# Patient Record
Sex: FEMALE | Race: WHITE | NOT HISPANIC OR LATINO | Employment: OTHER | URBAN - METROPOLITAN AREA
[De-identification: names, ages, dates, MRNs, and addresses within clinical notes are randomized per-mention and may not be internally consistent; named-entity substitution may affect disease eponyms.]

---

## 2017-01-24 DIAGNOSIS — I10 ESSENTIAL HYPERTENSION: ICD-10-CM

## 2017-01-24 DIAGNOSIS — E78.5 HYPERLIPIDEMIA, UNSPECIFIED HYPERLIPIDEMIA TYPE: ICD-10-CM

## 2017-01-24 DIAGNOSIS — Z86.73 HISTORY OF TIA (TRANSIENT ISCHEMIC ATTACK): ICD-10-CM

## 2017-01-24 RX ORDER — FLUTICASONE PROPIONATE 110 UG/1
1 AEROSOL, METERED RESPIRATORY (INHALATION) 2 TIMES DAILY
Qty: 12 G | Refills: 4 | Status: SHIPPED | OUTPATIENT
Start: 2017-01-24 | End: 2021-01-04 | Stop reason: SDUPTHER

## 2017-01-24 RX ORDER — VALSARTAN 160 MG/1
160 TABLET ORAL DAILY
Qty: 30 TABLET | Refills: 4 | Status: SHIPPED | OUTPATIENT
Start: 2017-01-24 | End: 2017-04-17 | Stop reason: SDUPTHER

## 2017-01-24 RX ORDER — METOPROLOL SUCCINATE 100 MG/1
100 TABLET, EXTENDED RELEASE ORAL DAILY
Qty: 90 TABLET | Refills: 1 | Status: SHIPPED | OUTPATIENT
Start: 2017-01-24 | End: 2017-04-17 | Stop reason: SDUPTHER

## 2017-01-24 RX ORDER — PRAVASTATIN SODIUM 40 MG/1
40 TABLET ORAL DAILY
Qty: 90 TABLET | Refills: 1 | Status: SHIPPED | OUTPATIENT
Start: 2017-01-24 | End: 2017-04-17 | Stop reason: SDUPTHER

## 2017-01-24 NOTE — TELEPHONE ENCOUNTER
----- Message from Kamala Carpenter sent at 1/24/2017 11:12 AM CST -----  Would like a refill on medication. Pt uses.  [id number] - [place] - [address] AT [facility] [address]  [address]  [facility] [address]  Phone: [phone] Fax: [phone]    Please call back at [phone]. Thanks///cdb

## 2017-01-27 ENCOUNTER — OFFICE VISIT (OUTPATIENT)
Dept: INTERNAL MEDICINE | Facility: CLINIC | Age: 82
End: 2017-01-27
Payer: MEDICARE

## 2017-01-27 VITALS
OXYGEN SATURATION: 97 % | WEIGHT: 205 LBS | SYSTOLIC BLOOD PRESSURE: 120 MMHG | HEIGHT: 59 IN | DIASTOLIC BLOOD PRESSURE: 72 MMHG | HEART RATE: 62 BPM | BODY MASS INDEX: 41.33 KG/M2

## 2017-01-27 DIAGNOSIS — I48.0 PAF (PAROXYSMAL ATRIAL FIBRILLATION): Chronic | ICD-10-CM

## 2017-01-27 DIAGNOSIS — Z00.00 ENCOUNTER FOR PREVENTIVE HEALTH EXAMINATION: Primary | ICD-10-CM

## 2017-01-27 DIAGNOSIS — E66.01 MORBID OBESITY WITH BMI OF 40.0-44.9, ADULT: ICD-10-CM

## 2017-01-27 DIAGNOSIS — I25.10 CORONARY ARTERY DISEASE INVOLVING NATIVE CORONARY ARTERY OF NATIVE HEART WITHOUT ANGINA PECTORIS: Chronic | ICD-10-CM

## 2017-01-27 DIAGNOSIS — E78.5 HYPERLIPIDEMIA, UNSPECIFIED HYPERLIPIDEMIA TYPE: Chronic | ICD-10-CM

## 2017-01-27 DIAGNOSIS — I50.32 CHRONIC DIASTOLIC CONGESTIVE HEART FAILURE: ICD-10-CM

## 2017-01-27 DIAGNOSIS — R94.4 DECREASED GFR: ICD-10-CM

## 2017-01-27 DIAGNOSIS — I10 ESSENTIAL HYPERTENSION: Chronic | ICD-10-CM

## 2017-01-27 DIAGNOSIS — H91.90 HEARING DIFFICULTY, UNSPECIFIED LATERALITY: ICD-10-CM

## 2017-01-27 DIAGNOSIS — I38 HEART VALVE REGURGITATION: ICD-10-CM

## 2017-01-27 DIAGNOSIS — I51.89 LEFT VENTRICULAR DIASTOLIC DYSFUNCTION WITH PRESERVED SYSTOLIC FUNCTION: Chronic | ICD-10-CM

## 2017-01-27 DIAGNOSIS — J45.909 UNCOMPLICATED ASTHMA, UNSPECIFIED ASTHMA SEVERITY: Chronic | ICD-10-CM

## 2017-01-27 DIAGNOSIS — R32 URINARY INCONTINENCE, UNSPECIFIED TYPE: ICD-10-CM

## 2017-01-27 DIAGNOSIS — I70.0 CALCIFICATION OF AORTA: ICD-10-CM

## 2017-01-27 DIAGNOSIS — M19.90 OSTEOARTHRITIS, UNSPECIFIED OSTEOARTHRITIS TYPE, UNSPECIFIED SITE: ICD-10-CM

## 2017-01-27 PROCEDURE — 99999 PR PBB SHADOW E&M-EST. PATIENT-LVL IV: CPT | Mod: PBBFAC,,, | Performed by: NURSE PRACTITIONER

## 2017-01-27 PROCEDURE — 99499 UNLISTED E&M SERVICE: CPT | Mod: S$GLB,,, | Performed by: NURSE PRACTITIONER

## 2017-01-27 PROCEDURE — 3078F DIAST BP <80 MM HG: CPT | Mod: S$GLB,,, | Performed by: NURSE PRACTITIONER

## 2017-01-27 PROCEDURE — G0439 PPPS, SUBSEQ VISIT: HCPCS | Mod: S$GLB,,, | Performed by: NURSE PRACTITIONER

## 2017-01-27 PROCEDURE — 3074F SYST BP LT 130 MM HG: CPT | Mod: S$GLB,,, | Performed by: NURSE PRACTITIONER

## 2017-01-27 NOTE — MR AVS SNAPSHOT
[facility]  [id number] [place] [id number]  Phone: [phone]  Fax: [phone]                  Elke Laws   2017 9:00 AM   Office Visit    Description:  Female : 10/15/1934   Provider:  Beronica Walters NP   Department:  [facility] - Internal Medicine           Reason for Visit     Health Risk Assessment           Diagnoses this Visit        Comments    Encounter for preventive health examination    -  Primary     Chronic diastolic congestive heart failure         Left ventricular diastolic dysfunction with preserved systolic function         Coronary artery disease involving native coronary artery of native heart without angina pectoris         PAF (paroxysmal atrial fibrillation)         Essential hypertension         Hyperlipidemia, unspecified hyperlipidemia type         Calcification of aorta         Heart valve regurgitation         Uncomplicated asthma, unspecified asthma severity         Decreased GFR         Urinary incontinence, unspecified type         Hearing difficulty, unspecified laterality         Osteoarthritis, unspecified osteoarthritis type, unspecified site         Morbid obesity with BMI of 40.0-44.9, adult                To Do List           Future Appointments        Provider Department Dept Phone    2017 8:40 AM Isidra Benavidez MD [facility] Internal [facility] [phone]      Goals (5 Years of Data)     None      Follow-Up and Disposition     Return in 4 months (on 2017).      [facility]     [facility] On [facility] Nurse Care Line -  Assistance  Registered nurses in the [facility] On Call Center provide clinical advisement, health education, appointment booking, and other advisory services.  Call for this free service at [phone].             Medications           Message regarding Medications     Verify the changes and/or additions to your medication regime listed below are the same as discussed with your clinician  today.  If any of these changes or additions are incorrect, please notify your healthcare provider.             Verify that the below list of medications is an accurate representation of the medications you are currently taking.  If none reported, the list may be blank. If incorrect, please contact your healthcare provider. Carry this list with you in case of emergency.           Current Medications     acetaminophen (TYLENOL) 500 MG tablet Take 1 tablet (500 mg total) by mouth 2 (two) times daily.    albuterol 90 mcg/actuation inhaler Inhale 2 puffs into the lungs 4 (four) times daily.    albuterol sulfate 2.5 mg/0.5 mL Nebu Take 2.5 mg by nebulization every 4 (four) hours as needed.    apixaban (ELIQUIS) 5 mg Tab Take 1 tablet (5 mg total) by mouth 2 (two) times daily.    ASCORBATE CALCIUM (VITAMIN C ORAL) Take by mouth once daily.    compressor, for nebulizer Lara Compressor use as directed by albuterol use.    DOCOSAHEXANOIC ACID/EPA (FISH OIL ORAL) Take by mouth once daily.    flecainide (TAMBOCOR) 50 MG Tab Take 1 tablet (50 mg total) by mouth every 12 (twelve) hours.    fluticasone (FLOVENT HFA) 110 mcg/actuation inhaler Inhale 1 puff into the lungs 2 (two) times daily.    furosemide (LASIX) 40 MG tablet Take 1 tablet (40 mg total) by mouth once daily.    isosorbide mononitrate (IMDUR) 30 MG 24 hr tablet TAKE 1 TABLET(30 MG) BY MOUTH EVERY DAY    loratadine (CLARITIN) 10 mg tablet Take 1 tablet (10 mg total) by mouth once daily.    metoprolol succinate (TOPROL-XL) 100 MG 24 hr tablet Take 1 tablet (100 mg total) by mouth once daily.    pravastatin (PRAVACHOL) 40 MG tablet Take 1 tablet (40 mg total) by mouth once daily.    valsartan (DIOVAN) 160 MG tablet Take 1 tablet (160 mg total) by mouth once daily.           Clinical Reference Information           Vital Signs - Last Recorded  Most recent update: 1/27/2017  8:56 AM by Jessica Gifford MA    BP Pulse Ht Wt LMP SpO2    120/72 (BP Location: Left arm,  "Patient Position: Sitting, BP Method: Manual) 62 4' 10.5" (1.486 m) 93 kg (205 lb 0.4 oz) 12/13/1973 97%    BMI                42.12 kg/m2          Blood Pressure          Most Recent Value    BP  120/72      Allergies as of 1/27/2017     Iodine And Iodide Containing Products      Immunizations Administered on Date of Encounter - 1/27/2017     None      [facility] Sign-Up     Activating your [facility] account is as easy as 1-2-3!     1) Visit [email or web address], select Sign Up Now, enter this activation code and your date of birth, then select Next.  [email or web address]  Expires: 3/13/2017 10:15 AM      2) Create a username and password to use when you visit [facility] in the future and select a security question in case you lose your password and select Next.    3) Enter your e-mail address and click Sign Up!    Additional Information  If you have questions, please e-mail [email or web address] or call [phone] to talk to our [facility] staff. Remember, [facility] is NOT to be used for urgent needs. For medical emergencies, dial 911.         Instructions      Counseling and Referral of Other Preventative  (Italic type indicates deductible and co-insurance are waived)    Patient Name: Elke Laws  Today's Date: 1/27/2017      SERVICE LIMITATIONS RECOMMENDATION    Vaccines    · Pneumococcal (once after 65)    · Influenza (annually)    · Hepatitis B (if medium/high risk)    · Prevnar 13      Hepatitis B medium/high risk factors:       - End-stage renal disease       - Hemophiliacs who received Factor VII or         IX concentrates       - Clients of institutions for the mentally             retarded       - Persons who live in the same house as          a HepB carrier       - Homosexual men       - Illicit injectable drug abusers     Pneumococcal: Done, 11/1/2007     Influenza: Done, 10/4/2016     Hepatitis B: Recommended to follow up with PCP to determine the need for Hep B vaccine.     Prevnar 13: Done, 12/8/2015    " Mammogram (biennial age 50-74)  Annually (age 40 or over)  Family history of breast cancer (sister). Mammogram 6/7/2012, report recommended a 1 year repeat. Recommended to follow up with PCP to discuss scheduling mammogram.    Pap (up to age 70 and after 70 if unknown history or abnormal study last 10 years)   Denies any history of abnormal pap smears. Recommended to follow up with PCP to determine the need for pap smear.     Colorectal cancer screening (to age 75)    · Fecal occult blood test (annual)  · Flexible sigmoidoscopy (5y)  · Screening colonoscopy (10y)  · Barium enema  Family history of colon cancer (mother and father). History of colon polyps.  Colonoscopy 3/27/2011. Recommended to follow up with PCP to discuss scheduling colorectal cancer screening.      Diabetes self-management training (no USPSTF recommendations)  Requires referral by treating physician for patient with diabetes or renal disease. 10 hours of initial DSMT sessions of no less than 30 minutes each in a continuous 12-month period. 2 hours of follow-up DSMT in subsequent years.  Patient is not a diabetic and does not have any known renal disease     Bone mass measurements (age 65 & older, biennial)  Requires diagnosis related to osteoporosis or estrogen deficiency. Biennial benefit unless patient has history of long-term glucocorticoid  DEXA 5/12/2010. Advised to follow up with PCP to schedule.     Glaucoma screening (no USPSTF recommendation)  Diabetes mellitus, family history   , age 50 or over    American, age 65 or over Eye exam 1/22/2016. Had an appointment this morning but was told she did not need an eye appointment at this time.     Medical nutrition therapy for diabetes or renal disease (no recommended schedule)  Requires referral by treating physician for patient with diabetes or renal disease or kidney transplant within the past 3 years.  Can be provided in same year as diabetes self-management training  (DSMT), and CMS recommends medical nutrition therapy take place after DSMT. Up to 3 hours for initial year and 2 hours in subsequent years. Patient is not a diabetic and does not have any known renal disease     Cardiovascular screening blood tests (every 5 years)  · Fasting lipid panel  Order as a panel if possible  Lipid 6/2/2016    Diabetes screening tests (at least every 3 years, Medicare covers annually or at 6-month intervals for prediabetic patients)  · Fasting blood sugar (FBS) or glucose tolerance test (GTT)  Patient must be diagnosed with one of the following:       - Hypertension       - Dyslipidemia       - Obesity (BMI 30kg/m2)       - Previous elevated impaired FBS or GTT       ... or any two of the following:       - Overweight (BMI 25 but <30)       - Family history of diabetes       - Age 65 or older       - History of gestational diabetes or birth of baby weighing more than 9 pounds  CMP 12/30/2016, reports was not fasting. Blood sugar 128H. Advised to follow up with PCP to further evaluate.      Abdominal aortic aneurysm screening (once)  · Sonogram   Limited to patients who meet one of the following criteria:       - Men who are 65-75 years old and have smoked more than 100 cigarette in their lifetime       - Anyone with a family history of abdominal aortic aneurysm       - Anyone recommended for screening by the USPSTF N/A    HIV screening (annually for increased risk patients)  · HIV-1 and HIV-2 by EIA, or PATRICE, rapid antibody test or oral mucosa transudate  Patients must be at increased risk for HIV infection per USPSTF guidelines or pregnant. Tests covered annually for patient at increased risk or as requested by the patient. Pregnant patients may receive up to 3 tests during pregnancy.  Risks discussed, recommended to follow up with PCP to determine the need for HIV screening.     Smoking cessation counseling (up to 8 sessions per year)  Patients must be asymptomatic of tobacco-related  conditions to receive as a preventative service.  N/A    Subsequent annual wellness visit  At least 12 months since last AWV  Return in one year     The following information is provided to all patients.  This information is to help you find resources for any of the problems found today that may be affecting your health:                Living healthy guide: www.[facility].[place].[facility]      Understanding Diabetes: www.diabetes.org      Eating healthy: www.cdc.gov/healthyweight      CDC home safety checklist: www.cdc.gov/steadi/patient.html      Agency on Aging: www.goea.[place].[facility]      Alcoholics anonymous (AA): www.aa.org      Physical Activity: www.cody.nih.gov/gv3cvon      Tobacco use: www.quitwithusla.org

## 2017-01-27 NOTE — PROGRESS NOTES
"Elke Laws presented for a  Medicare AWV and comprehensive Health Risk Assessment today. The following components were reviewed and updated:    · Medical history  · Family History  · Social history  · Allergies and Current Medications  · Health Risk Assessment  · Health Maintenance  · Care Team     ** See Completed Assessments for Annual Wellness Visit within the encounter summary.**       The following assessments were completed:  · Living Situation  · CAGE  · Depression Screening  · Timed Get Up and Go  · Whisper Test  · Cognitive Function Screening  · Nutrition Screening  · ADL Screening  · PAQ Screening    Vitals:    01/27/17 0856   BP: 120/72   BP Location: Left arm   Patient Position: Sitting   BP Method: Manual   Pulse: 62   SpO2: 97%   Weight: 93 kg (205 lb 0.4 oz)   Height: 4' 10.5" (1.486 m)     Body mass index is 42.12 kg/(m^2).  Physical Exam   Constitutional: She is oriented to person, place, and time. She appears well-developed and well-nourished.   HENT:   Head: Normocephalic.   Cardiovascular: Normal rate, regular rhythm and normal heart sounds.    No murmur heard.  Pulmonary/Chest: Effort normal and breath sounds normal. No respiratory distress.   Abdominal: Soft. She exhibits no mass. There is no tenderness.   Musculoskeletal: Normal range of motion. She exhibits no edema.   Neurological: She is alert and oriented to person, place, and time. She exhibits normal muscle tone.   Skin: Skin is warm, dry and intact.   Psychiatric: She has a normal mood and affect. Her speech is normal and behavior is normal.   Nursing note and vitals reviewed.        Diagnoses and health risks identified today and associated recommendations/orders:    1. Encounter for preventive health examination    2. Chronic diastolic congestive heart failure  See echo on #3.   Reports SOB on exertion, not new and not worsening. Cardiology is aware of SOB on exertion.   Denies chest pains or palpitations.  Denies lightheadedness, " "dizziness, near syncope, or syncope.    Discussed s/s of MI and stroke (patient denies any s/s at this time) and advised to go to the ER if occur. She expressed understanding.   Stable. Continue current treatment plan as previously prescribed with your cardiologist, Dr. Angulo.     3. Left ventricular diastolic dysfunction with preserved systolic function  Echo 6/16/2015---CONCLUSIONS     1 - Normal left ventricular systolic function (EF 60-65%).     2 - Left ventricular diastolic dysfunction.     3 - Normal right ventricular systolic function .   Stable. Continue current treatment plan as previously prescribed with your cardiologist, Dr. Angulo, and PCP.     4. Coronary artery disease involving native coronary artery of native heart without angina pectoris  Cath 6/30/2016---B. Summary/Post-Operative Diagnosis   1.   Mild non-obstructive CAD.   2.   Normal LVEF.                3.  Elevated LVEDP.  Reports was told she had a "mini heart attack" in the past.   Stable. Continue current treatment plan as previously prescribed with cardiology.     5. PAF (paroxysmal atrial fibrillation)  EKG 5/7/2015---Atrial fibrillation  Abnormal ECG  When compared with ECG of 20-MAY-2014 09:29,  Previous ECG has undetermined rhythm, needs review  Confirmed by OLIVIA GAGNON MD ([id number]) on 5/7/2015 3:21:45 PM  Patient reports was told by one doctor she had 2 mini strokes but another doctor said she did not  Stable and controlled. Continue current treatment plan as previously prescribed with your PCP and cardiologist.     6. Essential hypertension  Stable and controlled. Continue current treatment plan as previously prescribed with your PCP and cardiologist.     7. Hyperlipidemia, unspecified hyperlipidemia type  Stable and controlled. Continue current treatment plan as previously prescribed with your PCP and cardiologist.     8. Calcification of aorta  CXR 12/30/2016--- Aorta is tortuous with underlying calcification.   Stable. Continue " "current treatment plan as previously prescribed with your PCP and cardiologist.     9. Heart valve regurgitation  Echo 6/16/2015---Aortic Valve:  The aortic valve is mildly sclerotic with normal leaflet mobility. The peak gradient obtained across the aortic valve is 19.0 mmHg, with a mean gradient of 9.0 mmHg. Using a left ventricular outflow tract diameter of 1.8 cm, a left ventricular outflow tract velocity time integral of 51 cm, and a peak instantaneous transvalvular velocity of  m/s, the calculated aortic valve area is 2.4 cm2. Additionally, there is mild aortic regurgitation. There is a pressure half time of 539.0   msec.   Mitral Valve:  The mitral valve is mildly sclerotic. There is mild mitral regurgitation. There is marked mitral annular calcification.   Tricuspid Valve:  The tricuspid valve is normal in structure. There is mild tricuspid regurgitation.   Pulmonary Valve:  The pulmonic valve is not well seen.   Stable. Continue current treatment plan as previously prescribed with your PCP and cardiologist.     10. Uncomplicated asthma, unspecified asthma severity  Reports she had an "asthma attack" this am but used her inhaler and it resolved. Reports that is the first she has had in a while. Reports she feels okay now. Reports wheezing this am that resolved with use of inhaler. Denies cough. Reports she is at her respiratory baseline.   Continue current treatment plan as previously prescribed with your PCP.   Advised to follow up with PCP for any future attacks or any worsening of breathing. She expressed understanding.       11. Decreased GFR  Discussed with patient.   Advised to follow up with PCP for monitoring and further recommendations. She expressed understanding.      12. Urinary incontinence, unspecified type  Reports daily urinary incontinence, ongoing for a "long time", denies any worsening. Wears depends.   Advised to follow up with PCP for further evaluation and recommendations. She expressed " understanding.      13. Hearing difficulty, unspecified laterality  Reports difficulty hearing. Abnormal whisper test.   Advised to follow up with PCP for further evaluation and recommendations. She expressed understanding.     14. Osteoarthritis, unspecified osteoarthritis type, unspecified site  Per patient stable.   Stable. Continue current treatment plan as previously prescribed with your PCP.     15. Morbid obesity with BMI of 40.0-44.9, adult  Continue current treatment plan as previously prescribed with your PCP.     Advised to follow up with PCP for further evaluation and treatment of back pain. She expressed understanding.     Abnormal Cognitive Function Screening (3). Patient denies any memory difficulties. Reports she gets nervous when she has to do things like this (referring to the cognitive screening)Advised to follow up with PCP for further evaluation. She expressed understanding.    Reports intermittent numbness and tingling (very seldom, been a while since she has noticed it) of left arm/hand, ongoing for about a year, denies any worsening. Advised to follow up with PCP for further evaluation. She expressed understanding.      Discussed health maintenance with patient and advised to follow up with PCP to discuss screenings. Education, counseling, and referrals were provided as needed. After Visit Summary printed and given to patient which includes a list of additional screenings\tests needed.     Return in 4 months (on 6/9/2017).    Beronica Walters NP

## 2017-01-27 NOTE — PATIENT INSTRUCTIONS
Counseling and Referral of Other Preventative  (Italic type indicates deductible and co-insurance are waived)    Patient Name: Elke Laws  Today's Date: 1/27/2017      SERVICE LIMITATIONS RECOMMENDATION    Vaccines    · Pneumococcal (once after 65)    · Influenza (annually)    · Hepatitis B (if medium/high risk)    · Prevnar 13      Hepatitis B medium/high risk factors:       - End-stage renal disease       - Hemophiliacs who received Factor VII or         IX concentrates       - Clients of institutions for the mentally             retarded       - Persons who live in the same house as          a HepB carrier       - Homosexual men       - Illicit injectable drug abusers     Pneumococcal: Done, 11/1/2007     Influenza: Done, 10/4/2016     Hepatitis B: Recommended to follow up with PCP to determine the need for Hep B vaccine.     Prevnar 13: Done, 12/8/2015    Mammogram (biennial age 50-74)  Annually (age 40 or over)  Family history of breast cancer (sister). Mammogram 6/7/2012, report recommended a 1 year repeat. Recommended to follow up with PCP to discuss scheduling mammogram.    Pap (up to age 70 and after 70 if unknown history or abnormal study last 10 years)   Denies any history of abnormal pap smears. Recommended to follow up with PCP to determine the need for pap smear.     Colorectal cancer screening (to age 75)    · Fecal occult blood test (annual)  · Flexible sigmoidoscopy (5y)  · Screening colonoscopy (10y)  · Barium enema  Family history of colon cancer (mother and father). History of colon polyps.  Colonoscopy 3/27/2011. Recommended to follow up with PCP to discuss scheduling colorectal cancer screening.      Diabetes self-management training (no USPSTF recommendations)  Requires referral by treating physician for patient with diabetes or renal disease. 10 hours of initial DSMT sessions of no less than 30 minutes each in a continuous 12-month period. 2 hours of follow-up DSMT in subsequent years.   Patient is not a diabetic and does not have any known renal disease     Bone mass measurements (age 65 & older, biennial)  Requires diagnosis related to osteoporosis or estrogen deficiency. Biennial benefit unless patient has history of long-term glucocorticoid  DEXA 5/12/2010. Advised to follow up with PCP to schedule.     Glaucoma screening (no USPSTF recommendation)  Diabetes mellitus, family history   , age 50 or over    American, age 65 or over Eye exam 1/22/2016. Had an appointment this morning but was told she did not need an eye appointment at this time.     Medical nutrition therapy for diabetes or renal disease (no recommended schedule)  Requires referral by treating physician for patient with diabetes or renal disease or kidney transplant within the past 3 years.  Can be provided in same year as diabetes self-management training (DSMT), and CMS recommends medical nutrition therapy take place after DSMT. Up to 3 hours for initial year and 2 hours in subsequent years. Patient is not a diabetic and does not have any known renal disease     Cardiovascular screening blood tests (every 5 years)  · Fasting lipid panel  Order as a panel if possible  Lipid 6/2/2016    Diabetes screening tests (at least every 3 years, Medicare covers annually or at 6-month intervals for prediabetic patients)  · Fasting blood sugar (FBS) or glucose tolerance test (GTT)  Patient must be diagnosed with one of the following:       - Hypertension       - Dyslipidemia       - Obesity (BMI 30kg/m2)       - Previous elevated impaired FBS or GTT       ... or any two of the following:       - Overweight (BMI 25 but <30)       - Family history of diabetes       - Age 65 or older       - History of gestational diabetes or birth of baby weighing more than 9 pounds  CMP 12/30/2016, reports was not fasting. Blood sugar 128H. Advised to follow up with PCP to further evaluate.      Abdominal aortic aneurysm screening  (once)  · Sonogram   Limited to patients who meet one of the following criteria:       - Men who are 65-75 years old and have smoked more than 100 cigarette in their lifetime       - Anyone with a family history of abdominal aortic aneurysm       - Anyone recommended for screening by the USPSTF N/A    HIV screening (annually for increased risk patients)  · HIV-1 and HIV-2 by EIA, or PATRICE, rapid antibody test or oral mucosa transudate  Patients must be at increased risk for HIV infection per USPSTF guidelines or pregnant. Tests covered annually for patient at increased risk or as requested by the patient. Pregnant patients may receive up to 3 tests during pregnancy.  Risks discussed, recommended to follow up with PCP to determine the need for HIV screening.     Smoking cessation counseling (up to 8 sessions per year)  Patients must be asymptomatic of tobacco-related conditions to receive as a preventative service.  N/A    Subsequent annual wellness visit  At least 12 months since last AWV  Return in one year     The following information is provided to all patients.  This information is to help you find resources for any of the problems found today that may be affecting your health:                Living healthy guide: www.[facility].[place].gov      Understanding Diabetes: www.diabetes.org      Eating healthy: www.cdc.gov/healthyweight      CDC home safety checklist: www.cdc.gov/steadi/patient.html      Agency on Aging: www.goea.[place].gov      Alcoholics anonymous (AA): www.aa.org      Physical Activity: www.cody.nih.gov/cl6jvfs      Tobacco use: www.quitwithusla.org

## 2017-03-03 ENCOUNTER — TELEPHONE (OUTPATIENT)
Dept: INTERNAL MEDICINE | Facility: CLINIC | Age: 82
End: 2017-03-03

## 2017-03-03 NOTE — TELEPHONE ENCOUNTER
----- Message from Mary Lepe sent at 3/3/2017 10:25 AM CST -----  Contact: [employer]  Calling to see if forms were received from them for patients Lidocaine ointment, please call them back at [phone], ref#[id number]. Thank you

## 2017-03-10 ENCOUNTER — TELEPHONE (OUTPATIENT)
Dept: PEDIATRICS | Facility: CLINIC | Age: 82
End: 2017-03-10

## 2017-03-10 NOTE — TELEPHONE ENCOUNTER
----- Message from Ashley Gómez sent at 3/10/2017 10:11 AM CST -----  Contact: [facility] [employer]  He would like to know the status of the Lidocaine ointment.  Call him at [phone] ref#[id number].                                              [place]

## 2017-03-13 ENCOUNTER — OFFICE VISIT (OUTPATIENT)
Dept: INTERNAL MEDICINE | Facility: CLINIC | Age: 82
End: 2017-03-13
Payer: MEDICARE

## 2017-03-13 VITALS
TEMPERATURE: 97 F | OXYGEN SATURATION: 97 % | DIASTOLIC BLOOD PRESSURE: 72 MMHG | HEIGHT: 58 IN | BODY MASS INDEX: 41.97 KG/M2 | HEART RATE: 50 BPM | WEIGHT: 199.94 LBS | SYSTOLIC BLOOD PRESSURE: 128 MMHG

## 2017-03-13 DIAGNOSIS — R05.9 COUGH: ICD-10-CM

## 2017-03-13 DIAGNOSIS — J06.9 VIRAL URI: ICD-10-CM

## 2017-03-13 DIAGNOSIS — R68.89 FLU-LIKE SYMPTOMS: Primary | ICD-10-CM

## 2017-03-13 LAB
FLUAV AG SPEC QL IA: NEGATIVE
FLUBV AG SPEC QL IA: NEGATIVE
SPECIMEN SOURCE: NORMAL

## 2017-03-13 PROCEDURE — 1160F RVW MEDS BY RX/DR IN RCRD: CPT | Mod: S$GLB,,, | Performed by: NURSE PRACTITIONER

## 2017-03-13 PROCEDURE — 99214 OFFICE O/P EST MOD 30 MIN: CPT | Mod: S$GLB,,, | Performed by: NURSE PRACTITIONER

## 2017-03-13 PROCEDURE — 1125F AMNT PAIN NOTED PAIN PRSNT: CPT | Mod: S$GLB,,, | Performed by: NURSE PRACTITIONER

## 2017-03-13 PROCEDURE — 3074F SYST BP LT 130 MM HG: CPT | Mod: S$GLB,,, | Performed by: NURSE PRACTITIONER

## 2017-03-13 PROCEDURE — 1157F ADVNC CARE PLAN IN RCRD: CPT | Mod: S$GLB,,, | Performed by: NURSE PRACTITIONER

## 2017-03-13 PROCEDURE — 87400 INFLUENZA A/B EACH AG IA: CPT | Mod: 59

## 2017-03-13 PROCEDURE — 99999 PR PBB SHADOW E&M-EST. PATIENT-LVL IV: CPT | Mod: PBBFAC,,, | Performed by: NURSE PRACTITIONER

## 2017-03-13 PROCEDURE — 3078F DIAST BP <80 MM HG: CPT | Mod: S$GLB,,, | Performed by: NURSE PRACTITIONER

## 2017-03-13 PROCEDURE — 1159F MED LIST DOCD IN RCRD: CPT | Mod: S$GLB,,, | Performed by: NURSE PRACTITIONER

## 2017-03-13 RX ORDER — BENZONATATE 100 MG/1
100 CAPSULE ORAL 3 TIMES DAILY PRN
Qty: 30 CAPSULE | Refills: 0 | Status: SHIPPED | OUTPATIENT
Start: 2017-03-13 | End: 2017-03-23

## 2017-03-13 NOTE — PROGRESS NOTES
Subjective:       Patient ID: Elke Laws is a 82 y.o. female.    Chief Complaint: Cough; Sinus Problem; and body aches    Cough   This is a new problem. Associated symptoms include chills, ear pain, a fever (subjective), myalgias and a sore throat. Pertinent negatives include no chest pain, headaches, rhinorrhea or shortness of breath.   Sinus Problem   Associated symptoms include chills, coughing (productive), ear pain and a sore throat. Pertinent negatives include no congestion, headaches, shortness of breath, sinus pressure or sneezing.   URI    This is a new problem. The current episode started in the past 7 days (3-4 days). The problem has been gradually worsening. Maximum temperature: subjective. Associated symptoms include coughing (productive), ear pain, sinus pain and a sore throat. Pertinent negatives include no chest pain, congestion, diarrhea, headaches, nausea, rhinorrhea, sneezing or vomiting. Associated symptoms comments: chills. Treatments tried: delsym. The treatment provided moderate relief.     Review of Systems   Constitutional: Positive for chills and fever (subjective).   HENT: Positive for ear pain and sore throat. Negative for congestion, rhinorrhea, sinus pressure and sneezing.    Eyes: Negative for discharge.   Respiratory: Positive for cough (productive). Negative for shortness of breath.    Cardiovascular: Negative for chest pain.   Gastrointestinal: Negative for diarrhea, nausea and vomiting.   Musculoskeletal: Positive for myalgias.   Neurological: Negative for headaches.       Objective:      Physical Exam   Constitutional: She is oriented to person, place, and time. She appears well-developed and well-nourished.   HENT:   Head: Normocephalic and atraumatic.   Right Ear: Tympanic membrane, external ear and ear canal normal.   Left Ear: Tympanic membrane, external ear and ear canal normal.   Nose: Rhinorrhea (clear) present. No mucosal edema. Right sinus exhibits no maxillary sinus  tenderness and no frontal sinus tenderness. Left sinus exhibits no maxillary sinus tenderness and no frontal sinus tenderness.   Eyes: Conjunctivae and EOM are normal. Pupils are equal, round, and reactive to light. Right eye exhibits no discharge. Left eye exhibits no discharge.   Neck: Normal range of motion.   Cardiovascular: Normal rate and regular rhythm.  Exam reveals no gallop and no friction rub.    No murmur heard.  Pulmonary/Chest: Effort normal and breath sounds normal. No respiratory distress. She has no wheezes. She has no rales.   Musculoskeletal:   In wheelchair   Neurological: She is alert and oriented to person, place, and time.   Skin: Skin is warm and dry. No rash noted.       Assessment:       1. Flu-like symptoms    2. Cough    3. Viral URI        Plan:   Flu-like symptoms  -     Influenza antigen Nasopharyngeal Swab    Cough  -     benzonatate (TESSALON) 100 MG capsule; Take 1 capsule (100 mg total) by mouth 3 (three) times daily as needed for Cough.  Dispense: 30 capsule; Refill: 0    Viral URI      Flu negative. Discussed symptom management with patient.   Return if symptoms worsen or fail to improve, for keep routine follow up.

## 2017-03-16 ENCOUNTER — HOSPITAL ENCOUNTER (OUTPATIENT)
Dept: RADIOLOGY | Facility: HOSPITAL | Age: 82
Discharge: HOME | End: 2017-03-16
Attending: NURSE PRACTITIONER
Payer: MEDICARE

## 2017-03-16 ENCOUNTER — OFFICE VISIT (OUTPATIENT)
Dept: INTERNAL MEDICINE | Facility: CLINIC | Age: 82
End: 2017-03-16
Payer: MEDICARE

## 2017-03-16 VITALS
WEIGHT: 199.94 LBS | SYSTOLIC BLOOD PRESSURE: 138 MMHG | HEIGHT: 58 IN | DIASTOLIC BLOOD PRESSURE: 74 MMHG | TEMPERATURE: 98 F | HEART RATE: 51 BPM | BODY MASS INDEX: 41.97 KG/M2 | OXYGEN SATURATION: 98 %

## 2017-03-16 DIAGNOSIS — J01.90 ACUTE SINUSITIS, RECURRENCE NOT SPECIFIED, UNSPECIFIED LOCATION: ICD-10-CM

## 2017-03-16 DIAGNOSIS — R05.9 COUGH: Primary | ICD-10-CM

## 2017-03-16 DIAGNOSIS — R05.9 COUGH: ICD-10-CM

## 2017-03-16 PROCEDURE — 71020 XR CHEST PA AND LATERAL: CPT | Mod: 26,,, | Performed by: RADIOLOGY

## 2017-03-16 PROCEDURE — 1157F ADVNC CARE PLAN IN RCRD: CPT | Mod: S$GLB,,, | Performed by: NURSE PRACTITIONER

## 2017-03-16 PROCEDURE — 71020 XR CHEST PA AND LATERAL: CPT | Mod: TC

## 2017-03-16 PROCEDURE — 1159F MED LIST DOCD IN RCRD: CPT | Mod: S$GLB,,, | Performed by: NURSE PRACTITIONER

## 2017-03-16 PROCEDURE — 3078F DIAST BP <80 MM HG: CPT | Mod: S$GLB,,, | Performed by: NURSE PRACTITIONER

## 2017-03-16 PROCEDURE — 99999 PR PBB SHADOW E&M-EST. PATIENT-LVL IV: CPT | Mod: PBBFAC,,, | Performed by: NURSE PRACTITIONER

## 2017-03-16 PROCEDURE — 1160F RVW MEDS BY RX/DR IN RCRD: CPT | Mod: S$GLB,,, | Performed by: NURSE PRACTITIONER

## 2017-03-16 PROCEDURE — 1126F AMNT PAIN NOTED NONE PRSNT: CPT | Mod: S$GLB,,, | Performed by: NURSE PRACTITIONER

## 2017-03-16 PROCEDURE — 99214 OFFICE O/P EST MOD 30 MIN: CPT | Mod: S$GLB,,, | Performed by: NURSE PRACTITIONER

## 2017-03-16 PROCEDURE — 3075F SYST BP GE 130 - 139MM HG: CPT | Mod: S$GLB,,, | Performed by: NURSE PRACTITIONER

## 2017-03-16 RX ORDER — AMOXICILLIN AND CLAVULANATE POTASSIUM 875; 125 MG/1; MG/1
1 TABLET, FILM COATED ORAL 2 TIMES DAILY
Qty: 14 TABLET | Refills: 0 | Status: SHIPPED | OUTPATIENT
Start: 2017-03-16 | End: 2017-03-26

## 2017-03-16 RX ORDER — METHYLPREDNISOLONE 4 MG/1
TABLET ORAL
Qty: 1 PACKAGE | Refills: 0 | Status: SHIPPED | OUTPATIENT
Start: 2017-03-16 | End: 2017-04-06

## 2017-03-16 RX ORDER — AZITHROMYCIN 250 MG/1
TABLET, FILM COATED ORAL
Qty: 6 TABLET | Refills: 0 | Status: SHIPPED | OUTPATIENT
Start: 2017-03-16 | End: 2017-03-16 | Stop reason: SINTOL

## 2017-03-16 NOTE — PROGRESS NOTES
Subjective:       Patient ID: Elke Laws is a 82 y.o. female.    Chief Complaint: Cough and weakness    Cough   This is a new problem. The current episode started in the past 7 days (about 1 week). The problem has been gradually worsening. The problem occurs every few minutes. The cough is productive of sputum. Associated symptoms include myalgias and rhinorrhea. Pertinent negatives include no chest pain, chills, ear pain, fever, headaches, sore throat, shortness of breath, sweats or wheezing. Nothing aggravates the symptoms. She has tried prescription cough suppressant and a beta-agonist inhaler for the symptoms. The treatment provided no relief. Her past medical history is significant for asthma.     Review of Systems   Constitutional: Negative for chills and fever.   HENT: Positive for rhinorrhea. Negative for ear pain and sore throat.    Respiratory: Positive for cough. Negative for shortness of breath and wheezing.    Cardiovascular: Negative for chest pain.   Musculoskeletal: Positive for myalgias.   Neurological: Positive for weakness (generalized). Negative for headaches.       Objective:      Physical Exam   Constitutional: She is oriented to person, place, and time. She appears well-developed and well-nourished.   HENT:   Head: Normocephalic and atraumatic.   Eyes: Conjunctivae and EOM are normal. Pupils are equal, round, and reactive to light. Right eye exhibits no discharge. Left eye exhibits no discharge.   Neck: Normal range of motion.   Cardiovascular: Regular rhythm.  Bradycardia present.  Exam reveals no gallop and no friction rub.    No murmur heard.  On beta-blocker   Pulmonary/Chest: Effort normal. No respiratory distress. She has decreased breath sounds in the left lower field. She has no wheezes. She has no rales.   Neurological: She is alert and oriented to person, place, and time.   Skin: Skin is warm and dry. No rash noted.       Assessment:       1. Cough    2. Acute sinusitis, recurrence  not specified, unspecified location        Plan:   Cough  -     X-Ray Chest PA And Lateral; Future; Expected date: 3/16/17  -     methylPREDNISolone (MEDROL DOSEPACK) 4 mg tablet; use as directed  Dispense: 1 Package; Refill: 0    Acute sinusitis, recurrence not specified, unspecified location  -     Discontinue: azithromycin (Z-DAMIAN) 250 MG tablet; Take 2 tablets by mouth on day 1; Take 1 tablet by mouth on days 2-5  Dispense: 6 tablet; Refill: 0  -     methylPREDNISolone (MEDROL DOSEPACK) 4 mg tablet; use as directed  Dispense: 1 Package; Refill: 0  -     amoxicillin-clavulanate 875-125mg (AUGMENTIN) 875-125 mg per tablet; Take 1 tablet by mouth 2 (two) times daily.  Dispense: 14 tablet; Refill: 0      Chest x-ray does not indicate PNA. Discussed with patient and daughter. Recommended rest, fluids, and tylenol for aches and fever.  ADDEND:  azith interacts with flecinide. Will change.  Return if symptoms worsen or fail to improve.

## 2017-03-31 ENCOUNTER — TELEPHONE (OUTPATIENT)
Dept: INTERNAL MEDICINE | Facility: CLINIC | Age: 82
End: 2017-03-31

## 2017-04-09 RX ORDER — APIXABAN 5 MG/1
TABLET, FILM COATED ORAL
Qty: 60 TABLET | Refills: 0 | Status: ON HOLD | OUTPATIENT
Start: 2017-04-09 | End: 2017-04-12 | Stop reason: SDUPTHER

## 2017-04-12 ENCOUNTER — HOSPITAL ENCOUNTER (OUTPATIENT)
Facility: HOSPITAL | Age: 82
Discharge: HOME | End: 2017-04-13
Attending: EMERGENCY MEDICINE | Admitting: HOSPITALIST
Payer: MEDICARE

## 2017-04-12 ENCOUNTER — NURSE TRIAGE (OUTPATIENT)
Dept: ADMINISTRATIVE | Facility: CLINIC | Age: 82
End: 2017-04-12

## 2017-04-12 DIAGNOSIS — R07.9 CHEST PAIN: ICD-10-CM

## 2017-04-12 DIAGNOSIS — M47.816 OSTEOARTHRITIS OF LUMBAR SPINE, UNSPECIFIED SPINAL OSTEOARTHRITIS COMPLICATION STATUS: ICD-10-CM

## 2017-04-12 DIAGNOSIS — E78.5 HYPERLIPIDEMIA, UNSPECIFIED HYPERLIPIDEMIA TYPE: Chronic | ICD-10-CM

## 2017-04-12 DIAGNOSIS — I10 ESSENTIAL HYPERTENSION: Chronic | ICD-10-CM

## 2017-04-12 DIAGNOSIS — I50.33 ACUTE ON CHRONIC DIASTOLIC CONGESTIVE HEART FAILURE: Chronic | ICD-10-CM

## 2017-04-12 DIAGNOSIS — E66.01 MORBID OBESITY WITH BMI OF 40.0-44.9, ADULT: ICD-10-CM

## 2017-04-12 DIAGNOSIS — J45.909 UNCOMPLICATED ASTHMA, UNSPECIFIED ASTHMA SEVERITY: Chronic | ICD-10-CM

## 2017-04-12 DIAGNOSIS — M19.90 OSTEOARTHRITIS, UNSPECIFIED OSTEOARTHRITIS TYPE, UNSPECIFIED SITE: ICD-10-CM

## 2017-04-12 DIAGNOSIS — I51.89 LEFT VENTRICULAR DIASTOLIC DYSFUNCTION WITH PRESERVED SYSTOLIC FUNCTION: Chronic | ICD-10-CM

## 2017-04-12 DIAGNOSIS — R07.9 CHEST PAIN SYNDROME: ICD-10-CM

## 2017-04-12 DIAGNOSIS — R06.02 SHORTNESS OF BREATH: Primary | ICD-10-CM

## 2017-04-12 DIAGNOSIS — I25.10 CORONARY ARTERY DISEASE, ANGINA PRESENCE UNSPECIFIED, UNSPECIFIED VESSEL OR LESION TYPE, UNSPECIFIED WHETHER NATIVE OR TRANSPLANTED HEART: ICD-10-CM

## 2017-04-12 DIAGNOSIS — I38 HEART VALVE REGURGITATION: ICD-10-CM

## 2017-04-12 DIAGNOSIS — I25.10 CORONARY ARTERY DISEASE INVOLVING NATIVE CORONARY ARTERY OF NATIVE HEART WITHOUT ANGINA PECTORIS: Chronic | ICD-10-CM

## 2017-04-12 DIAGNOSIS — I48.0 PAF (PAROXYSMAL ATRIAL FIBRILLATION): Chronic | ICD-10-CM

## 2017-04-12 DIAGNOSIS — I70.0 ATHEROSCLEROSIS OF AORTA: Chronic | ICD-10-CM

## 2017-04-12 DIAGNOSIS — R94.39 ABNORMAL NUCLEAR STRESS TEST: ICD-10-CM

## 2017-04-12 LAB
ALBUMIN SERPL BCP-MCNC: 3.2 G/DL
ALP SERPL-CCNC: 88 U/L
ALT SERPL W/O P-5'-P-CCNC: 11 U/L
ANION GAP SERPL CALC-SCNC: 8 MMOL/L
AST SERPL-CCNC: 17 U/L
BASOPHILS # BLD AUTO: 0.04 K/UL
BASOPHILS NFR BLD: 0.7 %
BILIRUB SERPL-MCNC: 0.5 MG/DL
BNP SERPL-MCNC: 385 PG/ML
BUN SERPL-MCNC: 22 MG/DL
CALCIUM SERPL-MCNC: 8.9 MG/DL
CHLORIDE SERPL-SCNC: 101 MMOL/L
CO2 SERPL-SCNC: 30 MMOL/L
CREAT SERPL-MCNC: 0.9 MG/DL
DIFFERENTIAL METHOD: ABNORMAL
EOSINOPHIL # BLD AUTO: 0.4 K/UL
EOSINOPHIL NFR BLD: 7.7 %
ERYTHROCYTE [DISTWIDTH] IN BLOOD BY AUTOMATED COUNT: 14.2 %
EST. GFR  (AFRICAN AMERICAN): >60 ML/MIN/1.73 M^2
EST. GFR  (NON AFRICAN AMERICAN): 60 ML/MIN/1.73 M^2
GLUCOSE SERPL-MCNC: 116 MG/DL
HCT VFR BLD AUTO: 34.9 %
HGB BLD-MCNC: 11.2 G/DL
INR PPP: 1
LYMPHOCYTES # BLD AUTO: 1.5 K/UL
LYMPHOCYTES NFR BLD: 26.2 %
MCH RBC QN AUTO: 29.3 PG
MCHC RBC AUTO-ENTMCNC: 32.1 %
MCV RBC AUTO: 91 FL
MONOCYTES # BLD AUTO: 0.8 K/UL
MONOCYTES NFR BLD: 13.3 %
NEUTROPHILS # BLD AUTO: 3 K/UL
NEUTROPHILS NFR BLD: 52.1 %
PLATELET # BLD AUTO: 199 K/UL
PMV BLD AUTO: 11.3 FL
POTASSIUM SERPL-SCNC: 4.2 MMOL/L
PROT SERPL-MCNC: 6.7 G/DL
PROTHROMBIN TIME: 10.7 SEC
RBC # BLD AUTO: 3.82 M/UL
SODIUM SERPL-SCNC: 139 MMOL/L
TROPONIN I SERPL DL<=0.01 NG/ML-MCNC: 0.01 NG/ML
TROPONIN I SERPL DL<=0.01 NG/ML-MCNC: <0.006 NG/ML
WBC # BLD AUTO: 5.72 K/UL

## 2017-04-12 PROCEDURE — 85025 COMPLETE CBC W/AUTO DIFF WBC: CPT

## 2017-04-12 PROCEDURE — 84484 ASSAY OF TROPONIN QUANT: CPT | Mod: 91

## 2017-04-12 PROCEDURE — 94640 AIRWAY INHALATION TREATMENT: CPT

## 2017-04-12 PROCEDURE — 99285 EMERGENCY DEPT VISIT HI MDM: CPT | Mod: 25

## 2017-04-12 PROCEDURE — 25000003 PHARM REV CODE 250: Performed by: EMERGENCY MEDICINE

## 2017-04-12 PROCEDURE — 93010 ELECTROCARDIOGRAM REPORT: CPT | Mod: ,,, | Performed by: INTERNAL MEDICINE

## 2017-04-12 PROCEDURE — G0378 HOSPITAL OBSERVATION PER HR: HCPCS

## 2017-04-12 PROCEDURE — 93005 ELECTROCARDIOGRAM TRACING: CPT

## 2017-04-12 PROCEDURE — 80053 COMPREHEN METABOLIC PANEL: CPT

## 2017-04-12 PROCEDURE — 27000221 HC OXYGEN, UP TO 24 HOURS

## 2017-04-12 PROCEDURE — 36415 COLL VENOUS BLD VENIPUNCTURE: CPT

## 2017-04-12 PROCEDURE — 85610 PROTHROMBIN TIME: CPT

## 2017-04-12 PROCEDURE — 83880 ASSAY OF NATRIURETIC PEPTIDE: CPT

## 2017-04-12 PROCEDURE — 25000242 PHARM REV CODE 250 ALT 637 W/ HCPCS: Performed by: HOSPITALIST

## 2017-04-12 PROCEDURE — 25000003 PHARM REV CODE 250: Performed by: HOSPITALIST

## 2017-04-12 RX ORDER — MORPHINE SULFATE 2 MG/ML
2 INJECTION, SOLUTION INTRAMUSCULAR; INTRAVENOUS EVERY 4 HOURS PRN
Status: DISCONTINUED | OUTPATIENT
Start: 2017-04-12 | End: 2017-04-13 | Stop reason: HOSPADM

## 2017-04-12 RX ORDER — FLECAINIDE ACETATE 50 MG/1
50 TABLET ORAL EVERY 12 HOURS
Status: DISCONTINUED | OUTPATIENT
Start: 2017-04-12 | End: 2017-04-13 | Stop reason: HOSPADM

## 2017-04-12 RX ORDER — PANTOPRAZOLE SODIUM 40 MG/1
40 TABLET, DELAYED RELEASE ORAL DAILY
Status: DISCONTINUED | OUTPATIENT
Start: 2017-04-13 | End: 2017-04-13 | Stop reason: HOSPADM

## 2017-04-12 RX ORDER — NITROGLYCERIN 0.4 MG/1
0.4 TABLET SUBLINGUAL EVERY 5 MIN PRN
Status: DISCONTINUED | OUTPATIENT
Start: 2017-04-12 | End: 2017-04-13 | Stop reason: HOSPADM

## 2017-04-12 RX ORDER — HYDROCODONE BITARTRATE AND ACETAMINOPHEN 5; 325 MG/1; MG/1
1 TABLET ORAL EVERY 6 HOURS PRN
Status: DISCONTINUED | OUTPATIENT
Start: 2017-04-12 | End: 2017-04-13 | Stop reason: HOSPADM

## 2017-04-12 RX ORDER — IPRATROPIUM BROMIDE AND ALBUTEROL SULFATE 2.5; .5 MG/3ML; MG/3ML
3 SOLUTION RESPIRATORY (INHALATION) EVERY 4 HOURS PRN
Status: DISCONTINUED | OUTPATIENT
Start: 2017-04-12 | End: 2017-04-13 | Stop reason: HOSPADM

## 2017-04-12 RX ORDER — PRAVASTATIN SODIUM 20 MG/1
40 TABLET ORAL DAILY
Status: DISCONTINUED | OUTPATIENT
Start: 2017-04-13 | End: 2017-04-13 | Stop reason: HOSPADM

## 2017-04-12 RX ORDER — ASPIRIN 81 MG/1
81 TABLET ORAL DAILY
Status: DISCONTINUED | OUTPATIENT
Start: 2017-04-13 | End: 2017-04-13 | Stop reason: HOSPADM

## 2017-04-12 RX ORDER — ASPIRIN 325 MG
325 TABLET ORAL
Status: COMPLETED | OUTPATIENT
Start: 2017-04-12 | End: 2017-04-12

## 2017-04-12 RX ORDER — METOPROLOL SUCCINATE 50 MG/1
50 TABLET, EXTENDED RELEASE ORAL DAILY
Status: DISCONTINUED | OUTPATIENT
Start: 2017-04-13 | End: 2017-04-12

## 2017-04-12 RX ORDER — IPRATROPIUM BROMIDE AND ALBUTEROL SULFATE 2.5; .5 MG/3ML; MG/3ML
3 SOLUTION RESPIRATORY (INHALATION)
Status: DISCONTINUED | OUTPATIENT
Start: 2017-04-12 | End: 2017-04-13 | Stop reason: HOSPADM

## 2017-04-12 RX ORDER — ISOSORBIDE MONONITRATE 30 MG/1
30 TABLET, EXTENDED RELEASE ORAL DAILY
Status: DISCONTINUED | OUTPATIENT
Start: 2017-04-13 | End: 2017-04-13 | Stop reason: HOSPADM

## 2017-04-12 RX ORDER — ONDANSETRON 2 MG/ML
4 INJECTION INTRAMUSCULAR; INTRAVENOUS EVERY 8 HOURS PRN
Status: DISCONTINUED | OUTPATIENT
Start: 2017-04-12 | End: 2017-04-13 | Stop reason: HOSPADM

## 2017-04-12 RX ORDER — DIPHENHYDRAMINE HYDROCHLORIDE 50 MG/ML
12.5 INJECTION INTRAMUSCULAR; INTRAVENOUS EVERY 6 HOURS PRN
Status: DISCONTINUED | OUTPATIENT
Start: 2017-04-12 | End: 2017-04-13 | Stop reason: HOSPADM

## 2017-04-12 RX ORDER — BUDESONIDE 0.25 MG/2ML
0.5 INHALANT ORAL EVERY 12 HOURS
Status: DISCONTINUED | OUTPATIENT
Start: 2017-04-12 | End: 2017-04-12 | Stop reason: DRUGHIGH

## 2017-04-12 RX ORDER — FUROSEMIDE 40 MG/1
40 TABLET ORAL DAILY
Status: DISCONTINUED | OUTPATIENT
Start: 2017-04-13 | End: 2017-04-13 | Stop reason: HOSPADM

## 2017-04-12 RX ORDER — IPRATROPIUM BROMIDE AND ALBUTEROL SULFATE 2.5; .5 MG/3ML; MG/3ML
3 SOLUTION RESPIRATORY (INHALATION) EVERY 8 HOURS
Status: DISCONTINUED | OUTPATIENT
Start: 2017-04-13 | End: 2017-04-12

## 2017-04-12 RX ORDER — BUDESONIDE 0.5 MG/2ML
0.5 INHALANT ORAL EVERY 12 HOURS
Status: DISCONTINUED | OUTPATIENT
Start: 2017-04-12 | End: 2017-04-13 | Stop reason: HOSPADM

## 2017-04-12 RX ORDER — VALSARTAN 80 MG/1
160 TABLET ORAL DAILY
Status: DISCONTINUED | OUTPATIENT
Start: 2017-04-13 | End: 2017-04-13 | Stop reason: HOSPADM

## 2017-04-12 RX ORDER — METOPROLOL SUCCINATE 50 MG/1
100 TABLET, EXTENDED RELEASE ORAL DAILY
Status: DISCONTINUED | OUTPATIENT
Start: 2017-04-13 | End: 2017-04-13 | Stop reason: HOSPADM

## 2017-04-12 RX ADMIN — BUDESONIDE 0.5 MG: 0.5 SUSPENSION RESPIRATORY (INHALATION) at 07:04

## 2017-04-12 RX ADMIN — APIXABAN 5 MG: 2.5 TABLET, FILM COATED ORAL at 09:04

## 2017-04-12 RX ADMIN — ASPIRIN 325 MG ORAL TABLET 325 MG: 325 PILL ORAL at 11:04

## 2017-04-12 RX ADMIN — IPRATROPIUM BROMIDE AND ALBUTEROL SULFATE 3 ML: .5; 3 SOLUTION RESPIRATORY (INHALATION) at 07:04

## 2017-04-12 RX ADMIN — FLECAINIDE ACETATE 50 MG: 50 TABLET ORAL at 09:04

## 2017-04-12 NOTE — ASSESSMENT & PLAN NOTE
Resume home meds  Hydralazine 10mg IVP q6 hours prn for systolic blood pressure greater than 170

## 2017-04-12 NOTE — SUBJECTIVE & OBJECTIVE
"Past Medical History:   Diagnosis Date    Acute on chronic diastolic congestive heart failure 8/27/2015    Anemia 5/9/2016    Anemia 5/9/2016    Aortic regurgitation     Echo 11/2013---5 - Mild to moderate aortic regurgitation.      Atrial fibrillation 5/15/2014    Back pain     s/p epidural steriod injections, no recent injections.    Baker's cyst     small, right, seen on US lower extremity 6/2014    Blood transfusion     Chest pain, atypical 8/27/2015    Diastolic dysfunction     Seen on echo 11/2013, stress test negative 5/2014    Diverticulosis     colonoscopy 3/27/2011    Hemorrhoids     colonoscopy 3/27/2011    Hiatal hernia     CXR 12/30/2016---Retrocardiac density again noted consistent with a hiatal hernia.    Hx of adenomatous colonic polyps     Hyperlipidemia     Hypertension     Hyponatremia 5/9/2016    Mitral regurgitation     Myocardial infarction     per patient was told in the past she had a "mild heart attack"    Obesity     Osteoarthritis, knee     Pneumonia     per patient "walking Pneumonia" did not require hospitalization    Seasonal allergies     Stroke     TIA; patient reports was told by one doctor she had 2 mini strokes but another doctor said she did not       Past Surgical History:   Procedure Laterality Date    EYE SURGERY Left     HYSTERECTOMY      benign reasons    KNEE SURGERY Bilateral     x2     OLECRANON BURSECTOMY Right     6/2011    URETHRA SURGERY         Review of patient's allergies indicates:   Allergen Reactions    Iodine and iodide containing products Rash       No current facility-administered medications on file prior to encounter.      Current Outpatient Prescriptions on File Prior to Encounter   Medication Sig    acetaminophen (TYLENOL) 500 MG tablet Take 1 tablet (500 mg total) by mouth 2 (two) times daily.    albuterol 90 mcg/actuation inhaler Inhale 2 puffs into the lungs 4 (four) times daily.    albuterol sulfate 2.5 mg/0.5 mL Nebu " Take 2.5 mg by nebulization every 4 (four) hours as needed.    apixaban (ELIQUIS) 5 mg Tab Take 1 tablet (5 mg total) by mouth 2 (two) times daily.    ASCORBATE CALCIUM (VITAMIN C ORAL) Take by mouth once daily.    compressor, for nebulizer Lara Compressor use as directed by albuterol use.    DOCOSAHEXANOIC ACID/EPA (FISH OIL ORAL) Take by mouth once daily.    ELIQUIS 5 mg Tab TAKE 1 TABLET BY MOUTH TWICE DAILY    flecainide (TAMBOCOR) 50 MG Tab Take 1 tablet (50 mg total) by mouth every 12 (twelve) hours.    fluticasone (FLOVENT HFA) 110 mcg/actuation inhaler Inhale 1 puff into the lungs 2 (two) times daily.    furosemide (LASIX) 40 MG tablet Take 1 tablet (40 mg total) by mouth once daily.    isosorbide mononitrate (IMDUR) 30 MG 24 hr tablet TAKE 1 TABLET(30 MG) BY MOUTH EVERY DAY    loratadine (CLARITIN) 10 mg tablet Take 1 tablet (10 mg total) by mouth once daily.    metoprolol succinate (TOPROL-XL) 100 MG 24 hr tablet Take 1 tablet (100 mg total) by mouth once daily.    pravastatin (PRAVACHOL) 40 MG tablet Take 1 tablet (40 mg total) by mouth once daily.    valsartan (DIOVAN) 160 MG tablet Take 1 tablet (160 mg total) by mouth once daily.     Family History     Problem Relation (Age of Onset)    COPD Son    Cancer Mother, Father, Sister    Diabetes Brother, Son    Heart disease Mother, Father, Sister, Brother    Hyperlipidemia Mother, Father    Hypertension Mother, Father, Son        Social History Main Topics    Smoking status: Former Smoker     Packs/day: 0.05     Years: 1.00     Types: Cigarettes     Quit date: 1/1/1952    Smokeless tobacco: Never Used    Alcohol use No    Drug use: No    Sexual activity: No     Review of Systems   Constitutional: Positive for diaphoresis and fatigue.   HENT: Positive for ear pain.    Eyes: Negative.    Respiratory: Positive for cough and wheezing.    Cardiovascular: Positive for chest pain.   Gastrointestinal: Positive for constipation and nausea.    Endocrine: Negative.    Genitourinary: Negative.    Musculoskeletal: Positive for back pain and myalgias.   Skin: Negative.    Allergic/Immunologic: Negative.    Neurological: Positive for light-headedness and headaches.   Hematological: Negative.    Psychiatric/Behavioral: Negative.      Objective:     Vital Signs (Most Recent):  Temp: 97.5 °F (36.4 °C) (04/12/17 1111)  Pulse: (!) 57 (04/12/17 1519)  Resp: (!) 22 (04/12/17 1519)  BP: (!) 151/49 (04/12/17 1518)  SpO2: 98 % (04/12/17 1519) Vital Signs (24h Range):  Temp:  [97.5 °F (36.4 °C)] 97.5 °F (36.4 °C)  Pulse:  [54-63] 57  Resp:  [17-23] 22  SpO2:  [97 %-100 %] 98 %  BP: (151-184)/(49-73) 151/49     Weight: 86.6 kg (191 lb)  Body mass index is 39.24 kg/(m^2).    Physical Exam   Constitutional: She is oriented to person, place, and time. She appears well-developed and well-nourished.   HENT:   Head: Normocephalic and atraumatic.   Eyes: Conjunctivae and EOM are normal. Pupils are equal, round, and reactive to light.   Neck: Normal range of motion. Neck supple.   Cardiovascular: Normal rate, regular rhythm and intact distal pulses.    Murmur heard.  Pulmonary/Chest: Effort normal. She has wheezes.   Abdominal: Soft. Bowel sounds are normal.   Musculoskeletal: Normal range of motion.   Neurological: She is alert and oriented to person, place, and time. She has normal reflexes.   Skin: Skin is warm and dry.        Psychiatric: She has a normal mood and affect. Her behavior is normal. Judgment and thought content normal.   Nursing note and vitals reviewed.       Significant Labs:   CBC:   Recent Labs  Lab 04/12/17  1130   WBC 5.72   HGB 11.2*   HCT 34.9*        CMP:   Recent Labs  Lab 04/12/17  1130      K 4.2      CO2 30*   *   BUN 22   CREATININE 0.9   CALCIUM 8.9   PROT 6.7   ALBUMIN 3.2*   BILITOT 0.5   ALKPHOS 88   AST 17   ALT 11   ANIONGAP 8   EGFRNONAA 60     Cardiac Markers:   Recent Labs  Lab 04/12/17  1130   *      Coagulation:   Recent Labs  Lab 04/12/17  1130   INR 1.0     All pertinent labs within the past 24 hours have been reviewed.    Significant Imaging:   Imaging Results         X-Ray Chest AP Portable (Final result) Result time:  04/12/17 12:01:56    Final result by Jose Gongora III, MD (04/12/17 12:01:56)    Impression:     Cardiomegaly. Suboptimal visualization of the left lung base. Lungs otherwise clear.      Electronically signed by: JOSE GONGORA MD  Date:     04/12/17  Time:    12:01     Narrative:    Chest x-ray, single view.    Clinical indication: Congestive heart failure.    Compared to March.    The heart size is enlarged. Chronic elevation of the right hemidiaphragm again noted. Lung fields are grossly clear. No consolidation or effusion. Left base is obscured by projection and overlying heart.

## 2017-04-12 NOTE — IP AVS SNAPSHOT
[address] Dr [place] [address]           Patient Discharge Instructions   Our goal is to set you up for success. This packet includes information on your condition, medications, and your home care.  It will help you care for yourself to prevent having to return to the hospital.     Please ask your nurse if you have any questions.      There are many details to remember when preparing to leave the hospital. Here is what you will need to do:    1. Take your medicine. If you are prescribed medications, review your Medication List on the following pages. You may have new medications to  at the pharmacy and others that you'll need to stop taking. Review the instructions for how and when to take your medications. Talk with your doctor or nurses if you are unsure of what to do.     2. Go to your follow-up appointments. Specific follow-up information is listed in the following pages. Your may be contacted by a nurse or clinical provider about future appointments. Be sure we have all of the phone numbers to reach you. Please contact your provider's office if you are unable to make an appointment.     3. Watch for warning signs. Your doctor or nurse will give you detailed warning signs to watch for and when to call for assistance. These instructions may also include educational information about your condition. If you experience any of warning signs to your health, call your doctor.               ** Verify the list of medication(s) below is accurate and up to date. Carry this with you in case of emergency. If your medications have changed, please notify your healthcare provider.             Medication List      START taking these medications        Additional Info                      aspirin 81 MG EC tablet   Commonly known as:  ECOTRIN   Refills:  0   Dose:  81 mg    Last time this was given:  81 mg on 4/13/2017  8:18 AM   Instructions:  Take 1 tablet (81 mg total) by  mouth once daily.     Begin Date    AM    Noon    PM    Bedtime         CONTINUE taking these medications        Additional Info                      acetaminophen 500 MG tablet   Commonly known as:  TYLENOL   Refills:  0   Dose:  500 mg    Instructions:  Take 1 tablet (500 mg total) by mouth 2 (two) times daily.     Begin Date    AM    Noon    PM    Bedtime       * albuterol 90 mcg/actuation inhaler   Quantity:  3 Inhaler   Refills:  4   Dose:  2 puff    Instructions:  Inhale 2 puffs into the lungs 4 (four) times daily.     Begin Date    AM    Noon    PM    Bedtime       * albuterol sulfate 2.5 mg/0.5 mL Nebu   Quantity:  90 each   Refills:  1   Dose:  2.5 mg    Instructions:  Take 2.5 mg by nebulization every 4 (four) hours as needed.     Begin Date    AM    Noon    PM    Bedtime       apixaban 5 mg Tab   Commonly known as:  ELIQUIS   Quantity:  180 tablet   Refills:  3   Dose:  5 mg    Last time this was given:  5 mg on 4/13/2017  8:18 AM   Instructions:  Take 1 tablet (5 mg total) by mouth 2 (two) times daily.     Begin Date    AM    Noon    PM    Bedtime       compressor, for nebulizer Lara   Quantity:  1 Device   Refills:  0    Instructions:  Compressor use as directed by albuterol use.     Begin Date    AM    Noon    PM    Bedtime       FISH OIL ORAL   Refills:  0    Instructions:  Take by mouth once daily.     Begin Date    AM    Noon    PM    Bedtime       flecainide 50 MG Tab   Commonly known as:  TAMBOCOR   Quantity:  60 tablet   Refills:  6   Dose:  50 mg    Last time this was given:  50 mg on 4/13/2017  8:17 AM   Instructions:  Take 1 tablet (50 mg total) by mouth every 12 (twelve) hours.     Begin Date    AM    Noon    PM    Bedtime       fluticasone 110 mcg/actuation inhaler   Commonly known as:  FLOVENT HFA   Quantity:  12 g   Refills:  4   Dose:  1 puff    Instructions:  Inhale 1 puff into the lungs 2 (two) times daily.     Begin Date    AM    Noon    PM    Bedtime       furosemide 40 MG tablet    Commonly known as:  LASIX   Quantity:  30 tablet   Refills:  11   Dose:  40 mg    Last time this was given:  40 mg on 4/13/2017  8:18 AM   Instructions:  Take 1 tablet (40 mg total) by mouth once daily.     Begin Date    AM    Noon    PM    Bedtime       isosorbide mononitrate 30 MG 24 hr tablet   Commonly known as:  IMDUR   Quantity:  30 tablet   Refills:  9    Last time this was given:  30 mg on 4/13/2017  8:18 AM   Instructions:  TAKE 1 TABLET(30 MG) BY MOUTH EVERY DAY     Begin Date    AM    Noon    PM    Bedtime       loratadine 10 mg tablet   Commonly known as:  CLARITIN   Refills:  0   Dose:  10 mg    Instructions:  Take 1 tablet (10 mg total) by mouth once daily.     Begin Date    AM    Noon    PM    Bedtime       metoprolol succinate 100 MG 24 hr tablet   Commonly known as:  TOPROL-XL   Quantity:  90 tablet   Refills:  1   Dose:  100 mg    Last time this was given:  100 mg on 4/13/2017  8:18 AM   Instructions:  Take 1 tablet (100 mg total) by mouth once daily.     Begin Date    AM    Noon    PM    Bedtime       pravastatin 40 MG tablet   Commonly known as:  PRAVACHOL   Quantity:  90 tablet   Refills:  1   Dose:  40 mg    Last time this was given:  40 mg on 4/13/2017  8:18 AM   Instructions:  Take 1 tablet (40 mg total) by mouth once daily.     Begin Date    AM    Noon    PM    Bedtime       valsartan 160 MG tablet   Commonly known as:  DIOVAN   Quantity:  30 tablet   Refills:  4   Dose:  160 mg    Last time this was given:  160 mg on 4/13/2017  8:18 AM   Instructions:  Take 1 tablet (160 mg total) by mouth once daily.     Begin Date    AM    Noon    PM    Bedtime       VITAMIN C ORAL   Refills:  0    Instructions:  Take by mouth once daily.     Begin Date    AM    Noon    PM    Bedtime       * Notice:  This list has 2 medication(s) that are the same as other medications prescribed for you. Read the directions carefully, and ask your doctor or other care provider to review them with you.         Where to  Get Your Medications      You can get these medications from any pharmacy     You don't need a prescription for these medications     aspirin 81 MG EC tablet                  Please bring to all follow up appointments:    1. A copy of your discharge instructions.  2. All medicines you are currently taking in their original bottles.  3. Identification and insurance card.    Please arrive 15 minutes ahead of scheduled appointment time.    Please call 24 hours in advance if you must reschedule your appointment and/or time.        Your Scheduled Appointments     Apr 17, 2017  9:00 AM CDT   Holter Monitor-3-14 Day with HOLTER, O'NEAL O'Neal - Special Cardiology ([facility])    [address] [place] [address] , 2nd Floor  [facility] [id number] [phone]            Apr 21, 2017 11:30 AM CDT   Established Patient Visit with KOSTAS Eason - Cardiology ([facility])    [address] [place] [id number] [phone]            Jun 08, 2017  9:40 AM CDT   Established Patient Visit with MD [place] Lopez - Internal Medicine ([facility])    [address] [place] [id number] [phone]              Follow-up Information     Follow up with Isidra Benavidez MD.    Specialty:  Family Medicine    Contact information:    [address] [place] [address] DR [place] [address]  [phone]          Follow up with Michael Valentine NP. Schedule an appointment as soon as possible for a visit in 1 week.    Specialty:  Cardiology    Contact information:    [address]  [place] [id number] [phone]          Discharge Instructions     Future Orders    Activity as tolerated     Diet general     Questions:    Total calories:      Fat restriction, if any:      Protein restriction, if any:      Na restriction, if any:      Fluid restriction:      Additional restrictions:          Primary Diagnosis     Your primary diagnosis was:  Chest Pain Syndrome     "  Admission Information     Date & Time Provider Department CSN    4/12/2017 11:12 AM Jim Garland MD [facility] [id number]      Care Providers     Provider Role Specialty Primary office phone    Jim Garland MD Attending Provider Internal Medicine [phone]    Jmi Garland MD Team Attending  Internal Medicine [phone]    Tish Angulo MD Consulting Physician  Cardiology [phone]      Your Vitals Were     BP Pulse Temp Resp Height Weight    117/53 (BP Location: Right arm, Patient Position: Lying, BP Method: Automatic) 55 97.9 °F (36.6 °C) (Oral) 18 4' 10" (1.473 m) 91.8 kg (202 lb 6.1 oz)    Last Period SpO2 BMI          12/13[birth year]1973 95% 42.3 kg/m2        Recent Lab Values     No lab values to display.      Pending Labs     Order Current Status    Hemoglobin A1c In process    Lipid panel In process      Allergies as of 4/13/2017        Reactions    Iodine And Iodide Containing Products Rash      [facility]     [facility] Nurse Care Line - 24/7 Assistance  Unless otherwise directed by your provider, please contact [facility], our nurse care line that is available for 24/7 assistance.     Registered nurses in the [facility] provide clinical advisement, health education, appointment booking, and other advisory services.  Call for this free service at [phone].        Advance Directives     An advance directive is a document which, in the event you are no longer able to make decisions for yourself, tells your healthcare team what kind of treatment you do or do not want to receive, or who you would like to make those decisions for you.  If you do not currently have an advance directive, [facility] encourages you to create one.  For more information call:  [phone]), [phone] ([phone]),  or log on to [email or web address].        Language Assistance Services     ATTENTION: Language assistance services are " available, free of charge. Please call [phone].      ATENCIÓN: Si max kumar, tiene a gong disposición servicios gratuitos de asistencia lingüística. Lljuaquin al [phone].     CHÚ Ý: N?u b?n nói Ti?ng Vi?t, có các d?ch v? h? tr? ngôn ng? mi?n phí dành cho b?n. G?i s? [phone].        Heart Failure Education       Heart Failure: Being Active  You have a condition called heart failure. Being active doesnt mean that you have to wear yourself out. Even a little movement each day helps to strengthen your heart. If you cant get out to exercise, you can do simple stretching and strengthening exercises at home. These are good ways to keep you well-conditioned and prevent you and your heart from becoming excessively weak.    Ideas to get you started  · Add a little movement to things you do now. Walk to mail letters. Park your car at the far end of the parking lot and walk to the store. Walk up a flight of stairs instead of taking the elevator.  · Choose activities you enjoy. You might walk, swim, or ride an exercise bike. Things like gardening and washing the car count, too. Other possibilities include: washing dishes, walking the dog, walking around the mall, and doing aerobic activities with friends.  · Join a group exercise program at a [facility], a senior center, or a community center. Or look into a hospital cardiac rehabilitation program. Ask your doctor if you qualify.  Tips to keep you going  · Get up and get dressed each day. Go to a coffee shop and read a newspaper or go somewhere that you'll be in the presence of other active people. Youll feel more like being active.  · Make a plan. Choose one or more activities that you enjoy and that you can easily do. Then plan to do at least one each day. You might write your plan on a calendar.  · Go with a friend or a group if you like company. This can help you feel supported and stay motivated, too.  · Plan social events that you enjoy. This will  keep you mentally engaged as well as physically motivated to do things you find pleasure in.  For your safety  · Talk with your healthcare provider before starting an exercise program.  · Exercise indoors when its too hot or too cold outside, or when the air quality is poor. Try walking at a shopping mall.  · Wear socks and sturdy shoes to maintain your balance and prevent falls.  · Start slowly. Do a few minutes several times a day at first. Increase your time and speed little by little.  · Stop and rest whenever you feel tired or get short of breath.  · Dont push yourself on days when you dont feel well.  Date Last Reviewed: 3/20/2016  © 8441-5012 [employer]. [address] [place] [address], [place] [address]. All rights reserved. This information is not intended as a substitute for professional medical care. Always follow your healthcare professional's instructions.              Heart Failure: Evaluating Your Heart  You have a condition called heart failure. To evaluate your condition, your doctor will examine you, ask questions, and do some tests. Along with looking for signs of heart failure, the doctor looks for any other health problems that may have led to heart failure. The results of your evaluation will help your doctor form a treatment plan.  Health history and physical exam  Your visit will start with a health history. Tell the doctor about any symptoms youve noticed and about all medicines you take. Then youll have a physical exam. This includes listening to your heartbeat and breathing. Youll also be checked for swelling (edema) in your legs and neck. When you have fluid buildup or fluid in the lungs, it may be called congestive heart failure.  Diagnosing heart failure     During an echocardiogram, sound waves bounce off the heart. These are converted into a picture on the screen.   The following may be done to help your doctor form a diagnosis:  · X-rays show the size and shape of your  heart. These pictures can also show fluid in your lungs.  · An electrocardiogram (ECG or EKG) shows the pattern of your heartbeat. Small pads (electrodes) are placed on your chest, arms, and legs. Wires connect the pads to the ECG machine, which records your hearts electrical signals. This can give the doctor information about heart function.  · An echocardiogram uses ultrasound waves to show the structure and movement of your heart muscle. This shows how well the heart pumps. It also shows the thickness of the heart walls, and if the heart is enlarged. It is one of the most useful, non-invasive tests as it provides information about the heart's general function. This helps your doctor make treatment decisions.  · Lab tests evaluate small amounts of blood or urine for signs of problems. A BNP lab test can help diagnose and evaluate heart failure. BNP stands for B-type natriuretic peptide. The ventricles secrete more BNP when heart failure worsens. Lab tests can also provide information about metabolic dysfunction or heart dysfunction.  Your treatment plan  Based on the results of your evaluation and tests, your doctor will develop a treatment plan. This plan is designed to relieve some of your heart failure symptoms and help make you more comfortable. Your treatment plan may include:  · Medicine to help your heart work better and improve your quality of life  · Changes in what you eat and drink to help prevent fluid from backing up in your body  · Daily monitoring of your weight and heart failure symptoms to see how well your treatment plan is working  · Exercise to help you stay healthy  · Help with quitting smoking  · Emotional and psychological support to help adjust to the changes  · Referrals to other specialists to make sure you are being treated comprehensively  Date Last Reviewed: 3/21/2016  © 0977-7889 [employer]. [address] [place] [address], [place] [address]. All rights reserved. This  information is not intended as a substitute for professional medical care. Always follow your healthcare professional's instructions.              Heart Failure: Making Changes to Your Diet  You have a condition called heart failure. When you have heart failure, excess fluid is more likely to build up in your body because your heart isn't working well. This makes the heart work harder to pump blood. Fluid buildup causes symptoms such as shortness of breath and swelling (edema). This is often referred to as congestive heart failure or CHF. Controlling the amount of salt (sodium) you eat may help stop fluid from building up. Your doctor may also tell you to reduce the amount of fluid you drink.  Reading food labels    Your healthcare provider will tell you how much sodium you can eat each day. Read food labels to keep track. Keep in mind that certain foods are high in salt. These include canned, frozen, and processed foods. Check the amount of sodium in each serving. Watch out for high-sodium ingredients. These include MSG (monosodium glutamate), baking soda, and sodium phosphate.   Eating less salt  Give yourself time to get used to eating less salt. It may take a little while. Here are some tips to help:  · Take the saltshaker off the table. Replace it with salt-free herb mixes and spices.  · Eat fresh or plain frozen vegetables. These have much less salt than canned vegetables.  · Choose low-sodium snacks like sodium-free pretzels, crackers, or air-popped popcorn.  · Dont add salt to your food when youre cooking. Instead, season your foods with pepper, lemon, garlic, or onion.  · When you eat out, ask that your food be cooked without added salt.  · Avoid eating fried foods as these often have a great deal of salt.  If youre told to limit fluids  You may need to limit how much fluid you have to help prevent swelling. This includes anything that is liquid at room temperature, such as ice cream and soup. If your  doctor tells you to limit fluid, try these tips:  · Measure drinks in a measuring cup before you drink them. This will help you meet daily goals.  · Chill drinks to make them more refreshing.  · Suck on frozen lemon wedges to quench thirst.  · Only drink when youre thirsty.  · Chew sugarless gum or suck on hard candy to keep your mouth moist.  · Weigh yourself daily to know if your body's fluid content is rising.  My sodium goal  Your healthcare provider may give you a sodium goal to meet each day. This includes sodium found in food as well as salt that you add. My goal is to eat no more than ___________ mg of sodium per day.     When to call your doctor  Call your doctor right away if you have any symptoms of worsening heart failure. These can include:  · Sudden weight gain  · Increased swelling of your legs or ankles  · Trouble breathing when youre resting or at night  · Increase in the number of pillows you have to sleep on  · Chest pain, pressure, discomfort, or pain in the jaw, neck, or back   Date Last Reviewed: 3/21/2016  © 2430-5880 [employer]. [address] [place] [address]. All rights reserved. This information is not intended as a substitute for professional medical care. Always follow your healthcare professional's instructions.              Heart Failure: Medicines to Help Your Heart    You have a condition called heart failure (also known as congestive heart failure, or CHF). Your doctor will likely prescribe medicines for heart failure and any underlying health problems you have. Most heart failure patients take one or more types of medicinen. Your healthcare provider will work to find the combination of medicines that works best for you.  Heart failure medicines  Here are the most common heart failure medicines:  · ACE inhibitors lower blood pressure and decrease strain on the heart. This makes it easier for the heart to pump. Angiotensin receptor blockers have similar  effects. These are prescribed for some patients instead of ACE inhibitors.  · Beta-blockers relieve stress on the heart. They also improve symptoms. They may also improve the heart's pumping action over time.  · Diuretics (also called water pills) help rid your body of excess water. This can help rid your body of swelling (edema). Having less fluid to pump means your heart doesnt have to work as hard. Some diuretics make your body lose a mineral called potassium. Your doctor will tell you if you need to take supplements or eat more foods high in potassium.  · Digoxin helps your heart pump with more strength. This helps your heart pump more blood with each beat. So, more oxygen-rich blood travels to the rest of the body.  · Aldosterone antagonists help alter hormones and decrease strain on the heart.  · Hydralazine and nitrates are two separate medicines used together to treat heart failure. They may come in one combination pill. They lower blood pressure and decrease how hard the heart has to pump.  Medicines for related conditions  Controlling other heart problems helps keep heart failure under control, too. Depending on other heart problems you have, medicines may be prescribed to:  · Lower blood pressure (antihypertensives).  · Lower cholesterol levels (statins).  · Prevent blood clots (anticoagulants or aspirin).  · Keep the heartbeat steady (antiarrhythmics).  Date Last Reviewed: 3/5/2016  © 5187-2789 [employer]. [address] [place] [address], [place] [address]. All rights reserved. This information is not intended as a substitute for professional medical care. Always follow your healthcare professional's instructions.              Heart Failure: Procedures That May Help    The heart is a muscle that pumps oxygen-rich blood to all parts of the body. When you have heart failure, the heart is not able to pump as well as it should. Blood and fluid may back up into the lungs (congestive heart failure),  and some parts of the body dont get enough oxygen-rich blood to work normally. These problems lead to the symptoms of heart failure.     Certain procedures may help the heart pump better in some cases of heart failure. Some procedures are done to treat health problems that may have caused the heart failure such as coronary artery disease or heart rhythm problems. For more serious heart failure, other options are available.  Treating artery and valve problems  If you have coronary artery disease or valve disease, procedures may be done to improve blood flow. This helps the heart pump better, which can improve heart failure symptoms. First, your doctor may do a cardiac catheterization to help detect clogged blood vessels or valve damage. During this procedure, a  thin tube (catheter) in inserted into a blood vessel and guided to the heart. There a dye is injected and a special type of X-ray (angiogram) is taken of the blood vessels. Procedures to open a blocked artery or fix damaged valves can also be done using catheterization.  · Angioplasty uses a balloon-tipped instrument at the end of the catheter. The balloon is inflated to widen the narrowed artery. In many cases, a stent is expanded to further support the narrowed artery. A stent is a metal mesh tube.  · Valve surgery repairs or replacement of faulty valves can also be done during catheterization so blood can flow properly through the chambers of the heart.  Bypass surgery is another option to help treat blocked arteries. It uses a healthy blood vessel from elsewhere in the body. The healthy blood vessel is attached above and below the blocked area so that blood can flow around the blocked artery.  Treating heart rhythm problems  A device may be placed in the chest to help a weak heart maintain a healthy, heartbeat so the heart can pump more effectively:  · Pacemaker. A pacemaker is an implanted device that regulates your heartbeat electronically. It monitors  your heart's rhythm and generates a painless electric impulse that helps the heart beat in a regular rhythm. A pacemaker is programmed to meet your specific heart rhythm needs.  · Biventricular pacing/cardiac resynchronization therapy. A type of pacemaker that paces both pumping chambers of the heart at the same time to coordinate contractions and to improve the heart's function. Some people with heart failure are candidates for this therapy.  · Implantable cardioverter defibrillator. A device similar to a pacemaker that senses when the heart is beating too fast and delivers an electrical shock to convert the fast rhythm to a normal rhythm. This can be a life saving device.  In severe cases  In more serious cases of heart failure when other treatments no longer work, other options may include:  · Ventricular assist devices (VADs). These are mechanical devices used to take over the pumping function for one or both of the heart's ventricles, or pumping chambers. A VAD may be necessary when heart failure progresses to the point that medicines and other treatments no longer help. In some cases, a VAD may be used as a bridge to transplant.  · Heart transplant. This is replacing the diseased heart with a healthy one from a donor. This is an option for a few people who are very sick. A heart transplant is very serious and not an option for all patients. Your doctor can tell you more.  Date Last Reviewed: 3/20/2016  © 4415-3758 [employer]. [address] [place] [address], [place] [address]. All rights reserved. This information is not intended as a substitute for professional medical care. Always follow your healthcare professional's instructions.              Heart Failure: Tracking Your Weight  You have a condition called heart failure. When you have heart failure, a sudden weight gain or a steady rise in weight is a warning sign that your body is retaining too much water and salt. This could mean your heart failure  is getting worse. If left untreated, it can cause problems for your lungs and result in shortness of breath. Weighing yourself each day is the best way to know if youre retaining water. If your weight goes up quickly, call your doctor. You will be given instructions on how to get rid of the excess water. You will likely need medicines and to avoid salt. This will help your heart work better.  Call your doctor if you gain more than 2 pounds in 1 day, more than 5 pounds in 1 week, or whatever weight gain you were told to report by your doctor. This is often a sign of worsening heart failure and needs to be evaluated and treated. Your doctor will tell you what to do next.   Tips for weighing yourself    · Weigh yourself at the same time each morning, wearing the same clothes. Weigh yourself after urinating and before eating.  · Use the same scale each day. Make sure the numbers are easy to read. Put the scale on a flat, hard surface -- not on a rug or carpet.  · Do not stop weighing yourself. If you forget one day, weigh again the next morning.  How to use your weight chart  · Keep your weight chart near the scale. Write your weight on the chart as soon as you get off the scale.  · Fill in the month and the start date on the chart. Then write down your weight each day. Your chart will look like this:    · If you miss a day, leave the space blank. Weigh yourself the next day and write your weight in the next space.  · Take your weight chart with you when you go to see your doctor.  Date Last Reviewed: 3/20/2016  © 3102-9338 [email or web address]. [address] [place] [address]. All rights reserved. This information is not intended as a substitute for professional medical care. Always follow your healthcare professional's instructions.              Heart Failure: Warning Signs of a Flare-Up  You have a condition called heart failure. Once you have heart failure, flare-ups can happen. Below are signs that  can mean your heart failure is getting worse. If you notice any of these warning signs, call your healthcare provider.  Swelling    · Your feet, ankles, or lower legs get puffier.  · You notice skin changes on your lower legs.  · Your shoes feel too tight.  · Your clothes are tighter in the waist.  · You have trouble getting rings on or off your fingers.  Shortness of breath  · You have to breathe harder even when youre doing your normal activities or when youre resting.  · You are short of breath walking up stairs or even short distances.  · You wake up at night short of breath or coughing.  · You need to use more pillows or sit up to sleep.  · You wake up tired or restless.  Other warning signs  · You feel weaker, dizzy, or more tired.  · You have chest pain or changes in your heartbeat.  · You have a cough that wont go away.  · You cant remember things or dont feel like eating.  Tracking your weight  Gaining weight is often the first warning sign that heart failure is getting worse. Gaining even a few pounds can be a sign that your body is retaining excess water and salt. Weighing yourself each day in the morning after you urinate and before you eat, is the best way to know if you're retaining water. Get a scale that is easy to read and make sure you wear the same clothes and use the same scale every time you weigh. Your healthcare provider will show you how to track your weight. Call your doctor if you gain more than 2 pounds in 1 day, 5 pounds in 1 week, or whatever weight gain you were told to report by your doctor. This is often a sign of worsening heart failure and needs to be evaluated and treated before it compromises your breathing. Your doctor will tell you what to do next.    Date Last Reviewed: 3/15/2016  © 5087-4623 [employer]. [address] [place] [address], [place] [address]. All rights reserved. This information is not intended as a substitute for professional medical care. Always  follow your healthcare professional's instructions.              Pneumonmia Discharge Instructions                Eliquis Informaiton           [employer] Sign-Up     Activating your [facility] account is as easy as 1-2-3!     1) Visit [email or web address], select Sign Up Now, enter this activation code and your date of birth, then select Next.  [email or web address]  Expires: 5/28/2017  2:02 PM      2) Create a username and password to use when you visit [facility] in the future and select a security question in case you lose your password and select Next.    3) Enter your e-mail address and click Sign Up!    Additional Information  If you have questions, please e-mail [email or web address] or call [phone] to talk to our [facility] staff. Remember, [facility] is NOT to be used for urgent needs. For medical emergencies, dial 911.          [facility] complies with applicable Federal civil rights laws and does not discriminate on the basis of race, color, national origin, age, disability, or sex.

## 2017-04-12 NOTE — PROGRESS NOTES
Patient oriented to room, call light, fall precautions, and room service. VS and assessment completed. Patient denies pain. Patient informed strict I +Os- patient notified that she is on fluid restrictions 1.5 L QD.

## 2017-04-12 NOTE — ED PROVIDER NOTES
SCRIBE #1 NOTE: I, Shaniqua Nichole, am scribing for, and in the presence of, Nat Cormier MD. I have scribed the entire note.      History      Chief Complaint   Patient presents with    Shortness of Breath     doctor told pt to come to her for increased sob. pt reports hx of asthma       Review of patient's allergies indicates:   Allergen Reactions    Iodine and iodide containing products Rash        HPI   HPI    4/12/2017, 11:20 AM   History obtained from the patient      History of Present Illness: Elke Laws is a 82 y.o. female patient who presents to the Emergency Department for SOB which onset gradually last night. Sx have been constant and moderate in severity. No modifying factors noted.  Associated sx include nausea and chest pain which onset gradually yesterday afternoon at approximately 4:00 PM. Chest pain is described as a pressure, is rated 7/10, and is located to the left chest. Pt denies any fever, chills, diaphoresis, leg swelling, calf pain, vomiting, or dizziness. No further complaints at this time.       Arrival mode: Personal vehicle      PCP: Isidra Benavidez MD       Past Medical History:  Past Medical History:   Diagnosis Date    Acute on chronic diastolic congestive heart failure 8/27/2015    Anemia 5/9/2016    Anemia 5/9/2016    Aortic regurgitation     Echo 11/2013---5 - Mild to moderate aortic regurgitation.      Atrial fibrillation 5/15/2014    Back pain     s/p epidural steriod injections, no recent injections.    Baker's cyst     small, right, seen on US lower extremity 6/2014    Blood transfusion     Chest pain, atypical 8/27/2015    Diastolic dysfunction     Seen on echo 11/2013, stress test negative 5/2014    Diverticulosis     colonoscopy 3/27/2011    Hemorrhoids     colonoscopy 3/27/2011    Hiatal hernia     CXR 12/30/2016---Retrocardiac density again noted consistent with a hiatal hernia.    Hx of adenomatous colonic polyps     Hyperlipidemia      "Hypertension     Hyponatremia 5/9/2016    Mitral regurgitation     Myocardial infarction     per patient was told in the past she had a "mild heart attack"    Obesity     Osteoarthritis, knee     Pneumonia     per patient "walking Pneumonia" did not require hospitalization    Seasonal allergies     Stroke     TIA; patient reports was told by one doctor she had 2 mini strokes but another doctor said she did not       Past Surgical History:  Past Surgical History:   Procedure Laterality Date    EYE SURGERY Left     HYSTERECTOMY      benign reasons    KNEE SURGERY Bilateral     x2     OLECRANON BURSECTOMY Right     6/2011    URETHRA SURGERY           Family History:  Family History   Problem Relation Age of Onset    Heart disease Mother     Cancer Mother      colon    Hypertension Mother     Hyperlipidemia Mother     Heart disease Father     Cancer Father      colon    Hypertension Father     Hyperlipidemia Father     Heart disease Sister      MI    Heart disease Brother      MI    COPD Son     Hypertension Son     Diabetes Brother     Diabetes Son     Cancer Sister      breast    Kidney disease Neg Hx     Stroke Neg Hx        Social History:  Social History     Social History Main Topics    Smoking status: Former Smoker     Packs/day: 0.05     Years: 1.00     Types: Cigarettes     Quit date: 1/1/1952    Smokeless tobacco: Never Used    Alcohol use No    Drug use: No    Sexual activity: No       ROS   Review of Systems   Constitutional: Negative for chills, diaphoresis and fever.   HENT: Negative for sore throat.    Respiratory: Positive for shortness of breath.    Cardiovascular: Positive for chest pain. Negative for leg swelling.   Gastrointestinal: Positive for nausea. Negative for abdominal pain, blood in stool, constipation, diarrhea and vomiting.   Genitourinary: Negative for dysuria.   Musculoskeletal: Negative for back pain.        (-) calf pain   Skin: Negative for rash. " "  Neurological: Negative for dizziness, weakness, light-headedness, numbness and headaches.   Hematological: Does not bruise/bleed easily.       Physical Exam    Initial Vitals   BP Pulse Resp Temp SpO2   04/12/17 1111 04/12/17 1111 04/12/17 1111 04/12/17 1111 04/12/17 1111   159/63 63 18 97.5 °F (36.4 °C) 97 %      Physical Exam  Nursing Notes and Vital Signs Reviewed.  Constitutional: Patient is in no acute distress. Awake and alert. Well-developed and well-nourished.  Head: Atraumatic. Normocephalic.  Eyes: PERRL. EOM intact. Conjunctivae are not pale. No scleral icterus.  ENT: Mucous membranes are moist. Oropharynx is clear and symmetric.    Neck: Supple. Full ROM. No lymphadenopathy.  Cardiovascular: Regular rate. Regular rhythm. No murmurs, rubs, or gallops. Distal pulses are 2+ and symmetric.  Pulmonary/Chest: No respiratory distress. Clear to auscultation bilaterally. No wheezing, rales, or rhonchi.  Abdominal: Soft and non-distended.  There is no tenderness.  No rebound, guarding, or rigidity.   Musculoskeletal: Moves all extremities. No obvious deformities. No edema. No calf tenderness.  Skin: Warm and dry.  Neurological:  Alert, awake, and appropriate.  Normal speech.  No acute focal neurological deficits are appreciated.  Psychiatric: Normal affect. Good eye contact. Appropriate in content.    ED Course    Procedures  ED Vital Signs:  Vitals:    04/12/17 1111 04/12/17 1222 04/12/17 1248 04/12/17 1317   BP: (!) 159/63 (!) 175/59 (!) 155/56 (!) 157/60   Pulse: 63 (!) 56 (!) 54 (!) 55   Resp: 18 (!) 23 (!) 22 18   Temp: 97.5 °F (36.4 °C)      TempSrc: Oral      SpO2: 97% 99% 98% 99%   Weight: 86.6 kg (191 lb)      Height: 4' 10.5" (1.486 m)          Abnormal Lab Results:  Labs Reviewed   CBC W/ AUTO DIFFERENTIAL - Abnormal; Notable for the following:        Result Value    RBC 3.82 (*)     Hemoglobin 11.2 (*)     Hematocrit 34.9 (*)     All other components within normal limits   COMPREHENSIVE METABOLIC " PANEL - Abnormal; Notable for the following:     CO2 30 (*)     Glucose 116 (*)     Albumin 3.2 (*)     All other components within normal limits   B-TYPE NATRIURETIC PEPTIDE - Abnormal; Notable for the following:      (*)     All other components within normal limits   TROPONIN I   PROTIME-INR        All Lab Results:  Results for orders placed or performed during the hospital encounter of 04/12/17   CBC auto differential   Result Value Ref Range    WBC 5.72 3.90 - 12.70 K/uL    RBC 3.82 (L) 4.00 - 5.40 M/uL    Hemoglobin 11.2 (L) 12.0 - 16.0 g/dL    Hematocrit 34.9 (L) 37.0 - 48.5 %    MCV 91 82 - 98 fL    MCH 29.3 27.0 - 31.0 pg    MCHC 32.1 32.0 - 36.0 %    RDW 14.2 11.5 - 14.5 %    Platelets 199 150 - 350 K/uL    MPV 11.3 9.2 - 12.9 fL    Gran # 3.0 1.8 - 7.7 K/uL    Lymph # 1.5 1.0 - 4.8 K/uL    Mono # 0.8 0.3 - 1.0 K/uL    Eos # 0.4 0.0 - 0.5 K/uL    Baso # 0.04 0.00 - 0.20 K/uL    Gran% 52.1 38.0 - 73.0 %    Lymph% 26.2 18.0 - 48.0 %    Mono% 13.3 4.0 - 15.0 %    Eosinophil% 7.7 0.0 - 8.0 %    Basophil% 0.7 0.0 - 1.9 %    Differential Method Automated    Comprehensive metabolic panel   Result Value Ref Range    Sodium 139 136 - 145 mmol/L    Potassium 4.2 3.5 - 5.1 mmol/L    Chloride 101 95 - 110 mmol/L    CO2 30 (H) 23 - 29 mmol/L    Glucose 116 (H) 70 - 110 mg/dL    BUN, Bld 22 8 - 23 mg/dL    Creatinine 0.9 0.5 - 1.4 mg/dL    Calcium 8.9 8.7 - 10.5 mg/dL    Total Protein 6.7 6.0 - 8.4 g/dL    Albumin 3.2 (L) 3.5 - 5.2 g/dL    Total Bilirubin 0.5 0.1 - 1.0 mg/dL    Alkaline Phosphatase 88 55 - 135 U/L    AST 17 10 - 40 U/L    ALT 11 10 - 44 U/L    Anion Gap 8 8 - 16 mmol/L    eGFR if African American >60 >60 mL/min/1.73 m^2    eGFR if non African American 60 >60 mL/min/1.73 m^2   Troponin I   Result Value Ref Range    Troponin I 0.014 0.000 - 0.026 ng/mL   Brain natriuretic peptide   Result Value Ref Range     (H) 0 - 99 pg/mL   Protime-INR   Result Value Ref Range    Prothrombin Time 10.7 9.0  - 12.5 sec    INR 1.0 0.8 - 1.2         Imaging Results:  Imaging Results         X-Ray Chest AP Portable (Final result) Result time:  04/12/17 12:01:56    Final result by Jose Gongora III, MD (04/12/17 12:01:56)    Impression:     Cardiomegaly. Suboptimal visualization of the left lung base. Lungs otherwise clear.      Electronically signed by: JOSE GONGORA MD  Date:     04/12/17  Time:    12:01     Narrative:    Chest x-ray, single view.    Clinical indication: Congestive heart failure.    Compared to March.    The heart size is enlarged. Chronic elevation of the right hemidiaphragm again noted. Lung fields are grossly clear. No consolidation or effusion. Left base is obscured by projection and overlying heart.             The EKG was ordered, reviewed, and independently interpreted by the ED provider.  Interpretation time: 11:19  Rate: 59 BPM  Rhythm: sinus bradycardia  Interpretation: Normal ST segment, T wave, and QRS. No STEMI.         The Emergency Provider reviewed the vital signs and test results, which are outlined above.    ED Discussion     11:26 AM: Pt's history was reviewed. Pt had a cardiac catheterization in 06/2016 which showed non-obstructive coronary disease.     11:56 AM: Per nurse, pt is chest pain free at this time. NTG not given.     1:44 PM: Re-evaluated pt. I have discussed test results, shared treatment plan, and the need for admission with patient and family at bedside. Pt and family express understanding at this time and agree with all information. All questions answered. Pt and family have no further questions or concerns at this time. Pt is ready for admit.    2:05 PM: Discussed case with Kylee Alvarez NP (Hospital Medicine). Kylee agrees with current care and management of pt and accepts admission.   Admitting Service: Hospital medicine   Admitting Physician: Dr. Garland   Admit to: Observation    ED Medication(s):  Medications   nitroGLYCERIN SL tablet 0.4 mg (0  mg Sublingual Hold 4/12/17 1137)   aspirin tablet 325 mg (325 mg Oral Given 4/12/17 1138)     Medical Decision Making    Medical Decision Making:   Clinical Tests:   Lab Tests: Ordered and Reviewed  Radiological Study: Ordered and Reviewed  Medical Tests: Reviewed and Ordered           Scribe Attestation:   Scribe #1: I performed the above scribed service and the documentation accurately describes the services I performed. I attest to the accuracy of the note.    Attending:   Physician Attestation Statement for Scribe #1: I, Nat Cormier MD, personally performed the services described in this documentation, as scribed by Shaniqua Nichole, in my presence, and it is both accurate and complete.          Clinical Impression       ICD-10-CM ICD-9-CM   1. Shortness of breath R06.02 786.05   2. Chest pain syndrome R07.9 786.50   3. Essential hypertension I10 401.9   4. Hyperlipidemia, unspecified hyperlipidemia type E78.5 272.4   5. Coronary artery disease, angina presence unspecified, unspecified vessel or lesion type, unspecified whether native or transplanted heart I25.10 414.00       Disposition:   Disposition: Placed in Observation  Condition: Fair         Nat Cormier MD  04/12/17 3662

## 2017-04-12 NOTE — ASSESSMENT & PLAN NOTE
Place on observation   Serial Troponin every 6 hours times 3  2D echo  Oxygen Therapy to maintain o2 sats greater than 92%  Lipid panel in AM  HGA1C in AM   Asa 81mg daily  Lipitor 40 mg daily  Nitrates SL PRN   Morphine PRN for chest pain   Possible cardiology consult  Left Heart cath in 2016 that showed luminal irregularities of coronary arteries.

## 2017-04-12 NOTE — ASSESSMENT & PLAN NOTE
Pt. Is compensated, will repeat 2D echo  Continue valsartan  Cardiac low sodium diet  Strict I/O's  1500 ml fluid restriction  Continue Lasix

## 2017-04-12 NOTE — H&P
[facility] Hospital Medicine  History & Physical    Patient Name: Elke Laws  MRN: [id number]  Admission Date: 4/12/2017  Attending Physician:  Gill  Primary Care Provider: Isidra Benavidez MD         Patient information was obtained from patient, relative(s) and ER records.     Subjective:     Principal Problem:Chest pain syndrome    Chief Complaint:   Chief Complaint   Patient presents with    Shortness of Breath     doctor told pt to come to her for increased sob. pt reports hx of asthma        HPI: Elke Laws 82 year old female with past medical history CAD, HF, hyperlipidemia, and HTN, presents today with complaints shortness of breath that started last night. Pt. reports SOB that started last night after going to the restroom. Associated symptoms include chest pain described as tightness on left side of chest, nausea, and diaphoresis.  Patient complains of fatigue for one week.   In the ED blood pressure mildly elevated, heart rate bradycardiac, , initial Troponin 0.014, EKG with no significant changes from previous.       Past Medical History:   Diagnosis Date    Acute on chronic diastolic congestive heart failure 8/27/2015    Anemia 5/9/2016    Anemia 5/9/2016    Aortic regurgitation     Echo 11/2013---5 - Mild to moderate aortic regurgitation.      Atrial fibrillation 5/15/2014    Back pain     s/p epidural steriod injections, no recent injections.    Baker's cyst     small, right, seen on US lower extremity 6/2014    Blood transfusion     Chest pain, atypical 8/27/2015    Diastolic dysfunction     Seen on echo 11/2013, stress test negative 5/2014    Diverticulosis     colonoscopy 3/27/2011    Hemorrhoids     colonoscopy 3/27/2011    Hiatal hernia     CXR 12/30/2016---Retrocardiac density again noted consistent with a hiatal hernia.    Hx of adenomatous colonic polyps     Hyperlipidemia     Hypertension     Hyponatremia 5/9/2016    Mitral regurgitation      "Myocardial infarction     per patient was told in the past she had a "mild heart attack"    Obesity     Osteoarthritis, knee     Pneumonia     per patient "walking Pneumonia" did not require hospitalization    Seasonal allergies     Stroke     TIA; patient reports was told by one doctor she had 2 mini strokes but another doctor said she did not       Past Surgical History:   Procedure Laterality Date    EYE SURGERY Left     HYSTERECTOMY      benign reasons    KNEE SURGERY Bilateral     x2     OLECRANON BURSECTOMY Right     6/2011    URETHRA SURGERY         Review of patient's allergies indicates:   Allergen Reactions    Iodine and iodide containing products Rash       No current facility-administered medications on file prior to encounter.      Current Outpatient Prescriptions on File Prior to Encounter   Medication Sig    acetaminophen (TYLENOL) 500 MG tablet Take 1 tablet (500 mg total) by mouth 2 (two) times daily.    albuterol 90 mcg/actuation inhaler Inhale 2 puffs into the lungs 4 (four) times daily.    albuterol sulfate 2.5 mg/0.5 mL Nebu Take 2.5 mg by nebulization every 4 (four) hours as needed.    apixaban (ELIQUIS) 5 mg Tab Take 1 tablet (5 mg total) by mouth 2 (two) times daily.    ASCORBATE CALCIUM (VITAMIN C ORAL) Take by mouth once daily.    compressor, for nebulizer Lara Compressor use as directed by albuterol use.    DOCOSAHEXANOIC ACID/EPA (FISH OIL ORAL) Take by mouth once daily.    ELIQUIS 5 mg Tab TAKE 1 TABLET BY MOUTH TWICE DAILY    flecainide (TAMBOCOR) 50 MG Tab Take 1 tablet (50 mg total) by mouth every 12 (twelve) hours.    fluticasone (FLOVENT HFA) 110 mcg/actuation inhaler Inhale 1 puff into the lungs 2 (two) times daily.    furosemide (LASIX) 40 MG tablet Take 1 tablet (40 mg total) by mouth once daily.    isosorbide mononitrate (IMDUR) 30 MG 24 hr tablet TAKE 1 TABLET(30 MG) BY MOUTH EVERY DAY    loratadine (CLARITIN) 10 mg tablet Take 1 tablet (10 mg total) by " mouth once daily.    metoprolol succinate (TOPROL-XL) 100 MG 24 hr tablet Take 1 tablet (100 mg total) by mouth once daily.    pravastatin (PRAVACHOL) 40 MG tablet Take 1 tablet (40 mg total) by mouth once daily.    valsartan (DIOVAN) 160 MG tablet Take 1 tablet (160 mg total) by mouth once daily.     Family History     Problem Relation (Age of Onset)    COPD Son    Cancer Mother, Father, Sister    Diabetes Brother, Son    Heart disease Mother, Father, Sister, Brother    Hyperlipidemia Mother, Father    Hypertension Mother, Father, Son        Social History Main Topics    Smoking status: Former Smoker     Packs/day: 0.05     Years: 1.00     Types: Cigarettes     Quit date: 1/1/1952    Smokeless tobacco: Never Used    Alcohol use No    Drug use: No    Sexual activity: No     Review of Systems   Constitutional: Positive for diaphoresis and fatigue.   HENT: Positive for ear pain.    Eyes: Negative.    Respiratory: Positive for cough and wheezing.    Cardiovascular: Positive for chest pain.   Gastrointestinal: Positive for constipation and nausea.   Endocrine: Negative.    Genitourinary: Negative.    Musculoskeletal: Positive for back pain and myalgias.   Skin: Negative.    Allergic/Immunologic: Negative.    Neurological: Positive for light-headedness and headaches.   Hematological: Negative.    Psychiatric/Behavioral: Negative.      Objective:     Vital Signs (Most Recent):  Temp: 97.5 °F (36.4 °C) (04/12/17 1111)  Pulse: (!) 57 (04/12/17 1519)  Resp: (!) 22 (04/12/17 1519)  BP: (!) 151/49 (04/12/17 1518)  SpO2: 98 % (04/12/17 1519) Vital Signs (24h Range):  Temp:  [97.5 °F (36.4 °C)] 97.5 °F (36.4 °C)  Pulse:  [54-63] 57  Resp:  [17-23] 22  SpO2:  [97 %-100 %] 98 %  BP: (151-184)/(49-73) 151/49     Weight: 86.6 kg (191 lb)  Body mass index is 39.24 kg/(m^2).    Physical Exam   Constitutional: She is oriented to person, place, and time. She appears well-developed and well-nourished.   HENT:   Head:  Normocephalic and atraumatic.   Eyes: Conjunctivae and EOM are normal. Pupils are equal, round, and reactive to light.   Neck: Normal range of motion. Neck supple.   Cardiovascular: Normal rate, regular rhythm and intact distal pulses.    Murmur heard.  Pulmonary/Chest: Effort normal. She has wheezes.   Abdominal: Soft. Bowel sounds are normal.   Musculoskeletal: Normal range of motion.   Neurological: She is alert and oriented to person, place, and time. She has normal reflexes.   Skin: Skin is warm and dry.        Psychiatric: She has a normal mood and affect. Her behavior is normal. Judgment and thought content normal.   Nursing note and vitals reviewed.       Significant Labs:   CBC:   Recent Labs  Lab 04/12/17  1130   WBC 5.72   HGB 11.2*   HCT 34.9*        CMP:   Recent Labs  Lab 04/12/17  1130      K 4.2      CO2 30*   *   BUN 22   CREATININE 0.9   CALCIUM 8.9   PROT 6.7   ALBUMIN 3.2*   BILITOT 0.5   ALKPHOS 88   AST 17   ALT 11   ANIONGAP 8   EGFRNONAA 60     Cardiac Markers:   Recent Labs  Lab 04/12/17  1130   *     Coagulation:   Recent Labs  Lab 04/12/17  1130   INR 1.0     All pertinent labs within the past 24 hours have been reviewed.    Significant Imaging:   Imaging Results         X-Ray Chest AP Portable (Final result) Result time:  04/12/17 12:01:56    Final result by Jose Gongora III, MD (04/12/17 12:01:56)    Impression:     Cardiomegaly. Suboptimal visualization of the left lung base. Lungs otherwise clear.      Electronically signed by: JOSE GONGORA MD  Date:     04/12/17  Time:    12:01     Narrative:    Chest x-ray, single view.    Clinical indication: Congestive heart failure.    Compared to March.    The heart size is enlarged. Chronic elevation of the right hemidiaphragm again noted. Lung fields are grossly clear. No consolidation or effusion. Left base is obscured by projection and overlying heart.            Assessment/Plan:     Chest pain  syndrome  Place on observation   Serial Troponin every 6 hours times 3  2D echo  Oxygen Therapy to maintain o2 sats greater than 92%  Lipid panel in AM  HGA1C in AM   Asa 81mg daily  Lipitor 40 mg daily  Nitrates SL PRN   Morphine PRN for chest pain   Possible cardiology consult  Left Heart cath in 2016 that showed luminal irregularities of coronary arteries.            Coronary artery disease involving native coronary artery of native heart without angina pectoris    Continue Eliquis, Imdur, Metoprolol, Pravastatin, and valsartan     Left ventricular diastolic dysfunction with preserved systolic function  Pt. Is compensated, will repeat 2D echo  Continue valsartan  Cardiac low sodium diet  Strict I/O's  1500 ml fluid restriction  Continue Lasix           Hypertension  Resume home meds  Hydralazine 10mg IVP q6 hours prn for systolic blood pressure greater than 170      Hyperlipidemia  Resume pravastatin   Lipid panel in AM      PAF (paroxysmal atrial fibrillation)  Pt. In sinus bradycardiac on EKG   Pt. Takes metoprolol 100 mg po daily will decrease dose to 50 mg po daily  Telemetry monitor  Continue Eliquis       VTE Risk Mitigation     None        Kathrine Mauricio NP  Department of Hospital Medicine   [facility]

## 2017-04-12 NOTE — TELEPHONE ENCOUNTER
Reason for Disposition   Difficulty breathing    Protocols used: ST CHEST PAIN-A-OH  Ms. Laws states she is experiencing difficulty breathing and pain around her heart that began last night. Patient's blood pressure is 150/60 with a pulse of 51.

## 2017-04-12 NOTE — PLAN OF CARE
Problem: Patient Care Overview  Goal: Plan of Care Review  Outcome: Ongoing (interventions implemented as appropriate)  Patient denies pain  Trending troponins  Patient up with stand by assistance- cane at bedside and family at bedside to assist  Fluid restrictions 1.5 L QD

## 2017-04-12 NOTE — ASSESSMENT & PLAN NOTE
Pt. In sinus bradycardiac on EKG   Pt. Takes metoprolol 100 mg po daily will decrease dose to 50 mg po daily  Telemetry monitor  Continue Eliquis

## 2017-04-12 NOTE — ED NOTES
Patient c/o chest pressure that began last night, reports no chest pain at this time. Patient reports seeing her PCP this morning, who instructed her to come to the ER. Patient also reports feeling some generalized weakness.    Patient moved to ED room 16, patient assisted onto stretcher and changed into a gown. Patient placed on cardiac monitor, continuous pulse oximetry and automatic blood pressure cuff. Bed placed in low locked position, side rails up x 2, call light is within reach of patient or family, orientation to room and explanation of wait provided to family and patient, alarms set and turned on for monitor and pulse ox, awaiting MD evaluation and orders, will continue to monitor.    Patient identifies self as Elke Laws      LOC: The patient is awake, alert and aware of environment with an appropriate affect, the patient is oriented x 3 and speaking appropriately.  APPEARANCE: Patient resting comfortably and in no acute distress, patient is clean and well groomed, patient's clothing is properly fastened.  SKIN: The skin is warm and dry, color consistent with ethnicity, patient has normal skin turgor and moist mucus membranes, skin intact, no breakdown or bruising noted.  MUSCULOSKELETAL: Patient moving all extremities well, no obvious swelling or deformities noted.  RESPIRATORY: Airway is open and patent, respirations are spontaneous, patient has a normal effort and rate, no accessory muscle use noted.  CARDIAC: Patient has a normal rate and rhythm, no periphreal edema noted, capillary refill < 3 seconds.  ABDOMEN: Soft and non tender to palpation, no distention noted.  NEUROLOGIC: PERRL, mm bilaterally, eyes open spontaneously, behavior appropriate to situation, follows commands, facial expression symmetrical, bilateral hand grasp equal and even, purposeful motor response noted, normal sensation in all extremities when touched with a finger.

## 2017-04-13 ENCOUNTER — TELEPHONE (OUTPATIENT)
Dept: CARDIOLOGY | Facility: CLINIC | Age: 82
End: 2017-04-13

## 2017-04-13 VITALS
HEART RATE: 55 BPM | BODY MASS INDEX: 42.48 KG/M2 | SYSTOLIC BLOOD PRESSURE: 117 MMHG | HEIGHT: 58 IN | TEMPERATURE: 98 F | DIASTOLIC BLOOD PRESSURE: 53 MMHG | RESPIRATION RATE: 18 BRPM | WEIGHT: 202.38 LBS | OXYGEN SATURATION: 95 %

## 2017-04-13 DIAGNOSIS — I48.0 PAF (PAROXYSMAL ATRIAL FIBRILLATION): Primary | Chronic | ICD-10-CM

## 2017-04-13 LAB
ALBUMIN SERPL BCP-MCNC: 3 G/DL
ALP SERPL-CCNC: 80 U/L
ALT SERPL W/O P-5'-P-CCNC: 11 U/L
ANION GAP SERPL CALC-SCNC: 5 MMOL/L
AORTIC VALVE REGURGITATION: ABNORMAL
AST SERPL-CCNC: 18 U/L
BASOPHILS # BLD AUTO: 0.02 K/UL
BASOPHILS NFR BLD: 0.3 %
BILIRUB SERPL-MCNC: 0.4 MG/DL
BUN SERPL-MCNC: 19 MG/DL
CALCIUM SERPL-MCNC: 9.3 MG/DL
CHLORIDE SERPL-SCNC: 102 MMOL/L
CHOLEST/HDLC SERPL: 2.1 {RATIO}
CO2 SERPL-SCNC: 32 MMOL/L
CREAT SERPL-MCNC: 0.8 MG/DL
DIASTOLIC DYSFUNCTION: YES
DIFFERENTIAL METHOD: ABNORMAL
EOSINOPHIL # BLD AUTO: 0.4 K/UL
EOSINOPHIL NFR BLD: 7.4 %
ERYTHROCYTE [DISTWIDTH] IN BLOOD BY AUTOMATED COUNT: 14.3 %
EST. GFR  (AFRICAN AMERICAN): >60 ML/MIN/1.73 M^2
EST. GFR  (NON AFRICAN AMERICAN): >60 ML/MIN/1.73 M^2
ESTIMATED PA SYSTOLIC PRESSURE: 28.2
GLUCOSE SERPL-MCNC: 86 MG/DL
HCT VFR BLD AUTO: 34.4 %
HDL/CHOLESTEROL RATIO: 48 %
HDLC SERPL-MCNC: 123 MG/DL
HDLC SERPL-MCNC: 59 MG/DL
HGB BLD-MCNC: 11 G/DL
LDLC SERPL CALC-MCNC: 51.2 MG/DL
LYMPHOCYTES # BLD AUTO: 1.8 K/UL
LYMPHOCYTES NFR BLD: 31.1 %
MCH RBC QN AUTO: 29.6 PG
MCHC RBC AUTO-ENTMCNC: 32 %
MCV RBC AUTO: 93 FL
MITRAL VALVE REGURGITATION: ABNORMAL
MONOCYTES # BLD AUTO: 0.8 K/UL
MONOCYTES NFR BLD: 13 %
NEUTROPHILS # BLD AUTO: 2.8 K/UL
NEUTROPHILS NFR BLD: 48.2 %
NONHDLC SERPL-MCNC: 64 MG/DL
PLATELET # BLD AUTO: 186 K/UL
PMV BLD AUTO: 11.5 FL
POTASSIUM SERPL-SCNC: 5.4 MMOL/L
PROT SERPL-MCNC: 6.4 G/DL
RBC # BLD AUTO: 3.72 M/UL
RETIRED EF AND QEF - SEE NOTES: 60 (ref 55–65)
SODIUM SERPL-SCNC: 139 MMOL/L
TRIGL SERPL-MCNC: 64 MG/DL
TROPONIN I SERPL DL<=0.01 NG/ML-MCNC: <0.006 NG/ML
WBC # BLD AUTO: 5.79 K/UL

## 2017-04-13 PROCEDURE — 25000003 PHARM REV CODE 250: Performed by: HOSPITALIST

## 2017-04-13 PROCEDURE — 25000242 PHARM REV CODE 250 ALT 637 W/ HCPCS: Performed by: HOSPITALIST

## 2017-04-13 PROCEDURE — 27000221 HC OXYGEN, UP TO 24 HOURS

## 2017-04-13 PROCEDURE — 93306 TTE W/DOPPLER COMPLETE: CPT | Mod: 26,,, | Performed by: INTERNAL MEDICINE

## 2017-04-13 PROCEDURE — 36415 COLL VENOUS BLD VENIPUNCTURE: CPT

## 2017-04-13 PROCEDURE — 99214 OFFICE O/P EST MOD 30 MIN: CPT | Mod: ,,, | Performed by: INTERNAL MEDICINE

## 2017-04-13 PROCEDURE — 85025 COMPLETE CBC W/AUTO DIFF WBC: CPT

## 2017-04-13 PROCEDURE — 80061 LIPID PANEL: CPT

## 2017-04-13 PROCEDURE — C8929 TTE W OR WO FOL WCON,DOPPLER: HCPCS

## 2017-04-13 PROCEDURE — 25000003 PHARM REV CODE 250: Performed by: NURSE PRACTITIONER

## 2017-04-13 PROCEDURE — 25000003 PHARM REV CODE 250: Performed by: EMERGENCY MEDICINE

## 2017-04-13 PROCEDURE — 80053 COMPREHEN METABOLIC PANEL: CPT

## 2017-04-13 PROCEDURE — 84484 ASSAY OF TROPONIN QUANT: CPT

## 2017-04-13 PROCEDURE — 83036 HEMOGLOBIN GLYCOSYLATED A1C: CPT

## 2017-04-13 PROCEDURE — G0378 HOSPITAL OBSERVATION PER HR: HCPCS

## 2017-04-13 PROCEDURE — 94640 AIRWAY INHALATION TREATMENT: CPT

## 2017-04-13 RX ORDER — ASPIRIN 81 MG/1
81 TABLET ORAL DAILY
Refills: 0 | COMMUNITY
Start: 2017-04-13 | End: 2018-08-07

## 2017-04-13 RX ADMIN — ISOSORBIDE MONONITRATE 30 MG: 30 TABLET, EXTENDED RELEASE ORAL at 08:04

## 2017-04-13 RX ADMIN — FUROSEMIDE 40 MG: 40 TABLET ORAL at 08:04

## 2017-04-13 RX ADMIN — PANTOPRAZOLE SODIUM 40 MG: 40 TABLET, DELAYED RELEASE ORAL at 08:04

## 2017-04-13 RX ADMIN — FLECAINIDE ACETATE 50 MG: 50 TABLET ORAL at 08:04

## 2017-04-13 RX ADMIN — METOPROLOL SUCCINATE 100 MG: 50 TABLET, EXTENDED RELEASE ORAL at 08:04

## 2017-04-13 RX ADMIN — IPRATROPIUM BROMIDE AND ALBUTEROL SULFATE 3 ML: .5; 3 SOLUTION RESPIRATORY (INHALATION) at 07:04

## 2017-04-13 RX ADMIN — BUDESONIDE 0.5 MG: 0.5 SUSPENSION RESPIRATORY (INHALATION) at 07:04

## 2017-04-13 RX ADMIN — PRAVASTATIN SODIUM 40 MG: 20 TABLET ORAL at 08:04

## 2017-04-13 RX ADMIN — ASPIRIN 81 MG: 81 TABLET, COATED ORAL at 08:04

## 2017-04-13 RX ADMIN — VALSARTAN 160 MG: 80 TABLET, FILM COATED ORAL at 08:04

## 2017-04-13 RX ADMIN — APIXABAN 5 MG: 2.5 TABLET, FILM COATED ORAL at 08:04

## 2017-04-13 NOTE — PLAN OF CARE
Problem: Patient Care Overview  Goal: Plan of Care Review  Outcome: Ongoing (interventions implemented as appropriate)  Free of falls/injury during shift  Pain managed effectively w/ PRN meds  1.5L Fluid Restriction  SOB reported w/ exertion  Breathing treatments  Serial troponin negative  SB/NSR on telemetry  Safety interventions in place

## 2017-04-13 NOTE — DISCHARGE SUMMARY
[facility] Hospital Medicine  Discharge Summary      Patient Name: Elke Laws  MRN: [id number]  Admission Date: 4/12/2017  Hospital Length of Stay: 0 days  Discharge Date and Time: 4/13/2017  2:32 PM  Attending Physician: No att. providers found   Discharging Provider: Manish Cruz NP  Primary Care Provider: Isidra Benavidez MD      HPI:   Elke Laws 82 year old female with past medical history CAD, HF, hyperlipidemia, and HTN, presents today with complaints shortness of breath that started last night. Pt. reports SOB that started last night after going to the restroom. Associated symptoms include chest pain described as tightness on left side of chest, nausea, and diaphoresis.  Patient complains of fatigue for one week.   In the ED blood pressure mildly elevated, heart rate bradycardiac, , initial Troponin 0.014, EKG with no significant changes from previous.       * No surgery found *      Indwelling Lines/Drains at time of discharge:   Lines/Drains/Airways          No matching active lines, drains, or airways        Hospital Course:   The patient reported no further chest pain overnight. Troponin remained negative. Cardiology was consulted and recommended outpatient follow up in the clinic and for the patient to be fit for a Zio holter monitor. The patient was seen and examined today and determined to be suitable for discharge. The patient will follow up with her PCP and with Cardiology     Consults:   Consults         Status Ordering Provider     Inpatient consult to Cardiology  Once     Provider:  Tish Angulo MD    Completed MANISH CRUZ          Significant Diagnostic Studies:   Imaging Results         X-Ray Chest AP Portable (Final result) Result time:  04/12/17 12:01:56    Final result by Jose Gongora III, MD (04/12/17 12:01:56)    Impression:     Cardiomegaly. Suboptimal visualization of the left lung base. Lungs otherwise clear.      Electronically signed  by: JODEE GLASER MD  Date:     04/12/17  Time:    12:01     Narrative:    Chest x-ray, single view.    Clinical indication: Congestive heart failure.    Compared to March.    The heart size is enlarged. Chronic elevation of the right hemidiaphragm again noted. Lung fields are grossly clear. No consolidation or effusion. Left base is obscured by projection and overlying heart.                 Pending Diagnostic Studies:     Procedure Component Value Units Date/Time    Hemoglobin A1c [[id number]] Collected:  04/13/17 0309    Order Status:  Sent Lab Status:  In process Updated:  04/13/17 1617    Specimen:  Blood from Blood     Lipid panel [[id number]] Collected:  04/13/17 0309    Order Status:  Sent Lab Status:  In process Updated:  04/13/17 1617    Specimen:  Blood from Blood         Final Active Diagnoses:    Diagnosis Date Noted POA    Coronary artery disease involving native coronary artery of native heart without angina pectoris [I25.10] 12/15/2016 Yes     Chronic    Left ventricular diastolic dysfunction with preserved systolic function [I51.9] 05/09/2016 Yes     Chronic    PAF (paroxysmal atrial fibrillation) [I48.0] 07/17/2015 Yes     Chronic    Hypertension [I10]  Yes     Chronic    Hyperlipidemia [E78.5]  Yes     Chronic      Problems Resolved During this Admission:    Diagnosis Date Noted Date Resolved POA    PRINCIPAL PROBLEM:  Chest pain syndrome [R07.9] 06/09/2016 04/13/2017 Yes     Chronic      No new Assessment & Plan notes have been filed under this hospital service since the last note was generated.  Service: Hospital Medicine      Discharged Condition: stable    Disposition: Home or Self Care    Follow Up:  Follow-up Information     Follow up with Isidra Benavidez MD.    Specialty:  Family Medicine    Contact information:    [address] [place] [address] DR Bushra GILL [id number] [phone]          Follow up with Michael Valentine NP. Schedule an appointment as soon as possible for a visit in  1 week.    Specialty:  Cardiology    Contact information:    [address]  [place] [id number] [phone]          Patient Instructions:     Diet general     Activity as tolerated       Medications:  Reconciled Home Medications:   Discharge Medication List as of 4/13/2017  2:08 PM      START taking these medications    Details   aspirin (ECOTRIN) 81 MG EC tablet Take 1 tablet (81 mg total) by mouth once daily., Starting 4/13/2017, Until Fri 4/13/18, OTC         CONTINUE these medications which have NOT CHANGED    Details   apixaban (ELIQUIS) 5 mg Tab Take 1 tablet (5 mg total) by mouth 2 (two) times daily., Starting 10/18/2016, Until Discontinued, Normal      ASCORBATE CALCIUM (VITAMIN C ORAL) Take by mouth once daily., Until Discontinued, Historical Med      DOCOSAHEXANOIC ACID/EPA (FISH OIL ORAL) Take by mouth once daily., Until Discontinued, Historical Med      flecainide (TAMBOCOR) 50 MG Tab Take 1 tablet (50 mg total) by mouth every 12 (twelve) hours., Starting 6/24/2016, Until Discontinued, Normal      fluticasone (FLOVENT HFA) 110 mcg/actuation inhaler Inhale 1 puff into the lungs 2 (two) times daily., Starting 1/24/2017, Until Wed 1/24/18, Normal      furosemide (LASIX) 40 MG tablet Take 1 tablet (40 mg total) by mouth once daily., Starting 9/8/2016, Until Discontinued, Normal      isosorbide mononitrate (IMDUR) 30 MG 24 hr tablet TAKE 1 TABLET(30 MG) BY MOUTH EVERY DAY, Normal      metoprolol succinate (TOPROL-XL) 100 MG 24 hr tablet Take 1 tablet (100 mg total) by mouth once daily., Starting 1/24/2017, Until Discontinued, Normal      pravastatin (PRAVACHOL) 40 MG tablet Take 1 tablet (40 mg total) by mouth once daily., Starting 1/24/2017, Until Discontinued, Normal      valsartan (DIOVAN) 160 MG tablet Take 1 tablet (160 mg total) by mouth once daily., Starting 1/24/2017, Until Discontinued, Normal      acetaminophen (TYLENOL) 500 MG tablet Take 1 tablet (500 mg total) by mouth 2 (two) times daily.,  Starting 3/10/2016, Until Discontinued, No Print      albuterol 90 mcg/actuation inhaler Inhale 2 puffs into the lungs 4 (four) times daily., Starting 5/14/2015, Until Discontinued, Normal      albuterol sulfate 2.5 mg/0.5 mL Nebu Take 2.5 mg by nebulization every 4 (four) hours as needed., Starting 12/30/2016, Until Sat 12/30/17, Normal      compressor, for nebulizer Lara Compressor use as directed by albuterol use., Normal      loratadine (CLARITIN) 10 mg tablet Take 1 tablet (10 mg total) by mouth once daily., Starting 12/30/2016, Until Sat 12/30/17, OTC           Time spent on the discharge of patient: > 30 minutes    Manish Desai NP  Department of Hospital Medicine  [facility]     I have reviewed the notes, assessments, and/or procedures performed by NP, I concur with her/his documentation of Elke Laws.

## 2017-04-13 NOTE — CONSULTS
"Consult Note  Cardiology    Consult Requested By: Manish Desai NP  Reason for Consult: Chest Pain     SUBJECTIVE:     History of Present Illness:  Ms Laws is a 82 y.o. female presents with PMHx HTN, HLD, PAF, Diastolic Heart Failure and chest pain syndrome. She presented to [facility] Emergency Room with chest pain. Patient describes chest pain as pressure to the left side of chest. Associated symptoms include shortness of breath and palpitations. Troponin negative. . Left Heart Cath by Dr Valdez on 630/2016 showed luminal irregularities of coronary arteries, normal LVEF. Reviewed of records shows Blood pressure was in the 200's during episode of chest pain.     Review of patient's allergies indicates:   Allergen Reactions    Iodine and iodide containing products Rash       Past Medical History:   Diagnosis Date    Acute on chronic diastolic congestive heart failure 8/27/2015    Anemia 5/9/2016    Anemia 5/9/2016    Aortic regurgitation     Echo 11/2013---5 - Mild to moderate aortic regurgitation.      Atrial fibrillation 5/15/2014    Back pain     s/p epidural steriod injections, no recent injections.    Baker's cyst     small, right, seen on US lower extremity 6/2014    Blood transfusion     Chest pain, atypical 8/27/2015    Diastolic dysfunction     Seen on echo 11/2013, stress test negative 5/2014    Diverticulosis     colonoscopy 3/27/2011    Hemorrhoids     colonoscopy 3/27/2011    Hiatal hernia     CXR 12/30/2016---Retrocardiac density again noted consistent with a hiatal hernia.    Hx of adenomatous colonic polyps     Hyperlipidemia     Hypertension     Hyponatremia 5/9/2016    Mitral regurgitation     Myocardial infarction     per patient was told in the past she had a "mild heart attack"    Obesity     Osteoarthritis, knee     Pneumonia     per patient "walking Pneumonia" did not require hospitalization    Seasonal allergies     Stroke     TIA; patient reports was told by one " doctor she had 2 mini strokes but another doctor said she did not     Past Surgical History:   Procedure Laterality Date    EYE SURGERY Left     HYSTERECTOMY      benign reasons    KNEE SURGERY Bilateral     x2     OLECRANON BURSECTOMY Right     6/2011    URETHRA SURGERY       Family History   Problem Relation Age of Onset    Heart disease Mother     Cancer Mother      colon    Hypertension Mother     Hyperlipidemia Mother     Heart disease Father     Cancer Father      colon    Hypertension Father     Hyperlipidemia Father     Heart disease Sister      MI    Heart disease Brother      MI    COPD Son     Hypertension Son     Diabetes Brother     Diabetes Son     Cancer Sister      breast    Kidney disease Neg Hx     Stroke Neg Hx      Social History   Substance Use Topics    Smoking status: Former Smoker     Packs/day: 0.05     Years: 1.00     Types: Cigarettes     Quit date: 1/1/1952    Smokeless tobacco: Never Used    Alcohol use No        Review of Systems:  Constitutional: negative for fever or chills   Eyes: negative  Ears, nose, mouth, throat, and face: negative  Respiratory: negative for shortness of breath, orthopnea or PND  Cardiovascular: positive for chest pain and palpitations negative syncope or near syncope  Gastrointestinal: negative for nausea or vomiting  Genitourinary:negative  Hematologic/lymphatic: negative  Musculoskeletal:negative,   Neurological: negative  Behavioral/Psych: negative  Endocrine: negative  Allergic/Immunologic: negative    OBJECTIVE:     Vital Signs (Most Recent)  Temp: 97.9 °F (36.6 °C) (04/13/17 1201)  Pulse: (!) 55 (04/13/17 1201)  Resp: 18 (04/13/17 1201)  BP: (!) 117/53 (04/13/17 1201)  SpO2: 95 % (04/13/17 1201)    Vital Signs Range (Last 24H):  Temp:  [97.9 °F (36.6 °C)-98.4 °F (36.9 °C)]   Pulse:  [47-61]   Resp:  [16-23]   BP: (117-184)/(49-86)   SpO2:  [95 %-100 %]     Current Facility-Administered Medications   Medication     albuterol-ipratropium 2.5mg-0.5mg/3mL nebulizer solution 3 mL    albuterol-ipratropium 2.5mg-0.5mg/3mL nebulizer solution 3 mL    apixaban tablet 5 mg    aspirin EC tablet 81 mg    budesonide nebulizer solution 0.5 mg    diphenhydrAMINE injection 12.5 mg    flecainide tablet 50 mg    furosemide tablet 40 mg    hydrocodone-acetaminophen 5-325mg per tablet 1 tablet    isosorbide mononitrate 24 hr tablet 30 mg    metoprolol succinate (TOPROL-XL) 24 hr tablet 100 mg    morphine injection 2 mg    nitroGLYCERIN SL tablet 0.4 mg    ondansetron injection 4 mg    pantoprazole EC tablet 40 mg    pravastatin tablet 40 mg    promethazine (PHENERGAN) 12.5 mg in dextrose 5 % 50 mL IVPB    valsartan tablet 160 mg     No current facility-administered medications on file prior to encounter.      Current Outpatient Prescriptions on File Prior to Encounter   Medication Sig    apixaban (ELIQUIS) 5 mg Tab Take 1 tablet (5 mg total) by mouth 2 (two) times daily.    ASCORBATE CALCIUM (VITAMIN C ORAL) Take by mouth once daily.    DOCOSAHEXANOIC ACID/EPA (FISH OIL ORAL) Take by mouth once daily.    flecainide (TAMBOCOR) 50 MG Tab Take 1 tablet (50 mg total) by mouth every 12 (twelve) hours.    fluticasone (FLOVENT HFA) 110 mcg/actuation inhaler Inhale 1 puff into the lungs 2 (two) times daily.    furosemide (LASIX) 40 MG tablet Take 1 tablet (40 mg total) by mouth once daily.    isosorbide mononitrate (IMDUR) 30 MG 24 hr tablet TAKE 1 TABLET(30 MG) BY MOUTH EVERY DAY    metoprolol succinate (TOPROL-XL) 100 MG 24 hr tablet Take 1 tablet (100 mg total) by mouth once daily.    pravastatin (PRAVACHOL) 40 MG tablet Take 1 tablet (40 mg total) by mouth once daily.    valsartan (DIOVAN) 160 MG tablet Take 1 tablet (160 mg total) by mouth once daily.    acetaminophen (TYLENOL) 500 MG tablet Take 1 tablet (500 mg total) by mouth 2 (two) times daily.    albuterol 90 mcg/actuation inhaler Inhale 2 puffs into the lungs 4  (four) times daily.    albuterol sulfate 2.5 mg/0.5 mL Nebu Take 2.5 mg by nebulization every 4 (four) hours as needed.    compressor, for nebulizer Lara Compressor use as directed by albuterol use.    loratadine (CLARITIN) 10 mg tablet Take 1 tablet (10 mg total) by mouth once daily.       Physical Exam:  General appearance: alert, appears stated age and cooperative  Head: Normocephalic, without obvious abnormality, atraumatic  Eyes:  conjunctivae/corneas clear. PERRL, EOM's intact. Fundi benign.  Nose: no discharge  Throat: normal findings: tongue midline and normal  Neck: no adenopathy, no carotid bruit, no JVD, supple, symmetrical, trachea midline and thyroid not enlarged, symmetric, no tenderness/mass/nodules  Back:  no skin lesions, erythema, or scars  Lungs:  clear to auscultation bilaterally  Chest wall: no tenderness  Heart: regular rate and rhythm, S1, S2 normal, no murmur, click, rub or gallop  Abdomen: soft, non-tender; bowel sounds normal; no masses,  no organomegaly  Extremities: extremities normal, atraumatic, no cyanosis or edema  Pulses: 1+ and symmetric  Skin: Skin color, texture, turgor normal. No rashes or lesions  Neurologic: Grossly normal    Laboratory:  Chemistry:   Lab Results   Component Value Date     04/13/2017    K 5.4 (H) 04/13/2017     04/13/2017    CO2 32 (H) 04/13/2017    BUN 19 04/13/2017    CREATININE 0.8 04/13/2017    CALCIUM 9.3 04/13/2017     Cardiac Markers:   Lab Results   Component Value Date    TROPONINI <0.006 04/13/2017     Cardiac Markers (Last 3):   Lab Results   Component Value Date    TROPONINI <0.006 04/13/2017    TROPONINI <0.006 04/12/2017    TROPONINI 0.014 04/12/2017     CBC:   Lab Results   Component Value Date    WBC 5.79 04/13/2017    HGB 11.0 (L) 04/13/2017    HCT 34.4 (L) 04/13/2017    MCV 93 04/13/2017     04/13/2017     Lipids:   Lab Results   Component Value Date    CHOL 165 06/02/2016    TRIG 51 06/02/2016    HDL 75 06/02/2016      Coagulation:   Lab Results   Component Value Date    INR 1.0 04/12/2017    APTT 30.4 12/02/2016       Diagnostic Results:  ECG: Reviewed  X-Ray: Reviewed  US: Reviewed  CT: Reviewed  Echo: Reviewed      ASSESSMENT/PLAN:     Patient Active Problem List   Diagnosis    Hypertension    Hyperlipidemia    Morbid obesity with BMI of 40.0-44.9, adult    Lumbar spondylosis    Asthma    Atherosclerosis of aorta    PAF (paroxysmal atrial fibrillation)    Acute on chronic diastolic congestive heart failure    Left ventricular diastolic dysfunction with preserved systolic function    Heart valve regurgitation    Osteoarthritis    Chest pain syndrome    Abnormal nuclear stress test    Coronary artery disease involving native coronary artery of native heart without angina pectoris      Patient feeling much better. No sign of myocardial ischemia or ADHF. Vital sighs   BP was elevated before admission which was probable contribution to her symptoms. BP now improved.   She may have experienced some runs of PAF. She reports complaints with medications.     Plan:     Will continue current medications and treatment plan  Risk modification   Low sodium diet   Continue Statin, ARB, B blocker , Imdur and flecainide.   Continue ASA and Eliquis.   Zio Holter monitor after discharge.     Chart reviewed. Dr. Angulo agrees with plan as outlined above.

## 2017-04-13 NOTE — PLAN OF CARE
04/13/17 1152   Discharge Assessment   Assessment Type Discharge Planning Assessment   Assessment information obtained from? Medical Record   Prior to hospitilization cognitive status: Alert/Oriented   Arrived From home or self-care   Lives With alone  (Supportive family)   Patient discharging with follow up with cardiology. Discussed with NP. No discharge needs.

## 2017-04-13 NOTE — NURSING
Notified Dr. Sheldon that was pt was complaining of SOB and wheezing. Duo nebs Q 4 hr prn ordered per Dr. Sheldon. Confirmed though read back.

## 2017-04-14 LAB
ESTIMATED AVG GLUCOSE: 123 MG/DL
HBA1C MFR BLD HPLC: 5.9 %

## 2017-04-17 ENCOUNTER — CLINICAL SUPPORT (OUTPATIENT)
Dept: CARDIOLOGY | Facility: CLINIC | Age: 82
End: 2017-04-17
Payer: MEDICARE

## 2017-04-17 ENCOUNTER — OFFICE VISIT (OUTPATIENT)
Dept: INTERNAL MEDICINE | Facility: CLINIC | Age: 82
End: 2017-04-17
Payer: MEDICARE

## 2017-04-17 ENCOUNTER — APPOINTMENT (OUTPATIENT)
Dept: CARDIOLOGY | Facility: CLINIC | Age: 82
End: 2017-04-17
Payer: MEDICARE

## 2017-04-17 VITALS
WEIGHT: 199.06 LBS | SYSTOLIC BLOOD PRESSURE: 140 MMHG | HEIGHT: 58 IN | DIASTOLIC BLOOD PRESSURE: 64 MMHG | HEART RATE: 54 BPM | BODY MASS INDEX: 41.78 KG/M2 | TEMPERATURE: 97 F

## 2017-04-17 DIAGNOSIS — I25.10 CORONARY ARTERY DISEASE INVOLVING NATIVE CORONARY ARTERY OF NATIVE HEART WITHOUT ANGINA PECTORIS: Chronic | ICD-10-CM

## 2017-04-17 DIAGNOSIS — I20.89 ANGINA EFFORT: ICD-10-CM

## 2017-04-17 DIAGNOSIS — I48.0 PAF (PAROXYSMAL ATRIAL FIBRILLATION): Chronic | ICD-10-CM

## 2017-04-17 DIAGNOSIS — I25.10 CORONARY ARTERY DISEASE INVOLVING NATIVE CORONARY ARTERY OF NATIVE HEART WITHOUT ANGINA PECTORIS: ICD-10-CM

## 2017-04-17 DIAGNOSIS — I10 ESSENTIAL HYPERTENSION: ICD-10-CM

## 2017-04-17 DIAGNOSIS — I10 ESSENTIAL HYPERTENSION: Primary | Chronic | ICD-10-CM

## 2017-04-17 DIAGNOSIS — E78.5 HYPERLIPIDEMIA, UNSPECIFIED HYPERLIPIDEMIA TYPE: ICD-10-CM

## 2017-04-17 DIAGNOSIS — I50.33 ACUTE ON CHRONIC DIASTOLIC CONGESTIVE HEART FAILURE: ICD-10-CM

## 2017-04-17 DIAGNOSIS — Z86.73 HISTORY OF TIA (TRANSIENT ISCHEMIC ATTACK): ICD-10-CM

## 2017-04-17 PROCEDURE — 99213 OFFICE O/P EST LOW 20 MIN: CPT | Mod: S$GLB,,, | Performed by: NURSE PRACTITIONER

## 2017-04-17 PROCEDURE — 0298T HOLTER MONITOR - 3-14 DAY ADULT: CPT | Mod: ,,, | Performed by: NUCLEAR MEDICINE

## 2017-04-17 PROCEDURE — 3078F DIAST BP <80 MM HG: CPT | Mod: S$GLB,,, | Performed by: NURSE PRACTITIONER

## 2017-04-17 PROCEDURE — 1157F ADVNC CARE PLAN IN RCRD: CPT | Mod: S$GLB,,, | Performed by: NURSE PRACTITIONER

## 2017-04-17 PROCEDURE — 3077F SYST BP >= 140 MM HG: CPT | Mod: S$GLB,,, | Performed by: NURSE PRACTITIONER

## 2017-04-17 PROCEDURE — 0296T HOLTER MONITOR - 3-14 DAY ADULT: CPT | Mod: ,,, | Performed by: NUCLEAR MEDICINE

## 2017-04-17 PROCEDURE — 99499 UNLISTED E&M SERVICE: CPT | Mod: S$GLB,,, | Performed by: NURSE PRACTITIONER

## 2017-04-17 PROCEDURE — 1159F MED LIST DOCD IN RCRD: CPT | Mod: S$GLB,,, | Performed by: NURSE PRACTITIONER

## 2017-04-17 PROCEDURE — 1160F RVW MEDS BY RX/DR IN RCRD: CPT | Mod: S$GLB,,, | Performed by: NURSE PRACTITIONER

## 2017-04-17 PROCEDURE — 99999 PR PBB SHADOW E&M-EST. PATIENT-LVL IV: CPT | Mod: PBBFAC,,, | Performed by: NURSE PRACTITIONER

## 2017-04-17 RX ORDER — FUROSEMIDE 40 MG/1
40 TABLET ORAL DAILY
Qty: 90 TABLET | Refills: 3 | Status: SHIPPED | OUTPATIENT
Start: 2017-04-17 | End: 2017-06-05 | Stop reason: ALTCHOICE

## 2017-04-17 RX ORDER — ISOSORBIDE MONONITRATE 30 MG/1
TABLET, EXTENDED RELEASE ORAL
Qty: 90 TABLET | Refills: 3 | Status: SHIPPED | OUTPATIENT
Start: 2017-04-17 | End: 2018-05-04 | Stop reason: SDUPTHER

## 2017-04-17 RX ORDER — VALSARTAN 160 MG/1
160 TABLET ORAL DAILY
Qty: 90 TABLET | Refills: 3 | Status: SHIPPED | OUTPATIENT
Start: 2017-04-17 | End: 2017-06-06 | Stop reason: ALTCHOICE

## 2017-04-17 RX ORDER — METOPROLOL SUCCINATE 100 MG/1
100 TABLET, EXTENDED RELEASE ORAL DAILY
Qty: 90 TABLET | Refills: 1 | Status: SHIPPED | OUTPATIENT
Start: 2017-04-17 | End: 2017-11-10 | Stop reason: SDUPTHER

## 2017-04-17 RX ORDER — PRAVASTATIN SODIUM 40 MG/1
40 TABLET ORAL DAILY
Qty: 90 TABLET | Refills: 1 | Status: SHIPPED | OUTPATIENT
Start: 2017-04-17 | End: 2017-11-10 | Stop reason: SDUPTHER

## 2017-04-17 NOTE — PROGRESS NOTES
Subjective:       Patient ID: Elke Laws is a 82 y.o. female.    Chief Complaint: No chief complaint on file.    HPI Comments: Patient presents with her daughter for hospital follow up. She reports she went to the hospital on 4/12 with chest pain and low blood pressure. She was in the hospital approximately 24 hours. She saw cardiology today for ZIO heart monitor. She reports her daughter is staying with her and she is doing well.     Review of Systems   Constitutional: Positive for fatigue (chronic). Negative for chills, fever and unexpected weight change.   Eyes: Negative for visual disturbance.   Respiratory: Negative for shortness of breath.    Cardiovascular: Negative for chest pain.   Musculoskeletal: Negative for myalgias.   Neurological: Negative for headaches.       Objective:      Physical Exam   Constitutional: She is oriented to person, place, and time. She appears well-developed and well-nourished. No distress.   HENT:   Head: Normocephalic and atraumatic.   Cardiovascular: Normal rate and regular rhythm.    Pulmonary/Chest: Effort normal and breath sounds normal.   Neurological: She is alert and oriented to person, place, and time.   Skin: She is not diaphoretic.   Psychiatric: She has a normal mood and affect.   Vitals reviewed.      Assessment:       1. Essential hypertension    2. Coronary artery disease involving native coronary artery of native heart without angina pectoris        Plan:   Essential hypertension  Comments:  controlled, continue current meds    Coronary artery disease involving native coronary artery of native heart without angina pectoris  Comments:  stable, keep follow up with cardiology        Return if symptoms worsen or fail to improve, for keep routine follow up.

## 2017-04-21 ENCOUNTER — OFFICE VISIT (OUTPATIENT)
Dept: CARDIOLOGY | Facility: CLINIC | Age: 82
End: 2017-04-21
Payer: MEDICARE

## 2017-04-21 VITALS
BODY MASS INDEX: 42.06 KG/M2 | HEIGHT: 58 IN | SYSTOLIC BLOOD PRESSURE: 140 MMHG | DIASTOLIC BLOOD PRESSURE: 60 MMHG | WEIGHT: 200.38 LBS | HEART RATE: 58 BPM

## 2017-04-21 DIAGNOSIS — I70.0 ATHEROSCLEROSIS OF AORTA: Chronic | ICD-10-CM

## 2017-04-21 DIAGNOSIS — I25.10 CORONARY ARTERY DISEASE INVOLVING NATIVE CORONARY ARTERY OF NATIVE HEART WITHOUT ANGINA PECTORIS: Primary | Chronic | ICD-10-CM

## 2017-04-21 DIAGNOSIS — I38 HEART VALVE REGURGITATION: ICD-10-CM

## 2017-04-21 DIAGNOSIS — I48.0 PAF (PAROXYSMAL ATRIAL FIBRILLATION): Chronic | ICD-10-CM

## 2017-04-21 DIAGNOSIS — I10 ESSENTIAL HYPERTENSION: Chronic | ICD-10-CM

## 2017-04-21 DIAGNOSIS — I51.89 LEFT VENTRICULAR DIASTOLIC DYSFUNCTION WITH PRESERVED SYSTOLIC FUNCTION: Chronic | ICD-10-CM

## 2017-04-21 DIAGNOSIS — E66.01 MORBID OBESITY WITH BMI OF 40.0-44.9, ADULT: ICD-10-CM

## 2017-04-21 DIAGNOSIS — E78.5 HYPERLIPIDEMIA, UNSPECIFIED HYPERLIPIDEMIA TYPE: Chronic | ICD-10-CM

## 2017-04-21 PROCEDURE — 99499 UNLISTED E&M SERVICE: CPT | Mod: S$GLB,,, | Performed by: NURSE PRACTITIONER

## 2017-04-21 PROCEDURE — 1126F AMNT PAIN NOTED NONE PRSNT: CPT | Mod: S$GLB,,, | Performed by: NURSE PRACTITIONER

## 2017-04-21 PROCEDURE — 99214 OFFICE O/P EST MOD 30 MIN: CPT | Mod: S$GLB,,, | Performed by: NURSE PRACTITIONER

## 2017-04-21 PROCEDURE — 99999 PR PBB SHADOW E&M-EST. PATIENT-LVL III: CPT | Mod: PBBFAC,,, | Performed by: NURSE PRACTITIONER

## 2017-04-21 PROCEDURE — 3077F SYST BP >= 140 MM HG: CPT | Mod: S$GLB,,, | Performed by: NURSE PRACTITIONER

## 2017-04-21 PROCEDURE — 1159F MED LIST DOCD IN RCRD: CPT | Mod: S$GLB,,, | Performed by: NURSE PRACTITIONER

## 2017-04-21 PROCEDURE — 1160F RVW MEDS BY RX/DR IN RCRD: CPT | Mod: S$GLB,,, | Performed by: NURSE PRACTITIONER

## 2017-04-21 PROCEDURE — 3078F DIAST BP <80 MM HG: CPT | Mod: S$GLB,,, | Performed by: NURSE PRACTITIONER

## 2017-04-21 NOTE — PROGRESS NOTES
Subjective:    Patient ID:  Elke Laws is a 82 y.o. female who presents for follow-up of Hospital Follow Up; Coronary Artery Disease; and Atrial Fibrillation      HPI   Ms Laws is a 82 year old female with a PMHx of CAD, HTN, PAF, Diastolic CHF, HTN and HLD. Patient was admitted to [facility] on 4/12/2017 with chest pain associated with shortness of breath and palpitations. Left Heart Cath by Dr Guaman on 6/30/2016 showed luminal irregularities of coronary arteries, normal LVEF. Symptoms may have been cause by elevated blood pressure before admit or possible run of PAF. Vital signs have been stable and no PAF during hospital stay. Troponin negative. .   She visits the clinic today for hospital follow up. Patient has been doing well since discharge from [facility]. No complaints of chest pain or shortness of breath. She currently has Zio holter monitor in place.       Review of Systems   Constitution: Negative for diaphoresis, weakness, malaise/fatigue, weight gain and weight loss.   HENT: Negative for congestion and nosebleeds.    Eyes: Negative for blurred vision and double vision.   Cardiovascular: Negative for chest pain, claudication, cyanosis, dyspnea on exertion, irregular heartbeat, leg swelling, near-syncope, orthopnea, palpitations, paroxysmal nocturnal dyspnea and syncope.   Respiratory: Negative for cough, hemoptysis, shortness of breath, sputum production and wheezing.    Hematologic/Lymphatic: Negative for bleeding problem. Does not bruise/bleed easily.   Skin: Negative for rash.   Musculoskeletal: Positive for arthritis, back pain and joint pain. Negative for falls, joint swelling and neck pain.   Gastrointestinal: Negative for abdominal pain, heartburn and vomiting.   Genitourinary: Negative for dysuria, frequency and hematuria.   Neurological: Negative for difficulty with concentration, dizziness, focal weakness, light-headedness, numbness and seizures.   Psychiatric/Behavioral: Negative for  "depression, memory loss and substance abuse.   Allergic/Immunologic: Negative for HIV exposure and hives.           Past Medical History:   Diagnosis Date    Acute on chronic diastolic congestive heart failure 8/27/2015    Anemia 5/9/2016    Anemia 5/9/2016    Aortic regurgitation     Echo 11/2013---5 - Mild to moderate aortic regurgitation.      Atrial fibrillation 5/15/2014    Back pain     s/p epidural steriod injections, no recent injections.    Baker's cyst     small, right, seen on US lower extremity 6/2014    Blood transfusion     Chest pain, atypical 8/27/2015    Diastolic dysfunction     Seen on echo 11/2013, stress test negative 5/2014    Diverticulosis     colonoscopy 3/27/2011    Hemorrhoids     colonoscopy 3/27/2011    Hiatal hernia     CXR 12/30/2016---Retrocardiac density again noted consistent with a hiatal hernia.    Hx of adenomatous colonic polyps     Hyperlipidemia     Hypertension     Hyponatremia 5/9/2016    Mitral regurgitation     Myocardial infarction     per patient was told in the past she had a "mild heart attack"    Obesity     Osteoarthritis, knee     Pneumonia     per patient "walking Pneumonia" did not require hospitalization    Seasonal allergies     Stroke     TIA; patient reports was told by one doctor she had 2 mini strokes but another doctor said she did not     Past Surgical History:   Procedure Laterality Date    EYE SURGERY Left     HYSTERECTOMY      benign reasons    KNEE SURGERY Bilateral     x2     OLECRANON BURSECTOMY Right     6/2011    URETHRA SURGERY       Family History   Problem Relation Age of Onset    Heart disease Mother     Cancer Mother      colon    Hypertension Mother     Hyperlipidemia Mother     Heart disease Father     Cancer Father      colon    Hypertension Father     Hyperlipidemia Father     Heart disease Sister      MI    Heart disease Brother      MI    COPD Son     Hypertension Son     Diabetes Brother     " Diabetes Son     Cancer Sister      breast    Kidney disease Neg Hx     Stroke Neg Hx      Social History     Social History    Marital status:      Spouse name: N/A    Number of children: N/A    Years of education: N/A     Social History Main Topics    Smoking status: Former Smoker     Packs/day: 0.05     Years: 1.00     Types: Cigarettes     Quit date: 1/1/1952    Smokeless tobacco: Never Used    Alcohol use No    Drug use: No    Sexual activity: No     Other Topics Concern    Not on file     Social History Narrative     Review of patient's allergies indicates:   Allergen Reactions    Iodine and iodide containing products Rash     Current Outpatient Prescriptions on File Prior to Visit   Medication Sig Dispense Refill    acetaminophen (TYLENOL) 500 MG tablet Take 1 tablet (500 mg total) by mouth 2 (two) times daily.  0    albuterol 90 mcg/actuation inhaler Inhale 2 puffs into the lungs 4 (four) times daily. 3 Inhaler 4    albuterol sulfate 2.5 mg/0.5 mL Nebu Take 2.5 mg by nebulization every 4 (four) hours as needed. 90 each 1    apixaban (ELIQUIS) 5 mg Tab Take 1 tablet (5 mg total) by mouth 2 (two) times daily. 180 tablet 3    ASCORBATE CALCIUM (VITAMIN C ORAL) Take by mouth once daily.      aspirin (ECOTRIN) 81 MG EC tablet Take 1 tablet (81 mg total) by mouth once daily.  0    compressor, for nebulizer Lara Compressor use as directed by albuterol use. 1 Device 0    DOCOSAHEXANOIC ACID/EPA (FISH OIL ORAL) Take by mouth once daily.      flecainide (TAMBOCOR) 50 MG Tab Take 1 tablet (50 mg total) by mouth every 12 (twelve) hours. 60 tablet 6    fluticasone (FLOVENT HFA) 110 mcg/actuation inhaler Inhale 1 puff into the lungs 2 (two) times daily. 12 g 4    furosemide (LASIX) 40 MG tablet Take 1 tablet (40 mg total) by mouth once daily. 90 tablet 3    isosorbide mononitrate (IMDUR) 30 MG 24 hr tablet TAKE 1 TABLET(30 MG) BY MOUTH EVERY DAY 90 tablet 3    loratadine (CLARITIN) 10 mg  tablet Take 1 tablet (10 mg total) by mouth once daily.  0    metoprolol succinate (TOPROL-XL) 100 MG 24 hr tablet Take 1 tablet (100 mg total) by mouth once daily. 90 tablet 1    pravastatin (PRAVACHOL) 40 MG tablet Take 1 tablet (40 mg total) by mouth once daily. 90 tablet 1    valsartan (DIOVAN) 160 MG tablet Take 1 tablet (160 mg total) by mouth once daily. 90 tablet 3     No current facility-administered medications on file prior to visit.      .     Objective:    Physical Exam   Constitutional: She is oriented to person, place, and time. She appears well-developed and well-nourished.   HENT:   Head: Normocephalic and atraumatic.   Eyes: Pupils are equal, round, and reactive to light.   Neck: Normal range of motion. No JVD present.   Cardiovascular: Exam reveals no gallop and no friction rub.    No murmur heard.  Pulmonary/Chest: Effort normal and breath sounds normal. No respiratory distress. She has no wheezes. She has no rales. She exhibits no tenderness.   Abdominal: Soft. Bowel sounds are normal. She exhibits no distension and no mass. There is no tenderness. There is no rebound and no guarding.   Musculoskeletal: She exhibits no edema or deformity.   Neurological: She is alert and oriented to person, place, and time.   Skin: Skin is warm and dry.   Psychiatric: She has a normal mood and affect. Her behavior is normal. Judgment and thought content normal.   Nursing note and vitals reviewed.        Lab Results   Component Value Date    CHOL 123 04/13/2017    CHOL 165 06/02/2016    CHOL 170 05/14/2015     Lab Results   Component Value Date    HDL 59 04/13/2017    HDL 75 06/02/2016    HDL 92 (H) 05/14/2015     Lab Results   Component Value Date    LDLCALC 51.2 (L) 04/13/2017    LDLCALC 79.8 06/02/2016    LDLCALC 65.4 05/14/2015     Lab Results   Component Value Date    TRIG 64 04/13/2017    TRIG 51 06/02/2016    TRIG 63 05/14/2015     Lab Results   Component Value Date    CHOLHDL 48.0 04/13/2017    CHOLHDL  "45.5 06/02/2016    CHOLHDL 54.1 (H) 05/14/2015       Chemistry        Component Value Date/Time     04/13/2017 0309    K 5.4 (H) 04/13/2017 0309     04/13/2017 0309    CO2 32 (H) 04/13/2017 0309    BUN 19 04/13/2017 0309    CREATININE 0.8 04/13/2017 0309    GLU 86 04/13/2017 0309        Component Value Date/Time    CALCIUM 9.3 04/13/2017 0309    ALKPHOS 80 04/13/2017 0309    AST 18 04/13/2017 0309    ALT 11 04/13/2017 0309    BILITOT 0.4 04/13/2017 0309          Lab Results   Component Value Date    TSH 1.057 12/30/2016     Lab Results   Component Value Date    INR 1.0 04/12/2017    INR 1.0 12/02/2016    INR 1.0 02/01/2016     Lab Results   Component Value Date    WBC 5.79 04/13/2017    HGB 11.0 (L) 04/13/2017    HCT 34.4 (L) 04/13/2017    MCV 93 04/13/2017     04/13/2017     BP (!) 140/60 (BP Location: Right arm, Patient Position: Sitting, BP Method: Manual)  Pulse (!) 58 Comment: radial  Ht 4' 10" (1.473 m)  Wt 90.9 kg (200 lb 6.4 oz)  LMP 12/13/1973  BMI 41.88 kg/m2    Assessment:       1. Coronary artery disease involving native coronary artery of native heart without angina pectoris    2. Morbid obesity with BMI of 40.0-44.9, adult    3. Hyperlipidemia, unspecified hyperlipidemia type    4. Heart valve regurgitation    5. Left ventricular diastolic dysfunction with preserved systolic function    6. PAF (paroxysmal atrial fibrillation)    7. Atherosclerosis of aorta    8. Essential hypertension        She is doing well since hospital discharge. No sign of ADHF or myocardial ischemia on exam. Vital signs stable.       Plan:       Will continue current medications and treatment plan  Risk modification   Low sodium diet  Telephone review Zio monitor results   Follow up in clinic in 3 months or sooner if needed               "

## 2017-04-21 NOTE — MR AVS SNAPSHOT
[facility] - Cardiology  [address] [facility] [address]  Phone: [phone]  Fax: [phone]                  Elke Laws   2017 11:30 AM   Office Visit    Description:  Female : 10/15/1934   Provider:  Michael Valentine NP   Department:  [facility]           Reason for Visit     Hospital Follow Up           Diagnoses this Visit        Comments    Coronary artery disease involving native coronary artery of native heart without angina pectoris    -  Primary     Morbid obesity with BMI of 40.0-44.9, adult         Hyperlipidemia, unspecified hyperlipidemia type         Heart valve regurgitation         Left ventricular diastolic dysfunction with preserved systolic function         PAF (paroxysmal atrial fibrillation)         Atherosclerosis of aorta         Essential hypertension                To Do List           Future Appointments        Provider Department Dept Phone    2017 9:40 AM Isidra Benavidez MD [facility] Internal Medicine [phone]      Goals (5 Years of Data)     None      Follow-Up and Disposition     Return in about 3 months (around 2017).      [facility] On Call     [facility] On Call Nurse Care Line -  Assistance  Unless otherwise directed by your provider, please contact [facility], our nurse care line that is available for  assistance.     Registered nurses in the [facility] On Call Center provide: appointment scheduling, clinical advisement, health education, and other advisory services.  Call: [phone] (toll free)               Medications           Message regarding Medications     Verify the changes and/or additions to your medication regime listed below are the same as discussed with your clinician today.  If any of these changes or additions are incorrect, please notify your healthcare provider.             Verify that the below list of medications is an accurate representation of the medications you are currently taking.  If  "none reported, the list may be blank. If incorrect, please contact your healthcare provider. Carry this list with you in case of emergency.           Current Medications     acetaminophen (TYLENOL) 500 MG tablet Take 1 tablet (500 mg total) by mouth 2 (two) times daily.    albuterol 90 mcg/actuation inhaler Inhale 2 puffs into the lungs 4 (four) times daily.    albuterol sulfate 2.5 mg/0.5 mL Nebu Take 2.5 mg by nebulization every 4 (four) hours as needed.    apixaban (ELIQUIS) 5 mg Tab Take 1 tablet (5 mg total) by mouth 2 (two) times daily.    ASCORBATE CALCIUM (VITAMIN C ORAL) Take by mouth once daily.    aspirin (ECOTRIN) 81 MG EC tablet Take 1 tablet (81 mg total) by mouth once daily.    compressor, for nebulizer Lara Compressor use as directed by albuterol use.    DOCOSAHEXANOIC ACID/EPA (FISH OIL ORAL) Take by mouth once daily.    flecainide (TAMBOCOR) 50 MG Tab Take 1 tablet (50 mg total) by mouth every 12 (twelve) hours.    fluticasone (FLOVENT HFA) 110 mcg/actuation inhaler Inhale 1 puff into the lungs 2 (two) times daily.    furosemide (LASIX) 40 MG tablet Take 1 tablet (40 mg total) by mouth once daily.    isosorbide mononitrate (IMDUR) 30 MG 24 hr tablet TAKE 1 TABLET(30 MG) BY MOUTH EVERY DAY    loratadine (CLARITIN) 10 mg tablet Take 1 tablet (10 mg total) by mouth once daily.    metoprolol succinate (TOPROL-XL) 100 MG 24 hr tablet Take 1 tablet (100 mg total) by mouth once daily.    pravastatin (PRAVACHOL) 40 MG tablet Take 1 tablet (40 mg total) by mouth once daily.    valsartan (DIOVAN) 160 MG tablet Take 1 tablet (160 mg total) by mouth once daily.           Clinical Reference Information           Your Vitals Were     BP Pulse Height Weight Last Period BMI    140/60 (BP Location: Right arm, Patient Position: Sitting, BP Method: Manual) 58 4' 10" (1.473 m) 90.9 kg (200 lb 6.4 oz) 12/13/1973 41.88 kg/m2      Blood Pressure          Most Recent Value    BP  (!)  140/60      Allergies as of 4/21/2017 "     Iodine And Iodide Containing Products      Immunizations Administered on Date of Encounter - 4/21/2017     None      [facility] Sign-Up     Activating your [facility] account is as easy as 1-2-3!     1) Visit [email or web address], select Sign Up Now, enter this activation code and your date of birth, then select Next.  [email or web address]  Expires: 5/28/2017  2:02 PM      2) Create a username and password to use when you visit [facility] in the future and select a security question in case you lose your password and select Next.    3) Enter your e-mail address and click Sign Up!    Additional Information  If you have questions, please e-mail [email or web address] or call [phone] to talk to our [facility] staff. Remember, [facility] is NOT to be used for urgent needs. For medical emergencies, dial 911.         Language Assistance Services     ATTENTION: Language assistance services are available, free of charge. Please call [phone].      ATENCIÓN: Si habla español, tiene a gong disposición servicios gratuitos de asistencia lingüística. Llame al [phone].     CHÚ Ý: N?u b?n nói Ti?ng Vi?t, có các d?ch v? h? tr? ngôn ng? mi?n phí dành cho b?n. G?i s? [phone].         [facility] complies with applicable Federal civil rights laws and does not discriminate on the basis of race, color, national origin, age, disability, or sex.

## 2017-04-22 ENCOUNTER — HOSPITAL ENCOUNTER (EMERGENCY)
Facility: HOSPITAL | Age: 82
Discharge: HOME | End: 2017-04-22
Attending: EMERGENCY MEDICINE
Payer: MEDICARE

## 2017-04-22 VITALS
TEMPERATURE: 98 F | OXYGEN SATURATION: 98 % | DIASTOLIC BLOOD PRESSURE: 61 MMHG | RESPIRATION RATE: 18 BRPM | BODY MASS INDEX: 39.88 KG/M2 | WEIGHT: 190 LBS | HEIGHT: 58 IN | HEART RATE: 66 BPM | SYSTOLIC BLOOD PRESSURE: 142 MMHG

## 2017-04-22 DIAGNOSIS — R06.00 DYSPNEA, UNSPECIFIED TYPE: Primary | ICD-10-CM

## 2017-04-22 DIAGNOSIS — Z79.01 CHRONIC ANTICOAGULATION: ICD-10-CM

## 2017-04-22 LAB
ALBUMIN SERPL BCP-MCNC: 3.2 G/DL
ALP SERPL-CCNC: 106 U/L
ALT SERPL W/O P-5'-P-CCNC: 14 U/L
ANION GAP SERPL CALC-SCNC: 11 MMOL/L
AST SERPL-CCNC: 19 U/L
BASOPHILS # BLD AUTO: 0.02 K/UL
BASOPHILS NFR BLD: 0.2 %
BILIRUB SERPL-MCNC: 0.4 MG/DL
BNP SERPL-MCNC: 254 PG/ML
BUN SERPL-MCNC: 30 MG/DL
CALCIUM SERPL-MCNC: 9.1 MG/DL
CHLORIDE SERPL-SCNC: 101 MMOL/L
CO2 SERPL-SCNC: 25 MMOL/L
CREAT SERPL-MCNC: 1.2 MG/DL
DIFFERENTIAL METHOD: ABNORMAL
EOSINOPHIL # BLD AUTO: 0.2 K/UL
EOSINOPHIL NFR BLD: 2.1 %
ERYTHROCYTE [DISTWIDTH] IN BLOOD BY AUTOMATED COUNT: 14 %
EST. GFR  (AFRICAN AMERICAN): 49 ML/MIN/1.73 M^2
EST. GFR  (NON AFRICAN AMERICAN): 42 ML/MIN/1.73 M^2
GLUCOSE SERPL-MCNC: 109 MG/DL
HCT VFR BLD AUTO: 34.9 %
HGB BLD-MCNC: 11.3 G/DL
INR PPP: 1.1
LYMPHOCYTES # BLD AUTO: 2.1 K/UL
LYMPHOCYTES NFR BLD: 25.2 %
MCH RBC QN AUTO: 29.5 PG
MCHC RBC AUTO-ENTMCNC: 32.4 %
MCV RBC AUTO: 91 FL
MONOCYTES # BLD AUTO: 1 K/UL
MONOCYTES NFR BLD: 12.4 %
NEUTROPHILS # BLD AUTO: 4.9 K/UL
NEUTROPHILS NFR BLD: 60.1 %
PLATELET # BLD AUTO: 188 K/UL
PMV BLD AUTO: 11.6 FL
POTASSIUM SERPL-SCNC: 4 MMOL/L
PROT SERPL-MCNC: 6.7 G/DL
PROTHROMBIN TIME: 11 SEC
RBC # BLD AUTO: 3.83 M/UL
SODIUM SERPL-SCNC: 137 MMOL/L
TROPONIN I SERPL DL<=0.01 NG/ML-MCNC: 0.01 NG/ML
WBC # BLD AUTO: 8.14 K/UL

## 2017-04-22 PROCEDURE — 84484 ASSAY OF TROPONIN QUANT: CPT

## 2017-04-22 PROCEDURE — 99284 EMERGENCY DEPT VISIT MOD MDM: CPT | Mod: 25

## 2017-04-22 PROCEDURE — 80053 COMPREHEN METABOLIC PANEL: CPT

## 2017-04-22 PROCEDURE — 85025 COMPLETE CBC W/AUTO DIFF WBC: CPT

## 2017-04-22 PROCEDURE — 93010 ELECTROCARDIOGRAM REPORT: CPT | Mod: ,,, | Performed by: INTERNAL MEDICINE

## 2017-04-22 PROCEDURE — 85610 PROTHROMBIN TIME: CPT

## 2017-04-22 PROCEDURE — 93005 ELECTROCARDIOGRAM TRACING: CPT

## 2017-04-22 PROCEDURE — 36415 COLL VENOUS BLD VENIPUNCTURE: CPT

## 2017-04-22 PROCEDURE — 83880 ASSAY OF NATRIURETIC PEPTIDE: CPT

## 2017-04-22 NOTE — ED AVS SNAPSHOT
[facility] -   [address] [place] [address]               Elke Laws   2017 12:42 AM   ED    Description:  Female : 10/15/1934   Department:  [facility]           Your Care was Coordinated By:     Provider Role From To    Nat Cormier MD Attending Provider 17 0038 --      Reason for Visit     Shortness of Breath           Diagnoses this Visit        Comments    Dyspnea, unspecified type    -  Primary     Chronic anticoagulation           ED Disposition     None           To Do List           Follow-up Information     Follow up with [facility] CARDIOLOGY. Schedule an appointment as soon as possible for a visit in 2 days.    Specialty:  Cardiology    Why:  Return to the Emergency Room, If symptoms worsen    Contact information:    [address] [place] [address]  [phone]      [facility]     [facility] Nurse Care [facility] -  Assistance  Unless otherwise directed by your provider, please contact [facility], our nurse care line that is available for  assistance.     Registered nurses in the [facility] provide: appointment scheduling, clinical advisement, health education, and other advisory services.  Call: [phone] (toll free)               Medications           Message regarding Medications     Verify the changes and/or additions to your medication regime listed below are the same as discussed with your clinician today.  If any of these changes or additions are incorrect, please notify your healthcare provider.             Verify that the below list of medications is an accurate representation of the medications you are currently taking.  If none reported, the list may be blank. If incorrect, please contact your healthcare provider. Carry this list with you in case of emergency.           Current Medications     acetaminophen (TYLENOL) 500 MG tablet Take 1 tablet (500  "mg total) by mouth 2 (two) times daily.    albuterol 90 mcg/actuation inhaler Inhale 2 puffs into the lungs 4 (four) times daily.    albuterol sulfate 2.5 mg/0.5 mL Nebu Take 2.5 mg by nebulization every 4 (four) hours as needed.    apixaban (ELIQUIS) 5 mg Tab Take 1 tablet (5 mg total) by mouth 2 (two) times daily.    ASCORBATE CALCIUM (VITAMIN C ORAL) Take by mouth once daily.    aspirin (ECOTRIN) 81 MG EC tablet Take 1 tablet (81 mg total) by mouth once daily.    compressor, for nebulizer Lara Compressor use as directed by albuterol use.    DOCOSAHEXANOIC ACID/EPA (FISH OIL ORAL) Take by mouth once daily.    flecainide (TAMBOCOR) 50 MG Tab Take 1 tablet (50 mg total) by mouth every 12 (twelve) hours.    fluticasone (FLOVENT HFA) 110 mcg/actuation inhaler Inhale 1 puff into the lungs 2 (two) times daily.    furosemide (LASIX) 40 MG tablet Take 1 tablet (40 mg total) by mouth once daily.    isosorbide mononitrate (IMDUR) 30 MG 24 hr tablet TAKE 1 TABLET(30 MG) BY MOUTH EVERY DAY    loratadine (CLARITIN) 10 mg tablet Take 1 tablet (10 mg total) by mouth once daily.    metoprolol succinate (TOPROL-XL) 100 MG 24 hr tablet Take 1 tablet (100 mg total) by mouth once daily.    pravastatin (PRAVACHOL) 40 MG tablet Take 1 tablet (40 mg total) by mouth once daily.    valsartan (DIOVAN) 160 MG tablet Take 1 tablet (160 mg total) by mouth once daily.           Clinical Reference Information           Your Vitals Were     BP Pulse Temp Resp Height Weight    148/55 63 96 °F (35.6 °C) (Oral) 20 4' 10" (1.473 m) 86.2 kg (190 lb)    Last Period SpO2 BMI          12/13/1973 96% 39.71 kg/m2        Allergies as of 4/22/2017        Reactions    Iodine And Iodide Containing Products Rash      Immunizations Administered on Date of Encounter - 4/22/2017     None      ED Micro, Lab, POCT     Start Ordered       Status Ordering Provider    04/22/17 0043 04/22/17 0042  CBC auto differential  STAT      Final result     04/22/17 0043 04/22/17 " 0042  Comprehensive metabolic panel  STAT      In process     04/22/17 0043 04/22/17 0042  Troponin I  STAT      In process     04/22/17 0043 04/22/17 0042  Brain natriuretic peptide  STAT      In process     04/22/17 0043 04/22/17 0042  Protime-INR  Once      Final result       ED Imaging Orders     Start Ordered       Status Ordering Provider    04/22/17 0043 04/22/17 0042  X-Ray Chest AP Portable  1 time imaging      In process       Discharge References/Attachments     SHORTNESS OF BREATH (DYSPNEA) (ENGLISH)      Your Scheduled Appointments     Jun 08, 2017  9:40 AM CDT   Established Patient Visit with Isidra Benavidez MD   [facility] ([facility])    [address] [place] [address]   [phone]            Jul 26, 2017 11:00 AM CDT   Established Patient Visit with Michael Valentine NP   [facility] ([facility])    [address] [place] [address]   [phone]              [facility] Sign-Up     Activating your [facility] account is as easy as 1-2-3!     1) Visit [email or web address], select Sign Up Now, enter this activation code and your date of birth, then select Next.  [email or web address]  Expires: 5/28/2017  2:02 PM      2) Create a username and password to use when you visit [facility] in the future and select a security question in case you lose your password and select Next.    3) Enter your e-mail address and click Sign Up!    Additional Information  If you have questions, please e-mail [email or web address] or call [phone] to talk to our [facility] staff. Remember, [facility] is NOT to be used for urgent needs. For medical emergencies, dial 911.          [facility]  complies with applicable Federal civil rights laws and does not discriminate on the basis of race, color, national origin, age, disability, or sex.        Language Assistance Services     ATTENTION: Language assistance services are available, free of  charge. Please call [phone].      ATENCIÓN: Si habla español, tiene a gong disposición servicios gratuitos de asistencia lingüística. Llame al [phone].     CHÚ Ý: N?u b?n nói Ti?ng Vi?t, có các d?ch v? h? tr? ngôn ng? mi?n phí dành cho b?n. G?i s? [phone].

## 2017-04-22 NOTE — ED PROVIDER NOTES
"SCRIBE #1 NOTE: I, Rebecca Shook, am scribing for, and in the presence of, Nat Cormier MD. I have scribed the entire note.      History      Chief Complaint   Patient presents with    Shortness of Breath     " i woke up short of breath"        Review of patient's allergies indicates:   Allergen Reactions    Iodine and iodide containing products Rash        HPI   HPI    4/22/2017, 12:47 AM   History obtained from the patient      History of Present Illness: Elke Laws is a 82 y.o. female patient who presents to the Emergency Department for SOB which onset suddenly tonight awakening patient from sleep around 11:00pm. Symptoms are constant and moderate in severity. No mitigating or exacerbating factors reported. Patient reports taking 1 nebulizer treatment bc she was wheezing and using her breathing machine at home and patient states she is currently feeling much better, denies SOB at this time. No other sxs reported. Patient denies any chills, fever, leg pain/swelling, CP, cough, n/v, chest tightness, palpitations, lightheadedness, dizziness and all other sxs at this time. Pt also reports seeing cardiology yesterday for a f/u. No further complaints or concerns at this time.     Arrival mode: Personal vehicle    PCP: Isidra Benavidez MD       Past Medical History:  Past Medical History:   Diagnosis Date    Acute on chronic diastolic congestive heart failure 8/27/2015    Anemia 5/9/2016    Anemia 5/9/2016    Aortic regurgitation     Echo 11/2013---5 - Mild to moderate aortic regurgitation.      Atrial fibrillation 5/15/2014    Back pain     s/p epidural steriod injections, no recent injections.    Baker's cyst     small, right, seen on US lower extremity 6/2014    Blood transfusion     Chest pain, atypical 8/27/2015    Diastolic dysfunction     Seen on echo 11/2013, stress test negative 5/2014    Diverticulosis     colonoscopy 3/27/2011    Hemorrhoids     colonoscopy 3/27/2011    Hiatal " "hernia     CXR 12/30/2016---Retrocardiac density again noted consistent with a hiatal hernia.    Hx of adenomatous colonic polyps     Hyperlipidemia     Hypertension     Hyponatremia 5/9/2016    Mitral regurgitation     Myocardial infarction     per patient was told in the past she had a "mild heart attack"    Obesity     Osteoarthritis, knee     Pneumonia     per patient "walking Pneumonia" did not require hospitalization    Seasonal allergies     Stroke     TIA; patient reports was told by one doctor she had 2 mini strokes but another doctor said she did not       Past Surgical History:  Past Surgical History:   Procedure Laterality Date    EYE SURGERY Left     HYSTERECTOMY      benign reasons    KNEE SURGERY Bilateral     x2     OLECRANON BURSECTOMY Right     6/2011    URETHRA SURGERY           Family History:  Family History   Problem Relation Age of Onset    Heart disease Mother     Cancer Mother      colon    Hypertension Mother     Hyperlipidemia Mother     Heart disease Father     Cancer Father      colon    Hypertension Father     Hyperlipidemia Father     Heart disease Sister      MI    Heart disease Brother      MI    COPD Son     Hypertension Son     Diabetes Brother     Diabetes Son     Cancer Sister      breast    Kidney disease Neg Hx     Stroke Neg Hx        Social History:  Social History     Social History Main Topics    Smoking status: Former Smoker     Packs/day: 0.05     Years: 1.00     Types: Cigarettes     Quit date: 1/1/1952    Smokeless tobacco: Never Used    Alcohol use No    Drug use: No    Sexual activity: No       ROS   Review of Systems   Constitutional: Negative for chills and fever.   HENT: Negative for congestion and sore throat.    Respiratory: Positive for shortness of breath. Negative for cough and chest tightness.    Cardiovascular: Negative for chest pain and leg swelling.   Gastrointestinal: Negative for abdominal pain, nausea and vomiting. " "  Genitourinary: Negative for difficulty urinating and dysuria.   Musculoskeletal: Negative for back pain and neck pain.   Skin: Negative for rash.   Neurological: Negative for dizziness, numbness and headaches.   Psychiatric/Behavioral: Negative for agitation and confusion.   All other systems reviewed and are negative.      Physical Exam    Initial Vitals   BP Pulse Resp Temp SpO2   04/22/17 0039 04/22/17 0037 04/22/17 0037 04/22/17 0037 04/22/17 0037   163/69 65 19 96 °F (35.6 °C) 93 %      Physical Exam  Nursing Notes and Vital Signs Reviewed.  Constitutional: Patient is in no acute distress. Awake and alert. Well-developed and well-nourished.  Head: Atraumatic. Normocephalic.  Eyes: PERRL. EOM intact. Conjunctivae are not pale. No scleral icterus.  ENT: Mucous membranes are moist. Oropharynx is clear and symmetric.    Neck: Supple. Full ROM. No lymphadenopathy.  Cardiovascular: Regular rate. Regular rhythm. No murmurs, rubs, or gallops. Distal pulses are 2+ and symmetric.  Pulmonary/Chest: No respiratory distress. Clear to auscultation bilaterally. No wheezing, rales, or rhonchi.  Abdominal: Soft and non-distended.  There is no tenderness.  No rebound, guarding, or rigidity. Good bowel sounds.  Musculoskeletal: Moves all extremities. No obvious deformities. No edema. No calf tenderness.  Skin: Warm and dry.  Neurological:  Alert, awake, and appropriate.  Normal speech.  No acute focal neurological deficits are appreciated.  Psychiatric: Normal affect. Good eye contact. Appropriate in content.    ED Course    Procedures  ED Vital Signs:  Vitals:    04/22/17 0037 04/22/17 0039 04/22/17 0045 04/22/17 0055   BP:  (!) 163/69     Pulse: 65   67   Resp: 19      Temp: 96 °F (35.6 °C)      TempSrc: Oral      SpO2: (!) 93%  97%    Weight: 86.2 kg (190 lb)      Height: 4' 10" (1.473 m)       04/22/17 0059 04/22/17 0106 04/22/17 0200   BP: (!) 148/55  (!) 142/61   Pulse: 64 63 66   Resp: 20  18   Temp:   98.1 °F (36.7 °C) "   TempSrc:   Oral   SpO2: 96%  98%   Weight:      Height:          Abnormal Lab Results:  Labs Reviewed   CBC W/ AUTO DIFFERENTIAL - Abnormal; Notable for the following:        Result Value    RBC 3.83 (*)     Hemoglobin 11.3 (*)     Hematocrit 34.9 (*)     All other components within normal limits   COMPREHENSIVE METABOLIC PANEL - Abnormal; Notable for the following:     BUN, Bld 30 (*)     Albumin 3.2 (*)     eGFR if  49 (*)     eGFR if non  42 (*)     All other components within normal limits   B-TYPE NATRIURETIC PEPTIDE - Abnormal; Notable for the following:      (*)     All other components within normal limits   TROPONIN I   PROTIME-INR        All Lab Results:  Results for orders placed or performed during the hospital encounter of 04/22/17   CBC auto differential   Result Value Ref Range    WBC 8.14 3.90 - 12.70 K/uL    RBC 3.83 (L) 4.00 - 5.40 M/uL    Hemoglobin 11.3 (L) 12.0 - 16.0 g/dL    Hematocrit 34.9 (L) 37.0 - 48.5 %    MCV 91 82 - 98 fL    MCH 29.5 27.0 - 31.0 pg    MCHC 32.4 32.0 - 36.0 %    RDW 14.0 11.5 - 14.5 %    Platelets 188 150 - 350 K/uL    MPV 11.6 9.2 - 12.9 fL    Gran # 4.9 1.8 - 7.7 K/uL    Lymph # 2.1 1.0 - 4.8 K/uL    Mono # 1.0 0.3 - 1.0 K/uL    Eos # 0.2 0.0 - 0.5 K/uL    Baso # 0.02 0.00 - 0.20 K/uL    Gran% 60.1 38.0 - 73.0 %    Lymph% 25.2 18.0 - 48.0 %    Mono% 12.4 4.0 - 15.0 %    Eosinophil% 2.1 0.0 - 8.0 %    Basophil% 0.2 0.0 - 1.9 %    Differential Method Automated    Comprehensive metabolic panel   Result Value Ref Range    Sodium 137 136 - 145 mmol/L    Potassium 4.0 3.5 - 5.1 mmol/L    Chloride 101 95 - 110 mmol/L    CO2 25 23 - 29 mmol/L    Glucose 109 70 - 110 mg/dL    BUN, Bld 30 (H) 8 - 23 mg/dL    Creatinine 1.2 0.5 - 1.4 mg/dL    Calcium 9.1 8.7 - 10.5 mg/dL    Total Protein 6.7 6.0 - 8.4 g/dL    Albumin 3.2 (L) 3.5 - 5.2 g/dL    Total Bilirubin 0.4 0.1 - 1.0 mg/dL    Alkaline Phosphatase 106 55 - 135 U/L    AST 19 10 - 40 U/L     ALT 14 10 - 44 U/L    Anion Gap 11 8 - 16 mmol/L    eGFR if African American 49 (A) >60 mL/min/1.73 m^2    eGFR if non African American 42 (A) >60 mL/min/1.73 m^2   Troponin I   Result Value Ref Range    Troponin I 0.014 0.000 - 0.026 ng/mL   Brain natriuretic peptide   Result Value Ref Range     (H) 0 - 99 pg/mL   Protime-INR   Result Value Ref Range    Prothrombin Time 11.0 9.0 - 12.5 sec    INR 1.1 0.8 - 1.2         Imaging Results:  Imaging Results         X-Ray Chest AP Portable (In process) Impression by ED provider: Stable, NAF          The EKG was ordered, reviewed, and independently interpreted by the ED provider.  Interpretation time: 052  Rate: 65 BPM  Rhythm: normal sinus rhythm  Interpretation: Normal EKG. No STEMI.           The Emergency Provider reviewed the vital signs and test results, which are outlined above.    ED Discussion     1:28 AM: Reassessed pt at this time.  Pt states her condition has improved at this time, labs, ECG and x-ray at baseline, on chronic anticoagulation, do not feel this is a PE or ACS. Discussed with pt all pertinent ED information and results. Discussed pt dx and plan of tx. Gave pt all f/u and return to the ED instructions. All questions and concerns were addressed at this time. Pt expresses understanding of information and instructions, and is comfortable with plan to discharge. Pt is stable for discharge.    Pre-hypertension/Hypertension: The pt has been informed that they may have pre-hypertension or hypertension based on a blood pressure reading in the ED. I recommend that the pt call the PCP listed on their discharge instructions or a physician of their choice this week to arrange f/u for further evaluation of possible pre-hypertension or hypertension.     I discussed with patient and/or family/caretaker that evaluation in the ED does not suggest any emergent or life threatening medical conditions requiring immediate intervention beyond what was provided  in the ED, and I believe patient is safe for discharge.  Regardless, an unremarkable evaluation in the ED does not preclude the development or presence of a serious of life threatening condition. As such, patient was instructed to return immediately for any worsening or change in current symptoms.      ED Medication(s):  Medications - No data to display    New Prescriptions    No medications on file       Follow-up Information     Follow up with [facility]. Schedule an appointment as soon as possible for a visit in 2 days.    Specialty:  Cardiology    Why:  Return to the Emergency Room, If symptoms worsen    Contact information:    [address] [place] [address]  [phone]            Medical Decision Making    Medical Decision Making:   Clinical Tests:   Lab Tests: Ordered and Reviewed  Radiological Study: Reviewed and Ordered  Medical Tests: Ordered and Reviewed           Scribe Attestation:   Scribe #1: I performed the above scribed service and the documentation accurately describes the services I performed. I attest to the accuracy of the note.    Attending:   Physician Attestation Statement for Scribe #1: I, Nat Cormier MD, personally performed the services described in this documentation, as scribed by Rebecca Shook, in my presence, and it is both accurate and complete.          Clinical Impression       ICD-10-CM ICD-9-CM   1. Dyspnea, unspecified type R06.00 786.09   2. Chronic anticoagulation Z79.01 V58.61       Disposition:   Disposition: Discharged  Condition: Stable         Nat Cormier MD  04/22/17 0545

## 2017-05-07 RX ORDER — APIXABAN 5 MG/1
TABLET, FILM COATED ORAL
Qty: 60 TABLET | Refills: 6 | Status: ON HOLD | OUTPATIENT
Start: 2017-05-07 | End: 2017-05-25 | Stop reason: HOSPADM

## 2017-05-12 ENCOUNTER — TELEPHONE (OUTPATIENT)
Dept: CARDIOLOGY | Facility: CLINIC | Age: 82
End: 2017-05-12

## 2017-05-12 NOTE — TELEPHONE ENCOUNTER
Please notify Ms Laws Holter monitor results was stable. Check if she is experiencing any symptoms.

## 2017-05-21 ENCOUNTER — HOSPITAL ENCOUNTER (INPATIENT)
Facility: HOSPITAL | Age: 82
LOS: 3 days | Discharge: HOME HEALTH | DRG: 871 | End: 2017-05-25
Attending: EMERGENCY MEDICINE | Admitting: INTERNAL MEDICINE
Payer: MEDICARE

## 2017-05-21 DIAGNOSIS — I50.33 ACUTE ON CHRONIC DIASTOLIC CONGESTIVE HEART FAILURE: Primary | Chronic | ICD-10-CM

## 2017-05-21 DIAGNOSIS — R07.9 CHEST PAIN: ICD-10-CM

## 2017-05-21 DIAGNOSIS — R06.02 SOB (SHORTNESS OF BREATH): ICD-10-CM

## 2017-05-21 DIAGNOSIS — I48.91 ATRIAL FIBRILLATION: ICD-10-CM

## 2017-05-21 DIAGNOSIS — R07.9 CHEST PAIN, UNSPECIFIED TYPE: ICD-10-CM

## 2017-05-21 DIAGNOSIS — I50.9 ACUTE ON CHRONIC HEART FAILURE: ICD-10-CM

## 2017-05-21 DIAGNOSIS — I50.9 ACUTE ON CHRONIC CONGESTIVE HEART FAILURE: ICD-10-CM

## 2017-05-21 DIAGNOSIS — A41.9 SEPSIS: ICD-10-CM

## 2017-05-21 DIAGNOSIS — I48.0 PAROXYSMAL ATRIAL FIBRILLATION: ICD-10-CM

## 2017-05-21 DIAGNOSIS — I50.9 ACUTE ON CHRONIC CONGESTIVE HEART FAILURE, UNSPECIFIED CONGESTIVE HEART FAILURE TYPE: ICD-10-CM

## 2017-05-21 PROBLEM — R10.31 RLQ ABDOMINAL PAIN: Status: ACTIVE | Noted: 2017-05-21

## 2017-05-21 PROBLEM — N39.0 UTI (URINARY TRACT INFECTION): Status: ACTIVE | Noted: 2017-05-21

## 2017-05-21 LAB
ALBUMIN SERPL BCP-MCNC: 3 G/DL
ALBUMIN SERPL BCP-MCNC: 3.1 G/DL
ALP SERPL-CCNC: 82 U/L
ALP SERPL-CCNC: 82 U/L
ALT SERPL W/O P-5'-P-CCNC: 16 U/L
ALT SERPL W/O P-5'-P-CCNC: 19 U/L
ANION GAP SERPL CALC-SCNC: 10 MMOL/L
ANION GAP SERPL CALC-SCNC: 9 MMOL/L
AST SERPL-CCNC: 28 U/L
AST SERPL-CCNC: 30 U/L
BACTERIA #/AREA URNS HPF: ABNORMAL /HPF
BASOPHILS # BLD AUTO: 0.01 K/UL
BASOPHILS # BLD AUTO: 0.02 K/UL
BASOPHILS NFR BLD: 0.1 %
BASOPHILS NFR BLD: 0.2 %
BILIRUB SERPL-MCNC: 1.1 MG/DL
BILIRUB SERPL-MCNC: 1.3 MG/DL
BILIRUB UR QL STRIP: NEGATIVE
BNP SERPL-MCNC: 1041 PG/ML
BUN SERPL-MCNC: 17 MG/DL
BUN SERPL-MCNC: 17 MG/DL
CALCIUM SERPL-MCNC: 9.2 MG/DL
CALCIUM SERPL-MCNC: 9.3 MG/DL
CHLORIDE SERPL-SCNC: 103 MMOL/L
CHLORIDE SERPL-SCNC: 99 MMOL/L
CLARITY UR: CLEAR
CO2 SERPL-SCNC: 24 MMOL/L
CO2 SERPL-SCNC: 28 MMOL/L
COLOR UR: YELLOW
CREAT SERPL-MCNC: 0.8 MG/DL
CREAT SERPL-MCNC: 0.9 MG/DL
DIFFERENTIAL METHOD: ABNORMAL
DIFFERENTIAL METHOD: ABNORMAL
EOSINOPHIL # BLD AUTO: 0 K/UL
EOSINOPHIL # BLD AUTO: 0 K/UL
EOSINOPHIL NFR BLD: 0 %
EOSINOPHIL NFR BLD: 0.1 %
ERYTHROCYTE [DISTWIDTH] IN BLOOD BY AUTOMATED COUNT: 14.7 %
ERYTHROCYTE [DISTWIDTH] IN BLOOD BY AUTOMATED COUNT: 14.7 %
EST. GFR  (AFRICAN AMERICAN): >60 ML/MIN/1.73 M^2
EST. GFR  (AFRICAN AMERICAN): >60 ML/MIN/1.73 M^2
EST. GFR  (NON AFRICAN AMERICAN): 60 ML/MIN/1.73 M^2
EST. GFR  (NON AFRICAN AMERICAN): >60 ML/MIN/1.73 M^2
GLUCOSE SERPL-MCNC: 117 MG/DL
GLUCOSE SERPL-MCNC: 128 MG/DL
GLUCOSE UR QL STRIP: NEGATIVE
HCT VFR BLD AUTO: 35 %
HCT VFR BLD AUTO: 35.4 %
HGB BLD-MCNC: 11.4 G/DL
HGB BLD-MCNC: 11.5 G/DL
HGB UR QL STRIP: ABNORMAL
INR PPP: 1.2
KETONES UR QL STRIP: NEGATIVE
LEUKOCYTE ESTERASE UR QL STRIP: NEGATIVE
LYMPHOCYTES # BLD AUTO: 0.9 K/UL
LYMPHOCYTES # BLD AUTO: 1 K/UL
LYMPHOCYTES NFR BLD: 8.2 %
LYMPHOCYTES NFR BLD: 8.5 %
MAGNESIUM SERPL-MCNC: 1.8 MG/DL
MCH RBC QN AUTO: 29.6 PG
MCH RBC QN AUTO: 29.8 PG
MCHC RBC AUTO-ENTMCNC: 32.5 %
MCHC RBC AUTO-ENTMCNC: 32.6 %
MCV RBC AUTO: 91 FL
MCV RBC AUTO: 92 FL
MICROSCOPIC COMMENT: ABNORMAL
MONOCYTES # BLD AUTO: 1.2 K/UL
MONOCYTES # BLD AUTO: 1.5 K/UL
MONOCYTES NFR BLD: 11 %
MONOCYTES NFR BLD: 12.4 %
NEUTROPHILS # BLD AUTO: 8.5 K/UL
NEUTROPHILS # BLD AUTO: 9.4 K/UL
NEUTROPHILS NFR BLD: 78.9 %
NEUTROPHILS NFR BLD: 80.6 %
NITRITE UR QL STRIP: POSITIVE
PH UR STRIP: 6 [PH] (ref 5–8)
PHOSPHATE SERPL-MCNC: 2.7 MG/DL
PLATELET # BLD AUTO: 150 K/UL
PLATELET # BLD AUTO: 166 K/UL
PMV BLD AUTO: 10.8 FL
PMV BLD AUTO: 11.2 FL
POTASSIUM SERPL-SCNC: 4.1 MMOL/L
POTASSIUM SERPL-SCNC: 4.2 MMOL/L
PROT SERPL-MCNC: 6.8 G/DL
PROT SERPL-MCNC: 7.1 G/DL
PROT UR QL STRIP: NEGATIVE
PROTHROMBIN TIME: 12.1 SEC
RBC # BLD AUTO: 3.85 M/UL
RBC # BLD AUTO: 3.86 M/UL
RBC #/AREA URNS HPF: 2 /HPF (ref 0–4)
SODIUM SERPL-SCNC: 136 MMOL/L
SODIUM SERPL-SCNC: 137 MMOL/L
SP GR UR STRIP: 1.01 (ref 1–1.03)
TROPONIN I SERPL DL<=0.01 NG/ML-MCNC: 0.03 NG/ML
TROPONIN I SERPL DL<=0.01 NG/ML-MCNC: 0.04 NG/ML
URN SPEC COLLECT METH UR: ABNORMAL
UROBILINOGEN UR STRIP-ACNC: NEGATIVE EU/DL
WBC # BLD AUTO: 10.59 K/UL
WBC # BLD AUTO: 11.9 K/UL

## 2017-05-21 PROCEDURE — 25000242 PHARM REV CODE 250 ALT 637 W/ HCPCS: Performed by: EMERGENCY MEDICINE

## 2017-05-21 PROCEDURE — G0378 HOSPITAL OBSERVATION PER HR: HCPCS

## 2017-05-21 PROCEDURE — 83735 ASSAY OF MAGNESIUM: CPT

## 2017-05-21 PROCEDURE — 84484 ASSAY OF TROPONIN QUANT: CPT

## 2017-05-21 PROCEDURE — 63600175 PHARM REV CODE 636 W HCPCS: Performed by: EMERGENCY MEDICINE

## 2017-05-21 PROCEDURE — 87186 SC STD MICRODIL/AGAR DIL: CPT

## 2017-05-21 PROCEDURE — 84100 ASSAY OF PHOSPHORUS: CPT

## 2017-05-21 PROCEDURE — 80053 COMPREHEN METABOLIC PANEL: CPT | Mod: 91

## 2017-05-21 PROCEDURE — 36415 COLL VENOUS BLD VENIPUNCTURE: CPT

## 2017-05-21 PROCEDURE — 99900035 HC TECH TIME PER 15 MIN (STAT)

## 2017-05-21 PROCEDURE — 96374 THER/PROPH/DIAG INJ IV PUSH: CPT

## 2017-05-21 PROCEDURE — 85025 COMPLETE CBC W/AUTO DIFF WBC: CPT

## 2017-05-21 PROCEDURE — 85610 PROTHROMBIN TIME: CPT

## 2017-05-21 PROCEDURE — 81000 URINALYSIS NONAUTO W/SCOPE: CPT

## 2017-05-21 PROCEDURE — 25000003 PHARM REV CODE 250: Performed by: INTERNAL MEDICINE

## 2017-05-21 PROCEDURE — 27000221 HC OXYGEN, UP TO 24 HOURS

## 2017-05-21 PROCEDURE — 93010 ELECTROCARDIOGRAM REPORT: CPT | Mod: ,,, | Performed by: INTERNAL MEDICINE

## 2017-05-21 PROCEDURE — 94640 AIRWAY INHALATION TREATMENT: CPT

## 2017-05-21 PROCEDURE — 80053 COMPREHEN METABOLIC PANEL: CPT

## 2017-05-21 PROCEDURE — 25000003 PHARM REV CODE 250: Performed by: NURSE PRACTITIONER

## 2017-05-21 PROCEDURE — 63600175 PHARM REV CODE 636 W HCPCS: Performed by: NURSE PRACTITIONER

## 2017-05-21 PROCEDURE — 83880 ASSAY OF NATRIURETIC PEPTIDE: CPT

## 2017-05-21 PROCEDURE — 87040 BLOOD CULTURE FOR BACTERIA: CPT

## 2017-05-21 PROCEDURE — 25000003 PHARM REV CODE 250: Performed by: EMERGENCY MEDICINE

## 2017-05-21 PROCEDURE — 99285 EMERGENCY DEPT VISIT HI MDM: CPT | Mod: 25

## 2017-05-21 PROCEDURE — 93010 ELECTROCARDIOGRAM REPORT: CPT | Mod: 76,,, | Performed by: INTERNAL MEDICINE

## 2017-05-21 RX ORDER — POLYETHYLENE GLYCOL 3350 17 G/17G
17 POWDER, FOR SOLUTION ORAL DAILY
Status: DISCONTINUED | OUTPATIENT
Start: 2017-05-22 | End: 2017-05-25 | Stop reason: HOSPADM

## 2017-05-21 RX ORDER — ACETAMINOPHEN 325 MG/1
650 TABLET ORAL EVERY 6 HOURS PRN
Status: DISCONTINUED | OUTPATIENT
Start: 2017-05-21 | End: 2017-05-25 | Stop reason: HOSPADM

## 2017-05-21 RX ORDER — FUROSEMIDE 10 MG/ML
40 INJECTION INTRAMUSCULAR; INTRAVENOUS
Status: COMPLETED | OUTPATIENT
Start: 2017-05-21 | End: 2017-05-21

## 2017-05-21 RX ORDER — ONDANSETRON 8 MG/1
8 TABLET, ORALLY DISINTEGRATING ORAL EVERY 8 HOURS PRN
Status: DISCONTINUED | OUTPATIENT
Start: 2017-05-21 | End: 2017-05-25 | Stop reason: HOSPADM

## 2017-05-21 RX ORDER — FLECAINIDE ACETATE 50 MG/1
50 TABLET ORAL EVERY 12 HOURS
Status: DISCONTINUED | OUTPATIENT
Start: 2017-05-21 | End: 2017-05-25 | Stop reason: HOSPADM

## 2017-05-21 RX ORDER — ASPIRIN 325 MG
325 TABLET ORAL
Status: COMPLETED | OUTPATIENT
Start: 2017-05-21 | End: 2017-05-21

## 2017-05-21 RX ORDER — BUDESONIDE 0.5 MG/2ML
0.5 INHALANT ORAL EVERY 12 HOURS
Status: DISCONTINUED | OUTPATIENT
Start: 2017-05-21 | End: 2017-05-25 | Stop reason: HOSPADM

## 2017-05-21 RX ORDER — POLYETHYLENE GLYCOL 3350 17 G/17G
17 POWDER, FOR SOLUTION ORAL ONCE
Status: COMPLETED | OUTPATIENT
Start: 2017-05-21 | End: 2017-05-21

## 2017-05-21 RX ORDER — SODIUM CHLORIDE 9 MG/ML
3 INJECTION, SOLUTION INTRAMUSCULAR; INTRAVENOUS; SUBCUTANEOUS EVERY 8 HOURS
Status: DISCONTINUED | OUTPATIENT
Start: 2017-05-21 | End: 2017-05-25 | Stop reason: HOSPADM

## 2017-05-21 RX ORDER — VALSARTAN 80 MG/1
160 TABLET ORAL DAILY
Status: DISCONTINUED | OUTPATIENT
Start: 2017-05-22 | End: 2017-05-25 | Stop reason: HOSPADM

## 2017-05-21 RX ORDER — PRAVASTATIN SODIUM 20 MG/1
40 TABLET ORAL DAILY
Status: DISCONTINUED | OUTPATIENT
Start: 2017-05-22 | End: 2017-05-25

## 2017-05-21 RX ORDER — ASPIRIN 81 MG/1
81 TABLET ORAL DAILY
Status: DISCONTINUED | OUTPATIENT
Start: 2017-05-22 | End: 2017-05-25

## 2017-05-21 RX ORDER — IPRATROPIUM BROMIDE AND ALBUTEROL SULFATE 2.5; .5 MG/3ML; MG/3ML
3 SOLUTION RESPIRATORY (INHALATION)
Status: COMPLETED | OUTPATIENT
Start: 2017-05-21 | End: 2017-05-21

## 2017-05-21 RX ORDER — NITROGLYCERIN 0.4 MG/1
0.4 TABLET SUBLINGUAL EVERY 5 MIN PRN
Status: DISCONTINUED | OUTPATIENT
Start: 2017-05-21 | End: 2017-05-25 | Stop reason: HOSPADM

## 2017-05-21 RX ORDER — METOPROLOL SUCCINATE 50 MG/1
100 TABLET, EXTENDED RELEASE ORAL DAILY
Status: DISCONTINUED | OUTPATIENT
Start: 2017-05-22 | End: 2017-05-25 | Stop reason: HOSPADM

## 2017-05-21 RX ORDER — FUROSEMIDE 10 MG/ML
40 INJECTION INTRAMUSCULAR; INTRAVENOUS DAILY
Status: DISCONTINUED | OUTPATIENT
Start: 2017-05-22 | End: 2017-05-23

## 2017-05-21 RX ORDER — ENOXAPARIN SODIUM 100 MG/ML
40 INJECTION SUBCUTANEOUS EVERY 24 HOURS
Status: DISCONTINUED | OUTPATIENT
Start: 2017-05-21 | End: 2017-05-21

## 2017-05-21 RX ORDER — ISOSORBIDE MONONITRATE 30 MG/1
30 TABLET, EXTENDED RELEASE ORAL DAILY
Status: DISCONTINUED | OUTPATIENT
Start: 2017-05-22 | End: 2017-05-25 | Stop reason: HOSPADM

## 2017-05-21 RX ORDER — FLUTICASONE PROPIONATE 110 UG/1
1 AEROSOL, METERED RESPIRATORY (INHALATION) 2 TIMES DAILY
Status: DISCONTINUED | OUTPATIENT
Start: 2017-05-21 | End: 2017-05-21

## 2017-05-21 RX ORDER — FAMOTIDINE 20 MG/1
20 TABLET, FILM COATED ORAL 2 TIMES DAILY
Status: DISCONTINUED | OUTPATIENT
Start: 2017-05-21 | End: 2017-05-25 | Stop reason: HOSPADM

## 2017-05-21 RX ADMIN — ACETAMINOPHEN 650 MG: 325 TABLET ORAL at 06:05

## 2017-05-21 RX ADMIN — BUDESONIDE 0.5 MG: 0.5 SUSPENSION RESPIRATORY (INHALATION) at 07:05

## 2017-05-21 RX ADMIN — FUROSEMIDE 40 MG: 10 INJECTION, SOLUTION INTRAMUSCULAR; INTRAVENOUS at 01:05

## 2017-05-21 RX ADMIN — SODIUM CHLORIDE, PRESERVATIVE FREE 3 ML: 5 INJECTION INTRAVENOUS at 11:05

## 2017-05-21 RX ADMIN — ASPIRIN 325 MG ORAL TABLET 325 MG: 325 PILL ORAL at 01:05

## 2017-05-21 RX ADMIN — APIXABAN 5 MG: 2.5 TABLET, FILM COATED ORAL at 08:05

## 2017-05-21 RX ADMIN — FAMOTIDINE 20 MG: 20 TABLET ORAL at 08:05

## 2017-05-21 RX ADMIN — POLYETHYLENE GLYCOL 3350 17 G: 17 POWDER, FOR SOLUTION ORAL at 06:05

## 2017-05-21 RX ADMIN — CEFTRIAXONE 1 G: 1 INJECTION, SOLUTION INTRAVENOUS at 06:05

## 2017-05-21 RX ADMIN — IPRATROPIUM BROMIDE AND ALBUTEROL SULFATE 3 ML: .5; 3 SOLUTION RESPIRATORY (INHALATION) at 11:05

## 2017-05-21 RX ADMIN — FLECAINIDE ACETATE 50 MG: 50 TABLET ORAL at 08:05

## 2017-05-21 NOTE — PROGRESS NOTES
Order for EKG was placed to do EKG on admit if not done in ER. Patient had a few EKGs done in the ER. Order completed.

## 2017-05-21 NOTE — PROGRESS NOTES
Pt arrived to room from ed via stretcher. Pt accompanied by family. Pt is stable. Cardiac monitor applied to chest wall; afib on cm. Pt smells of urine. Pt states shedid urinate in breif wants to change her own pull up. Pt assisted to restroom and assisted pt to clean up. Pt oriented to room and call bell and menu. Safety measures in place. Will continue to monitor

## 2017-05-21 NOTE — H&P
"[facility] Hospital Medicine  History & Physical    Patient Name: Elke Laws  MRN: [id number]  Admission Date: 5/21/2017  Attending Physician: Solomon Han MD   Primary Care Provider: Isidra Benavidez MD         Patient information was obtained from patient, relative(s) and ER records.     Subjective:     Principal Problem:Acute on chronic congestive heart failure    Chief Complaint:   Chief Complaint   Patient presents with    Weakness     Pt states, "I feel really weak and it feels like my heart is going really fast".        HPI: 83 y/o w/f presented to ED via private with hx of COPD presents to the ED for generalized weakness which onset gradually yesterday. She reports she was nauseated and vomited last night 4-5 times and continued to vomit this morning until she was dry heaving. She reports RLQ pain that started yesterday before she began vomiting, and she has not had a bowel movement in 5 days. She also reports she is SOB with heaviness in l chest, palpitations, and wheezing. CXR shows no acute findings. Admitted to telemetry with dx of acute on chronic diastolic heart failure, A-Fib, RLQ pain, and COPD     Past Medical History:   Diagnosis Date    Acute on chronic diastolic congestive heart failure 8/27/2015    Anemia 5/9/2016    Anemia 5/9/2016    Anticoagulant long-term use     Aortic regurgitation     Echo 11/2013---5 - Mild to moderate aortic regurgitation.      Atrial fibrillation 5/15/2014    Back pain     s/p epidural steriod injections, no recent injections.    Baker's cyst     small, right, seen on US lower extremity 6/2014    Blood transfusion     Chest pain, atypical 8/27/2015    Diastolic dysfunction     Seen on echo 11/2013, stress test negative 5/2014    Diverticulosis     colonoscopy 3/27/2011    Hemorrhoids     colonoscopy 3/27/2011    Hiatal hernia     CXR 12/30/2016---Retrocardiac density again noted consistent with a hiatal hernia.    Hx of " "adenomatous colonic polyps     Hyperlipidemia     Hypertension     Hyponatremia 5/9/2016    Mitral regurgitation     Myocardial infarction     per patient was told in the past she had a "mild heart attack"    Obesity     Osteoarthritis, knee     Pneumonia     per patient "walking Pneumonia" did not require hospitalization    Seasonal allergies     Stroke     TIA; patient reports was told by one doctor she had 2 mini strokes but another doctor said she did not       Past Surgical History:   Procedure Laterality Date    APPENDECTOMY      EYE SURGERY Left     HYSTERECTOMY      benign reasons    KNEE SURGERY Bilateral     x2     OLECRANON BURSECTOMY Right     6/2011    URETHRA SURGERY         Review of patient's allergies indicates:   Allergen Reactions    Iodine and iodide containing products Rash       No current facility-administered medications on file prior to encounter.      Current Outpatient Prescriptions on File Prior to Encounter   Medication Sig    apixaban (ELIQUIS) 5 mg Tab Take 1 tablet (5 mg total) by mouth 2 (two) times daily.    ASCORBATE CALCIUM (VITAMIN C ORAL) Take by mouth once daily.    aspirin (ECOTRIN) 81 MG EC tablet Take 1 tablet (81 mg total) by mouth once daily.    DOCOSAHEXANOIC ACID/EPA (FISH OIL ORAL) Take by mouth once daily.    ELIQUIS 5 mg Tab TAKE 1 TABLET BY MOUTH TWICE DAILY    flecainide (TAMBOCOR) 50 MG Tab Take 1 tablet (50 mg total) by mouth every 12 (twelve) hours.    fluticasone (FLOVENT HFA) 110 mcg/actuation inhaler Inhale 1 puff into the lungs 2 (two) times daily.    furosemide (LASIX) 40 MG tablet Take 1 tablet (40 mg total) by mouth once daily.    isosorbide mononitrate (IMDUR) 30 MG 24 hr tablet TAKE 1 TABLET(30 MG) BY MOUTH EVERY DAY    loratadine (CLARITIN) 10 mg tablet Take 1 tablet (10 mg total) by mouth once daily.    metoprolol succinate (TOPROL-XL) 100 MG 24 hr tablet Take 1 tablet (100 mg total) by mouth once daily.    pravastatin " (PRAVACHOL) 40 MG tablet Take 1 tablet (40 mg total) by mouth once daily.    valsartan (DIOVAN) 160 MG tablet Take 1 tablet (160 mg total) by mouth once daily.    acetaminophen (TYLENOL) 500 MG tablet Take 1 tablet (500 mg total) by mouth 2 (two) times daily.    albuterol 90 mcg/actuation inhaler Inhale 2 puffs into the lungs 4 (four) times daily.    albuterol sulfate 2.5 mg/0.5 mL Nebu Take 2.5 mg by nebulization every 4 (four) hours as needed.    compressor, for nebulizer Lara Compressor use as directed by albuterol use.     Family History     Problem Relation (Age of Onset)    COPD Son    Cancer Mother, Father, Sister    Diabetes Brother, Son    Heart disease Mother, Father, Sister, Brother    Hyperlipidemia Mother, Father    Hypertension Mother, Father, Son        Social History Main Topics    Smoking status: Former Smoker     Packs/day: 0.05     Years: 1.00     Types: Cigarettes     Quit date: 1/1/1952    Smokeless tobacco: Never Used    Alcohol use No    Drug use: No    Sexual activity: No     Review of Systems   Constitutional: Positive for fatigue.   HENT: Negative.    Eyes: Negative.    Respiratory: Positive for cough, chest tightness, shortness of breath and wheezing.    Cardiovascular: Positive for chest pain, palpitations and leg swelling.   Gastrointestinal: Positive for constipation, nausea and vomiting.   Endocrine: Negative.    Genitourinary: Negative.    Musculoskeletal: Negative.    Skin: Negative.    Allergic/Immunologic: Positive for food allergies (seafood).   Neurological: Positive for weakness.   Hematological: Negative.    Psychiatric/Behavioral: Negative.      Objective:     Vital Signs (Most Recent):  Temp: (!) 101.7 °F (38.7 °C) (05/21/17 1519)  Pulse: 61 (05/21/17 1519)  Resp: (!) 24 (05/21/17 1519)  BP: 125/67 (05/21/17 1519)  SpO2: 96 % (05/21/17 1519) Vital Signs (24h Range):  Temp:  [99.8 °F (37.7 °C)-101.7 °F (38.7 °C)] 101.7 °F (38.7 °C)  Pulse:  [] 61  Resp:   "[20-31] 24  SpO2:  [93 %-98 %] 96 %  BP: (102-145)/(53-80) 125/67     Weight: 90.7 kg (200 lb)  Body mass index is 61.03 kg/m².    Physical Exam   Constitutional: She is oriented to person, place, and time. She appears well-developed and well-nourished.   Eyes: Conjunctivae and EOM are normal. Pupils are equal, round, and reactive to light.   Neck: Normal range of motion. Neck supple.   Cardiovascular: Normal rate and intact distal pulses.    Irregular rythm   Pulmonary/Chest: Effort normal. She has wheezes.   Abdominal: Soft. Bowel sounds are normal. There is tenderness (RLQ with deep palp).   Musculoskeletal: Normal range of motion. She exhibits edema (1+ to BLE).   Neurological: She is alert and oriented to person, place, and time.   Skin: Skin is warm and dry. Capillary refill takes less than 2 seconds.   Psychiatric: She has a normal mood and affect. Her behavior is normal.   Vitals reviewed.    Past Medical History:   Diagnosis Date    Acute on chronic diastolic congestive heart failure 8/27/2015    Anemia 5/9/2016    Anemia 5/9/2016    Anticoagulant long-term use     Aortic regurgitation     Echo 11/2013---5 - Mild to moderate aortic regurgitation.      Atrial fibrillation 5/15/2014    Back pain     s/p epidural steriod injections, no recent injections.    Baker's cyst     small, right, seen on US lower extremity 6/2014    Blood transfusion     Chest pain, atypical 8/27/2015    Diastolic dysfunction     Seen on echo 11/2013, stress test negative 5/2014    Diverticulosis     colonoscopy 3/27/2011    Hemorrhoids     colonoscopy 3/27/2011    Hiatal hernia     CXR 12/30/2016---Retrocardiac density again noted consistent with a hiatal hernia.    Hx of adenomatous colonic polyps     Hyperlipidemia     Hypertension     Hyponatremia 5/9/2016    Mitral regurgitation     Myocardial infarction     per patient was told in the past she had a "mild heart attack"    Obesity     Osteoarthritis, knee  " "   Pneumonia     per patient "walking Pneumonia" did not require hospitalization    Seasonal allergies     Stroke     TIA; patient reports was told by one doctor she had 2 mini strokes but another doctor said she did not       Past Surgical History:   Procedure Laterality Date    APPENDECTOMY      EYE SURGERY Left     HYSTERECTOMY      benign reasons    KNEE SURGERY Bilateral     x2     OLECRANON BURSECTOMY Right     6/2011    URETHRA SURGERY         Review of patient's allergies indicates:   Allergen Reactions    Iodine and iodide containing products Rash       No current facility-administered medications on file prior to encounter.      Current Outpatient Prescriptions on File Prior to Encounter   Medication Sig    apixaban (ELIQUIS) 5 mg Tab Take 1 tablet (5 mg total) by mouth 2 (two) times daily.    ASCORBATE CALCIUM (VITAMIN C ORAL) Take by mouth once daily.    aspirin (ECOTRIN) 81 MG EC tablet Take 1 tablet (81 mg total) by mouth once daily.    DOCOSAHEXANOIC ACID/EPA (FISH OIL ORAL) Take by mouth once daily.    ELIQUIS 5 mg Tab TAKE 1 TABLET BY MOUTH TWICE DAILY    flecainide (TAMBOCOR) 50 MG Tab Take 1 tablet (50 mg total) by mouth every 12 (twelve) hours.    fluticasone (FLOVENT HFA) 110 mcg/actuation inhaler Inhale 1 puff into the lungs 2 (two) times daily.    furosemide (LASIX) 40 MG tablet Take 1 tablet (40 mg total) by mouth once daily.    isosorbide mononitrate (IMDUR) 30 MG 24 hr tablet TAKE 1 TABLET(30 MG) BY MOUTH EVERY DAY    loratadine (CLARITIN) 10 mg tablet Take 1 tablet (10 mg total) by mouth once daily.    metoprolol succinate (TOPROL-XL) 100 MG 24 hr tablet Take 1 tablet (100 mg total) by mouth once daily.    pravastatin (PRAVACHOL) 40 MG tablet Take 1 tablet (40 mg total) by mouth once daily.    valsartan (DIOVAN) 160 MG tablet Take 1 tablet (160 mg total) by mouth once daily.    acetaminophen (TYLENOL) 500 MG tablet Take 1 tablet (500 mg total) by mouth 2 (two) times " daily.    albuterol 90 mcg/actuation inhaler Inhale 2 puffs into the lungs 4 (four) times daily.    albuterol sulfate 2.5 mg/0.5 mL Nebu Take 2.5 mg by nebulization every 4 (four) hours as needed.    compressor, for nebulizer Lara Compressor use as directed by albuterol use.     Family History     Problem Relation (Age of Onset)    COPD Son    Cancer Mother, Father, Sister    Diabetes Brother, Son    Heart disease Mother, Father, Sister, Brother    Hyperlipidemia Mother, Father    Hypertension Mother, Father, Son        Social History Main Topics    Smoking status: Former Smoker     Packs/day: 0.05     Years: 1.00     Types: Cigarettes     Quit date: 1/1/1952    Smokeless tobacco: Never Used    Alcohol use No    Drug use: No    Sexual activity: No     Review of Systems   Constitutional: Positive for fatigue.   HENT: Negative.    Eyes: Negative.    Respiratory: Positive for cough, chest tightness, shortness of breath and wheezing.    Cardiovascular: Positive for chest pain, palpitations and leg swelling.   Gastrointestinal: Positive for constipation, nausea and vomiting.   Endocrine: Negative.    Genitourinary: Negative.    Musculoskeletal: Negative.    Skin: Negative.    Allergic/Immunologic: Positive for food allergies (seafood).   Neurological: Positive for weakness.   Hematological: Negative.    Psychiatric/Behavioral: Negative.      Objective:     Vital Signs (Most Recent):  Temp: (!) 101.7 °F (38.7 °C) (05/21/17 1519)  Pulse: 61 (05/21/17 1519)  Resp: (!) 24 (05/21/17 1519)  BP: 125/67 (05/21/17 1519)  SpO2: 96 % (05/21/17 1519) Vital Signs (24h Range):  Temp:  [99.8 °F (37.7 °C)-101.7 °F (38.7 °C)] 101.7 °F (38.7 °C)  Pulse:  [] 61  Resp:  [20-31] 24  SpO2:  [93 %-98 %] 96 %  BP: (102-145)/(53-80) 125/67     Weight: 90.7 kg (200 lb)  Body mass index is 61.03 kg/m².    Physical Exam   Constitutional: She is oriented to person, place, and time. She appears well-developed and well-nourished.   Eyes:  Conjunctivae and EOM are normal. Pupils are equal, round, and reactive to light.   Neck: Normal range of motion. Neck supple.   Cardiovascular: Normal rate and intact distal pulses.    Irregular rythm   Pulmonary/Chest: Effort normal. She has wheezes.   Abdominal: Soft. Bowel sounds are normal. There is tenderness (RLQ with deep palp).   Musculoskeletal: Normal range of motion. She exhibits edema (1+ to BLE).   Neurological: She is alert and oriented to person, place, and time.   Skin: Skin is warm and dry. Capillary refill takes less than 2 seconds.   Psychiatric: She has a normal mood and affect. Her behavior is normal.   Vitals reviewed.       Significant Labs: All pertinent labs within the past 24 hours have been reviewed.    Significant Imaging: I have reviewed and interpreted all pertinent imaging results/findings within the past 24 hours.     Significant Labs: All pertinent labs within the past 24 hours have been reviewed.    Significant Imaging: I have reviewed and interpreted all pertinent imaging results/findings within the past 24 hours.    Assessment/Plan:     RLQ abdominal pain    KUB  Monitor I&O  Miralax          PAF (paroxysmal atrial fibrillation)    Cardiac Monitoring  Continue fleccanide  Continue elaquis            Sepsis due to urinary tract infection    Blood cultures  U/A  Rocephin          Hypertension      Monitor VS  Continue home meds          VTE Risk Mitigation         Ordered     apixaban tablet 5 mg  2 times daily     Route:  Oral        05/21/17 1755     Medium Risk of VTE  Once      05/21/17 1715     Place sequential compression device  Until discontinued      05/21/17 1522        SRIDEVI Huston  Department of Hospital Medicine   [facility]

## 2017-05-21 NOTE — H&P
"[facility] Hospital Medicine  History & Physical    Patient Name: Elke Laws  MRN: [id number]  Admission Date: 5/21/2017  Attending Physician: Solomon Han MD   Primary Care Provider: Isidra Benavidez MD         Patient information was obtained from patient, relative(s) and ER records.     Subjective:     Principal Problem:Acute on chronic congestive heart failure    Chief Complaint:   Chief Complaint   Patient presents with    Weakness     Pt states, "I feel really weak and it feels like my heart is going really fast".        HPI: 81 y/o w/f presented to ED via private with hx of COPD presents to the ED for generalized weakness which onset gradually yesterday. She reports she was nauseated and vomited last night 4-5 times and continued to vomit this morning until she was dry heaving. She reports RLQ pain that started yesterday before she began vomiting, and she has not had a bowel movement in 5 days. She also reports she is SOB with heaviness in l chest, palpitations, and wheezing. CXR shows no acute findings. Admitted to telemetry with dx of acute on chronic diastolic heart failure, A-Fib, RLQ pain, and COPD     Past Medical History:   Diagnosis Date    Acute on chronic diastolic congestive heart failure 8/27/2015    Anemia 5/9/2016    Anemia 5/9/2016    Anticoagulant long-term use     Aortic regurgitation     Echo 11/2013---5 - Mild to moderate aortic regurgitation.      Atrial fibrillation 5/15/2014    Back pain     s/p epidural steriod injections, no recent injections.    Baker's cyst     small, right, seen on US lower extremity 6/2014    Blood transfusion     Chest pain, atypical 8/27/2015    Diastolic dysfunction     Seen on echo 11/2013, stress test negative 5/2014    Diverticulosis     colonoscopy 3/27/2011    Hemorrhoids     colonoscopy 3/27/2011    Hiatal hernia     CXR 12/30/2016---Retrocardiac density again noted consistent with a hiatal hernia.    Hx of " "adenomatous colonic polyps     Hyperlipidemia     Hypertension     Hyponatremia 5/9/2016    Mitral regurgitation     Myocardial infarction     per patient was told in the past she had a "mild heart attack"    Obesity     Osteoarthritis, knee     Pneumonia     per patient "walking Pneumonia" did not require hospitalization    Seasonal allergies     Stroke     TIA; patient reports was told by one doctor she had 2 mini strokes but another doctor said she did not       Past Surgical History:   Procedure Laterality Date    APPENDECTOMY      EYE SURGERY Left     HYSTERECTOMY      benign reasons    KNEE SURGERY Bilateral     x2     OLECRANON BURSECTOMY Right     6/2011    URETHRA SURGERY         Review of patient's allergies indicates:   Allergen Reactions    Iodine and iodide containing products Rash       No current facility-administered medications on file prior to encounter.      Current Outpatient Prescriptions on File Prior to Encounter   Medication Sig    apixaban (ELIQUIS) 5 mg Tab Take 1 tablet (5 mg total) by mouth 2 (two) times daily.    ASCORBATE CALCIUM (VITAMIN C ORAL) Take by mouth once daily.    aspirin (ECOTRIN) 81 MG EC tablet Take 1 tablet (81 mg total) by mouth once daily.    DOCOSAHEXANOIC ACID/EPA (FISH OIL ORAL) Take by mouth once daily.    ELIQUIS 5 mg Tab TAKE 1 TABLET BY MOUTH TWICE DAILY    flecainide (TAMBOCOR) 50 MG Tab Take 1 tablet (50 mg total) by mouth every 12 (twelve) hours.    fluticasone (FLOVENT HFA) 110 mcg/actuation inhaler Inhale 1 puff into the lungs 2 (two) times daily.    furosemide (LASIX) 40 MG tablet Take 1 tablet (40 mg total) by mouth once daily.    isosorbide mononitrate (IMDUR) 30 MG 24 hr tablet TAKE 1 TABLET(30 MG) BY MOUTH EVERY DAY    loratadine (CLARITIN) 10 mg tablet Take 1 tablet (10 mg total) by mouth once daily.    metoprolol succinate (TOPROL-XL) 100 MG 24 hr tablet Take 1 tablet (100 mg total) by mouth once daily.    pravastatin " (PRAVACHOL) 40 MG tablet Take 1 tablet (40 mg total) by mouth once daily.    valsartan (DIOVAN) 160 MG tablet Take 1 tablet (160 mg total) by mouth once daily.    acetaminophen (TYLENOL) 500 MG tablet Take 1 tablet (500 mg total) by mouth 2 (two) times daily.    albuterol 90 mcg/actuation inhaler Inhale 2 puffs into the lungs 4 (four) times daily.    albuterol sulfate 2.5 mg/0.5 mL Nebu Take 2.5 mg by nebulization every 4 (four) hours as needed.    compressor, for nebulizer Lara Compressor use as directed by albuterol use.     Family History     Problem Relation (Age of Onset)    COPD Son    Cancer Mother, Father, Sister    Diabetes Brother, Son    Heart disease Mother, Father, Sister, Brother    Hyperlipidemia Mother, Father    Hypertension Mother, Father, Son        Social History Main Topics    Smoking status: Former Smoker     Packs/day: 0.05     Years: 1.00     Types: Cigarettes     Quit date: 1/1/1952    Smokeless tobacco: Never Used    Alcohol use No    Drug use: No    Sexual activity: No     Review of Systems   Constitutional: Positive for fatigue.   HENT: Negative.    Eyes: Negative.    Respiratory: Positive for cough, chest tightness, shortness of breath and wheezing.    Cardiovascular: Positive for chest pain, palpitations and leg swelling.   Gastrointestinal: Positive for constipation, nausea and vomiting.   Endocrine: Negative.    Genitourinary: Negative.    Musculoskeletal: Negative.    Skin: Negative.    Allergic/Immunologic: Positive for food allergies (seafood).   Neurological: Positive for weakness.   Hematological: Negative.    Psychiatric/Behavioral: Negative.      Objective:     Vital Signs (Most Recent):  Temp: (!) 101.7 °F (38.7 °C) (05/21/17 1519)  Pulse: 61 (05/21/17 1519)  Resp: (!) 24 (05/21/17 1519)  BP: 125/67 (05/21/17 1519)  SpO2: 96 % (05/21/17 1519) Vital Signs (24h Range):  Temp:  [99.8 °F (37.7 °C)-101.7 °F (38.7 °C)] 101.7 °F (38.7 °C)  Pulse:  [] 61  Resp:   [20-31] 24  SpO2:  [93 %-98 %] 96 %  BP: (102-145)/(53-80) 125/67     Weight: 90.7 kg (200 lb)  Body mass index is 61.03 kg/m².    Physical Exam   Constitutional: She is oriented to person, place, and time. She appears well-developed and well-nourished.   Eyes: Conjunctivae and EOM are normal. Pupils are equal, round, and reactive to light.   Neck: Normal range of motion. Neck supple.   Cardiovascular: Normal rate and intact distal pulses.    Irregular rythm   Pulmonary/Chest: Effort normal. She has wheezes.   Abdominal: Soft. Bowel sounds are normal. There is tenderness (RLQ with deep palp).   Musculoskeletal: Normal range of motion. She exhibits edema (1+ to BLE).   Neurological: She is alert and oriented to person, place, and time.   Skin: Skin is warm and dry. Capillary refill takes less than 2 seconds.   Psychiatric: She has a normal mood and affect. Her behavior is normal.   Vitals reviewed.       Significant Labs: All pertinent labs within the past 24 hours have been reviewed.    Significant Imaging: I have reviewed and interpreted all pertinent imaging results/findings within the past 24 hours.    Assessment/Plan:     RLQ abdominal pain    KUB  Monitor I&O  Miralax          PAF (paroxysmal atrial fibrillation)    Cardiac Monitoring  Continue fleccanide  Continue elaquis            Sepsis due to urinary tract infection    Blood cultures  U/A  Rocephin          Hypertension      Monitor VS  Continue home meds          VTE Risk Mitigation         Ordered     Elequis       05/21/17 1715     Medium Risk of VTE  Once      05/21/17 1715     Place sequential compression device  Until discontinued      05/21/17 1522        SRIDEVI Huston  Department of Hospital Medicine   [facility]

## 2017-05-21 NOTE — ED NOTES
Patient sitting up in bed, no acute distress noted, awake, alert, and oriented x 3, calm, respirations even and skin is warm and dry. Patient verbalized understanding of status and plan of care. Patient denies needs at this time. Side rails up x 2, call light in reach, bed low and locked. Family at bedside. Will continue to monitor.

## 2017-05-21 NOTE — ED PROVIDER NOTES
"SCRIBE #1 NOTE: I, Shelly Wilhelm, am scribing for, and in the presence of, Rufus Lobo MD. I have scribed the entire note.      History      Chief Complaint   Patient presents with    Weakness     Pt states, "I feel really weak and it feels like my heart is going really fast".       Review of patient's allergies indicates:   Allergen Reactions    Iodine and iodide containing products Rash        HPI   HPI    5/21/2017, 11:41 AM   History obtained from the patient      History of Present Illness: Elke Laws is a 82 y.o. female patient with hx of COPD presents to the Emergency Department for generalized weakness which onset gradually yesterday. Symptoms are constant and moderate in severity.  No mitigating or exacerbating factors reported. Associated sxs include SOB, heaviness in L chest, chills, and wheezing. Patient denies any diaphoresis, palpitations, extremity weakness/numbness, leg pain/swelling, dizziness, cough, n/v and all other sxs at this time. No further complaints or concerns at this time.         Arrival mode: Personal vehicle      PCP: Isidra Benavidez MD       Past Medical History:  Past Medical History:   Diagnosis Date    Acute kidney injury (nontraumatic) 5/27/2017    Acute on chronic diastolic congestive heart failure 8/27/2015    Anemia 5/9/2016    Anemia 5/9/2016    Anticoagulant long-term use     Aortic regurgitation     Echo 11/2013---5 - Mild to moderate aortic regurgitation.      Atrial fibrillation 5/15/2014    Back pain     s/p epidural steriod injections, no recent injections.    Baker's cyst     small, right, seen on US lower extremity 6/2014    Blood transfusion     Chest pain, atypical 8/27/2015    Diastolic dysfunction     Seen on echo 11/2013, stress test negative 5/2014    Diverticulosis     colonoscopy 3/27/2011    Hemorrhoids     colonoscopy 3/27/2011    Hiatal hernia     CXR 12/30/2016---Retrocardiac density again noted consistent with a hiatal hernia.    " "Hx of adenomatous colonic polyps     Hyperlipidemia     Hypertension     Hyponatremia 5/9/2016    Mitral regurgitation     Myocardial infarction     per patient was told in the past she had a "mild heart attack"    Obesity     Osteoarthritis, knee     Pneumonia     per patient "walking Pneumonia" did not require hospitalization    Seasonal allergies     Stroke     TIA; patient reports was told by one doctor she had 2 mini strokes but another doctor said she did not       Past Surgical History:  Past Surgical History:   Procedure Laterality Date    APPENDECTOMY      EYE SURGERY Left     HYSTERECTOMY      benign reasons    KNEE SURGERY Bilateral     x2     OLECRANON BURSECTOMY Right     6/2011    TONSILLECTOMY      URETHRA SURGERY           Family History:  Family History   Problem Relation Age of Onset    Heart disease Mother     Cancer Mother      colon    Hypertension Mother     Hyperlipidemia Mother     Heart disease Father     Cancer Father      colon    Hypertension Father     Hyperlipidemia Father     Heart disease Sister      MI    Heart disease Brother      MI    COPD Son     Hypertension Son     Diabetes Brother     Diabetes Son     Cancer Sister      breast    Kidney disease Neg Hx     Stroke Neg Hx        Social History:  Social History     Social History Main Topics    Smoking status: Former Smoker     Packs/day: 0.05     Years: 1.00     Types: Cigarettes     Quit date: 1/1/1952    Smokeless tobacco: Never Used    Alcohol use No    Drug use: No    Sexual activity: No       ROS   Review of Systems   Constitutional: Positive for chills. Negative for diaphoresis and fever.   HENT: Negative for sore throat.    Respiratory: Positive for shortness of breath and wheezing. Negative for cough.    Cardiovascular: Negative for chest pain and palpitations.        + chest heaviness   Gastrointestinal: Negative for nausea and vomiting.   Genitourinary: Negative for dysuria. "   Musculoskeletal: Negative for back pain.        - leg pain/swelling   Skin: Negative for rash.   Neurological: Positive for weakness. Negative for dizziness and numbness.   Hematological: Does not bruise/bleed easily.       Physical Exam      Vitals:    05/25/17 1500   BP:    Pulse: 72   Resp:    Temp:        Physical Exam  Nursing Notes and Vital Signs Reviewed.  Constitutional: Patient is in no apparent distress. Well-developed and well-nourished.  Head: Atraumatic. Normocephalic.  Eyes: PERRL. EOM intact. Conjunctivae are not pale. No scleral icterus.  ENT: Mucous membranes are moist. Oropharynx is clear and symmetric.    Neck: Supple. Full ROM. No lymphadenopathy.  Cardiovascular: Regular rate. Regular rhythm. No murmurs, rubs, or gallops. Distal pulses are 2+ and symmetric.  Pulmonary/Chest: No respiratory distress. Clear to auscultation bilaterally. Slight L sided inspiratory wheezing..  Abdominal: Soft and non-distended.  There is no tenderness.  No rebound, guarding, or rigidity.   Musculoskeletal: Moves all extremities. No obvious deformities. No edema. No calf tenderness.  Skin: Warm and dry.  Neurological:  Alert, awake, and appropriate.  Normal speech.  No acute focal neurological deficits are appreciated.  Psychiatric: Normal affect. Good eye contact. Appropriate in content.    ED Course    Procedures  ED Vital Signs:  Vitals:    05/24/17 1900 05/24/17 2044 05/25/17 0009 05/25/17 0139   BP: 130/63  (!) 125/54    Pulse: 78 69 93    Resp: 18 (!) 22 20    Temp: 98.3 °F (36.8 °C)  98 °F (36.7 °C)    TempSrc: Oral  Oral    SpO2: 98% 97% 97% 96%   Weight:       Height:        05/25/17 0411 05/25/17 0430 05/25/17 0539 05/25/17 0700   BP: (!) 161/89 (!) 145/57     Pulse: 81 76  68   Resp: 20      Temp: 98 °F (36.7 °C)      TempSrc: Oral      SpO2: 95%  95%    Weight: 92.5 kg (204 lb)      Height:        05/25/17 0800 05/25/17 0824 05/25/17 0900 05/25/17 1100   BP: 125/71      Pulse: 74 77 73 79   Resp: 18 18      Temp: 98.2 °F (36.8 °C)      TempSrc: Oral      SpO2: 99% 95%     Weight:       Height:        05/25/17 1234 05/25/17 1300 05/25/17 1500   BP: (!) 96/43     Pulse: 78 72 72   Resp: 18     Temp: 97.8 °F (36.6 °C)     TempSrc: Oral     SpO2: 95%     Weight:      Height:          Abnormal Lab Results:  Labs Reviewed   CBC W/ AUTO DIFFERENTIAL - Abnormal; Notable for the following:        Result Value    RBC 3.85 (*)     Hemoglobin 11.4 (*)     Hematocrit 35.0 (*)     RDW 14.7 (*)     Gran # 9.4 (*)     Mono # 1.5 (*)     Gran% 78.9 (*)     Lymph% 8.5 (*)     All other components within normal limits   COMPREHENSIVE METABOLIC PANEL - Abnormal; Notable for the following:     Glucose 128 (*)     Albumin 3.0 (*)     Total Bilirubin 1.1 (*)     All other components within normal limits   TROPONIN I - Abnormal; Notable for the following:     Troponin I 0.027 (*)     All other components within normal limits   B-TYPE NATRIURETIC PEPTIDE - Abnormal; Notable for the following:     BNP 1,041 (*)     All other components within normal limits   PROTIME-INR        All Lab Results:  Results for orders placed or performed during the hospital encounter of 05/21/17   Blood culture   Result Value Ref Range    Blood Culture, Routine Gram stain rani bottle: Gram negative rods     Blood Culture, Routine       Results called to and read back by:Tati Dunham RN 05/22/2017  12:50    Blood Culture, Routine Gram stain aer bottle: Gram negative rods     Blood Culture, Routine 05/22/2017  14:58      Blood Culture, Routine Positive results previously called     Blood Culture, Routine ESCHERICHIA COLI        Susceptibility    Escherichia coli - CULTURE, BLOOD     Amp/Sulbactam <=4/2 Sensitive mcg/mL     Ampicillin <=2 Sensitive mcg/mL     Amox/K Clav'ate <=8/4 Sensitive mcg/mL     Ceftriaxone <=1 Sensitive mcg/mL     Cefazolin <=2 Sensitive mcg/mL     Ciprofloxacin <=0.5 Sensitive mcg/mL     Ertapenem <=0.5 Sensitive mcg/mL     Gentamicin <=1  Sensitive mcg/mL     Meropenem <=1 Sensitive mcg/mL     Piperacillin/Tazo <=8 Sensitive mcg/mL     Trimeth/Sulfa <=0.5/9.5 Sensitive mcg/mL     Tetracycline <=2 Sensitive mcg/mL     Tobramycin <=2 Sensitive mcg/mL   Blood culture   Result Value Ref Range    Blood Culture, Routine Gram stain rani bottle: Gram negative rods     Blood Culture, Routine       Results called to and read back by:Tati Dunham RN  05/22/2017  12:51    Blood Culture, Routine Gram stain aer bottle: Gram negative rods     Blood Culture, Routine 05/23/2017  08:53     Blood Culture, Routine Positive results previously called     Blood Culture, Routine       ESCHERICHIA COLI  For susceptibility see order #[id number]     Urine culture   Result Value Ref Range    Urine Culture, Routine ESCHERICHIA COLI  >100,000 cfu/ml          Susceptibility    Escherichia coli - CULTURE, URINE     Amp/Sulbactam <=8/4 Sensitive mcg/mL     Ampicillin <=8 Sensitive mcg/mL     Amox/K Clav'ate <=8/4 Sensitive mcg/mL     Ceftriaxone <=8 Sensitive mcg/mL     Cefazolin <=8 Sensitive mcg/mL     Ciprofloxacin <=1 Sensitive mcg/mL     Cefepime <=8 Sensitive mcg/mL     Ertapenem <=2 Sensitive mcg/mL     Nitrofurantoin <=32 Sensitive mcg/mL     Gentamicin <=4 Sensitive mcg/mL     Meropenem <=4 Sensitive mcg/mL     Piperacillin/Tazo <=16 Sensitive mcg/mL     Trimeth/Sulfa <=2/38 Sensitive mcg/mL     Tetracycline <=4 Sensitive mcg/mL     Tobramycin <=4 Sensitive mcg/mL   Blood culture   Result Value Ref Range    Blood Culture, Routine No Growth to date     Blood Culture, Routine No Growth to date     Blood Culture, Routine No Growth to date     Blood Culture, Routine No Growth to date     Blood Culture, Routine No Growth to date    Blood culture   Result Value Ref Range    Blood Culture, Routine No Growth to date     Blood Culture, Routine No Growth to date     Blood Culture, Routine No Growth to date     Blood Culture, Routine No Growth to date     Blood Culture, Routine No  Growth to date    CBC auto differential   Result Value Ref Range    WBC 11.90 3.90 - 12.70 K/uL    RBC 3.85 (L) 4.00 - 5.40 M/uL    Hemoglobin 11.4 (L) 12.0 - 16.0 g/dL    Hematocrit 35.0 (L) 37.0 - 48.5 %    MCV 91 82 - 98 fL    MCH 29.6 27.0 - 31.0 pg    MCHC 32.6 32.0 - 36.0 %    RDW 14.7 (H) 11.5 - 14.5 %    Platelets 166 150 - 350 K/uL    MPV 11.2 9.2 - 12.9 fL    Gran # 9.4 (H) 1.8 - 7.7 K/uL    Lymph # 1.0 1.0 - 4.8 K/uL    Mono # 1.5 (H) 0.3 - 1.0 K/uL    Eos # 0.0 0.0 - 0.5 K/uL    Baso # 0.02 0.00 - 0.20 K/uL    Gran% 78.9 (H) 38.0 - 73.0 %    Lymph% 8.5 (L) 18.0 - 48.0 %    Mono% 12.4 4.0 - 15.0 %    Eosinophil% 0.0 0.0 - 8.0 %    Basophil% 0.2 0.0 - 1.9 %    Differential Method Automated    Comprehensive metabolic panel   Result Value Ref Range    Sodium 136 136 - 145 mmol/L    Potassium 4.2 3.5 - 5.1 mmol/L    Chloride 103 95 - 110 mmol/L    CO2 24 23 - 29 mmol/L    Glucose 128 (H) 70 - 110 mg/dL    BUN, Bld 17 8 - 23 mg/dL    Creatinine 0.8 0.5 - 1.4 mg/dL    Calcium 9.3 8.7 - 10.5 mg/dL    Total Protein 6.8 6.0 - 8.4 g/dL    Albumin 3.0 (L) 3.5 - 5.2 g/dL    Total Bilirubin 1.1 (H) 0.1 - 1.0 mg/dL    Alkaline Phosphatase 82 55 - 135 U/L    AST 30 10 - 40 U/L    ALT 16 10 - 44 U/L    Anion Gap 9 8 - 16 mmol/L    eGFR if African American >60 >60 mL/min/1.73 m^2    eGFR if non African American >60 >60 mL/min/1.73 m^2   Troponin I   Result Value Ref Range    Troponin I 0.027 (H) 0.000 - 0.026 ng/mL   Protime-INR   Result Value Ref Range    Prothrombin Time 12.1 9.0 - 12.5 sec    INR 1.2 0.8 - 1.2   Brain natriuretic peptide   Result Value Ref Range    BNP 1,041 (H) 0 - 99 pg/mL   Urinalysis   Result Value Ref Range    Specimen UA Urine, Clean Catch     Color, UA Yellow Yellow, Straw, Ruth    Appearance, UA Clear Clear    pH, UA 6.0 5.0 - 8.0    Specific Gravity, UA 1.010 1.005 - 1.030    Protein, UA Negative Negative    Glucose, UA Negative Negative    Ketones, UA Negative Negative    Bilirubin (UA)  Negative Negative    Occult Blood UA 1+ (A) Negative    Nitrite, UA Positive (A) Negative    Urobilinogen, UA Negative <2.0 EU/dL    Leukocytes, UA Negative Negative   Urinalysis Microscopic   Result Value Ref Range    RBC, UA 2 0 - 4 /hpf    Bacteria, UA Few (A) None-Occ /hpf    Microscopic Comment SEE COMMENT    Troponin I   Result Value Ref Range    Troponin I 0.045 (H) 0.000 - 0.026 ng/mL   CBC auto differential   Result Value Ref Range    WBC 10.59 3.90 - 12.70 K/uL    RBC 3.86 (L) 4.00 - 5.40 M/uL    Hemoglobin 11.5 (L) 12.0 - 16.0 g/dL    Hematocrit 35.4 (L) 37.0 - 48.5 %    MCV 92 82 - 98 fL    MCH 29.8 27.0 - 31.0 pg    MCHC 32.5 32.0 - 36.0 %    RDW 14.7 (H) 11.5 - 14.5 %    Platelets 150 150 - 350 K/uL    MPV 10.8 9.2 - 12.9 fL    Gran # 8.5 (H) 1.8 - 7.7 K/uL    Lymph # 0.9 (L) 1.0 - 4.8 K/uL    Mono # 1.2 (H) 0.3 - 1.0 K/uL    Eos # 0.0 0.0 - 0.5 K/uL    Baso # 0.01 0.00 - 0.20 K/uL    Gran% 80.6 (H) 38.0 - 73.0 %    Lymph% 8.2 (L) 18.0 - 48.0 %    Mono% 11.0 4.0 - 15.0 %    Eosinophil% 0.1 0.0 - 8.0 %    Basophil% 0.1 0.0 - 1.9 %    Differential Method Automated    Comprehensive metabolic panel - if not done in ED   Result Value Ref Range    Sodium 137 136 - 145 mmol/L    Potassium 4.1 3.5 - 5.1 mmol/L    Chloride 99 95 - 110 mmol/L    CO2 28 23 - 29 mmol/L    Glucose 117 (H) 70 - 110 mg/dL    BUN, Bld 17 8 - 23 mg/dL    Creatinine 0.9 0.5 - 1.4 mg/dL    Calcium 9.2 8.7 - 10.5 mg/dL    Total Protein 7.1 6.0 - 8.4 g/dL    Albumin 3.1 (L) 3.5 - 5.2 g/dL    Total Bilirubin 1.3 (H) 0.1 - 1.0 mg/dL    Alkaline Phosphatase 82 55 - 135 U/L    AST 28 10 - 40 U/L    ALT 19 10 - 44 U/L    Anion Gap 10 8 - 16 mmol/L    eGFR if African American >60 >60 mL/min/1.73 m^2    eGFR if non African American 60 >60 mL/min/1.73 m^2   Magnesium - if not done in ED   Result Value Ref Range    Magnesium 1.8 1.6 - 2.6 mg/dL   Phosphorus - if not done in ED   Result Value Ref Range    Phosphorus 2.7 2.7 - 4.5 mg/dL   Troponin I    Result Value Ref Range    Troponin I 0.043 (H) 0.000 - 0.026 ng/mL   CBC auto differential   Result Value Ref Range    WBC 9.62 3.90 - 12.70 K/uL    RBC 3.67 (L) 4.00 - 5.40 M/uL    Hemoglobin 10.8 (L) 12.0 - 16.0 g/dL    Hematocrit 33.5 (L) 37.0 - 48.5 %    MCV 91 82 - 98 fL    MCH 29.4 27.0 - 31.0 pg    MCHC 32.2 32.0 - 36.0 %    RDW 14.4 11.5 - 14.5 %    Platelets 138 (L) 150 - 350 K/uL    MPV 11.0 9.2 - 12.9 fL    Gran # 7.6 1.8 - 7.7 K/uL    Lymph # 0.6 (L) 1.0 - 4.8 K/uL    Mono # 1.4 (H) 0.3 - 1.0 K/uL    Eos # 0.0 0.0 - 0.5 K/uL    Baso # 0.01 0.00 - 0.20 K/uL    Gran% 78.7 (H) 38.0 - 73.0 %    Lymph% 6.4 (L) 18.0 - 48.0 %    Mono% 14.7 4.0 - 15.0 %    Eosinophil% 0.1 0.0 - 8.0 %    Basophil% 0.1 0.0 - 1.9 %    Differential Method Automated    Comprehensive metabolic panel   Result Value Ref Range    Sodium 136 136 - 145 mmol/L    Potassium 3.8 3.5 - 5.1 mmol/L    Chloride 101 95 - 110 mmol/L    CO2 25 23 - 29 mmol/L    Glucose 134 (H) 70 - 110 mg/dL    BUN, Bld 19 8 - 23 mg/dL    Creatinine 1.0 0.5 - 1.4 mg/dL    Calcium 9.0 8.7 - 10.5 mg/dL    Total Protein 6.5 6.0 - 8.4 g/dL    Albumin 2.8 (L) 3.5 - 5.2 g/dL    Total Bilirubin 0.7 0.1 - 1.0 mg/dL    Alkaline Phosphatase 76 55 - 135 U/L    AST 22 10 - 40 U/L    ALT 16 10 - 44 U/L    Anion Gap 10 8 - 16 mmol/L    eGFR if African American >60 >60 mL/min/1.73 m^2    eGFR if non African American 53 (A) >60 mL/min/1.73 m^2   Basic metabolic panel    Result Value Ref Range    Sodium 136 136 - 145 mmol/L    Potassium 3.8 3.5 - 5.1 mmol/L    Chloride 101 95 - 110 mmol/L    CO2 25 23 - 29 mmol/L    Glucose 134 (H) 70 - 110 mg/dL    BUN, Bld 19 8 - 23 mg/dL    Creatinine 1.0 0.5 - 1.4 mg/dL    Calcium 9.0 8.7 - 10.5 mg/dL    Anion Gap 10 8 - 16 mmol/L    eGFR if African American >60 >60 mL/min/1.73 m^2    eGFR if non African American 53 (A) >60 mL/min/1.73 m^2   CBC auto differential   Result Value Ref Range    WBC 6.50 3.90 - 12.70 K/uL    RBC 3.69 (L) 4.00 -  5.40 M/uL    Hemoglobin 10.8 (L) 12.0 - 16.0 g/dL    Hematocrit 33.9 (L) 37.0 - 48.5 %    MCV 92 82 - 98 fL    MCH 29.3 27.0 - 31.0 pg    MCHC 31.9 (L) 32.0 - 36.0 %    RDW 14.4 11.5 - 14.5 %    Platelets 124 (L) 150 - 350 K/uL    MPV 11.3 9.2 - 12.9 fL    Gran # 4.1 1.8 - 7.7 K/uL    Lymph # 0.8 (L) 1.0 - 4.8 K/uL    Mono # 1.5 (H) 0.3 - 1.0 K/uL    Eos # 0.1 0.0 - 0.5 K/uL    Baso # 0.01 0.00 - 0.20 K/uL    Gran% 62.8 38.0 - 73.0 %    Lymph% 12.2 (L) 18.0 - 48.0 %    Mono% 23.7 (H) 4.0 - 15.0 %    Eosinophil% 1.1 0.0 - 8.0 %    Basophil% 0.2 0.0 - 1.9 %    Differential Method Automated    Comprehensive metabolic panel   Result Value Ref Range    Sodium 134 (L) 136 - 145 mmol/L    Potassium 3.9 3.5 - 5.1 mmol/L    Chloride 98 95 - 110 mmol/L    CO2 29 23 - 29 mmol/L    Glucose 115 (H) 70 - 110 mg/dL    BUN, Bld 22 8 - 23 mg/dL    Creatinine 1.0 0.5 - 1.4 mg/dL    Calcium 9.1 8.7 - 10.5 mg/dL    Total Protein 6.4 6.0 - 8.4 g/dL    Albumin 2.5 (L) 3.5 - 5.2 g/dL    Total Bilirubin 0.6 0.1 - 1.0 mg/dL    Alkaline Phosphatase 69 55 - 135 U/L    AST 25 10 - 40 U/L    ALT 20 10 - 44 U/L    Anion Gap 7 (L) 8 - 16 mmol/L    eGFR if African American >60 >60 mL/min/1.73 m^2    eGFR if non African American 53 (A) >60 mL/min/1.73 m^2   Basic metabolic panel    Result Value Ref Range    Sodium 134 (L) 136 - 145 mmol/L    Potassium 3.9 3.5 - 5.1 mmol/L    Chloride 98 95 - 110 mmol/L    CO2 29 23 - 29 mmol/L    Glucose 115 (H) 70 - 110 mg/dL    BUN, Bld 22 8 - 23 mg/dL    Creatinine 1.0 0.5 - 1.4 mg/dL    Calcium 9.1 8.7 - 10.5 mg/dL    Anion Gap 7 (L) 8 - 16 mmol/L    eGFR if African American >60 >60 mL/min/1.73 m^2    eGFR if non African American 53 (A) >60 mL/min/1.73 m^2   CBC auto differential   Result Value Ref Range    WBC 6.43 3.90 - 12.70 K/uL    RBC 3.39 (L) 4.00 - 5.40 M/uL    Hemoglobin 10.0 (L) 12.0 - 16.0 g/dL    Hematocrit 31.0 (L) 37.0 - 48.5 %    MCV 91 82 - 98 fL    MCH 29.5 27.0 - 31.0 pg    MCHC 32.3 32.0 -  36.0 %    RDW 14.0 11.5 - 14.5 %    Platelets 140 (L) 150 - 350 K/uL    MPV 11.6 9.2 - 12.9 fL    Gran # 3.9 1.8 - 7.7 K/uL    Lymph # 1.1 1.0 - 4.8 K/uL    Mono # 1.2 (H) 0.3 - 1.0 K/uL    Eos # 0.3 0.0 - 0.5 K/uL    Baso # 0.02 0.00 - 0.20 K/uL    Gran% 59.9 38.0 - 73.0 %    Lymph% 17.1 (L) 18.0 - 48.0 %    Mono% 18.8 (H) 4.0 - 15.0 %    Eosinophil% 3.9 0.0 - 8.0 %    Basophil% 0.3 0.0 - 1.9 %    Differential Method Automated    Comprehensive metabolic panel   Result Value Ref Range    Sodium 133 (L) 136 - 145 mmol/L    Potassium 4.4 3.5 - 5.1 mmol/L    Chloride 99 95 - 110 mmol/L    CO2 25 23 - 29 mmol/L    Glucose 105 70 - 110 mg/dL    BUN, Bld 32 (H) 8 - 23 mg/dL    Creatinine 1.2 0.5 - 1.4 mg/dL    Calcium 8.9 8.7 - 10.5 mg/dL    Total Protein 6.1 6.0 - 8.4 g/dL    Albumin 2.5 (L) 3.5 - 5.2 g/dL    Total Bilirubin 0.4 0.1 - 1.0 mg/dL    Alkaline Phosphatase 75 55 - 135 U/L    AST 36 10 - 40 U/L    ALT 29 10 - 44 U/L    Anion Gap 9 8 - 16 mmol/L    eGFR if African American 49 (A) >60 mL/min/1.73 m^2    eGFR if non African American 42 (A) >60 mL/min/1.73 m^2   CBC auto differential   Result Value Ref Range    WBC 6.21 3.90 - 12.70 K/uL    RBC 3.70 (L) 4.00 - 5.40 M/uL    Hemoglobin 10.7 (L) 12.0 - 16.0 g/dL    Hematocrit 33.2 (L) 37.0 - 48.5 %    MCV 90 82 - 98 fL    MCH 28.9 27.0 - 31.0 pg    MCHC 32.2 32.0 - 36.0 %    RDW 13.7 11.5 - 14.5 %    Platelets 164 150 - 350 K/uL    MPV 11.7 9.2 - 12.9 fL    Gran # 3.8 1.8 - 7.7 K/uL    Lymph # 1.1 1.0 - 4.8 K/uL    Mono # 1.1 (H) 0.3 - 1.0 K/uL    Eos # 0.2 0.0 - 0.5 K/uL    Baso # 0.02 0.00 - 0.20 K/uL    Gran% 61.0 38.0 - 73.0 %    Lymph% 17.6 (L) 18.0 - 48.0 %    Mono% 17.6 (H) 4.0 - 15.0 %    Eosinophil% 3.5 0.0 - 8.0 %    Basophil% 0.3 0.0 - 1.9 %    Differential Method Automated    Comprehensive metabolic panel   Result Value Ref Range    Sodium 134 (L) 136 - 145 mmol/L    Potassium 5.2 (H) 3.5 - 5.1 mmol/L    Chloride 98 95 - 110 mmol/L    CO2 27 23 - 29  mmol/L    Glucose 104 70 - 110 mg/dL    BUN, Bld 33 (H) 8 - 23 mg/dL    Creatinine 1.0 0.5 - 1.4 mg/dL    Calcium 9.3 8.7 - 10.5 mg/dL    Total Protein 6.8 6.0 - 8.4 g/dL    Albumin 2.7 (L) 3.5 - 5.2 g/dL    Total Bilirubin 0.4 0.1 - 1.0 mg/dL    Alkaline Phosphatase 99 55 - 135 U/L    AST 55 (H) 10 - 40 U/L    ALT 47 (H) 10 - 44 U/L    Anion Gap 9 8 - 16 mmol/L    eGFR if African American >60 >60 mL/min/1.73 m^2    eGFR if non African American 53 (A) >60 mL/min/1.73 m^2         Imaging Results:  Imaging Results          X-Ray Abdomen AP 1 View (Final result)  Result time 05/21/17 17:56:35    Final result by Jose Gongora III, MD (05/21/17 17:56:35)                 Impression:        1. Scoliosis. Degenerative changes in the spine. No acute abnormality suggested.      Electronically signed by: JOSE GONGORA MD  Date:     05/21/17  Time:    17:56              Narrative:    Abdomen x-ray, single view.    Clinical indication: Right lower quadrant pain.    There is a moderate thoracolumbar scoliosis, convexity to the right with moderately pronounced arthritic changes in the spine. Nonspecific gas pattern without mass or obstruction.                             X-Ray Chest PA And Lateral (Final result)  Result time 05/21/17 12:19:25    Final result by Tai Shook MD (05/21/17 12:19:25)                 Impression:     No acute process. No significant change from prior exam.      Electronically signed by: TAI SHOOK MD  Date:     05/21/17  Time:    12:19              Narrative:    Exam: Chest X-ray, two views.    History: Asthma    Findings: No infiltrate or effusion identified. Cardiomediastinal silhouette is within normal limits. No significant change from prior study dated 04/22/2017. Elevation of the right hemidiaphragm again noted.                             The EKG was ordered, reviewed, and independently interpreted by the ED provider.  Interpretation time: 1125  Rate: 80 BPM  Rhythm: normal  sinus rhythm with sinus arrhythmia.  Interpretation:  No STEMI.             The Emergency Provider reviewed the vital signs and test results, which are outlined above.    ED Discussion     2:08 PM: Discussed case with KOSTAS Rodriguez([facility]). Dr. Han agrees with current care and management of pt and accepts admission.   Admitting Service: Hospital medicine   Admitting Physician:Dr. Han  Admit to: Tele    2:12 PM: Re-evaluated pt. I have discussed test results, shared treatment plan, and the need for admission with patient and family at bedside. Pt and family express understanding at this time and agree with all information. All questions answered. Pt and family have no further questions or concerns at this time. Pt is ready for admit.          ED Medication(s):  Medications   albuterol-ipratropium 2.5mg-0.5mg/3mL nebulizer solution 3 mL (3 mLs Nebulization Given 5/21/17 1065)   aspirin tablet 325 mg (325 mg Oral Given 5/21/17 1341)   furosemide injection 40 mg (40 mg Intravenous Given 5/21/17 1341)   polyethylene glycol packet 17 g (17 g Oral Given 5/21/17 1810)       Discharge Medication List as of 5/25/2017  5:17 PM      START taking these medications    Details   ciprofloxacin HCl (CIPRO) 500 MG tablet Take 1 tablet (500 mg total) by mouth 2 (two) times daily., Starting Thu 5/25/2017, Until Thu 6/8/2017, Normal             Follow-up Information     Isidra Benavidez MD In 3 days.    Specialty:  Family Medicine  Why:  hospital follow up   Contact information:  [address] [place] [address]  [phone]             Abida Angulo MD In 1 week.    Specialty:  Cardiology  Why:  hospital follow up   Contact information:  [address] [place] [address] [phone]                     Medical Decision Making    Medical Decision Making:   Clinical Tests:   Lab Tests: Ordered and Reviewed  Radiological Study: Ordered and Reviewed  Medical Tests: Ordered and Reviewed            Scribe Attestation:   Scribe #1: I performed the above scribed service and the documentation accurately describes the services I performed. I attest to the accuracy of the note.    Attending:   Physician Attestation Statement for Scribe #1: I, Rufus Lobo MD, personally performed the services described in this documentation, as scribed by Shelly Wilhelm, in my presence, and it is both accurate and complete.          Clinical Impression       ICD-10-CM ICD-9-CM   1. Acute on chronic diastolic congestive heart failure I50.33 428.33     428.0   2. SOB (shortness of breath)Active R06.02 786.05   3. Acute on chronic congestive heart failure, unspecified congestive heart failure typeActive I50.9 428.0   4. Chest pain, unspecified typeActive R07.9 786.50   5. Chest pain R07.9 786.50   6. Atrial fibrillation I48.91 427.31   7. Acute on chronic heart failure I50.9 428.0   8. Acute on chronic congestive heart failure I50.9 428.0   9. Sepsis A41.9 038.9     995.91   10. Paroxysmal atrial fibrillation I48.0 427.31       Disposition:   Disposition: Admitted  Condition: Fair         Rufus Lobo MD  05/28/17 1732

## 2017-05-21 NOTE — SUBJECTIVE & OBJECTIVE
"Past Medical History:   Diagnosis Date    Acute on chronic diastolic congestive heart failure 8/27/2015    Anemia 5/9/2016    Anemia 5/9/2016    Anticoagulant long-term use     Aortic regurgitation     Echo 11/2013---5 - Mild to moderate aortic regurgitation.      Atrial fibrillation 5/15/2014    Back pain     s/p epidural steriod injections, no recent injections.    Baker's cyst     small, right, seen on US lower extremity 6/2014    Blood transfusion     Chest pain, atypical 8/27/2015    Diastolic dysfunction     Seen on echo 11/2013, stress test negative 5/2014    Diverticulosis     colonoscopy 3/27/2011    Hemorrhoids     colonoscopy 3/27/2011    Hiatal hernia     CXR 12/30/2016---Retrocardiac density again noted consistent with a hiatal hernia.    Hx of adenomatous colonic polyps     Hyperlipidemia     Hypertension     Hyponatremia 5/9/2016    Mitral regurgitation     Myocardial infarction     per patient was told in the past she had a "mild heart attack"    Obesity     Osteoarthritis, knee     Pneumonia     per patient "walking Pneumonia" did not require hospitalization    Seasonal allergies     Stroke     TIA; patient reports was told by one doctor she had 2 mini strokes but another doctor said she did not       Past Surgical History:   Procedure Laterality Date    APPENDECTOMY      EYE SURGERY Left     HYSTERECTOMY      benign reasons    KNEE SURGERY Bilateral     x2     OLECRANON BURSECTOMY Right     6/2011    URETHRA SURGERY         Review of patient's allergies indicates:   Allergen Reactions    Iodine and iodide containing products Rash       No current facility-administered medications on file prior to encounter.      Current Outpatient Prescriptions on File Prior to Encounter   Medication Sig    apixaban (ELIQUIS) 5 mg Tab Take 1 tablet (5 mg total) by mouth 2 (two) times daily.    ASCORBATE CALCIUM (VITAMIN C ORAL) Take by mouth once daily.    aspirin (ECOTRIN) 81 MG " EC tablet Take 1 tablet (81 mg total) by mouth once daily.    DOCOSAHEXANOIC ACID/EPA (FISH OIL ORAL) Take by mouth once daily.    ELIQUIS 5 mg Tab TAKE 1 TABLET BY MOUTH TWICE DAILY    flecainide (TAMBOCOR) 50 MG Tab Take 1 tablet (50 mg total) by mouth every 12 (twelve) hours.    fluticasone (FLOVENT HFA) 110 mcg/actuation inhaler Inhale 1 puff into the lungs 2 (two) times daily.    furosemide (LASIX) 40 MG tablet Take 1 tablet (40 mg total) by mouth once daily.    isosorbide mononitrate (IMDUR) 30 MG 24 hr tablet TAKE 1 TABLET(30 MG) BY MOUTH EVERY DAY    loratadine (CLARITIN) 10 mg tablet Take 1 tablet (10 mg total) by mouth once daily.    metoprolol succinate (TOPROL-XL) 100 MG 24 hr tablet Take 1 tablet (100 mg total) by mouth once daily.    pravastatin (PRAVACHOL) 40 MG tablet Take 1 tablet (40 mg total) by mouth once daily.    valsartan (DIOVAN) 160 MG tablet Take 1 tablet (160 mg total) by mouth once daily.    acetaminophen (TYLENOL) 500 MG tablet Take 1 tablet (500 mg total) by mouth 2 (two) times daily.    albuterol 90 mcg/actuation inhaler Inhale 2 puffs into the lungs 4 (four) times daily.    albuterol sulfate 2.5 mg/0.5 mL Nebu Take 2.5 mg by nebulization every 4 (four) hours as needed.    compressor, for nebulizer Lara Compressor use as directed by albuterol use.     Family History     Problem Relation (Age of Onset)    COPD Son    Cancer Mother, Father, Sister    Diabetes Brother, Son    Heart disease Mother, Father, Sister, Brother    Hyperlipidemia Mother, Father    Hypertension Mother, Father, Son        Social History Main Topics    Smoking status: Former Smoker     Packs/day: 0.05     Years: 1.00     Types: Cigarettes     Quit date: 1/1/1952    Smokeless tobacco: Never Used    Alcohol use No    Drug use: No    Sexual activity: No     Review of Systems   Constitutional: Positive for fatigue.   HENT: Negative.    Eyes: Negative.    Respiratory: Positive for cough, chest  tightness, shortness of breath and wheezing.    Cardiovascular: Positive for chest pain, palpitations and leg swelling.   Gastrointestinal: Positive for constipation, nausea and vomiting.   Endocrine: Negative.    Genitourinary: Negative.    Musculoskeletal: Negative.    Skin: Negative.    Allergic/Immunologic: Positive for food allergies (seafood).   Neurological: Positive for weakness.   Hematological: Negative.    Psychiatric/Behavioral: Negative.      Objective:     Vital Signs (Most Recent):  Temp: (!) 101.7 °F (38.7 °C) (05/21/17 1519)  Pulse: 61 (05/21/17 1519)  Resp: (!) 24 (05/21/17 1519)  BP: 125/67 (05/21/17 1519)  SpO2: 96 % (05/21/17 1519) Vital Signs (24h Range):  Temp:  [99.8 °F (37.7 °C)-101.7 °F (38.7 °C)] 101.7 °F (38.7 °C)  Pulse:  [] 61  Resp:  [20-31] 24  SpO2:  [93 %-98 %] 96 %  BP: (102-145)/(53-80) 125/67     Weight: 90.7 kg (200 lb)  Body mass index is 61.03 kg/m².    Physical Exam   Constitutional: She is oriented to person, place, and time. She appears well-developed and well-nourished.   Eyes: Conjunctivae and EOM are normal. Pupils are equal, round, and reactive to light.   Neck: Normal range of motion. Neck supple.   Cardiovascular: Normal rate and intact distal pulses.    Irregular rythm   Pulmonary/Chest: Effort normal. She has wheezes.   Abdominal: Soft. Bowel sounds are normal. There is tenderness (RLQ with deep palp).   Musculoskeletal: Normal range of motion. She exhibits edema (1+ to BLE).   Neurological: She is alert and oriented to person, place, and time.   Skin: Skin is warm and dry. Capillary refill takes less than 2 seconds.   Psychiatric: She has a normal mood and affect. Her behavior is normal.   Vitals reviewed.    Past Medical History:   Diagnosis Date    Acute on chronic diastolic congestive heart failure 8/27/2015    Anemia 5/9/2016    Anemia 5/9/2016    Anticoagulant long-term use     Aortic regurgitation     Echo 11/2013---5 - Mild to moderate aortic  "regurgitation.      Atrial fibrillation 5/15/2014    Back pain     s/p epidural steriod injections, no recent injections.    Baker's cyst     small, right, seen on US lower extremity 6/2014    Blood transfusion     Chest pain, atypical 8/27/2015    Diastolic dysfunction     Seen on echo 11/2013, stress test negative 5/2014    Diverticulosis     colonoscopy 3/27/2011    Hemorrhoids     colonoscopy 3/27/2011    Hiatal hernia     CXR 12/30/2016---Retrocardiac density again noted consistent with a hiatal hernia.    Hx of adenomatous colonic polyps     Hyperlipidemia     Hypertension     Hyponatremia 5/9/2016    Mitral regurgitation     Myocardial infarction     per patient was told in the past she had a "mild heart attack"    Obesity     Osteoarthritis, knee     Pneumonia     per patient "walking Pneumonia" did not require hospitalization    Seasonal allergies     Stroke     TIA; patient reports was told by one doctor she had 2 mini strokes but another doctor said she did not       Past Surgical History:   Procedure Laterality Date    APPENDECTOMY      EYE SURGERY Left     HYSTERECTOMY      benign reasons    KNEE SURGERY Bilateral     x2     OLECRANON BURSECTOMY Right     6/2011    URETHRA SURGERY         Review of patient's allergies indicates:   Allergen Reactions    Iodine and iodide containing products Rash       No current facility-administered medications on file prior to encounter.      Current Outpatient Prescriptions on File Prior to Encounter   Medication Sig    apixaban (ELIQUIS) 5 mg Tab Take 1 tablet (5 mg total) by mouth 2 (two) times daily.    ASCORBATE CALCIUM (VITAMIN C ORAL) Take by mouth once daily.    aspirin (ECOTRIN) 81 MG EC tablet Take 1 tablet (81 mg total) by mouth once daily.    DOCOSAHEXANOIC ACID/EPA (FISH OIL ORAL) Take by mouth once daily.    ELIQUIS 5 mg Tab TAKE 1 TABLET BY MOUTH TWICE DAILY    flecainide (TAMBOCOR) 50 MG Tab Take 1 tablet (50 mg total) " by mouth every 12 (twelve) hours.    fluticasone (FLOVENT HFA) 110 mcg/actuation inhaler Inhale 1 puff into the lungs 2 (two) times daily.    furosemide (LASIX) 40 MG tablet Take 1 tablet (40 mg total) by mouth once daily.    isosorbide mononitrate (IMDUR) 30 MG 24 hr tablet TAKE 1 TABLET(30 MG) BY MOUTH EVERY DAY    loratadine (CLARITIN) 10 mg tablet Take 1 tablet (10 mg total) by mouth once daily.    metoprolol succinate (TOPROL-XL) 100 MG 24 hr tablet Take 1 tablet (100 mg total) by mouth once daily.    pravastatin (PRAVACHOL) 40 MG tablet Take 1 tablet (40 mg total) by mouth once daily.    valsartan (DIOVAN) 160 MG tablet Take 1 tablet (160 mg total) by mouth once daily.    acetaminophen (TYLENOL) 500 MG tablet Take 1 tablet (500 mg total) by mouth 2 (two) times daily.    albuterol 90 mcg/actuation inhaler Inhale 2 puffs into the lungs 4 (four) times daily.    albuterol sulfate 2.5 mg/0.5 mL Nebu Take 2.5 mg by nebulization every 4 (four) hours as needed.    compressor, for nebulizer Lara Compressor use as directed by albuterol use.     Family History     Problem Relation (Age of Onset)    COPD Son    Cancer Mother, Father, Sister    Diabetes Brother, Son    Heart disease Mother, Father, Sister, Brother    Hyperlipidemia Mother, Father    Hypertension Mother, Father, Son        Social History Main Topics    Smoking status: Former Smoker     Packs/day: 0.05     Years: 1.00     Types: Cigarettes     Quit date: 1/1/1952    Smokeless tobacco: Never Used    Alcohol use No    Drug use: No    Sexual activity: No     Review of Systems   Constitutional: Positive for fatigue.   HENT: Negative.    Eyes: Negative.    Respiratory: Positive for cough, chest tightness, shortness of breath and wheezing.    Cardiovascular: Positive for chest pain, palpitations and leg swelling.   Gastrointestinal: Positive for constipation, nausea and vomiting.   Endocrine: Negative.    Genitourinary: Negative.     Musculoskeletal: Negative.    Skin: Negative.    Allergic/Immunologic: Positive for food allergies (seafood).   Neurological: Positive for weakness.   Hematological: Negative.    Psychiatric/Behavioral: Negative.      Objective:     Vital Signs (Most Recent):  Temp: (!) 101.7 °F (38.7 °C) (05/21/17 1519)  Pulse: 61 (05/21/17 1519)  Resp: (!) 24 (05/21/17 1519)  BP: 125/67 (05/21/17 1519)  SpO2: 96 % (05/21/17 1519) Vital Signs (24h Range):  Temp:  [99.8 °F (37.7 °C)-101.7 °F (38.7 °C)] 101.7 °F (38.7 °C)  Pulse:  [] 61  Resp:  [20-31] 24  SpO2:  [93 %-98 %] 96 %  BP: (102-145)/(53-80) 125/67     Weight: 90.7 kg (200 lb)  Body mass index is 61.03 kg/m².    Physical Exam   Constitutional: She is oriented to person, place, and time. She appears well-developed and well-nourished.   Eyes: Conjunctivae and EOM are normal. Pupils are equal, round, and reactive to light.   Neck: Normal range of motion. Neck supple.   Cardiovascular: Normal rate and intact distal pulses.    Irregular rythm   Pulmonary/Chest: Effort normal. She has wheezes.   Abdominal: Soft. Bowel sounds are normal. There is tenderness (RLQ with deep palp).   Musculoskeletal: Normal range of motion. She exhibits edema (1+ to BLE).   Neurological: She is alert and oriented to person, place, and time.   Skin: Skin is warm and dry. Capillary refill takes less than 2 seconds.   Psychiatric: She has a normal mood and affect. Her behavior is normal.   Vitals reviewed.       Significant Labs: All pertinent labs within the past 24 hours have been reviewed.    Significant Imaging: I have reviewed and interpreted all pertinent imaging results/findings within the past 24 hours.     Significant Labs: All pertinent labs within the past 24 hours have been reviewed.    Significant Imaging: I have reviewed and interpreted all pertinent imaging results/findings within the past 24 hours.

## 2017-05-21 NOTE — ED NOTES
Patient sitting up in bed. NAD, VSS. No needs or complaints at this time. Patient states she is feeling better since arrival to ED.

## 2017-05-21 NOTE — SUBJECTIVE & OBJECTIVE
"Past Medical History:   Diagnosis Date    Acute on chronic diastolic congestive heart failure 8/27/2015    Anemia 5/9/2016    Anemia 5/9/2016    Anticoagulant long-term use     Aortic regurgitation     Echo 11/2013---5 - Mild to moderate aortic regurgitation.      Atrial fibrillation 5/15/2014    Back pain     s/p epidural steriod injections, no recent injections.    Baker's cyst     small, right, seen on US lower extremity 6/2014    Blood transfusion     Chest pain, atypical 8/27/2015    Diastolic dysfunction     Seen on echo 11/2013, stress test negative 5/2014    Diverticulosis     colonoscopy 3/27/2011    Hemorrhoids     colonoscopy 3/27/2011    Hiatal hernia     CXR 12/30/2016---Retrocardiac density again noted consistent with a hiatal hernia.    Hx of adenomatous colonic polyps     Hyperlipidemia     Hypertension     Hyponatremia 5/9/2016    Mitral regurgitation     Myocardial infarction     per patient was told in the past she had a "mild heart attack"    Obesity     Osteoarthritis, knee     Pneumonia     per patient "walking Pneumonia" did not require hospitalization    Seasonal allergies     Stroke     TIA; patient reports was told by one doctor she had 2 mini strokes but another doctor said she did not       Past Surgical History:   Procedure Laterality Date    APPENDECTOMY      EYE SURGERY Left     HYSTERECTOMY      benign reasons    KNEE SURGERY Bilateral     x2     OLECRANON BURSECTOMY Right     6/2011    URETHRA SURGERY         Review of patient's allergies indicates:   Allergen Reactions    Iodine and iodide containing products Rash       No current facility-administered medications on file prior to encounter.      Current Outpatient Prescriptions on File Prior to Encounter   Medication Sig    apixaban (ELIQUIS) 5 mg Tab Take 1 tablet (5 mg total) by mouth 2 (two) times daily.    ASCORBATE CALCIUM (VITAMIN C ORAL) Take by mouth once daily.    aspirin (ECOTRIN) 81 MG " EC tablet Take 1 tablet (81 mg total) by mouth once daily.    DOCOSAHEXANOIC ACID/EPA (FISH OIL ORAL) Take by mouth once daily.    ELIQUIS 5 mg Tab TAKE 1 TABLET BY MOUTH TWICE DAILY    flecainide (TAMBOCOR) 50 MG Tab Take 1 tablet (50 mg total) by mouth every 12 (twelve) hours.    fluticasone (FLOVENT HFA) 110 mcg/actuation inhaler Inhale 1 puff into the lungs 2 (two) times daily.    furosemide (LASIX) 40 MG tablet Take 1 tablet (40 mg total) by mouth once daily.    isosorbide mononitrate (IMDUR) 30 MG 24 hr tablet TAKE 1 TABLET(30 MG) BY MOUTH EVERY DAY    loratadine (CLARITIN) 10 mg tablet Take 1 tablet (10 mg total) by mouth once daily.    metoprolol succinate (TOPROL-XL) 100 MG 24 hr tablet Take 1 tablet (100 mg total) by mouth once daily.    pravastatin (PRAVACHOL) 40 MG tablet Take 1 tablet (40 mg total) by mouth once daily.    valsartan (DIOVAN) 160 MG tablet Take 1 tablet (160 mg total) by mouth once daily.    acetaminophen (TYLENOL) 500 MG tablet Take 1 tablet (500 mg total) by mouth 2 (two) times daily.    albuterol 90 mcg/actuation inhaler Inhale 2 puffs into the lungs 4 (four) times daily.    albuterol sulfate 2.5 mg/0.5 mL Nebu Take 2.5 mg by nebulization every 4 (four) hours as needed.    compressor, for nebulizer Lara Compressor use as directed by albuterol use.     Family History     Problem Relation (Age of Onset)    COPD Son    Cancer Mother, Father, Sister    Diabetes Brother, Son    Heart disease Mother, Father, Sister, Brother    Hyperlipidemia Mother, Father    Hypertension Mother, Father, Son        Social History Main Topics    Smoking status: Former Smoker     Packs/day: 0.05     Years: 1.00     Types: Cigarettes     Quit date: 1/1/1952    Smokeless tobacco: Never Used    Alcohol use No    Drug use: No    Sexual activity: No     Review of Systems   Constitutional: Positive for fatigue.   HENT: Negative.    Eyes: Negative.    Respiratory: Positive for cough, chest  tightness, shortness of breath and wheezing.    Cardiovascular: Positive for chest pain, palpitations and leg swelling.   Gastrointestinal: Positive for constipation, nausea and vomiting.   Endocrine: Negative.    Genitourinary: Negative.    Musculoskeletal: Negative.    Skin: Negative.    Allergic/Immunologic: Positive for food allergies (seafood).   Neurological: Positive for weakness.   Hematological: Negative.    Psychiatric/Behavioral: Negative.      Objective:     Vital Signs (Most Recent):  Temp: (!) 101.7 °F (38.7 °C) (05/21/17 1519)  Pulse: 61 (05/21/17 1519)  Resp: (!) 24 (05/21/17 1519)  BP: 125/67 (05/21/17 1519)  SpO2: 96 % (05/21/17 1519) Vital Signs (24h Range):  Temp:  [99.8 °F (37.7 °C)-101.7 °F (38.7 °C)] 101.7 °F (38.7 °C)  Pulse:  [] 61  Resp:  [20-31] 24  SpO2:  [93 %-98 %] 96 %  BP: (102-145)/(53-80) 125/67     Weight: 90.7 kg (200 lb)  Body mass index is 61.03 kg/m².    Physical Exam   Constitutional: She is oriented to person, place, and time. She appears well-developed and well-nourished.   Eyes: Conjunctivae and EOM are normal. Pupils are equal, round, and reactive to light.   Neck: Normal range of motion. Neck supple.   Cardiovascular: Normal rate and intact distal pulses.    Irregular rythm   Pulmonary/Chest: Effort normal. She has wheezes.   Abdominal: Soft. Bowel sounds are normal. There is tenderness (RLQ with deep palp).   Musculoskeletal: Normal range of motion. She exhibits edema (1+ to BLE).   Neurological: She is alert and oriented to person, place, and time.   Skin: Skin is warm and dry. Capillary refill takes less than 2 seconds.   Psychiatric: She has a normal mood and affect. Her behavior is normal.   Vitals reviewed.       Significant Labs: All pertinent labs within the past 24 hours have been reviewed.    Significant Imaging: I have reviewed and interpreted all pertinent imaging results/findings within the past 24 hours.

## 2017-05-21 NOTE — HPI
81 y/o w/f presented to ED via private with hx of COPD presents to the ED for generalized weakness which onset gradually yesterday. She reports she was nauseated and vomited last night 4-5 times and continued to vomit this morning until she was dry heaving. She reports RLQ pain that started yesterday before she began vomiting, and she has not had a bowel movement in 5 days. She also reports she is SOB with heaviness in l chest, palpitations, and wheezing. CXR shows no acute findings. Admitted to telemetry with dx of acute on chronic diastolic heart failure, A-Fib, RLQ pain, and COPD

## 2017-05-22 PROBLEM — A41.9 SEPSIS: Status: ACTIVE | Noted: 2017-05-22

## 2017-05-22 LAB
ALBUMIN SERPL BCP-MCNC: 2.8 G/DL
ALP SERPL-CCNC: 76 U/L
ALT SERPL W/O P-5'-P-CCNC: 16 U/L
ANION GAP SERPL CALC-SCNC: 10 MMOL/L
ANION GAP SERPL CALC-SCNC: 10 MMOL/L
AST SERPL-CCNC: 22 U/L
BASOPHILS # BLD AUTO: 0.01 K/UL
BASOPHILS NFR BLD: 0.1 %
BILIRUB SERPL-MCNC: 0.7 MG/DL
BUN SERPL-MCNC: 19 MG/DL
BUN SERPL-MCNC: 19 MG/DL
CALCIUM SERPL-MCNC: 9 MG/DL
CALCIUM SERPL-MCNC: 9 MG/DL
CHLORIDE SERPL-SCNC: 101 MMOL/L
CHLORIDE SERPL-SCNC: 101 MMOL/L
CO2 SERPL-SCNC: 25 MMOL/L
CO2 SERPL-SCNC: 25 MMOL/L
CREAT SERPL-MCNC: 1 MG/DL
CREAT SERPL-MCNC: 1 MG/DL
DIFFERENTIAL METHOD: ABNORMAL
EOSINOPHIL # BLD AUTO: 0 K/UL
EOSINOPHIL NFR BLD: 0.1 %
ERYTHROCYTE [DISTWIDTH] IN BLOOD BY AUTOMATED COUNT: 14.4 %
EST. GFR  (AFRICAN AMERICAN): >60 ML/MIN/1.73 M^2
EST. GFR  (AFRICAN AMERICAN): >60 ML/MIN/1.73 M^2
EST. GFR  (NON AFRICAN AMERICAN): 53 ML/MIN/1.73 M^2
EST. GFR  (NON AFRICAN AMERICAN): 53 ML/MIN/1.73 M^2
GLUCOSE SERPL-MCNC: 134 MG/DL
GLUCOSE SERPL-MCNC: 134 MG/DL
HCT VFR BLD AUTO: 33.5 %
HGB BLD-MCNC: 10.8 G/DL
LYMPHOCYTES # BLD AUTO: 0.6 K/UL
LYMPHOCYTES NFR BLD: 6.4 %
MCH RBC QN AUTO: 29.4 PG
MCHC RBC AUTO-ENTMCNC: 32.2 %
MCV RBC AUTO: 91 FL
MONOCYTES # BLD AUTO: 1.4 K/UL
MONOCYTES NFR BLD: 14.7 %
NEUTROPHILS # BLD AUTO: 7.6 K/UL
NEUTROPHILS NFR BLD: 78.7 %
PLATELET # BLD AUTO: 138 K/UL
PMV BLD AUTO: 11 FL
POTASSIUM SERPL-SCNC: 3.8 MMOL/L
POTASSIUM SERPL-SCNC: 3.8 MMOL/L
PROT SERPL-MCNC: 6.5 G/DL
RBC # BLD AUTO: 3.67 M/UL
SODIUM SERPL-SCNC: 136 MMOL/L
SODIUM SERPL-SCNC: 136 MMOL/L
TROPONIN I SERPL DL<=0.01 NG/ML-MCNC: 0.04 NG/ML
WBC # BLD AUTO: 9.62 K/UL

## 2017-05-22 PROCEDURE — 97165 OT EVAL LOW COMPLEX 30 MIN: CPT

## 2017-05-22 PROCEDURE — 93005 ELECTROCARDIOGRAM TRACING: CPT

## 2017-05-22 PROCEDURE — G8979 MOBILITY GOAL STATUS: HCPCS | Mod: CI

## 2017-05-22 PROCEDURE — 87186 SC STD MICRODIL/AGAR DIL: CPT

## 2017-05-22 PROCEDURE — 87086 URINE CULTURE/COLONY COUNT: CPT

## 2017-05-22 PROCEDURE — 87088 URINE BACTERIA CULTURE: CPT

## 2017-05-22 PROCEDURE — 99900035 HC TECH TIME PER 15 MIN (STAT)

## 2017-05-22 PROCEDURE — 97116 GAIT TRAINING THERAPY: CPT

## 2017-05-22 PROCEDURE — 80053 COMPREHEN METABOLIC PANEL: CPT

## 2017-05-22 PROCEDURE — 93010 ELECTROCARDIOGRAM REPORT: CPT | Mod: ,,, | Performed by: INTERNAL MEDICINE

## 2017-05-22 PROCEDURE — 63600175 PHARM REV CODE 636 W HCPCS: Performed by: NURSE PRACTITIONER

## 2017-05-22 PROCEDURE — G8987 SELF CARE CURRENT STATUS: HCPCS | Mod: CJ

## 2017-05-22 PROCEDURE — 27000221 HC OXYGEN, UP TO 24 HOURS

## 2017-05-22 PROCEDURE — 85025 COMPLETE CBC W/AUTO DIFF WBC: CPT

## 2017-05-22 PROCEDURE — 84484 ASSAY OF TROPONIN QUANT: CPT

## 2017-05-22 PROCEDURE — 94640 AIRWAY INHALATION TREATMENT: CPT

## 2017-05-22 PROCEDURE — 36415 COLL VENOUS BLD VENIPUNCTURE: CPT

## 2017-05-22 PROCEDURE — G8988 SELF CARE GOAL STATUS: HCPCS | Mod: CI

## 2017-05-22 PROCEDURE — 97162 PT EVAL MOD COMPLEX 30 MIN: CPT

## 2017-05-22 PROCEDURE — 21400001 HC TELEMETRY ROOM

## 2017-05-22 PROCEDURE — 87077 CULTURE AEROBIC IDENTIFY: CPT

## 2017-05-22 PROCEDURE — G8978 MOBILITY CURRENT STATUS: HCPCS | Mod: CK

## 2017-05-22 PROCEDURE — 25000003 PHARM REV CODE 250: Performed by: NURSE PRACTITIONER

## 2017-05-22 PROCEDURE — 25000003 PHARM REV CODE 250: Performed by: INTERNAL MEDICINE

## 2017-05-22 PROCEDURE — 63600175 PHARM REV CODE 636 W HCPCS: Performed by: HOSPITALIST

## 2017-05-22 RX ORDER — CEFEPIME HYDROCHLORIDE 1 G/50ML
1 INJECTION, SOLUTION INTRAVENOUS
Status: DISCONTINUED | OUTPATIENT
Start: 2017-05-22 | End: 2017-05-24

## 2017-05-22 RX ADMIN — FUROSEMIDE 40 MG: 10 INJECTION, SOLUTION INTRAMUSCULAR; INTRAVENOUS at 08:05

## 2017-05-22 RX ADMIN — PRAVASTATIN SODIUM 40 MG: 20 TABLET ORAL at 08:05

## 2017-05-22 RX ADMIN — FLECAINIDE ACETATE 50 MG: 50 TABLET ORAL at 08:05

## 2017-05-22 RX ADMIN — CEFEPIME 1 G: 1 INJECTION, POWDER, FOR SOLUTION INTRAMUSCULAR; INTRAVENOUS at 02:05

## 2017-05-22 RX ADMIN — ASPIRIN 81 MG: 81 TABLET, COATED ORAL at 08:05

## 2017-05-22 RX ADMIN — ONDANSETRON 8 MG: 8 TABLET, ORALLY DISINTEGRATING ORAL at 02:05

## 2017-05-22 RX ADMIN — APIXABAN 5 MG: 2.5 TABLET, FILM COATED ORAL at 08:05

## 2017-05-22 RX ADMIN — SODIUM CHLORIDE, PRESERVATIVE FREE 3 ML: 5 INJECTION INTRAVENOUS at 02:05

## 2017-05-22 RX ADMIN — BUDESONIDE 0.5 MG: 0.5 SUSPENSION RESPIRATORY (INHALATION) at 08:05

## 2017-05-22 RX ADMIN — FAMOTIDINE 20 MG: 20 TABLET ORAL at 08:05

## 2017-05-22 RX ADMIN — POLYETHYLENE GLYCOL 3350 17 G: 17 POWDER, FOR SOLUTION ORAL at 08:05

## 2017-05-22 RX ADMIN — SODIUM CHLORIDE, PRESERVATIVE FREE 3 ML: 5 INJECTION INTRAVENOUS at 05:05

## 2017-05-22 RX ADMIN — BUDESONIDE 0.5 MG: 0.5 SUSPENSION RESPIRATORY (INHALATION) at 07:05

## 2017-05-22 RX ADMIN — ACETAMINOPHEN 650 MG: 325 TABLET ORAL at 02:05

## 2017-05-22 RX ADMIN — SODIUM CHLORIDE, PRESERVATIVE FREE 3 ML: 5 INJECTION INTRAVENOUS at 10:05

## 2017-05-22 NOTE — PLAN OF CARE
Problem: Patient Care Overview  Goal: Plan of Care Review  Outcome: Ongoing (interventions implemented as appropriate)  Patient remains free from injury or falls during shift; plan of care reviewed with patient; pt verbalized understanding; afib-St on monitor HR 70s-130s; tmax 101.7; family at bedside for support; Will continue to monitor with hourly rounding.

## 2017-05-22 NOTE — PLAN OF CARE
Problem: Patient Care Overview  Goal: Plan of Care Review  Outcome: Ongoing (interventions implemented as appropriate)  PT REMAINED FREE FROM FALLS AND PAIN THIS SHIFT. POSITIVE BLOOD CULTURES REPORTED TODAY. ANTIBX CHANGED. PT AFEBRILE THIS SHIFT

## 2017-05-22 NOTE — ASSESSMENT & PLAN NOTE
-IV Lasix  -strict I/O  -fluid restriction   -daily weights  -elevated BNP noted  -chest xray showed no acute process

## 2017-05-22 NOTE — PROGRESS NOTES
LAB CALLED TO COME TO COLLECT BLOOD CULTURES.  STATES SHE IS ON BREAK BUT WILL BE UP AS SOON AS SHE IS DONE.

## 2017-05-22 NOTE — PT/OT/SLP EVAL
"Occupational Therapy  Evaluation    Elke Laws   MRN: [id number]   Admitting Diagnosis: Acute on chronic congestive heart failure    OT Date of Treatment: 05/22/17   OT Start Time: 1500  OT Stop Time: 1515  OT Total Time (min): 15 min    Billable Minutes:  Evaluation 15    Diagnosis: Acute on chronic congestive heart failure   OT TX DX: DEBILITY    Past Medical History:   Diagnosis Date    Acute on chronic diastolic congestive heart failure 8/27/2015    Anemia 5/9/2016    Anemia 5/9/2016    Anticoagulant long-term use     Aortic regurgitation     Echo 11/2013---5 - Mild to moderate aortic regurgitation.      Atrial fibrillation 5/15/2014    Back pain     s/p epidural steriod injections, no recent injections.    Baker's cyst     small, right, seen on US lower extremity 6/2014    Blood transfusion     Chest pain, atypical 8/27/2015    Diastolic dysfunction     Seen on echo 11/2013, stress test negative 5/2014    Diverticulosis     colonoscopy 3/27/2011    Hemorrhoids     colonoscopy 3/27/2011    Hiatal hernia     CXR 12/30/2016---Retrocardiac density again noted consistent with a hiatal hernia.    Hx of adenomatous colonic polyps     Hyperlipidemia     Hypertension     Hyponatremia 5/9/2016    Mitral regurgitation     Myocardial infarction     per patient was told in the past she had a "mild heart attack"    Obesity     Osteoarthritis, knee     Pneumonia     per patient "walking Pneumonia" did not require hospitalization    Seasonal allergies     Stroke     TIA; patient reports was told by one doctor she had 2 mini strokes but another doctor said she did not      Past Surgical History:   Procedure Laterality Date    APPENDECTOMY      EYE SURGERY Left     HYSTERECTOMY      benign reasons    KNEE SURGERY Bilateral     x2     OLECRANON BURSECTOMY Right     6/2011    URETHRA SURGERY         Referring physician: DR. BAKER  Date referred to OT: 5/22/17    General Precautions: Standard, " fall  Orthopedic Precautions: N/A  Braces: N/A          Patient History:  Living Environment  Lives With: grandchild(kennedy) (ADULT GRANDSON )  Living Arrangements: house  Home Accessibility: stairs to enter home  Home Layout: Able to live on 1st floor  Number of Stairs to Enter Home: 5  Stair Railings at Home: outside, present on left side  Transportation Available: car, family or friend will provide  Equipment Currently Used at Home: cane, straight, grab bar, rollator, shower chair    Prior level of function:   Bed Mobility/Transfers: independent  Grooming: independent  Bathing: independent  Upper Body Dressing: independent  Lower Body Dressing: independent  Toileting: independent  Home Management Skills: independent  Homemaking Responsibilities: Yes  Driving License: Yes  Mode of Transportation: Family, Car, Friends  Occupation: Retired     Dominant hand: right    Subjective:  Communicated with RN prior to session.    Chief Complaint: WEAKNESS  Patient/Family stated goals: RETURN HOME    Pain Ratin/10              Pain Rating Post-Intervention: 0/10    Objective:  Patient found with: peripheral IV, telemetry, oxygen    Cognitive Exam:  Oriented to: Person, Place, Time and Situation  Follows Commands/attention: Follows two-step commands  Communication: clear/fluent  Memory:  No Deficits noted  Safety awareness/insight to disability: impaired  Coping skills/emotional control: Appropriate to situation    Visual/perceptual:  Intact    Physical Exam:  Postural examination/scapula alignment: Rounded shoulder  Skin integrity: Visible skin intact  Edema: None noted     Sensation:   Intact    Upper Extremity Range of Motion:  Right Upper Extremity: WFL  Left Upper Extremity: WFL    Upper Extremity Strength:  Right Upper Extremity: WFL except SHLDR 3/5  Left Upper Extremity: WFL except SHLDR 3/5   Strength: WFL    Fine motor coordination:   Intact      Functional Mobility:  Bed Mobility:  Rolling/Turning to Left:  "Supervision  Rolling/Turning Right: Supervision  Scooting/Bridging: Supervision  Supine to Sit: Supervision  Sit to Supine: Supervision    Transfers:  Sit <> Stand Assistance: Stand By Assistance  Sit <> Stand Assistive Device: Straight Cane  Bed <> Chair Technique: Stand Pivot  Bed <> Chair Transfer Assistance: Supervision  Bed <> Chair Assistive Device: Straight Cane    Functional Ambulation: AMBULATED WITH SC SBA, WITH OCCASIONAL USE OF ROSE SIDE RAILS FOR SUPPORT    Activities of Daily Living:  Feeding Level of Assistance: Modified independent      Balance:   Static Sit: NORMAL: No deviations seen in posture held statically  Dynamic Sit: GOOD: Maintains balance through MODERATE excursions of active trunk movement  Static Stand: FAIR+: Takes MINIMAL challenges from all directions  Dynamic stand: FAIR+: Needs CLOSE SUPERVISION during gait and is able to right self with minor LOB      AM-PAC 6 CLICK ADL  How much help from another person does this patient currently need?  1 = Unable, Total/Dependent Assistance  2 = A lot, Maximum/Moderate Assistance  3 = A little, Minimum/Contact Guard/Supervision  4 = None, Modified [place]ombs/Independent    Putting on and taking off regular lower body clothing? : 3  Bathing (including washing, rinsing, drying)?: 1 (NOT ASSESSED)  Toileting, which includes using toilet, bedpan, or urinal? : 3  Putting on and taking off regular upper body clothing?: 3  Taking care of personal grooming such as brushing teeth?: 4  Eating meals?: 4  Total Score: 18    AM-PAC Raw Score CMS "G-Code Modifier Level of Impairment Assistance   6 % Total / Unable   7 - 9 CM 80 - 100% Maximal Assist   10 - 14 CL 60 - 80% Moderate Assist   15 - 19 CK 40 - 60% Moderate Assist   20 - 22 CJ 20 - 40% Minimal Assist   23 CI 1-20% SBA / CGA   24 CH 0% Independent/ Mod I       Patient left up in chair with all lines intact, call button in reach, NURSE notified and FRIEND present    Assessment:  Elke Laws " is a 82 y.o. female with a medical diagnosis of Acute on chronic congestive heart failure and presents with DEBILITY LIMITING FUNCTIONAL MOBILITY AND ADLS. PT WOULD BENEFIT FROM SKILLED OT SERVICES TO ADDRESS FUNCTIONAL DEFICITS.    Rehab identified problem list/impairments: Rehab identified problem list/impairments: weakness, impaired endurance, impaired self care skills, impaired balance, gait instability, decreased safety awareness    Rehab potential is good.    Activity tolerance: Good    Discharge recommendations: Discharge Facility/Level Of Care Needs: home health OT     Barriers to discharge: Barriers to Discharge: None (PT'S FRIEND STATED SHE WILL BE STAYING WITH PT 24/7 UNTIL NEEDED)    Equipment recommendations: walker, rolling     GOALS:    Occupational Therapy Goals        Problem: Occupational Therapy Goal    Goal Priority Disciplines Outcome Interventions   Occupational Therapy Goal     OT, PT/OT     Description:  Goals to be met by: 5/29/17     Patient will increase functional independence with ADLs by performing:    UE Dressing with Modified Parker.  LE Dressing with Modified Parker.  Grooming while standing at sink with Modified Parker.  Toileting from toilet with Modified Parker for hygiene and clothing management.   Toilet transfer to toilet with Modified Parker.  Upper extremity exercise program x10 reps per handout, with supervision.                      PLAN:  Patient to be seen 3 x/week to address the above listed problems via self-care/home management, therapeutic activities, therapeutic exercises  Plan of Care expires: 05/29/17  Plan of Care reviewed with: patient, friend    OT G-codes  Functional Assessment Tool Used: FIM-ADL  Score: 5  Functional Limitation: Self care  Self Care Current Status (): DEAN  Self Care Goal Status (): KWAME Rowe OT  05/22/2017

## 2017-05-22 NOTE — PROGRESS NOTES
LAB CALLED FOR 3RD TIME ABOUT BLOOD CULTURES. LAB THAT TECH IS ON THEIR WAY TO ROOM NOW. EXPLAINED IMPORTANCE AND NEED FOR BLOOD CULTURES TO .

## 2017-05-22 NOTE — SUBJECTIVE & OBJECTIVE
Interval History: pt stable overnight and states that she is feeling much better.  Pt reports activity change with weakness and denies any additional symptoms.  Blood culture with NGTD.  Urine culture pending.  Case management consulted for discharge planning.      Review of Systems   Constitutional: Positive for activity change. Negative for appetite change, chills, fatigue and fever.   HENT: Negative for congestion, sinus pressure, sneezing, sore throat and trouble swallowing.    Eyes: Negative.    Respiratory: Negative for chest tightness, shortness of breath and wheezing.    Cardiovascular: Negative for chest pain, palpitations and leg swelling.   Gastrointestinal: Negative for abdominal distention, abdominal pain, diarrhea, nausea and vomiting.   Endocrine: Negative for cold intolerance and heat intolerance.   Genitourinary: Negative for decreased urine volume, difficulty urinating, dysuria, flank pain and frequency.   Musculoskeletal: Negative for arthralgias, back pain and myalgias.   Skin: Negative for color change and wound.   Allergic/Immunologic: Negative for environmental allergies and food allergies.   Neurological: Positive for weakness. Negative for dizziness and headaches.   Hematological: Does not bruise/bleed easily.   Psychiatric/Behavioral: Negative for agitation and sleep disturbance. The patient is not nervous/anxious.      Objective:     Vital Signs (Most Recent):  Temp: 99.2 °F (37.3 °C) (05/22/17 1139)  Pulse: 75 (05/22/17 1139)  Resp: 18 (05/22/17 1139)  BP: (!) 104/44 (05/22/17 1139)  SpO2: 97 % (05/22/17 1139) Vital Signs (24h Range):  Temp:  [97.3 °F (36.3 °C)-101.7 °F (38.7 °C)] 99.2 °F (37.3 °C)  Pulse:  [] 75  Resp:  [18-31] 18  SpO2:  [93 %-99 %] 97 %  BP: ()/(44-86) 104/44     Weight: 89.2 kg (196 lb 10.4 oz)  Body mass index is 60.01 kg/m².    Intake/Output Summary (Last 24 hours) at 05/22/17 1251  Last data filed at 05/22/17 0200   Gross per 24 hour   Intake               290 ml   Output              800 ml   Net             -510 ml      Physical Exam   Constitutional: She is oriented to person, place, and time. She appears well-developed and well-nourished.   Eyes: Conjunctivae and EOM are normal. Pupils are equal, round, and reactive to light.   Neck: Normal range of motion. Neck supple.   Cardiovascular: Normal rate and intact distal pulses.    Irregular rythm   Pulmonary/Chest: Effort normal. She has no wheezes.   Abdominal: Soft. Bowel sounds are normal. There is no tenderness (RLQ with deep palp).   Musculoskeletal: Normal range of motion. Edema: Trace edema to BLE.   Neurological: She is alert and oriented to person, place, and time.   Skin: Skin is warm and dry. Capillary refill takes less than 2 seconds.   Psychiatric: She has a normal mood and affect. Her behavior is normal.   Vitals reviewed.      Significant Labs:   CBC:   Recent Labs  Lab 05/21/17  1145 05/21/17  1753 05/22/17  0456   WBC 11.90 10.59 9.62   HGB 11.4* 11.5* 10.8*   HCT 35.0* 35.4* 33.5*    150 138*     CMP:   Recent Labs  Lab 05/21/17  1145 05/21/17  1753 05/22/17  0456    137 136  136   K 4.2 4.1 3.8  3.8    99 101  101   CO2 24 28 25  25   * 117* 134*  134*   BUN 17 17 19  19   CREATININE 0.8 0.9 1.0  1.0   CALCIUM 9.3 9.2 9.0  9.0   PROT 6.8 7.1 6.5   ALBUMIN 3.0* 3.1* 2.8*   BILITOT 1.1* 1.3* 0.7   ALKPHOS 82 82 76   AST 30 28 22   ALT 16 19 16   ANIONGAP 9 10 10  10   EGFRNONAA >60 60 53*  53*       Significant Imaging:   Imaging Results          X-Ray Abdomen AP 1 View (Final result)  Result time 05/21/17 17:56:35    Final result by Jose Gongora III, MD (05/21/17 17:56:35)                 Impression:        1. Scoliosis. Degenerative changes in the spine. No acute abnormality suggested.      Electronically signed by: JOSE GONGORA MD  Date:     05/21/17  Time:    17:56              Narrative:    Abdomen x-ray, single view.    Clinical indication:  Right lower quadrant pain.    There is a moderate thoracolumbar scoliosis, convexity to the right with moderately pronounced arthritic changes in the spine. Nonspecific gas pattern without mass or obstruction.                             X-Ray Chest PA And Lateral (Final result)  Result time 05/21/17 12:19:25    Final result by Tai Shook MD (05/21/17 12:19:25)                 Impression:     No acute process. No significant change from prior exam.      Electronically signed by: TAI SHOOK MD  Date:     05/21/17  Time:    12:19              Narrative:    Exam: Chest X-ray, two views.    History: Asthma    Findings: No infiltrate or effusion identified. Cardiomediastinal silhouette is within normal limits. No significant change from prior study dated 04/22/2017. Elevation of the right hemidiaphragm again noted.

## 2017-05-22 NOTE — PROGRESS NOTES
CALLED LAB AGAIN ABOUT DRAWING BLOOD CULTURES. LAB STATES THAT TECH IS ON THE FLOOR DRAWING PT'S NOW

## 2017-05-22 NOTE — PLAN OF CARE
Problem: Patient Care Overview  Goal: Plan of Care Review  Outcome: Ongoing (interventions implemented as appropriate)  PT REMAINED FREE FROM FALLS AND PAIN THIS SHIFT. BLOOD CULTURES DRAWN ON PT AND IV ANTIBX AND TYLENOL STARTED. NIGHT NURSE TO RECHECK TEMP. PT DIURESING WELL FROM IV LASIX GIVEN IN ED. PT WEARING HOME PULL UPS.

## 2017-05-22 NOTE — PT/OT/SLP EVAL
"Physical Therapy  Evaluation    Elke Laws   MRN: [id number]   Admitting Diagnosis: Acute on chronic congestive heart failure    PT Received On: 05/22/17  PT Start Time: 1500     PT Stop Time: 1525    PT Total Time (min): 25 min       Billable Minutes:  Evaluation 15 and Gait [id number]    Diagnosis: Acute on chronic congestive heart failure  P.T. TX DX: IMPAIRED FUNCTIONAL MOBILITY    Past Medical History:   Diagnosis Date    Acute on chronic diastolic congestive heart failure 8/27/2015    Anemia 5/9/2016    Anemia 5/9/2016    Anticoagulant long-term use     Aortic regurgitation     Echo 11/2013---5 - Mild to moderate aortic regurgitation.      Atrial fibrillation 5/15/2014    Back pain     s/p epidural steriod injections, no recent injections.    Baker's cyst     small, right, seen on US lower extremity 6/2014    Blood transfusion     Chest pain, atypical 8/27/2015    Diastolic dysfunction     Seen on echo 11/2013, stress test negative 5/2014    Diverticulosis     colonoscopy 3/27/2011    Hemorrhoids     colonoscopy 3/27/2011    Hiatal hernia     CXR 12/30/2016---Retrocardiac density again noted consistent with a hiatal hernia.    Hx of adenomatous colonic polyps     Hyperlipidemia     Hypertension     Hyponatremia 5/9/2016    Mitral regurgitation     Myocardial infarction     per patient was told in the past she had a "mild heart attack"    Obesity     Osteoarthritis, knee     Pneumonia     per patient "walking Pneumonia" did not require hospitalization    Seasonal allergies     Stroke     TIA; patient reports was told by one doctor she had 2 mini strokes but another doctor said she did not      Past Surgical History:   Procedure Laterality Date    APPENDECTOMY      EYE SURGERY Left     HYSTERECTOMY      benign reasons    KNEE SURGERY Bilateral     x2     OLECRANON BURSECTOMY Right     6/2011    URETHRA SURGERY       Referring physician: DR. BAKER  Date referred to PT: " 2017    General Precautions: Standard, fall  Orthopedic Precautions: N/A   Braces: N/A       Patient History:  Lives With: grandchild(kennedy) (GRANDSON LIVES WITH HER BUT HE WORKS DURING THE DAY, PT ALONE FOR APPROX. 8 HOURS DURING THE DAY)  Living Arrangements: house  Home Accessibility: stairs to enter home  Home Layout: Able to live on 1st floor  Number of Stairs to Enter Home: 5  Stair Railings at Home: outside, present on left side  Transportation Available: car, family or friend will provide  Living Environment Comment: PT HAS A GRANDSON THAT LIVES WITH HER BUT DID NOT HAVE ACCESS TO 24 HOUR CARE, PT REPORTS FUNCTIONALLY INDEP PTA, NO O2 USED AT HOME, ONLY NEBULIZER  Equipment Currently Used at Home: cane, straight, rollator, grab bar, shower chair  DME owned (not currently used): none    Previous Level of Function:  Ambulation Skills: independent  Transfer Skills: independent  ADL Skills: independent    Subjective:  Communicated with NURSE NELSON prior to session.  PT AGREEABLE TO P.JANIE LLOYD THIS PM, PT EAGER TO GET OOB AND WALK  Pain Ratin/10     Objective:   Patient found with: telemetry, oxygen, peripheral IV     Cognitive Exam:  Oriented to: Person, Place, Time and Situation    Follows Commands/attention: Follows one-step commands  Communication: clear/fluent  Safety awareness/insight to disability: impaired    Physical Exam:  Postural examination/scapula alignment: Rounded shoulder    Skin integrity: Visible skin intact  Edema: None noted     Sensation:   Intact    Upper Extremity Range of Motion:  REFER TO O.TInocente LLOYD    Lower Extremity Range of Motion:  Right Lower Extremity: WFL  Left Lower Extremity: WFL    Lower Extremity Strength:  Right Lower Extremity: GROSSLY 4-/5  Left Lower Extremity: GROSSLY 4-/5    Functional Mobility:  Bed Mobility:  Rolling/Turning to Left: Stand by assistance  Rolling/Turning Right: Stand by assistance  Scooting/Bridging: Stand by Assistance  Supine to Sit: Stand by  Assistance    Transfers:  Sit <> Stand Assistance: Stand By Assistance  Sit <> Stand Assistive Device: No Assistive Device  Bed <> Chair Technique: Stand Pivot  Bed <> Chair Assistance: Contact Guard Assistance, Stand By Assistance  Bed <> Chair Assistive Device: Straight Cane    Gait:   Gait Distance: PT ' WITH SPC AND CG/SBA, PT WITH SLOW PACED GAIT, MILDLY UNSTEADY BUT NO GROSS LOB, INCREASED SOB ON ROOM AIR, QUICK TO FATIGUE, PT HOLDING ONTO SPC AND RAILING IN HALLWAY SO EDUCATED IN NEED FOR ROLLING WALKER INSTEAD OF SPC  Assistance 1: Contact Guard Assistance  Gait Assistive Device: Single point cane  Gait Pattern: swing-through gait  Gait Deviation(s): decreased robson, decreased step length    Balance:   Static Sit: GOOD  Dynamic Sit: GOOD  Static Stand: FAIR  Dynamic stand: FAIR-    Therapeutic Activities and Exercises:    AM-PAC 6 CLICK MOBILITY  How much help from another person does this patient currently need?   1 = Unable, Total/Dependent Assistance  2 = A lot, Maximum/Moderate Assistance  3 = A little, Minimum/Contact Guard/Supervision  4 = None, Modified Barber/Independent    Turning over in bed (including adjusting bedclothes, sheets and blankets)?: 4  Sitting down on and standing up from a chair with arms (e.g., wheelchair, bedside commode, etc.): 3  Moving from lying on back to sitting on the side of the bed?: 4  Moving to and from a bed to a chair (including a wheelchair)?: 3  Need to walk in hospital room?: 3  Climbing 3-5 steps with a railing?: 1  Total Score: 18     AM-PAC Raw Score CMS G-Code Modifier Level of Impairment Assistance   6 % Total / Unable   7 - 9 CM 80 - 100% Maximal Assist   10 - 14 CL 60 - 80% Moderate Assist   15 - 19 CK 40 - 60% Moderate Assist   20 - 22 CJ 20 - 40% Minimal Assist   23 CI 1-20% SBA / CGA   24 CH 0% Independent/ Mod I     Patient left up in chair with all lines intact, call button in reach, NURSE notified and FRIEND  present.    Assessment:   Elke Laws is a 82 y.o. female with a medical diagnosis of Acute on chronic congestive heart failure and presents with IMPAIRED FUNCTIONAL MOBILITY. PT WILL BENEFIT FROM CONT. SKILLED P.T. TO ADDRESS IMPAIRMENTS    Rehab identified problem list/impairments: Rehab identified problem list/impairments: weakness, impaired endurance, gait instability, impaired balance, decreased coordination, decreased safety awareness, impaired functional mobilty    Rehab potential is good.    Activity tolerance: Good    Discharge recommendations: Discharge Facility/Level Of Care Needs: home health PT     Barriers to discharge: Barriers to Discharge: None (PT'S [employer] FRIEND PRESENT DURING TIME OF EVAL AND REPORTS SHE WILL PROVIDE 24 HOUR CARE AT HOME FOR AS LONG AS PT NEEDS)    Equipment recommendations: Equipment Needed After Discharge: walker, rolling     GOALS:    Physical Therapy Goals        Problem: Physical Therapy Goal    Goal Priority Disciplines Outcome Goal Variances Interventions   Physical Therapy Goal     PT/OT, PT      Description:  LTG'S TO BE MET IN 7 DAYS (5-29-17)  1. PT WILL BE NIRANJAN WITH BED MOBILITY   2. PT WILL BE NIRANJAN WITH TF'S  3. PT WILL ' WITH RW AND SPV  4. PT WILL TOLERATE BLE THEREX X 20 REPS AROM  5. PT WILL DEMO G DYNAMIC BALANCE DURING GAIT   6. PT WILL ASC/DESC. 5 STEPS USING 1 SIDE RAIL AND SBA                  PLAN:    Patient to be seen 5 x/week to address the above listed problems via gait training, therapeutic activities, therapeutic exercises  Plan of Care expires: 05/29/17  Plan of Care reviewed with: patient, friend    PT ENCOURAGED TO CALL FOR ASSISTANCE WITH ALL NEEDS DUE TO FALL RISK STATUS, PT AGREEABLE.  PT ENCOURAGED TO INCREASE TIME OOB IN CHAIR, ALL MEALS IN CHAIR OOB, PT AGREEABLE    Amber Grey, PT  05/22/2017

## 2017-05-22 NOTE — PROGRESS NOTES
[facility] Hospital Medicine  Progress Note    Patient Name: Elke Laws  MRN: [id number]  Patient Class: IP- Inpatient   Admission Date: 5/21/2017  Length of Stay: 0 days  Attending Physician: Jama Crawford MD  Primary Care Provider: Isidra Benavidez MD        Subjective:     Principal Problem:Acute on chronic congestive heart failure    HPI:  81 y/o w/f presented to ED via private with hx of COPD presents to the ED for generalized weakness which onset gradually yesterday. She reports she was nauseated and vomited last night 4-5 times and continued to vomit this morning until she was dry heaving. She reports RLQ pain that started yesterday before she began vomiting, and she has not had a bowel movement in 5 days. She also reports she is SOB with heaviness in l chest, palpitations, and wheezing. CXR shows no acute findings. Admitted to telemetry with dx of acute on chronic diastolic heart failure, A-Fib, RLQ pain, and COPD     Hospital Course:  Pt admitted to Observation Unit for Acute on Chronic congestive heart failure.  Elevated BNP noted.  Troponin elevated and flat.  UA findings consistent with UTI and IV Rocephin initiated.  Pt transitioned to inpatient status.  Chest xray showed no acute process and abdominal xray showed scoliosis.  Blood cultures with no growth and Urine culture pending.     Interval History: pt stable overnight and states that she is feeling much better.  Pt reports activity change with weakness and denies any additional symptoms.  Blood culture with NGTD.  Urine culture pending.  Case management consulted for discharge planning.  WBCs normal and pt afebrile.     Review of Systems   Constitutional: Positive for activity change. Negative for appetite change, chills, fatigue and fever.   HENT: Negative for congestion, sinus pressure, sneezing, sore throat and trouble swallowing.    Eyes: Negative.    Respiratory: Negative for chest tightness, shortness of breath and  wheezing.    Cardiovascular: Negative for chest pain, palpitations and leg swelling.   Gastrointestinal: Negative for abdominal distention, abdominal pain, diarrhea, nausea and vomiting.   Endocrine: Negative for cold intolerance and heat intolerance.   Genitourinary: Negative for decreased urine volume, difficulty urinating, dysuria, flank pain and frequency.   Musculoskeletal: Negative for arthralgias, back pain and myalgias.   Skin: Negative for color change and wound.   Allergic/Immunologic: Negative for environmental allergies and food allergies.   Neurological: Positive for weakness. Negative for dizziness and headaches.   Hematological: Does not bruise/bleed easily.   Psychiatric/Behavioral: Negative for agitation and sleep disturbance. The patient is not nervous/anxious.      Objective:     Vital Signs (Most Recent):  Temp: 99.2 °F (37.3 °C) (05/22/17 1139)  Pulse: 75 (05/22/17 1139)  Resp: 18 (05/22/17 1139)  BP: (!) 104/44 (05/22/17 1139)  SpO2: 97 % (05/22/17 1139) Vital Signs (24h Range):  Temp:  [97.3 °F (36.3 °C)-101.7 °F (38.7 °C)] 99.2 °F (37.3 °C)  Pulse:  [] 75  Resp:  [18-31] 18  SpO2:  [93 %-99 %] 97 %  BP: ()/(44-86) 104/44     Weight: 89.2 kg (196 lb 10.4 oz)  Body mass index is 60.01 kg/m².    Intake/Output Summary (Last 24 hours) at 05/22/17 1251  Last data filed at 05/22/17 0200   Gross per 24 hour   Intake              290 ml   Output              800 ml   Net             -510 ml      Physical Exam   Constitutional: She is oriented to person, place, and time. She appears well-developed and well-nourished.   Eyes: Conjunctivae and EOM are normal. Pupils are equal, round, and reactive to light.   Neck: Normal range of motion. Neck supple.   Cardiovascular: Normal rate and intact distal pulses.    Irregular rythm   Pulmonary/Chest: Effort normal. She has no wheezes.   Abdominal: Soft. Bowel sounds are normal. There is no tenderness (RLQ with deep palp).   Musculoskeletal: Normal  range of motion. Edema: Trace edema to BLE.   Neurological: She is alert and oriented to person, place, and time.   Skin: Skin is warm and dry. Capillary refill takes less than 2 seconds.   Psychiatric: She has a normal mood and affect. Her behavior is normal.   Vitals reviewed.      Significant Labs:   CBC:   Recent Labs  Lab 05/21/17  1145 05/21/17  1753 05/22/17  0456   WBC 11.90 10.59 9.62   HGB 11.4* 11.5* 10.8*   HCT 35.0* 35.4* 33.5*    150 138*     CMP:   Recent Labs  Lab 05/21/17  1145 05/21/17  1753 05/22/17  0456    137 136  136   K 4.2 4.1 3.8  3.8    99 101  101   CO2 24 28 25  25   * 117* 134*  134*   BUN 17 17 19  19   CREATININE 0.8 0.9 1.0  1.0   CALCIUM 9.3 9.2 9.0  9.0   PROT 6.8 7.1 6.5   ALBUMIN 3.0* 3.1* 2.8*   BILITOT 1.1* 1.3* 0.7   ALKPHOS 82 82 76   AST 30 28 22   ALT 16 19 16   ANIONGAP 9 10 10  10   EGFRNONAA >60 60 53*  53*       Significant Imaging:   Imaging Results          X-Ray Abdomen AP 1 View (Final result)  Result time 05/21/17 17:56:35    Final result by Jose Gongora III, MD (05/21/17 17:56:35)                 Impression:        1. Scoliosis. Degenerative changes in the spine. No acute abnormality suggested.      Electronically signed by: JOSE GONGORA MD  Date:     05/21/17  Time:    17:56              Narrative:    Abdomen x-ray, single view.    Clinical indication: Right lower quadrant pain.    There is a moderate thoracolumbar scoliosis, convexity to the right with moderately pronounced arthritic changes in the spine. Nonspecific gas pattern without mass or obstruction.                             X-Ray Chest PA And Lateral (Final result)  Result time 05/21/17 12:19:25    Final result by Tai Shook MD (05/21/17 12:19:25)                 Impression:     No acute process. No significant change from prior exam.      Electronically signed by: TAI SHOOK MD  Date:     05/21/17  Time:    12:19              Narrative:     Exam: Chest X-ray, two views.    History: Asthma    Findings: No infiltrate or effusion identified. Cardiomediastinal silhouette is within normal limits. No significant change from prior study dated 04/22/2017. Elevation of the right hemidiaphragm again noted.                            Assessment/Plan:      Sepsis due to urinary tract infection    Blood cultures with NGTD  U/A consistent with UTI  Urine culture pending  Rocephin continued  WBC normal and pt afebrile             RLQ abdominal pain    KUB showed scoliosis with no acute abnormality  Monitor I&O  Miralax continued          PAF (paroxysmal atrial fibrillation)    Cardiac Monitoring  Continue fleccanide  Continue elaquis            Hypertension    -antihypertensives held due to episodes of hypotension   Monitor VS          * Acute on chronic congestive heart failure    -IV Lasix  -strict I/O  -fluid restriction   -daily weights  -elevated BNP noted  -chest xray showed no acute process            VTE Risk Mitigation         Ordered     apixaban tablet 5 mg  2 times daily     Route:  Oral        05/21/17 1755     Medium Risk of VTE  Once      05/21/17 1715     Place sequential compression device  Until discontinued      05/21/17 1522          Michelle Hernandez NP  Department of Hospital Medicine   [facility]

## 2017-05-22 NOTE — ASSESSMENT & PLAN NOTE
Blood cultures with NGTD  U/A consistent with UTI  Urine culture pending  Rocephin continued  WBC normal and pt afebrile

## 2017-05-22 NOTE — HOSPITAL COURSE
Pt admitted to Observation Unit for Sepsis, UTI and Acute on Chronic congestive heart failure.  Elevated BNP noted.  Troponin elevated and flat.  UA findings consistent with Ecoli UTI and IV Rocephin given initially then changed to cefepime. Blood cultures isolated E coli. Lasix IV was given daily then deescalated to oral when patient reported decreased urine output. She has ambulated in the hallway with assistance of Physical Therapy. Repeat blood cultures with no growth to date.  Cipro initiated per sensitivity results.  I have seen and examined the patient on the day of discharge and deemed her appropriate to return home with home health.

## 2017-05-22 NOTE — PLAN OF CARE
Met with patient and family. Patient continues to drive. She stated that she is typically independent. No anticipate discharge needs. Provided phone number for case management. Explained role of CM in discharge planning.      05/22/17 1117   Discharge Assessment   Assessment Type Discharge Planning Assessment   Confirmed/corrected address and phone number on facesheet? Yes   Assessment information obtained from? Patient;Medical Record   Expected Length of Stay (days) (TBD)   Type of Healthcare Directive Received Other (Comment)  (Does not have LW/POA)   Prior to hospitilization cognitive status: Alert/Oriented   Prior to hospitalization functional status: Assistive Equipment   Current cognitive status: Alert/Oriented   Current Functional Status: Assistive Equipment   Arrived From home or self-care   Lives With grandchild(kennedy)  (Juanpablo Laws, great-grandson lives with her)   Able to Return to Prior Arrangements yes   Is patient able to care for self after discharge? Unable to determine at this time (comments)   How many people do you have in your home that can help with your care after discharge? other (see comments)  (Large supportive family live nearby. )   Who are your caregiver(s) and their phone number(s)? (Keya Mcleod, daughter [phone], [phone]; Reza Laws, son [phone])   Patient's perception of discharge disposition home or selfcare   Readmission Within The Last 30 Days no previous admission in last 30 days   Patient currently being followed by outpatient case management? No   Patient currently receives home health services? No   Does the patient currently use HME? Yes   Name and contact number for HME provider: (None)   Patient currently receives private duty nursing? No   Equipment Currently Used at Home cane, straight;rollator  (Uses cane when walking outside, rollator in stores)   Do you have any problems affording any of your prescribed medications? No   Is the patient taking medications as  prescribed? yes   Do you have any financial concerns preventing you from receiving the healthcare you need? No   Does the patient have transportation to healthcare appointments? Yes   Transportation Available car;family or friend will provide  (Patient drives but calls on family when she does not feel well. )   On Dialysis? No   Discharge Plan A Home   Discharge Plan B Home with family   Patient/Family In Agreement With Plan yes

## 2017-05-23 PROBLEM — B96.20 E COLI BACTEREMIA: Status: ACTIVE | Noted: 2017-05-23

## 2017-05-23 PROBLEM — A41.9 SEPSIS DUE TO URINARY TRACT INFECTION: Status: RESOLVED | Noted: 2017-05-21 | Resolved: 2017-05-23

## 2017-05-23 PROBLEM — N39.0 E. COLI UTI (URINARY TRACT INFECTION): Status: ACTIVE | Noted: 2017-05-23

## 2017-05-23 PROBLEM — N39.0 SEPSIS DUE TO URINARY TRACT INFECTION: Status: RESOLVED | Noted: 2017-05-21 | Resolved: 2017-05-23

## 2017-05-23 PROBLEM — B96.20 E. COLI UTI (URINARY TRACT INFECTION): Status: ACTIVE | Noted: 2017-05-23

## 2017-05-23 PROBLEM — R10.31 RLQ ABDOMINAL PAIN: Status: RESOLVED | Noted: 2017-05-21 | Resolved: 2017-05-23

## 2017-05-23 PROBLEM — R78.81 E COLI BACTEREMIA: Status: ACTIVE | Noted: 2017-05-23

## 2017-05-23 LAB
ALBUMIN SERPL BCP-MCNC: 2.5 G/DL
ALP SERPL-CCNC: 69 U/L
ALT SERPL W/O P-5'-P-CCNC: 20 U/L
ANION GAP SERPL CALC-SCNC: 7 MMOL/L
ANION GAP SERPL CALC-SCNC: 7 MMOL/L
AST SERPL-CCNC: 25 U/L
BASOPHILS # BLD AUTO: 0.01 K/UL
BASOPHILS NFR BLD: 0.2 %
BILIRUB SERPL-MCNC: 0.6 MG/DL
BUN SERPL-MCNC: 22 MG/DL
BUN SERPL-MCNC: 22 MG/DL
CALCIUM SERPL-MCNC: 9.1 MG/DL
CALCIUM SERPL-MCNC: 9.1 MG/DL
CHLORIDE SERPL-SCNC: 98 MMOL/L
CHLORIDE SERPL-SCNC: 98 MMOL/L
CO2 SERPL-SCNC: 29 MMOL/L
CO2 SERPL-SCNC: 29 MMOL/L
CREAT SERPL-MCNC: 1 MG/DL
CREAT SERPL-MCNC: 1 MG/DL
DIFFERENTIAL METHOD: ABNORMAL
EOSINOPHIL # BLD AUTO: 0.1 K/UL
EOSINOPHIL NFR BLD: 1.1 %
ERYTHROCYTE [DISTWIDTH] IN BLOOD BY AUTOMATED COUNT: 14.4 %
EST. GFR  (AFRICAN AMERICAN): >60 ML/MIN/1.73 M^2
EST. GFR  (AFRICAN AMERICAN): >60 ML/MIN/1.73 M^2
EST. GFR  (NON AFRICAN AMERICAN): 53 ML/MIN/1.73 M^2
EST. GFR  (NON AFRICAN AMERICAN): 53 ML/MIN/1.73 M^2
GLUCOSE SERPL-MCNC: 115 MG/DL
GLUCOSE SERPL-MCNC: 115 MG/DL
HCT VFR BLD AUTO: 33.9 %
HGB BLD-MCNC: 10.8 G/DL
LYMPHOCYTES # BLD AUTO: 0.8 K/UL
LYMPHOCYTES NFR BLD: 12.2 %
MCH RBC QN AUTO: 29.3 PG
MCHC RBC AUTO-ENTMCNC: 31.9 %
MCV RBC AUTO: 92 FL
MONOCYTES # BLD AUTO: 1.5 K/UL
MONOCYTES NFR BLD: 23.7 %
NEUTROPHILS # BLD AUTO: 4.1 K/UL
NEUTROPHILS NFR BLD: 62.8 %
PLATELET # BLD AUTO: 124 K/UL
PMV BLD AUTO: 11.3 FL
POTASSIUM SERPL-SCNC: 3.9 MMOL/L
POTASSIUM SERPL-SCNC: 3.9 MMOL/L
PROT SERPL-MCNC: 6.4 G/DL
RBC # BLD AUTO: 3.69 M/UL
SODIUM SERPL-SCNC: 134 MMOL/L
SODIUM SERPL-SCNC: 134 MMOL/L
WBC # BLD AUTO: 6.5 K/UL

## 2017-05-23 PROCEDURE — 80053 COMPREHEN METABOLIC PANEL: CPT

## 2017-05-23 PROCEDURE — 97116 GAIT TRAINING THERAPY: CPT

## 2017-05-23 PROCEDURE — 63600175 PHARM REV CODE 636 W HCPCS: Performed by: HOSPITALIST

## 2017-05-23 PROCEDURE — 85025 COMPLETE CBC W/AUTO DIFF WBC: CPT

## 2017-05-23 PROCEDURE — 36415 COLL VENOUS BLD VENIPUNCTURE: CPT

## 2017-05-23 PROCEDURE — 25000003 PHARM REV CODE 250: Performed by: NURSE PRACTITIONER

## 2017-05-23 PROCEDURE — 25000003 PHARM REV CODE 250: Performed by: HOSPITALIST

## 2017-05-23 PROCEDURE — 21400001 HC TELEMETRY ROOM

## 2017-05-23 PROCEDURE — 94640 AIRWAY INHALATION TREATMENT: CPT

## 2017-05-23 PROCEDURE — 25000003 PHARM REV CODE 250: Performed by: INTERNAL MEDICINE

## 2017-05-23 PROCEDURE — 27000221 HC OXYGEN, UP TO 24 HOURS

## 2017-05-23 PROCEDURE — 97110 THERAPEUTIC EXERCISES: CPT

## 2017-05-23 PROCEDURE — 63600175 PHARM REV CODE 636 W HCPCS: Performed by: NURSE PRACTITIONER

## 2017-05-23 RX ORDER — FUROSEMIDE 40 MG/1
40 TABLET ORAL DAILY
Status: DISCONTINUED | OUTPATIENT
Start: 2017-05-24 | End: 2017-05-25 | Stop reason: HOSPADM

## 2017-05-23 RX ADMIN — BUDESONIDE 0.5 MG: 0.5 SUSPENSION RESPIRATORY (INHALATION) at 08:05

## 2017-05-23 RX ADMIN — SODIUM CHLORIDE, PRESERVATIVE FREE 3 ML: 5 INJECTION INTRAVENOUS at 10:05

## 2017-05-23 RX ADMIN — POLYETHYLENE GLYCOL 3350 17 G: 17 POWDER, FOR SOLUTION ORAL at 08:05

## 2017-05-23 RX ADMIN — FUROSEMIDE 40 MG: 10 INJECTION, SOLUTION INTRAMUSCULAR; INTRAVENOUS at 08:05

## 2017-05-23 RX ADMIN — FAMOTIDINE 20 MG: 20 TABLET ORAL at 08:05

## 2017-05-23 RX ADMIN — ISOSORBIDE MONONITRATE 30 MG: 30 TABLET, EXTENDED RELEASE ORAL at 08:05

## 2017-05-23 RX ADMIN — FLECAINIDE ACETATE 50 MG: 50 TABLET ORAL at 10:05

## 2017-05-23 RX ADMIN — CEFEPIME 1 G: 1 INJECTION, POWDER, FOR SOLUTION INTRAMUSCULAR; INTRAVENOUS at 03:05

## 2017-05-23 RX ADMIN — ASPIRIN 81 MG: 81 TABLET, COATED ORAL at 08:05

## 2017-05-23 RX ADMIN — FLECAINIDE ACETATE 50 MG: 50 TABLET ORAL at 08:05

## 2017-05-23 RX ADMIN — PRAVASTATIN SODIUM 40 MG: 20 TABLET ORAL at 08:05

## 2017-05-23 RX ADMIN — CEFEPIME 1 G: 1 INJECTION, POWDER, FOR SOLUTION INTRAMUSCULAR; INTRAVENOUS at 02:05

## 2017-05-23 RX ADMIN — METOPROLOL SUCCINATE 100 MG: 50 TABLET, EXTENDED RELEASE ORAL at 08:05

## 2017-05-23 RX ADMIN — APIXABAN 5 MG: 2.5 TABLET, FILM COATED ORAL at 10:05

## 2017-05-23 RX ADMIN — VALSARTAN 160 MG: 80 TABLET, FILM COATED ORAL at 08:05

## 2017-05-23 RX ADMIN — FAMOTIDINE 20 MG: 20 TABLET ORAL at 10:05

## 2017-05-23 RX ADMIN — APIXABAN 5 MG: 2.5 TABLET, FILM COATED ORAL at 08:05

## 2017-05-23 NOTE — PROGRESS NOTES
[facility] Hospital Medicine  Progress Note    Patient Name: Elke Laws  MRN: [id number]  Patient Class: IP- Inpatient   Admission Date: 5/21/2017  Length of Stay: 1 days  Attending Physician: Jama Crawford MD  Primary Care Provider: Isidra Benavidez MD        Subjective:     Principal Problem:Acute on chronic congestive heart failure    HPI:  81 y/o w/f presented to ED via private with hx of COPD presents to the ED for generalized weakness which onset gradually yesterday. She reports she was nauseated and vomited last night 4-5 times and continued to vomit this morning until she was dry heaving. She reports RLQ pain that started yesterday before she began vomiting, and she has not had a bowel movement in 5 days. She also reports she is SOB with heaviness in l chest, palpitations, and wheezing. CXR shows no acute findings. Admitted to telemetry with dx of acute on chronic diastolic heart failure, A-Fib, RLQ pain, and COPD     Hospital Course:  Pt admitted to Observation Unit for Sepsis, UTI and Acute on Chronic congestive heart failure.  Elevated BNP noted.  Troponin elevated and flat.  UA findings consistent with Ecoli UTI and IV Rocephin given initially then changed to cefepime. Blood cultures isolated E coli. Lasix IV was given daily then deescalated to oral when patient reported decreased urine output. She has ambulated in the hallway with assistance of Physical Therapy. Repeat blood cultures pending.      Interval History:     Pt states no further right sided abdominal pain. She reports some feelings of wooziness with ambulation and decreased urination. Will check orthostatic vitals.     Review of Systems   Constitutional: Positive for activity change. Negative for appetite change, chills, fatigue and fever.   HENT: Negative for congestion, sinus pressure, sneezing, sore throat and trouble swallowing.    Eyes: Negative.    Respiratory: Negative for chest tightness, shortness of breath and  wheezing.    Cardiovascular: Negative for chest pain, palpitations and leg swelling.   Gastrointestinal: Negative for abdominal distention, abdominal pain, diarrhea, nausea and vomiting.   Endocrine: Negative for cold intolerance and heat intolerance.   Genitourinary: Negative for decreased urine volume, difficulty urinating, dysuria, flank pain and frequency.   Musculoskeletal: Negative for arthralgias, back pain and myalgias.   Skin: Negative for color change and wound.   Allergic/Immunologic: Negative for environmental allergies and food allergies.   Neurological: Positive for weakness. Negative for dizziness and headaches.   Hematological: Does not bruise/bleed easily.   Psychiatric/Behavioral: Negative for agitation and sleep disturbance. The patient is not nervous/anxious.      Objective:     Vital Signs (Most Recent):  Temp: 98.3 °F (36.8 °C) (05/23/17 0833)  Pulse: 68 (05/23/17 0833)  Resp: 18 (05/23/17 0833)  BP: (!) 115/50 (05/23/17 0833)  SpO2: 97 % (05/23/17 0833) Vital Signs (24h Range):  Temp:  [98.3 °F (36.8 °C)-99.6 °F (37.6 °C)] 98.3 °F (36.8 °C)  Pulse:  [68-92] 68  Resp:  [18] 18  SpO2:  [95 %-99 %] 97 %  BP: (115-140)/(50-68) 115/50     Weight: 89.2 kg (196 lb 10.4 oz)  Body mass index is 60.01 kg/m².    Intake/Output Summary (Last 24 hours) at 05/23/17 1706  Last data filed at 05/23/17 0600   Gross per 24 hour   Intake              530 ml   Output                0 ml   Net              530 ml      Physical Exam   Constitutional: She is oriented to person, place, and time. She appears well-developed and well-nourished.   HENT:   Head: Normocephalic and atraumatic.   Eyes: Conjunctivae are normal. No scleral icterus.   Neck: Normal range of motion. Neck supple.   Cardiovascular: Normal rate.    No murmur heard.  Irregular rythm   Pulmonary/Chest: Effort normal and breath sounds normal. No respiratory distress. She has no wheezes.   Abdominal: Soft. Bowel sounds are normal. She exhibits no  distension. There is no tenderness.   Musculoskeletal: Normal range of motion. Edema: Trace edema to BLE.   Neurological: She is alert and oriented to person, place, and time.   Skin: Skin is warm and dry. Capillary refill takes less than 2 seconds.   Psychiatric: She has a normal mood and affect. Her behavior is normal.   Nursing note and vitals reviewed.      Significant Labs:   CBC:   Recent Labs  Lab 05/21/17 1753 05/22/17 0456 05/23/17  0533   WBC 10.59 9.62 6.50   HGB 11.5* 10.8* 10.8*   HCT 35.4* 33.5* 33.9*    138* 124*     CMP:   Recent Labs  Lab 05/21/17 1753 05/22/17 0456 05/23/17  0533    136  136 134*  134*   K 4.1 3.8  3.8 3.9  3.9   CL 99 101  101 98  98   CO2 28 25  25 29  29   * 134*  134* 115*  115*   BUN 17 19  19 22  22   CREATININE 0.9 1.0  1.0 1.0  1.0   CALCIUM 9.2 9.0  9.0 9.1  9.1   PROT 7.1 6.5 6.4   ALBUMIN 3.1* 2.8* 2.5*   BILITOT 1.3* 0.7 0.6   ALKPHOS 82 76 69   AST 28 22 25   ALT 19 16 20   ANIONGAP 10 10  10 7*  7*   EGFRNONAA 60 53*  53* 53*  53*     All pertinent labs within the past 24 hours have been reviewed.    Significant Imaging: I have reviewed all pertinent imaging results/findings within the past 24 hours.    Assessment/Plan:      E coli bacteremia    Will repeat blood cultures 5/24/2017  Sensitivities pending  IV Cefepime          E. coli UTI (urinary tract infection)    Sensitivities pending  IV Cefepime          PAF (paroxysmal atrial fibrillation)    Cardiac Monitoring - rate controlled  Continue fleccanide  Continue eliquis            Hypertension    -antihypertensives held due to episodes of hypotension   Monitor VS          * Acute on chronic congestive heart failure    Lasix 40 mg po daily  -strict I/O  -fluid restriction   -daily weights  -elevated BNP noted  -chest xray showed no acute process            Disposition to home with home health and physical therapy    VTE Risk Mitigation         Ordered     apixaban tablet 5  mg  2 times daily     Route:  Oral        05/21/17 1755     Medium Risk of VTE  Once      05/21/17 1715     Place sequential compression device  Until discontinued      05/21/17 1522          Kylee Alvarez NP  Department of Hospital Medicine   [facility]

## 2017-05-23 NOTE — ASSESSMENT & PLAN NOTE
Lasix 40 mg po daily  -strict I/O  -fluid restriction   -daily weights  -elevated BNP noted  -chest xray showed no acute process

## 2017-05-23 NOTE — PT/OT/SLP PROGRESS
Physical Therapy  Treatment    Elke Laws   MRN: [id number]   Admitting Diagnosis: Acute on chronic congestive heart failure    PT Received On: 17  PT Start Time: 930     PT Stop Time: 955    PT Total Time (min): 25 min       Billable Minutes:  Gait [id number] and Therapeutic Exercise 10    Treatment Type: Treatment  PT/PTA: PT       General Precautions: Standard, fall  Orthopedic Precautions: N/A   Braces: N/A    Subjective:  Communicated with NURSE POWERS prior to session.  PT REPORTS NO PAIN TODAY BUT C/O INCREASED WEAKNESS  Pain Ratin/10    Objective:   Patient found with: telemetry    Functional Mobility:  Bed Mobility:   Rolling/Turning to Left: Stand by assistance  Rolling/Turning Right: Stand by assistance  Scooting/Bridging: Stand by Assistance  Supine to Sit: Stand by Assistance    Transfers:  Sit <> Stand Assistance: Stand By Assistance  Sit <> Stand Assistive Device: Rolling Walker (PT EDUCATED IN RW USE AND SAFETY DURING TF'S AND GAIT)  Bed <> Chair Technique: Stand Pivot  Bed <> Chair Assistance: Stand By Assistance  Bed <> Chair Assistive Device: Rolling Walker    Gait:   Gait Distance: PT ' WITH RW, CUES FOR UPRIGHT POSTURE AND RW SAFETY, PT C/O INCREASED WEAKNESS TODAY  Assistance 1: Contact Guard Assistance, Stand by Assistance  Gait Assistive Device: Rolling walker  Gait Pattern: swing-through gait  Gait Deviation(s): decreased robson, decreased step length    Balance:   Static Sit: GOOD  Dynamic Sit: GOOD  Static Stand: FAIR  Dynamic stand:  FAIR    Therapeutic Activities and Exercises:  PT EDUCATED IN AND PERFORMED BLE THEREX X 20 REPS WHILE SEATED IN CHAIR, AROM.  CGA FOR SUKHDEEP BAHENA     AM-PAC 6 CLICK MOBILITY  How much help from another person does this patient currently need?   1 = Unable, Total/Dependent Assistance  2 = A lot, Maximum/Moderate Assistance  3 = A little, Minimum/Contact Guard/Supervision  4 = None, Modified [place]/Independent    Turning over in bed  (including adjusting bedclothes, sheets and blankets)?: 4  Sitting down on and standing up from a chair with arms (e.g., wheelchair, bedside commode, etc.): 4  Moving from lying on back to sitting on the side of the bed?: 4  Moving to and from a bed to a chair (including a wheelchair)?: 4  Need to walk in hospital room?: 3  Climbing 3-5 steps with a railing?: 1  Total Score: 20    AM-PAC Raw Score CMS G-Code Modifier Level of Impairment Assistance   6 % Total / Unable   7 - 9 CM 80 - 100% Maximal Assist   10 - 14 CL 60 - 80% Moderate Assist   15 - 19 CK 40 - 60% Moderate Assist   20 - 22 CJ 20 - 40% Minimal Assist   23 CI 1-20% SBA / CGA   24 CH 0% Independent/ Mod I     Patient left up in chair with all lines intact, call button in reach, NURSE notified and FRIEND present.    Assessment:  Elke Laws is a 82 y.o. female with a medical diagnosis of Acute on chronic congestive heart failure   PT WILL BENEFIT FROM CONT. SKILLED P.T. TO ADDRESS IMPAIRMENTS    Rehab identified problem list/impairments: Rehab identified problem list/impairments: weakness, impaired endurance, impaired functional mobilty, gait instability, decreased safety awareness    Rehab potential is good.    Activity tolerance: Good    Discharge recommendations: Discharge Facility/Level Of Care Needs: home health PT     Barriers to discharge: Barriers to Discharge: None    Equipment recommendations: Equipment Needed After Discharge: walker, rolling     GOALS:    Physical Therapy Goals        Problem: Physical Therapy Goal    Goal Priority Disciplines Outcome Goal Variances Interventions   Physical Therapy Goal     PT/OT, PT      Description:  LTG'S TO BE MET IN 7 DAYS (5-29-17)  1. PT WILL BE NIRANJAN WITH BED MOBILITY   2. PT WILL BE NIRANJAN WITH TF'S  3. PT WILL ' WITH RW AND SPV  4. PT WILL TOLERATE BLE THEREX X 20 REPS AROM  5. PT WILL DEMO G DYNAMIC BALANCE DURING GAIT   6. PT WILL ASC/DESC. 5 STEPS USING 1 SIDE RAIL AND SBA                   PLAN:    Patient to be seen 5 x/week  to address the above listed problems via gait training, therapeutic activities, therapeutic exercises  Plan of Care expires: 05/29/17  Plan of Care reviewed with: patient, friend    PT ENCOURAGED TO CALL FOR ASSISTANCE WITH ALL NEEDS DUE TO FALL RISK STATUS, PT AGREEABLE.  PT ENCOURAGED TO INCREASE TIME OOB IN CHAIR, ALL MEALS IN CHAIR OOB, PT AGREEABLE    Amber Grey, PT  05/23/2017

## 2017-05-23 NOTE — SUBJECTIVE & OBJECTIVE
Interval History:     Pt states no further right sided abdominal pain. She reports some feelings of wooziness with ambulation and decreased urination. Will check orthostatic vitals.     Review of Systems   Constitutional: Positive for activity change. Negative for appetite change, chills, fatigue and fever.   HENT: Negative for congestion, sinus pressure, sneezing, sore throat and trouble swallowing.    Eyes: Negative.    Respiratory: Negative for chest tightness, shortness of breath and wheezing.    Cardiovascular: Negative for chest pain, palpitations and leg swelling.   Gastrointestinal: Negative for abdominal distention, abdominal pain, diarrhea, nausea and vomiting.   Endocrine: Negative for cold intolerance and heat intolerance.   Genitourinary: Negative for decreased urine volume, difficulty urinating, dysuria, flank pain and frequency.   Musculoskeletal: Negative for arthralgias, back pain and myalgias.   Skin: Negative for color change and wound.   Allergic/Immunologic: Negative for environmental allergies and food allergies.   Neurological: Positive for weakness. Negative for dizziness and headaches.   Hematological: Does not bruise/bleed easily.   Psychiatric/Behavioral: Negative for agitation and sleep disturbance. The patient is not nervous/anxious.      Objective:     Vital Signs (Most Recent):  Temp: 98.3 °F (36.8 °C) (05/23/17 0833)  Pulse: 68 (05/23/17 0833)  Resp: 18 (05/23/17 0833)  BP: (!) 115/50 (05/23/17 0833)  SpO2: 97 % (05/23/17 0833) Vital Signs (24h Range):  Temp:  [98.3 °F (36.8 °C)-99.6 °F (37.6 °C)] 98.3 °F (36.8 °C)  Pulse:  [68-92] 68  Resp:  [18] 18  SpO2:  [95 %-99 %] 97 %  BP: (115-140)/(50-68) 115/50     Weight: 89.2 kg (196 lb 10.4 oz)  Body mass index is 60.01 kg/m².    Intake/Output Summary (Last 24 hours) at 05/23/17 1706  Last data filed at 05/23/17 0600   Gross per 24 hour   Intake              530 ml   Output                0 ml   Net              530 ml      Physical Exam    Constitutional: She is oriented to person, place, and time. She appears well-developed and well-nourished.   HENT:   Head: Normocephalic and atraumatic.   Eyes: Conjunctivae are normal. No scleral icterus.   Neck: Normal range of motion. Neck supple.   Cardiovascular: Normal rate.    No murmur heard.  Irregular rythm   Pulmonary/Chest: Effort normal and breath sounds normal. No respiratory distress. She has no wheezes.   Abdominal: Soft. Bowel sounds are normal. She exhibits no distension. There is no tenderness.   Musculoskeletal: Normal range of motion. Edema: Trace edema to BLE.   Neurological: She is alert and oriented to person, place, and time.   Skin: Skin is warm and dry. Capillary refill takes less than 2 seconds.   Psychiatric: She has a normal mood and affect. Her behavior is normal.   Nursing note and vitals reviewed.      Significant Labs:   CBC:   Recent Labs  Lab 05/21/17  1753 05/22/17  0456 05/23/17  0533   WBC 10.59 9.62 6.50   HGB 11.5* 10.8* 10.8*   HCT 35.4* 33.5* 33.9*    138* 124*     CMP:   Recent Labs  Lab 05/21/17 1753 05/22/17  0456 05/23/17  0533    136  136 134*  134*   K 4.1 3.8  3.8 3.9  3.9   CL 99 101  101 98  98   CO2 28 25  25 29  29   * 134*  134* 115*  115*   BUN 17 19  19 22  22   CREATININE 0.9 1.0  1.0 1.0  1.0   CALCIUM 9.2 9.0  9.0 9.1  9.1   PROT 7.1 6.5 6.4   ALBUMIN 3.1* 2.8* 2.5*   BILITOT 1.3* 0.7 0.6   ALKPHOS 82 76 69   AST 28 22 25   ALT 19 16 20   ANIONGAP 10 10  10 7*  7*   EGFRNONAA 60 53*  53* 53*  53*     All pertinent labs within the past 24 hours have been reviewed.    Significant Imaging: I have reviewed all pertinent imaging results/findings within the past 24 hours.

## 2017-05-23 NOTE — PLAN OF CARE
Problem: Patient Care Overview  Goal: Plan of Care Review  Outcome: Ongoing (interventions implemented as appropriate)  Patient remains free from falls, fall precaution in place. Assist x1.  VS stable. UA resembles e.coli. IV abx infusing.   No other C/O at this time.Call bell and belongings within reach, reminded to call for assistance.

## 2017-05-23 NOTE — PLAN OF CARE
Problem: Patient Care Overview  Goal: Plan of Care Review  Outcome: Ongoing (interventions implemented as appropriate)  IMPROVED GAIT STABILITY WITH RW USE

## 2017-05-23 NOTE — PLAN OF CARE
Problem: Patient Care Overview  Goal: Plan of Care Review  Outcome: Ongoing (interventions implemented as appropriate)  pateint had a restful night, plan of care reviewed with daughter and patient, iv abx given per md orders, no complaints of pain, remains free from falls.  Will continue to monitor.

## 2017-05-24 LAB
ALBUMIN SERPL BCP-MCNC: 2.5 G/DL
ALP SERPL-CCNC: 75 U/L
ALT SERPL W/O P-5'-P-CCNC: 29 U/L
ANION GAP SERPL CALC-SCNC: 9 MMOL/L
AST SERPL-CCNC: 36 U/L
BACTERIA BLD CULT: NORMAL
BACTERIA UR CULT: NORMAL
BASOPHILS # BLD AUTO: 0.02 K/UL
BASOPHILS NFR BLD: 0.3 %
BILIRUB SERPL-MCNC: 0.4 MG/DL
BUN SERPL-MCNC: 32 MG/DL
CALCIUM SERPL-MCNC: 8.9 MG/DL
CHLORIDE SERPL-SCNC: 99 MMOL/L
CO2 SERPL-SCNC: 25 MMOL/L
CREAT SERPL-MCNC: 1.2 MG/DL
DIFFERENTIAL METHOD: ABNORMAL
EOSINOPHIL # BLD AUTO: 0.3 K/UL
EOSINOPHIL NFR BLD: 3.9 %
ERYTHROCYTE [DISTWIDTH] IN BLOOD BY AUTOMATED COUNT: 14 %
EST. GFR  (AFRICAN AMERICAN): 49 ML/MIN/1.73 M^2
EST. GFR  (NON AFRICAN AMERICAN): 42 ML/MIN/1.73 M^2
GLUCOSE SERPL-MCNC: 105 MG/DL
HCT VFR BLD AUTO: 31 %
HGB BLD-MCNC: 10 G/DL
LYMPHOCYTES # BLD AUTO: 1.1 K/UL
LYMPHOCYTES NFR BLD: 17.1 %
MCH RBC QN AUTO: 29.5 PG
MCHC RBC AUTO-ENTMCNC: 32.3 %
MCV RBC AUTO: 91 FL
MONOCYTES # BLD AUTO: 1.2 K/UL
MONOCYTES NFR BLD: 18.8 %
NEUTROPHILS # BLD AUTO: 3.9 K/UL
NEUTROPHILS NFR BLD: 59.9 %
PLATELET # BLD AUTO: 140 K/UL
PMV BLD AUTO: 11.6 FL
POTASSIUM SERPL-SCNC: 4.4 MMOL/L
PROT SERPL-MCNC: 6.1 G/DL
RBC # BLD AUTO: 3.39 M/UL
SODIUM SERPL-SCNC: 133 MMOL/L
WBC # BLD AUTO: 6.43 K/UL

## 2017-05-24 PROCEDURE — 94640 AIRWAY INHALATION TREATMENT: CPT

## 2017-05-24 PROCEDURE — 87040 BLOOD CULTURE FOR BACTERIA: CPT

## 2017-05-24 PROCEDURE — 97530 THERAPEUTIC ACTIVITIES: CPT

## 2017-05-24 PROCEDURE — 25000003 PHARM REV CODE 250: Performed by: HOSPITALIST

## 2017-05-24 PROCEDURE — 21400001 HC TELEMETRY ROOM

## 2017-05-24 PROCEDURE — 27000221 HC OXYGEN, UP TO 24 HOURS

## 2017-05-24 PROCEDURE — 63600175 PHARM REV CODE 636 W HCPCS: Performed by: HOSPITALIST

## 2017-05-24 PROCEDURE — 97110 THERAPEUTIC EXERCISES: CPT

## 2017-05-24 PROCEDURE — 25000003 PHARM REV CODE 250: Performed by: INTERNAL MEDICINE

## 2017-05-24 PROCEDURE — 97116 GAIT TRAINING THERAPY: CPT

## 2017-05-24 PROCEDURE — 25000003 PHARM REV CODE 250: Performed by: NURSE PRACTITIONER

## 2017-05-24 PROCEDURE — 63600175 PHARM REV CODE 636 W HCPCS: Performed by: NURSE PRACTITIONER

## 2017-05-24 PROCEDURE — 36415 COLL VENOUS BLD VENIPUNCTURE: CPT

## 2017-05-24 PROCEDURE — 80053 COMPREHEN METABOLIC PANEL: CPT

## 2017-05-24 PROCEDURE — 94761 N-INVAS EAR/PLS OXIMETRY MLT: CPT

## 2017-05-24 PROCEDURE — 85025 COMPLETE CBC W/AUTO DIFF WBC: CPT

## 2017-05-24 RX ORDER — DIPHENHYDRAMINE HYDROCHLORIDE 50 MG/ML
12.5 INJECTION INTRAMUSCULAR; INTRAVENOUS 2 TIMES DAILY PRN
Status: DISCONTINUED | OUTPATIENT
Start: 2017-05-24 | End: 2017-05-25 | Stop reason: HOSPADM

## 2017-05-24 RX ORDER — CIPROFLOXACIN 2 MG/ML
400 INJECTION, SOLUTION INTRAVENOUS
Status: DISCONTINUED | OUTPATIENT
Start: 2017-05-24 | End: 2017-05-25 | Stop reason: HOSPADM

## 2017-05-24 RX ADMIN — FLECAINIDE ACETATE 50 MG: 50 TABLET ORAL at 09:05

## 2017-05-24 RX ADMIN — CEFEPIME 1 G: 1 INJECTION, POWDER, FOR SOLUTION INTRAMUSCULAR; INTRAVENOUS at 04:05

## 2017-05-24 RX ADMIN — PRAVASTATIN SODIUM 40 MG: 20 TABLET ORAL at 09:05

## 2017-05-24 RX ADMIN — SODIUM CHLORIDE, PRESERVATIVE FREE 3 ML: 5 INJECTION INTRAVENOUS at 06:05

## 2017-05-24 RX ADMIN — CIPROFLOXACIN 400 MG: 2 INJECTION, SOLUTION INTRAVENOUS at 02:05

## 2017-05-24 RX ADMIN — POLYETHYLENE GLYCOL 3350 17 G: 17 POWDER, FOR SOLUTION ORAL at 09:05

## 2017-05-24 RX ADMIN — BUDESONIDE 0.5 MG: 0.5 SUSPENSION RESPIRATORY (INHALATION) at 07:05

## 2017-05-24 RX ADMIN — DIPHENHYDRAMINE HYDROCHLORIDE 12.5 MG: 50 INJECTION, SOLUTION INTRAMUSCULAR; INTRAVENOUS at 03:05

## 2017-05-24 RX ADMIN — APIXABAN 5 MG: 2.5 TABLET, FILM COATED ORAL at 09:05

## 2017-05-24 RX ADMIN — ISOSORBIDE MONONITRATE 30 MG: 30 TABLET, EXTENDED RELEASE ORAL at 09:05

## 2017-05-24 RX ADMIN — SODIUM CHLORIDE, PRESERVATIVE FREE 3 ML: 5 INJECTION INTRAVENOUS at 09:05

## 2017-05-24 RX ADMIN — FAMOTIDINE 20 MG: 20 TABLET ORAL at 09:05

## 2017-05-24 RX ADMIN — BUDESONIDE 0.5 MG: 0.5 SUSPENSION RESPIRATORY (INHALATION) at 08:05

## 2017-05-24 RX ADMIN — METOPROLOL SUCCINATE 100 MG: 50 TABLET, EXTENDED RELEASE ORAL at 09:05

## 2017-05-24 RX ADMIN — FUROSEMIDE 40 MG: 40 TABLET ORAL at 09:05

## 2017-05-24 RX ADMIN — VALSARTAN 160 MG: 80 TABLET, FILM COATED ORAL at 09:05

## 2017-05-24 RX ADMIN — ASPIRIN 81 MG: 81 TABLET, COATED ORAL at 09:05

## 2017-05-24 NOTE — PLAN OF CARE
CM met with pt for d/c planning reassessment.  CM discussed home health with pt and pt confirms that she does want home health, giving verbal consent for first available home health provider.  Pt family requests a type of grab bar that pt can use in the bath to hold onto and pull herself up when needed.  Pt also requests a bedside commode for use at night if possible.  Pt may benefit from oxygen at home - needs oxygen evaluation prior to discharge.      RIZWAN spoke with Kylee at [facility], who was previous home health provider.  Agency will provide home health services to pt again.      D/C plan: home health         05/24/17 1525   Discharge Reassessment   Assessment Type Discharge Planning Reassessment   Can the patient answer the patient profile reliably? Yes, cognitively intact   How does the patient rate their overall health at the present time? Good   Describe the patient's ability to walk at the present time. Minor restrictions or changes   How often would a person be available to care for the patient? Whenever needed   Number of comorbid conditions (as recorded on the chart) Four   During the past month, has the patient often been bothered by feeling down, depressed or hopeless? No   During the past month, has the patient often been bothered by little interest or pleasure in doing things? No   Discharge plan remains the same: No   Discharge Plan A Home Health   Discharge Plan B Home with family   Change in patient condition or support system No   Patient choice form signed by patient/caregiver Yes   Involved the patient/caregiver in establishing a new discharge plan: Yes

## 2017-05-24 NOTE — PLAN OF CARE
Problem: Patient Care Overview  Goal: Plan of Care Review  Outcome: Ongoing (interventions implemented as appropriate)  Patient had an uneventful night.  Patient family member stated at bedside.  Patient transferred from [facility] this evening.  Patient has no complaints at this time.

## 2017-05-24 NOTE — PROGRESS NOTES
Pt arrived in stable condition accompanied by OBS nurse.  Bedside report given.  Patient IV patent.  Pt has no complaints at this time.  Heart monitor put on.  Will cont to monitor.

## 2017-05-24 NOTE — ASSESSMENT & PLAN NOTE
repeat blood cultures 5/24/2017-pending  Sensitivities resulted  IV Cefepime discontinued and Cipro initiated

## 2017-05-24 NOTE — PT/OT/SLP PROGRESS
Occupational Therapy  Treatment    Elke Laws   MRN: [id number]   Admitting Diagnosis: Acute on chronic congestive heart failure    OT Date of Treatment: 17   OT Start Time: 152  OT Stop Time:   OT Total Time (min): 23 min    Billable Minutes:  Therapeutic Activity  x  10 min and Therapeutic Exercise  x 13 min    General Precautions: Standard, fall  Orthopedic Precautions: N/A  Braces: N/A         Subjective:  Communicated with pt, nurse- Cielo and family prior to session.      Pain Ratin/10                   Objective:  Patient found with: telemetry, oxygen     Functional Mobility:  Bed Mobility:  Rolling/Turning to Left: Supervision  Rolling/Turning Right: Supervision  Scooting/Bridging: Supervision  Supine to Sit: Supervision  Sit to Supine: Supervision    Transfers:   Sit <> Stand Assistance: Stand By Assistance  Sit <> Stand Assistive Device: No Assistive Device  Bed <> Chair Technique: Stand Pivot  Bed <> Chair Transfer Assistance: Stand By Assistance  Bed <> Chair Assistive Device: No Assistive Device    Functional Ambulation: fx ambulation with pt's personal hurried cane in room and hallway ( approp 40 ft x2 ) with CGA, with standing rest breaks due to SOB noted, no O2 during ambulation.     Activities of Daily Living:     Feeding adaptive equipment:      UE adaptive equipment:      LE adaptive equipment:                     Bathing adaptive equipment:     Balance:   Static Sit: GOOD: Takes MODERATE challenges from all directions  Dynamic Sit: GOOD: Maintains balance through MODERATE excursions of active trunk movement  Static Stand: FAIR+: Takes MINIMAL challenges from all directions  Dynamic stand: FAIR: Needs CONTACT GUARD during gait    Therapeutic Activities and Exercises:  Pt completed therapeutic ex to BUE sitting up in chair via red theraband 1 x 10 reps to all available planes to increase fx strength to impact self care skills, needs rest breaks due to SOB noted and O2 is on at 1lit.  Pt tolerated tx well, motivated and cooperative.     AM-PAC 6 CLICK ADL   How much help from another person does this patient currently need?   1 = Unable, Total/Dependent Assistance  2 = A lot, Maximum/Moderate Assistance  3 = A little, Minimum/Contact Guard/Supervision  4 = None, Modified [place]/Independent    Putting on and taking off regular lower body clothing? : 3  Bathing (including washing, rinsing, drying)?: 1  Toileting, which includes using toilet, bedpan, or urinal? : 3  Putting on and taking off regular upper body clothing?: 3  Taking care of personal grooming such as brushing teeth?: 4  Eating meals?: 4  Total Score: 18     AM-PAC Raw Score CMS G-Code Modifier Level of Impairment Assistance   6 % Total / Unable   7 - 9 CM 80 - 100% Maximal Assist   10 - 14 CL 60 - 80% Moderate Assist   15 - 19 CK 40 - 60% Moderate Assist   20 - 22 CJ 20 - 40% Minimal Assist   23 CI 1-20% SBA / CGA   24 CH 0% Independent/ Mod I     Patient left up in chair with all lines intact, call button in reach and family present    ASSESSMENT:  Elke Laws is a 82 y.o. female with a medical diagnosis of Acute on chronic congestive heart failure and presents with impaired self care skills and fx mobility.    Rehab identified problem list/impairments: Rehab identified problem list/impairments: weakness, impaired endurance, impaired sensation, gait instability, impaired functional mobilty, impaired self care skills, impaired balance, decreased coordination, decreased safety awareness    Rehab potential is good.    Activity tolerance: Good    Discharge recommendations: Discharge Facility/Level Of Care Needs: home health OT     Barriers to discharge: Barriers to Discharge: None    Equipment recommendations: walker, rolling     GOALS:    Occupational Therapy Goals        Problem: Occupational Therapy Goal    Goal Priority Disciplines Outcome Interventions   Occupational Therapy Goal     OT, PT/OT Ongoing (interventions  implemented as appropriate)    Description:  Goals to be met by: 5/29/17     Patient will increase functional independence with ADLs by performing:    UE Dressing with Modified [place].  LE Dressing with Modified [place].  Grooming while standing at sink with Modified [place].  Toileting from toilet with Modified [place] for hygiene and clothing management.   Toilet transfer to toilet with Modified [place].  Upper extremity exercise program x10 reps per handout, with supervision.                      Plan:  Patient to be seen 3 x/week to address the above listed problems via self-care/home management, therapeutic activities, therapeutic exercises  Plan of Care expires: 05/29/17  Plan of Care reviewed with: patient, family         Garcia Horne OT  05/24/2017

## 2017-05-24 NOTE — PT/OT/SLP PROGRESS
Physical Therapy  Treatment    Elke Laws   MRN: [id number]   Admitting Diagnosis: Acute on chronic congestive heart failure    PT Received On: 17  PT Start Time: 1005     PT Stop Time: 1030    PT Total Time (min): 25 min       Billable Minutes:  Gait [id number] and Therapeutic Exercise 10    Treatment Type: Treatment  PT/PTA: PT        General Precautions: Standard, fall  Orthopedic Precautions: N/A   Braces: N/A    Subjective:  Communicated with NURSE THOMPSON prior to session.  Pain Ratin/10    Objective:   Patient found with: telemetry, oxygen    Functional Mobility:  Bed Mobility:   Rolling/Turning to Left: Supervision  Rolling/Turning Right: Supervision  Scooting/Bridging: Supervision  Supine to Sit: Supervision    Transfers:  Sit <> Stand Assistance: Stand By Assistance  Sit <> Stand Assistive Device: Rolling Walker  Bed <> Chair Technique: Stand Pivot  Bed <> Chair Assistance: Stand By Assistance  Bed <> Chair Assistive Device: Rolling Walker    Gait:   Gait Distance: PT ' WITH RW AND CGA, INCREASED SOB WITH O2 IN TOW, 2 SMALL STANDING REST DUE TO C/O WEAKNESS, NO LOB  Assistance 1: Contact Guard Assistance, Stand by Assistance  Gait Assistive Device: Rolling walker  Gait Pattern: swing-through gait  Gait Deviation(s): decreased robson    Balance:   Static Sit: GOOD  Dynamic Sit: GOOD  Static Stand: GOOD  Dynamic stand:  FAIR+    Therapeutic Activities and Exercises:  PT WASH/DRY FACE WITH TOWEL WITH SET UP.  PT EDUCATED IN AND PERFORMED BLE THEREX X 20 REPS AROM WITH REST     AM-PAC 6 CLICK MOBILITY  How much help from another person does this patient currently need?   1 = Unable, Total/Dependent Assistance  2 = A lot, Maximum/Moderate Assistance  3 = A little, Minimum/Contact Guard/Supervision  4 = None, Modified [place]/Independent    Turning over in bed (including adjusting bedclothes, sheets and blankets)?: 4  Sitting down on and standing up from a chair with arms (e.g., wheelchair,  bedside commode, etc.): 4  Moving from lying on back to sitting on the side of the bed?: 4  Moving to and from a bed to a chair (including a wheelchair)?: 4  Need to walk in hospital room?: 3  Climbing 3-5 steps with a railing?: 1  Total Score: 20    AM-PAC Raw Score CMS G-Code Modifier Level of Impairment Assistance   6 % Total / Unable   7 - 9 CM 80 - 100% Maximal Assist   10 - 14 CL 60 - 80% Moderate Assist   15 - 19 CK 40 - 60% Moderate Assist   20 - 22 CJ 20 - 40% Minimal Assist   23 CI 1-20% SBA / CGA   24 CH 0% Independent/ Mod I     Patient left up in chair with all lines intact, call button in reach, NURSE notified and FAMILY present.    Assessment:  Elke Laws is a 82 y.o. female with a medical diagnosis of Acute on chronic congestive heart failure   PT WILL BENEFIT FROM CONT. SKILLED P.T. TO ADDRESS IMPAIRMENTS    Rehab identified problem list/impairments: Rehab identified problem list/impairments: weakness, impaired endurance, impaired functional mobilty, impaired balance, decreased safety awareness    Rehab potential is good.    Activity tolerance: Good    Discharge recommendations: Discharge Facility/Level Of Care Needs: home health PT     Barriers to discharge: Barriers to Discharge: None    Equipment recommendations: Equipment Needed After Discharge: walker, rolling     GOALS:    Physical Therapy Goals        Problem: Physical Therapy Goal    Goal Priority Disciplines Outcome Goal Variances Interventions   Physical Therapy Goal     PT/OT, PT      Description:  LTG'S TO BE MET IN 7 DAYS (5-29-17)  1. PT WILL BE NIRANJAN WITH BED MOBILITY   2. PT WILL BE NIRANJAN WITH TF'S  3. PT WILL ' WITH RW AND SPV  4. PT WILL TOLERATE BLE THEREX X 20 REPS AROM  5. PT WILL DEMO G DYNAMIC BALANCE DURING GAIT   6. PT WILL ASC/DESC. 5 STEPS USING 1 SIDE RAIL AND SBA                  PLAN:    Patient to be seen 5 x/week  to address the above listed problems via gait training, therapeutic activities,  therapeutic exercises  Plan of Care expires: 05/29/17  Plan of Care reviewed with: patient, family    PT ENCOURAGED TO CALL FOR ASSISTANCE WITH ALL NEEDS DUE TO FALL RISK STATUS, PT AGREEABLE.  PT ENCOURAGED TO INCREASE TIME OOB IN CHAIR, ALL MEALS IN CHAIR OOB, PT AGREEABLE    Amber Grey, PT  05/24/2017

## 2017-05-24 NOTE — PROGRESS NOTES
[facility] Hospital Medicine  Progress Note    Patient Name: Elke Laws  MRN: [id number]  Patient Class: IP- Inpatient   Admission Date: 5/21/2017  Length of Stay: 2 days  Attending Physician: Jama Crawford MD  Primary Care Provider: Isidra Benavidez MD        Subjective:     Principal Problem:Acute on chronic congestive heart failure    HPI:  81 y/o w/f presented to ED via private with hx of COPD presents to the ED for generalized weakness which onset gradually yesterday. She reports she was nauseated and vomited last night 4-5 times and continued to vomit this morning until she was dry heaving. She reports RLQ pain that started yesterday before she began vomiting, and she has not had a bowel movement in 5 days. She also reports she is SOB with heaviness in l chest, palpitations, and wheezing. CXR shows no acute findings. Admitted to telemetry with dx of acute on chronic diastolic heart failure, A-Fib, RLQ pain, and COPD     Hospital Course:  Pt admitted to Observation Unit for Sepsis, UTI and Acute on Chronic congestive heart failure.  Elevated BNP noted.  Troponin elevated and flat.  UA findings consistent with Ecoli UTI and IV Rocephin given initially then changed to cefepime. Blood cultures isolated E coli. Lasix IV was given daily then deescalated to oral when patient reported decreased urine output. She has ambulated in the hallway with assistance of Physical Therapy. Repeat blood cultures pending.      Interval History: pt sitting up in chair during exam and able to tolerate PT.  Pt states that she is feeling much better today and denies any additional complaints. Sensitivities resulted.  Cipo initiated.  Repeat blood cultures pending.      Review of Systems   Constitutional: Positive for activity change. Negative for appetite change, chills, fatigue and fever.   HENT: Negative for congestion, sinus pressure, sneezing, sore throat and trouble swallowing.    Eyes: Negative.    Respiratory:  Negative for chest tightness, shortness of breath and wheezing.    Cardiovascular: Negative for chest pain, palpitations and leg swelling.   Gastrointestinal: Negative for abdominal distention, abdominal pain, diarrhea, nausea and vomiting.   Endocrine: Negative for cold intolerance and heat intolerance.   Genitourinary: Negative for decreased urine volume, difficulty urinating, dysuria, flank pain and frequency.   Musculoskeletal: Negative for arthralgias, back pain and myalgias.   Skin: Negative for color change and wound.   Allergic/Immunologic: Negative for environmental allergies and food allergies.   Neurological: Positive for weakness. Negative for dizziness and headaches.   Hematological: Does not bruise/bleed easily.   Psychiatric/Behavioral: Negative for agitation and sleep disturbance. The patient is not nervous/anxious.      Objective:     Vital Signs (Most Recent):  Temp: 98.1 °F (36.7 °C) (05/24/17 0815)  Pulse: 66 (05/24/17 0815)  Resp: 18 (05/24/17 0815)  BP: (!) 121/53 (05/24/17 0815)  SpO2: 97 % (05/24/17 0815) Vital Signs (24h Range):  Temp:  [97.8 °F (36.6 °C)-98.7 °F (37.1 °C)] 98.1 °F (36.7 °C)  Pulse:  [66-95] 66  Resp:  [17-18] 18  SpO2:  [89 %-100 %] 97 %  BP: ()/(53-79) 121/53     Weight: 91.2 kg (201 lb)  Body mass index is 61.34 kg/m².    Intake/Output Summary (Last 24 hours) at 05/24/17 1154  Last data filed at 05/24/17 1000   Gross per 24 hour   Intake              820 ml   Output             1050 ml   Net             -230 ml      Physical Exam   Constitutional: She is oriented to person, place, and time. She appears well-developed and well-nourished.   HENT:   Head: Normocephalic and atraumatic.   Eyes: Conjunctivae are normal. No scleral icterus.   Neck: Normal range of motion. Neck supple.   Cardiovascular: Normal rate.    No murmur heard.  Irregular rythm   Pulmonary/Chest: Effort normal and breath sounds normal. No respiratory distress. She has no wheezes.   Abdominal: Soft.  Bowel sounds are normal. She exhibits no distension. There is no tenderness.   Musculoskeletal: Normal range of motion. Edema: Trace edema to BLE.   Neurological: She is alert and oriented to person, place, and time.   Skin: Skin is warm and dry. Capillary refill takes less than 2 seconds.   Psychiatric: She has a normal mood and affect. Her behavior is normal.   Nursing note and vitals reviewed.      Significant Labs:   CBC:   Recent Labs  Lab 05/23/17 0533 05/24/17  0534   WBC 6.50 6.43   HGB 10.8* 10.0*   HCT 33.9* 31.0*   * 140*     CMP:   Recent Labs  Lab 05/23/17  0533 05/24/17  0534   *  134* 133*   K 3.9  3.9 4.4   CL 98  98 99   CO2 29  29 25   *  115* 105   BUN 22  22 32*   CREATININE 1.0  1.0 1.2   CALCIUM 9.1  9.1 8.9   PROT 6.4 6.1   ALBUMIN 2.5* 2.5*   BILITOT 0.6 0.4   ALKPHOS 69 75   AST 25 36   ALT 20 29   ANIONGAP 7*  7* 9   EGFRNONAA 53*  53* 42*       Significant Imaging:   Imaging Results          X-Ray Abdomen AP 1 View (Final result)  Result time 05/21/17 17:56:35    Final result by Jose Gongora III, MD (05/21/17 17:56:35)                 Impression:        1. Scoliosis. Degenerative changes in the spine. No acute abnormality suggested.      Electronically signed by: JOSE GONGORA MD  Date:     05/21/17  Time:    17:56              Narrative:    Abdomen x-ray, single view.    Clinical indication: Right lower quadrant pain.    There is a moderate thoracolumbar scoliosis, convexity to the right with moderately pronounced arthritic changes in the spine. Nonspecific gas pattern without mass or obstruction.                             X-Ray Chest PA And Lateral (Final result)  Result time 05/21/17 12:19:25    Final result by Tai Shook MD (05/21/17 12:19:25)                 Impression:     No acute process. No significant change from prior exam.      Electronically signed by: TAI SHOOK MD  Date:     05/21/17  Time:    12:19               Narrative:    Exam: Chest X-ray, two views.    History: Asthma    Findings: No infiltrate or effusion identified. Cardiomediastinal silhouette is within normal limits. No significant change from prior study dated 04/22/2017. Elevation of the right hemidiaphragm again noted.                            Assessment/Plan:      E coli bacteremia    repeat blood cultures 5/24/2017-pending  Sensitivities resulted  IV Cefepime discontinued and Cipro initiated           E. coli UTI (urinary tract infection)    Sensitivities resulted  IV Cefepime discontinued and Cipro initiated          PAF (paroxysmal atrial fibrillation)    Cardiac Monitoring - rate controlled  Continue fleccanide  Continue eliquis            Hypertension    -antihypertensives held due to episodes of hypotension   Monitor VS          * Acute on chronic congestive heart failure    Lasix 40 mg po daily  -strict I/O  -fluid restriction   -daily weights  -elevated BNP noted  -chest xray showed no acute process            VTE Risk Mitigation         Ordered     apixaban tablet 5 mg  2 times daily     Route:  Oral        05/21/17 1755     Medium Risk of VTE  Once      05/21/17 1715     Place sequential compression device  Until discontinued      05/21/17 1522          Michelle Hernandez NP  Department of Hospital Medicine   [facility]

## 2017-05-24 NOTE — PLAN OF CARE
Problem: Patient Care Overview  Goal: Plan of Care Review  Outcome: Ongoing (interventions implemented as appropriate)  IMPROVED FUNCTIONAL MOBILITY, STILL C/O WEAKNESS

## 2017-05-24 NOTE — PLAN OF CARE
Problem: Patient Care Overview  Goal: Individualization & Mutuality  1. P.T. NOTE: PT CURRENTLY REQUIRES SBA FOR BED MOBILITY AND TF'S, CGA/SBA FOR GAIT WITH SPC   Outcome: Ongoing (interventions implemented as appropriate)  DX: shortness of breath, acute on chronic congestive heart failure  PATIENT REMAINS FREE FROM FALLS, FALL PRECAUTIONS IN PLACE.  VS STABLE  NO OTHER C/O AT THIS TIME  CALL BELL AND BELONGINGS WITHIN REACH, REMINDED TO CALL FOR ASSISTANCE.

## 2017-05-24 NOTE — SUBJECTIVE & OBJECTIVE
Interval History: pt sitting up in chair during exam and able to tolerate PT.  Pt states that she is feeling much better today and denies any additional complaints. Repeat blood cultures pending.      Review of Systems   Constitutional: Positive for activity change. Negative for appetite change, chills, fatigue and fever.   HENT: Negative for congestion, sinus pressure, sneezing, sore throat and trouble swallowing.    Eyes: Negative.    Respiratory: Negative for chest tightness, shortness of breath and wheezing.    Cardiovascular: Negative for chest pain, palpitations and leg swelling.   Gastrointestinal: Negative for abdominal distention, abdominal pain, diarrhea, nausea and vomiting.   Endocrine: Negative for cold intolerance and heat intolerance.   Genitourinary: Negative for decreased urine volume, difficulty urinating, dysuria, flank pain and frequency.   Musculoskeletal: Negative for arthralgias, back pain and myalgias.   Skin: Negative for color change and wound.   Allergic/Immunologic: Negative for environmental allergies and food allergies.   Neurological: Positive for weakness. Negative for dizziness and headaches.   Hematological: Does not bruise/bleed easily.   Psychiatric/Behavioral: Negative for agitation and sleep disturbance. The patient is not nervous/anxious.      Objective:     Vital Signs (Most Recent):  Temp: 98.1 °F (36.7 °C) (05/24/17 0815)  Pulse: 66 (05/24/17 0815)  Resp: 18 (05/24/17 0815)  BP: (!) 121/53 (05/24/17 0815)  SpO2: 97 % (05/24/17 0815) Vital Signs (24h Range):  Temp:  [97.8 °F (36.6 °C)-98.7 °F (37.1 °C)] 98.1 °F (36.7 °C)  Pulse:  [66-95] 66  Resp:  [17-18] 18  SpO2:  [89 %-100 %] 97 %  BP: ()/(53-79) 121/53     Weight: 91.2 kg (201 lb)  Body mass index is 61.34 kg/m².    Intake/Output Summary (Last 24 hours) at 05/24/17 1154  Last data filed at 05/24/17 1000   Gross per 24 hour   Intake              820 ml   Output             1050 ml   Net             -230 ml       Physical Exam   Constitutional: She is oriented to person, place, and time. She appears well-developed and well-nourished.   HENT:   Head: Normocephalic and atraumatic.   Eyes: Conjunctivae are normal. No scleral icterus.   Neck: Normal range of motion. Neck supple.   Cardiovascular: Normal rate.    No murmur heard.  Irregular rythm   Pulmonary/Chest: Effort normal and breath sounds normal. No respiratory distress. She has no wheezes.   Abdominal: Soft. Bowel sounds are normal. She exhibits no distension. There is no tenderness.   Musculoskeletal: Normal range of motion. Edema: Trace edema to BLE.   Neurological: She is alert and oriented to person, place, and time.   Skin: Skin is warm and dry. Capillary refill takes less than 2 seconds.   Psychiatric: She has a normal mood and affect. Her behavior is normal.   Nursing note and vitals reviewed.      Significant Labs:   CBC:   Recent Labs  Lab 05/23/17 0533 05/24/17  0534   WBC 6.50 6.43   HGB 10.8* 10.0*   HCT 33.9* 31.0*   * 140*     CMP:   Recent Labs  Lab 05/23/17  0533 05/24/17  0534   *  134* 133*   K 3.9  3.9 4.4   CL 98  98 99   CO2 29  29 25   *  115* 105   BUN 22  22 32*   CREATININE 1.0  1.0 1.2   CALCIUM 9.1  9.1 8.9   PROT 6.4 6.1   ALBUMIN 2.5* 2.5*   BILITOT 0.6 0.4   ALKPHOS 69 75   AST 25 36   ALT 20 29   ANIONGAP 7*  7* 9   EGFRNONAA 53*  53* 42*       Significant Imaging:   Imaging Results          X-Ray Abdomen AP 1 View (Final result)  Result time 05/21/17 17:56:35    Final result by Jose Gongora III, MD (05/21/17 17:56:35)                 Impression:        1. Scoliosis. Degenerative changes in the spine. No acute abnormality suggested.      Electronically signed by: JOSE GONGORA MD  Date:     05/21/17  Time:    17:56              Narrative:    Abdomen x-ray, single view.    Clinical indication: Right lower quadrant pain.    There is a moderate thoracolumbar scoliosis, convexity to the right with  moderately pronounced arthritic changes in the spine. Nonspecific gas pattern without mass or obstruction.                             X-Ray Chest PA And Lateral (Final result)  Result time 05/21/17 12:19:25    Final result by Tai Shook MD (05/21/17 12:19:25)                 Impression:     No acute process. No significant change from prior exam.      Electronically signed by: TAI SHOOK MD  Date:     05/21/17  Time:    12:19              Narrative:    Exam: Chest X-ray, two views.    History: Asthma    Findings: No infiltrate or effusion identified. Cardiomediastinal silhouette is within normal limits. No significant change from prior study dated 04/22/2017. Elevation of the right hemidiaphragm again noted.

## 2017-05-25 ENCOUNTER — TELEPHONE (OUTPATIENT)
Dept: CARDIOLOGY | Facility: CLINIC | Age: 82
End: 2017-05-25

## 2017-05-25 VITALS
TEMPERATURE: 98 F | BODY MASS INDEX: 47.21 KG/M2 | OXYGEN SATURATION: 95 % | HEART RATE: 72 BPM | DIASTOLIC BLOOD PRESSURE: 43 MMHG | HEIGHT: 55 IN | SYSTOLIC BLOOD PRESSURE: 96 MMHG | WEIGHT: 204 LBS | RESPIRATION RATE: 18 BRPM

## 2017-05-25 LAB
ALBUMIN SERPL BCP-MCNC: 2.7 G/DL
ALP SERPL-CCNC: 99 U/L
ALT SERPL W/O P-5'-P-CCNC: 47 U/L
ANION GAP SERPL CALC-SCNC: 9 MMOL/L
AST SERPL-CCNC: 55 U/L
BASOPHILS # BLD AUTO: 0.02 K/UL
BASOPHILS NFR BLD: 0.3 %
BILIRUB SERPL-MCNC: 0.4 MG/DL
BUN SERPL-MCNC: 33 MG/DL
CALCIUM SERPL-MCNC: 9.3 MG/DL
CHLORIDE SERPL-SCNC: 98 MMOL/L
CO2 SERPL-SCNC: 27 MMOL/L
CREAT SERPL-MCNC: 1 MG/DL
DIFFERENTIAL METHOD: ABNORMAL
EOSINOPHIL # BLD AUTO: 0.2 K/UL
EOSINOPHIL NFR BLD: 3.5 %
ERYTHROCYTE [DISTWIDTH] IN BLOOD BY AUTOMATED COUNT: 13.7 %
EST. GFR  (AFRICAN AMERICAN): >60 ML/MIN/1.73 M^2
EST. GFR  (NON AFRICAN AMERICAN): 53 ML/MIN/1.73 M^2
GLUCOSE SERPL-MCNC: 104 MG/DL
HCT VFR BLD AUTO: 33.2 %
HGB BLD-MCNC: 10.7 G/DL
LYMPHOCYTES # BLD AUTO: 1.1 K/UL
LYMPHOCYTES NFR BLD: 17.6 %
MCH RBC QN AUTO: 28.9 PG
MCHC RBC AUTO-ENTMCNC: 32.2 %
MCV RBC AUTO: 90 FL
MONOCYTES # BLD AUTO: 1.1 K/UL
MONOCYTES NFR BLD: 17.6 %
NEUTROPHILS # BLD AUTO: 3.8 K/UL
NEUTROPHILS NFR BLD: 61 %
PLATELET # BLD AUTO: 164 K/UL
PMV BLD AUTO: 11.7 FL
POTASSIUM SERPL-SCNC: 5.2 MMOL/L
PROT SERPL-MCNC: 6.8 G/DL
RBC # BLD AUTO: 3.7 M/UL
SODIUM SERPL-SCNC: 134 MMOL/L
WBC # BLD AUTO: 6.21 K/UL

## 2017-05-25 PROCEDURE — 97116 GAIT TRAINING THERAPY: CPT

## 2017-05-25 PROCEDURE — 25000003 PHARM REV CODE 250: Performed by: INTERNAL MEDICINE

## 2017-05-25 PROCEDURE — 63600175 PHARM REV CODE 636 W HCPCS: Performed by: NURSE PRACTITIONER

## 2017-05-25 PROCEDURE — 96374 THER/PROPH/DIAG INJ IV PUSH: CPT

## 2017-05-25 PROCEDURE — 25000003 PHARM REV CODE 250: Performed by: NURSE PRACTITIONER

## 2017-05-25 PROCEDURE — 94761 N-INVAS EAR/PLS OXIMETRY MLT: CPT

## 2017-05-25 PROCEDURE — 36415 COLL VENOUS BLD VENIPUNCTURE: CPT

## 2017-05-25 PROCEDURE — 97110 THERAPEUTIC EXERCISES: CPT

## 2017-05-25 PROCEDURE — 85025 COMPLETE CBC W/AUTO DIFF WBC: CPT

## 2017-05-25 PROCEDURE — 94640 AIRWAY INHALATION TREATMENT: CPT

## 2017-05-25 PROCEDURE — 96375 TX/PRO/DX INJ NEW DRUG ADDON: CPT

## 2017-05-25 PROCEDURE — 99222 1ST HOSP IP/OBS MODERATE 55: CPT | Mod: ,,, | Performed by: INTERNAL MEDICINE

## 2017-05-25 PROCEDURE — 80053 COMPREHEN METABOLIC PANEL: CPT

## 2017-05-25 RX ORDER — PRAVASTATIN SODIUM 20 MG/1
40 TABLET ORAL NIGHTLY
Status: DISCONTINUED | OUTPATIENT
Start: 2017-05-25 | End: 2017-05-25 | Stop reason: HOSPADM

## 2017-05-25 RX ORDER — CIPROFLOXACIN 500 MG/1
500 TABLET ORAL 2 TIMES DAILY
Qty: 28 TABLET | Refills: 0 | Status: SHIPPED | OUTPATIENT
Start: 2017-05-25 | End: 2017-06-08

## 2017-05-25 RX ORDER — ASPIRIN 81 MG/1
81 TABLET ORAL NIGHTLY
Status: DISCONTINUED | OUTPATIENT
Start: 2017-05-25 | End: 2017-05-25 | Stop reason: HOSPADM

## 2017-05-25 RX ADMIN — FLECAINIDE ACETATE 50 MG: 50 TABLET ORAL at 09:05

## 2017-05-25 RX ADMIN — SODIUM CHLORIDE, PRESERVATIVE FREE 3 ML: 5 INJECTION INTRAVENOUS at 05:05

## 2017-05-25 RX ADMIN — BUDESONIDE 0.5 MG: 0.5 SUSPENSION RESPIRATORY (INHALATION) at 08:05

## 2017-05-25 RX ADMIN — SODIUM CHLORIDE, PRESERVATIVE FREE 3 ML: 5 INJECTION INTRAVENOUS at 02:05

## 2017-05-25 RX ADMIN — POLYETHYLENE GLYCOL 3350 17 G: 17 POWDER, FOR SOLUTION ORAL at 09:05

## 2017-05-25 RX ADMIN — VALSARTAN 160 MG: 80 TABLET, FILM COATED ORAL at 09:05

## 2017-05-25 RX ADMIN — APIXABAN 5 MG: 2.5 TABLET, FILM COATED ORAL at 09:05

## 2017-05-25 RX ADMIN — CIPROFLOXACIN 400 MG: 2 INJECTION, SOLUTION INTRAVENOUS at 01:05

## 2017-05-25 RX ADMIN — ISOSORBIDE MONONITRATE 30 MG: 30 TABLET, EXTENDED RELEASE ORAL at 09:05

## 2017-05-25 RX ADMIN — CIPROFLOXACIN 400 MG: 2 INJECTION, SOLUTION INTRAVENOUS at 02:05

## 2017-05-25 RX ADMIN — FUROSEMIDE 40 MG: 40 TABLET ORAL at 09:05

## 2017-05-25 RX ADMIN — METOPROLOL SUCCINATE 100 MG: 50 TABLET, EXTENDED RELEASE ORAL at 09:05

## 2017-05-25 RX ADMIN — FAMOTIDINE 20 MG: 20 TABLET ORAL at 09:05

## 2017-05-25 NOTE — SUBJECTIVE & OBJECTIVE
Interval History: pt stable overnight and reports symptom improvement.  Pt able to ambulate in hallway with walker assisted by PT.  Elevated potassium noted.  Pt seen by Cardiology with LISSETH recommended.      Review of Systems   Constitutional: Positive for activity change. Negative for appetite change, chills, fatigue and fever.   HENT: Negative for congestion, sinus pressure, sneezing, sore throat and trouble swallowing.    Eyes: Negative.    Respiratory: Negative for chest tightness, shortness of breath and wheezing.    Cardiovascular: Negative for chest pain, palpitations and leg swelling.   Gastrointestinal: Negative for abdominal distention, abdominal pain, diarrhea, nausea and vomiting.   Endocrine: Negative for cold intolerance and heat intolerance.   Genitourinary: Negative for decreased urine volume, difficulty urinating, dysuria, flank pain and frequency.   Musculoskeletal: Negative for arthralgias, back pain and myalgias.   Skin: Negative for color change and wound.   Allergic/Immunologic: Negative for environmental allergies and food allergies.   Neurological: Positive for weakness. Negative for dizziness and headaches.   Hematological: Does not bruise/bleed easily.   Psychiatric/Behavioral: Negative for agitation and sleep disturbance. The patient is not nervous/anxious.      Objective:     Vital Signs (Most Recent):  Temp: 98.2 °F (36.8 °C) (05/25/17 0800)  Pulse: 73 (05/25/17 0900)  Resp: 18 (05/25/17 0824)  BP: 125/71 (05/25/17 0800)  SpO2: 95 % (05/25/17 0824) Vital Signs (24h Range):  Temp:  [98 °F (36.7 °C)-98.3 °F (36.8 °C)] 98.2 °F (36.8 °C)  Pulse:  [68-98] 73  Resp:  [18-22] 18  SpO2:  [95 %-99 %] 95 %  BP: (113-161)/(54-89) 125/71     Weight: 92.5 kg (204 lb)  Body mass index is 62.25 kg/m².    Intake/Output Summary (Last 24 hours) at 05/25/17 1210  Last data filed at 05/25/17 0800   Gross per 24 hour   Intake             1000 ml   Output             1250 ml   Net             -250 ml       Physical Exam   Constitutional: She is oriented to person, place, and time. She appears well-developed and well-nourished.   HENT:   Head: Normocephalic and atraumatic.   Eyes: Conjunctivae are normal. No scleral icterus.   Neck: Normal range of motion. Neck supple.   Cardiovascular: Normal rate.  An irregularly irregular rhythm present.   No murmur heard.  Irregular rythm   Pulmonary/Chest: Effort normal and breath sounds normal. No respiratory distress. She has no wheezes.   Abdominal: Soft. Bowel sounds are normal. She exhibits no distension. There is no tenderness.   Musculoskeletal: Normal range of motion. Edema: Trace edema to BLE.   Neurological: She is alert and oriented to person, place, and time.   Skin: Skin is warm and dry. Capillary refill takes less than 2 seconds.   Psychiatric: She has a normal mood and affect. Her behavior is normal.   Nursing note and vitals reviewed.      Significant Labs:   CBC:   Recent Labs  Lab 05/24/17  0534 05/25/17  0611   WBC 6.43 6.21   HGB 10.0* 10.7*   HCT 31.0* 33.2*   * 164     CMP:   Recent Labs  Lab 05/24/17  0534 05/25/17  0611   * 134*   K 4.4 5.2*   CL 99 98   CO2 25 27    104   BUN 32* 33*   CREATININE 1.2 1.0   CALCIUM 8.9 9.3   PROT 6.1 6.8   ALBUMIN 2.5* 2.7*   BILITOT 0.4 0.4   ALKPHOS 75 99   AST 36 55*   ALT 29 47*   ANIONGAP 9 9   EGFRNONAA 42* 53*       Significant Imaging:   Imaging Results          X-Ray Abdomen AP 1 View (Final result)  Result time 05/21/17 17:56:35    Final result by Jose Gongora III, MD (05/21/17 17:56:35)                 Impression:        1. Scoliosis. Degenerative changes in the spine. No acute abnormality suggested.      Electronically signed by: JOSE GONGORA MD  Date:     05/21/17  Time:    17:56              Narrative:    Abdomen x-ray, single view.    Clinical indication: Right lower quadrant pain.    There is a moderate thoracolumbar scoliosis, convexity to the right with moderately  pronounced arthritic changes in the spine. Nonspecific gas pattern without mass or obstruction.                             X-Ray Chest PA And Lateral (Final result)  Result time 05/21/17 12:19:25    Final result by Tai Shook MD (05/21/17 12:19:25)                 Impression:     No acute process. No significant change from prior exam.      Electronically signed by: TAI SHOOK MD  Date:     05/21/17  Time:    12:19              Narrative:    Exam: Chest X-ray, two views.    History: Asthma    Findings: No infiltrate or effusion identified. Cardiomediastinal silhouette is within normal limits. No significant change from prior study dated 04/22/2017. Elevation of the right hemidiaphragm again noted.

## 2017-05-25 NOTE — DISCHARGE SUMMARY
[facility] Hospital Medicine  Discharge Summary      Patient Name: Elke Laws  MRN: [id number]  Admission Date: 5/21/2017  Hospital Length of Stay: 3 days  Discharge Date and Time: 5/25/2017  6:06 PM  Attending Physician: Solomon Han MD   Discharging Provider: Margie Aponte NP  Primary Care Provider: Isidra Benavidez MD      HPI:   83 y/o w/f presented to ED via private with hx of COPD presents to the ED for generalized weakness which onset gradually yesterday. She reports she was nauseated and vomited last night 4-5 times and continued to vomit this morning until she was dry heaving. She reports RLQ pain that started yesterday before she began vomiting, and she has not had a bowel movement in 5 days. She also reports she is SOB with heaviness in l chest, palpitations, and wheezing. CXR shows no acute findings. Admitted to telemetry with dx of acute on chronic diastolic heart failure, A-Fib, RLQ pain, and COPD     * No surgery found *      Indwelling Lines/Drains at time of discharge:   Lines/Drains/Airways          No matching active lines, drains, or airways        Hospital Course:   Pt admitted to Observation Unit for Sepsis, UTI and Acute on Chronic congestive heart failure.  Elevated BNP noted.  Troponin elevated and flat.  UA findings consistent with Ecoli UTI and IV Rocephin given initially then changed to cefepime. Blood cultures isolated E coli. Lasix IV was given daily then deescalated to oral when patient reported decreased urine output. She has ambulated in the hallway with assistance of Physical Therapy. Repeat blood cultures with no growth to date.  Cipro initiated per sensitivity results.  I have seen and examined on the day of discharge and deemed Ms. Laws appropriate to return home with home health.    Consults:   Consults         Status Ordering Provider     Inpatient consult to Cardiology  Once     Provider:  Kee Jones MD    Completed MARGIE APONTE.          Significant  Diagnostic Studies: Labs:   CMP   Recent Labs  Lab 05/27/17  0723 05/28/17  1026   * 129*   K 4.8 4.7   CL 96 94*   CO2 25 26   * 207*   BUN 27* 42*   CREATININE 1.4 2.7*   CALCIUM 9.2 8.3*   ANIONGAP 13 9   ESTGFRAFRICA 40* 18*   EGFRNONAA 35* 16*    and CBC   Recent Labs  Lab 05/27/17  0723 05/28/17  1026   WBC 15.52* 12.05   HGB 10.6* 12.8   HCT 31.5* 38.2    140*       Pending Diagnostic Studies:     None        Final Active Diagnoses:    Diagnosis Date Noted POA    PRINCIPAL PROBLEM:  Acute on chronic congestive heart failure [I50.9] 05/21/2017 Yes    E. coli UTI (urinary tract infection) [N39.0, B96.20] 05/23/2017 Unknown    E coli bacteremia [R78.81] 05/23/2017 Unknown    PAF (paroxysmal atrial fibrillation) [I48.0] 07/17/2015 Yes     Chronic    Hypertension [I10]  Yes     Chronic      Problems Resolved During this Admission:    Diagnosis Date Noted Date Resolved POA    RLQ abdominal pain [R10.31] 05/21/2017 05/23/2017 Yes    Sepsis due to urinary tract infection [A41.9, N39.0] 05/21/2017 05/23/2017 Yes      No new Assessment & Plan notes have been filed under this hospital service since the last note was generated.  Service: Hospital Medicine      Discharged Condition: good    [place] [facility] [place]    Follow Up:  Follow-up Information     Isidra Benavidez MD In 3 days.    Specialty:  Family Medicine  Why:  hospital follow up   Contact information:  [address] [place] [address] [phone]             Abida Angulo MD In 1 week.    Specialty:  Cardiology  Why:  hospital follow up   Contact information:  [address] [place] [address] [phone]                 Patient Instructions:     Ambulatory referral to Home Health   Referral Priority: Routine Referral Type: Home Health   Referral Reason: Specialty Services Required    Requested Specialty: Home Health Services    Number of Visits Requested: 1      Diet general   Order Specific  Question Answer Comments   Na restriction, if any: 2gNa    Fluid restriction: Fluid - 1500mL      Activity as tolerated     Call MD for:  temperature >100.4     Call MD for:  persistent nausea and vomiting or diarrhea     Call MD for:  severe uncontrolled pain     Call MD for:  persistent dizziness, light-headedness, or visual disturbances     Call MD for:  severe persistent headache     Call MD for:  difficulty breathing or increased cough     Call MD for:  increased confusion or weakness       Medications:  Reconciled Home Medications:   Discharge Medication List as of 5/25/2017  5:17 PM      START taking these medications    Details   ciprofloxacin HCl (CIPRO) 500 MG tablet Take 1 tablet (500 mg total) by mouth 2 (two) times daily., Starting Thu 5/25/2017, Until Thu 6/8/2017, Normal         CONTINUE these medications which have CHANGED    Details   apixaban (ELIQUIS) 5 mg Tab Take 1 tablet (5 mg total) by mouth 2 (two) times daily., Starting u 5/25/2017, Until Sat 6/24/2017, Normal         CONTINUE these medications which have NOT CHANGED    Details   ASCORBATE CALCIUM (VITAMIN C ORAL) Take by mouth once daily., Until Discontinued, Historical Med      aspirin (ECOTRIN) 81 MG EC tablet Take 1 tablet (81 mg total) by mouth once daily., Starting 4/13/2017, Until Fri 4/13/18, OTC      DOCOSAHEXANOIC ACID/EPA (FISH OIL ORAL) Take by mouth once daily., Until Discontinued, Historical Med      flecainide (TAMBOCOR) 50 MG Tab Take 1 tablet (50 mg total) by mouth every 12 (twelve) hours., Starting 6/24/2016, Until Discontinued, Normal      fluticasone (FLOVENT HFA) 110 mcg/actuation inhaler Inhale 1 puff into the lungs 2 (two) times daily., Starting 1/24/2017, Until Wed 1/24/18, Normal      furosemide (LASIX) 40 MG tablet Take 1 tablet (40 mg total) by mouth once daily., Starting 4/17/2017, Until Discontinued, Normal      isosorbide mononitrate (IMDUR) 30 MG 24 hr tablet TAKE 1 TABLET(30 MG) BY MOUTH EVERY DAY, Normal       loratadine (CLARITIN) 10 mg tablet Take 1 tablet (10 mg total) by mouth once daily., Starting 12/30/2016, Until Sat 12/30/17, OTC      metoprolol succinate (TOPROL-XL) 100 MG 24 hr tablet Take 1 tablet (100 mg total) by mouth once daily., Starting 4/17/2017, Until Discontinued, Normal      pravastatin (PRAVACHOL) 40 MG tablet Take 1 tablet (40 mg total) by mouth once daily., Starting 4/17/2017, Until Discontinued, Normal      valsartan (DIOVAN) 160 MG tablet Take 1 tablet (160 mg total) by mouth once daily., Starting 4/17/2017, Until Discontinued, Normal      acetaminophen (TYLENOL) 500 MG tablet Take 1 tablet (500 mg total) by mouth 2 (two) times daily., Starting 3/10/2016, Until Discontinued, No Print      albuterol 90 mcg/actuation inhaler Inhale 2 puffs into the lungs 4 (four) times daily., Starting 5/14/2015, Until Discontinued, Normal      albuterol sulfate 2.5 mg/0.5 mL Nebu Take 2.5 mg by nebulization every 4 (four) hours as needed., Starting 12/30/2016, Until Sat 12/30/17, Normal      compressor, for nebulizer Lara Compressor use as directed by albuterol use., Normal           Time spent on the discharge of patient: 30 minutes    Michelle Hernandez NP  Department of Hospital Medicine  [facility]

## 2017-05-25 NOTE — TELEPHONE ENCOUNTER
----- Message from Daphnie Wilson sent at 5/25/2017  4:52 PM CDT -----  The pt needs to be worked in with in a week for a f/u appt, pt can be reached at [phone]///[id number]

## 2017-05-25 NOTE — PLAN OF CARE
Problem: Patient Care Overview  Goal: Plan of Care Review  Outcome: Outcome(s) achieved Date Met: 05/25/17  D/C PT FROM P.T. SERVICES TO [facility]

## 2017-05-25 NOTE — ASSESSMENT & PLAN NOTE
repeat blood cultures on 5/24/2017 with no growth to date.    Sensitivities resulted  IV Cefepime discontinued and Cipro initiated

## 2017-05-25 NOTE — ASSESSMENT & PLAN NOTE
Lasix 40 mg po daily  -strict I/O  -fluid restriction   -daily weights  -elevated BNP initially noted  -chest xray showed no acute process  -Echo results in 04/2017 showed EF 60% with diastolic dysfunction, mild MVR, and trivial AVR  -Cardiology consulted with LISSETH recommended

## 2017-05-25 NOTE — PLAN OF CARE
05/25/17 1622   Final Note   Assessment Type Final Discharge Note   Discharge Disposition Home-Health   Phoned Silvana Pichardo. Notified of discharge.

## 2017-05-25 NOTE — PLAN OF CARE
Problem: Patient Care Overview  Goal: Plan of Care Review  Outcome: Ongoing (interventions implemented as appropriate)  o2 sat on nc 2l/m=97%; tolerates txs well.

## 2017-05-25 NOTE — PROGRESS NOTES
[facility] Hospital Medicine  Progress Note    Patient Name: Elke Laws  MRN: [id number]  Patient Class: IP- Inpatient   Admission Date: 5/21/2017  Length of Stay: 3 days  Attending Physician: Solomon Han MD  Primary Care Provider: Isidra Benavidez MD        Subjective:     Principal Problem:Acute on chronic congestive heart failure    HPI:  81 y/o w/f presented to ED via private with hx of COPD presents to the ED for generalized weakness which onset gradually yesterday. She reports she was nauseated and vomited last night 4-5 times and continued to vomit this morning until she was dry heaving. She reports RLQ pain that started yesterday before she began vomiting, and she has not had a bowel movement in 5 days. She also reports she is SOB with heaviness in l chest, palpitations, and wheezing. CXR shows no acute findings. Admitted to telemetry with dx of acute on chronic diastolic heart failure, A-Fib, RLQ pain, and COPD     Hospital Course:  Pt admitted to Observation Unit for Sepsis, UTI and Acute on Chronic congestive heart failure.  Elevated BNP noted.  Troponin elevated and flat.  UA findings consistent with Ecoli UTI and IV Rocephin given initially then changed to cefepime. Blood cultures isolated E coli. Lasix IV was given daily then deescalated to oral when patient reported decreased urine output. She has ambulated in the hallway with assistance of Physical Therapy. Repeat blood cultures with no growth to date.  Cardiology consulted with LISSETH recommended.  Cipro initiated per sensitivity results.      Interval History: pt stable overnight and reports symptom improvement.  Pt able to ambulate in hallway with walker assisted by PT.  Elevated potassium noted.  Pt seen by Cardiology with LISSETH recommended.      Review of Systems   Constitutional: Positive for activity change. Negative for appetite change, chills, fatigue and fever.   HENT: Negative for congestion, sinus pressure,  sneezing, sore throat and trouble swallowing.    Eyes: Negative.    Respiratory: Negative for chest tightness, shortness of breath and wheezing.    Cardiovascular: Negative for chest pain, palpitations and leg swelling.   Gastrointestinal: Negative for abdominal distention, abdominal pain, diarrhea, nausea and vomiting.   Endocrine: Negative for cold intolerance and heat intolerance.   Genitourinary: Negative for decreased urine volume, difficulty urinating, dysuria, flank pain and frequency.   Musculoskeletal: Negative for arthralgias, back pain and myalgias.   Skin: Negative for color change and wound.   Allergic/Immunologic: Negative for environmental allergies and food allergies.   Neurological: Positive for weakness. Negative for dizziness and headaches.   Hematological: Does not bruise/bleed easily.   Psychiatric/Behavioral: Negative for agitation and sleep disturbance. The patient is not nervous/anxious.      Objective:     Vital Signs (Most Recent):  Temp: 98.2 °F (36.8 °C) (05/25/17 0800)  Pulse: 73 (05/25/17 0900)  Resp: 18 (05/25/17 0824)  BP: 125/71 (05/25/17 0800)  SpO2: 95 % (05/25/17 0824) Vital Signs (24h Range):  Temp:  [98 °F (36.7 °C)-98.3 °F (36.8 °C)] 98.2 °F (36.8 °C)  Pulse:  [68-98] 73  Resp:  [18-22] 18  SpO2:  [95 %-99 %] 95 %  BP: (113-161)/(54-89) 125/71     Weight: 92.5 kg (204 lb)  Body mass index is 62.25 kg/m².    Intake/Output Summary (Last 24 hours) at 05/25/17 1210  Last data filed at 05/25/17 0800   Gross per 24 hour   Intake             1000 ml   Output             1250 ml   Net             -250 ml      Physical Exam   Constitutional: She is oriented to person, place, and time. She appears well-developed and well-nourished.   HENT:   Head: Normocephalic and atraumatic.   Eyes: Conjunctivae are normal. No scleral icterus.   Neck: Normal range of motion. Neck supple.   Cardiovascular: Normal rate.  An irregularly irregular rhythm present.   No murmur heard.  Irregular rythm    Pulmonary/Chest: Effort normal and breath sounds normal. No respiratory distress. She has no wheezes.   Abdominal: Soft. Bowel sounds are normal. She exhibits no distension. There is no tenderness.   Musculoskeletal: Normal range of motion. Edema: Trace edema to BLE.   Neurological: She is alert and oriented to person, place, and time.   Skin: Skin is warm and dry. Capillary refill takes less than 2 seconds.   Psychiatric: She has a normal mood and affect. Her behavior is normal.   Nursing note and vitals reviewed.      Significant Labs:   CBC:   Recent Labs  Lab 05/24/17  0534 05/25/17  0611   WBC 6.43 6.21   HGB 10.0* 10.7*   HCT 31.0* 33.2*   * 164     CMP:   Recent Labs  Lab 05/24/17  0534 05/25/17  0611   * 134*   K 4.4 5.2*   CL 99 98   CO2 25 27    104   BUN 32* 33*   CREATININE 1.2 1.0   CALCIUM 8.9 9.3   PROT 6.1 6.8   ALBUMIN 2.5* 2.7*   BILITOT 0.4 0.4   ALKPHOS 75 99   AST 36 55*   ALT 29 47*   ANIONGAP 9 9   EGFRNONAA 42* 53*       Significant Imaging:   Imaging Results          X-Ray Abdomen AP 1 View (Final result)  Result time 05/21/17 17:56:35    Final result by Jose Gongora III, MD (05/21/17 17:56:35)                 Impression:        1. Scoliosis. Degenerative changes in the spine. No acute abnormality suggested.      Electronically signed by: JOSE GONGORA MD  Date:     05/21/17  Time:    17:56              Narrative:    Abdomen x-ray, single view.    Clinical indication: Right lower quadrant pain.    There is a moderate thoracolumbar scoliosis, convexity to the right with moderately pronounced arthritic changes in the spine. Nonspecific gas pattern without mass or obstruction.                             X-Ray Chest PA And Lateral (Final result)  Result time 05/21/17 12:19:25    Final result by Tai Shook MD (05/21/17 12:19:25)                 Impression:     No acute process. No significant change from prior exam.      Electronically signed by: TAI  CARMELO STALEY  Date:     05/21/17  Time:    12:19              Narrative:    Exam: Chest X-ray, two views.    History: Asthma    Findings: No infiltrate or effusion identified. Cardiomediastinal silhouette is within normal limits. No significant change from prior study dated 04/22/2017. Elevation of the right hemidiaphragm again noted.                            Assessment/Plan:      E coli bacteremia    repeat blood cultures on 5/24/2017 with no growth to date.    Sensitivities resulted  IV Cefepime discontinued and Cipro initiated           E. coli UTI (urinary tract infection)    Sensitivities resulted  IV Cefepime discontinued and Cipro initiated          PAF (paroxysmal atrial fibrillation)    Cardiac Monitoring - rate controlled  Continue fleccanide  Continue eliquis            Hypertension    -stable  -antihypertensives held due to episodes of hypotension   Monitor VS          * Acute on chronic congestive heart failure    Lasix 40 mg po daily  -strict I/O  -fluid restriction   -daily weights  -elevated BNP initially noted  -chest xray showed no acute process  -Echo results in 04/2017 showed EF 60% with diastolic dysfunction, mild MVR, and trivial AVR  -Cardiology consulted with LISSETH recommended            VTE Risk Mitigation         Ordered     apixaban tablet 5 mg  2 times daily     Route:  Oral        05/21/17 1755     Medium Risk of VTE  Once      05/21/17 1715     Place sequential compression device  Until discontinued      05/21/17 1522          Michelle Hernandez NP  Department of Hospital Medicine   [facility]

## 2017-05-25 NOTE — PT/OT/SLP PROGRESS
Physical Therapy  Treatment    Elke Laws   MRN: [id number]   Admitting Diagnosis: Acute on chronic congestive heart failure    PT Received On: 05/25/17  PT Start Time: 0945     PT Stop Time: 1010    PT Total Time (min): 25 min       Billable Minutes:  Gait [id number] and Therapeutic Exercise 10    Treatment Type: Treatment  PT/PTA: PT         General Precautions: Standard  Orthopedic Precautions: N/A   Braces: N/A    Subjective:  Communicated with NURSE GUERRERO prior to session.  Pain/Comfort  Pain Rating 1: 0/10    Objective:   Patient found with: telemetry    Functional Mobility:  Bed Mobility:   Rolling/Turning to Left: Supervision  Rolling/Turning Right: Supervision  Scooting/Bridging: Supervision  Supine to Sit: Supervision    Transfers:  Sit <> Stand Assistance: Supervision  Sit <> Stand Assistive Device: Rolling Walker  Bed <> Chair Technique: Stand Pivot  Bed <> Chair Assistance: Stand By Assistance  Bed <> Chair Assistive Device: Rolling Walker    Gait:   Gait Distance: PT ' WITH RW AND SBA, NO LOB OR SOB ON ROOM AIR  Assistance 1: Stand by Assistance  Gait Assistive Device: Rolling walker  Gait Pattern: swing-through gait  Gait Deviation(s): decreased robson    Balance:   Static Sit: GOOD  Dynamic Sit: GOOD  Static Stand: GOOD  Dynamic stand:  GOOD    Therapeutic Activities and Exercises:  PT PERFORMED BLE THEREX X 20 REPS AROM    AM-PAC 6 CLICK MOBILITY  How much help from another person does this patient currently need?   1 = Unable, Total/Dependent Assistance  2 = A lot, Maximum/Moderate Assistance  3 = A little, Minimum/Contact Guard/Supervision  4 = None, Modified [place]/Independent    Turning over in bed (including adjusting bedclothes, sheets and blankets)?: 4  Sitting down on and standing up from a chair with arms (e.g., wheelchair, bedside commode, etc.): 4  Moving from lying on back to sitting on the side of the bed?: 4  Moving to and from a bed to a chair (including a wheelchair)?:  4  Need to walk in hospital room?: 4  Climbing 3-5 steps with a railing?: 1  Total Score: 21    AM-PAC Raw Score CMS G-Code Modifier Level of Impairment Assistance   6 % Total / Unable   7 - 9 CM 80 - 100% Maximal Assist   10 - 14 CL 60 - 80% Moderate Assist   15 - 19 CK 40 - 60% Moderate Assist   20 - 22 CJ 20 - 40% Minimal Assist   23 CI 1-20% SBA / CGA   24 CH 0% Independent/ Mod I     Patient left up in chair with all lines intact, call button in reach, NURSE notified and FAMILY present.    Assessment:  Elke Laws is a 82 y.o. female with a medical diagnosis of Acute on chronic congestive heart failure   PT IS NO LONGER A CANDIDATE FOR INPATIENT SKILLED P.T. DUE TO HIGH LEVEL OF FUNCTION, PT WILL BENEFIT FROM [facility] FOR CONT. GAIT/TE    Rehab identified problem list/impairments:     Rehab potential is     Activity tolerance:     Discharge recommendations: Discharge Facility/Level Of Care Needs: home health PT     Barriers to discharge: Barriers to Discharge: None    Equipment recommendations: Equipment Needed After Discharge: walker, rolling     GOALS:    Physical Therapy Goals        Problem: Physical Therapy Goal    Goal Priority Disciplines Outcome Goal Variances Interventions   Physical Therapy Goal     PT/OT, PT      Description:  LTG'S TO BE MET IN 7 DAYS (5-29-17)  1. PT WILL BE NIRANJAN WITH BED MOBILITY   2. PT WILL BE NIRANJAN WITH TF'S  3. PT WILL ' WITH RW AND SPV  4. PT WILL TOLERATE BLE THEREX X 20 REPS AROM  5. PT WILL DEMO G DYNAMIC BALANCE DURING GAIT   6. PT WILL ASC/DESC. 5 STEPS USING 1 SIDE RAIL AND SBA                  PLAN:     (D/C PT FROM P[facility]T. SERVICES TO [facility])  Plan of Care reviewed with: patient, family     PT ENCOURAGED TO CALL FOR ASSISTANCE WITH ALL NEEDS, PT AGREEABLE.  PT ENCOURAGED TO INCREASE TIME OOB IN CHAIR, ALL MEALS IN CHAIR OOB, PT AGREEABLE    Amber Grey, PT  05/25/2017

## 2017-05-25 NOTE — PLAN OF CARE
Problem: Patient Care Overview  Goal: Plan of Care Review  Patient awake, alert, oriented. Respirations even and unlabored. Patient on 2L of oxygen via nasal canula. Family at bedside. Patient remained free of falls on this shift.  No skin breakdown noted. Patient on IV antibiotics. PIV clean, dry and intact. Patient denies pain at this time.  Room free of clutter. Bed locked and in lowest position. Call light within reach.  Safety precautions in place. Side rails up x2. Patient A-Fib with controlled heart rate on the telemetry monitor. POC reviewed. No concerns voiced at this time. Will continue to monitor patient.

## 2017-05-25 NOTE — CONSULTS
"Consult Note  Cardiology    Consult Requested By: Michelle Hernandez NP  Reason for Consult: CHF    SUBJECTIVE:     History of Present Illness:  Ms Laws is a 82 y.o. female presents with PMHx of PAF, diastolic CHF, HTN and HLD. She was admitted to [facility] on 5/21/2017 with increase generalize weakness and decompensated diastolic Heart Failure. BNP 1041.  She felt like her heart was racing. Patient is currently sitting up in chair, and states feeling well. She currently denies  chest pain, shortness of breath, palpitations, dizziness or syncope. Denies orthopnea or PND. She is A Fib on the cardiac monitor rate in the 80-90's. Patient had a Cardio event monitor done 4/17/2017 to 4//30//2017, the was predominant rhythm was Sinus,  BPM with average 58 BMP.     Review of patient's allergies indicates:   Allergen Reactions    Iodine and iodide containing products Rash       Past Medical History:   Diagnosis Date    Acute on chronic diastolic congestive heart failure 8/27/2015    Anemia 5/9/2016    Anemia 5/9/2016    Anticoagulant long-term use     Aortic regurgitation     Echo 11/2013---5 - Mild to moderate aortic regurgitation.      Atrial fibrillation 5/15/2014    Back pain     s/p epidural steriod injections, no recent injections.    Baker's cyst     small, right, seen on US lower extremity 6/2014    Blood transfusion     Chest pain, atypical 8/27/2015    Diastolic dysfunction     Seen on echo 11/2013, stress test negative 5/2014    Diverticulosis     colonoscopy 3/27/2011    Hemorrhoids     colonoscopy 3/27/2011    Hiatal hernia     CXR 12/30/2016---Retrocardiac density again noted consistent with a hiatal hernia.    Hx of adenomatous colonic polyps     Hyperlipidemia     Hypertension     Hyponatremia 5/9/2016    Mitral regurgitation     Myocardial infarction     per patient was told in the past she had a "mild heart attack"    Obesity     Osteoarthritis, knee     Pneumonia     per patient " ""walking Pneumonia" did not require hospitalization    Seasonal allergies     Stroke     TIA; patient reports was told by one doctor she had 2 mini strokes but another doctor said she did not     Past Surgical History:   Procedure Laterality Date    APPENDECTOMY      EYE SURGERY Left     HYSTERECTOMY      benign reasons    KNEE SURGERY Bilateral     x2     OLECRANON BURSECTOMY Right     6/2011    URETHRA SURGERY       Family History   Problem Relation Age of Onset    Heart disease Mother     Cancer Mother      colon    Hypertension Mother     Hyperlipidemia Mother     Heart disease Father     Cancer Father      colon    Hypertension Father     Hyperlipidemia Father     Heart disease Sister      MI    Heart disease Brother      MI    COPD Son     Hypertension Son     Diabetes Brother     Diabetes Son     Cancer Sister      breast    Kidney disease Neg Hx     Stroke Neg Hx      Social History   Substance Use Topics    Smoking status: Former Smoker     Packs/day: 0.05     Years: 1.00     Types: Cigarettes     Quit date: 1/1/1952    Smokeless tobacco: Never Used    Alcohol use No        Review of Systems:  Constitutional: positive for generalize weakness, negative for fever or chills   Eyes: negative  Ears, nose, mouth, throat, and face: negative  Respiratory: positive for shortness of breath, orthopnea or PND  Cardiovascular: negative for chest pain, palpitations, syncope or near syncope  Gastrointestinal: negative for nausea or vomiting  Genitourinary:negative  Hematologic/lymphatic: negative  Musculoskeletal:negative,   Neurological: negative  Behavioral/Psych: negative  Endocrine: negative  Allergic/Immunologic: negative    OBJECTIVE:     Vital Signs (Most Recent)  Temp: 98.2 °F (36.8 °C) (05/25/17 0800)  Pulse: 73 (05/25/17 0900)  Resp: 18 (05/25/17 0824)  BP: 125/71 (05/25/17 0800)  SpO2: 95 % (05/25/17 0824)    Vital Signs Range (Last 24H):  Temp:  [98 °F (36.7 °C)-98.4 °F (36.9 °C)] "   Pulse:  [68-98]   Resp:  [18-22]   BP: (113-161)/(54-89)   SpO2:  [95 %-99 %]     Current Facility-Administered Medications   Medication    acetaminophen tablet 650 mg    apixaban tablet 5 mg    aspirin EC tablet 81 mg    budesonide nebulizer solution 0.5 mg    ciprofloxacin (CIPRO)400mg/200ml D5W IVPB 400 mg    diphenhydrAMINE injection 12.5 mg    famotidine tablet 20 mg    flecainide tablet 50 mg    furosemide tablet 40 mg    isosorbide mononitrate 24 hr tablet 30 mg    metoprolol succinate (TOPROL-XL) 24 hr tablet 100 mg    nitroGLYCERIN SL tablet 0.4 mg    ondansetron disintegrating tablet 8 mg    polyethylene glycol packet 17 g    pravastatin tablet 40 mg    sodium chloride 0.9% injection 3 mL    valsartan tablet 160 mg     No current facility-administered medications on file prior to encounter.      Current Outpatient Prescriptions on File Prior to Encounter   Medication Sig    apixaban (ELIQUIS) 5 mg Tab Take 1 tablet (5 mg total) by mouth 2 (two) times daily.    ASCORBATE CALCIUM (VITAMIN C ORAL) Take by mouth once daily.    aspirin (ECOTRIN) 81 MG EC tablet Take 1 tablet (81 mg total) by mouth once daily.    DOCOSAHEXANOIC ACID/EPA (FISH OIL ORAL) Take by mouth once daily.    ELIQUIS 5 mg Tab TAKE 1 TABLET BY MOUTH TWICE DAILY    flecainide (TAMBOCOR) 50 MG Tab Take 1 tablet (50 mg total) by mouth every 12 (twelve) hours.    fluticasone (FLOVENT HFA) 110 mcg/actuation inhaler Inhale 1 puff into the lungs 2 (two) times daily.    furosemide (LASIX) 40 MG tablet Take 1 tablet (40 mg total) by mouth once daily.    isosorbide mononitrate (IMDUR) 30 MG 24 hr tablet TAKE 1 TABLET(30 MG) BY MOUTH EVERY DAY    loratadine (CLARITIN) 10 mg tablet Take 1 tablet (10 mg total) by mouth once daily.    metoprolol succinate (TOPROL-XL) 100 MG 24 hr tablet Take 1 tablet (100 mg total) by mouth once daily.    pravastatin (PRAVACHOL) 40 MG tablet Take 1 tablet (40 mg total) by mouth once daily.     valsartan (DIOVAN) 160 MG tablet Take 1 tablet (160 mg total) by mouth once daily.    acetaminophen (TYLENOL) 500 MG tablet Take 1 tablet (500 mg total) by mouth 2 (two) times daily.    albuterol 90 mcg/actuation inhaler Inhale 2 puffs into the lungs 4 (four) times daily.    albuterol sulfate 2.5 mg/0.5 mL Nebu Take 2.5 mg by nebulization every 4 (four) hours as needed.    compressor, for nebulizer Lara Compressor use as directed by albuterol use.       Physical Exam:  General appearance: alert, appears stated age and cooperative  Head: Normocephalic, without obvious abnormality, atraumatic  Eyes:  conjunctivae/corneas clear. PERRL, EOM's intact. Fundi benign.  Nose: no discharge  Throat: normal findings: tongue midline and normal  Neck: no adenopathy, no carotid bruit, no JVD, supple, symmetrical, trachea midline and thyroid not enlarged, symmetric, no tenderness/mass/nodules  Back:  no skin lesions, erythema, or scars  Lungs:  clear to auscultation bilaterally  Chest wall: no tenderness  Heart: irregular rate and rhythm, S1, S2 normal, no murmur, click, rub or gallop  Abdomen: soft, non-tender; bowel sounds normal; no masses,  no organomegaly  Extremities: extremities normal, atraumatic, no cyanosis or edema  Pulses: 1+ and symmetric  Skin: Skin color, texture, turgor normal. No rashes or lesions  Neurologic: Grossly normal    Laboratory:  Chemistry:   Lab Results   Component Value Date     (L) 05/25/2017    K 5.2 (H) 05/25/2017    CL 98 05/25/2017    CO2 27 05/25/2017    BUN 33 (H) 05/25/2017    CREATININE 1.0 05/25/2017    CALCIUM 9.3 05/25/2017     Cardiac Markers:   Lab Results   Component Value Date    TROPONINI 0.043 (H) 05/22/2017     Cardiac Markers (Last 3):   Lab Results   Component Value Date    TROPONINI 0.043 (H) 05/22/2017    TROPONINI 0.045 (H) 05/21/2017    TROPONINI 0.027 (H) 05/21/2017     CBC:   Lab Results   Component Value Date    WBC 6.21 05/25/2017    HGB 10.7 (L) 05/25/2017     HCT 33.2 (L) 05/25/2017    MCV 90 05/25/2017     05/25/2017     Lipids:   Lab Results   Component Value Date    CHOL 123 04/13/2017    TRIG 64 04/13/2017    HDL 59 04/13/2017     Coagulation:   Lab Results   Component Value Date    INR 1.2 05/21/2017    APTT 30.4 12/02/2016       Diagnostic Results:  ECG: Reviewed  X-Ray: Reviewed  US: Reviewed  CT: Reviewed  Echo: Reviewed      ASSESSMENT/PLAN:     Patient Active Problem List   Diagnosis    Hypertension    Hyperlipidemia    Morbid obesity with BMI of 40.0-44.9, adult    Lumbar spondylosis    Asthma    Atherosclerosis of aorta    PAF (paroxysmal atrial fibrillation)    Acute on chronic diastolic congestive heart failure    Left ventricular diastolic dysfunction with preserved systolic function    Heart valve regurgitation    Osteoarthritis    Abnormal nuclear stress test    Coronary artery disease involving native coronary artery of native heart without angina pectoris    Acute on chronic congestive heart failure    Sepsis    E. coli UTI (urinary tract infection)    E coli bacteremia      Patient admitted with Decompensated Diastolic Heart Failure. Heart Failure is now compensated, however she continues to be A Fib on the monitor, rate controled.   May benefit for LISSETH/Cardioversion.  She has been on Eliquis 5 mg BID for CVA protection.     Plan:     Will continue current medications and treatment plan  Continue Eliquis, ASA, Flecainide, Imdur, B Blocker, Statin and Valsartan  Low sodium diet of 1.5 grams daily and limit fluid intake to 1.5 liters daily  Risk modification     Chart reviewed. Dr. Jones agrees with plan as outlined above.

## 2017-05-26 ENCOUNTER — PATIENT OUTREACH (OUTPATIENT)
Dept: ADMINISTRATIVE | Facility: CLINIC | Age: 82
End: 2017-05-26
Payer: MEDICARE

## 2017-05-26 NOTE — PATIENT INSTRUCTIONS

## 2017-05-27 ENCOUNTER — HOSPITAL ENCOUNTER (INPATIENT)
Facility: HOSPITAL | Age: 82
LOS: 3 days | Discharge: NURSING, REHAB OR LONG-TERM CARE | DRG: 189 | End: 2017-05-30
Attending: EMERGENCY MEDICINE | Admitting: INTERNAL MEDICINE
Payer: MEDICARE

## 2017-05-27 DIAGNOSIS — R09.02 HYPOXIA: ICD-10-CM

## 2017-05-27 DIAGNOSIS — R06.02 SHORTNESS OF BREATH: ICD-10-CM

## 2017-05-27 DIAGNOSIS — R09.02 HYPOXEMIA: ICD-10-CM

## 2017-05-27 DIAGNOSIS — I50.9 ACUTE CONGESTIVE HEART FAILURE, UNSPECIFIED CONGESTIVE HEART FAILURE TYPE: Primary | ICD-10-CM

## 2017-05-27 PROBLEM — N17.9 ACUTE KIDNEY INJURY (NONTRAUMATIC): Status: ACTIVE | Noted: 2017-05-27

## 2017-05-27 PROBLEM — D72.829 LEUKOCYTOSIS: Status: ACTIVE | Noted: 2017-05-27

## 2017-05-27 LAB
ALLENS TEST: ABNORMAL
ALLENS TEST: ABNORMAL
ANION GAP SERPL CALC-SCNC: 13 MMOL/L
BACTERIA #/AREA URNS HPF: ABNORMAL /HPF
BASOPHILS # BLD AUTO: 0.01 K/UL
BASOPHILS NFR BLD: 0.1 %
BILIRUB UR QL STRIP: ABNORMAL
BNP SERPL-MCNC: 537 PG/ML
BUN SERPL-MCNC: 27 MG/DL
CALCIUM SERPL-MCNC: 9.2 MG/DL
CHLORIDE SERPL-SCNC: 96 MMOL/L
CK MB SERPL-MCNC: 0.5 NG/ML
CK MB SERPL-RTO: 1.9 %
CK SERPL-CCNC: 27 U/L
CK SERPL-CCNC: 27 U/L
CLARITY UR: ABNORMAL
CO2 SERPL-SCNC: 25 MMOL/L
COLOR UR: YELLOW
CREAT SERPL-MCNC: 1.4 MG/DL
DELSYS: ABNORMAL
DELSYS: ABNORMAL
DIFFERENTIAL METHOD: ABNORMAL
EOSINOPHIL # BLD AUTO: 0 K/UL
EOSINOPHIL NFR BLD: 0.1 %
ERYTHROCYTE [DISTWIDTH] IN BLOOD BY AUTOMATED COUNT: 14 %
EST. GFR  (AFRICAN AMERICAN): 40 ML/MIN/1.73 M^2
EST. GFR  (NON AFRICAN AMERICAN): 35 ML/MIN/1.73 M^2
FIO2: 100
FIO2: 28
FLOW: 2
GLUCOSE SERPL-MCNC: 139 MG/DL
GLUCOSE UR QL STRIP: NEGATIVE
HCO3 UR-SCNC: 25.8 MMOL/L (ref 24–28)
HCO3 UR-SCNC: 27.8 MMOL/L (ref 24–28)
HCT VFR BLD AUTO: 31.5 %
HGB BLD-MCNC: 10.6 G/DL
HGB UR QL STRIP: ABNORMAL
HYALINE CASTS #/AREA URNS LPF: 1 /LPF
INR PPP: 1.2
KETONES UR QL STRIP: NEGATIVE
LEUKOCYTE ESTERASE UR QL STRIP: NEGATIVE
LYMPHOCYTES # BLD AUTO: 1.2 K/UL
LYMPHOCYTES NFR BLD: 7.8 %
MCH RBC QN AUTO: 29.9 PG
MCHC RBC AUTO-ENTMCNC: 33.7 %
MCV RBC AUTO: 89 FL
MICROSCOPIC COMMENT: ABNORMAL
MODE: ABNORMAL
MODE: ABNORMAL
MONOCYTES # BLD AUTO: 1.5 K/UL
MONOCYTES NFR BLD: 9.9 %
NEUTROPHILS # BLD AUTO: 12.8 K/UL
NEUTROPHILS NFR BLD: 82.8 %
NITRITE UR QL STRIP: NEGATIVE
PCO2 BLDA: 39.7 MMHG (ref 35–45)
PCO2 BLDA: 51.9 MMHG (ref 35–45)
PH SMN: 7.34 [PH] (ref 7.35–7.45)
PH SMN: 7.42 [PH] (ref 7.35–7.45)
PH UR STRIP: 6 [PH] (ref 5–8)
PLATELET # BLD AUTO: 168 K/UL
PMV BLD AUTO: 10.4 FL
PO2 BLDA: 228 MMHG (ref 80–100)
PO2 BLDA: 79 MMHG (ref 80–100)
POC BE: 1 MMOL/L
POC BE: 2 MMOL/L
POC SATURATED O2: 100 % (ref 95–100)
POC SATURATED O2: 96 % (ref 95–100)
POTASSIUM SERPL-SCNC: 4.8 MMOL/L
PROT UR QL STRIP: ABNORMAL
PROTHROMBIN TIME: 12.1 SEC
RBC # BLD AUTO: 3.54 M/UL
RBC #/AREA URNS HPF: >100 /HPF (ref 0–4)
SAMPLE: ABNORMAL
SAMPLE: ABNORMAL
SITE: ABNORMAL
SITE: ABNORMAL
SODIUM SERPL-SCNC: 134 MMOL/L
SP GR UR STRIP: 1.02 (ref 1–1.03)
TROPONIN I SERPL DL<=0.01 NG/ML-MCNC: 0.02 NG/ML
URN SPEC COLLECT METH UR: ABNORMAL
UROBILINOGEN UR STRIP-ACNC: NEGATIVE EU/DL
WBC # BLD AUTO: 15.52 K/UL
WBC #/AREA URNS HPF: 12 /HPF (ref 0–5)

## 2017-05-27 PROCEDURE — 25000003 PHARM REV CODE 250: Performed by: NURSE PRACTITIONER

## 2017-05-27 PROCEDURE — 82803 BLOOD GASES ANY COMBINATION: CPT

## 2017-05-27 PROCEDURE — 84484 ASSAY OF TROPONIN QUANT: CPT

## 2017-05-27 PROCEDURE — 96374 THER/PROPH/DIAG INJ IV PUSH: CPT

## 2017-05-27 PROCEDURE — 21400001 HC TELEMETRY ROOM

## 2017-05-27 PROCEDURE — 87040 BLOOD CULTURE FOR BACTERIA: CPT | Mod: 59

## 2017-05-27 PROCEDURE — 63600175 PHARM REV CODE 636 W HCPCS: Performed by: NURSE PRACTITIONER

## 2017-05-27 PROCEDURE — 82800 BLOOD PH: CPT

## 2017-05-27 PROCEDURE — 94640 AIRWAY INHALATION TREATMENT: CPT

## 2017-05-27 PROCEDURE — 99285 EMERGENCY DEPT VISIT HI MDM: CPT | Mod: 25

## 2017-05-27 PROCEDURE — P9612 CATHETERIZE FOR URINE SPEC: HCPCS

## 2017-05-27 PROCEDURE — 85610 PROTHROMBIN TIME: CPT

## 2017-05-27 PROCEDURE — 63600175 PHARM REV CODE 636 W HCPCS: Performed by: EMERGENCY MEDICINE

## 2017-05-27 PROCEDURE — 80048 BASIC METABOLIC PNL TOTAL CA: CPT

## 2017-05-27 PROCEDURE — 83880 ASSAY OF NATRIURETIC PEPTIDE: CPT

## 2017-05-27 PROCEDURE — 27000221 HC OXYGEN, UP TO 24 HOURS

## 2017-05-27 PROCEDURE — 36600 WITHDRAWAL OF ARTERIAL BLOOD: CPT

## 2017-05-27 PROCEDURE — 25000242 PHARM REV CODE 250 ALT 637 W/ HCPCS: Performed by: EMERGENCY MEDICINE

## 2017-05-27 PROCEDURE — 81000 URINALYSIS NONAUTO W/SCOPE: CPT

## 2017-05-27 PROCEDURE — 99900035 HC TECH TIME PER 15 MIN (STAT)

## 2017-05-27 PROCEDURE — 82553 CREATINE MB FRACTION: CPT

## 2017-05-27 PROCEDURE — 85025 COMPLETE CBC W/AUTO DIFF WBC: CPT

## 2017-05-27 PROCEDURE — 96375 TX/PRO/DX INJ NEW DRUG ADDON: CPT

## 2017-05-27 PROCEDURE — 93010 ELECTROCARDIOGRAM REPORT: CPT | Mod: ,,, | Performed by: INTERNAL MEDICINE

## 2017-05-27 PROCEDURE — 63600175 PHARM REV CODE 636 W HCPCS: Performed by: INTERNAL MEDICINE

## 2017-05-27 PROCEDURE — 87086 URINE CULTURE/COLONY COUNT: CPT

## 2017-05-27 PROCEDURE — 25000242 PHARM REV CODE 250 ALT 637 W/ HCPCS: Performed by: INTERNAL MEDICINE

## 2017-05-27 PROCEDURE — 93005 ELECTROCARDIOGRAM TRACING: CPT

## 2017-05-27 RX ORDER — FUROSEMIDE 10 MG/ML
40 INJECTION INTRAMUSCULAR; INTRAVENOUS DAILY
Status: DISCONTINUED | OUTPATIENT
Start: 2017-05-28 | End: 2017-05-30 | Stop reason: HOSPADM

## 2017-05-27 RX ORDER — FUROSEMIDE 10 MG/ML
40 INJECTION INTRAMUSCULAR; INTRAVENOUS
Status: COMPLETED | OUTPATIENT
Start: 2017-05-27 | End: 2017-05-27

## 2017-05-27 RX ORDER — SODIUM CHLORIDE 0.9 % (FLUSH) 0.9 %
3 SYRINGE (ML) INJECTION EVERY 8 HOURS
Status: DISCONTINUED | OUTPATIENT
Start: 2017-05-27 | End: 2017-05-30 | Stop reason: HOSPADM

## 2017-05-27 RX ORDER — FUROSEMIDE 10 MG/ML
INJECTION INTRAMUSCULAR; INTRAVENOUS
Status: DISPENSED
Start: 2017-05-27 | End: 2017-05-28

## 2017-05-27 RX ORDER — IPRATROPIUM BROMIDE AND ALBUTEROL SULFATE 2.5; .5 MG/3ML; MG/3ML
3 SOLUTION RESPIRATORY (INHALATION) EVERY 4 HOURS PRN
Status: DISCONTINUED | OUTPATIENT
Start: 2017-05-27 | End: 2017-05-30 | Stop reason: HOSPADM

## 2017-05-27 RX ORDER — PANTOPRAZOLE SODIUM 40 MG/1
40 TABLET, DELAYED RELEASE ORAL DAILY
Status: DISCONTINUED | OUTPATIENT
Start: 2017-05-27 | End: 2017-05-30 | Stop reason: HOSPADM

## 2017-05-27 RX ORDER — ISOSORBIDE MONONITRATE 30 MG/1
30 TABLET, EXTENDED RELEASE ORAL DAILY
Status: DISCONTINUED | OUTPATIENT
Start: 2017-05-27 | End: 2017-05-30 | Stop reason: HOSPADM

## 2017-05-27 RX ORDER — PRAVASTATIN SODIUM 20 MG/1
40 TABLET ORAL DAILY
Status: DISCONTINUED | OUTPATIENT
Start: 2017-05-27 | End: 2017-05-30 | Stop reason: HOSPADM

## 2017-05-27 RX ORDER — FUROSEMIDE 10 MG/ML
80 INJECTION INTRAMUSCULAR; INTRAVENOUS ONCE
Status: COMPLETED | OUTPATIENT
Start: 2017-05-27 | End: 2017-05-27

## 2017-05-27 RX ORDER — MOXIFLOXACIN HYDROCHLORIDE 400 MG/250ML
400 INJECTION, SOLUTION INTRAVENOUS
Status: DISCONTINUED | OUTPATIENT
Start: 2017-05-27 | End: 2017-05-30 | Stop reason: HOSPADM

## 2017-05-27 RX ORDER — POLYETHYLENE GLYCOL 3350 17 G/17G
17 POWDER, FOR SOLUTION ORAL DAILY
Status: DISCONTINUED | OUTPATIENT
Start: 2017-05-27 | End: 2017-05-30 | Stop reason: HOSPADM

## 2017-05-27 RX ORDER — FUROSEMIDE 40 MG/1
40 TABLET ORAL DAILY
Status: DISCONTINUED | OUTPATIENT
Start: 2017-05-27 | End: 2017-05-27

## 2017-05-27 RX ORDER — IPRATROPIUM BROMIDE AND ALBUTEROL SULFATE 2.5; .5 MG/3ML; MG/3ML
3 SOLUTION RESPIRATORY (INHALATION) ONCE
Status: COMPLETED | OUTPATIENT
Start: 2017-05-27 | End: 2017-05-27

## 2017-05-27 RX ORDER — ACETAMINOPHEN 325 MG/1
325 TABLET ORAL EVERY 6 HOURS PRN
Status: DISCONTINUED | OUTPATIENT
Start: 2017-05-27 | End: 2017-05-30 | Stop reason: HOSPADM

## 2017-05-27 RX ORDER — FLECAINIDE ACETATE 50 MG/1
50 TABLET ORAL EVERY 12 HOURS
Status: DISCONTINUED | OUTPATIENT
Start: 2017-05-27 | End: 2017-05-30 | Stop reason: HOSPADM

## 2017-05-27 RX ORDER — ONDANSETRON 8 MG/1
8 TABLET, ORALLY DISINTEGRATING ORAL EVERY 8 HOURS PRN
Status: DISCONTINUED | OUTPATIENT
Start: 2017-05-27 | End: 2017-05-30 | Stop reason: HOSPADM

## 2017-05-27 RX ORDER — VALSARTAN 80 MG/1
160 TABLET ORAL DAILY
Status: DISCONTINUED | OUTPATIENT
Start: 2017-05-27 | End: 2017-05-30 | Stop reason: HOSPADM

## 2017-05-27 RX ORDER — ONDANSETRON 2 MG/ML
4 INJECTION INTRAMUSCULAR; INTRAVENOUS
Status: COMPLETED | OUTPATIENT
Start: 2017-05-27 | End: 2017-05-27

## 2017-05-27 RX ORDER — ASPIRIN 81 MG/1
81 TABLET ORAL DAILY
Status: DISCONTINUED | OUTPATIENT
Start: 2017-05-27 | End: 2017-05-30 | Stop reason: HOSPADM

## 2017-05-27 RX ORDER — METOPROLOL SUCCINATE 50 MG/1
100 TABLET, EXTENDED RELEASE ORAL DAILY
Status: DISCONTINUED | OUTPATIENT
Start: 2017-05-27 | End: 2017-05-30 | Stop reason: HOSPADM

## 2017-05-27 RX ADMIN — MOXIFLOXACIN HYDROCHLORIDE 400 MG: 400 INJECTION, SOLUTION INTRAVENOUS at 03:05

## 2017-05-27 RX ADMIN — PIPERACILLIN SODIUM AND TAZOBACTAM SODIUM 4.5 G: 4; .5 INJECTION, POWDER, FOR SOLUTION INTRAVENOUS at 06:05

## 2017-05-27 RX ADMIN — FUROSEMIDE 40 MG: 10 INJECTION, SOLUTION INTRAMUSCULAR; INTRAVENOUS at 09:05

## 2017-05-27 RX ADMIN — FUROSEMIDE 40 MG: 40 TABLET ORAL at 03:05

## 2017-05-27 RX ADMIN — APIXABAN 5 MG: 2.5 TABLET, FILM COATED ORAL at 08:05

## 2017-05-27 RX ADMIN — Medication 1000 MG: at 04:05

## 2017-05-27 RX ADMIN — SODIUM CHLORIDE, PRESERVATIVE FREE 3 ML: 5 INJECTION INTRAVENOUS at 02:05

## 2017-05-27 RX ADMIN — FUROSEMIDE 80 MG: 10 INJECTION, SOLUTION INTRAMUSCULAR; INTRAVENOUS at 06:05

## 2017-05-27 RX ADMIN — POLYETHYLENE GLYCOL 3350 17 G: 17 POWDER, FOR SOLUTION ORAL at 03:05

## 2017-05-27 RX ADMIN — IPRATROPIUM BROMIDE AND ALBUTEROL SULFATE 3 ML: .5; 3 SOLUTION RESPIRATORY (INHALATION) at 06:05

## 2017-05-27 RX ADMIN — IPRATROPIUM BROMIDE AND ALBUTEROL SULFATE 3 ML: .5; 3 SOLUTION RESPIRATORY (INHALATION) at 10:05

## 2017-05-27 RX ADMIN — APIXABAN 5 MG: 2.5 TABLET, FILM COATED ORAL at 03:05

## 2017-05-27 RX ADMIN — SODIUM CHLORIDE, PRESERVATIVE FREE 3 ML: 5 INJECTION INTRAVENOUS at 10:05

## 2017-05-27 RX ADMIN — ASPIRIN 81 MG: 81 TABLET, COATED ORAL at 03:05

## 2017-05-27 RX ADMIN — VALSARTAN 160 MG: 80 TABLET, FILM COATED ORAL at 03:05

## 2017-05-27 RX ADMIN — PRAVASTATIN SODIUM 40 MG: 20 TABLET ORAL at 03:05

## 2017-05-27 RX ADMIN — FLECAINIDE ACETATE 50 MG: 50 TABLET ORAL at 08:05

## 2017-05-27 RX ADMIN — ISOSORBIDE MONONITRATE 30 MG: 30 TABLET, EXTENDED RELEASE ORAL at 03:05

## 2017-05-27 RX ADMIN — PANTOPRAZOLE SODIUM 40 MG: 40 TABLET, DELAYED RELEASE ORAL at 03:05

## 2017-05-27 RX ADMIN — FLECAINIDE ACETATE 50 MG: 50 TABLET ORAL at 03:05

## 2017-05-27 RX ADMIN — METOPROLOL SUCCINATE 100 MG: 50 TABLET, EXTENDED RELEASE ORAL at 03:05

## 2017-05-27 RX ADMIN — ONDANSETRON 4 MG: 2 INJECTION INTRAMUSCULAR; INTRAVENOUS at 08:05

## 2017-05-27 NOTE — SUBJECTIVE & OBJECTIVE
"Past Medical History:   Diagnosis Date    Acute on chronic diastolic congestive heart failure 8/27/2015    Anemia 5/9/2016    Anemia 5/9/2016    Anticoagulant long-term use     Aortic regurgitation     Echo 11/2013---5 - Mild to moderate aortic regurgitation.      Atrial fibrillation 5/15/2014    Back pain     s/p epidural steriod injections, no recent injections.    Baker's cyst     small, right, seen on US lower extremity 6/2014    Blood transfusion     Chest pain, atypical 8/27/2015    Diastolic dysfunction     Seen on echo 11/2013, stress test negative 5/2014    Diverticulosis     colonoscopy 3/27/2011    Hemorrhoids     colonoscopy 3/27/2011    Hiatal hernia     CXR 12/30/2016---Retrocardiac density again noted consistent with a hiatal hernia.    Hx of adenomatous colonic polyps     Hyperlipidemia     Hypertension     Hyponatremia 5/9/2016    Mitral regurgitation     Myocardial infarction     per patient was told in the past she had a "mild heart attack"    Obesity     Osteoarthritis, knee     Pneumonia     per patient "walking Pneumonia" did not require hospitalization    Seasonal allergies     Stroke     TIA; patient reports was told by one doctor she had 2 mini strokes but another doctor said she did not       Past Surgical History:   Procedure Laterality Date    APPENDECTOMY      EYE SURGERY Left     HYSTERECTOMY      benign reasons    KNEE SURGERY Bilateral     x2     OLECRANON BURSECTOMY Right     6/2011    TONSILLECTOMY      URETHRA SURGERY         Review of patient's allergies indicates:   Allergen Reactions    Iodine and iodide containing products Rash       No current facility-administered medications on file prior to encounter.      Current Outpatient Prescriptions on File Prior to Encounter   Medication Sig    acetaminophen (TYLENOL) 500 MG tablet Take 1 tablet (500 mg total) by mouth 2 (two) times daily.    albuterol 90 mcg/actuation inhaler Inhale 2 puffs into " the lungs 4 (four) times daily.    albuterol sulfate 2.5 mg/0.5 mL Nebu Take 2.5 mg by nebulization every 4 (four) hours as needed.    apixaban (ELIQUIS) 5 mg Tab Take 1 tablet (5 mg total) by mouth 2 (two) times daily.    ASCORBATE CALCIUM (VITAMIN C ORAL) Take by mouth once daily.    aspirin (ECOTRIN) 81 MG EC tablet Take 1 tablet (81 mg total) by mouth once daily.    ciprofloxacin HCl (CIPRO) 500 MG tablet Take 1 tablet (500 mg total) by mouth 2 (two) times daily.    compressor, for nebulizer Lara Compressor use as directed by albuterol use.    DOCOSAHEXANOIC ACID/EPA (FISH OIL ORAL) Take by mouth once daily.    flecainide (TAMBOCOR) 50 MG Tab Take 1 tablet (50 mg total) by mouth every 12 (twelve) hours.    fluticasone (FLOVENT HFA) 110 mcg/actuation inhaler Inhale 1 puff into the lungs 2 (two) times daily.    furosemide (LASIX) 40 MG tablet Take 1 tablet (40 mg total) by mouth once daily.    isosorbide mononitrate (IMDUR) 30 MG 24 hr tablet TAKE 1 TABLET(30 MG) BY MOUTH EVERY DAY    loratadine (CLARITIN) 10 mg tablet Take 1 tablet (10 mg total) by mouth once daily.    metoprolol succinate (TOPROL-XL) 100 MG 24 hr tablet Take 1 tablet (100 mg total) by mouth once daily.    pravastatin (PRAVACHOL) 40 MG tablet Take 1 tablet (40 mg total) by mouth once daily.    valsartan (DIOVAN) 160 MG tablet Take 1 tablet (160 mg total) by mouth once daily.     Family History     Problem Relation (Age of Onset)    COPD Son    Cancer Mother, Father, Sister    Diabetes Brother, Son    Heart disease Mother, Father, Sister, Brother    Hyperlipidemia Mother, Father    Hypertension Mother, Father, Son        Social History Main Topics    Smoking status: Former Smoker     Packs/day: 0.05     Years: 1.00     Types: Cigarettes     Quit date: 1/1/1952    Smokeless tobacco: Never Used    Alcohol use No    Drug use: No    Sexual activity: No     Review of Systems   Constitutional: Positive for fatigue.   HENT: Negative.     Eyes: Negative.    Respiratory: Positive for cough and shortness of breath. Negative for wheezing.    Cardiovascular: Negative for chest pain, palpitations and leg swelling.   Gastrointestinal: Negative for abdominal pain, nausea and vomiting.   Endocrine: Negative.    Genitourinary: Negative for difficulty urinating, dysuria, hematuria and urgency.   Musculoskeletal: Negative.    Skin: Negative.    Allergic/Immunologic: Negative.    Neurological: Negative for syncope and light-headedness.   Hematological: Negative.    Psychiatric/Behavioral: Negative.    All other systems reviewed and are negative.    Objective:     Vital Signs (Most Recent):  Temp: 97.8 °F (36.6 °C) (05/27/17 1036)  Pulse: 73 (05/27/17 1004)  Resp: (!) 35 (05/27/17 1004)  BP: (!) 158/60 (provider informed ) (05/27/17 0917)  SpO2: 100 % (05/27/17 1004) Vital Signs (24h Range):  Temp:  [97.8 °F (36.6 °C)-99.6 °F (37.6 °C)] 97.8 °F (36.6 °C)  Pulse:  [69-73] 73  Resp:  [18-35] 35  SpO2:  [88 %-100 %] 100 %  BP: (150-158)/(60-82) 158/60     Weight: 89.8 kg (198 lb)  Body mass index is 40.68 kg/m².    Physical Exam   Constitutional: She is oriented to person, place, and time. She appears well-developed and well-nourished.   HENT:   Head: Normocephalic and atraumatic.   Eyes: EOM are normal. Pupils are equal, round, and reactive to light.   Neck: Normal range of motion. Neck supple.   Cardiovascular: Normal rate and regular rhythm.    Murmur heard.  Pulmonary/Chest: She is in respiratory distress (mild).   Crackles to bilat bases   Abdominal: Soft. Bowel sounds are normal.   Musculoskeletal: Normal range of motion. She exhibits no edema.   Neurological: She is alert and oriented to person, place, and time.   Skin: Skin is warm and dry. Capillary refill takes less than 2 seconds.   Psychiatric: She has a normal mood and affect. Her behavior is normal.   Nursing note and vitals reviewed.       Significant Labs:   Blood Culture: No results for input(s):  LABBLOO in the last 48 hours.  CBC:   Recent Labs  Lab 05/27/17  0723   WBC 15.52*   HGB 10.6*   HCT 31.5*        CMP:   Recent Labs  Lab 05/27/17  0723   *   K 4.8   CL 96   CO2 25   *   BUN 27*   CREATININE 1.4   CALCIUM 9.2   ANIONGAP 13   EGFRNONAA 35*     UA and BC pending    Significant Imaging: CT: I have reviewed all pertinent results/findings within the past 24 hours and my personal findings are:  pulmonary edema vs infective inflamatory process  CXR: I have reviewed all pertinent results/findings within the past 24 hours and my personal findings are:  Possible trace pleural effusions.

## 2017-05-27 NOTE — PROGRESS NOTES
Dr. Moore notified of pt being tachynic, /66. Dr. Moore ordered stat chest x ray and abg. Pt oz sat prior to placing mask was between 88-91 %.  Pt placed on non rebreather  mask placed o2 sat  97 %.  Pt breath sounds diminished. Respiration 48. Will continue to monitor.

## 2017-05-27 NOTE — CONSULTS
Elke Laws [id number] is a 82 y.o. female who has been consulted for vancomycin dosing.    Dx: Hypoxia  Pneumonia/HCAP (recent hospitalization)   Goal trough 15-20     The patient has the following labs:     Date Creatinine (mg/dl)    BUN WBC Count   5/27/2017 Estimated Creatinine Clearance: 43.9 mL/min (based on Cr of 1.4). Lab Results   Component Value Date    BUN 27 (H) 05/27/2017     Lab Results   Component Value Date    WBC 15.52 (H) 05/27/2017        Current weight is 89.8 kg (198 lb)    The patient will be started on vancomycin at a dose of 1,000 mg. Patient has an JAE. Scr 1.4 (CrCl 43.9 ml/min). Patient will be PULSE DOSED. Patient will be followed by pharmacy for changes in renal function, toxicity, and efficacy.  The Vancomycin Random has been ordered daily at 0430. Placeholder dose of 1,000 mg every 48 hours to be given, pending acceptable AM random.     Thank you for allowing us to participate in this patient's care.     Jacklyn Torre

## 2017-05-27 NOTE — H&P
[facility] Hospital Medicine  History & Physical    Patient Name: Elke Laws  MRN: [id number]  Admission Date: 5/27/2017  Attending Physician: Shaq Moore MD   Primary Care Provider: Isidra Benavidez MD         Patient information was obtained from patient, relative(s), past medical records and ER records.     Subjective:     Principal Problem:Hypoxemia    Chief Complaint:   Chief Complaint   Patient presents with    Vomiting     vomiting and shortness of breath since this morning        HPI: 81 y/o female presented to ED with c/o of SOB and cough. PMH of a-fib, CHF, and HTN. She was discharged from hospital 2 days ago with a dx of ecoli UTI on PO cipro, which is due to be finished today. She reports she developed a worsening cough with thick, white sputum and progressive SOB which started last night and at 4am she instructed her sitter to take her to the hospital. She denies abd pain, back pain, urine urgency, dysuria, hematuria, Cp, chest pressure, and elevated temperature. Her daughter is at the bedside and reports her urine was blood tinged.      No new subjective & objective note has been filed under this hospital service since the last note was generated.    Assessment/Plan:     Leukocytosis    Pt discharged 2 days ago with E Coli UTI  Ddx:  UTI, pneumonia  BC pending   CT of chest pending  UA pending  emperic ABX   Monitor for elevated temp  Trend WBC          * Hypoxemia    Pulmonary edema vs pneumonia  Discharged from hospital 2 days ago  emperic IV abx: Vanc, zosyn, avelox  Continuous pulse ox monitoring  Supplemental Oxygen to maintain sat of 92%  Lasix 40mg daily            PAF (paroxysmal atrial fibrillation)    Cardiac Monitoring - rate controlled  Continue fleccanide  Continue eliquis        Hypertension    Monitor VS  Continue home diovan, isosorbide, Toprol  Low sodium diet            Coronary artery disease involving native coronary artery of native heart without angina  pectoris    Continue Eliquis, Imdur, Metoprolol, Pravastatin, and valsartan           Acute on chronic diastolic congestive heart failure    Echo on 4/13/17 Shows Impaired LV relaxation, elevated LAP with normal EF  Airway assessment and continuous pulse ox monitoring  Supplemental Oxygen to maintain sat of 92%  Continuous cardiac monitoring  Lasix 40mg IV  Continue Toprol,           Morbid obesity with BMI of 40.0-44.9, adult    Discussed importance of weight loss on heart disease symptoms  encouraged pt to follow low calorie, low fat, low salt diet upon discharge  Cardiac diet while in hospital          Acute Kidney Injury       Avoid nephrotoxic agents       Monitor [facility]       Pharmacy to monitor vanc dosing    VTE Risk Mitigation     Daily Tyrell Walker, MAULIKP-C  Department of Hospital Medicine   [facility]

## 2017-05-27 NOTE — ED PROVIDER NOTES
SCRIBE #1 NOTE: I, Corinne Mack, am scribing for, and in the presence of, Jimmie Hughes MD. I have scribed the entire note.      History      Chief Complaint   Patient presents with    Vomiting     vomiting and shortness of breath since this morning       Review of patient's allergies indicates:   Allergen Reactions    Iodine and iodide containing products Rash        HPI   HPI    5/27/2017, 6:27 AM   History obtained from the patient and daughter      History of Present Illness: Elke Laws is a 82 y.o. female patient who presents to the Emergency Department for SOB which onset gradually this morning. Symptoms are constant and moderate in severity. Pt reports having mucous like regurgitation that is not vomiting. Pt was in the hospital on Thursday. Pt's daughter contacted Dr. Jones (Cardiology) this morning. Dr. Jones instructed pt to tx with nebulizer and, if sxs did not improve, present to the ED. No mitigating or exacerbating factors reported. No associated sxs. Patient denies any recent travel, long car trips, leg pain/swelling, CP, cough, fever, chills, constipation, hematochezia, dysuria, hematuria, urinary frequency, N/V, and all other sxs at this time. Prior Tx includes nebulizer. Pt has hx of CHF and COPD. No further complaints or concerns at this time.         Arrival mode: Personal vehicle      PCP: Isidra Benavidez MD       Past Medical History:  Past Medical History:   Diagnosis Date    Acute on chronic diastolic congestive heart failure 8/27/2015    Anemia 5/9/2016    Anemia 5/9/2016    Anticoagulant long-term use     Aortic regurgitation     Echo 11/2013---5 - Mild to moderate aortic regurgitation.      Atrial fibrillation 5/15/2014    Back pain     s/p epidural steriod injections, no recent injections.    Baker's cyst     small, right, seen on US lower extremity 6/2014    Blood transfusion     Chest pain, atypical 8/27/2015    Diastolic dysfunction     Seen on echo 11/2013,  "stress test negative 5/2014    Diverticulosis     colonoscopy 3/27/2011    Hemorrhoids     colonoscopy 3/27/2011    Hiatal hernia     CXR 12/30/2016---Retrocardiac density again noted consistent with a hiatal hernia.    Hx of adenomatous colonic polyps     Hyperlipidemia     Hypertension     Hyponatremia 5/9/2016    Mitral regurgitation     Myocardial infarction     per patient was told in the past she had a "mild heart attack"    Obesity     Osteoarthritis, knee     Pneumonia     per patient "walking Pneumonia" did not require hospitalization    Seasonal allergies     Stroke     TIA; patient reports was told by one doctor she had 2 mini strokes but another doctor said she did not       Past Surgical History:  Past Surgical History:   Procedure Laterality Date    APPENDECTOMY      EYE SURGERY Left     HYSTERECTOMY      benign reasons    KNEE SURGERY Bilateral     x2     OLECRANON BURSECTOMY Right     6/2011    TONSILLECTOMY      URETHRA SURGERY           Family History:  Family History   Problem Relation Age of Onset    Heart disease Mother     Cancer Mother      colon    Hypertension Mother     Hyperlipidemia Mother     Heart disease Father     Cancer Father      colon    Hypertension Father     Hyperlipidemia Father     Heart disease Sister      MI    Heart disease Brother      MI    COPD Son     Hypertension Son     Diabetes Brother     Diabetes Son     Cancer Sister      breast    Kidney disease Neg Hx     Stroke Neg Hx        Social History:  Social History     Social History Main Topics    Smoking status: Former Smoker     Packs/day: 0.05     Years: 1.00     Types: Cigarettes     Quit date: 1/1/1952    Smokeless tobacco: Never Used    Alcohol use No    Drug use: No    Sexual activity: No       ROS   Review of Systems   Constitutional: Negative for chills, diaphoresis and fever.        (-) recent travel  (-) long car rides   Respiratory: Positive for shortness of " breath. Negative for cough.    Cardiovascular: Negative for chest pain and leg swelling.   Gastrointestinal: Negative for abdominal pain, blood in stool, diarrhea, nausea and vomiting.   Genitourinary: Negative for difficulty urinating, dysuria, flank pain, frequency and hematuria.   Musculoskeletal: Negative for back pain, neck pain and neck stiffness.   Skin: Negative for rash and wound.   Neurological: Negative for dizziness, numbness and headaches.   All other systems reviewed and are negative.      Physical Exam      Initial Vitals [05/27/17 0625]   BP Pulse Resp Temp SpO2   (!) 157/75 71 18 99.6 °F (37.6 °C) (!) 91 %      Physical Exam  Nursing Notes and Vital Signs Reviewed.  Constitutional: Patient is in no apparent distress. Well-developed and well-nourished.  Head: Atraumatic. Normocephalic.  Eyes: PERRL. EOM intact. Conjunctivae are not pale. No scleral icterus.  ENT: Mucous membranes are moist. Oropharynx is clear and symmetric.    Neck: Supple. Full ROM. No lymphadenopathy.  Cardiovascular: Regular rate. Regular rhythm. No rubs or gallops. Distal pulses are 2+ and symmetric. 3/6 systolic murmur.  Pulmonary/Chest: No respiratory distress. Breath sounds are diminished bilaterally. Crackles in lung bases bilaterally. No wheezing, rales, or rhonchi.  Abdominal: Soft and non-distended.  There is no tenderness.  No rebound, guarding, or rigidity. Good bowel sounds.  Musculoskeletal: Moves all extremities. No obvious deformities. No edema. No calf tenderness.  Skin: Warm and dry.  Neurological:  Alert, awake, and appropriate.  Normal speech.  No acute focal neurological deficits are appreciated.  Psychiatric: Normal affect. Good eye contact. Appropriate in content.    ED Course    Procedures  ED Vital Signs:  Vitals:    05/27/17 0625 05/27/17 0848 05/27/17 0917 05/27/17 0918   BP: (!) 157/75 (!) 150/82 (!) 158/60    Pulse: 71 70 69 69   Resp: 18 (!) 26 (!) 30 (!) 28   Temp: 99.6 °F (37.6 °C) 99 °F (37.2 °C)    "  TempSrc: Oral      SpO2: (!) 91% 97% (!) 90% (!) 88%   Weight: 89.8 kg (198 lb)      Height: 4' 10.5" (1.486 m)       05/27/17 1004   BP:    Pulse: 73   Resp: (!) 35   Temp:    TempSrc:    SpO2: 100%   Weight:    Height:        Abnormal Lab Results:  Labs Reviewed   BASIC METABOLIC PANEL - Abnormal; Notable for the following:        Result Value    Sodium 134 (*)     Glucose 139 (*)     BUN, Bld 27 (*)     eGFR if  40 (*)     eGFR if non  35 (*)     All other components within normal limits   B-TYPE NATRIURETIC PEPTIDE - Abnormal; Notable for the following:      (*)     All other components within normal limits   CBC W/ AUTO DIFFERENTIAL - Abnormal; Notable for the following:     WBC 15.52 (*)     RBC 3.54 (*)     Hemoglobin 10.6 (*)     Hematocrit 31.5 (*)     Gran # 12.8 (*)     Mono # 1.5 (*)     Gran% 82.8 (*)     Lymph% 7.8 (*)     All other components within normal limits   ISTAT PROCEDURE - Abnormal; Notable for the following:     POC PO2 79 (*)     All other components within normal limits   PROTIME-INR   TROPONIN I   CK   CK-MB   URINALYSIS        All Lab Results:  Results for orders placed or performed during the hospital encounter of 05/27/17   Basic metabolic panel   Result Value Ref Range    Sodium 134 (L) 136 - 145 mmol/L    Potassium 4.8 3.5 - 5.1 mmol/L    Chloride 96 95 - 110 mmol/L    CO2 25 23 - 29 mmol/L    Glucose 139 (H) 70 - 110 mg/dL    BUN, Bld 27 (H) 8 - 23 mg/dL    Creatinine 1.4 0.5 - 1.4 mg/dL    Calcium 9.2 8.7 - 10.5 mg/dL    Anion Gap 13 8 - 16 mmol/L    eGFR if African American 40 (A) >60 mL/min/1.73 m^2    eGFR if non African American 35 (A) >60 mL/min/1.73 m^2   Brain natriuretic peptide   Result Value Ref Range     (H) 0 - 99 pg/mL   CBC auto differential   Result Value Ref Range    WBC 15.52 (H) 3.90 - 12.70 K/uL    RBC 3.54 (L) 4.00 - 5.40 M/uL    Hemoglobin 10.6 (L) 12.0 - 16.0 g/dL    Hematocrit 31.5 (L) 37.0 - 48.5 %    MCV 89 " 82 - 98 fL    MCH 29.9 27.0 - 31.0 pg    MCHC 33.7 32.0 - 36.0 %    RDW 14.0 11.5 - 14.5 %    Platelets 168 150 - 350 K/uL    MPV 10.4 9.2 - 12.9 fL    Gran # 12.8 (H) 1.8 - 7.7 K/uL    Lymph # 1.2 1.0 - 4.8 K/uL    Mono # 1.5 (H) 0.3 - 1.0 K/uL    Eos # 0.0 0.0 - 0.5 K/uL    Baso # 0.01 0.00 - 0.20 K/uL    Gran% 82.8 (H) 38.0 - 73.0 %    Lymph% 7.8 (L) 18.0 - 48.0 %    Mono% 9.9 4.0 - 15.0 %    Eosinophil% 0.1 0.0 - 8.0 %    Basophil% 0.1 0.0 - 1.9 %    Differential Method Automated    Protime-INR   Result Value Ref Range    Prothrombin Time 12.1 9.0 - 12.5 sec    INR 1.2 0.8 - 1.2   Troponin I   Result Value Ref Range    Troponin I 0.024 0.000 - 0.026 ng/mL   CK   Result Value Ref Range    CPK 27 20 - 180 U/L   CK-MB   Result Value Ref Range    CPK 27 20 - 180 U/L    CPK MB 0.5 0.1 - 6.5 ng/mL    MB% 1.9 0.0 - 5.0 %   ISTAT PROCEDURE   Result Value Ref Range    POC PH 7.421 7.35 - 7.45    POC PCO2 39.7 35 - 45 mmHg    POC PO2 79 (L) 80 - 100 mmHg    POC HCO3 25.8 24 - 28 mmol/L    POC BE 1 -2 to 2 mmol/L    POC SATURATED O2 96 95 - 100 %    Sample ARTERIAL     Site RR     Allens Test Pass     DelSys Nasal Can     Mode SPONT     Flow 2     FiO2 28        Imaging Results:  Imaging Results          X-Ray Chest 1 View (Final result)  Result time 05/27/17 08:06:26    Final result by Manish Jo III, MD (05/27/17 08:06:26)                 Impression:     Stable cardiomegaly and pulmonary vascular congestion without overt alveolar edema.  Possible trace pleural effusions.        Electronically signed by: MANISH JO MD  Date:     05/27/17  Time:    08:06              Narrative:    XR CHEST 1 VIEW    Clinical history: Shortness of breath.      Findings: There is stable cardiomegaly and pulmonary vascular congestion without evidence of overt alveolar edema.  There are possible trace pleural effusions. The remainder of the lungs appear clear of active disease.                             The EKG was ordered, reviewed,  and independently interpreted by the ED provider.  Interpretation time: 0728  Rate: 70 BPM  Rhythm: normal sinus rhythm  Interpretation: Normal ECG. No STEMI.         The Emergency Provider reviewed the vital signs and test results, which are outlined above.    ED Discussion     10:13 AM: Discussed case with Kylee Alvarez NP ([facility]). Kylee Alvarez NP agrees with current care and management of pt and accepts admission.   Admitting Service: Hospital medicine   Admitting Physician: Dr. Moore  Admit to: Obs    10:27 AM: Re-evaluated pt. I have discussed test results, shared treatment plan, and the need for admission with patient and family at bedside. Pt and family express understanding at this time and agree with all information. All questions answered. Pt and family have no further questions or concerns at this time. Pt is ready for admit.      ED Medication(s):  Medications   ondansetron injection 4 mg (4 mg Intravenous Given 5/27/17 0821)   albuterol-ipratropium 2.5mg-0.5mg/3mL nebulizer solution 3 mL (3 mLs Nebulization Given 5/27/17 1004)   furosemide injection 40 mg (40 mg Intravenous Given 5/27/17 0905)       New Prescriptions    No medications on file             Medical Decision Making    Medical Decision Making:   Clinical Tests:   Lab Tests: Reviewed and Ordered  Radiological Study: Reviewed and Ordered  Medical Tests: Reviewed and Ordered           Scribe Attestation:   Scribe #1: I performed the above scribed service and the documentation accurately describes the services I performed. I attest to the accuracy of the note.    Attending:   Physician Attestation Statement for Scribe #1: I, Jimmie Hughes MD, personally performed the services described in this documentation, as scribed by Corinne Mack, in my presence, and it is both accurate and complete.         Attending Attestation:         Attending Critical Care:   Critical Care Times:   Direct Patient Care (initial evaluation,  reassessments, and time considering the case)................................................................10 minutes.   Additional History from reviewing old medical records or taking additional history from the family, EMS, PCP, etc.......................10 minutes.   Ordering, Reviewing, and Interpreting Diagnostic Studies...............................................................................................................5 minutes.   Documentation..................................................................................................................................................................................5 minutes.   Consultation with other Physicians. .................................................................................................................................................5 minutes.   ==============================================================  · Total Critical Care Time - exclusive of procedural time: 35 minutes.  ==============================================================  Critical care was necessary to treat or prevent imminent or life-threatening deterioration of the following conditions: congestive heart failure.   Critical care procedures: tx with IV lasix.   Critical care was time spent personally by me on the following activities: obtaining history from patient or relative, examination of patient, review of x-rays / CT sent with the patient, review of old charts, ordering lab, x-rays, and/or EKG, development of treatment plan with patient or relative, ordering and performing treatments and interventions, evaluation of patient's response to treatment, discussion with consultants, interpretation of cardiac measurements and re-evaluation of patient's conition.   Critical Care Condition: potentially life-threatening           Clinical Impression       ICD-10-CM ICD-9-CM   1. Acute congestive heart failure, unspecified congestive heart failure type  I50.9 428.0   2. Shortness of breath R06.02 786.05   3. Hypoxia R09.02 799.02       Disposition:   Disposition: Placed in Observation  Condition: Valeriano Hughes MD  05/28/17 0654

## 2017-05-27 NOTE — ED NOTES
Fall precautions in place at this time: Patient side rails are up x 2, bed is low and locked, call light is in reach of patient and family, and fall risk band applied to left wrist. Patient  And family  educated on fall risk and verbalized understanding.

## 2017-05-27 NOTE — ASSESSMENT & PLAN NOTE
Echo on 4/13/17 Shows Impaired LV relaxation, elevated LAP with normal EF  Airway assessment and continuous pulse ox monitoring  PRN oxygen  Continuous cardiac monitoring  Lasix 40mg IV  Continue Toprol,

## 2017-05-27 NOTE — ASSESSMENT & PLAN NOTE
Pulmonary edema vs pneumonia  Discharged from hospital 2 days ago  emperic IV abx: Vanc, zosyn, avelox  Continuous pulse ox monitoring  Oxygen PRN  BiPAP

## 2017-05-27 NOTE — ASSESSMENT & PLAN NOTE
Pt discharged 2 days ago with E Coli UTI  Ddx:  UTI, pneumonia  BC pending   CT of chest pending  UA pending  emperic ABX   Monitor for elevated temp  Trend WBC

## 2017-05-27 NOTE — ASSESSMENT & PLAN NOTE
Avoid nephrotoxic agents  Monitor BMP  Vanco dosing monitored by pharmacy  Will continue ARB for now

## 2017-05-27 NOTE — PROGRESS NOTES
Pt stated that she was having trouble breathing. Vital signs was taken ./88. Hr 71. Pulse ox 90 on 4 nc. T: 100.2 oral.

## 2017-05-27 NOTE — SUBJECTIVE & OBJECTIVE
Interval History: Acute Respiratory Decompensation / Failure    Review of Systems   Constitutional: Positive for activity change and fatigue. Negative for unexpected weight change.   HENT: Negative.  Negative for trouble swallowing.    Eyes: Negative.    Respiratory: Positive for shortness of breath and wheezing. Negative for cough.    Cardiovascular: Negative.  Negative for chest pain.   Gastrointestinal: Negative.    Endocrine: Negative.    Genitourinary: Negative.    Musculoskeletal: Negative.    Skin: Negative.    Allergic/Immunologic: Negative.    Neurological: Positive for weakness. Negative for dizziness.   Hematological: Negative.    Psychiatric/Behavioral: Negative.    All other systems reviewed and are negative.    Objective:     Vital Signs (Most Recent):  Temp: 99 °F (37.2 °C) (05/27/17 1745)  Pulse: 71 (05/27/17 1830)  Resp: (!) 36 (05/27/17 1830)  BP: (!) 99/42 (05/27/17 1745)  SpO2: (!) 94 % (05/27/17 1830) Vital Signs (24h Range):  Temp:  [97.8 °F (36.6 °C)-99.6 °F (37.6 °C)] 99 °F (37.2 °C)  Pulse:  [69-96] 71  Resp:  [18-36] 36  SpO2:  [88 %-100 %] 94 %  BP: ()/(42-82) 99/42     Weight: 89.8 kg (198 lb)  Body mass index is 40.68 kg/m².  No intake or output data in the 24 hours ending 05/27/17 1839   Physical Exam   Constitutional: She is oriented to person, place, and time. She appears well-developed and well-nourished. She appears distressed.   HENT:   Head: Normocephalic and atraumatic.   Eyes: EOM are normal. Pupils are equal, round, and reactive to light.   Neck: Normal range of motion. Neck supple. No JVD present.   Cardiovascular: Normal rate, regular rhythm, normal heart sounds and intact distal pulses.    No murmur heard.  Pulmonary/Chest: She is in respiratory distress. She has wheezes.   Abdominal: Soft. Bowel sounds are normal. She exhibits no distension.   Musculoskeletal: Normal range of motion. She exhibits no edema or tenderness.   Neurological: She is alert and oriented to  person, place, and time. She has normal reflexes. No cranial nerve deficit.   Skin: Skin is warm and dry. Capillary refill takes less than 2 seconds. No erythema.   Psychiatric: She has a normal mood and affect. Her behavior is normal. Judgment and thought content normal.   Nursing note and vitals reviewed.      Significant Labs:   ABGs:   Recent Labs  Lab 05/27/17  1821   PH 7.337*   PCO2 51.9*   HCO3 27.8   POCSATURATED 100   BE 2     BMP:   Recent Labs  Lab 05/27/17  0723   *   *   K 4.8   CL 96   CO2 25   BUN 27*   CREATININE 1.4   CALCIUM 9.2     CBC:   Recent Labs  Lab 05/27/17 0723   WBC 15.52*   HGB 10.6*   HCT 31.5*        CMP:   Recent Labs  Lab 05/27/17  0723   *   K 4.8   CL 96   CO2 25   *   BUN 27*   CREATININE 1.4   CALCIUM 9.2   ANIONGAP 13   EGFRNONAA 35*     Troponin:   Recent Labs  Lab 05/27/17 0723   TROPONINI 0.024     Urine Studies:   Recent Labs  Lab 05/27/17  1351   COLORU Yellow   APPEARANCEUA Cloudy*   PHUR 6.0   SPECGRAV 1.020   PROTEINUA 2+*   GLUCUA Negative   KETONESU Negative   BILIRUBINUA 1+*   OCCULTUA 3+*   NITRITE Negative   UROBILINOGEN Negative   LEUKOCYTESUR Negative   RBCUA >100*   WBCUA 12*   BACTERIA Few*   HYALINECASTS 1     All pertinent labs within the past 24 hours have been reviewed.    Significant Imaging: I have reviewed all pertinent imaging results/findings within the past 24 hours.   Imaging Results          X-Ray Chest 1 View (Final result)  Result time 05/27/17 18:34:57    Final result by Issac Prieto MD (05/27/17 18:34:57)                 Impression:     Patchy areas of interstitial thickening to the lung fields.  On the CT, but infiltrative changes were more exaggerated than seen on the chest x-ray.  Pneumonia?  Right hemidiaphragm remains moderately elevated      Electronically signed by: ISSAC PRIETO MD  Date:     05/27/17  Time:    18:34              Narrative:    Portable chest    Clinical indication: Shortness of  breath    Findings: Right hemidiaphragm is moderately elevated.  The heart is enlarged.  There is mild patchy interstitial thickening through the lung fields.  See the recent CT report.  There is heavy calcification of the aortic arch.                             CT Chest Without Contrast (Final result)  Result time 05/27/17 11:29:16    Final result by Tai Black MD (05/27/17 11:29:16)                 Impression:      Bilateral patchy nodular infiltrates which may be on the basis of patchy airspace pulmonary edema or infectious/inflammatory etiology.            All CT scans at this facility use dose modulation, iterative reconstruction and/or weight based dosing when appropriate to reduce radiation dose to as low as reasonably achievable.       Electronically signed by: TAI BLACK MD  Date:     05/27/17  Time:    11:29              Narrative:    Exam: CT CHEST WITHOUT CONTRAST,    Date:  05/27/17 10:48:05    History: Shortness of breath, rule out pneumonia, history of CHF    Comparison:  12/2/16 CT    Findings: There are patchy nodular pulmonary infiltrates bilaterally, involving all lobes, right lung worse than left. No pleural effusion. The infiltrates can be on the basis of patchy pulmonary edema or inflammatory/infectious etiology. No pathologically enlarged hilar or mediastinal lymph nodes. No abnormality of the airway. Unchanged cardiomegaly.                             X-Ray Chest 1 View (Final result)  Result time 05/27/17 08:06:26    Final result by Manish Jo III, MD (05/27/17 08:06:26)                 Impression:     Stable cardiomegaly and pulmonary vascular congestion without overt alveolar edema.  Possible trace pleural effusions.        Electronically signed by: MANISH JO MD  Date:     05/27/17  Time:    08:06              Narrative:    XR CHEST 1 VIEW    Clinical history: Shortness of breath.      Findings: There is stable cardiomegaly and pulmonary vascular congestion without  evidence of overt alveolar edema.  There are possible trace pleural effusions. The remainder of the lungs appear clear of active disease.

## 2017-05-27 NOTE — ASSESSMENT & PLAN NOTE
Echo on 4/13/17 Shows Impaired LV relaxation, elevated LAP with normal EF + Diastolic dysfunction  Airway assessment and continuous pulse ox monitoring  PRN oxygen  Continuous cardiac monitoring  Lasix 80 mg IV  Continue Toprol,

## 2017-05-27 NOTE — ASSESSMENT & PLAN NOTE
Pulmonary edema vs pneumonia  Discharged from hospital 2 days ago  emperic IV abx: Vanc, zosyn, avelox  Continuous pulse ox monitoring  Oxygen PRN

## 2017-05-27 NOTE — PROGRESS NOTES
Pt received breathing treatment @1830 and stated that she felt better. Respirations 40. Pt also received 80 mg of lasix IV. Will continue to monitor.

## 2017-05-27 NOTE — ASSESSMENT & PLAN NOTE
Pt discharged 2 days ago with E Coli UTI  Ddx:  UTI, pneumonia  BC pending   CT of chest pending  UA pending  emperic ABX   Monitor for elevated temp  Trend WBC  Monitor

## 2017-05-27 NOTE — HOSPITAL COURSE
Patient placed in observation initially. Patient with a progressive decline due to Acute Hypoxic Hypercapnic Respiratory Failure. Patient placed on Bipap and admitted to telemetry. Patient improved after being diuresed. Patient started on IV abx. And duoneb tx. Patient weaned off Bipap and place on NC. Blood cultures show no growth to date.  Patient now a DNR per patient. Case management consulted for discharge placement. Patient accepted at [facility]. Patient seen and examined on date of discharge and found suitable for discharge.

## 2017-05-27 NOTE — PROGRESS NOTES
[facility] Hospital Medicine  Progress Note    Patient Name: Elke Laws  MRN: [id number]  Patient Class: IP- Inpatient   Admission Date: 5/27/2017  Length of Stay: 0 days  Attending Physician: Shaq Moore MD  Primary Care Provider: Isidra Benavidez MD        Subjective:     Principal Problem:Hypoxemia    HPI:  81 y/o female presented to ED with c/o of SOB and cough. PMH of a-fib, CHF, and HTN. She was discharged from hospital 2 days ago with a dx of ecoli UTI on PO cipro, which is due to be finished today. She reports she developed a worsening cough with thick, white sputum and progressive SOB which started last night and at 4am she instructed her sitter to take her to the hospital. She denies abd pain, back pain, urine urgency, dysuria, hematuria, Cp, chest pressure, and elevated temperature. Her daughter is at the bedside and reports her urine was blood tinged.      Hospital Course:  Patient placed in observation initially. Patient with a progressive decline due to Acute Hypoxic Hypercapnic Respiratory Failure. Patient placed on Bipap, diuresed and admitted to telemetry. Patient now a DNR per patient and family at bedside.    Interval History: Acute Respiratory Decompensation / Failure    Review of Systems   Constitutional: Positive for activity change and fatigue. Negative for unexpected weight change.   HENT: Negative.  Negative for trouble swallowing.    Eyes: Negative.    Respiratory: Positive for shortness of breath and wheezing. Negative for cough.    Cardiovascular: Negative.  Negative for chest pain.   Gastrointestinal: Negative.    Endocrine: Negative.    Genitourinary: Negative.    Musculoskeletal: Negative.    Skin: Negative.    Allergic/Immunologic: Negative.    Neurological: Positive for weakness. Negative for dizziness.   Hematological: Negative.    Psychiatric/Behavioral: Negative.    All other systems reviewed and are negative.    Objective:     Vital Signs (Most  Recent):  Temp: 99 °F (37.2 °C) (05/27/17 1745)  Pulse: 71 (05/27/17 1830)  Resp: (!) 36 (05/27/17 1830)  BP: (!) 99/42 (05/27/17 1745)  SpO2: (!) 94 % (05/27/17 1830) Vital Signs (24h Range):  Temp:  [97.8 °F (36.6 °C)-99.6 °F (37.6 °C)] 99 °F (37.2 °C)  Pulse:  [69-96] 71  Resp:  [18-36] 36  SpO2:  [88 %-100 %] 94 %  BP: ()/(42-82) 99/42     Weight: 89.8 kg (198 lb)  Body mass index is 40.68 kg/m².  No intake or output data in the 24 hours ending 05/27/17 1839   Physical Exam   Constitutional: She is oriented to person, place, and time. She appears well-developed and well-nourished. She appears distressed.   HENT:   Head: Normocephalic and atraumatic.   Eyes: EOM are normal. Pupils are equal, round, and reactive to light.   Neck: Normal range of motion. Neck supple. No JVD present.   Cardiovascular: Normal rate, regular rhythm, normal heart sounds and intact distal pulses.    No murmur heard.  Pulmonary/Chest: She is in respiratory distress. She has wheezes.   Abdominal: Soft. Bowel sounds are normal. She exhibits no distension.   Musculoskeletal: Normal range of motion. She exhibits no edema or tenderness.   Neurological: She is alert and oriented to person, place, and time. She has normal reflexes. No cranial nerve deficit.   Skin: Skin is warm and dry. Capillary refill takes less than 2 seconds. No erythema.   Psychiatric: She has a normal mood and affect. Her behavior is normal. Judgment and thought content normal.   Nursing note and vitals reviewed.      Significant Labs:   ABGs:   Recent Labs  Lab 05/27/17 1821   PH 7.337*   PCO2 51.9*   HCO3 27.8   POCSATURATED 100   BE 2     BMP:   Recent Labs  Lab 05/27/17  0723   *   *   K 4.8   CL 96   CO2 25   BUN 27*   CREATININE 1.4   CALCIUM 9.2     CBC:   Recent Labs  Lab 05/27/17  0723   WBC 15.52*   HGB 10.6*   HCT 31.5*        CMP:   Recent Labs  Lab 05/27/17  0723   *   K 4.8   CL 96   CO2 25   *   BUN 27*   CREATININE  1.4   CALCIUM 9.2   ANIONGAP 13   EGFRNONAA 35*     Troponin:   Recent Labs  Lab 05/27/17  0723   TROPONINI 0.024     Urine Studies:   Recent Labs  Lab 05/27/17  1351   COLORU Yellow   APPEARANCEUA Cloudy*   PHUR 6.0   SPECGRAV 1.020   PROTEINUA 2+*   GLUCUA Negative   KETONESU Negative   BILIRUBINUA 1+*   OCCULTUA 3+*   NITRITE Negative   UROBILINOGEN Negative   LEUKOCYTESUR Negative   RBCUA >100*   WBCUA 12*   BACTERIA Few*   HYALINECASTS 1     All pertinent labs within the past 24 hours have been reviewed.    Significant Imaging: I have reviewed all pertinent imaging results/findings within the past 24 hours.   Imaging Results          X-Ray Chest 1 View (Final result)  Result time 05/27/17 18:34:57    Final result by Issac Prieto MD (05/27/17 18:34:57)                 Impression:     Patchy areas of interstitial thickening to the lung fields.  On the CT, but infiltrative changes were more exaggerated than seen on the chest x-ray.  Pneumonia?  Right hemidiaphragm remains moderately elevated      Electronically signed by: ISSAC PRIETO MD  Date:     05/27/17  Time:    18:34              Narrative:    Portable chest    Clinical indication: Shortness of breath    Findings: Right hemidiaphragm is moderately elevated.  The heart is enlarged.  There is mild patchy interstitial thickening through the lung fields.  See the recent CT report.  There is heavy calcification of the aortic arch.                             CT Chest Without Contrast (Final result)  Result time 05/27/17 11:29:16    Final result by Tai Black MD (05/27/17 11:29:16)                 Impression:      Bilateral patchy nodular infiltrates which may be on the basis of patchy airspace pulmonary edema or infectious/inflammatory etiology.            All CT scans at this facility use dose modulation, iterative reconstruction and/or weight based dosing when appropriate to reduce radiation dose to as low as reasonably achievable.        Electronically signed by: SANJAY PASTOR MD  Date:     05/27/17  Time:    11:29              Narrative:    Exam: CT CHEST WITHOUT CONTRAST,    Date:  05/27/17 10:48:05    History: Shortness of breath, rule out pneumonia, history of CHF    Comparison:  12/2/16 CT    Findings: There are patchy nodular pulmonary infiltrates bilaterally, involving all lobes, right lung worse than left. No pleural effusion. The infiltrates can be on the basis of patchy pulmonary edema or inflammatory/infectious etiology. No pathologically enlarged hilar or mediastinal lymph nodes. No abnormality of the airway. Unchanged cardiomegaly.                             X-Ray Chest 1 View (Final result)  Result time 05/27/17 08:06:26    Final result by Manish Jo III, MD (05/27/17 08:06:26)                 Impression:     Stable cardiomegaly and pulmonary vascular congestion without overt alveolar edema.  Possible trace pleural effusions.        Electronically signed by: MANISH JO MD  Date:     05/27/17  Time:    08:06              Narrative:    XR CHEST 1 VIEW    Clinical history: Shortness of breath.      Findings: There is stable cardiomegaly and pulmonary vascular congestion without evidence of overt alveolar edema.  There are possible trace pleural effusions. The remainder of the lungs appear clear of active disease.                                Assessment/Plan:      Acute congestive heart failure    Diuresis  BiPAP  O2              Acute kidney injury (nontraumatic)    Avoid nephrotoxic agents  Monitor BMP  Vanco dosing monitored by pharmacy  Will continue ARB for now        Leukocytosis    Pt discharged 2 days ago with E Coli UTI  Ddx:  UTI, pneumonia  BC pending   CT of chest pending  UA pending  emperic ABX   Monitor for elevated temp  Trend WBC  Monitor          Coronary artery disease involving native coronary artery of native heart without angina pectoris    Continue Eliquis, Imdur, Metoprolol, Pravastatin, and  valsartan           Acute on chronic diastolic congestive heart failure    Echo on 4/13/17 Shows Impaired LV relaxation, elevated LAP with normal EF + Diastolic dysfunction  Airway assessment and continuous pulse ox monitoring  PRN oxygen  Continuous cardiac monitoring  Lasix 80 mg IV  Continue Toprol,           PAF (paroxysmal atrial fibrillation)    Cardiac Monitoring - rate controlled  Continue fleccanide  Continue eliquis        Morbid obesity with BMI of 40.0-44.9, adult    Discussed importance of weight loss on heart disease symptoms  encouraged pt to follow low calorie, low fat, low salt diet upon discharge  Cardiac diet while in hospital          Hypertension    Monitor VS  Continue home diovan, isosorbide, Toprol  Low sodium diet            * Hypoxemia    Pulmonary edema vs pneumonia  Discharged from hospital 2 days ago  emperic IV abx: Vanc, zosyn, avelox  Continuous pulse ox monitoring  Oxygen PRN  BiPAP              VTE Risk Mitigation         Ordered     apixaban tablet 5 mg  2 times daily     Route:  Oral        05/27/17 1253     Medium Risk of VTE  Once      05/27/17 1253       35 mins cc time    Shaq Moore MD  Department of Hospital Medicine   [facility]

## 2017-05-27 NOTE — ASSESSMENT & PLAN NOTE
Discussed importance of weight loss on heart disease symptoms  encouraged pt to follow low calorie, low fat, low salt diet upon discharge  Cardiac diet while in hospital

## 2017-05-27 NOTE — HPI
81 y/o female presented to ED with c/o of SOB and cough. PMH of a-fib, CHF, and HTN. She was discharged from hospital 2 days ago with a dx of ecoli UTI on PO cipro, which is due to be finished today. She reports she developed a worsening cough with thick, white sputum and progressive SOB which started last night and at 4am she instructed her sitter to take her to the hospital. She denies abd pain, back pain, urine urgency, dysuria, hematuria, Cp, chest pressure, and elevated temperature. Her daughter is at the bedside and reports her urine was blood tinged.

## 2017-05-28 LAB
ANION GAP SERPL CALC-SCNC: 9 MMOL/L
BASOPHILS # BLD AUTO: 0.01 K/UL
BASOPHILS NFR BLD: 0.1 %
BUN SERPL-MCNC: 42 MG/DL
CALCIUM SERPL-MCNC: 8.3 MG/DL
CHLORIDE SERPL-SCNC: 94 MMOL/L
CO2 SERPL-SCNC: 26 MMOL/L
CREAT SERPL-MCNC: 2.7 MG/DL
DIFFERENTIAL METHOD: ABNORMAL
EOSINOPHIL # BLD AUTO: 0 K/UL
EOSINOPHIL NFR BLD: 0 %
ERYTHROCYTE [DISTWIDTH] IN BLOOD BY AUTOMATED COUNT: 14.2 %
EST. GFR  (AFRICAN AMERICAN): 18 ML/MIN/1.73 M^2
EST. GFR  (NON AFRICAN AMERICAN): 16 ML/MIN/1.73 M^2
GLUCOSE SERPL-MCNC: 207 MG/DL
HCT VFR BLD AUTO: 38.2 %
HGB BLD-MCNC: 12.8 G/DL
LYMPHOCYTES # BLD AUTO: 0.5 K/UL
LYMPHOCYTES NFR BLD: 3.7 %
MAGNESIUM SERPL-MCNC: 2 MG/DL
MCH RBC QN AUTO: 30.1 PG
MCHC RBC AUTO-ENTMCNC: 33.5 %
MCV RBC AUTO: 90 FL
MONOCYTES # BLD AUTO: 0.7 K/UL
MONOCYTES NFR BLD: 6.1 %
NEUTROPHILS # BLD AUTO: 10.9 K/UL
NEUTROPHILS NFR BLD: 90.1 %
PHOSPHATE SERPL-MCNC: 4.9 MG/DL
PLATELET # BLD AUTO: 140 K/UL
PMV BLD AUTO: 10.7 FL
POTASSIUM SERPL-SCNC: 4.7 MMOL/L
RBC # BLD AUTO: 4.25 M/UL
SODIUM SERPL-SCNC: 129 MMOL/L
VANCOMYCIN SERPL-MCNC: 11.7 UG/ML
WBC # BLD AUTO: 12.05 K/UL

## 2017-05-28 PROCEDURE — 27000221 HC OXYGEN, UP TO 24 HOURS

## 2017-05-28 PROCEDURE — 85025 COMPLETE CBC W/AUTO DIFF WBC: CPT

## 2017-05-28 PROCEDURE — 63600175 PHARM REV CODE 636 W HCPCS: Performed by: NURSE PRACTITIONER

## 2017-05-28 PROCEDURE — 25000242 PHARM REV CODE 250 ALT 637 W/ HCPCS: Performed by: INTERNAL MEDICINE

## 2017-05-28 PROCEDURE — 80202 ASSAY OF VANCOMYCIN: CPT

## 2017-05-28 PROCEDURE — 84100 ASSAY OF PHOSPHORUS: CPT

## 2017-05-28 PROCEDURE — 25000003 PHARM REV CODE 250: Performed by: INTERNAL MEDICINE

## 2017-05-28 PROCEDURE — 80048 BASIC METABOLIC PNL TOTAL CA: CPT

## 2017-05-28 PROCEDURE — 94640 AIRWAY INHALATION TREATMENT: CPT

## 2017-05-28 PROCEDURE — 21400001 HC TELEMETRY ROOM

## 2017-05-28 PROCEDURE — 25000003 PHARM REV CODE 250: Performed by: NURSE PRACTITIONER

## 2017-05-28 PROCEDURE — 83735 ASSAY OF MAGNESIUM: CPT

## 2017-05-28 RX ADMIN — FUROSEMIDE 40 MG: 10 INJECTION, SOLUTION INTRAMUSCULAR; INTRAVENOUS at 09:05

## 2017-05-28 RX ADMIN — MOXIFLOXACIN HYDROCHLORIDE 400 MG: 400 INJECTION, SOLUTION INTRAVENOUS at 02:05

## 2017-05-28 RX ADMIN — PRAVASTATIN SODIUM 40 MG: 20 TABLET ORAL at 09:05

## 2017-05-28 RX ADMIN — SODIUM CHLORIDE, PRESERVATIVE FREE 3 ML: 5 INJECTION INTRAVENOUS at 10:05

## 2017-05-28 RX ADMIN — APIXABAN 5 MG: 2.5 TABLET, FILM COATED ORAL at 08:05

## 2017-05-28 RX ADMIN — VANCOMYCIN HYDROCHLORIDE 500 MG: 500 INJECTION, POWDER, LYOPHILIZED, FOR SOLUTION INTRAVENOUS at 12:05

## 2017-05-28 RX ADMIN — PANTOPRAZOLE SODIUM 40 MG: 40 TABLET, DELAYED RELEASE ORAL at 09:05

## 2017-05-28 RX ADMIN — IPRATROPIUM BROMIDE AND ALBUTEROL SULFATE 3 ML: .5; 3 SOLUTION RESPIRATORY (INHALATION) at 05:05

## 2017-05-28 RX ADMIN — FLECAINIDE ACETATE 50 MG: 50 TABLET ORAL at 09:05

## 2017-05-28 RX ADMIN — PIPERACILLIN SODIUM AND TAZOBACTAM SODIUM 4.5 G: 4; .5 INJECTION, POWDER, FOR SOLUTION INTRAVENOUS at 11:05

## 2017-05-28 RX ADMIN — METOPROLOL SUCCINATE 100 MG: 50 TABLET, EXTENDED RELEASE ORAL at 09:05

## 2017-05-28 RX ADMIN — PIPERACILLIN SODIUM AND TAZOBACTAM SODIUM 4.5 G: 4; .5 INJECTION, POWDER, FOR SOLUTION INTRAVENOUS at 06:05

## 2017-05-28 RX ADMIN — PIPERACILLIN SODIUM AND TAZOBACTAM SODIUM 4.5 G: 4; .5 INJECTION, POWDER, FOR SOLUTION INTRAVENOUS at 02:05

## 2017-05-28 RX ADMIN — APIXABAN 5 MG: 2.5 TABLET, FILM COATED ORAL at 09:05

## 2017-05-28 RX ADMIN — ASPIRIN 81 MG: 81 TABLET, COATED ORAL at 09:05

## 2017-05-28 RX ADMIN — SODIUM CHLORIDE, PRESERVATIVE FREE 3 ML: 5 INJECTION INTRAVENOUS at 02:05

## 2017-05-28 RX ADMIN — FLECAINIDE ACETATE 50 MG: 50 TABLET ORAL at 08:05

## 2017-05-28 NOTE — SUBJECTIVE & OBJECTIVE
Interval History: Improved. SNF placement in progress    Review of Systems   Constitutional: Positive for activity change and fatigue. Negative for unexpected weight change.   HENT: Negative.  Negative for trouble swallowing.    Eyes: Negative.    Respiratory: Positive for shortness of breath and wheezing. Negative for cough.    Cardiovascular: Negative.  Negative for chest pain.   Gastrointestinal: Negative.    Endocrine: Negative.    Genitourinary: Negative.    Musculoskeletal: Negative.    Skin: Negative.    Allergic/Immunologic: Negative.    Neurological: Positive for weakness. Negative for dizziness.   Hematological: Negative.    Psychiatric/Behavioral: Negative.    All other systems reviewed and are negative.    Objective:     Vital Signs (Most Recent):  Temp: 98.4 °F (36.9 °C) (05/28/17 0800)  Pulse: 75 (05/28/17 0918)  Resp: 18 (05/28/17 0800)  BP: (!) 113/57 (05/28/17 0908)  SpO2: 97 % (05/28/17 0908) Vital Signs (24h Range):  Temp:  [97.3 °F (36.3 °C)-99.2 °F (37.3 °C)] 98.4 °F (36.9 °C)  Pulse:  [54-76] 75  Resp:  [14-36] 18  SpO2:  [94 %-100 %] 97 %  BP: (101-163)/(41-69) 113/57     Weight: 89.8 kg (198 lb)  Body mass index is 40.68 kg/m².    Intake/Output Summary (Last 24 hours) at 05/28/17 0958  Last data filed at 05/28/17 0313   Gross per 24 hour   Intake              160 ml   Output                0 ml   Net              160 ml      Physical Exam   Constitutional: She is oriented to person, place, and time. She appears well-developed and well-nourished. No distress.   HENT:   Head: Normocephalic and atraumatic.   Eyes: EOM are normal. Pupils are equal, round, and reactive to light.   Neck: Normal range of motion. Neck supple. No JVD present.   Cardiovascular: Normal rate, regular rhythm and intact distal pulses.    Murmur heard.  3/6 juliet   Pulmonary/Chest: Effort normal and breath sounds normal. No respiratory distress. She has no wheezes.   + basilar crackle   Abdominal: Soft. Bowel sounds are  normal. She exhibits no distension.   Musculoskeletal: Normal range of motion. She exhibits no edema or tenderness.   Neurological: She is alert and oriented to person, place, and time. She has normal reflexes. No cranial nerve deficit.   Skin: Skin is warm and dry. Capillary refill takes less than 2 seconds. No erythema.   Psychiatric: She has a normal mood and affect. Her behavior is normal. Judgment and thought content normal.   Nursing note and vitals reviewed.      Significant Labs:   BMP:   Recent Labs  Lab 05/27/17  0723   *   *   K 4.8   CL 96   CO2 25   BUN 27*   CREATININE 1.4   CALCIUM 9.2     CBC:   Recent Labs  Lab 05/27/17 0723   WBC 15.52*   HGB 10.6*   HCT 31.5*        CMP:   Recent Labs  Lab 05/27/17 0723   *   K 4.8   CL 96   CO2 25   *   BUN 27*   CREATININE 1.4   CALCIUM 9.2   ANIONGAP 13   EGFRNONAA 35*     All pertinent labs within the past 24 hours have been reviewed.    Significant Imaging: I have reviewed all pertinent imaging results/findings within the past 24 hours.   Imaging Results          X-Ray Chest 1 View (Final result)  Result time 05/27/17 18:34:57    Final result by Issac Prieto MD (05/27/17 18:34:57)                 Impression:     Patchy areas of interstitial thickening to the lung fields.  On the CT, but infiltrative changes were more exaggerated than seen on the chest x-ray.  Pneumonia?  Right hemidiaphragm remains moderately elevated      Electronically signed by: ISSAC PRIETO MD  Date:     05/27/17  Time:    18:34              Narrative:    Portable chest    Clinical indication: Shortness of breath    Findings: Right hemidiaphragm is moderately elevated.  The heart is enlarged.  There is mild patchy interstitial thickening through the lung fields.  See the recent CT report.  There is heavy calcification of the aortic arch.                             CT Chest Without Contrast (Final result)  Result time 05/27/17 11:29:16    Final  result by Tai Black MD (05/27/17 11:29:16)                 Impression:      Bilateral patchy nodular infiltrates which may be on the basis of patchy airspace pulmonary edema or infectious/inflammatory etiology.            All CT scans at this facility use dose modulation, iterative reconstruction and/or weight based dosing when appropriate to reduce radiation dose to as low as reasonably achievable.       Electronically signed by: TAI BLACK MD  Date:     05/27/17  Time:    11:29              Narrative:    Exam: CT CHEST WITHOUT CONTRAST,    Date:  05/27/17 10:48:05    History: Shortness of breath, rule out pneumonia, history of CHF    Comparison:  12/2/16 CT    Findings: There are patchy nodular pulmonary infiltrates bilaterally, involving all lobes, right lung worse than left. No pleural effusion. The infiltrates can be on the basis of patchy pulmonary edema or inflammatory/infectious etiology. No pathologically enlarged hilar or mediastinal lymph nodes. No abnormality of the airway. Unchanged cardiomegaly.                             X-Ray Chest 1 View (Final result)  Result time 05/27/17 08:06:26    Final result by Manish Jo III, MD (05/27/17 08:06:26)                 Impression:     Stable cardiomegaly and pulmonary vascular congestion without overt alveolar edema.  Possible trace pleural effusions.        Electronically signed by: MANISH JO MD  Date:     05/27/17  Time:    08:06              Narrative:    XR CHEST 1 VIEW    Clinical history: Shortness of breath.      Findings: There is stable cardiomegaly and pulmonary vascular congestion without evidence of overt alveolar edema.  There are possible trace pleural effusions. The remainder of the lungs appear clear of active disease.

## 2017-05-28 NOTE — PLAN OF CARE
05/28/17 0835   Readmission Questionnaire   At the time of your discharge, did someone talk to you about what your health problems were? Yes   At the time of discharge, did someone talk to you about what to watch out for regarding worsening of your health problem? Yes   At the time of discharge, did someone talk to you about what to do if you experienced worsening of your health problem? Yes   At the time of discharge, did someone talk to you about which medication to take when you left the hospital and which ones to stop taking? Yes   At the time of discharge, did someone talk to you about when and where to follow up with a doctor after you left the hospital? Yes   What do you believe caused you to be sick enough to be re-admitted? short of breath   How often do you need to have someone help you when you read instructions, pamphlets, or other written material from your doctor or pharmacy? Sometimes  (wants to make sure medication is taken correctly)   Do you have problems taking your medications as prescribed? No   Do you have any problems affording any of  your prescribed medications? To be determined   Do you have problems obtaining/receiving your medications? No   Does the patient have transportation to healthcare appointments? Yes   Lives With grandchild(kennedy)   Living Arrangements house   Does the patient have family/friends to help with healtcare needs after discharge? yes   Does your caregiver provide all the help you need? (sometimes)   Are you currently feeling confused? No   Are you currently having problems thinking? No   Have you felt down, depressed, or hopeless? 0   Have you felt little interest or pleasure in doing things? 0   In the last 7 days, my sleep quality was: good   Patient reported that she was here from Sunday to Thursday of last week.  She returned to the hospital after experiencing shortness of breath.  Patient lives with her grandson and reported that he works and sometimes her  children come to help and sometimes they don't (discussed help when granddaughter was not present).  Aurelia Romeo, JOURDAN, ACM-SW, CCM

## 2017-05-28 NOTE — PROGRESS NOTES
MD notified of pt /69 and ordered to hold imdur and valsartan, give all other meds. Will give and monitor.

## 2017-05-28 NOTE — PLAN OF CARE
Problem: Patient Care Overview  Goal: Plan of Care Review  Outcome: Ongoing (interventions implemented as appropriate)  POC discussed w/patient and granddaughter, verbalized understanding. NSR on monitor. VSS. Voids per brief. Dyspneic on exertion.  Patient turns independently in bed with occasional assist from family. Fall precautions in place, bed alarm on.

## 2017-05-28 NOTE — PROGRESS NOTES
Home Oxygen Evaluation    Date Performed: 5/28/2017    1) Patient's Home O2 Sat on room air, while at rest: 88%        If O2 sats on room air at rest are 88% or below, patient qualifies. No additional testing needed. Document N/A in steps 2 and 3. If 89% or above, complete steps 2.      2) Patient's O2 Sat on room air while exercising:         If O2 sats on room air while exercising remain 89% or above patient does not qualify, no further testing needed Document N/A in step 3. If O2 sats on room air while exercising are 88% or below, continue to step 3.      3) Patient's O2 Sat while exercising on O2:  at  LPM         (Must show improvement from #2 for patients to qualify)    If O2 sats improve on oxygen, patient qualifies for portable oxygen. If not, the patient does not qualify.

## 2017-05-28 NOTE — CONSULTS
Fu with patient and daughter, Keya, completed.  Patient's choice form signed for SNF placement at [facility] (preferred facility) or [facility].    (Patient provided with Advance Directives earlier).  Packets will be submitted to preferred providers.  PPD will be ordered.  to f/u with patient and facilities on Monday.  Aurelia Romeo LMSW, DONNIE-Pablo, CCM  05/28/2017

## 2017-05-28 NOTE — PROGRESS NOTES
"Pt arrived to room. Patient is aaox4. No signs of acute distress noted. Report received from MARY Cruz  /60 (BP Location: Left arm, Patient Position: Lying, BP Method: Automatic)   Pulse 65   Temp 98.5 °F (36.9 °C) (Oral)   Resp 18   Ht 4' 10.5" (1.486 m)   Wt 89.8 kg (198 lb)   LMP 12/13/1973   SpO2 95%   Breastfeeding? No   BMI 40.68 kg/m² Patient has been orientated to room, call light, fall precautions, rounding sheet, menu, and board. Heart monitor in place, no questions or concerns at this time.   "

## 2017-05-28 NOTE — PROGRESS NOTES
[facility] Hospital Medicine  Progress Note    Patient Name: Elke Lwas  MRN: [id number]  Patient Class: IP- Inpatient   Admission Date: 5/27/2017  Length of Stay: 1 days  Attending Physician: Shaq Moore MD  Primary Care Provider: Isidra Benavidez MD        Subjective:     Principal Problem:Hypoxemia    HPI:  81 y/o female presented to ED with c/o of SOB and cough. PMH of a-fib, CHF, and HTN. She was discharged from hospital 2 days ago with a dx of ecoli UTI on PO cipro, which is due to be finished today. She reports she developed a worsening cough with thick, white sputum and progressive SOB which started last night and at 4am she instructed her sitter to take her to the hospital. She denies abd pain, back pain, urine urgency, dysuria, hematuria, Cp, chest pressure, and elevated temperature. Her daughter is at the bedside and reports her urine was blood tinged.      Hospital Course:  Patient placed in observation initially. Patient with a progressive decline due to Acute Hypoxic Hypercapnic Respiratory Failure. Patient placed on Bipap, diuresed and admitted to telemetry. Patient now a DNR per patient and family at bedside.    5/28/17 - Patient improved work of breathing. Discussed POC at length with patient and family at bedside. Plan for SNF placement - [facility]. Consult to CM initiated.    Interval History: Improved. SNF placement in progress    Review of Systems   Constitutional: Positive for activity change and fatigue. Negative for unexpected weight change.   HENT: Negative.  Negative for trouble swallowing.    Eyes: Negative.    Respiratory: Positive for shortness of breath and wheezing. Negative for cough.    Cardiovascular: Negative.  Negative for chest pain.   Gastrointestinal: Negative.    Endocrine: Negative.    Genitourinary: Negative.    Musculoskeletal: Negative.    Skin: Negative.    Allergic/Immunologic: Negative.    Neurological: Positive for weakness.  Negative for dizziness.   Hematological: Negative.    Psychiatric/Behavioral: Negative.    All other systems reviewed and are negative.    Objective:     Vital Signs (Most Recent):  Temp: 98.4 °F (36.9 °C) (05/28/17 0800)  Pulse: 75 (05/28/17 0918)  Resp: 18 (05/28/17 0800)  BP: (!) 113/57 (05/28/17 0908)  SpO2: 97 % (05/28/17 0908) Vital Signs (24h Range):  Temp:  [97.3 °F (36.3 °C)-99.2 °F (37.3 °C)] 98.4 °F (36.9 °C)  Pulse:  [54-76] 75  Resp:  [14-36] 18  SpO2:  [94 %-100 %] 97 %  BP: (101-163)/(41-69) 113/57     Weight: 89.8 kg (198 lb)  Body mass index is 40.68 kg/m².    Intake/Output Summary (Last 24 hours) at 05/28/17 0958  Last data filed at 05/28/17 0313   Gross per 24 hour   Intake              160 ml   Output                0 ml   Net              160 ml      Physical Exam   Constitutional: She is oriented to person, place, and time. She appears well-developed and well-nourished. No distress.   HENT:   Head: Normocephalic and atraumatic.   Eyes: EOM are normal. Pupils are equal, round, and reactive to light.   Neck: Normal range of motion. Neck supple. No JVD present.   Cardiovascular: Normal rate, regular rhythm and intact distal pulses.    Murmur heard.  3/6 juliet   Pulmonary/Chest: Effort normal and breath sounds normal. No respiratory distress. She has no wheezes.   + basilar crackle   Abdominal: Soft. Bowel sounds are normal. She exhibits no distension.   Musculoskeletal: Normal range of motion. She exhibits no edema or tenderness.   Neurological: She is alert and oriented to person, place, and time. She has normal reflexes. No cranial nerve deficit.   Skin: Skin is warm and dry. Capillary refill takes less than 2 seconds. No erythema.   Psychiatric: She has a normal mood and affect. Her behavior is normal. Judgment and thought content normal.   Nursing note and vitals reviewed.      Significant Labs:   BMP:   Recent Labs  Lab 05/27/17  0723   *   *   K 4.8   CL 96   CO2 25   BUN 27*    CREATININE 1.4   CALCIUM 9.2     CBC:   Recent Labs  Lab 05/27/17  0723   WBC 15.52*   HGB 10.6*   HCT 31.5*        CMP:   Recent Labs  Lab 05/27/17  0723   *   K 4.8   CL 96   CO2 25   *   BUN 27*   CREATININE 1.4   CALCIUM 9.2   ANIONGAP 13   EGFRNONAA 35*     All pertinent labs within the past 24 hours have been reviewed.    Significant Imaging: I have reviewed all pertinent imaging results/findings within the past 24 hours.   Imaging Results          X-Ray Chest 1 View (Final result)  Result time 05/27/17 18:34:57    Final result by Issac Prieto MD (05/27/17 18:34:57)                 Impression:     Patchy areas of interstitial thickening to the lung fields.  On the CT, but infiltrative changes were more exaggerated than seen on the chest x-ray.  Pneumonia?  Right hemidiaphragm remains moderately elevated      Electronically signed by: ISSAC PRIETO MD  Date:     05/27/17  Time:    18:34              Narrative:    Portable chest    Clinical indication: Shortness of breath    Findings: Right hemidiaphragm is moderately elevated.  The heart is enlarged.  There is mild patchy interstitial thickening through the lung fields.  See the recent CT report.  There is heavy calcification of the aortic arch.                             CT Chest Without Contrast (Final result)  Result time 05/27/17 11:29:16    Final result by Tai Black MD (05/27/17 11:29:16)                 Impression:      Bilateral patchy nodular infiltrates which may be on the basis of patchy airspace pulmonary edema or infectious/inflammatory etiology.            All CT scans at this facility use dose modulation, iterative reconstruction and/or weight based dosing when appropriate to reduce radiation dose to as low as reasonably achievable.       Electronically signed by: TAI BLACK MD  Date:     05/27/17  Time:    11:29              Narrative:    Exam: CT CHEST WITHOUT CONTRAST,    Date:  05/27/17  10:48:05    History: Shortness of breath, rule out pneumonia, history of CHF    Comparison:  12/2/16 CT    Findings: There are patchy nodular pulmonary infiltrates bilaterally, involving all lobes, right lung worse than left. No pleural effusion. The infiltrates can be on the basis of patchy pulmonary edema or inflammatory/infectious etiology. No pathologically enlarged hilar or mediastinal lymph nodes. No abnormality of the airway. Unchanged cardiomegaly.                             X-Ray Chest 1 View (Final result)  Result time 05/27/17 08:06:26    Final result by Manish Jo III, MD (05/27/17 08:06:26)                 Impression:     Stable cardiomegaly and pulmonary vascular congestion without overt alveolar edema.  Possible trace pleural effusions.        Electronically signed by: MANISH JO MD  Date:     05/27/17  Time:    08:06              Narrative:    XR CHEST 1 VIEW    Clinical history: Shortness of breath.      Findings: There is stable cardiomegaly and pulmonary vascular congestion without evidence of overt alveolar edema.  There are possible trace pleural effusions. The remainder of the lungs appear clear of active disease.                                Assessment/Plan:      Acute congestive heart failure    Diuresis  BiPAP  O2              Acute kidney injury (nontraumatic)    Avoid nephrotoxic agents  Monitor BMP  Vanco dosing monitored by pharmacy  Will continue ARB for now        Leukocytosis    Pt discharged 2 days ago with E Coli UTI  Ddx:  UTI, pneumonia  BC pending   CT of chest pending  UA pending  emperic ABX   Monitor for elevated temp  Trend WBC  Monitor          Coronary artery disease involving native coronary artery of native heart without angina pectoris    Continue Eliquis, Imdur, Metoprolol, Pravastatin, and valsartan           Acute on chronic diastolic congestive heart failure    Echo on 4/13/17 Shows Impaired LV relaxation, elevated LAP with normal EF + Diastolic  dysfunction  Airway assessment and continuous pulse ox monitoring  PRN oxygen  Continuous cardiac monitoring  Lasix 80 mg IV  Continue Toprol,           PAF (paroxysmal atrial fibrillation)    Cardiac Monitoring - rate controlled  Continue fleccanide  Continue eliquis        Morbid obesity with BMI of 40.0-44.9, adult    Discussed importance of weight loss on heart disease symptoms  encouraged pt to follow low calorie, low fat, low salt diet upon discharge  Cardiac diet while in hospital          Hypertension    Monitor VS  Continue home diovan, isosorbide, Toprol  Low sodium diet            * Hypoxemia    Pulmonary edema vs pneumonia  Discharged from hospital 2 days ago  emperic IV abx: Vanc, zosyn, avelox  Continuous pulse ox monitoring  Oxygen PRN  BiPAP              VTE Risk Mitigation         Ordered     apixaban tablet 5 mg  2 times daily     Route:  Oral        05/27/17 1253     Medium Risk of VTE  Once      05/27/17 1253          Shaq Moore MD  Department of Hospital Medicine   [facility]

## 2017-05-28 NOTE — PLAN OF CARE
Problem: Patient Care Overview  Goal: Plan of Care Review  Outcome: Ongoing (interventions implemented as appropriate)  Patient remained free of falls, pt up with assist, pt on IV antibiotics. Bed will remain in low position at all times. Call bell will remain in reach, and reminded too call for assistance when needed. VS will remain stable. Will do hourly rounding.

## 2017-05-28 NOTE — PLAN OF CARE
Discharge Planning Assessment:  Discharge Disposition:  Home with family: Resume  with [facility]; DME requested:  Walker and BSC; O2 Eval Completed:  Will need home O2.       05/28/17 0830   Discharge Assessment   Assessment Type Discharge Planning Assessment   Confirmed/corrected address and phone number on facesheet? Yes   Assessment information obtained from? Patient   Communicated expected length of stay with patient/caregiver no   Type of Healthcare Directive Received Living will   If Healthcare Directive is received, is it scanned into Epic? no (comment)  (Thinks it is on file here .  Provided with copy of Advance Directives.)   Prior to hospitilization cognitive status: Alert/Oriented   Prior to hospitalization functional status: Needs Assistance  (Was independent prior to about 3 weeks ago when SOB started.)   Current cognitive status: Alert/Oriented   Current Functional Status: Needs Assistance  (Requested a Walker and BSC)   Arrived From admitted as an inpatient;home or self-care;home health  (Has [facility].  Agency notified of patient's admit on 5/27/2017)   Lives With grandchild(kennedy)  (Juanpablo De Leon, lives in the home)   Able to Return to Prior Arrangements yes   Is patient able to care for self after discharge? Unable to determine at this time (comments)   Does the patient have family/friends to help with healtcare needs after discharge? yes   How many people do you have in your home that can help with your care after discharge? other (see comments)  (Lives with grandson who works.  Has adult children who sometimes assist.)   Who are your caregiver(s) and their phone number(s)? (Daughter:  Keya Mcleod- [phone])   Patient's perception of discharge disposition home health  (Resume )   Readmission Within The Last 30 Days previous discharge plan unsuccessful  (Recurrence of Shortness of Breath)   Patient currently being followed by outpatient case management? No   Patient currently  receives home health services? Yes   Patient previously received home health services and would like to resume services if necessary? Yes   If yes, name of home health provider: [facility]   Does the patient currently use HME? No  (Would like a walker and BSC)   Patient currently receives private duty nursing? No   Patient currently receives any other outside agency services? No   Equipment Currently Used at Home (Patient reported not having equipment and requesting walker and BSC)   Do you have any problems affording any of your prescribed medications? No   Is the patient taking medications as prescribed? yes   Do you have any financial concerns preventing you from receiving the healthcare you need? No   Does the patient have transportation to healthcare appointments? Yes   Transportation Available family or friend will provide  (Stated that he son Bob sometimes helps with transportation.  Also has daughter and grandchildren.)   On Dialysis? No   Does the patient receive services at the [facility]mad[facility] Clinic? No  (Takes Eliquis.)   Are there any open cases? No   Discharge Plan A Home with family;Home Health   Discharge Plan B Home Health;Home with family   Patient/Family In Agreement With Plan yes   Patient provided with a copy of Advance Directives.    Esteban with [facility] notified of patient's admission to hospital.    Aurelia Romeo, JOURDAN, DONNIE-PAULINE, CCM  05/28/2017

## 2017-05-29 LAB
ANION GAP SERPL CALC-SCNC: 12 MMOL/L
BACTERIA BLD CULT: NORMAL
BACTERIA BLD CULT: NORMAL
BACTERIA UR CULT: NO GROWTH
BASOPHILS # BLD AUTO: 0.01 K/UL
BASOPHILS NFR BLD: 0.1 %
BUN SERPL-MCNC: 48 MG/DL
CALCIUM SERPL-MCNC: 8.2 MG/DL
CHLORIDE SERPL-SCNC: 93 MMOL/L
CO2 SERPL-SCNC: 25 MMOL/L
CREAT SERPL-MCNC: 3.2 MG/DL
DIFFERENTIAL METHOD: ABNORMAL
EOSINOPHIL # BLD AUTO: 0.1 K/UL
EOSINOPHIL NFR BLD: 0.9 %
ERYTHROCYTE [DISTWIDTH] IN BLOOD BY AUTOMATED COUNT: 14 %
EST. GFR  (AFRICAN AMERICAN): 15 ML/MIN/1.73 M^2
EST. GFR  (NON AFRICAN AMERICAN): 13 ML/MIN/1.73 M^2
GLUCOSE SERPL-MCNC: 108 MG/DL
HCT VFR BLD AUTO: 30.4 %
HGB BLD-MCNC: 9.9 G/DL
LYMPHOCYTES # BLD AUTO: 0.9 K/UL
LYMPHOCYTES NFR BLD: 10.2 %
MAGNESIUM SERPL-MCNC: 1.9 MG/DL
MCH RBC QN AUTO: 29.2 PG
MCHC RBC AUTO-ENTMCNC: 32.6 %
MCV RBC AUTO: 90 FL
MONOCYTES # BLD AUTO: 1.1 K/UL
MONOCYTES NFR BLD: 11.4 %
NEUTROPHILS # BLD AUTO: 7.2 K/UL
NEUTROPHILS NFR BLD: 78.5 %
PHOSPHATE SERPL-MCNC: 5.6 MG/DL
PLATELET # BLD AUTO: 198 K/UL
PLATELET BLD QL SMEAR: ABNORMAL
PMV BLD AUTO: 10.9 FL
POTASSIUM SERPL-SCNC: 4.5 MMOL/L
RBC # BLD AUTO: 3.39 M/UL
SODIUM SERPL-SCNC: 130 MMOL/L
VANCOMYCIN SERPL-MCNC: 14.7 UG/ML
WBC # BLD AUTO: 9.25 K/UL

## 2017-05-29 PROCEDURE — 84100 ASSAY OF PHOSPHORUS: CPT

## 2017-05-29 PROCEDURE — 97162 PT EVAL MOD COMPLEX 30 MIN: CPT

## 2017-05-29 PROCEDURE — 94761 N-INVAS EAR/PLS OXIMETRY MLT: CPT

## 2017-05-29 PROCEDURE — 97167 OT EVAL HIGH COMPLEX 60 MIN: CPT

## 2017-05-29 PROCEDURE — 25000242 PHARM REV CODE 250 ALT 637 W/ HCPCS: Performed by: INTERNAL MEDICINE

## 2017-05-29 PROCEDURE — 97116 GAIT TRAINING THERAPY: CPT

## 2017-05-29 PROCEDURE — G8988 SELF CARE GOAL STATUS: HCPCS | Mod: CK

## 2017-05-29 PROCEDURE — 80048 BASIC METABOLIC PNL TOTAL CA: CPT

## 2017-05-29 PROCEDURE — 25000003 PHARM REV CODE 250: Performed by: NURSE PRACTITIONER

## 2017-05-29 PROCEDURE — 63600175 PHARM REV CODE 636 W HCPCS: Performed by: NURSE PRACTITIONER

## 2017-05-29 PROCEDURE — 21400001 HC TELEMETRY ROOM

## 2017-05-29 PROCEDURE — 80202 ASSAY OF VANCOMYCIN: CPT

## 2017-05-29 PROCEDURE — 25000003 PHARM REV CODE 250: Performed by: INTERNAL MEDICINE

## 2017-05-29 PROCEDURE — 97535 SELF CARE MNGMENT TRAINING: CPT

## 2017-05-29 PROCEDURE — G8978 MOBILITY CURRENT STATUS: HCPCS | Mod: CL

## 2017-05-29 PROCEDURE — 27000221 HC OXYGEN, UP TO 24 HOURS

## 2017-05-29 PROCEDURE — G8987 SELF CARE CURRENT STATUS: HCPCS | Mod: CM

## 2017-05-29 PROCEDURE — 94640 AIRWAY INHALATION TREATMENT: CPT

## 2017-05-29 PROCEDURE — 63600175 PHARM REV CODE 636 W HCPCS: Performed by: INTERNAL MEDICINE

## 2017-05-29 PROCEDURE — G8979 MOBILITY GOAL STATUS: HCPCS | Mod: CJ

## 2017-05-29 PROCEDURE — 99900037 HC PT THERAPY SCREENING (STAT)

## 2017-05-29 PROCEDURE — 36415 COLL VENOUS BLD VENIPUNCTURE: CPT

## 2017-05-29 PROCEDURE — 83735 ASSAY OF MAGNESIUM: CPT

## 2017-05-29 PROCEDURE — 85025 COMPLETE CBC W/AUTO DIFF WBC: CPT

## 2017-05-29 RX ADMIN — FLECAINIDE ACETATE 50 MG: 50 TABLET ORAL at 10:05

## 2017-05-29 RX ADMIN — POLYETHYLENE GLYCOL 3350 17 G: 17 POWDER, FOR SOLUTION ORAL at 10:05

## 2017-05-29 RX ADMIN — PIPERACILLIN SODIUM AND TAZOBACTAM SODIUM 4.5 G: 4; .5 INJECTION, POWDER, FOR SOLUTION INTRAVENOUS at 02:05

## 2017-05-29 RX ADMIN — PRAVASTATIN SODIUM 40 MG: 20 TABLET ORAL at 10:05

## 2017-05-29 RX ADMIN — APIXABAN 2.5 MG: 2.5 TABLET, FILM COATED ORAL at 08:05

## 2017-05-29 RX ADMIN — ASPIRIN 81 MG: 81 TABLET, COATED ORAL at 10:05

## 2017-05-29 RX ADMIN — FUROSEMIDE 40 MG: 10 INJECTION, SOLUTION INTRAMUSCULAR; INTRAVENOUS at 10:05

## 2017-05-29 RX ADMIN — PANTOPRAZOLE SODIUM 40 MG: 40 TABLET, DELAYED RELEASE ORAL at 10:05

## 2017-05-29 RX ADMIN — SODIUM CHLORIDE, PRESERVATIVE FREE 3 ML: 5 INJECTION INTRAVENOUS at 02:05

## 2017-05-29 RX ADMIN — SODIUM CHLORIDE, PRESERVATIVE FREE 3 ML: 5 INJECTION INTRAVENOUS at 08:05

## 2017-05-29 RX ADMIN — APIXABAN 2.5 MG: 2.5 TABLET, FILM COATED ORAL at 10:05

## 2017-05-29 RX ADMIN — SODIUM CHLORIDE, PRESERVATIVE FREE 3 ML: 5 INJECTION INTRAVENOUS at 07:05

## 2017-05-29 RX ADMIN — FLECAINIDE ACETATE 50 MG: 50 TABLET ORAL at 08:05

## 2017-05-29 RX ADMIN — IPRATROPIUM BROMIDE AND ALBUTEROL SULFATE 3 ML: .5; 3 SOLUTION RESPIRATORY (INHALATION) at 12:05

## 2017-05-29 RX ADMIN — MOXIFLOXACIN HYDROCHLORIDE 400 MG: 400 INJECTION, SOLUTION INTRAVENOUS at 12:05

## 2017-05-29 NOTE — CLINICAL REVIEW
Pharmacy Vancomycin Consult    Consult day #3  Vancomycin Random 5/29 at 0651 = 14.7  Thus far, patient has received 1,000 mg 5/27 and 500 mg 5/28  AM Random due 5/30 at 0430  Dx: Hypoxia  Considering WBC decrease to 9.25, Tmax 98.7, Cultures NGTD, and JEA (CrCl 19.2 ml/min) will hold today's dose   Also on Avelox and Zosyn     Pharmacy will continue to closely monitor patient and make adjustments as necessary.   Thank you for allowing us to participate in this patient's care,   Jacklyn Torre 5/29/2017 8:07 AM

## 2017-05-29 NOTE — PROGRESS NOTES
"Pt converted from SB-AFIB, HR in the 80's. Pt has Hx of AFIB, on Eliquis and flecainide. Pt resting in bed, denies any palpitations. Pt states she "actually feels pretty good." Will continue to monitor.   "

## 2017-05-29 NOTE — PROGRESS NOTES
[facility] Hospital Medicine  Progress Note    Patient Name: Elke Laws  MRN: [id number]  Patient Class: IP- Inpatient   Admission Date: 5/27/2017  Length of Stay: 2 days  Attending Physician: Shaq Moore MD  Primary Care Provider: Isidra Benavidez MD        Subjective:     Principal Problem:Hypoxemia    HPI:  83 y/o female presented to ED with c/o of SOB and cough. PMH of a-fib, CHF, and HTN. She was discharged from hospital 2 days ago with a dx of ecoli UTI on PO cipro, which is due to be finished today. She reports she developed a worsening cough with thick, white sputum and progressive SOB which started last night and at 4am she instructed her sitter to take her to the hospital. She denies abd pain, back pain, urine urgency, dysuria, hematuria, Cp, chest pressure, and elevated temperature. Her daughter is at the bedside and reports her urine was blood tinged.      Hospital Course:  Patient placed in observation initially. Patient with a progressive decline due to Acute Hypoxic Hypercapnic Respiratory Failure. Patient placed on Bipap, diuresed and admitted to telemetry. Patient now a DNR per patient and family at bedside.    5/28/17 - Patient improved work of breathing. Discussed POC at length with patient and family at bedside. Plan for SNF placement - [facility]. Consult to CM initiated.  5/29/17 - Patient improving in strength / clinically. No events. Await SNF    No new subjective & objective note has been filed under this hospital service since the last note was generated.    Assessment/Plan:      Acute congestive heart failure    Diuresis  BiPAP  O2              Acute kidney injury (nontraumatic)    Avoid nephrotoxic agents  Monitor BMP  Vanco dosing monitored by pharmacy  Will continue ARB for now        Leukocytosis    Monitor  Trend WBC  Monitor          Coronary artery disease involving native coronary artery of native heart without angina pectoris    Continue  Eliquis, Imdur, Metoprolol, Pravastatin, and valsartan           Acute on chronic diastolic congestive heart failure    Echo on 4/13/17 Shows Impaired LV relaxation, elevated LAP with normal EF + Diastolic dysfunction  Airway assessment and continuous pulse ox monitoring  PRN oxygen  Continuous cardiac monitoring  Lasix 80 mg IV  Continue Toprol,           PAF (paroxysmal atrial fibrillation)    Cardiac Monitoring - rate controlled  Continue fleccanide  Continue eliquis        Morbid obesity with BMI of 40.0-44.9, adult    Discussed importance of weight loss on heart disease symptoms  encouraged pt to follow low calorie, low fat, low salt diet upon discharge  Cardiac diet while in hospital          Hypertension    Monitor VS  Continue home diovan, isosorbide, Toprol  Low sodium diet            * Hypoxemia    IV abx: Vanc, zosyn, avelox  Continuous pulse ox monitoring  Oxygen PRN  BiPAP              VTE Risk Mitigation         Ordered     apixaban tablet 2.5 mg  2 times daily     Route:  Oral        05/29/17 0813     Medium Risk of VTE  Once      05/27/17 1253          Shaq Moore MD  Department of Hospital Medicine   [facility]

## 2017-05-29 NOTE — PLAN OF CARE
"   05/29/17 1641   Readmission Questionnaire   At the time of your discharge, did someone talk to you about what your health problems were? Yes   At the time of discharge, did someone talk to you about what to watch out for regarding worsening of your health problem? Yes   At the time of discharge, did someone talk to you about what to do if you experienced worsening of your health problem? Yes   At the time of discharge, did someone talk to you about which medication to take when you left the hospital and which ones to stop taking? Yes   At the time of discharge, did someone talk to you about when and where to follow up with a doctor after you left the hospital? Yes   What do you believe caused you to be sick enough to be re-admitted? "Experienced problems breathing"    How often do you need to have someone help you when you read instructions, pamphlets, or other written material from your doctor or pharmacy? Often   Do you have problems taking your medications as prescribed? No   Do you have any problems affording any of  your prescribed medications? No   Do you have problems obtaining/receiving your medications? No   Does the patient have transportation to healthcare appointments? Yes   Lives With child(kennedy), adult   Living Arrangements house   Does the patient have family/friends to help with healtcare needs after discharge? yes   Who are your caregiver(s) and their phone number(s)? Keya Mcleod, Daughter: [phone]   Does your caregiver provide all the help you need? Yes   Are you currently feeling confused? No   Are you currently having problems thinking? No   Are you currently having memory problems? No   Have you felt down, depressed, or hopeless? 0   Have you felt little interest or pleasure in doing things? 0   In the last 7 days, my sleep quality was: fair     "

## 2017-05-29 NOTE — PLAN OF CARE
Problem: Patient Care Overview  Goal: Plan of Care Review  Outcome: Ongoing (interventions implemented as appropriate)  Pt stable. Pt is SB/SR HR range 55-70 on the heart monitor.  Pt had no complaints of pain, pt c/o slight SOB prn breathing tx given.  Pt given scheduled ABX.  Pt made a full code today and plan is to be discharged to SNF.  Pt moved to room 223 d/t air conditioning issues in previous room.  Pt urine output for this shift was 300mL.  Plan of care reviewed.  Pt verbalizes understanding.  Pt was free from falls or injuries during duration of shift.  Pt turned and repositioned self.  PIV intact with no redness, swelling or drainage.  Bed low, wheels locked, bed alarm on, call light in reach.  Pt instructed to call for assistance.  Will continue to monitor.

## 2017-05-29 NOTE — PT/OT/SLP EVAL
"Physical Therapy  Evaluation    Elke Laws   MRN: [id number]   Admitting Diagnosis: Hypoxemia    PT Received On: 05/29/17  PT Start Time: 1100     PT Stop Time: 1125    PT Total Time (min): 25 min       Billable Minutes:  Evaluation 15 and Gait [email or web address]    Diagnosis: Hypoxemia  P.T. TX DX: IMPAIRED FUNCTIONAL MOBILITY    Past Medical History:   Diagnosis Date    Acute kidney injury (nontraumatic) 5/27/2017    Acute on chronic diastolic congestive heart failure 8/27/2015    Anemia 5/9/2016    Anemia 5/9/2016    Anticoagulant long-term use     Aortic regurgitation     Echo 11/2013---5 - Mild to moderate aortic regurgitation.      Atrial fibrillation 5/15/2014    Back pain     s/p epidural steriod injections, no recent injections.    Baker's cyst     small, right, seen on US lower extremity 6/2014    Blood transfusion     Chest pain, atypical 8/27/2015    Diastolic dysfunction     Seen on echo 11/2013, stress test negative 5/2014    Diverticulosis     colonoscopy 3/27/2011    Hemorrhoids     colonoscopy 3/27/2011    Hiatal hernia     CXR 12/30/2016---Retrocardiac density again noted consistent with a hiatal hernia.    Hx of adenomatous colonic polyps     Hyperlipidemia     Hypertension     Hyponatremia 5/9/2016    Mitral regurgitation     Myocardial infarction     per patient was told in the past she had a "mild heart attack"    Obesity     Osteoarthritis, knee     Pneumonia     per patient "walking Pneumonia" did not require hospitalization    Seasonal allergies     Stroke     TIA; patient reports was told by one doctor she had 2 mini strokes but another doctor said she did not      Past Surgical History:   Procedure Laterality Date    APPENDECTOMY      EYE SURGERY Left     HYSTERECTOMY      benign reasons    KNEE SURGERY Bilateral     x2     OLECRANON BURSECTOMY Right     6/2011    TONSILLECTOMY      URETHRA SURGERY       Referring physician: DR. FERNANDEZ  Date referred to PT: " 5/29/2017    General Precautions: Standard, fall, respiratory  Orthopedic Precautions: N/A   Braces: N/A       Patient History:  Lives With: friend(s) (PT DID LIVE ALONE BUT NOT HAS A FRIEND STAYING WITH HER DURING RECOVERY)  Living Arrangements: house  Home Accessibility: stairs to enter home  Home Layout: Able to live on 1st floor  Number of Stairs to Enter Home: 5  Stair Railings at Home: outside, present on left side  Transportation Available: family or friend will provide  Living Environment Comment: PT HAS A GRANDSON THAT LIVES WITH HER BUT HE IS UNABLE TO PROVIDE 24 HOUR CARE, A FRIEND HAS BEEN STAYING WITH HER TO ASSIST AFTER RECENT LAST HOSPITAL STAY  Equipment Currently Used at Home: cane, straight, shower chair  DME owned (not currently used): none    Previous Level of Function:  Ambulation Skills: needs device (AMB HOUSEHOLD DISTANCES WITH SPC)  Transfer Skills: needs device  ADL Skills: needs device and assist (FRIEND ASSISTS WITH ADL'S)    Subjective:  Communicated with NURSE SIMON prior to session.  Pain/Comfort  Pain Rating 1: 0/10    Objective:   Patient found with: telemetry, oxygen  PT FOUND SEATED AT EOB UPON ARRIVAL, JUST FINISHED TOILETING     Cognitive Exam:  Oriented to: Person, Place, Time and Situation    Follows Commands/attention: Follows one-step commands  Communication: clear/fluent  Safety awareness/insight to disability: impaired    Physical Exam:  Postural examination/scapula alignment: Rounded shoulder and Head forward    Skin integrity: Visible skin intact  Edema: None noted     Sensation:   Intact    Upper Extremity Range of Motion:  REFER TO O.T. EVAL    Lower Extremity Range of Motion:  Right Lower Extremity: WFL  Left Lower Extremity: WFL    Lower Extremity Strength:  Right Lower Extremity: GROSSLY 3+/5  Left Lower Extremity: GROSSLY 3+/5     Functional Mobility:  Bed Mobility:  Scooting/Bridging: Stand by Assistance (SEATED SCOOTING IN CHAIR)    Transfers:  Sit <> Stand  Assistance: Minimum Assistance  Sit <> Stand Assistive Device: Rolling Walker (PT EDUCATED IN RW USE AND SAFETY DURING TF'S AND GAIT)  Bed <> Chair Technique: Stand Pivot  Bed <> Chair Assistance: Minimum Assistance  Bed <> Chair Assistive Device: Rolling Walker    Gait:   Gait Distance: PT AMB 50' X 2 TRIALS WITH RW AND CHIQUI, SLOW PACED GAIT, VERY QUICK TO FATIGUE, SMALL STANDING REST BREAK, INCREASED SOB WITH O2 IN TOW, DEEP BREATHING ENCOURAGED  Assistance 1: Minimum assistance  Gait Assistive Device: Rolling walker  Gait Pattern: swing-through gait  Gait Deviation(s): decreased step length, decreased weight-shifting ability, decreased toe-to-floor clearance    Balance:   Static Sit: GOOD  Dynamic Sit: FAIR+  Static Stand: POOR+  Dynamic stand: POOR+    Therapeutic Activities and Exercises:  REVIEWED BLE THEREX FOR PT TO PERFORM WHILE SEATED IN CHAIR    AM-PAC 6 CLICK MOBILITY  How much help from another person does this patient currently need?   1 = Unable, Total/Dependent Assistance  2 = A lot, Maximum/Moderate Assistance  3 = A little, Minimum/Contact Guard/Supervision  4 = None, Modified [place]/Independent    Turning over in bed (including adjusting bedclothes, sheets and blankets)?: 1 (NT)  Sitting down on and standing up from a chair with arms (e.g., wheelchair, bedside commode, etc.): 3  Moving from lying on back to sitting on the side of the bed?: 1 (NT)  Moving to and from a bed to a chair (including a wheelchair)?: 3  Need to walk in hospital room?: 3  Climbing 3-5 steps with a railing?: 1 (NT)  Total Score: 12     AM-PAC Raw Score CMS G-Code Modifier Level of Impairment Assistance   6 % Total / Unable   7 - 9 CM 80 - 100% Maximal Assist   10 - 14 CL 60 - 80% Moderate Assist   15 - 19 CK 40 - 60% Moderate Assist   20 - 22 CJ 20 - 40% Minimal Assist   23 CI 1-20% SBA / CGA   24 CH 0% Independent/ Mod I     Patient left up in chair with all lines intact, call button in reach, NURSE notified  and FAMILY present.    Assessment:   Elke Laws is a 82 y.o. female with a medical diagnosis of Hypoxemia and presents with IMPAIRED FUNCTIONAL MOBILITY. PT WILL BENEFIT FROM CONT. SKILLED P.T. TO ADDRESS IMPAIRMENTS    Rehab identified problem list/impairments: Rehab identified problem list/impairments: weakness, impaired endurance, gait instability, impaired functional mobilty, impaired balance, decreased coordination, decreased safety awareness    Rehab potential is good.    Activity tolerance: Good    Discharge recommendations: Discharge Facility/Level Of Care Needs: nursing facility, skilled     Barriers to discharge: Barriers to Discharge: Inaccessible home environment    Equipment recommendations: Equipment Needed After Discharge: bedside commode, bath bench, walker, rolling     GOALS:    Physical Therapy Goals        Problem: Physical Therapy Goal    Goal Priority Disciplines Outcome Goal Variances Interventions   Physical Therapy Goal     PT/OT, PT      Description:  LTG'S TO BE MET IN 7 DAYS (6-5-17)  1. PT WILL REQUIRE SBA FOR BED MOBILITY  2. PT WILL REQUIRE CGA FOR TF'S USING RW  3. PT WILL ' WITH RW AND CGA  4. PT WILL TOLERATE BLE THEREX X 20 REPS AROM  5. PT WILL DEMO F+ DYNAMIC BALANCE DURING GAIT                  PLAN:    Patient to be seen 5 x/week to address the above listed problems via gait training, therapeutic activities, therapeutic exercises  Plan of Care expires: 06/05/17  Plan of Care reviewed with: patient, family    PT ENCOURAGED TO CALL FOR ASSISTANCE WITH ALL NEEDS DUE TO FALL RISK STATUS, PT AGREEABLE.  PT ENCOURAGED TO INCREASE TIME OOB IN CHAIR, ALL MEALS IN CHAIR OOB, PT AGREEABLE    Amber Grey, PT  05/29/2017

## 2017-05-29 NOTE — PLAN OF CARE
Problem: Patient Care Overview  Goal: Plan of Care Review  Outcome: Ongoing (interventions implemented as appropriate)  Patient had uneventful shift. Patient awake, alert, disoriented to time. VS stable. Patient denies pain or SOB. Patient on telemetry, sinus helio on the monitor. Patient ambulates with assistance. Fall precautions in place. Bed alarm active and audible. Patient free from fall/injury. Plan of care reviewed with patient. Patient has no questions at this time. Will continue to monitor.

## 2017-05-29 NOTE — PT/OT/SLP EVAL
"Occupational Therapy  Evaluation    Elke Laws   MRN: [id number]   Admitting Diagnosis: Hypoxemia    OT Date of Treatment: 05/29/17   OT Start Time: 1105  OT Stop Time: 1135  OT Total Time (min): 30 min    Billable Minutes:  Evaluation 15 MINUTES  Self Care/Home Management 15 MINUTES    Diagnosis: Hypoxemia         Past Medical History:   Diagnosis Date    Acute kidney injury (nontraumatic) 5/27/2017    Acute on chronic diastolic congestive heart failure 8/27/2015    Anemia 5/9/2016    Anemia 5/9/2016    Anticoagulant long-term use     Aortic regurgitation     Echo 11/2013---5 - Mild to moderate aortic regurgitation.      Atrial fibrillation 5/15/2014    Back pain     s/p epidural steriod injections, no recent injections.    Baker's cyst     small, right, seen on US lower extremity 6/2014    Blood transfusion     Chest pain, atypical 8/27/2015    Diastolic dysfunction     Seen on echo 11/2013, stress test negative 5/2014    Diverticulosis     colonoscopy 3/27/2011    Hemorrhoids     colonoscopy 3/27/2011    Hiatal hernia     CXR 12/30/2016---Retrocardiac density again noted consistent with a hiatal hernia.    Hx of adenomatous colonic polyps     Hyperlipidemia     Hypertension     Hyponatremia 5/9/2016    Mitral regurgitation     Myocardial infarction     per patient was told in the past she had a "mild heart attack"    Obesity     Osteoarthritis, knee     Pneumonia     per patient "walking Pneumonia" did not require hospitalization    Seasonal allergies     Stroke     TIA; patient reports was told by one doctor she had 2 mini strokes but another doctor said she did not      Past Surgical History:   Procedure Laterality Date    APPENDECTOMY      EYE SURGERY Left     HYSTERECTOMY      benign reasons    KNEE SURGERY Bilateral     x2     OLECRANON BURSECTOMY Right     6/2011    TONSILLECTOMY      URETHRA SURGERY         Referring physician: DR FERNANDEZ  Date referred to OT: " 5-29-17    General Precautions: Standard,    Orthopedic Precautions: N/A  Braces:            Patient History:  Living Environment  Lives With: friend(s)  Living Arrangements: house  Home Accessibility: stairs to enter home  Home Layout: Able to live on 1st floor  Number of Stairs to Enter Home: 4  Stair Railings at Home: outside, present at both sides  Living Environment Comment: PRIOR TO FIRST HOSPITALIZATION PT WAS (I)  AND STILL DRIVING. PT REPORTS LIVING WITYH FAMILY AND REQ ASSISTANCE  Equipment Currently Used at Home: shower chair (HURRICANE)    Prior level of function:   Bed Mobility/Transfers: independent  Bathing: needs assist  Upper Body Dressing: independent  Lower Body Dressing: independent  Toileting: needs device  Driving License: No     Dominant hand: right    Subjective:  Communicated with NURSE SIMON  AND EPIC CHART REVIEW prior to session.    Chief Complaint: DEBILITY AND GENERALIZED WEAKNESS  Patient/Family stated goals:          Objective:  Patient found with: telemetry, oxygen (3 LITERS)    Cognitive Exam:  Oriented to: Person, Place, Time and Situation  Follows Commands/attention: Follows two-step commands  Communication: clear/fluent  Memory:  No Deficits noted  Safety awareness/insight to disability: intact  Coping skills/emotional control: Appropriate to situation    Visual/perceptual:  Intact    Physical Exam:  Postural examination/scapula alignment: Rounded shoulder and Head forward MILDLY  Skin integrity: Visible skin intact and Thin  Edema: None noted B LE    Sensation:   Intact    Upper Extremity Range of Motion:  Right Upper Extremity: WFL  Left Upper Extremity: WFL    Upper Extremity Strength:  Right Upper Extremity: MMT: 3+/5 GROSSLY  Left Upper Extremity: MMT: 3+/5 GROSSLY   Strength: mmt: 4/5 grossly    Fine motor coordination:   Intact    Gross motor coordination: WFL    Functional Mobility:  Bed Mobility:       Transfers:  Sit <> Stand Assistance: Minimum Assistance  Sit <>  "Stand Assistive Device: Other (see comments) (HURRICANE)  Toilet Transfer Technique: Stand Pivot  Toilet Transfer Assistance: Other (see comments) (HURRICANE)    Functional Ambulation: NA    Activities of Daily Living:     Feeding adaptive equipment: NA  UE Dressing Level of Assistance: Minimum assistance  UE adaptive equipment: NA  LE Dressing Level of Assistance: Maximum assistance  LE adaptive equipment: NA        Toileting Where Assessed: Bedside commode  Toileting Level of Assistance: Maximum assistance        Bathing adaptive equipment: NA    Balance:   Static Sit: FAIR+: Able to take MINIMAL challenges from all directions  Dynamic Sit: FAIR: Cannot move trunk without losing balance  Static Stand: POOR+: Needs MINIMAL assist to maintain  Dynamic stand: POOR: N/A    Therapeutic Activities and Exercises:      AM-PAC 6 CLICK ADL  How much help from another person does this patient currently need?  1 = Unable, Total/Dependent Assistance  2 = A lot, Maximum/Moderate Assistance  3 = A little, Minimum/Contact Guard/Supervision  4 = None, Modified [place]/Independent    Putting on and taking off regular lower body clothing? : 2  Bathing (including washing, rinsing, drying)?: 1  Toileting, which includes using toilet, bedpan, or urinal? : 2  Putting on and taking off regular upper body clothing?: 3  Taking care of personal grooming such as brushing teeth?: 1  Eating meals?: 1  Total Score: 10    AM-PAC Raw Score CMS "G-Code Modifier Level of Impairment Assistance   6 % Total / Unable   7 - 9 CM 80 - 100% Maximal Assist   10-14 CL 60 - 80% Moderate Assist   15 - 19 CK 40 - 60% Moderate Assist   20 - 22 CJ 20 - 40% Minimal Assist   23 CI 1-20% SBA / CGA   24 CH 0% Independent/ Mod I       Patient left SITTING EOB with all lines intact, call button in reach, nurse denisha  notified and family present    Assessment:  Elke Laws is a 82 y.o. female with a medical diagnosis of Hypoxemia and presents with.debility " and generalized weakness. Pt will benefit from skilled O.T.    Rehab identified problem list/impairments: Rehab identified problem list/impairments: weakness, impaired functional mobilty, impaired balance, decreased upper extremity function, impaired endurance, impaired self care skills, gait instability, decreased coordination, decreased lower extremity function, pain    Rehab potential is good.    Activity tolerance: Good    Discharge recommendations: Discharge Facility/Level Of Care Needs: nursing facility, skilled     Barriers to discharge: Barriers to Discharge: Inaccessible home environment    Equipment recommendations: bedside commode, walker, rolling, bath bench     GOALS:    Occupational Therapy Goals        Problem: Occupational Therapy Goal    Goal Priority Disciplines Outcome Interventions   Occupational Therapy Goal     OT, PT/OT     Description:  OT GOALS TO BE MET BY 6-5-17  1. MIN A WITH LE DRESSING  2. MIN A WITH TOILETING  3. PT WILL TOLERATE 1 SET X 15 REPS B UE STRENGTH/ENDURANCE                    PLAN:  Patient to be seen 3 x/week to address the above listed problems via self-care/home management, therapeutic exercises, therapeutic activities  Plan of Care expires:    Plan of Care reviewed with: patient, family         Josi Durant OT  05/29/2017

## 2017-05-29 NOTE — PLAN OF CARE
Problem: Patient Care Overview  Goal: Plan of Care Review  Outcome: Ongoing (interventions implemented as appropriate)  Pt alert, disoriented to time, family states she has some confusion sometimes at home. C/o DIAZ with heavy coughing, crackles noted- moderate relief withPRN nebs. 2L NC, >97% maintained. Educated pt on IS use and benefits- <500ml on IS noted. Afebrile during shift. 250 ml output noted during shift. BSC utilized, remained free of falls during shift, bed alarm set, encouraged pt to reposition during hourly rounding, call light in reach, room free of clutter, side rails up X2, family at bedside and offers support, pt on telemetry monitor SB 50's, will continue to monitor.

## 2017-05-29 NOTE — PLAN OF CARE
Hospitalist ordered PT and OT.  PT/OT Evaluations reflect next recommended level of care for patient post-hospitalization as SNF.      Contacted Haylee Stuart with [facility] who states that they will be able to accept patient for SNF placement PENDING insurance approval and 142.  (Haylee to submit request for SNF placement to insurance tomorrow morning since insurance closed today.  This  will call in LOCET and fax Level I [facility] tomorrow due to this agency being closed today as well).    Patient and family updated regarding above.

## 2017-05-29 NOTE — PT/OT/SLP PROGRESS
Physical Therapy      Elke STEINBERG Veto  MRN: [id number]     ARCADIO LLOYD INITIATED THIS AM VIA CHART REVIEW, PT CURRENTLY TOILETING, WILL RE-ATTEMPT COMPLETION OF ARCADIO LLOYD LATER THIS AM    Amber Grey, PT   5/29/2017  1037

## 2017-05-30 ENCOUNTER — TELEPHONE (OUTPATIENT)
Dept: CARDIOLOGY | Facility: CLINIC | Age: 82
End: 2017-05-30

## 2017-05-30 VITALS
DIASTOLIC BLOOD PRESSURE: 60 MMHG | TEMPERATURE: 98 F | BODY MASS INDEX: 41.73 KG/M2 | RESPIRATION RATE: 18 BRPM | SYSTOLIC BLOOD PRESSURE: 118 MMHG | HEART RATE: 67 BPM | WEIGHT: 207 LBS | OXYGEN SATURATION: 99 % | HEIGHT: 59 IN

## 2017-05-30 LAB
ANION GAP SERPL CALC-SCNC: 13 MMOL/L
BASOPHILS NFR BLD: 0 %
BUN SERPL-MCNC: 54 MG/DL
CALCIUM SERPL-MCNC: 8.4 MG/DL
CHLORIDE SERPL-SCNC: 92 MMOL/L
CO2 SERPL-SCNC: 25 MMOL/L
CREAT SERPL-MCNC: 3.7 MG/DL
DIFFERENTIAL METHOD: ABNORMAL
EOSINOPHIL NFR BLD: 0 %
ERYTHROCYTE [DISTWIDTH] IN BLOOD BY AUTOMATED COUNT: 14.2 %
EST. GFR  (AFRICAN AMERICAN): 12 ML/MIN/1.73 M^2
EST. GFR  (NON AFRICAN AMERICAN): 11 ML/MIN/1.73 M^2
GLUCOSE SERPL-MCNC: 103 MG/DL
HCT VFR BLD AUTO: 29.6 %
HGB BLD-MCNC: 9.6 G/DL
LYMPHOCYTES NFR BLD: 11 %
MAGNESIUM SERPL-MCNC: 2.1 MG/DL
MCH RBC QN AUTO: 29.4 PG
MCHC RBC AUTO-ENTMCNC: 32.4 %
MCV RBC AUTO: 91 FL
MONOCYTES NFR BLD: 18 %
MYELOCYTES NFR BLD MANUAL: 2 %
NEUTROPHILS NFR BLD: 68 %
NEUTS BAND NFR BLD MANUAL: 1 %
PHOSPHATE SERPL-MCNC: 6.1 MG/DL
PLATELET # BLD AUTO: 232 K/UL
PLATELET BLD QL SMEAR: ABNORMAL
PMV BLD AUTO: 10.8 FL
POLYCHROMASIA BLD QL SMEAR: ABNORMAL
POTASSIUM SERPL-SCNC: 4.7 MMOL/L
RBC # BLD AUTO: 3.27 M/UL
SODIUM SERPL-SCNC: 130 MMOL/L
VANCOMYCIN SERPL-MCNC: 12.1 UG/ML
WBC # BLD AUTO: 7.26 K/UL

## 2017-05-30 PROCEDURE — 97116 GAIT TRAINING THERAPY: CPT

## 2017-05-30 PROCEDURE — 83735 ASSAY OF MAGNESIUM: CPT

## 2017-05-30 PROCEDURE — 97110 THERAPEUTIC EXERCISES: CPT

## 2017-05-30 PROCEDURE — 25000003 PHARM REV CODE 250: Performed by: NURSE PRACTITIONER

## 2017-05-30 PROCEDURE — 25000003 PHARM REV CODE 250: Performed by: INTERNAL MEDICINE

## 2017-05-30 PROCEDURE — 63600175 PHARM REV CODE 636 W HCPCS: Performed by: INTERNAL MEDICINE

## 2017-05-30 PROCEDURE — 25000242 PHARM REV CODE 250 ALT 637 W/ HCPCS: Performed by: INTERNAL MEDICINE

## 2017-05-30 PROCEDURE — 63600175 PHARM REV CODE 636 W HCPCS: Performed by: NURSE PRACTITIONER

## 2017-05-30 PROCEDURE — 85027 COMPLETE CBC AUTOMATED: CPT

## 2017-05-30 PROCEDURE — 27000221 HC OXYGEN, UP TO 24 HOURS

## 2017-05-30 PROCEDURE — 80202 ASSAY OF VANCOMYCIN: CPT

## 2017-05-30 PROCEDURE — 36415 COLL VENOUS BLD VENIPUNCTURE: CPT

## 2017-05-30 PROCEDURE — 85007 BL SMEAR W/DIFF WBC COUNT: CPT

## 2017-05-30 PROCEDURE — 94640 AIRWAY INHALATION TREATMENT: CPT

## 2017-05-30 PROCEDURE — 97530 THERAPEUTIC ACTIVITIES: CPT

## 2017-05-30 PROCEDURE — 80048 BASIC METABOLIC PNL TOTAL CA: CPT

## 2017-05-30 PROCEDURE — 94761 N-INVAS EAR/PLS OXIMETRY MLT: CPT

## 2017-05-30 PROCEDURE — 84100 ASSAY OF PHOSPHORUS: CPT

## 2017-05-30 RX ADMIN — VALSARTAN 160 MG: 80 TABLET, FILM COATED ORAL at 08:05

## 2017-05-30 RX ADMIN — PRAVASTATIN SODIUM 40 MG: 20 TABLET ORAL at 08:05

## 2017-05-30 RX ADMIN — PIPERACILLIN SODIUM AND TAZOBACTAM SODIUM 4.5 G: 4; .5 INJECTION, POWDER, FOR SOLUTION INTRAVENOUS at 03:05

## 2017-05-30 RX ADMIN — IPRATROPIUM BROMIDE AND ALBUTEROL SULFATE 3 ML: .5; 3 SOLUTION RESPIRATORY (INHALATION) at 05:05

## 2017-05-30 RX ADMIN — APIXABAN 2.5 MG: 2.5 TABLET, FILM COATED ORAL at 08:05

## 2017-05-30 RX ADMIN — ASPIRIN 81 MG: 81 TABLET, COATED ORAL at 08:05

## 2017-05-30 RX ADMIN — MOXIFLOXACIN HYDROCHLORIDE 400 MG: 400 INJECTION, SOLUTION INTRAVENOUS at 01:05

## 2017-05-30 RX ADMIN — FLECAINIDE ACETATE 50 MG: 50 TABLET ORAL at 08:05

## 2017-05-30 RX ADMIN — ISOSORBIDE MONONITRATE 30 MG: 30 TABLET, EXTENDED RELEASE ORAL at 08:05

## 2017-05-30 RX ADMIN — PANTOPRAZOLE SODIUM 40 MG: 40 TABLET, DELAYED RELEASE ORAL at 08:05

## 2017-05-30 RX ADMIN — FUROSEMIDE 40 MG: 10 INJECTION, SOLUTION INTRAMUSCULAR; INTRAVENOUS at 08:05

## 2017-05-30 RX ADMIN — SODIUM CHLORIDE, PRESERVATIVE FREE 3 ML: 5 INJECTION INTRAVENOUS at 01:05

## 2017-05-30 RX ADMIN — VANCOMYCIN HYDROCHLORIDE 500 MG: 500 INJECTION, POWDER, LYOPHILIZED, FOR SOLUTION INTRAVENOUS at 12:05

## 2017-05-30 RX ADMIN — POLYETHYLENE GLYCOL 3350 17 G: 17 POWDER, FOR SOLUTION ORAL at 08:05

## 2017-05-30 NOTE — DISCHARGE SUMMARY
[facility] Hospital Medicine  Discharge Summary      Patient Name: Elke Laws  MRN: [id number]  Admission Date: 5/27/2017  Hospital Length of Stay: 3 days  Discharge Date and Time:  05/30/2017 4:07 PM  Attending Physician: Manish Moore MD   Discharging Provider: Kathrine Mauricio NP  Primary Care Provider: Isidra Benavidez MD      HPI:   81 y/o female presented to ED with c/o of SOB and cough. PMH of a-fib, CHF, and HTN. She was discharged from hospital 2 days ago with a dx of ecoli UTI on PO cipro, which is due to be finished today. She reports she developed a worsening cough with thick, white sputum and progressive SOB which started last night and at 4am she instructed her sitter to take her to the hospital. She denies abd pain, back pain, urine urgency, dysuria, hematuria, Cp, chest pressure, and elevated temperature. Her daughter is at the bedside and reports her urine was blood tinged.      * No surgery found *      Indwelling Lines/Drains at time of discharge:   Lines/Drains/Airways          No matching active lines, drains, or airways        Hospital Course:   Patient placed in observation initially. Patient with a progressive decline due to Acute Hypoxic Hypercapnic Respiratory Failure. Patient placed on Bipap and admitted to telemetry. Patient improved after being diuresed. Patient started on IV abx. And duoneb tx. Patient weaned off Bipap and place on NC. Blood cultures show no growth to date.  Patient now a DNR per patient. Case management consulted for discharge placement. Patient accepted at [facility] SNF. Patient seen and examined on date of discharge and found suitable for discharge.          Consults:   Consults         Status Ordering Provider     Inpatient consult to Pulmonology  Once     Provider:  (Not yet assigned)    Acknowledged TOBI CRUZ     Inpatient consult to Social Work  Once     Provider:  (Not yet assigned)    Completed MANISH MOORE     Pharmacy to  dose Vancomycin consult  Once     Provider:  (Not yet assigned)    Acknowledged MARTHA ESTRADA          Significant Diagnostic Studies: Labs:   BMP:   Recent Labs  Lab 05/29/17  0651 05/30/17  0552    103   * 130*   K 4.5 4.7   CL 93* 92*   CO2 25 25   BUN 48* 54*   CREATININE 3.2* 3.7*   CALCIUM 8.2* 8.4*   MG 1.9 2.1   , CMP   Recent Labs  Lab 05/29/17  0651 05/30/17  0552   * 130*   K 4.5 4.7   CL 93* 92*   CO2 25 25    103   BUN 48* 54*   CREATININE 3.2* 3.7*   CALCIUM 8.2* 8.4*   ANIONGAP 12 13   ESTGFRAFRICA 15* 12*   EGFRNONAA 13* 11*   , CBC   Recent Labs  Lab 05/29/17  0651 05/30/17  0552   WBC 9.25 7.26   HGB 9.9* 9.6*   HCT 30.4* 29.6*    232   , Troponin   Recent Labs  Lab 05/27/17  0723   TROPONINI 0.024    and All labs within the past 24 hours have been reviewed    Pending Diagnostic Studies:     None        Final Active Diagnoses:    Diagnosis Date Noted POA    PRINCIPAL PROBLEM:  Hypoxemia [R09.02] 05/27/2017 Yes    Coronary artery disease involving native coronary artery of native heart without angina pectoris [I25.10] 12/15/2016 Yes     Chronic    Leukocytosis [D72.829] 05/27/2017 Yes    Acute kidney injury (nontraumatic) [N17.9] 05/27/2017 Yes    Acute congestive heart failure [I50.9] 05/27/2017 Yes    Acute on chronic diastolic congestive heart failure [I50.33] 08/27/2015 Yes     Chronic    PAF (paroxysmal atrial fibrillation) [I48.0] 07/17/2015 Yes     Chronic    Morbid obesity with BMI of 40.0-44.9, adult [E66.01, Z68.41]  Not Applicable    Hypertension [I10]  Yes     Chronic      Problems Resolved During this Admission:    Diagnosis Date Noted Date Resolved POA      Discharged Condition: stable    Disposition: Skilled Nursing Facility    Follow Up:  Follow-up Information     [facility].    Specialties:  Nursing Home Agency, SNF Agency  Why:  SNF:  PT/OT  Contact information:  [address] [address]  [address]  [phone]             Isidra Benavidez MD In 3 days.    Specialty:  Family Medicine  Why:  hospital follow-up   Contact information:  [address] [address] [place] [address] [phone]             Abida Angulo MD In 1 week.    Specialty:  Cardiology  Why:  hospital follow-up   Contact information:  [address]  [place] [address] [phone]                 Patient Instructions:     Diet general     Activity as tolerated     Call MD for:  temperature >100.4     Call MD for:  severe uncontrolled pain     Call MD for:  difficulty breathing or increased cough     Call MD for:  severe persistent headache     Call MD for:  persistent dizziness, light-headedness, or visual disturbances     Call MD for:  increased confusion or weakness       Medications:  Reconciled Home Medications:   Current Discharge Medication List      CONTINUE these medications which have NOT CHANGED    Details   acetaminophen (TYLENOL) 500 MG tablet Take 1 tablet (500 mg total) by mouth 2 (two) times daily.  Refills: 0    Associated Diagnoses: Bilateral low back pain with right-sided sciatica      albuterol 90 mcg/actuation inhaler Inhale 2 puffs into the lungs 4 (four) times daily.  Qty: 3 Inhaler, Refills: 4    Associated Diagnoses: Asthma, mild intermittent, uncomplicated      albuterol sulfate 2.5 mg/0.5 mL Nebu Take 2.5 mg by nebulization every 4 (four) hours as needed.  Qty: 90 each, Refills: 1      apixaban (ELIQUIS) 5 mg Tab Take 1 tablet (5 mg total) by mouth 2 (two) times daily.  Qty: 60 tablet, Refills: 0    Associated Diagnoses: Paroxysmal atrial fibrillation      ASCORBATE CALCIUM (VITAMIN C ORAL) Take by mouth once daily.      aspirin (ECOTRIN) 81 MG EC tablet Take 1 tablet (81 mg total) by mouth once daily.  Refills: 0      ciprofloxacin HCl (CIPRO) 500 MG tablet Take 1 tablet (500 mg total) by mouth 2 (two) times daily.  Qty: 28 tablet, Refills: 0      compressor, for nebulizer Lara Compressor use  as directed by albuterol use.  Qty: 1 Device, Refills: 0      DOCOSAHEXANOIC ACID/EPA (FISH OIL ORAL) Take by mouth once daily.      flecainide (TAMBOCOR) 50 MG Tab Take 1 tablet (50 mg total) by mouth every 12 (twelve) hours.  Qty: 60 tablet, Refills: 6      fluticasone (FLOVENT HFA) 110 mcg/actuation inhaler Inhale 1 puff into the lungs 2 (two) times daily.  Qty: 12 g, Refills: 4      furosemide (LASIX) 40 MG tablet Take 1 tablet (40 mg total) by mouth once daily.  Qty: 90 tablet, Refills: 3    Associated Diagnoses: Acute on chronic diastolic congestive heart failure      isosorbide mononitrate (IMDUR) 30 MG 24 hr tablet TAKE 1 TABLET(30 MG) BY MOUTH EVERY DAY  Qty: 90 tablet, Refills: 3    Associated Diagnoses: Angina effort; Coronary artery disease involving native coronary artery of native heart without angina pectoris      loratadine (CLARITIN) 10 mg tablet Take 1 tablet (10 mg total) by mouth once daily.  Refills: 0    Associated Diagnoses: Post-nasal drip      metoprolol succinate (TOPROL-XL) 100 MG 24 hr tablet Take 1 tablet (100 mg total) by mouth once daily.  Qty: 90 tablet, Refills: 1      pravastatin (PRAVACHOL) 40 MG tablet Take 1 tablet (40 mg total) by mouth once daily.  Qty: 90 tablet, Refills: 1    Associated Diagnoses: Hyperlipidemia, unspecified hyperlipidemia type; History of TIA (transient ischemic attack)      valsartan (DIOVAN) 160 MG tablet Take 1 tablet (160 mg total) by mouth once daily.  Qty: 90 tablet, Refills: 3    Associated Diagnoses: Essential hypertension           Time spent on the discharge of patient: 45 minutes    Kathrine Mauricio NP  Department of Hospital Medicine  [facility]

## 2017-05-30 NOTE — PT/OT/SLP PROGRESS
Physical Therapy  Treatment    Elke Laws   MRN: [id number]   Admitting Diagnosis: Hypoxemia    PT Received On: 05/30/17  PT Start Time: 1025     PT Stop Time: 1050    PT Total Time (min): 25 min       Billable Minutes:  Gait [id number] and Therapeutic Exercise 10    Treatment Type: Treatment  PT/PTA: PT       General Precautions: Standard, fall, respiratory  Orthopedic Precautions: N/A   Braces: N/A    Subjective:  Communicated with NURSE SIMON prior to session.  Pain/Comfort  Pain Rating 1: 0/10    Objective:   Patient found with: telemetry, oxygen    Functional Mobility:  Bed Mobility:   Rolling/Turning to Left: Contact guard assistance  Rolling/Turning Right: Contact guard assistance  Scooting/Bridging: Contact Guard Assistance  Supine to Sit: Contact Guard Assistance    Transfers:  Sit <> Stand Assistance: Minimum Assistance  Sit <> Stand Assistive Device: Rolling Walker  Bed <> Chair Technique: Stand Pivot  Bed <> Chair Assistance: Contact Guard Assistance  Bed <> Chair Assistive Device: Rolling Walker    Gait:   Gait Distance: PT AMB 50' X 2 TRIALS WITH RW AND CGA, SLOW PACED GAIT, INCREASED SOB WITH O2 IN TOW  Assistance 1: Contact Guard Assistance  Gait Assistive Device: Rolling walker  Gait Pattern: swing-through gait  Gait Deviation(s): decreased robson    Balance:   Static Sit: FAIR+  Dynamic Sit: FAIR  Static Stand: FAIR  Dynamic stand:  FAIR    Therapeutic Activities and Exercises:  PT JOSE BAHENA WITH CHIQUI.  PT EDUCATED IN AND PERFORMED BLE THEREX X 20 REPS AROM    AM-PAC 6 CLICK MOBILITY  How much help from another person does this patient currently need?   1 = Unable, Total/Dependent Assistance  2 = A lot, Maximum/Moderate Assistance  3 = A little, Minimum/Contact Guard/Supervision  4 = None, Modified [place]/Independent    Turning over in bed (including adjusting bedclothes, sheets and blankets)?: 3  Sitting down on and standing up from a chair with arms (e.g., wheelchair, bedside commode,  etc.): 3  Moving from lying on back to sitting on the side of the bed?: 3  Moving to and from a bed to a chair (including a wheelchair)?: 3  Need to walk in hospital room?: 3  Climbing 3-5 steps with a railing?: 1 (NT)  Total Score: 16    AM-PAC Raw Score CMS G-Code Modifier Level of Impairment Assistance   6 % Total / Unable   7 - 9 CM 80 - 100% Maximal Assist   10 - 14 CL 60 - 80% Moderate Assist   15 - 19 CK 40 - 60% Moderate Assist   20 - 22 CJ 20 - 40% Minimal Assist   23 CI 1-20% SBA / CGA   24 CH 0% Independent/ Mod I     Patient left up in chair with all lines intact, call button in reach, NURSE notified and FAMILY present.    Assessment:  Elke Laws is a 82 y.o. female with a medical diagnosis of Hypoxemia   PT WILL BENEFIT FROM CONT. SKILLED P.T. TO ADDRESS IMPAIRMENTS    Rehab identified problem list/impairments: Rehab identified problem list/impairments: weakness, impaired endurance, impaired functional mobilty, gait instability, impaired balance, decreased safety awareness, decreased coordination    Rehab potential is good.    Activity tolerance: Good    Discharge recommendations: Discharge Facility/Level Of Care Needs: nursing facility, skilled     Barriers to discharge: Barriers to Discharge: Inaccessible home environment    Equipment recommendations: Equipment Needed After Discharge: bedside commode, bath bench, walker, rolling     GOALS:    Physical Therapy Goals     Not on file          Multidisciplinary Problems (Resolved)        Problem: Physical Therapy Goal    Goal Priority Disciplines Outcome Goal Variances Interventions   Physical Therapy Goal   (Resolved)     PT/OT, PT Outcome(s) achieved     Description:  LTG'S TO BE MET IN 7 DAYS (6-5-17)  1. PT WILL REQUIRE SBA FOR BED MOBILITY  2. PT WILL REQUIRE CGA FOR TF'S USING RW  3. PT WILL ' WITH RW AND CGA  4. PT WILL TOLERATE BLE THEREX X 20 REPS AROM  5. PT WILL DEMO F+ DYNAMIC BALANCE DURING GAIT                  PLAN:     Patient to be seen 5 x/week  to address the above listed problems via gait training, therapeutic activities, therapeutic exercises  Plan of Care expires: 06/05/17  Plan of Care reviewed with: patient, family    PT ENCOURAGED TO CALL FOR ASSISTANCE WITH ALL NEEDS DUE TO FALL RISK STATUS, PT AGREEABLE.  PT ENCOURAGED TO INCREASE TIME OOB IN CHAIR, ALL MEALS IN CHAIR OOB, PT AGREEABLE    Amber Grey, PT  05/30/2017

## 2017-05-30 NOTE — PLAN OF CARE
Problem: Patient Care Overview  Goal: Plan of Care Review  Outcome: Ongoing (interventions implemented as appropriate)  Pt SB on monitor.  2L. No distress noted. Denies pain and SOB.Remained free of falls.Pt slept most of the night. ABX administered per order. Family at bedside. Left pt bed low, call light in reach, side rails up. Will continue to monitor.

## 2017-05-30 NOTE — PLAN OF CARE
Received call from Haylee Stuart with [facility] that patient has been approved by insurance for SNF placement.  Haylee indicates that they are ready to accept patient today and that [facility] wheelchair van will be at [facility] this afternoon around 4:30 pm.  Hospitalist and patient and family notified.  D/C Orders obtained, and all D/C Documentation faxed to [facility] via [facility].        D/C PLAN:  SNF placement for PT/OT at [facility]       05/30/17 1605   Final Note   Assessment Type Final Discharge Note   Discharge Disposition SNF   What phone number can be called within the next 1-3 days to see how you are doing after discharge? [phone]   Hospital Follow Up  Appt(s) scheduled? Yes   Discharge plans and expectations educations in teach back method with documentation complete? Yes   Offered [facility] -- Bedside Delivery? n/a   Discharge/Hospital Encounter Summary to ([facility]) PCP n/a   Referral to Outpatient Case Management complete? n/a   Referral to / orders for Home Health Complete? n/a   Did you assess the readiness or willingness of the family or caregiver to support self management of care? Yes   [facility] Referral Info   Post Acute Recommendation SNF   Referral Type SNF for PT/OT   Facility Name [facility]   [address]   [place] [address]

## 2017-05-30 NOTE — PLAN OF CARE
LOCET called in and Level I PASRR faxed to [facility].  Awaiting 142.  Level I PASRR scanned into [facility] and will be forwarded to [facility] once 142 received.Spoke with Haylee Stuart at [facility] who indicates that SNF auth request was submitted to insurance this morning.  Faxed via Right Fax PT/OT Eval to [facility] this morning.       1025:  142 received.  Scanned into [facility] and forwarded via [facility] Fax to Haylee Stuart at [facility]    D/C PLAN:  Pending insurance authorization, SNF placement for PT/OT at [facility]

## 2017-05-30 NOTE — NURSING
Transport here to take pt to [facility]. Pt transported via wheelchair to transport vehicle with all personal belongings.

## 2017-05-30 NOTE — PROGRESS NOTES
Elke Laws [id number] is a 82 y.o. female who has been consulted for vancomycin dosing.    The patient has the following labs:     Date Creatinine (mg/dl)    BUN WBC Count   5/30/2017 Estimated Creatinine Clearance: 17.4 mL/min (based on Cr of 3.7). Lab Results   Component Value Date    BUN 54 (H) 05/30/2017     Lab Results   Component Value Date    WBC 7.26 05/30/2017        Current weight is 93.9 kg (207 lb)    Vancomycin trough from 5/30 was 12.1 mg/dL.  Target trough range is 15-20.  Patient received only one dose, will continue dose.    A vancomycin random has been ordered for 5/31 at 06:30.  Patient will be followed by pharmacy for changes in renal function, toxicity, and efficacy.    Thank you for allowing us to participate in this patient's care.     Angelica Sanchez

## 2017-05-30 NOTE — TELEPHONE ENCOUNTER
----- Message from Jami Cao sent at 5/30/2017  4:18 PM CDT -----  Contact: [facility]  Patient was hospitalized on 5/27/17. Patient was told to follow up in one week with cardiologist. Please call back at [phone]. Pt is at [facility] and will be able to ask for patient.    Thanks,  Jami Cao

## 2017-05-30 NOTE — NURSING
Patient discharged per MD order. Patient to be transported to [facility]. Patient verbalizes understanding and has no questions at this time. IV discontinued, telemetry removed and returned to monitor tech. Patient awaiting transport. Will continue to monitor.

## 2017-05-31 ENCOUNTER — TELEPHONE (OUTPATIENT)
Dept: INTERNAL MEDICINE | Facility: CLINIC | Age: 82
End: 2017-05-31

## 2017-05-31 ENCOUNTER — PATIENT OUTREACH (OUTPATIENT)
Dept: ADMINISTRATIVE | Facility: CLINIC | Age: 82
End: 2017-05-31
Payer: MEDICARE

## 2017-05-31 NOTE — PROGRESS NOTES
C3 nurse attempted to conduct a POST ACUTE 1 call with [facility].  Elen (SNF [facility]) is not available at this time and requests to return call.  Callback number given.

## 2017-05-31 NOTE — PT/OT/SLP PROGRESS
Occupational Therapy  Treatment    Elke Laws   MRN: [id number]   Admitting Diagnosis: Hypoxemia    OT Date of Treatment: 05/30/17   OT Start Time: 0950  OT Stop Time: 1013  OT Total Time (min): 23 min    Billable Minutes:  Therapeutic Activity  x 10 min and Therapeutic Exercise  x13 min    General Precautions: Standard, fall, respiratory  Orthopedic Precautions: N/A  Braces: N/A         Subjective:  Communicated with pt, family and nurse- Tamy  prior to session.    Pain/Comfort  Pain Rating 1: 0/10    Objective:  Patient found with: telemetry, oxygen     Functional Mobility:  Bed Mobility:  Rolling/Turning to Left: Stand by assistance  Scooting/Bridging: Stand by Assistance  Supine to Sit: Stand by Assistance    Transfers:   Sit <> Stand Assistance: Minimum Assistance  Sit <> Stand Assistive Device: Rolling Walker  Bed <> Chair Technique: Stand Pivot  Bed <> Chair Transfer Assistance: Minimum Assistance  Bed <> Chair Assistive Device: Rolling Walker    Functional Ambulation: fx ambulation with RW with O2 in tow, with min A/ CGA with standing rest breaks due to SOB noted with activity, slow paced gait.     Activities of Daily Living:     Feeding adaptive equipment:      UE adaptive equipment:      LE adaptive equipment:                     Bathing adaptive equipment:     Balance:   Static Sit: FAIR+: Able to take MINIMAL challenges from all directions  Dynamic Sit: FAIR: Cannot move trunk without losing balance  Static Stand: FAIR: Maintains without assist but unable to take challenges  Dynamic stand: FAIR: Needs CONTACT GUARD during gait    Therapeutic Activities and Exercises:  Pt completed therapeutic ex to BUE of AROM to all available planes to increase fx strength to impact self care performance. Pt tolerated tx well, cooperative.     AM-PAC 6 CLICK ADL   How much help from another person does this patient currently need?   1 = Unable, Total/Dependent Assistance  2 = A lot, Maximum/Moderate Assistance  3 = A  "little, Minimum/Contact Guard/Supervision  4 = None, Modified [place]/Independent    Putting on and taking off regular lower body clothing? : 2  Bathing (including washing, rinsing, drying)?: 1  Toileting, which includes using toilet, bedpan, or urinal? : 2  Putting on and taking off regular upper body clothing?: 3  Taking care of personal grooming such as brushing teeth?: 3  Eating meals?: 3  Total Score: 14     AM-PAC Raw Score CMS "G-Code Modifier Level of Impairment Assistance   6 % Total / Unable   7 - 8 CM 80 - 100% Maximal Assist   9-13 CL 60 - 80% Moderate Assist   14 - 19 CK 40 - 60% Moderate Assist   20 - 22 CJ 20 - 40% Minimal Assist   23 CI 1-20% SBA / CGA   24 CH 0% Independent/ Mod I       Patient left up in chair with all lines intact, call button in reach and family present    ASSESSMENT:  Elke Laws is a 82 y.o. female with a medical diagnosis of Hypoxemia and presents with impaired self care skills and fx mobility.    Rehab identified problem list/impairments: Rehab identified problem list/impairments: weakness, impaired endurance, impaired self care skills, impaired functional mobilty, decreased coordination, impaired balance, gait instability, decreased safety awareness    Rehab potential is good.    Activity tolerance: Good    Discharge recommendations: Discharge Facility/Level Of Care Needs: nursing facility, skilled     Barriers to discharge: Barriers to Discharge: Inaccessible home environment    Equipment recommendations: bedside commode, bath bench, walker, rolling     GOALS:    Occupational Therapy Goals        Problem: Occupational Therapy Goal    Goal Priority Disciplines Outcome Interventions   Occupational Therapy Goal     OT, PT/OT Ongoing (interventions implemented as appropriate)    Description:  OT GOALS TO BE MET BY 6-5-17  1. MIN A WITH LE DRESSING  2. MIN A WITH TOILETING  3. PT WILL TOLERATE 1 SET X 15 REPS B UE STRENGTH/ENDURANCE               "       Plan:  Patient to be seen 3 x/week to address the above listed problems via self-care/home management, therapeutic activities, therapeutic exercises  Plan of Care expires:    Plan of Care reviewed with: patient, family         Garcia Horne, OT  05/30/2017

## 2017-05-31 NOTE — TELEPHONE ENCOUNTER
----- Message from Jami Cao sent at 5/30/2017  4:16 PM CDT -----  Contact: [facility]  Patient was hospitalized on 5/27/17. Patient was told to follow up in three days with PCP. Please call back at [phone]. Pt is at [facility] and will be able to ask for patient.    Thanks,  Jami Cao

## 2017-05-31 NOTE — PROGRESS NOTES
C3 nurse attempted to conduct a POST ACUTE 1 call with [facility].  No answer at facility.  Unable to leave voicemail

## 2017-06-01 LAB
BACTERIA BLD CULT: NORMAL
BACTERIA BLD CULT: NORMAL

## 2017-06-05 ENCOUNTER — OFFICE VISIT (OUTPATIENT)
Dept: CARDIOLOGY | Facility: CLINIC | Age: 82
End: 2017-06-05
Payer: MEDICARE

## 2017-06-05 ENCOUNTER — LAB VISIT (OUTPATIENT)
Dept: LAB | Facility: HOSPITAL | Age: 82
End: 2017-06-05
Attending: INTERNAL MEDICINE
Payer: MEDICARE

## 2017-06-05 ENCOUNTER — TELEPHONE (OUTPATIENT)
Dept: CARDIOLOGY | Facility: CLINIC | Age: 82
End: 2017-06-05

## 2017-06-05 VITALS
WEIGHT: 198 LBS | BODY MASS INDEX: 39.92 KG/M2 | HEIGHT: 59 IN | HEART RATE: 54 BPM | DIASTOLIC BLOOD PRESSURE: 74 MMHG | SYSTOLIC BLOOD PRESSURE: 122 MMHG

## 2017-06-05 DIAGNOSIS — I48.0 PAF (PAROXYSMAL ATRIAL FIBRILLATION): Chronic | ICD-10-CM

## 2017-06-05 DIAGNOSIS — N17.9 ACUTE KIDNEY INJURY (NONTRAUMATIC): ICD-10-CM

## 2017-06-05 DIAGNOSIS — I51.89 LEFT VENTRICULAR DIASTOLIC DYSFUNCTION WITH PRESERVED SYSTOLIC FUNCTION: Chronic | ICD-10-CM

## 2017-06-05 DIAGNOSIS — I51.89 LEFT VENTRICULAR DIASTOLIC DYSFUNCTION WITH PRESERVED SYSTOLIC FUNCTION: Primary | Chronic | ICD-10-CM

## 2017-06-05 DIAGNOSIS — I25.10 CORONARY ARTERY DISEASE INVOLVING NATIVE CORONARY ARTERY OF NATIVE HEART WITHOUT ANGINA PECTORIS: Chronic | ICD-10-CM

## 2017-06-05 DIAGNOSIS — I10 ESSENTIAL HYPERTENSION: Chronic | ICD-10-CM

## 2017-06-05 LAB
ANION GAP SERPL CALC-SCNC: 10 MMOL/L
BNP SERPL-MCNC: 573 PG/ML
BUN SERPL-MCNC: 57 MG/DL
CALCIUM SERPL-MCNC: 8.7 MG/DL
CHLORIDE SERPL-SCNC: 99 MMOL/L
CO2 SERPL-SCNC: 28 MMOL/L
CREAT SERPL-MCNC: 3 MG/DL
EST. GFR  (AFRICAN AMERICAN): 16.1 ML/MIN/1.73 M^2
EST. GFR  (NON AFRICAN AMERICAN): 13.9 ML/MIN/1.73 M^2
GLUCOSE SERPL-MCNC: 100 MG/DL
POTASSIUM SERPL-SCNC: 4.6 MMOL/L
SODIUM SERPL-SCNC: 137 MMOL/L

## 2017-06-05 PROCEDURE — 99499 UNLISTED E&M SERVICE: CPT | Mod: S$GLB,,, | Performed by: INTERNAL MEDICINE

## 2017-06-05 PROCEDURE — 99214 OFFICE O/P EST MOD 30 MIN: CPT | Mod: S$GLB,,, | Performed by: INTERNAL MEDICINE

## 2017-06-05 PROCEDURE — 99999 PR PBB SHADOW E&M-EST. PATIENT-LVL III: CPT | Mod: PBBFAC,,, | Performed by: INTERNAL MEDICINE

## 2017-06-05 PROCEDURE — 1159F MED LIST DOCD IN RCRD: CPT | Mod: S$GLB,,, | Performed by: INTERNAL MEDICINE

## 2017-06-05 PROCEDURE — 80048 BASIC METABOLIC PNL TOTAL CA: CPT

## 2017-06-05 PROCEDURE — 83880 ASSAY OF NATRIURETIC PEPTIDE: CPT

## 2017-06-05 PROCEDURE — 36415 COLL VENOUS BLD VENIPUNCTURE: CPT

## 2017-06-05 NOTE — TELEPHONE ENCOUNTER
----- Message from Kamala Wilson sent at 6/5/2017  2:08 PM CDT -----  Contact: [facility]  Guerda states patient was in the office on today. States patient is advising her medication was changed. States she did not receive any paperwork. States need clarification. Please adv/call [phone].//thanks. cw

## 2017-06-05 NOTE — PROGRESS NOTES
"Subjective:   Patient ID:  Elke Laws is a 82 y.o. female who presents for follow up of Coronary Artery Disease      83 yo, female, came in for discharge f/u. In NH for short term rehab.  PMHx of PAF, diastolic CHF, non obstructive CAD per [facility] done in , HTN and HLD. She was admitted to [facility] on 5/21/2017 for CHF exacerbation. Had IV diuresis and also presented with PAF.  Today, states that she feels a lot of better, DIAZ improved and can  Finish PT in the NH. No chest pain.   , ECHo showed normal EF, DD, LAE and LVH.        Past Medical History:   Diagnosis Date    Acute kidney injury (nontraumatic) 5/27/2017    Acute on chronic diastolic congestive heart failure 8/27/2015    Anemia 5/9/2016    Anemia 5/9/2016    Anticoagulant long-term use     Aortic regurgitation     Echo 11/2013---5 - Mild to moderate aortic regurgitation.      Atrial fibrillation 5/15/2014    Back pain     s/p epidural steriod injections, no recent injections.    Baker's cyst     small, right, seen on US lower extremity 6/2014    Blood transfusion     Chest pain, atypical 8/27/2015    Diastolic dysfunction     Seen on echo 11/2013, stress test negative 5/2014    Diverticulosis     colonoscopy 3/27/2011    Hemorrhoids     colonoscopy 3/27/2011    Hiatal hernia     CXR 12/30/2016---Retrocardiac density again noted consistent with a hiatal hernia.    Hx of adenomatous colonic polyps     Hyperlipidemia     Hypertension     Hyponatremia 5/9/2016    Mitral regurgitation     Myocardial infarction     per patient was told in the past she had a "mild heart attack"    Obesity     Osteoarthritis, knee     Pneumonia     per patient "walking Pneumonia" did not require hospitalization    Seasonal allergies     Stroke     TIA; patient reports was told by one doctor she had 2 mini strokes but another doctor said she did not       Past Surgical History:   Procedure Laterality Date    APPENDECTOMY      EYE SURGERY Left  "    HYSTERECTOMY      benign reasons    KNEE SURGERY Bilateral     x2     OLECRANON BURSECTOMY Right     6/2011    TONSILLECTOMY      URETHRA SURGERY         Social History   Substance Use Topics    Smoking status: Former Smoker     Packs/day: 0.05     Years: 1.00     Types: Cigarettes     Quit date: 1/1/1952    Smokeless tobacco: Never Used    Alcohol use No       Family History   Problem Relation Age of Onset    Heart disease Mother     Cancer Mother      colon    Hypertension Mother     Hyperlipidemia Mother     Heart disease Father     Cancer Father      colon    Hypertension Father     Hyperlipidemia Father     Heart disease Sister      MI    Heart disease Brother      MI    COPD Son     Hypertension Son     Diabetes Brother     Diabetes Son     Cancer Sister      breast    Kidney disease Neg Hx     Stroke Neg Hx          Review of Systems   Constitution: Negative for diaphoresis, weakness, malaise/fatigue, weight gain and weight loss.   HENT: Negative for congestion and nosebleeds.    Eyes: Negative for blurred vision and double vision.   Cardiovascular: Negative for chest pain, claudication, cyanosis, dyspnea on exertion, irregular heartbeat, leg swelling, near-syncope, orthopnea, palpitations, paroxysmal nocturnal dyspnea and syncope.   Respiratory: Negative for cough, hemoptysis, shortness of breath, sputum production and wheezing.    Hematologic/Lymphatic: Negative for bleeding problem. Does not bruise/bleed easily.   Skin: Negative for rash.   Musculoskeletal: Positive for arthritis, back pain and joint pain. Negative for falls, joint swelling and neck pain.   Gastrointestinal: Negative for abdominal pain, heartburn and vomiting.   Genitourinary: Negative for dysuria, frequency and hematuria.   Neurological: Negative for difficulty with concentration, dizziness, focal weakness, light-headedness, numbness and seizures.   Psychiatric/Behavioral: Negative for depression, memory loss  and substance abuse.   Allergic/Immunologic: Negative for HIV exposure and hives.       Objective:   Physical Exam   Constitutional: She is oriented to person, place, and time. She appears well-developed and well-nourished.   HENT:   Head: Normocephalic and atraumatic.   Eyes: Pupils are equal, round, and reactive to light.   Neck: Normal range of motion. Normal carotid pulses and no JVD present. Carotid bruit is not present. No thyromegaly present.   Cardiovascular: Normal rate, regular rhythm, normal heart sounds and normal pulses.   No extrasystoles are present. PMI is not displaced.  Exam reveals no gallop, no S3 and no friction rub.    No murmur heard.  SM on LSB   Pulmonary/Chest: Effort normal and breath sounds normal. No stridor. No respiratory distress. She has no wheezes. She has no rales. She exhibits no tenderness.   Abdominal: Soft. Bowel sounds are normal. She exhibits no distension and no mass. There is no tenderness. There is no rebound and no guarding.   Musculoskeletal: Normal range of motion. She exhibits edema. She exhibits no deformity.   Trace edema above ankles   Neurological: She is alert and oriented to person, place, and time.   Skin: Skin is warm, dry and intact. No rash noted.   Psychiatric: She has a normal mood and affect. Her behavior is normal. Judgment and thought content normal.   Nursing note and vitals reviewed.      Lab Results   Component Value Date    CHOL 123 04/13/2017    CHOL 165 06/02/2016    CHOL 170 05/14/2015     Lab Results   Component Value Date    HDL 59 04/13/2017    HDL 75 06/02/2016    HDL 92 (H) 05/14/2015     Lab Results   Component Value Date    LDLCALC 51.2 (L) 04/13/2017    LDLCALC 79.8 06/02/2016    LDLCALC 65.4 05/14/2015     Lab Results   Component Value Date    TRIG 64 04/13/2017    TRIG 51 06/02/2016    TRIG 63 05/14/2015     Lab Results   Component Value Date    CHOLHDL 48.0 04/13/2017    CHOLHDL 45.5 06/02/2016    CHOLHDL 54.1 (H) 05/14/2015        Chemistry        Component Value Date/Time     (L) 05/30/2017 0552    K 4.7 05/30/2017 0552    CL 92 (L) 05/30/2017 0552    CO2 25 05/30/2017 0552    BUN 54 (H) 05/30/2017 0552    CREATININE 3.7 (H) 05/30/2017 0552     05/30/2017 0552        Component Value Date/Time    CALCIUM 8.4 (L) 05/30/2017 0552    ALKPHOS 99 05/25/2017 0611    AST 55 (H) 05/25/2017 0611    ALT 47 (H) 05/25/2017 0611    BILITOT 0.4 05/25/2017 0611          Lab Results   Component Value Date    TSH 1.057 12/30/2016     Lab Results   Component Value Date    INR 1.2 05/27/2017    INR 1.2 05/21/2017    INR 1.1 04/22/2017     Lab Results   Component Value Date    WBC 7.26 05/30/2017    HGB 9.6 (L) 05/30/2017    HCT 29.6 (L) 05/30/2017    MCV 91 05/30/2017     05/30/2017     BMP  Sodium   Date Value Ref Range Status   05/30/2017 130 (L) 136 - 145 mmol/L Final     Potassium   Date Value Ref Range Status   05/30/2017 4.7 3.5 - 5.1 mmol/L Final     Chloride   Date Value Ref Range Status   05/30/2017 92 (L) 95 - 110 mmol/L Final     CO2   Date Value Ref Range Status   05/30/2017 25 23 - 29 mmol/L Final     BUN, Bld   Date Value Ref Range Status   05/30/2017 54 (H) 8 - 23 mg/dL Final     Creatinine   Date Value Ref Range Status   05/30/2017 3.7 (H) 0.5 - 1.4 mg/dL Final     Calcium   Date Value Ref Range Status   05/30/2017 8.4 (L) 8.7 - 10.5 mg/dL Final     Anion Gap   Date Value Ref Range Status   05/30/2017 13 8 - 16 mmol/L Final     eGFR if    Date Value Ref Range Status   05/30/2017 12 (A) >60 mL/min/1.73 m^2 Final     eGFR if non    Date Value Ref Range Status   05/30/2017 11 (A) >60 mL/min/1.73 m^2 Final     Comment:     Calculation used to obtain the estimated glomerular filtration  rate (eGFR) is the CKD-EPI equation. Since race is unknown   in our information system, the eGFR values for   -American and Non--American patients are given   for each creatinine result.       Estimated  Creatinine Clearance: 16.6 mL/min (based on Cr of 3.7).     Assessment:      1. Left ventricular diastolic dysfunction with preserved systolic function    2. PAF (paroxysmal atrial fibrillation)    3. Essential hypertension    4. Coronary artery disease involving native coronary artery of native heart without angina pectoris    5. Acute kidney injury (nontraumatic)      Repeat BMP and BNP today showed improved Cr from 3.5 to 3.0   BNP at 573 stable.  Plan:     Continue ASA, BB, flecainide, and statin.  Hold Losartan. Resume her lasix at 20 mg daily.  Will have BMP and BNP in 2 weeks.   RTC in 3 months  DASH.

## 2017-06-06 ENCOUNTER — TELEPHONE (OUTPATIENT)
Dept: CARDIOLOGY | Facility: CLINIC | Age: 82
End: 2017-06-06

## 2017-06-06 DIAGNOSIS — I51.89 LEFT VENTRICULAR DIASTOLIC DYSFUNCTION WITH PRESERVED SYSTOLIC FUNCTION: Primary | Chronic | ICD-10-CM

## 2017-06-06 RX ORDER — FUROSEMIDE 20 MG/1
20 TABLET ORAL DAILY
Qty: 30 TABLET | Refills: 11 | Status: SHIPPED | OUTPATIENT
Start: 2017-06-06 | End: 2018-07-13 | Stop reason: SDUPTHER

## 2017-06-06 NOTE — TELEPHONE ENCOUNTER
I have attempted without success to contact this patient by phone to inform them of their results. I left a message for them to call back at [phone].

## 2017-06-15 ENCOUNTER — OFFICE VISIT (OUTPATIENT)
Dept: INTERNAL MEDICINE | Facility: CLINIC | Age: 82
End: 2017-06-15
Payer: MEDICARE

## 2017-06-15 ENCOUNTER — TELEPHONE (OUTPATIENT)
Dept: CARDIOLOGY | Facility: CLINIC | Age: 82
End: 2017-06-15

## 2017-06-15 VITALS
TEMPERATURE: 97 F | OXYGEN SATURATION: 99 % | DIASTOLIC BLOOD PRESSURE: 60 MMHG | BODY MASS INDEX: 41.09 KG/M2 | SYSTOLIC BLOOD PRESSURE: 118 MMHG | WEIGHT: 200 LBS | HEART RATE: 50 BPM

## 2017-06-15 DIAGNOSIS — R09.02 HYPOXEMIA: ICD-10-CM

## 2017-06-15 DIAGNOSIS — I50.33 ACUTE ON CHRONIC DIASTOLIC CONGESTIVE HEART FAILURE: Primary | Chronic | ICD-10-CM

## 2017-06-15 PROCEDURE — 1159F MED LIST DOCD IN RCRD: CPT | Mod: S$GLB,,, | Performed by: NURSE PRACTITIONER

## 2017-06-15 PROCEDURE — 99214 OFFICE O/P EST MOD 30 MIN: CPT | Mod: S$GLB,,, | Performed by: NURSE PRACTITIONER

## 2017-06-15 PROCEDURE — 1157F ADVNC CARE PLAN IN RCRD: CPT | Mod: S$GLB,,, | Performed by: NURSE PRACTITIONER

## 2017-06-15 PROCEDURE — 99999 PR PBB SHADOW E&M-EST. PATIENT-LVL IV: CPT | Mod: PBBFAC,,, | Performed by: NURSE PRACTITIONER

## 2017-06-15 PROCEDURE — 1126F AMNT PAIN NOTED NONE PRSNT: CPT | Mod: S$GLB,,, | Performed by: NURSE PRACTITIONER

## 2017-06-15 PROCEDURE — 99499 UNLISTED E&M SERVICE: CPT | Mod: S$GLB,,, | Performed by: NURSE PRACTITIONER

## 2017-06-15 NOTE — TELEPHONE ENCOUNTER
Spoke with pt friend Dilma schroeder pt saw Dr. America Lowery today and was told  Dr. America lowery called Dr. Jones to see about writing a prescription for pt to have oxygen states pt will be discharged from rehab on Monday June 19. I let pt friend know i will send message to Dr. Jones and call back once i hear something.

## 2017-06-15 NOTE — PROGRESS NOTES
Subjective:       Patient ID: Elke Laws is a 82 y.o. female.    Chief Complaint: Other ([facility] hospital follow up)  Transitional Care Note    Family and/or Caretaker present at visit?  Yes. Daughter.  Diagnostic tests reviewed/disposition: No diagnosic tests pending after this hospitalization.  Disease/illness education: Acute on Chronic HF.   Home health/community services discussion/referrals: Patient has home health established at [facility] to start when patient is discharged from NH on 6/19/17..   Establishment or re-establishment of referral orders for community resources: No other necessary community resources.   Discussion with other health care providers: No discussion with other health care providers necessary.         Patient presents with her daughter for hospital follow up. Patient is currently in a nursing home for short-term rehab. She will be discharged 6/19/17. Patient is on O2, 2L, PRN and is requesting an Rx for home O2 after rehab discharge. She reports overall improvement.      Review of Systems   Constitutional: Negative for chills, fatigue, fever and unexpected weight change.   Eyes: Negative for visual disturbance.   Respiratory: Negative for shortness of breath.    Cardiovascular: Negative for chest pain.   Musculoskeletal: Negative for myalgias.   Neurological: Negative for headaches.       Objective:      Physical Exam   Constitutional: She is oriented to person, place, and time. She appears well-developed and well-nourished. No distress.   HENT:   Head: Normocephalic and atraumatic.   Eyes: Conjunctivae and EOM are normal. Pupils are equal, round, and reactive to light.   Cardiovascular: Normal rate and regular rhythm.    Pulmonary/Chest: Effort normal and breath sounds normal. No respiratory distress. She has no wheezes.   Neurological: She is alert and oriented to person, place, and time.   Skin: Skin is warm and dry.   Psychiatric: She has a normal mood and affect.   Vitals  reviewed.      Assessment:       1. Acute on chronic diastolic congestive heart failure    2. Hypoxemia        Plan:   Acute on chronic diastolic congestive heart failure    Hypoxemia  Comments:  on 2L NC PRN, will discuss with cardiology if they can fill Rx    Continue plan per cardiology. Will see if cardiology can fill oxygen Rx.  25 minutes spent on this visit, with greater than 50% of the time spent on discussion of diagnosis and treatment/coordination of care as documented above.    Return in about 3 months (around 9/15/2017), or if symptoms worsen or fail to improve, for annual wellness.

## 2017-06-15 NOTE — TELEPHONE ENCOUNTER
----- Message from Brandon Mcgowan sent at 6/15/2017  1:02 PM CDT -----  Contact: [phone]   Would like to consult with the nurse about Oxygen Rx.  Please call back  [phone].  Thanks-[facility]

## 2017-06-16 DIAGNOSIS — R06.02 SHORTNESS OF BREATH: Primary | ICD-10-CM

## 2017-06-16 DIAGNOSIS — Z99.81 REQUIRES CONTINUOUS AT HOME SUPPLEMENTAL OXYGEN: ICD-10-CM

## 2017-06-19 ENCOUNTER — TELEPHONE (OUTPATIENT)
Dept: INTERNAL MEDICINE | Facility: CLINIC | Age: 82
End: 2017-06-19

## 2017-06-19 NOTE — TELEPHONE ENCOUNTER
----- Message from Daphnie Wilson sent at 6/19/2017  3:32 PM CDT -----  Contact: [facility]  States a orders for in home oxy tank is needed, please fax to [phone], can be reached at [phone]///thxMW

## 2017-06-20 ENCOUNTER — PATIENT OUTREACH (OUTPATIENT)
Dept: ADMINISTRATIVE | Facility: CLINIC | Age: 82
End: 2017-06-20

## 2017-06-20 NOTE — PROGRESS NOTES
C3 nurse attempted to conduct a POST ACUTE 2 call with [facility].  Staff is unavailable at this time.  Message and callback number left.

## 2017-06-21 NOTE — PROGRESS NOTES
C3 nurse spoke with Azucena  at [facility].  She was unable to review medications and requests this to be done with KAVYA Upton.  Callback number given.  She will have Alexsandra return call.

## 2017-06-22 ENCOUNTER — PATIENT OUTREACH (OUTPATIENT)
Dept: ADMINISTRATIVE | Facility: CLINIC | Age: 82
End: 2017-06-22

## 2017-06-22 RX ORDER — BUMETANIDE 1 MG/1
1 TABLET ORAL DAILY
COMMUNITY
End: 2017-06-28

## 2017-06-22 RX ORDER — VALSARTAN 160 MG/1
160 TABLET ORAL DAILY
COMMUNITY
End: 2017-06-28 | Stop reason: ALTCHOICE

## 2017-06-22 NOTE — PROGRESS NOTES
Received message from JOSE Upton at [facility], that she had patient's discharge medication instructions.  C3 nurse returned call.  She reports patient was to take:  Lactulose 10gm/15ml, 30ml prn  Bumex 1mg QD  Pravastatin 40mg nightly  Diovan 160mg QD  Toprol XL 100mg QD  IMDUR ER 30mg QD  Claritin 10mg QD  Flovent 110mcg, 1 puff BID  Flecainide 50mg every 12 hours  Fish Oil 500mg QD  Aspirin 81 QD  Vitamin C 500mg QD  Eliquis 5mg BED  Albuterol sulfate 0.25mg/0.5ml every 4 hours prn  Ventolin 2 puff QID  Tylenol 500mg BID    Reports Lasix was stopped on 6/5 and there were no instructions to resum3 medication at discharge.

## 2017-06-22 NOTE — PROGRESS NOTES
C3 nurse received callback from JOSE Upton [facility].  She reports Lasix was stopped on 6/5/17 and there were no instructions to resume at discharge.  C3 nurse contacted Dr. Jones and informed him of information.  Instructed to advise pt to stop Lasix and continue Bumex 1mg QD.  He will place order in EPIC.  C3 nurse contacted Dilma, patient's caregiver, and informed her of Dr. Jones's instructions.  She verbalized understanding and will stop Lasix.

## 2017-06-22 NOTE — PROGRESS NOTES
C3 nurse attempted to conduct a POST ACUTE 2 call with [facility].  Spoke with JOSE Upton at SNF.  She is unable to review discharge medications at this time and requests to return call asap.

## 2017-06-23 DIAGNOSIS — R09.02 HYPOXEMIA: Primary | ICD-10-CM

## 2017-06-23 NOTE — TELEPHONE ENCOUNTER
Spoke with Dilma, in regards to oxygen tank needed for patient. She has seen cardiologist and informed her she needs to see pulm. For oxygen tank order. I will schedule patient for consult.    Patient scheduled for pulm on 07/24/2017 @ 340 pm letter mailed to resident.

## 2017-06-28 ENCOUNTER — LAB VISIT (OUTPATIENT)
Dept: LAB | Facility: HOSPITAL | Age: 82
End: 2017-06-28
Attending: INTERNAL MEDICINE
Payer: MEDICARE

## 2017-06-28 ENCOUNTER — OFFICE VISIT (OUTPATIENT)
Dept: CARDIOLOGY | Facility: CLINIC | Age: 82
End: 2017-06-28
Payer: MEDICARE

## 2017-06-28 VITALS
DIASTOLIC BLOOD PRESSURE: 46 MMHG | WEIGHT: 189.81 LBS | BODY MASS INDEX: 38.27 KG/M2 | HEIGHT: 59 IN | OXYGEN SATURATION: 95 % | SYSTOLIC BLOOD PRESSURE: 90 MMHG | HEART RATE: 62 BPM

## 2017-06-28 DIAGNOSIS — I50.33 ACUTE ON CHRONIC DIASTOLIC CONGESTIVE HEART FAILURE: Primary | ICD-10-CM

## 2017-06-28 DIAGNOSIS — I48.0 PAF (PAROXYSMAL ATRIAL FIBRILLATION): Chronic | ICD-10-CM

## 2017-06-28 DIAGNOSIS — I51.89 LEFT VENTRICULAR DIASTOLIC DYSFUNCTION WITH PRESERVED SYSTOLIC FUNCTION: Chronic | ICD-10-CM

## 2017-06-28 DIAGNOSIS — I10 ESSENTIAL HYPERTENSION: Chronic | ICD-10-CM

## 2017-06-28 DIAGNOSIS — I25.10 CORONARY ARTERY DISEASE INVOLVING NATIVE CORONARY ARTERY OF NATIVE HEART WITHOUT ANGINA PECTORIS: Chronic | ICD-10-CM

## 2017-06-28 DIAGNOSIS — R09.02 HYPOXIA: ICD-10-CM

## 2017-06-28 LAB
ANION GAP SERPL CALC-SCNC: 9 MMOL/L
BNP SERPL-MCNC: 426 PG/ML
BUN SERPL-MCNC: 52 MG/DL
CALCIUM SERPL-MCNC: 9.1 MG/DL
CHLORIDE SERPL-SCNC: 104 MMOL/L
CO2 SERPL-SCNC: 26 MMOL/L
CREAT SERPL-MCNC: 1.7 MG/DL
EST. GFR  (AFRICAN AMERICAN): 31.9 ML/MIN/1.73 M^2
EST. GFR  (NON AFRICAN AMERICAN): 27.7 ML/MIN/1.73 M^2
GLUCOSE SERPL-MCNC: 105 MG/DL
POTASSIUM SERPL-SCNC: 5.6 MMOL/L
SODIUM SERPL-SCNC: 139 MMOL/L

## 2017-06-28 PROCEDURE — 1159F MED LIST DOCD IN RCRD: CPT | Mod: S$GLB,,, | Performed by: INTERNAL MEDICINE

## 2017-06-28 PROCEDURE — 99499 UNLISTED E&M SERVICE: CPT | Mod: S$GLB,,, | Performed by: INTERNAL MEDICINE

## 2017-06-28 PROCEDURE — 1157F ADVNC CARE PLAN IN RCRD: CPT | Mod: S$GLB,,, | Performed by: INTERNAL MEDICINE

## 2017-06-28 PROCEDURE — 99999 PR PBB SHADOW E&M-EST. PATIENT-LVL III: CPT | Mod: PBBFAC,,, | Performed by: INTERNAL MEDICINE

## 2017-06-28 PROCEDURE — 83880 ASSAY OF NATRIURETIC PEPTIDE: CPT

## 2017-06-28 PROCEDURE — 99214 OFFICE O/P EST MOD 30 MIN: CPT | Mod: S$GLB,,, | Performed by: INTERNAL MEDICINE

## 2017-06-28 PROCEDURE — 80048 BASIC METABOLIC PNL TOTAL CA: CPT

## 2017-06-28 PROCEDURE — 36415 COLL VENOUS BLD VENIPUNCTURE: CPT | Mod: PO

## 2017-06-28 NOTE — PROGRESS NOTES
"Subjective:   Patient ID:  Elke Laws is a 82 y.o. female who presents for follow up of Fatigue and Shortness of Breath      83 yo, female, discharged from NH for short term rehab. Saw her 3 weeks ago.  PMHx of PAF, diastolic CHF, non obstructive CAD per [facility] done in , HTN and HLD. She was admitted to [facility] on 5/21/2017 for CHF exacerbation. Had IV diuresis and also presented with PAF.  Today, states that she feels a lot of better, DIAZ improved.  PT at home twice a week. Could finish home activity.  visiti nurse noted hypoxic when she exercised  No chest pain.   , ECHo showed normal EF, DD, LAE and LVH.        Past Medical History:   Diagnosis Date    Acute kidney injury (nontraumatic) 5/27/2017    Acute on chronic diastolic congestive heart failure 8/27/2015    Anemia 5/9/2016    Anemia 5/9/2016    Anticoagulant long-term use     Aortic regurgitation     Echo 11/2013---5 - Mild to moderate aortic regurgitation.      Atrial fibrillation 5/15/2014    Back pain     s/p epidural steriod injections, no recent injections.    Baker's cyst     small, right, seen on US lower extremity 6/2014    Blood transfusion     Chest pain, atypical 8/27/2015    Diastolic dysfunction     Seen on echo 11/2013, stress test negative 5/2014    Diverticulosis     colonoscopy 3/27/2011    Hemorrhoids     colonoscopy 3/27/2011    Hiatal hernia     CXR 12/30/2016---Retrocardiac density again noted consistent with a hiatal hernia.    Hx of adenomatous colonic polyps     Hyperlipidemia     Hypertension     Hyponatremia 5/9/2016    Mitral regurgitation     Myocardial infarction     per patient was told in the past she had a "mild heart attack"    Obesity     Osteoarthritis, knee     Pneumonia     per patient "walking Pneumonia" did not require hospitalization    Seasonal allergies     Stroke     TIA; patient reports was told by one doctor she had 2 mini strokes but another doctor said she did not "       Past Surgical History:   Procedure Laterality Date    APPENDECTOMY      EYE SURGERY Left     HYSTERECTOMY      benign reasons    KNEE SURGERY Bilateral     x2     OLECRANON BURSECTOMY Right     6/2011    TONSILLECTOMY      URETHRA SURGERY         Social History   Substance Use Topics    Smoking status: Former Smoker     Packs/day: 0.05     Years: 1.00     Types: Cigarettes     Quit date: 1/1/1952    Smokeless tobacco: Never Used    Alcohol use No       Family History   Problem Relation Age of Onset    Heart disease Mother     Cancer Mother      colon    Hypertension Mother     Hyperlipidemia Mother     Heart disease Father     Cancer Father      colon    Hypertension Father     Hyperlipidemia Father     Heart disease Sister      MI    Heart disease Brother      MI    COPD Son     Hypertension Son     Diabetes Brother     Diabetes Son     Cancer Sister      breast    Kidney disease Neg Hx     Stroke Neg Hx          Review of Systems   Constitution: Negative for diaphoresis, weakness, malaise/fatigue, weight gain and weight loss.   HENT: Negative for congestion and nosebleeds.    Eyes: Negative for blurred vision and double vision.   Cardiovascular: Negative for chest pain, claudication, cyanosis, dyspnea on exertion, irregular heartbeat, leg swelling, near-syncope, orthopnea, palpitations, paroxysmal nocturnal dyspnea and syncope.   Respiratory: Negative for cough, hemoptysis, shortness of breath, sputum production and wheezing.    Hematologic/Lymphatic: Negative for bleeding problem. Does not bruise/bleed easily.   Skin: Negative for rash.   Musculoskeletal: Positive for arthritis, back pain and joint pain. Negative for falls, joint swelling and neck pain.   Gastrointestinal: Negative for abdominal pain, heartburn and vomiting.   Genitourinary: Negative for dysuria, frequency and hematuria.   Neurological: Negative for difficulty with concentration, dizziness, focal weakness,  light-headedness, numbness and seizures.   Psychiatric/Behavioral: Negative for depression, memory loss and substance abuse.   Allergic/Immunologic: Negative for HIV exposure and hives.       Objective:   Physical Exam   Constitutional: She is oriented to person, place, and time. She appears well-developed and well-nourished.   HENT:   Head: Normocephalic and atraumatic.   Eyes: Pupils are equal, round, and reactive to light.   Neck: Normal range of motion. Normal carotid pulses and no JVD present. Carotid bruit is not present. No thyromegaly present.   Cardiovascular: Normal rate, regular rhythm, normal heart sounds and normal pulses.   No extrasystoles are present. PMI is not displaced.  Exam reveals no gallop, no S3 and no friction rub.    No murmur heard.  SM on LSB   Pulmonary/Chest: Effort normal and breath sounds normal. No stridor. No respiratory distress. She has no wheezes. She has no rales. She exhibits no tenderness.   Abdominal: Soft. Bowel sounds are normal. She exhibits no distension and no mass. There is no tenderness. There is no rebound and no guarding.   Musculoskeletal: Normal range of motion. She exhibits edema. She exhibits no deformity.   Trace edema above ankles   Neurological: She is alert and oriented to person, place, and time.   Skin: Skin is warm, dry and intact. No rash noted.   Psychiatric: She has a normal mood and affect. Her behavior is normal. Judgment and thought content normal.   Nursing note and vitals reviewed.      Lab Results   Component Value Date    CHOL 123 04/13/2017    CHOL 165 06/02/2016    CHOL 170 05/14/2015     Lab Results   Component Value Date    HDL 59 04/13/2017    HDL 75 06/02/2016    HDL 92 (H) 05/14/2015     Lab Results   Component Value Date    LDLCALC 51.2 (L) 04/13/2017    LDLCALC 79.8 06/02/2016    LDLCALC 65.4 05/14/2015     Lab Results   Component Value Date    TRIG 64 04/13/2017    TRIG 51 06/02/2016    TRIG 63 05/14/2015     Lab Results   Component  Value Date    CHOLHDL 48.0 04/13/2017    CHOLHDL 45.5 06/02/2016    CHOLHDL 54.1 (H) 05/14/2015       Chemistry        Component Value Date/Time     06/05/2017 1153    K 4.6 06/05/2017 1153    CL 99 06/05/2017 1153    CO2 28 06/05/2017 1153    BUN 57 (H) 06/05/2017 1153    CREATININE 3.0 (H) 06/05/2017 1153     06/05/2017 1153        Component Value Date/Time    CALCIUM 8.7 06/05/2017 1153    ALKPHOS 99 05/25/2017 0611    AST 55 (H) 05/25/2017 0611    ALT 47 (H) 05/25/2017 0611    BILITOT 0.4 05/25/2017 0611          Lab Results   Component Value Date    TSH 1.057 12/30/2016     Lab Results   Component Value Date    INR 1.2 05/27/2017    INR 1.2 05/21/2017    INR 1.1 04/22/2017     Lab Results   Component Value Date    WBC 7.26 05/30/2017    HGB 9.6 (L) 05/30/2017    HCT 29.6 (L) 05/30/2017    MCV 91 05/30/2017     05/30/2017     BMP  Sodium   Date Value Ref Range Status   06/05/2017 137 136 - 145 mmol/L Final     Potassium   Date Value Ref Range Status   06/05/2017 4.6 3.5 - 5.1 mmol/L Final     Chloride   Date Value Ref Range Status   06/05/2017 99 95 - 110 mmol/L Final     CO2   Date Value Ref Range Status   06/05/2017 28 23 - 29 mmol/L Final     BUN, Bld   Date Value Ref Range Status   06/05/2017 57 (H) 8 - 23 mg/dL Final     Creatinine   Date Value Ref Range Status   06/05/2017 3.0 (H) 0.5 - 1.4 mg/dL Final     Calcium   Date Value Ref Range Status   06/05/2017 8.7 8.7 - 10.5 mg/dL Final     Anion Gap   Date Value Ref Range Status   06/05/2017 10 8 - 16 mmol/L Final     eGFR if    Date Value Ref Range Status   06/05/2017 16.1 (A) >60 mL/min/1.73 m^2 Final     eGFR if non    Date Value Ref Range Status   06/05/2017 13.9 (A) >60 mL/min/1.73 m^2 Final     Comment:     Calculation used to obtain the estimated glomerular filtration  rate (eGFR) is the CKD-EPI equation. Since race is unknown   in our information system, the eGFR values for   -American and  Non--American patients are given   for each creatinine result.       CrCl cannot be calculated (Patient's most recent sCr result is older than the maximum 14 days allowed.).     Assessment:      1. Acute on chronic diastolic congestive heart failure    2. Essential hypertension    3. PAF (paroxysmal atrial fibrillation)    4. Coronary artery disease involving native coronary artery of native heart without angina pectoris    5. Hypoxia        Plan:   ambulatory referral to Pulmonology for home O2 evaluation.  Hold Diovan due to hypotension.  Continue ASA, BB, flecainide, Eliquis, and statin  DASh and fluid restriction  RTC in 3 months  Check BNP and BMP

## 2017-07-28 RX ORDER — FLECAINIDE ACETATE 50 MG/1
50 TABLET ORAL EVERY 12 HOURS
Qty: 60 TABLET | Refills: 6 | Status: SHIPPED | OUTPATIENT
Start: 2017-07-28 | End: 2018-03-26 | Stop reason: SDUPTHER

## 2017-08-10 ENCOUNTER — TELEPHONE (OUTPATIENT)
Dept: INTERNAL MEDICINE | Facility: CLINIC | Age: 82
End: 2017-08-10

## 2017-08-10 ENCOUNTER — HOSPITAL ENCOUNTER (OUTPATIENT)
Dept: RADIOLOGY | Facility: HOSPITAL | Age: 82
Discharge: HOME | End: 2017-08-10
Attending: NURSE PRACTITIONER
Payer: MEDICARE

## 2017-08-10 ENCOUNTER — OFFICE VISIT (OUTPATIENT)
Dept: INTERNAL MEDICINE | Facility: CLINIC | Age: 82
End: 2017-08-10
Payer: MEDICARE

## 2017-08-10 VITALS
OXYGEN SATURATION: 96 % | HEART RATE: 60 BPM | TEMPERATURE: 98 F | DIASTOLIC BLOOD PRESSURE: 70 MMHG | BODY MASS INDEX: 38.1 KG/M2 | HEIGHT: 59 IN | SYSTOLIC BLOOD PRESSURE: 132 MMHG | WEIGHT: 189 LBS

## 2017-08-10 DIAGNOSIS — K64.4 EXTERNAL HEMORRHOIDS: Primary | ICD-10-CM

## 2017-08-10 DIAGNOSIS — K59.00 CONSTIPATION, UNSPECIFIED CONSTIPATION TYPE: ICD-10-CM

## 2017-08-10 PROCEDURE — 1157F ADVNC CARE PLAN IN RCRD: CPT | Mod: S$GLB,,, | Performed by: NURSE PRACTITIONER

## 2017-08-10 PROCEDURE — 1126F AMNT PAIN NOTED NONE PRSNT: CPT | Mod: S$GLB,,, | Performed by: NURSE PRACTITIONER

## 2017-08-10 PROCEDURE — 74000 XR ABDOMEN AP 1 VIEW: CPT | Mod: TC

## 2017-08-10 PROCEDURE — 3075F SYST BP GE 130 - 139MM HG: CPT | Mod: S$GLB,,, | Performed by: NURSE PRACTITIONER

## 2017-08-10 PROCEDURE — 1159F MED LIST DOCD IN RCRD: CPT | Mod: S$GLB,,, | Performed by: NURSE PRACTITIONER

## 2017-08-10 PROCEDURE — 3078F DIAST BP <80 MM HG: CPT | Mod: S$GLB,,, | Performed by: NURSE PRACTITIONER

## 2017-08-10 PROCEDURE — 99999 PR PBB SHADOW E&M-EST. PATIENT-LVL V: CPT | Mod: PBBFAC,,, | Performed by: NURSE PRACTITIONER

## 2017-08-10 PROCEDURE — 74000 XR ABDOMEN AP 1 VIEW: CPT | Mod: 26,,, | Performed by: RADIOLOGY

## 2017-08-10 PROCEDURE — 99214 OFFICE O/P EST MOD 30 MIN: CPT | Mod: S$GLB,,, | Performed by: NURSE PRACTITIONER

## 2017-08-10 PROCEDURE — 3008F BODY MASS INDEX DOCD: CPT | Mod: S$GLB,,, | Performed by: NURSE PRACTITIONER

## 2017-08-10 RX ORDER — PRAMOXINE HYDROCHLORIDE 10 MG/G
AEROSOL, FOAM TOPICAL 3 TIMES DAILY PRN
Qty: 15 G | Refills: 0 | Status: SHIPPED | OUTPATIENT
Start: 2017-08-10 | End: 2018-08-07

## 2017-08-10 NOTE — PATIENT INSTRUCTIONS
Take one capful miralax daily. Continue stool softener (dulcolax is fine). Increase water intake and may add prune juice.  If not BM after 3 days of miralax, may try enema. If no BM after enema, return to clinic for evaluation.   Seek immediate medical attention if you develop fever, chills, unable to pass gas, or abdominal pain

## 2017-08-10 NOTE — PROGRESS NOTES
"Subjective:       Patient ID: Elke Laws is a 82 y.o. female.    Chief Complaint: Constipation and Hemorrhoids    Patient presents for constipation. She reports this began about a week ago. She took miralax one time without effect so she "drank a bottle of mag citrate" which produced a small BM several days ago.      Constipation   This is a new problem. The current episode started in the past 7 days. Stool frequency: nothing x3 days, "little bit" this morning. The stool is described as blood tinged and formed (hard). The patient is on a high fiber diet. She does not exercise regularly. Water Intake: three 16oz water bottles. Associated symptoms include bloating, flatus and rectal pain (hemorrhoids). Pertinent negatives include no abdominal pain, diarrhea, fever, nausea or vomiting. Risk factors include immobility. She has tried laxatives (mag citrate, miralax) for the symptoms. The treatment provided mild relief.     Review of Systems   Constitutional: Negative for activity change, appetite change, chills and fever.   Gastrointestinal: Positive for bloating, blood in stool ("streaked"), constipation, flatus and rectal pain (hemorrhoids). Negative for abdominal distention, abdominal pain, anal bleeding, diarrhea, nausea and vomiting.   Genitourinary: Negative for dysuria.   Musculoskeletal: Positive for gait problem (uses wheelchair).   Skin: Negative for rash.   Neurological: Negative for weakness, light-headedness and headaches.       Objective:      Physical Exam   Constitutional: She is oriented to person, place, and time. She appears well-developed and well-nourished. No distress.   HENT:   Head: Normocephalic and atraumatic.   Cardiovascular: Normal rate and regular rhythm.    Murmur heard.  Abdominal: Soft. Bowel sounds are normal. She exhibits no distension and no mass. There is no tenderness. There is no guarding.   Neurological: She is alert and oriented to person, place, and time.   Skin: She is not " diaphoretic.   Psychiatric: She has a normal mood and affect.   Vitals reviewed.      Assessment:       1. External hemorrhoids    2. Constipation, unspecified constipation type        Plan:   External hemorrhoids  -     pramoxine 1 % Foam; Place rectally 3 (three) times daily as needed.  Dispense: 15 g; Refill: 0    Constipation, unspecified constipation type  -     X-Ray Abdomen AP 1 View; Future; Expected date: 08/10/2017      Instructions given:  Take one capful miralax daily. Continue stool softener (dulcolax is fine). Increase water intake and may add prune juice.  If not BM after 3 days of miralax, may try enema. If no BM after enema, return to clinic for evaluation.   Seek immediate medical attention if you develop fever, chills, unable to pass gas, or abdominal pain  Patient verbalized understanding and agreement.   Return if symptoms worsen or fail to improve.

## 2017-08-10 NOTE — TELEPHONE ENCOUNTER
----- Message from America Laws NP sent at 8/10/2017  2:12 PM CDT -----  Please notify patient abdominal x-ray shows moderate amount of stool in left colon. Follow instructions per AVS. [phone] or [phone](c)

## 2017-08-24 ENCOUNTER — OFFICE VISIT (OUTPATIENT)
Dept: INTERNAL MEDICINE | Facility: CLINIC | Age: 82
End: 2017-08-24
Payer: MEDICARE

## 2017-08-24 ENCOUNTER — INITIAL CONSULT (OUTPATIENT)
Dept: GASTROENTEROLOGY | Facility: CLINIC | Age: 82
End: 2017-08-24
Payer: MEDICARE

## 2017-08-24 ENCOUNTER — HOSPITAL ENCOUNTER (OUTPATIENT)
Dept: RADIOLOGY | Facility: HOSPITAL | Age: 82
Discharge: HOME | End: 2017-08-24
Attending: NURSE PRACTITIONER
Payer: MEDICARE

## 2017-08-24 ENCOUNTER — TELEPHONE (OUTPATIENT)
Dept: GASTROENTEROLOGY | Facility: CLINIC | Age: 82
End: 2017-08-24

## 2017-08-24 VITALS
SYSTOLIC BLOOD PRESSURE: 128 MMHG | HEIGHT: 58 IN | HEART RATE: 70 BPM | BODY MASS INDEX: 39.9 KG/M2 | WEIGHT: 190.06 LBS | DIASTOLIC BLOOD PRESSURE: 70 MMHG

## 2017-08-24 VITALS
TEMPERATURE: 99 F | WEIGHT: 191.56 LBS | OXYGEN SATURATION: 98 % | DIASTOLIC BLOOD PRESSURE: 70 MMHG | HEART RATE: 70 BPM | BODY MASS INDEX: 39.36 KG/M2 | SYSTOLIC BLOOD PRESSURE: 126 MMHG

## 2017-08-24 DIAGNOSIS — K57.30 DIVERTICULOSIS OF LARGE INTESTINE WITHOUT HEMORRHAGE: ICD-10-CM

## 2017-08-24 DIAGNOSIS — K62.5 RECTAL BLEEDING: ICD-10-CM

## 2017-08-24 DIAGNOSIS — K59.00 CONSTIPATION, UNSPECIFIED CONSTIPATION TYPE: Primary | ICD-10-CM

## 2017-08-24 DIAGNOSIS — R10.31 RLQ ABDOMINAL PAIN: ICD-10-CM

## 2017-08-24 DIAGNOSIS — K59.04 CHRONIC IDIOPATHIC CONSTIPATION: ICD-10-CM

## 2017-08-24 DIAGNOSIS — R19.4 ENCOUNTER FOR DIAGNOSTIC COLONOSCOPY DUE TO CHANGE IN BOWEL HABITS: Primary | ICD-10-CM

## 2017-08-24 DIAGNOSIS — R19.4 CHANGE IN BOWEL HABITS: ICD-10-CM

## 2017-08-24 DIAGNOSIS — K59.00 CONSTIPATION, UNSPECIFIED CONSTIPATION TYPE: ICD-10-CM

## 2017-08-24 PROCEDURE — 1157F ADVNC CARE PLAN IN RCRD: CPT | Mod: S$GLB,,, | Performed by: INTERNAL MEDICINE

## 2017-08-24 PROCEDURE — 3078F DIAST BP <80 MM HG: CPT | Mod: S$GLB,,, | Performed by: NURSE PRACTITIONER

## 2017-08-24 PROCEDURE — 3008F BODY MASS INDEX DOCD: CPT | Mod: S$GLB,,, | Performed by: NURSE PRACTITIONER

## 2017-08-24 PROCEDURE — 1125F AMNT PAIN NOTED PAIN PRSNT: CPT | Mod: S$GLB,,, | Performed by: INTERNAL MEDICINE

## 2017-08-24 PROCEDURE — 3078F DIAST BP <80 MM HG: CPT | Mod: S$GLB,,, | Performed by: INTERNAL MEDICINE

## 2017-08-24 PROCEDURE — 3074F SYST BP LT 130 MM HG: CPT | Mod: S$GLB,,, | Performed by: INTERNAL MEDICINE

## 2017-08-24 PROCEDURE — 99214 OFFICE O/P EST MOD 30 MIN: CPT | Mod: S$GLB,,, | Performed by: INTERNAL MEDICINE

## 2017-08-24 PROCEDURE — 1159F MED LIST DOCD IN RCRD: CPT | Mod: S$GLB,,, | Performed by: INTERNAL MEDICINE

## 2017-08-24 PROCEDURE — 1125F AMNT PAIN NOTED PAIN PRSNT: CPT | Mod: S$GLB,,, | Performed by: NURSE PRACTITIONER

## 2017-08-24 PROCEDURE — 99214 OFFICE O/P EST MOD 30 MIN: CPT | Mod: S$GLB,,, | Performed by: NURSE PRACTITIONER

## 2017-08-24 PROCEDURE — 1159F MED LIST DOCD IN RCRD: CPT | Mod: S$GLB,,, | Performed by: NURSE PRACTITIONER

## 2017-08-24 PROCEDURE — 3008F BODY MASS INDEX DOCD: CPT | Mod: S$GLB,,, | Performed by: INTERNAL MEDICINE

## 2017-08-24 PROCEDURE — 1157F ADVNC CARE PLAN IN RCRD: CPT | Mod: S$GLB,,, | Performed by: NURSE PRACTITIONER

## 2017-08-24 PROCEDURE — 74020 XR ABDOMEN FLAT AND ERECT: CPT | Mod: 26,,, | Performed by: RADIOLOGY

## 2017-08-24 PROCEDURE — 3074F SYST BP LT 130 MM HG: CPT | Mod: S$GLB,,, | Performed by: NURSE PRACTITIONER

## 2017-08-24 PROCEDURE — 99999 PR PBB SHADOW E&M-EST. PATIENT-LVL V: CPT | Mod: PBBFAC,,, | Performed by: NURSE PRACTITIONER

## 2017-08-24 PROCEDURE — 99999 PR PBB SHADOW E&M-EST. PATIENT-LVL III: CPT | Mod: PBBFAC,,, | Performed by: INTERNAL MEDICINE

## 2017-08-24 RX ORDER — SODIUM, POTASSIUM,MAG SULFATES 17.5-3.13G
SOLUTION, RECONSTITUTED, ORAL ORAL
Qty: 254 ML | Refills: 0 | Status: ON HOLD | OUTPATIENT
Start: 2017-08-24 | End: 2017-08-29 | Stop reason: HOSPADM

## 2017-08-24 NOTE — TELEPHONE ENCOUNTER
Patient is currently taking Eliquis and patient is scheduled to have EGD and Colonoscopy Tuesday August 29th.Can patient be cleared to discontinue medication 2 days prior.? Please advise.

## 2017-08-24 NOTE — PROGRESS NOTES
"Subjective:       Patient ID: Elke Laws is a 82 y.o. female.    Chief Complaint: Constipation    Patient presents for follow up of constipation. She reports since being seen by me two weeks ago she has taken miralax daily and had "two little bits of stool" and nothing in past 5-7 days. She reports enema two days ago and "only water came back out". Abdominal x-ray two weeks ago showed a moderate amount of stool. No new medications started in past month.       Constipation   This is a new problem. The current episode started 1 to 4 weeks ago. The problem is unchanged. Her stool frequency is 1 time per week or less. The stool is described as blood tinged and firm. She does not exercise regularly ("walks daily" but unclear how much or often). There has been adequate water intake (4-5 bottles daily). Associated symptoms include abdominal pain (RLQ), bloating, flatus and hemorrhoids. Pertinent negatives include no back pain, diarrhea, fecal incontinence, fever, melena, nausea, rectal pain, vomiting or weight loss. She has tried enemas and stool softeners (miralax daily for past 2 weeks) for the symptoms. The treatment provided no relief.     Review of Systems   Constitutional: Negative for chills, fever and weight loss.   Gastrointestinal: Positive for abdominal distention ("bloating"), abdominal pain (RLQ), bloating, constipation, flatus and hemorrhoids. Negative for anal bleeding, diarrhea, melena, nausea, rectal pain and vomiting.   Genitourinary: Negative for dysuria.   Musculoskeletal: Negative for back pain.       Objective:      Physical Exam   Constitutional: She is oriented to person, place, and time. She appears well-developed and well-nourished. No distress.   HENT:   Head: Normocephalic and atraumatic.   Abdominal: Soft. Bowel sounds are normal. She exhibits no distension and no mass. There is tenderness in the right lower quadrant and left lower quadrant. There is no guarding.   Neurological: She is alert " "and oriented to person, place, and time.   Skin: She is not diaphoretic.   Psychiatric: She has a normal mood and affect.   Vitals reviewed.      Assessment:       1. Constipation, unspecified constipation type    2. RLQ abdominal pain        Plan:   Constipation, unspecified constipation type  -     X-Ray Abdomen Flat And Erect; Future; Expected date: 08/24/2017  -     Ambulatory referral to Gastroenterology    RLQ abdominal pain  -     X-Ray Abdomen Flat And Erect; Future; Expected date: 08/24/2017  -     Ambulatory referral to Gastroenterology      X-ray today shows "mild to moderate amount of stool". No obstruction or ileum noted. Will refer to GI for evaluation of continued constipation.   Return if symptoms worsen or fail to improve, for keep routine follow up.      "

## 2017-08-24 NOTE — PROGRESS NOTES
Subjective:       Patient ID: Elke Laws is a 82 y.o. female.    Chief Complaint: Constipation and Abdominal Pain    The patient has a history of chronic constipation for ~ 4 year. She has a history of colonoscopy in 2010. She has been on Miralax and lactulos for this problem.  The patient had also used Dulcolax and enemas with limited success.  Her PCP had been attempting to retrieve this problem for the past 2 weeks after of KUB recently revealed evidence of moderate stool in colon or rectum.  The colonoscopy in 2010 revealed the presence of pandiverticulosis.  She also had a colonoscopy in 2011 because of lower GI bleeding which was also suspicious for diverticular bleed.  She is also exhibiting some rectal bleeding associated with her present complaint but carries a diagnosis of internal hemorrhoids as well.  The patient is elderly and has a history of multiple medical problems, so endoscopy avoidance was considered, however, because of the change in bowel habits and vague atypical presentation for her condition it was felt that intervention may be needed.  There was a recommendation that we use a colonoscopy prep to clear her colon, so if she had to go through this process regardless toward choice to make, it was felt that the intervention would be appropriate.  Discussion with the patient and her family to place and they were all in agreement.      Review of Systems   Constitutional: Negative for activity change, appetite change, chills, diaphoresis, fatigue, fever and unexpected weight change.   HENT: Negative for congestion, ear discharge, ear pain, hearing loss, nosebleeds, postnasal drip and tinnitus.    Eyes: Negative for photophobia and visual disturbance.   Respiratory: Positive for shortness of breath. Negative for apnea, cough, choking, chest tightness and wheezing.         When she eats a heavy meal and goes to lie down   Cardiovascular: Negative for chest pain, palpitations and leg swelling.    Gastrointestinal: Positive for anal bleeding and blood in stool. Negative for abdominal distention, abdominal pain, constipation, diarrhea, nausea, rectal pain and vomiting.   Genitourinary: Negative for difficulty urinating, dyspareunia, dysuria, flank pain, frequency, hematuria, menstrual problem, pelvic pain, urgency, vaginal bleeding and vaginal discharge.   Musculoskeletal: Negative for arthralgias, back pain, gait problem, joint swelling, myalgias and neck stiffness.   Skin: Negative for pallor and rash.   Neurological: Negative for dizziness, tremors, seizures, syncope, speech difficulty, weakness, numbness and headaches.   Hematological: Negative for adenopathy.   Psychiatric/Behavioral: Negative for agitation, confusion, hallucinations, sleep disturbance and suicidal ideas.       Objective:      Physical Exam   Constitutional: She is oriented to person, place, and time. She appears well-developed and well-nourished.   HENT:   Head: Normocephalic and atraumatic.   Bilateral turbinate congestion   Eyes: Conjunctivae and EOM are normal. Pupils are equal, round, and reactive to light. Right eye exhibits no discharge. Left eye exhibits no discharge. No scleral icterus.   Neck: Normal range of motion. Neck supple. No JVD present. No thyromegaly present.   Cardiovascular: Normal rate, regular rhythm, normal heart sounds and intact distal pulses.  Exam reveals no gallop and no friction rub.    No murmur heard.  Pulmonary/Chest: Effort normal and breath sounds normal. No respiratory distress. She has no wheezes. She has no rales. She exhibits no tenderness.   Abdominal: Soft. Bowel sounds are normal. She exhibits no distension and no mass. There is tenderness. There is no rebound and no guarding.   Diffusely tender   Musculoskeletal: Normal range of motion. She exhibits no edema.   Lymphadenopathy:     She has no cervical adenopathy.   Neurological: She is alert and oriented to person, place, and time. She has  normal reflexes. She exhibits normal muscle tone. Coordination normal.   Skin: Skin is warm and dry. No rash noted. No erythema. No pallor.   Psychiatric: She has a normal mood and affect. Her behavior is normal. Judgment and thought content normal.       Assessment:     Change in bowel habits    Rectal bleeding    Diverticulosis    Constipation  No diagnosis found.    Plan:     Colonoscopy

## 2017-08-24 NOTE — LETTER
August 28, 2017      America Laws NP  [address] [place] [address] [address] [id number]           [facility]  [address] [place] [address] [place] [address]  Phone: [phone]  Fax: [phone]          Patient: Elke Laws   MR Number: [id number]   YOB: 1934   Date of Visit: 8/24/2017       Dear America Laws:    Thank you for referring Elke Laws to me for evaluation. Attached you will find relevant portions of my assessment and plan of care.    If you have questions, please do not hesitate to call me. I look forward to following Elke Laws along with you.    Sincerely,    Mani Vaughan  CC:  No Recipients    If you would like to receive this communication electronically, please contact [email or web address] or [phone] to request more information on [employer] access.    For providers and/or their staff who would like to refer a patient to [facility], please contact us through our one-stop-shop provider referral line, Charisma Trejo, at [phone].    If you feel you have received this communication in error or would no longer like to receive these types of communications, please e-mail [email or web address]

## 2017-08-24 NOTE — TELEPHONE ENCOUNTER
Called patient and informed  patIent that  said it will be okay to discontinue the Eliquis.Patient verbalized understanding.

## 2017-08-28 PROBLEM — Z86.010 HISTORY OF COLON POLYPS: Status: ACTIVE | Noted: 2017-08-28

## 2017-08-28 PROBLEM — Z86.0100 HISTORY OF COLON POLYPS: Status: ACTIVE | Noted: 2017-08-28

## 2017-08-29 ENCOUNTER — SURGERY (OUTPATIENT)
Age: 82
End: 2017-08-29

## 2017-08-29 ENCOUNTER — ANESTHESIA (OUTPATIENT)
Dept: ENDOSCOPY | Facility: HOSPITAL | Age: 82
End: 2017-08-29
Payer: MEDICARE

## 2017-08-29 ENCOUNTER — ANESTHESIA EVENT (OUTPATIENT)
Dept: ENDOSCOPY | Facility: HOSPITAL | Age: 82
End: 2017-08-29
Payer: MEDICARE

## 2017-08-29 ENCOUNTER — HOSPITAL ENCOUNTER (OUTPATIENT)
Facility: HOSPITAL | Age: 82
Discharge: HOME | End: 2017-08-29
Attending: INTERNAL MEDICINE | Admitting: INTERNAL MEDICINE
Payer: MEDICARE

## 2017-08-29 VITALS — RESPIRATION RATE: 19 BRPM

## 2017-08-29 DIAGNOSIS — Z86.010 PERSONAL HISTORY OF COLONIC POLYPS: Primary | ICD-10-CM

## 2017-08-29 DIAGNOSIS — K57.30 DIVERTICULOSIS OF LARGE INTESTINE WITHOUT HEMORRHAGE: Chronic | ICD-10-CM

## 2017-08-29 PROBLEM — Z86.0100 PERSONAL HISTORY OF COLONIC POLYPS: Status: ACTIVE | Noted: 2017-08-29

## 2017-08-29 LAB — POTASSIUM SERPL-SCNC: 3.3 MMOL/L

## 2017-08-29 PROCEDURE — 37000008 HC ANESTHESIA 1ST 15 MINUTES: Performed by: INTERNAL MEDICINE

## 2017-08-29 PROCEDURE — 63600175 PHARM REV CODE 636 W HCPCS: Performed by: NURSE ANESTHETIST, CERTIFIED REGISTERED

## 2017-08-29 PROCEDURE — 25000003 PHARM REV CODE 250: Performed by: NURSE ANESTHETIST, CERTIFIED REGISTERED

## 2017-08-29 PROCEDURE — G0105 COLORECTAL SCRN; HI RISK IND: HCPCS | Mod: ,,, | Performed by: INTERNAL MEDICINE

## 2017-08-29 PROCEDURE — 84132 ASSAY OF SERUM POTASSIUM: CPT

## 2017-08-29 PROCEDURE — 25000003 PHARM REV CODE 250: Performed by: INTERNAL MEDICINE

## 2017-08-29 PROCEDURE — 37000009 HC ANESTHESIA EA ADD 15 MINS: Performed by: INTERNAL MEDICINE

## 2017-08-29 PROCEDURE — G0105 COLORECTAL SCRN; HI RISK IND: HCPCS | Performed by: INTERNAL MEDICINE

## 2017-08-29 RX ORDER — LIDOCAINE HCL/PF 100 MG/5ML
SYRINGE (ML) INTRAVENOUS
Status: DISCONTINUED | OUTPATIENT
Start: 2017-08-29 | End: 2017-08-29

## 2017-08-29 RX ORDER — PROPOFOL 10 MG/ML
VIAL (ML) INTRAVENOUS
Status: DISCONTINUED | OUTPATIENT
Start: 2017-08-29 | End: 2017-08-29

## 2017-08-29 RX ORDER — METRONIDAZOLE 500 MG/1
500 TABLET ORAL EVERY 12 HOURS
Qty: 20 TABLET | Refills: 0 | Status: SHIPPED | OUTPATIENT
Start: 2017-08-29 | End: 2017-09-22

## 2017-08-29 RX ORDER — CIPROFLOXACIN 500 MG/1
500 TABLET ORAL 2 TIMES DAILY
Qty: 20 TABLET | Refills: 0 | Status: SHIPPED | OUTPATIENT
Start: 2017-08-29 | End: 2017-09-22 | Stop reason: ALTCHOICE

## 2017-08-29 RX ORDER — SODIUM CHLORIDE, SODIUM LACTATE, POTASSIUM CHLORIDE, CALCIUM CHLORIDE 600; 310; 30; 20 MG/100ML; MG/100ML; MG/100ML; MG/100ML
INJECTION, SOLUTION INTRAVENOUS CONTINUOUS PRN
Status: DISCONTINUED | OUTPATIENT
Start: 2017-08-29 | End: 2017-08-29

## 2017-08-29 RX ORDER — SODIUM CHLORIDE, SODIUM LACTATE, POTASSIUM CHLORIDE, CALCIUM CHLORIDE 600; 310; 30; 20 MG/100ML; MG/100ML; MG/100ML; MG/100ML
INJECTION, SOLUTION INTRAVENOUS ONCE
Status: COMPLETED | OUTPATIENT
Start: 2017-08-29 | End: 2017-08-29

## 2017-08-29 RX ADMIN — SODIUM CHLORIDE, SODIUM LACTATE, POTASSIUM CHLORIDE, AND CALCIUM CHLORIDE: .6; .31; .03; .02 INJECTION, SOLUTION INTRAVENOUS at 07:08

## 2017-08-29 RX ADMIN — LIDOCAINE HYDROCHLORIDE 80 MG: 20 INJECTION, SOLUTION INTRAVENOUS at 07:08

## 2017-08-29 RX ADMIN — PROPOFOL 50 MG: 10 INJECTION, EMULSION INTRAVENOUS at 07:08

## 2017-08-29 RX ADMIN — PROPOFOL 100 MG: 10 INJECTION, EMULSION INTRAVENOUS at 07:08

## 2017-08-29 RX ADMIN — SODIUM CHLORIDE, SODIUM LACTATE, POTASSIUM CHLORIDE, AND CALCIUM CHLORIDE: 600; 310; 30; 20 INJECTION, SOLUTION INTRAVENOUS at 07:08

## 2017-08-29 NOTE — ANESTHESIA PREPROCEDURE EVALUATION
08/29/2017  Elke Laws is a 82 y.o., female.    Pre-op Assessment         Review of Systems

## 2017-08-29 NOTE — ANESTHESIA POSTPROCEDURE EVALUATION
"Anesthesia Post Evaluation    Patient: Elke Laws    Procedure(s) Performed: Procedure(s) (LRB):  COLONOSCOPY okay by dr. ramos (N/A)    Final Anesthesia Type: MAC  Patient location during evaluation: GI PACU  Patient participation: Yes- Able to Participate  Level of consciousness: awake and alert  Post-procedure vital signs: reviewed and stable  Pain management: adequate  Airway patency: patent  PONV status at discharge: No PONV  Anesthetic complications: no      Cardiovascular status: blood pressure returned to baseline  Respiratory status: unassisted and spontaneous ventilation  Hydration status: euvolemic  Follow-up not needed.        Visit Vitals  /65 (BP Location: Left arm, Patient Position: Lying)   Pulse 61   Temp 36.4 °C (97.5 °F) (Oral)   Resp 18   Ht 4' 10" (1.473 m)   Wt 86.2 kg (190 lb)   LMP 12/13/1973   SpO2 99%   Breastfeeding? No   BMI 39.71 kg/m²       Pain/Armani Score: Pain Assessment Performed: Yes (8/29/2017  7:06 AM)  Presence of Pain: denies (8/29/2017  7:06 AM)  Armani Score: 10 (8/29/2017  7:59 AM)      "

## 2017-08-29 NOTE — ANESTHESIA RELEASE NOTE
"Anesthesia Release from PACU Note    Patient: Elke Laws    Procedure(s) Performed: Procedure(s) (LRB):  COLONOSCOPY okay by dr. ramos (N/A)    Anesthesia type: MAC    Post pain: Adequate analgesia    Post assessment: no apparent anesthetic complications, tolerated procedure well and no evidence of recall    Last Vitals:   Visit Vitals  /65 (BP Location: Left arm, Patient Position: Lying)   Pulse 61   Temp 36.4 °C (97.5 °F) (Oral)   Resp 18   Ht 4' 10" (1.473 m)   Wt 86.2 kg (190 lb)   LMP 12/13/1973   SpO2 99%   Breastfeeding? No   BMI 39.71 kg/m²       Post vital signs: stable    Level of consciousness: awake, alert  and oriented    Nausea/Vomiting: no nausea/no vomiting    Complications: none    Airway Patency: patent    Respiratory: unassisted, spontaneous ventilation    Cardiovascular: stable and blood pressure at baseline    Hydration: euvolemic  "

## 2017-08-29 NOTE — H&P
"[facility]  Gastroenterology  H&P    Patient Name: Elke Laws  MRN: [id number]  Admission Date: 8/29/2017  Code Status: Prior    Attending Provider: Toño Abdi MD   Primary Care Physician: Isidra Benavidez MD  Principal Problem:History of colon polyps    Subjective:     History of Present Illness/Reasons for procedure(s): Patient here for a surveillance colonoscopy, history of polyps. She is also having abdominal pain and constipation but these symptoms are not the indication for her colonoscopy today. She has chronic pain and constipation.     Past Medical History:   Diagnosis Date    Acute kidney injury (nontraumatic) 5/27/2017    Acute on chronic diastolic congestive heart failure 8/27/2015    Anemia 5/9/2016    Anemia 5/9/2016    Anticoagulant long-term use     Aortic regurgitation     Echo 11/2013---5 - Mild to moderate aortic regurgitation.      Atrial fibrillation 5/15/2014    Back pain     s/p epidural steriod injections, no recent injections.    Baker's cyst     small, right, seen on US lower extremity 6/2014    Blood transfusion     Chest pain, atypical 8/27/2015    Diastolic dysfunction     Seen on echo 11/2013, stress test negative 5/2014    Diverticulosis     colonoscopy 3/27/2011    Hemorrhoids     colonoscopy 3/27/2011    Hiatal hernia     CXR 12/30/2016---Retrocardiac density again noted consistent with a hiatal hernia.    Hx of adenomatous colonic polyps     Hyperlipidemia     Hypertension     Hyponatremia 5/9/2016    Mitral regurgitation     Myocardial infarction     per patient was told in the past she had a "mild heart attack"    Obesity     Osteoarthritis, knee     Pneumonia     per patient "walking Pneumonia" did not require hospitalization    Seasonal allergies     Stroke     TIA; patient reports was told by one doctor she had 2 mini strokes but another doctor said she did not       No current facility-administered medications on file prior to " encounter.      Current Outpatient Prescriptions on File Prior to Encounter   Medication Sig Dispense Refill    acetaminophen (TYLENOL) 500 MG tablet Take 1 tablet (500 mg total) by mouth 2 (two) times daily.  0    albuterol 90 mcg/actuation inhaler Inhale 2 puffs into the lungs 4 (four) times daily. 3 Inhaler 4    ASCORBATE CALCIUM (VITAMIN C ORAL) Take 500 mg by mouth once daily.       DOCOSAHEXANOIC ACID/EPA (FISH OIL ORAL) Take 500 mg by mouth once daily.       DULCOLAX, BISACODYL, ORAL Take by mouth as needed.      flecainide (TAMBOCOR) 50 MG Tab Take 1 tablet (50 mg total) by mouth every 12 (twelve) hours. 60 tablet 6    fluticasone (FLOVENT HFA) 110 mcg/actuation inhaler Inhale 1 puff into the lungs 2 (two) times daily. 12 g 4    furosemide (LASIX) 20 MG tablet Take 1 tablet (20 mg total) by mouth once daily. (Patient taking differently: Take 40 mg by mouth once daily. ) 30 tablet 11    isosorbide mononitrate (IMDUR) 30 MG 24 hr tablet TAKE 1 TABLET(30 MG) BY MOUTH EVERY DAY 90 tablet 3    LACTULOSE ORAL Take 10 mg by mouth.       loratadine (CLARITIN) 10 mg tablet Take 1 tablet (10 mg total) by mouth once daily.  0    metoprolol succinate (TOPROL-XL) 100 MG 24 hr tablet Take 1 tablet (100 mg total) by mouth once daily. 90 tablet 1    POLYETHYLENE GLYCOL 3350 (MIRALAX ORAL) Take by mouth as needed.      pravastatin (PRAVACHOL) 40 MG tablet Take 1 tablet (40 mg total) by mouth once daily. (Patient taking differently: Take 40 mg by mouth every evening. ) 90 tablet 1    sodium,potassium,mag sulfates (SUPREP BOWEL PREP KIT) 17.5-3.13-1.6 gram SolR As directed for colonoscopy 254 mL 0    albuterol sulfate 2.5 mg/0.5 mL Nebu Take 2.5 mg by nebulization every 4 (four) hours as needed. 90 each 1    apixaban (ELIQUIS) 5 mg Tab Take 5 mg by mouth 2 (two) times daily.      aspirin (ECOTRIN) 81 MG EC tablet Take 1 tablet (81 mg total) by mouth once daily.  0    compressor, for nebulizer Lara Compressor  use as directed by albuterol use. 1 Device 0    pramoxine 1 % Foam Place rectally 3 (three) times daily as needed. 15 g 0       Past Surgical History:   Procedure Laterality Date    APPENDECTOMY      EYE SURGERY Left     HYSTERECTOMY      benign reasons    KNEE SURGERY Bilateral     x2     OLECRANON BURSECTOMY Right     6/2011    TONSILLECTOMY      URETHRA SURGERY         Review of patient's allergies indicates:   Allergen Reactions    Iodine and iodide containing products Rash     Family History     Problem Relation (Age of Onset)    COPD Son    Cancer Mother, Sister    Diabetes Brother, Son    Heart disease Mother, Father, Sister, Brother    Hyperlipidemia Mother, Father    Hypertension Mother, Father, Son        Social History Main Topics    Smoking status: Former Smoker     Packs/day: 0.05     Years: 1.00     Types: Cigarettes     Quit date: 1/1/1952    Smokeless tobacco: Never Used    Alcohol use No    Drug use: No    Sexual activity: No     Review of Systems   Constitutional: Negative for activity change, chills, diaphoresis, fatigue and fever.   HENT: Negative for ear pain, facial swelling, nosebleeds, rhinorrhea, sneezing, sore throat and trouble swallowing.    Eyes: Negative for photophobia, pain and visual disturbance.   Respiratory: Negative for apnea, cough, chest tightness, shortness of breath and wheezing.    Gastrointestinal: Positive for abdominal pain and constipation. Negative for blood in stool, nausea, rectal pain and vomiting.   Genitourinary: Negative for decreased urine volume, difficulty urinating, dysuria, flank pain and hematuria.   Musculoskeletal: Negative for arthralgias, back pain, gait problem, neck pain and neck stiffness.   Skin: Negative for pallor and rash.   Neurological: Negative for seizures, syncope, speech difficulty, weakness and headaches.   Hematological: Negative for adenopathy. Does not bruise/bleed easily.   Psychiatric/Behavioral: Negative for behavioral  problems, confusion and hallucinations.     Objective:     Vital Signs (Most Recent):  Temp: 97.5 °F (36.4 °C) (08/29/17 0703)  Pulse: 61 (08/29/17 0703)  Resp: 18 (08/29/17 0703)  BP: 138/65 (08/29/17 0703)  SpO2: 99 % (08/29/17 0703) Vital Signs (24h Range):  Temp:  [97.5 °F (36.4 °C)] 97.5 °F (36.4 °C)  Pulse:  [61] 61  Resp:  [18] 18  SpO2:  [99 %] 99 %  BP: (138)/(65) 138/65     Weight: 86.2 kg (190 lb) (08/29/17 0703)  Body mass index is 39.71 kg/m².    No intake or output data in the 24 hours ending 08/29/17 0722    Lines/Drains/Airways     Peripheral Intravenous Line                 Peripheral IV - Single Lumen 05/28/17 0525 Left Forearm 93 days         Peripheral IV - Single Lumen 08/29/17 0711 Right Hand less than 1 day                Physical Exam   Constitutional: She is oriented to person, place, and time. She appears well-developed and well-nourished.   HENT:   Head: Normocephalic and atraumatic.   Eyes: EOM are normal. Pupils are equal, round, and reactive to light.   Neck: Normal range of motion. Neck supple. No thyromegaly present.   Cardiovascular: Normal rate, regular rhythm, normal heart sounds and intact distal pulses.    No murmur heard.  Pulmonary/Chest: Effort normal and breath sounds normal. No respiratory distress. She has no wheezes. She has no rales.   Abdominal: Soft. Bowel sounds are normal. She exhibits no distension and no mass. There is no tenderness.   Musculoskeletal: Normal range of motion. She exhibits no edema or tenderness.   Lymphadenopathy:     She has no cervical adenopathy.   Neurological: She is alert and oriented to person, place, and time. She has normal reflexes. No cranial nerve deficit.   Skin: Skin is warm and dry. No erythema.   Psychiatric: She has a normal mood and affect. Her behavior is normal.       Lab Summary Latest Ref Rng & Units 5/27/2017 5/28/2017 5/29/2017 5/30/2017 6/5/2017 6/28/2017   Hemoglobin 12.0 - 16.0 g/dL 10.6(L) 12.8 9.9(L) 9.6(L) (None) (None)    Hematocrit 37.0 - 48.5 % 31.5(L) 38.2 30.4(L) 29.6(L) (None) (None)   White count 3.90 - 12.70 K/uL 15.52(H) 12.05 9.25 7.26 (None) (None)   Platelet count 150 - 350 K/uL 168 140(L) 198 232 (None) (None)   Sodium 136 - 145 mmol/L 134(L) 129(L) 130(L) 130(L) 137 139   Potassium 3.5 - 5.1 mmol/L 4.8 4.7 4.5 4.7 4.6 5.6(H)   Calcium 8.7 - 10.5 mg/dL 9.2 8.3(L) 8.2(L) 8.4(L) 8.7 9.1   Phosphorus 2.7 - 4.5 mg/dL (None) 4.9(H) 5.6(H) 6.1(H) (None) (None)   Creatinine 0.5 - 1.4 mg/dL 1.4 2.7(H) 3.2(H) 3.7(H) 3.0(H) 1.7(H)   AST - (None) (None) (None) (None) (None) (None)   Alk Phos - (None) (None) (None) (None) (None) (None)   Bilirubin - (None) (None) (None) (None) (None) (None)   Glucose 70 - 110 mg/dL 139(H) 207(H) 108 103 100 105   Cholesterol - (None) (None) (None) (None) (None) (None)   HDL cholesterol - (None) (None) (None) (None) (None) (None)   Triglycerides - (None) (None) (None) (None) (None) (None)   LDL calc - (None) (None) (None) (None) (None) (None)   Total protein - (None) (None) (None) (None) (None) (None)   Albumin - (None) (None) (None) (None) (None) (None)   A1C - (None) (None) (None) (None) (None) (None)   PSA Screen - (None) (None) (None) (None) (None) (None)   Some recent data might be hidden        Assessment/Plan:     Active Diagnoses:    Diagnosis Date Noted POA    PRINCIPAL PROBLEM:  History of colon polyps [Z86.010] 08/28/2017 Not Applicable    Personal history of colonic polyps [Z86.010] 08/29/2017 Not Applicable      Problems Resolved During this Admission:    Diagnosis Date Noted Date Resolved POA       Risks, benefits and alternative options were discussed with the patient. Risks including but not limited to infection, bleeding, heart or respiratory problems and perforation that may require surgery were all explained to the patient. The patient had a chance for questions if there were doubts or concerns about the test. The referring provider will be notified that our consultation is  complete and available through the patient's records.    Thanks for letting us participate in the care of this nice patient,      Toño Abdi MD  Gastroenterology  [facility]

## 2017-08-29 NOTE — PLAN OF CARE
Problem: Fall Risk (Adult)  Goal: Absence of Falls  Patient will demonstrate the desired outcomes by discharge/transition of care.   Outcome: Outcome(s) achieved Date Met: 08/29/17  Pt denies c/o discomfort, dc instructions reviewed, criteria met, iv dc'd tolerated well no bleeding noted, ok to dc to [facility] via [facility] with fmly

## 2017-08-29 NOTE — TRANSFER OF CARE
"Anesthesia Transfer of Care Note    Patient: Elke Laws    Procedure(s) Performed: Procedure(s) (LRB):  COLONOSCOPY okay by dr. ramos (N/A)    Patient location: GI    Anesthesia Type: MAC    Transport from OR: Transported from OR on room air with adequate spontaneous ventilation    Post pain: adequate analgesia    Post assessment: no apparent anesthetic complications    Post vital signs: stable    Level of consciousness: awake, alert and oriented    Nausea/Vomiting: no nausea/vomiting    Complications: none    Transfer of care protocol was followed      Last vitals:   Visit Vitals  /65 (BP Location: Left arm, Patient Position: Lying)   Pulse 61   Temp 36.4 °C (97.5 °F) (Oral)   Resp 18   Ht 4' 10" (1.473 m)   Wt 86.2 kg (190 lb)   LMP 12/13/1973   SpO2 99%   Breastfeeding? No   BMI 39.71 kg/m²     "

## 2017-08-29 NOTE — DISCHARGE SUMMARY
Endoscopy Discharge Summary      Admit Date: 8/29/2017    Discharge Date and Time:  8/29/2017 8:04 AM    Attending Physician: Toño Abdi MD     Discharge Physician: Toño Abdi MD     Principal Admitting Diagnoses: History of colon polyps         Discharge Diagnosis: The primary encounter diagnosis was Personal history of colonic polyps. A diagnosis of Diverticulosis of large intestine without hemorrhage was also pertinent to this visit.     Discharged Condition: Good    Indication for Admission: History of colon polyps     Hospital Course: Patient was admitted for an inpatient procedure and tolerated the procedure well with no complications.    Significant Diagnostic Studies:  COLON    Pathology (if any):  Specimen (12h ago through future)    None          Disposition: Home.    Bleeding: None    No Implants         Follow Up/Patient Instructions:   Current Discharge Medication List      START taking these medications    Details   ciprofloxacin HCl (CIPRO) 500 MG tablet Take 1 tablet (500 mg total) by mouth 2 (two) times daily.  Qty: 20 tablet, Refills: 0    Associated Diagnoses: Personal history of colonic polyps; Diverticulosis of large intestine without hemorrhage      metronidazole (FLAGYL) 500 MG tablet Take 1 tablet (500 mg total) by mouth every 12 (twelve) hours.  Qty: 20 tablet, Refills: 0    Associated Diagnoses: Personal history of colonic polyps; Diverticulosis of large intestine without hemorrhage         CONTINUE these medications which have NOT CHANGED    Details   acetaminophen (TYLENOL) 500 MG tablet Take 1 tablet (500 mg total) by mouth 2 (two) times daily.  Refills: 0    Associated Diagnoses: Bilateral low back pain with right-sided sciatica      albuterol 90 mcg/actuation inhaler Inhale 2 puffs into the lungs 4 (four) times daily.  Qty: 3 Inhaler, Refills: 4    Associated Diagnoses: Asthma, mild intermittent, uncomplicated      ASCORBATE CALCIUM (VITAMIN C ORAL) Take 500 mg by mouth once  daily.       DOCOSAHEXANOIC ACID/EPA (FISH OIL ORAL) Take 500 mg by mouth once daily.       DULCOLAX, BISACODYL, ORAL Take by mouth as needed.      flecainide (TAMBOCOR) 50 MG Tab Take 1 tablet (50 mg total) by mouth every 12 (twelve) hours.  Qty: 60 tablet, Refills: 6      fluticasone (FLOVENT HFA) 110 mcg/actuation inhaler Inhale 1 puff into the lungs 2 (two) times daily.  Qty: 12 g, Refills: 4      furosemide (LASIX) 20 MG tablet Take 1 tablet (20 mg total) by mouth once daily.  Qty: 30 tablet, Refills: 11    Associated Diagnoses: Left ventricular diastolic dysfunction with preserved systolic function      isosorbide mononitrate (IMDUR) 30 MG 24 hr tablet TAKE 1 TABLET(30 MG) BY MOUTH EVERY DAY  Qty: 90 tablet, Refills: 3    Associated Diagnoses: Angina effort; Coronary artery disease involving native coronary artery of native heart without angina pectoris      LACTULOSE ORAL Take 10 mg by mouth.       loratadine (CLARITIN) 10 mg tablet Take 1 tablet (10 mg total) by mouth once daily.  Refills: 0    Associated Diagnoses: Post-nasal drip      metoprolol succinate (TOPROL-XL) 100 MG 24 hr tablet Take 1 tablet (100 mg total) by mouth once daily.  Qty: 90 tablet, Refills: 1      POLYETHYLENE GLYCOL 3350 (MIRALAX ORAL) Take by mouth as needed.      pravastatin (PRAVACHOL) 40 MG tablet Take 1 tablet (40 mg total) by mouth once daily.  Qty: 90 tablet, Refills: 1    Associated Diagnoses: Hyperlipidemia, unspecified hyperlipidemia type; History of TIA (transient ischemic attack)      albuterol sulfate 2.5 mg/0.5 mL Nebu Take 2.5 mg by nebulization every 4 (four) hours as needed.  Qty: 90 each, Refills: 1      apixaban (ELIQUIS) 5 mg Tab Take 5 mg by mouth 2 (two) times daily.      aspirin (ECOTRIN) 81 MG EC tablet Take 1 tablet (81 mg total) by mouth once daily.  Refills: 0      compressor, for nebulizer Lara Compressor use as directed by albuterol use.  Qty: 1 Device, Refills: 0      pramoxine 1 % Foam Place rectally 3  (three) times daily as needed.  Qty: 15 g, Refills: 0    Associated Diagnoses: External hemorrhoids         STOP taking these medications       sodium,potassium,mag sulfates (SUPREP BOWEL PREP KIT) 17.5-3.13-1.6 gram SolR Comments:   Reason for Stopping:                 Discharge Procedure Orders  Diet general     Activity as tolerated     Call MD for:  temperature >100.4     Call MD for:  persistent nausea and vomiting     Call MD for:  severe uncontrolled pain     Call MD for:  difficulty breathing, headache or visual disturbances     Call MD for:  redness, tenderness, or signs of infection (pain, swelling, redness, odor or green/yellow discharge around incision site)     Call MD for:  hives     Call MD for:  persistent dizziness or light-headedness     No dressing needed         Follow-up Information     Isidra Benavidez MD.    Specialty:  Family Medicine  Why:  As needed  Contact information:  [address] [place] [address] DR Bushra GILL [id number] [phone]

## 2017-08-29 NOTE — DISCHARGE INSTRUCTIONS
If you develop fevers, severe abdominal pain, significant bleeding, nausea or vomiting, please contact us or come to our Emergency Department at [facility].  Our  contact number is [phone] available for emergencies or any question that you may have.      You may return to normal activities tomorrow.  Written discharge instructions were provided to you today and have them handy since you may not remember our conversation today.       I also recommend that you follow a high fiber diet and take calcium supplements daily as well as to continue your present medications.      If you take Aspirin, please resume taking it at your prior dose today. If we obtained biopsies or removed polyps during your procedure, we will contact you within 5 to 6 days with the results.      Thanks for trusting us with your healthcare needs.      Sincerely,     Toño Abdi M.D.        To rate your experience with Dr. Abdi, please click on the link below     [email or web address]

## 2017-08-29 NOTE — ANESTHESIA PREPROCEDURE EVALUATION
08/29/2017  Elke Laws is a 82 y.o., female.    Anesthesia Evaluation    I have reviewed the Patient Summary Reports.    I have reviewed the Nursing Notes.   I have reviewed the Medications.     Review of Systems  Anesthesia Hx:  No problems with previous Anesthesia    Cardiovascular:   Hypertension, well controlled Past MI CAD   CHF ECG has been reviewed. CONCLUSIONS     1 - Severe left atrial enlargement.     2 - Concentric hypertrophy.     3 - No wall motion abnormalities.     4 - Normal left ventricular systolic function (EF 60-65%).     5 - Impaired LV relaxation, elevated LAP (grade 2 diastolic dysfunction).     6 - Normal right ventricular systolic function .     7 - The estimated PA systolic pressure is 28 mmHg.     8 - Trivial aortic regurgitation.     9 - Mild mitral regurgitation.       Normal sinus rhythm  Low voltage QRS  Borderline Abnormal ECG  When compared with ECG of 22-MAY-2017 05:23,  Sinus rhythm has replaced Atrial flutter  Confirmed by MD CARLY, ERMA ([id number]) on 5/28/2017 3:37:10 PM   Pulmonary:   Pneumonia Asthma mild    Renal/:   Chronic Renal Disease    Hepatic/GI:   Hiatal Hernia,    Musculoskeletal:   Arthritis     Neurological:   CVA Neuromuscular Disease,        Physical Exam  General:  Well nourished    Airway/Jaw/Neck:  Airway Findings: Mouth Opening: Normal Tongue: Normal       Chest/Lungs:  Chest/Lungs Findings: Clear to auscultation     Heart/Vascular:  Heart Findings:            Anesthesia Plan  Type of Anesthesia, risks & benefits discussed:  Anesthesia Type:  MAC  Patient's Preference:   Intra-op Monitoring Plan:   Intra-op Monitoring Plan Comments:   Post Op Pain Control Plan:   Post Op Pain Control Plan Comments:   Induction:   IV  Beta Blocker:  Patient is not currently on a Beta-Blocker (No further documentation required).       Informed Consent: Patient understands  risks and agrees with Anesthesia plan.  Questions answered. Anesthesia consent signed with patient.  ASA Score: 3     Day of Surgery Review of History & Physical: I have interviewed and examined the patient. I have reviewed the patient's H&P dated:  There are no significant changes.          Ready For Surgery From Anesthesia Perspective.

## 2017-08-30 VITALS
WEIGHT: 190 LBS | TEMPERATURE: 98 F | HEART RATE: 69 BPM | OXYGEN SATURATION: 98 % | HEIGHT: 58 IN | SYSTOLIC BLOOD PRESSURE: 135 MMHG | DIASTOLIC BLOOD PRESSURE: 60 MMHG | BODY MASS INDEX: 39.88 KG/M2 | RESPIRATION RATE: 18 BRPM

## 2017-09-01 ENCOUNTER — TELEPHONE (OUTPATIENT)
Dept: GASTROENTEROLOGY | Facility: CLINIC | Age: 82
End: 2017-09-01

## 2017-09-01 NOTE — TELEPHONE ENCOUNTER
Called patient and patient thought she had a scheduled appt for sept 7th patient was told she did not have an appointment schedule but if she needed to scedule we can. Patient stated she was trying to figure out the purpose of the appointment.Patient will call back.

## 2017-09-01 NOTE — TELEPHONE ENCOUNTER
----- Message from Piper Khalil sent at 8/31/2017  1:48 PM CDT -----  Contact: Pt's friend Dilma Perera is calling to find out if an appt was scheduled for pt on 09/07/17 at 2:40p.wellington Perera would like a call back at [phone].    Thank you.

## 2017-09-22 ENCOUNTER — OFFICE VISIT (OUTPATIENT)
Dept: CARDIOLOGY | Facility: CLINIC | Age: 82
End: 2017-09-22
Payer: MEDICARE

## 2017-09-22 VITALS
HEIGHT: 58 IN | BODY MASS INDEX: 39.99 KG/M2 | HEART RATE: 56 BPM | DIASTOLIC BLOOD PRESSURE: 80 MMHG | SYSTOLIC BLOOD PRESSURE: 122 MMHG | WEIGHT: 190.5 LBS

## 2017-09-22 DIAGNOSIS — I50.33 ACUTE ON CHRONIC DIASTOLIC CONGESTIVE HEART FAILURE: Chronic | ICD-10-CM

## 2017-09-22 DIAGNOSIS — E78.5 HYPERLIPIDEMIA, UNSPECIFIED HYPERLIPIDEMIA TYPE: Chronic | ICD-10-CM

## 2017-09-22 DIAGNOSIS — J45.909 UNCOMPLICATED ASTHMA, UNSPECIFIED ASTHMA SEVERITY: Chronic | ICD-10-CM

## 2017-09-22 DIAGNOSIS — I48.0 PAF (PAROXYSMAL ATRIAL FIBRILLATION): Primary | Chronic | ICD-10-CM

## 2017-09-22 DIAGNOSIS — I25.10 CORONARY ARTERY DISEASE INVOLVING NATIVE CORONARY ARTERY OF NATIVE HEART WITHOUT ANGINA PECTORIS: Chronic | ICD-10-CM

## 2017-09-22 DIAGNOSIS — I70.0 ATHEROSCLEROSIS OF AORTA: Chronic | ICD-10-CM

## 2017-09-22 DIAGNOSIS — I51.89 LEFT VENTRICULAR DIASTOLIC DYSFUNCTION WITH PRESERVED SYSTOLIC FUNCTION: Chronic | ICD-10-CM

## 2017-09-22 DIAGNOSIS — I10 ESSENTIAL HYPERTENSION: Chronic | ICD-10-CM

## 2017-09-22 DIAGNOSIS — R94.39 ABNORMAL NUCLEAR STRESS TEST: ICD-10-CM

## 2017-09-22 PROCEDURE — 1159F MED LIST DOCD IN RCRD: CPT | Mod: S$GLB,,, | Performed by: INTERNAL MEDICINE

## 2017-09-22 PROCEDURE — 99999 PR PBB SHADOW E&M-EST. PATIENT-LVL III: CPT | Mod: PBBFAC,,, | Performed by: INTERNAL MEDICINE

## 2017-09-22 PROCEDURE — 99499 UNLISTED E&M SERVICE: CPT | Mod: S$GLB,,, | Performed by: INTERNAL MEDICINE

## 2017-09-22 PROCEDURE — 99214 OFFICE O/P EST MOD 30 MIN: CPT | Mod: S$GLB,,, | Performed by: INTERNAL MEDICINE

## 2017-09-22 PROCEDURE — 3079F DIAST BP 80-89 MM HG: CPT | Mod: S$GLB,,, | Performed by: INTERNAL MEDICINE

## 2017-09-22 PROCEDURE — 3008F BODY MASS INDEX DOCD: CPT | Mod: S$GLB,,, | Performed by: INTERNAL MEDICINE

## 2017-09-22 PROCEDURE — 3074F SYST BP LT 130 MM HG: CPT | Mod: S$GLB,,, | Performed by: INTERNAL MEDICINE

## 2017-09-22 PROCEDURE — 1157F ADVNC CARE PLAN IN RCRD: CPT | Mod: S$GLB,,, | Performed by: INTERNAL MEDICINE

## 2017-09-22 NOTE — PROGRESS NOTES
"Subjective:   Patient ID:  Elke Laws is a 82 y.o. female who presents for follow up of Chest Pain      HPI  A 81 yo female with h/o paf is here for f/u she is having a lot symptoms of heart fluttering she feels weak and tired she is real short of breath she feels tired has no energy. She is taking her meds regularily. Has no syncope no chf no leg swelling.  She drinks 2 cups of coffee in the morning and 2 cups at nite,. She had no other issues clinically she drinks plenty of water.  Past Medical History:   Diagnosis Date    Acute kidney injury (nontraumatic) 5/27/2017    Acute on chronic diastolic congestive heart failure 8/27/2015    Anemia 5/9/2016    Anemia 5/9/2016    Anticoagulant long-term use     Aortic regurgitation     Echo 11/2013---5 - Mild to moderate aortic regurgitation.      Atrial fibrillation 5/15/2014    Back pain     s/p epidural steriod injections, no recent injections.    Baker's cyst     small, right, seen on US lower extremity 6/2014    Blood transfusion     Chest pain, atypical 8/27/2015    Diastolic dysfunction     Seen on echo 11/2013, stress test negative 5/2014    Diverticulosis     colonoscopy 3/27/2011    Hemorrhoids     colonoscopy 3/27/2011    Hiatal hernia     CXR 12/30/2016---Retrocardiac density again noted consistent with a hiatal hernia.    Hx of adenomatous colonic polyps     Hyperlipidemia     Hypertension     Hyponatremia 5/9/2016    Mitral regurgitation     Myocardial infarction     per patient was told in the past she had a "mild heart attack"    Obesity     Osteoarthritis, knee     Pneumonia     per patient "walking Pneumonia" did not require hospitalization    Seasonal allergies     Stroke     TIA; patient reports was told by one doctor she had 2 mini strokes but another doctor said she did not       Past Surgical History:   Procedure Laterality Date    APPENDECTOMY      COLONOSCOPY N/A 8/29/2017    Procedure: COLONOSCOPY okay by dr. ramos;  " Surgeon: Toño Abdi MD;  Location: [facility];  Service: Endoscopy;  Laterality: N/A;    EYE SURGERY Left     HYSTERECTOMY      benign reasons    KNEE SURGERY Bilateral     x2     OLECRANON BURSECTOMY Right     6/2011    TONSILLECTOMY      URETHRA SURGERY         Social History   Substance Use Topics    Smoking status: Former Smoker     Packs/day: 0.05     Years: 1.00     Types: Cigarettes     Quit date: 1/1/1952    Smokeless tobacco: Never Used    Alcohol use No       Family History   Problem Relation Age of Onset    Heart disease Mother     Cancer Mother      colon    Hypertension Mother     Hyperlipidemia Mother     Heart disease Father     Hypertension Father     Hyperlipidemia Father     Heart disease Sister      MI    Heart disease Brother      MI    COPD Son     Hypertension Son     Diabetes Brother     Diabetes Son     Cancer Sister      breast    Kidney disease Neg Hx     Stroke Neg Hx        Current Outpatient Prescriptions   Medication Sig    acetaminophen (TYLENOL) 500 MG tablet Take 1 tablet (500 mg total) by mouth 2 (two) times daily.    albuterol 90 mcg/actuation inhaler Inhale 2 puffs into the lungs 4 (four) times daily.    albuterol sulfate 2.5 mg/0.5 mL Nebu Take 2.5 mg by nebulization every 4 (four) hours as needed.    apixaban (ELIQUIS) 5 mg Tab Take 5 mg by mouth 2 (two) times daily.    ASCORBATE CALCIUM (VITAMIN C ORAL) Take 500 mg by mouth once daily.     aspirin (ECOTRIN) 81 MG EC tablet Take 1 tablet (81 mg total) by mouth once daily.    compressor, for nebulizer Lara Compressor use as directed by albuterol use.    DOCOSAHEXANOIC ACID/EPA (FISH OIL ORAL) Take 500 mg by mouth once daily.     DULCOLAX, BISACODYL, ORAL Take by mouth as needed.    flecainide (TAMBOCOR) 50 MG Tab Take 1 tablet (50 mg total) by mouth every 12 (twelve) hours.    fluticasone (FLOVENT HFA) 110 mcg/actuation inhaler Inhale 1 puff into the lungs 2 (two) times daily.    furosemide  (LASIX) 20 MG tablet Take 1 tablet (20 mg total) by mouth once daily. (Patient taking differently: Take 40 mg by mouth once daily. )    isosorbide mononitrate (IMDUR) 30 MG 24 hr tablet TAKE 1 TABLET(30 MG) BY MOUTH EVERY DAY    LACTULOSE ORAL Take 10 mg by mouth.     loratadine (CLARITIN) 10 mg tablet Take 1 tablet (10 mg total) by mouth once daily.    metoprolol succinate (TOPROL-XL) 100 MG 24 hr tablet Take 1 tablet (100 mg total) by mouth once daily.    POLYETHYLENE GLYCOL 3350 (MIRALAX ORAL) Take by mouth as needed.    pramoxine 1 % Foam Place rectally 3 (three) times daily as needed.    pravastatin (PRAVACHOL) 40 MG tablet Take 1 tablet (40 mg total) by mouth once daily. (Patient taking differently: Take 40 mg by mouth every evening. )     No current facility-administered medications for this visit.      Current Outpatient Prescriptions on File Prior to Visit   Medication Sig    acetaminophen (TYLENOL) 500 MG tablet Take 1 tablet (500 mg total) by mouth 2 (two) times daily.    albuterol 90 mcg/actuation inhaler Inhale 2 puffs into the lungs 4 (four) times daily.    albuterol sulfate 2.5 mg/0.5 mL Nebu Take 2.5 mg by nebulization every 4 (four) hours as needed.    apixaban (ELIQUIS) 5 mg Tab Take 5 mg by mouth 2 (two) times daily.    ASCORBATE CALCIUM (VITAMIN C ORAL) Take 500 mg by mouth once daily.     aspirin (ECOTRIN) 81 MG EC tablet Take 1 tablet (81 mg total) by mouth once daily.    compressor, for nebulizer Lara Compressor use as directed by albuterol use.    DOCOSAHEXANOIC ACID/EPA (FISH OIL ORAL) Take 500 mg by mouth once daily.     DULCOLAX, BISACODYL, ORAL Take by mouth as needed.    flecainide (TAMBOCOR) 50 MG Tab Take 1 tablet (50 mg total) by mouth every 12 (twelve) hours.    fluticasone (FLOVENT HFA) 110 mcg/actuation inhaler Inhale 1 puff into the lungs 2 (two) times daily.    furosemide (LASIX) 20 MG tablet Take 1 tablet (20 mg total) by mouth once daily. (Patient taking  differently: Take 40 mg by mouth once daily. )    isosorbide mononitrate (IMDUR) 30 MG 24 hr tablet TAKE 1 TABLET(30 MG) BY MOUTH EVERY DAY    LACTULOSE ORAL Take 10 mg by mouth.     loratadine (CLARITIN) 10 mg tablet Take 1 tablet (10 mg total) by mouth once daily.    metoprolol succinate (TOPROL-XL) 100 MG 24 hr tablet Take 1 tablet (100 mg total) by mouth once daily.    POLYETHYLENE GLYCOL 3350 (MIRALAX ORAL) Take by mouth as needed.    pramoxine 1 % Foam Place rectally 3 (three) times daily as needed.    pravastatin (PRAVACHOL) 40 MG tablet Take 1 tablet (40 mg total) by mouth once daily. (Patient taking differently: Take 40 mg by mouth every evening. )    [DISCONTINUED] ciprofloxacin HCl (CIPRO) 500 MG tablet Take 1 tablet (500 mg total) by mouth 2 (two) times daily.    [DISCONTINUED] metronidazole (FLAGYL) 500 MG tablet Take 1 tablet (500 mg total) by mouth every 12 (twelve) hours.     No current facility-administered medications on file prior to visit.      Review of patient's allergies indicates:   Allergen Reactions    Iodine and iodide containing products Rash       ROS  Constitution: Positive for malaise/fatigue. Negative for diaphoresis, weakness and weight gain.   HENT: Negative for headaches and hoarse voice.   Eyes: Negative for double vision and visual disturbance.   Cardiovascular: Positive for chest pain and dyspnea on exertion  Palpitation . Negative for claudication, cyanosis, irregular heartbeat, leg swelling, near-syncope, orthopnea,  paroxysmal nocturnal dyspnea and syncope.   Respiratory: Positive for shortness of breath and wheezing improved. Negative for cough, hemoptysis and snoring.   Hematologic/Lymphatic: Negative for bleeding problem. Does not bruise/bleed easily.   Skin: Negative for color change and poor wound healing.   Musculoskeletal: Positive for back pain. Negative for muscle cramps, muscle weakness and myalgias.    Gastrointestinal: Negative for bloating, abdominal  "pain, change in bowel habit, diarrhea, heartburn, hematemesis, hematochezia, melena and nausea.   Neurological: Positive for numbness and paresthesias. Negative for excessive daytime sleepiness, dizziness, light-headedness and loss of balance.   Psychiatric/Behavioral: Negative for memory loss. The patient does not have insomnia.   Allergic/Immunologic: Negative for hives.   Objective:   Physical Exam  Vitals:    09/22/17 1553   BP: 122/80   BP Location: Right arm   Pulse: (!) 56   Weight: 86.4 kg (190 lb 7.6 oz)   Height: 4' 10" (1.473 m)   Constitutional: She is oriented to person, place, and time. She appears well-developed and well-nourished. She does not appear ill. No distress.   HENT:   Head: Normocephalic and atraumatic.   Eyes: EOM are normal. Pupils are equal, round, and reactive to light. Right eye exhibits no discharge. Left eye exhibits no discharge. No scleral icterus.   Neck: Normal range of motion. Neck supple. Normal carotid pulses, no hepatojugular reflux and no JVD present. Carotid bruit is not present. No tracheal deviation present. No thyromegaly present.   Cardiovascular: Normal rate, regular rhythm, intact distal pulses and normal pulses. Exam reveals no gallop and no friction rub.   Murmur heard.   Harsh midsystolic murmur is present with a grade of 1/6 at the upper right sternal border radiating to the neck   High-pitched blowing decrescendo early diastolic murmur is present with a grade of 1/6 at the upper right sternal border radiating to the apex   Pulmonary/Chest: Effort normal and breath sounds normal. No respiratory distress. She has no wheezes. She has no rhonchi. She has no rales. She exhibits no tenderness.   Abdominal: Soft. Normal appearance, normal aorta and bowel sounds are normal. She exhibits no distension, no abdominal bruit, no ascites, no pulsatile midline mass and no mass. There is no hepatomegaly. There is no tenderness. There is no rebound and no guarding.   Obese " abdomen.   Musculoskeletal: She exhibits no edema and no tenderness.   Right shoulder: She exhibits no deformity.   Scar bilateral knee surgery well healed.   Neurological: She is alert and oriented to person, place, and time. She has normal strength. No cranial nerve deficit.   Skin: Skin is warm and dry. No rash noted. She is not diaphoretic. No cyanosis or erythema. Nails show no clubbing.   Spider veins in lower extremities noted.   Psychiatric: She has a normal mood and affect. Her speech is normal and behavior is normal  Lab Results   Component Value Date    CHOL 123 04/13/2017    CHOL 165 06/02/2016    CHOL 170 05/14/2015     Lab Results   Component Value Date    HDL 59 04/13/2017    HDL 75 06/02/2016    HDL 92 (H) 05/14/2015     Lab Results   Component Value Date    LDLCALC 51.2 (L) 04/13/2017    LDLCALC 79.8 06/02/2016    LDLCALC 65.4 05/14/2015     Lab Results   Component Value Date    TRIG 64 04/13/2017    TRIG 51 06/02/2016    TRIG 63 05/14/2015     Lab Results   Component Value Date    CHOLHDL 48.0 04/13/2017    CHOLHDL 45.5 06/02/2016    CHOLHDL 54.1 (H) 05/14/2015       Chemistry        Component Value Date/Time     06/28/2017 1629    K 3.3 (L) 08/29/2017 0738     06/28/2017 1629    CO2 26 06/28/2017 1629    BUN 52 (H) 06/28/2017 1629    CREATININE 1.7 (H) 06/28/2017 1629     06/28/2017 1629        Component Value Date/Time    CALCIUM 9.1 06/28/2017 1629    ALKPHOS 99 05/25/2017 0611    AST 55 (H) 05/25/2017 0611    ALT 47 (H) 05/25/2017 0611    BILITOT 0.4 05/25/2017 0611    ESTGFRAFRICA 31.9 (A) 06/28/2017 1629    EGFRNONAA 27.7 (A) 06/28/2017 1629          Lab Results   Component Value Date    TSH 1.057 12/30/2016     Lab Results   Component Value Date    INR 1.2 05/27/2017    INR 1.2 05/21/2017    INR 1.1 04/22/2017     Lab Results   Component Value Date    WBC 7.26 05/30/2017    HGB 9.6 (L) 05/30/2017    HCT 29.6 (L) 05/30/2017    MCV 91 05/30/2017     05/30/2017      BMP  Sodium   Date Value Ref Range Status   06/28/2017 139 136 - 145 mmol/L Final     Potassium   Date Value Ref Range Status   08/29/2017 3.3 (L) 3.5 - 5.1 mmol/L Final     Chloride   Date Value Ref Range Status   06/28/2017 104 95 - 110 mmol/L Final     CO2   Date Value Ref Range Status   06/28/2017 26 23 - 29 mmol/L Final     BUN, Bld   Date Value Ref Range Status   06/28/2017 52 (H) 8 - 23 mg/dL Final     Creatinine   Date Value Ref Range Status   06/28/2017 1.7 (H) 0.5 - 1.4 mg/dL Final     Calcium   Date Value Ref Range Status   06/28/2017 9.1 8.7 - 10.5 mg/dL Final     Anion Gap   Date Value Ref Range Status   06/28/2017 9 8 - 16 mmol/L Final     eGFR if    Date Value Ref Range Status   06/28/2017 31.9 (A) >60 mL/min/1.73 m^2 Final     eGFR if non    Date Value Ref Range Status   06/28/2017 27.7 (A) >60 mL/min/1.73 m^2 Final     Comment:     Calculation used to obtain the estimated glomerular filtration  rate (eGFR) is the CKD-EPI equation. Since race is unknown   in our information system, the eGFR values for   -American and Non--American patients are given   for each creatinine result.       CrCl cannot be calculated (Patient's most recent lab result is older than the maximum 7 days allowed.).    Assessment:     1. PAF (paroxysmal atrial fibrillation)    2. Essential hypertension    3. Hyperlipidemia, unspecified hyperlipidemia type    4. Atherosclerosis of aorta    5. Uncomplicated asthma, unspecified asthma severity    6. Acute on chronic diastolic congestive heart failure    7. Left ventricular diastolic dysfunction with preserved systolic function    8. Coronary artery disease involving native coronary artery of native heart without angina pectoris    9. Abnormal nuclear stress test      Counseled about acffeine intake. Will repeat zio patch because of low hr up to 40/min with pat may be having sss that needs pacer in order to control symptoms.  Plan:    zio patch   Phone review and decide f/u  Decrease caffeine.

## 2017-09-29 ENCOUNTER — CLINICAL SUPPORT (OUTPATIENT)
Dept: CARDIOLOGY | Facility: CLINIC | Age: 82
End: 2017-09-29
Payer: MEDICARE

## 2017-09-29 DIAGNOSIS — I48.0 PAF (PAROXYSMAL ATRIAL FIBRILLATION): Chronic | ICD-10-CM

## 2017-09-29 PROCEDURE — 93224 XTRNL ECG REC UP TO 48 HRS: CPT | Mod: S$GLB,,, | Performed by: INTERNAL MEDICINE

## 2017-10-04 ENCOUNTER — TELEPHONE (OUTPATIENT)
Dept: CARDIOLOGY | Facility: CLINIC | Age: 82
End: 2017-10-04

## 2017-10-04 NOTE — TELEPHONE ENCOUNTER
----- Message from Tish Angulo MD sent at 10/4/2017  5:57 AM CDT -----  Get her to see EP she has a lot of pat short runs

## 2017-10-06 ENCOUNTER — OFFICE VISIT (OUTPATIENT)
Dept: CARDIOLOGY | Facility: CLINIC | Age: 82
End: 2017-10-06
Payer: MEDICARE

## 2017-10-06 ENCOUNTER — CLINICAL SUPPORT (OUTPATIENT)
Dept: CARDIOLOGY | Facility: CLINIC | Age: 82
End: 2017-10-06
Payer: MEDICARE

## 2017-10-06 ENCOUNTER — LAB VISIT (OUTPATIENT)
Dept: LAB | Facility: HOSPITAL | Age: 82
End: 2017-10-06
Attending: INTERNAL MEDICINE
Payer: MEDICARE

## 2017-10-06 VITALS
HEIGHT: 56 IN | SYSTOLIC BLOOD PRESSURE: 120 MMHG | HEART RATE: 62 BPM | WEIGHT: 187 LBS | BODY MASS INDEX: 42.07 KG/M2 | DIASTOLIC BLOOD PRESSURE: 60 MMHG

## 2017-10-06 DIAGNOSIS — I48.0 PAF (PAROXYSMAL ATRIAL FIBRILLATION): Primary | ICD-10-CM

## 2017-10-06 DIAGNOSIS — I48.91 ATRIAL FIBRILLATION: ICD-10-CM

## 2017-10-06 DIAGNOSIS — I48.91 ATRIAL FIBRILLATION: Primary | ICD-10-CM

## 2017-10-06 DIAGNOSIS — I10 ESSENTIAL HYPERTENSION: Chronic | ICD-10-CM

## 2017-10-06 DIAGNOSIS — J45.20 INTERMITTENT ASTHMA WITHOUT COMPLICATION, UNSPECIFIED ASTHMA SEVERITY: Chronic | ICD-10-CM

## 2017-10-06 DIAGNOSIS — E78.5 HYPERLIPIDEMIA, UNSPECIFIED HYPERLIPIDEMIA TYPE: Chronic | ICD-10-CM

## 2017-10-06 DIAGNOSIS — I48.0 PAF (PAROXYSMAL ATRIAL FIBRILLATION): ICD-10-CM

## 2017-10-06 DIAGNOSIS — I25.10 CORONARY ARTERY DISEASE INVOLVING NATIVE CORONARY ARTERY OF NATIVE HEART WITHOUT ANGINA PECTORIS: Chronic | ICD-10-CM

## 2017-10-06 DIAGNOSIS — I51.89 LEFT VENTRICULAR DIASTOLIC DYSFUNCTION WITH PRESERVED SYSTOLIC FUNCTION: Chronic | ICD-10-CM

## 2017-10-06 PROCEDURE — 93000 ELECTROCARDIOGRAM COMPLETE: CPT | Mod: S$GLB,,, | Performed by: INTERNAL MEDICINE

## 2017-10-06 PROCEDURE — 36415 COLL VENOUS BLD VENIPUNCTURE: CPT | Mod: PO

## 2017-10-06 PROCEDURE — 99999 PR PBB SHADOW E&M-EST. PATIENT-LVL III: CPT | Mod: PBBFAC,,, | Performed by: INTERNAL MEDICINE

## 2017-10-06 PROCEDURE — 80299 QUANTITATIVE ASSAY DRUG: CPT

## 2017-10-06 PROCEDURE — 99499 UNLISTED E&M SERVICE: CPT | Mod: S$GLB,,, | Performed by: INTERNAL MEDICINE

## 2017-10-06 PROCEDURE — 99215 OFFICE O/P EST HI 40 MIN: CPT | Mod: S$GLB,,, | Performed by: INTERNAL MEDICINE

## 2017-10-09 LAB — FLECAINIDE SERPL-MCNC: 0.4 MCG/ML (ref 0.2–1)

## 2017-10-20 ENCOUNTER — TELEPHONE (OUTPATIENT)
Dept: CARDIOLOGY | Facility: CLINIC | Age: 82
End: 2017-10-20

## 2017-10-20 NOTE — TELEPHONE ENCOUNTER
----- Message from Mayank Wilson MD sent at 10/19/2017  5:50 PM CDT -----  See comments below and call patient to discuss.   Please close encounter when done -- no need to route back to me.  Thanks    We can afford increasing Flecainide dose to 75 bid -- repeat ECG and Flec level in 10 days

## 2017-10-23 ENCOUNTER — OFFICE VISIT (OUTPATIENT)
Dept: INTERNAL MEDICINE | Facility: CLINIC | Age: 82
End: 2017-10-23
Payer: MEDICARE

## 2017-10-23 ENCOUNTER — TELEPHONE (OUTPATIENT)
Dept: CARDIOLOGY | Facility: CLINIC | Age: 82
End: 2017-10-23

## 2017-10-23 VITALS
BODY MASS INDEX: 42.07 KG/M2 | DIASTOLIC BLOOD PRESSURE: 56 MMHG | SYSTOLIC BLOOD PRESSURE: 116 MMHG | TEMPERATURE: 98 F | HEART RATE: 59 BPM | WEIGHT: 187 LBS | OXYGEN SATURATION: 98 % | HEIGHT: 56 IN

## 2017-10-23 DIAGNOSIS — M79.601 RIGHT ARM PAIN: Primary | ICD-10-CM

## 2017-10-23 PROCEDURE — 99999 PR PBB SHADOW E&M-EST. PATIENT-LVL V: CPT | Mod: PBBFAC,,, | Performed by: NURSE PRACTITIONER

## 2017-10-23 PROCEDURE — 99213 OFFICE O/P EST LOW 20 MIN: CPT | Mod: S$GLB,,, | Performed by: NURSE PRACTITIONER

## 2017-10-23 NOTE — TELEPHONE ENCOUNTER
----- Message from Aye Pineda sent at 10/23/2017  9:44 AM CDT -----  Contact: patient  Calling concerning her left arm is hurting from elbow to wrist and it's swollen. Please call patient today URGENT!! ASAP @ [phone]. Thanks, leonard

## 2017-10-23 NOTE — TELEPHONE ENCOUNTER
Left arm pain with a little swelling from her wrist to her elbow.  No cp or discomfort.   Denies sob.   I advised pt to call pcp for further recommendations.

## 2017-10-23 NOTE — TELEPHONE ENCOUNTER
Patient returned phone call to office and stated her left arm has been hurting for two days and now swollen from elbow down no chest pain, shortness of breath chronic with her A-fib; will see her PCP today but reminded that [facility] does have an Urgent [facility] Clinic.  Advised will notify Dr. Angulo and check on her later today and that she probably needs an ultrasound or x-ray.

## 2017-10-23 NOTE — PROGRESS NOTES
"Subjective:       Patient ID: Elke Laws is a 83 y.o. female.    Chief Complaint: Arm Pain (left)    Patient presents for right forearm pain that began two days ago.       Arm Pain    The incident occurred 2 days ago. There was no injury mechanism. Pain location: from wrist to elbow. Quality: "ping", sore. The pain does not radiate. The pain is at a severity of 8/10. The pain has been intermittent since the incident. Associated symptoms include tingling. Pertinent negatives include no chest pain, muscle weakness or numbness. Treatments tried: two old goats lotion. The treatment provided moderate relief.     Review of Systems   Constitutional: Negative for chills and fever.   Cardiovascular: Negative for chest pain.   Musculoskeletal: Positive for arthralgias (right arm) and gait problem (chronic, uses wheelchair and walker).   Skin: Negative for rash and wound.   Neurological: Positive for tingling. Negative for numbness.       Objective:      Physical Exam   Constitutional: She is oriented to person, place, and time. She appears well-developed and well-nourished. No distress.   HENT:   Head: Normocephalic and atraumatic.   Musculoskeletal:        Right elbow: Normal.       Right wrist: She exhibits decreased range of motion. She exhibits no tenderness, no bony tenderness, no swelling and no effusion.   Arthritic changes to bilateral wrists.    Neurological: She is alert and oriented to person, place, and time. She has normal strength. She displays no tremor.   Skin: She is not diaphoretic.   Psychiatric: She has a normal mood and affect.   Vitals reviewed.      Assessment:       1. Right arm pain        Plan:   Right arm pain  Comments:  arthritis vs. carpal tunnel, overall appears improving      Tylenol 650 mg every 8 hours.  Sleep in right wrist brace. Notify me if no improvement.   Return if symptoms worsen or fail to improve.      "

## 2017-10-25 ENCOUNTER — TELEPHONE (OUTPATIENT)
Dept: CARDIOLOGY | Facility: CLINIC | Age: 82
End: 2017-10-25

## 2017-11-10 DIAGNOSIS — E78.5 HYPERLIPIDEMIA, UNSPECIFIED HYPERLIPIDEMIA TYPE: ICD-10-CM

## 2017-11-10 DIAGNOSIS — Z86.73 HISTORY OF TIA (TRANSIENT ISCHEMIC ATTACK): ICD-10-CM

## 2017-11-13 RX ORDER — METOPROLOL SUCCINATE 100 MG/1
TABLET, EXTENDED RELEASE ORAL
Qty: 90 TABLET | Refills: 1 | Status: SHIPPED | OUTPATIENT
Start: 2017-11-13 | End: 2018-05-04 | Stop reason: SDUPTHER

## 2017-11-13 RX ORDER — PRAVASTATIN SODIUM 40 MG/1
TABLET ORAL
Qty: 90 TABLET | Refills: 1 | Status: SHIPPED | OUTPATIENT
Start: 2017-11-13 | End: 2018-05-04 | Stop reason: SDUPTHER

## 2017-12-06 ENCOUNTER — IMMUNIZATION (OUTPATIENT)
Dept: INTERNAL MEDICINE | Facility: CLINIC | Age: 82
End: 2017-12-06
Payer: MEDICARE

## 2017-12-06 PROCEDURE — 90662 IIV NO PRSV INCREASED AG IM: CPT | Mod: S$GLB,,, | Performed by: FAMILY MEDICINE

## 2017-12-06 PROCEDURE — G0008 ADMIN INFLUENZA VIRUS VAC: HCPCS | Mod: S$GLB,,, | Performed by: FAMILY MEDICINE

## 2018-01-16 ENCOUNTER — PES CALL (OUTPATIENT)
Dept: ADMINISTRATIVE | Facility: CLINIC | Age: 83
End: 2018-01-16

## 2018-01-24 ENCOUNTER — HOSPITAL ENCOUNTER (EMERGENCY)
Facility: HOSPITAL | Age: 83
Discharge: HOME | End: 2018-01-24
Attending: EMERGENCY MEDICINE
Payer: MEDICARE

## 2018-01-24 ENCOUNTER — TELEPHONE (OUTPATIENT)
Dept: INTERNAL MEDICINE | Facility: CLINIC | Age: 83
End: 2018-01-24

## 2018-01-24 VITALS
HEIGHT: 59 IN | SYSTOLIC BLOOD PRESSURE: 195 MMHG | HEART RATE: 64 BPM | BODY MASS INDEX: 38.3 KG/M2 | RESPIRATION RATE: 18 BRPM | OXYGEN SATURATION: 98 % | TEMPERATURE: 99 F | WEIGHT: 190 LBS | DIASTOLIC BLOOD PRESSURE: 71 MMHG
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
## 2018-01-24 DIAGNOSIS — M25.569 KNEE PAIN: ICD-10-CM

## 2018-01-24 PROCEDURE — 99283 EMERGENCY DEPT VISIT LOW MDM: CPT

## 2018-01-24 NOTE — TELEPHONE ENCOUNTER
----- Message from Katrina Chavez sent at 1/24/2018 11:59 AM CST -----  Contact: pt's son  He's calling to see if he can be fit into schedule on Monday 1/29/18 for hospital f/u, please advise [phone] (work)

## 2018-01-24 NOTE — ED PROVIDER NOTES
SCRIBE #1 NOTE: I, Jose Rodriguez, am scribing for, and in the presence of, Emiliano Holt MD. I have scribed the entire note.      History      Chief Complaint   Patient presents with    Hip Pain     pt c/o pain to Lt hip after fall yesterday, pt denies hitting her head or LOC       Review of patient's allergies indicates:   Allergen Reactions    Iodine and iodide containing products Rash        HPI   HPI    1/24/2018, 10:19 AM   History obtained from the patient      History of Present Illness: Elke Laws is a 83 y.o. female patient who presents to the Emergency Department for left hip pain which onset gradually 1 day ago after a fall. Symptoms are constant and moderate in severity. Pt states she landed on her knees when she fell and then hit her hip. Pt states she didn't hit head. Pt is on Eloquis. No mitigating or exacerbating factors reported. Associated sx include bilateral knee pain. Patient denies any fever, chills, n/v/d, numbness, weakness, back pain, neck pain, HA, lightheadedness, dizziness, and all other sxs at this time. No further complaints or concerns at this time.         Arrival mode: Personal vehicle     PCP: Isidra Benavidez MD       Past Medical History:  Past Medical History:   Diagnosis Date    Acute kidney injury (nontraumatic) 5/27/2017    Acute on chronic diastolic congestive heart failure 8/27/2015    Anemia 5/9/2016    Anemia 5/9/2016    Anticoagulant long-term use     Aortic regurgitation     Echo 11/2013---5 - Mild to moderate aortic regurgitation.      Atrial fibrillation 5/15/2014    Back pain     s/p epidural steriod injections, no recent injections.    Baker's cyst     small, right, seen on US lower extremity 6/2014    Blood transfusion     Chest pain, atypical 8/27/2015    Diastolic dysfunction     Seen on echo 11/2013, stress test negative 5/2014    Diverticulosis     colonoscopy 3/27/2011    Hemorrhoids     colonoscopy 3/27/2011    Hiatal hernia     CXR  "12/30/2016---Retrocardiac density again noted consistent with a hiatal hernia.    Hx of adenomatous colonic polyps     Hyperlipidemia     Hypertension     Hyponatremia 5/9/2016    Mitral regurgitation     Myocardial infarction     per patient was told in the past she had a "mild heart attack"    Obesity     Osteoarthritis, knee     Pneumonia     per patient "walking Pneumonia" did not require hospitalization    Seasonal allergies     Stroke     TIA; patient reports was told by one doctor she had 2 mini strokes but another doctor said she did not       Past Surgical History:  Past Surgical History:   Procedure Laterality Date    APPENDECTOMY      COLONOSCOPY N/A 8/29/2017    Procedure: COLONOSCOPY okay by dr. ramos;  Surgeon: Toño Abdi MD;  Location: [facility];  Service: Endoscopy;  Laterality: N/A;    EYE SURGERY Left     HYSTERECTOMY      benign reasons    KNEE SURGERY Bilateral     x2     OLECRANON BURSECTOMY Right     6/2011    TONSILLECTOMY      URETHRA SURGERY           Family History:  Family History   Problem Relation Age of Onset    Heart disease Mother     Cancer Mother      colon    Hypertension Mother     Hyperlipidemia Mother     Heart disease Father     Hypertension Father     Hyperlipidemia Father     Heart disease Sister      MI    Heart disease Brother      MI    COPD Son     Hypertension Son     Diabetes Brother     Diabetes Son     Cancer Sister      breast    Kidney disease Neg Hx     Stroke Neg Hx        Social History:  Social History     Social History Main Topics    Smoking status: Former Smoker     Packs/day: 0.05     Years: 1.00     Types: Cigarettes     Quit date: 1/1/1952    Smokeless tobacco: Never Used    Alcohol use No    Drug use: No    Sexual activity: No       ROS   Review of Systems   Constitutional: Negative for chills and fever.   HENT: Negative for sore throat.    Respiratory: Negative for shortness of breath.    Cardiovascular: " "Negative for chest pain.   Gastrointestinal: Negative for diarrhea, nausea and vomiting.   Genitourinary: Negative for dysuria.   Musculoskeletal: Positive for arthralgias (left hip, bilateral knees). Negative for back pain and neck pain.   Skin: Negative for rash.   Neurological: Negative for dizziness, weakness, light-headedness, numbness and headaches.   Hematological: Does not bruise/bleed easily.     Physical Exam      Initial Vitals [01/24/18 0927]   BP Pulse Resp Temp SpO2   (!) 195/71 64 18 98.5 °F (36.9 °C) 98 %      MAP       112.33          Physical Exam  Nursing Notes and Vital Signs Reviewed.  Constitutional: Patient is in no acute distress. Well-developed and well-nourished.  Head: Atraumatic. Normocephalic.  Eyes: PERRL. EOM intact. Conjunctivae are not pale. No scleral icterus.  ENT: Mucous membranes are moist. Oropharynx is clear and symmetric.    Neck: Supple. Full ROM. No lymphadenopathy.  Cardiovascular: Regular rate. Regular rhythm. No murmurs, rubs, or gallops. Distal pulses are 2+ and symmetric.  Pulmonary/Chest: No respiratory distress. Clear to auscultation bilaterally. No wheezing or rales.  Abdominal: Soft and non-distended.  There is no tenderness.  No rebound, guarding, or rigidity.   Musculoskeletal: Moves all extremities. No obvious deformities. No edema.   LLE: no evident deformity.negative for swelling. No tenderness. ROM is normal. Cap refill distally is <2 seconds. DP and PT pulses are equal and 2+ bilaterally. No motor deficit. No distal sensory deficit  Skin: Warm and dry.  Neurological:  Alert, awake, and appropriate.  Normal speech.  No acute focal neurological deficits are appreciated.  Psychiatric: Normal affect. Good eye contact. Appropriate in content.    ED Course    Procedures  ED Vital Signs:  Vitals:    01/24/18 0927   BP: (!) 195/71   Pulse: 64   Resp: 18   Temp: 98.5 °F (36.9 °C)   TempSrc: Oral   SpO2: 98%   Weight: 86.2 kg (190 lb)   Height: 4' 11" (1.499 m) "           Imaging Results:  Imaging Results          X-Ray Knee Complete 4 Or More Views Bilat (Final result)  Result time 01/24/18 11:07:04    Final result by CARLITOS Mendez Sr., MD (01/24/18 11:07:04)                 Impression:      1. There is a small joint effusion in the right knee.  2. There is total prosthetic hardware in both knees.       Electronically signed by: CARLITOS MENDEZ MD  Date:     01/24/18  Time:    11:07              Narrative:    4 view x-ray of both knees    Clinical History:     Pain in unspecified knee    Findings:     There is no acute fracture visualized. There is no dislocation. There is total prosthetic hardware in both knees. There is a small joint effusion in the right knee.                             X-Ray Hip 2 or 3 views Left (Final result)  Result time 01/24/18 11:01:19    Final result by CARLITOS Mendez Sr., MD (01/24/18 11:01:19)                 Impression:      1. The joint space of both hips is normal in appearance.   2. There are mild degenerative changes in the visualized portion of the lumbar spine.  3. There is a mild amount of atherosclerosis.        Electronically signed by: CARLITOS MENDEZ MD  Date:     01/24/18  Time:    11:01              Narrative:    2 view x-ray of the left hip    Clinical History:     Pain in the left hip    Findings:     There is no fracture. There is no dislocation. The joint space of both hips is normal in appearance. There are mild degenerative changes in the visualized portion of the lumbar spine. There is a mild amount of atherosclerosis.                                      The Emergency Provider reviewed the vital signs and test results, which are outlined above.    ED Discussion     11:14 AM: Reassessed pt at this time.  Pt is awake, alert, and in no distress. Discussed with pt all pertinent ED information and results. Discussed pt dx and plan of tx. Gave pt all f/u and return to the ED instructions. All questions and concerns were addressed  at this time. Pt expresses understanding of information and instructions, and is comfortable with plan to discharge. Pt is stable for discharge.    I discussed with patient and/or family/caretaker that evaluation in the ED does not suggest any emergent or life threatening medical conditions requiring immediate intervention beyond what was provided in the ED, and I believe patient is safe for discharge.  Regardless, an unremarkable evaluation in the ED does not preclude the development or presence of a serious of life threatening condition. As such, patient was instructed to return immediately for any worsening or change in current symptoms.      ED Medication(s):  Medications - No data to display    New Prescriptions    No medications on file       Follow-up Information     Isidra Benavidez MD In 2 days.    Specialty:  Family Medicine  Contact information:  [address] [place] [address] DR Bushra GILL [id number] [phone]                     Medical Decision Making    Medical Decision Making:   Clinical Tests:   Radiological Study: Ordered and Reviewed           Scribe Attestation:   Scribe #1: I performed the above scribed service and the documentation accurately describes the services I performed. I attest to the accuracy of the note.    Attending:   Physician Attestation Statement for Scribe #1: I, Emiliano Holt MD, personally performed the services described in this documentation, as scribed by Jose Rodriguez, in my presence, and it is both accurate and complete.          Clinical Impression       ICD-10-CM ICD-9-CM   1. Fall, initial encounter W19.XXXA E888.9   2. Knee pain M25.569 719.46       Disposition:   Disposition: Discharged  Condition: Stable         Emiliano Holt MD  01/24/18 [id number]

## 2018-01-25 PROBLEM — I20.9 ANGINA PECTORIS: Status: ACTIVE | Noted: 2018-01-25

## 2018-01-26 ENCOUNTER — OFFICE VISIT (OUTPATIENT)
Dept: INTERNAL MEDICINE | Facility: CLINIC | Age: 83
End: 2018-01-26
Payer: MEDICARE

## 2018-01-26 VITALS
BODY MASS INDEX: 39.8 KG/M2 | SYSTOLIC BLOOD PRESSURE: 128 MMHG | RESPIRATION RATE: 18 BRPM | HEIGHT: 58 IN | DIASTOLIC BLOOD PRESSURE: 56 MMHG | WEIGHT: 189.63 LBS | OXYGEN SATURATION: 96 % | HEART RATE: 60 BPM

## 2018-01-26 DIAGNOSIS — N18.4 CKD (CHRONIC KIDNEY DISEASE), STAGE IV: ICD-10-CM

## 2018-01-26 DIAGNOSIS — Z00.00 ENCOUNTER FOR PREVENTIVE HEALTH EXAMINATION: Primary | ICD-10-CM

## 2018-01-26 DIAGNOSIS — I20.9 ANGINA PECTORIS: ICD-10-CM

## 2018-01-26 DIAGNOSIS — J45.20 INTERMITTENT ASTHMA WITHOUT COMPLICATION, UNSPECIFIED ASTHMA SEVERITY: Chronic | ICD-10-CM

## 2018-01-26 DIAGNOSIS — M19.90 OSTEOARTHRITIS, UNSPECIFIED OSTEOARTHRITIS TYPE, UNSPECIFIED SITE: ICD-10-CM

## 2018-01-26 DIAGNOSIS — R09.89 DECREASED PULSES IN FEET: ICD-10-CM

## 2018-01-26 DIAGNOSIS — I51.89 LEFT VENTRICULAR DIASTOLIC DYSFUNCTION WITH PRESERVED SYSTOLIC FUNCTION: Chronic | ICD-10-CM

## 2018-01-26 DIAGNOSIS — N39.41 URGE INCONTINENCE OF URINE: ICD-10-CM

## 2018-01-26 DIAGNOSIS — H91.93 BILATERAL HEARING LOSS, UNSPECIFIED HEARING LOSS TYPE: ICD-10-CM

## 2018-01-26 DIAGNOSIS — R09.89 BILATERAL CAROTID BRUITS: ICD-10-CM

## 2018-01-26 DIAGNOSIS — E66.01 SEVERE OBESITY (BMI 35.0-39.9): ICD-10-CM

## 2018-01-26 DIAGNOSIS — I25.10 CORONARY ARTERY DISEASE INVOLVING NATIVE CORONARY ARTERY OF NATIVE HEART WITHOUT ANGINA PECTORIS: Chronic | ICD-10-CM

## 2018-01-26 DIAGNOSIS — K57.30 DIVERTICULOSIS OF LARGE INTESTINE WITHOUT HEMORRHAGE: Chronic | ICD-10-CM

## 2018-01-26 DIAGNOSIS — I50.32 CHRONIC DIASTOLIC HEART FAILURE: Chronic | ICD-10-CM

## 2018-01-26 DIAGNOSIS — I48.0 PAF (PAROXYSMAL ATRIAL FIBRILLATION): Chronic | ICD-10-CM

## 2018-01-26 DIAGNOSIS — I70.0 ATHEROSCLEROSIS OF AORTA: Chronic | ICD-10-CM

## 2018-01-26 DIAGNOSIS — M47.816 LUMBAR SPONDYLOSIS: ICD-10-CM

## 2018-01-26 DIAGNOSIS — I10 ESSENTIAL HYPERTENSION: Chronic | ICD-10-CM

## 2018-01-26 DIAGNOSIS — E78.5 HYPERLIPIDEMIA, UNSPECIFIED HYPERLIPIDEMIA TYPE: Chronic | ICD-10-CM

## 2018-01-26 DIAGNOSIS — Z91.89 POTENTIAL FOR COGNITIVE IMPAIRMENT: ICD-10-CM

## 2018-01-26 PROBLEM — N17.9 ACUTE KIDNEY INJURY (NONTRAUMATIC): Status: RESOLVED | Noted: 2017-05-27 | Resolved: 2018-01-26

## 2018-01-26 PROBLEM — N39.0 E. COLI UTI (URINARY TRACT INFECTION): Status: RESOLVED | Noted: 2017-05-23 | Resolved: 2018-01-26

## 2018-01-26 PROBLEM — B96.20 E COLI BACTEREMIA: Status: RESOLVED | Noted: 2017-05-23 | Resolved: 2018-01-26

## 2018-01-26 PROBLEM — I50.9 ACUTE ON CHRONIC CONGESTIVE HEART FAILURE: Status: RESOLVED | Noted: 2017-05-21 | Resolved: 2018-01-26

## 2018-01-26 PROBLEM — R09.02 HYPOXEMIA: Status: RESOLVED | Noted: 2017-05-27 | Resolved: 2018-01-26

## 2018-01-26 PROBLEM — I13.0 HYPERTENSIVE HEART AND KIDNEY DISEASE WITH HF AND WITH CKD STAGE IV: Status: ACTIVE | Noted: 2018-01-26

## 2018-01-26 PROBLEM — R78.81 E COLI BACTEREMIA: Status: RESOLVED | Noted: 2017-05-23 | Resolved: 2018-01-26

## 2018-01-26 PROBLEM — I50.9 ACUTE CONGESTIVE HEART FAILURE: Status: RESOLVED | Noted: 2017-05-27 | Resolved: 2018-01-26

## 2018-01-26 PROBLEM — D72.829 LEUKOCYTOSIS: Status: RESOLVED | Noted: 2017-05-27 | Resolved: 2018-01-26

## 2018-01-26 PROBLEM — B96.20 E. COLI UTI (URINARY TRACT INFECTION): Status: RESOLVED | Noted: 2017-05-23 | Resolved: 2018-01-26

## 2018-01-26 PROBLEM — A41.9 SEPSIS: Status: RESOLVED | Noted: 2017-05-22 | Resolved: 2018-01-26

## 2018-01-26 PROCEDURE — 99499 UNLISTED E&M SERVICE: CPT | Mod: S$GLB,,, | Performed by: NURSE PRACTITIONER

## 2018-01-26 PROCEDURE — 99999 PR PBB SHADOW E&M-EST. PATIENT-LVL IV: CPT | Mod: PBBFAC,,, | Performed by: NURSE PRACTITIONER

## 2018-01-26 PROCEDURE — G0439 PPPS, SUBSEQ VISIT: HCPCS | Mod: S$GLB,,, | Performed by: NURSE PRACTITIONER

## 2018-01-26 NOTE — Clinical Note
Primary Care Providers: Isidra Benavidez MD  Your patient was seen today for a HRA visit. Abnormalities and gap(s) in care (HEDIS gaps) have been identified during this visit that required additional testing and possible follow up.   Please see pertinent information in bold under diagnoses.  Orders Placed This Encounter     Ambulatory Referral to ENT         Referral Priority:Routine         Referral Type:Consultation         Referral Reason:Specialty Services Required         Requested Specialty:Otolaryngology         Number of Visits Requested:1   These orders were placed using [facility] approved protocol and any results will be forwarded to your office for appropriate follow up. I have included a copy my visit note; please review the note and feel free to contact me with any questions.   Thank you for allowing me to participate in the care of your patients.  Madelyn Graham NP

## 2018-01-26 NOTE — PATIENT INSTRUCTIONS
Counseling and Referral of Other Preventative  (Italic type indicates deductible and co-insurance are waived)    Patient Name: Elke Laws  Today's Date: 1/26/2018    Health Maintenance       Date Due Completion Date    DEXA SCAN 11/12/2016 11/12/2013 (Not Clinical)    Override on 11/12/2013: Not Clinically Appropriate (NOrmal in 2010. No repeat recommended.)    Override on 5/12/2010: Done (normal exam.  Repeat not recommended)    Lipid Panel 04/13/2018 4/13/2017    Colonoscopy 08/29/2018 8/29/2017    Override on 3/27/2011: Done    TETANUS VACCINE 06/10/2026 6/10/2016        Orders Placed This Encounter   Procedures    Ambulatory Referral to ENT     The following information is provided to all patients.  This information is to help you find resources for any of the problems found today that may be affecting your health:                Living healthy guide: www.[facility].[place].gov      Understanding Diabetes: www.diabetes.org      Eating healthy: www.cdc.gov/healthyweight      CDC home safety checklist: www.cdc.gov/steadi/patient.html      Agency on Aging: www.goea.[place].[facility]      Alcoholics anonymous (AA): www.aa.org      Physical Activity: www.cody.nih.gov/gt4czcq      Tobacco use: www.quitwithusla.org

## 2018-01-28 NOTE — PROGRESS NOTES
"Elke Laws presented for a  Medicare AWV and comprehensive Health Risk Assessment today. The following components were reviewed and updated:    · Medical history  · Family History  · Social history  · Allergies and Current Medications  · Health Risk Assessment  · Health Maintenance  · Care Team     ** See Completed Assessments for Annual Wellness Visit within the encounter summary.**       The following assessments were completed:  · Living Situation  · CAGE  · Depression Screening  · Timed Get Up and Go  · Whisper Test  · Cognitive Function Screening  · Nutrition Screening  · ADL Screening  · PAQ Screening    Vitals:    01/26/18 0823 01/26/18 0940   BP: (!) 200/78 (!) 128/56   BP Location: Left arm    Patient Position: Sitting    Pulse: 60    Resp: 18    SpO2: 96%    Weight: 86 kg (189 lb 9.5 oz)    Height: 4' 10.25" (1.48 m)      Body mass index is 39.29 kg/m².  Physical Exam   Constitutional: She appears well-developed.   Obese   HENT:   Head: Normocephalic and atraumatic.   Right Ear: Decreased hearing is noted.   Left Ear: Decreased hearing is noted.   Eyes: Conjunctivae are normal. Pupils are equal, round, and reactive to light.   Wears glasses   Neck: Normal range of motion. Neck supple. Decreased carotid pulses present. Carotid bruit is not present.   Decreased carotid pulse bilaterally   Cardiovascular: Normal rate, regular rhythm and intact distal pulses.    Murmur heard.  Pulses:       Carotid pulses are 1+ on the right side, and 1+ on the left side.       Dorsalis pedis pulses are 1+ on the right side, and 1+ on the left side.   Pulmonary/Chest: Effort normal. She has decreased breath sounds in the right middle field, the right lower field, the left middle field and the left lower field.   Abdominal: Soft. Normal appearance and bowel sounds are normal. There is no tenderness.   Musculoskeletal:        Right knee: She exhibits decreased range of motion. No tenderness found.        Left knee: She exhibits " decreased range of motion.        Right ankle: She exhibits decreased range of motion and abnormal pulse.        Left ankle: She exhibits decreased range of motion and abnormal pulse.   Feet:   Right Foot:   Protective Sensation: 6 sites tested. 1 site sensed.  Left Foot:   Protective Sensation: 6 sites tested. 1 site sensed.  Neurological: She is alert. A sensory deficit is present. She exhibits abnormal muscle tone. Gait normal.   Skin: Skin is warm, dry and intact.   Varicosities to BLE, feet cool to touch, loss of sensation in bilateral toes with monofilament test. Pale. Mild bilateral cyanosis. BLE varicosities   Psychiatric: She has a normal mood and affect. Her speech is normal and behavior is normal. Judgment and thought content normal. Cognition and memory are impaired.   Nursing note and vitals reviewed.        Diagnoses and health risks identified today and associated recommendations/orders:    1. Encounter for preventive health examination      2. Atherosclerosis of aorta  CXR 12/30/2016--- Aorta is tortuous with underlying calcification.   Ongoing. Patient taking eliquis, ASA, and Bb. Denies chest pain or shortness off breath. Continue to follow up with cardiology for further evaluation and treatment plan.       3. CKD (chronic kidney disease), stage IV with anemia  Component      Latest Ref Rng & Units 6/28/2017 6/5/2017 5/30/2017               BUN, Bld      8 - 23 mg/dL 52 (H) 57 (H) 54 (H)   Creatinine      0.5 - 1.4 mg/dL 1.7 (H) 3.0 (H) 3.7 (H)   Calcium      8.7 - 10.5 mg/dL 9.1 8.7 8.4 (L)   Anion Gap      8 - 16 mmol/L 9 10 13   eGFR if African American      >60 mL/min/1.73 m:2 31.9 (A) 16.1 (A) 12 (A)   eGFR if non African American      >60 mL/min/1.73 m:2 27.7 (A) 13.9 (A) 11 (A)     Component      Latest Ref Rng & Units 5/30/2017 5/29/2017 5/28/2017 5/27/2017                RBC      4.00 - 5.40 M/uL 3.27 (L) 3.39 (L) 4.25 3.54 (L)   Hemoglobin      12.0 - 16.0 g/dL 9.6 (L) 9.9 (L) 12.8 10.6  (L)   Hematocrit      37.0 - 48.5 % 29.6 (L) 30.4 (L) 38.2 31.5 (L)     Component      Latest Ref Rng & Units 5/25/2017 5/24/2017 5/23/2017 5/22/2017                RBC      4.00 - 5.40 M/uL 3.70 (L) 3.39 (L) 3.69 (L) 3.67 (L)   Hemoglobin      12.0 - 16.0 g/dL 10.7 (L) 10.0 (L) 10.8 (L) 10.8 (L)   Hematocrit      37.0 - 48.5 % 33.2 (L) 31.0 (L) 33.9 (L) 33.5 (L)   CKD- controlled. GFR improved at last BMP. Patient not being followed by nephrology. On lasix. Patient states unaware of any problems with renal function. Advised to avoid NSAIDS. Encouraged to discuss CKD with PCP Continue current treatment plan as previously prescribed with your PCP.     Anemia, unspecified- This problem is currently not controlled. Gradual decrease in Hgb and Hct. Denies bleeding. Please follow up with your PCP as planned to discuss adjustments to your treatment plan.      4. PAF (paroxysmal atrial fibrillation)  Stable and controlled. RRR. On beta blocker, flecainide, and Eliquis. Denies complaints. Continue current treatment plan as previously prescribed with your Cardiology team, Dr. Wilson, , and Dr. Jones.       5. Angina pectoris  Stable and controlled. Denies chest pain. Taking isosorbide. Treatment per cardiology.  Continue current treatment plan as previously prescribed with cardiology.      6. Severe obesity (BMI 35.0-39.9)  Discusssed low fat low cholesterol diet. Patient states she is active in her daily activities as tolerated. Discussed importance of monitoring salt intake and weight due to CHF. Follow up with PCP and cardiology for further evaluation and management.     7. Potential for cognitive impairment  This is a new problem that has been identified during today's visit. Cognitive status score: 4. Patient denies problems with memory. Please follow up with your PCP to discuss the next steps.      8. Chronic diastolic heart failure  CONCLUSIONS     1 - Severe left atrial enlargement.     2 - Concentric  hypertrophy.     3 - No wall motion abnormalities.     4 - Normal left ventricular systolic function (EF 60-65%).     5 - Impaired LV relaxation, elevated LAP (grade 2 diastolic dysfunction).     6 - Normal right ventricular systolic function .     7 - The estimated PA systolic pressure is 28 mmHg.     8 - Trivial aortic regurgitation.     9 - Mild mitral regurgitation.   This document has been electronically    SIGNED BY: Tish Angulo MD On: 04/13/2017 15:52    Office visit 6/28/17 with Dr. Jones for Acute on chronic diastolic congestive heart failure  note showed. PAF, diastolic CHF, non obstructive CAD per C done in , HTN and HLD. She was admitted to [facility] on 5/21/2017 for CHF exacerbation.   Controlled  Patient denies dyspnea at this time. On furosemide, Bb. No ACE or ARB. Has CKD 4. States on fluid and Na+ restricted diet.Continue current treatment plan as previously prescribed by cardiology.       9. Intermittent asthma without complication, unspecified asthma severity  Stable and controlled. Patient denies any recent asthmatic episodes. Has albuterol prn. Continue current treatment plan as previously prescribed with your PCP.     10. Hyperlipidemia, unspecified hyperlipidemia type  Component      Latest Ref Rng & Units 4/13/2017 6/2/2016 5/14/2015 11/6/2014   Cholesterol      120 - 199 mg/dL 123 165 170 154   Triglycerides      30 - 150 mg/dL 64 51 63 64   HDL      40 - 75 mg/dL 59 75 92 (H) 71   LDL Cholesterol      63.0 - 159.0 mg/dL 51.2 (L) 79.8 65.4 70.2   HDL/Chol Ratio      20.0 - 50.0 % 48.0 45.5 54.1 (H) 46.1   Total Cholesterol/HDL Ratio      2.0 - 5.0 2.1 2.2 1.8 (L) 2.2   Non-HDL Cholesterol      mg/dL 64 90 78 83   Stable and controlled. Not on statin. Diet controlled. LFTs WNL Continue current treatment plan as previously prescribed with your cardiologist.    11. Lumbar spondylosis   abdominal xray 8/24/18: Thoracolumbar spondylosis and dextroscoliosis  Stable and controlled. Recent  fall onto knees.  States oain resolved. States tripped over cord at home. Not due to fall risks in home, neuropathy, or loss of balance. Denies pain at this time. Takes Tylenol prn. Continue current treatment plan as previously prescribed with your PCP.       12. Bilateral hearing loss, unspecified hearing loss type  This problem is currently not controlled. Patient denies history of hearing evaluation. Referral made to ENT.  Please follow up with your PCP and keep ENT appt.  as planned to discuss adjustments to your treatment plan.  - Ambulatory Referral to ENT    13. Essential hypertension  Unontrolled. Stability not evident. Blood pressure 200/78 at initiation of visit. However, patient states she had not taken her medication. However, was approximately 30 minutes past the times she states she takes it daily. Patient took all antihypertensive medications. Blood pressure stable prior to discharge from appt after recheck. Follow up with PCP and cardiology for further evaluation and management. .       14. Coronary artery disease involving native coronary artery of native heart without angina pectoris  Cath 6/30/2016---B. Summary/Post-Operative Diagnosis   1.   Mild non-obstructive CAD.   2.   Normal LVEF.                3.  Elevated LVEDP.  Controlled. Patient taking ASA, Eliquis, Bb, and isosorbide. Denies Cp. Continue current treatment plan as previously prescribed with your cardiologist.         15. Osteoarthritis, unspecified osteoarthritis type, unspecified site  Stable and controlled. Takes tylenol prn pain. States mild to moderate when occurs  Continue current treatment plan as previously prescribed with your PCP.       16. Urge incontinence of urine  This problem is currently not controlled. Patient states wears briefs. Denies pain with urination. Please follow up with your PCP as planned to discuss adjustments to your treatment plan.      17. Left ventricular diastolic dysfunction with preserved systolic  function  - See # 8    18. Diverticulosis of large intestine without hemorrhage  Colonoscopy 8/29/17- The examined portion of the ileum was normal.  Severe diverticulosis in the sigmoid colon. Erythema was seen in association with the diverticular opening. Sheryl-diverticular erythema  was seen. Purulent discharge was seen in association with the diverticular opening, suspicious of diverticulitis. Severe diverticulosis in the descending colon, in  the transverse colon and in the ascending colon. There was no evidence of diverticular bleeding. The examination was otherwise normal on direct  and retroflexion views. No specimens collected.  - Follow up in 3 months to assess healing.   Patient has not had follow up colonoscopy. She states she did not recall that she was supposed to have another. Denies any bleeding with bowel movements. Dr. Abdi and Sigifredo's staff notified that patient needs follow up colonoscopy per colonoscopy report to evaluate for healing. Patient notified and verbalized understanding.     19. Decreased pulses in feet  The right ankle brachial index was 1.28 which is normal.   The left ankle brachial index was 1.33 which is abnormal and indicates non-compressible vessels.   The right TBI is 1.17.   The left TBI is 1.16.   This document has been electronically    SIGNED BY: Anand Garcia MD On: 07/01/2015 21:34  This problem is currently not controlled. Patient denies pain at this time. Has decreased sensation in bilateral toes, cool pale toes, varicosities, decreased hair growth, and decreased pedal pulses. CANDICE revealed non-compressible vessels in 2015. No Arterial US of BLE on file or follow up CANDICE. Please follow up with your PCP/ cardiology as planned to discuss adjustments to your treatment plan.    20. Bilateral carotid bruits  This is a new problem that has been identified during today's visit. Taking ASA and Eliquis. Not carotid ultrasound results.  Please follow up with your PCP to discuss  the next steps.            Patient annual eye exam with Dr. Turner past due. Dr Turner staff notified to reach out to patient to schedule annual eye exam. Patient has bilateral cataracts.    Provided Elke with a 5-10 year written screening schedule and personal prevention plan. Recommendations were developed using the USPSTF age appropriate recommendations. Education, counseling, and referrals were provided as needed. After Visit Summary printed and given to patient which includes a list of additional screenings\tests needed.    Follow-up in about 1 year (around 1/26/2019) for HRA.    Madelyn Graham NP

## 2018-01-29 ENCOUNTER — OFFICE VISIT (OUTPATIENT)
Dept: OPHTHALMOLOGY | Facility: CLINIC | Age: 83
End: 2018-01-29
Payer: MEDICARE

## 2018-01-29 ENCOUNTER — OFFICE VISIT (OUTPATIENT)
Dept: INTERNAL MEDICINE | Facility: CLINIC | Age: 83
End: 2018-01-29
Payer: MEDICARE

## 2018-01-29 VITALS
DIASTOLIC BLOOD PRESSURE: 70 MMHG | TEMPERATURE: 97 F | BODY MASS INDEX: 39.61 KG/M2 | HEART RATE: 57 BPM | SYSTOLIC BLOOD PRESSURE: 130 MMHG | HEIGHT: 58 IN | OXYGEN SATURATION: 96 % | WEIGHT: 188.69 LBS

## 2018-01-29 DIAGNOSIS — R09.89 BILATERAL CAROTID BRUITS: ICD-10-CM

## 2018-01-29 DIAGNOSIS — Z00.00 ROUTINE GENERAL MEDICAL EXAMINATION AT A HEALTH CARE FACILITY: Primary | ICD-10-CM

## 2018-01-29 DIAGNOSIS — M19.90 OSTEOARTHRITIS, UNSPECIFIED OSTEOARTHRITIS TYPE, UNSPECIFIED SITE: ICD-10-CM

## 2018-01-29 DIAGNOSIS — H91.93 BILATERAL HEARING LOSS, UNSPECIFIED HEARING LOSS TYPE: ICD-10-CM

## 2018-01-29 DIAGNOSIS — E78.5 HYPERLIPIDEMIA, UNSPECIFIED HYPERLIPIDEMIA TYPE: ICD-10-CM

## 2018-01-29 DIAGNOSIS — Z13.5 GLAUCOMA SCREENING: ICD-10-CM

## 2018-01-29 DIAGNOSIS — N39.41 URGE INCONTINENCE OF URINE: ICD-10-CM

## 2018-01-29 DIAGNOSIS — I10 ESSENTIAL HYPERTENSION: ICD-10-CM

## 2018-01-29 DIAGNOSIS — I51.89 LEFT VENTRICULAR DIASTOLIC DYSFUNCTION WITH PRESERVED SYSTOLIC FUNCTION: ICD-10-CM

## 2018-01-29 DIAGNOSIS — E66.01 SEVERE OBESITY (BMI 35.0-39.9): ICD-10-CM

## 2018-01-29 DIAGNOSIS — H35.30 MACULAR DEGENERATION (SENILE) OF RETINA: ICD-10-CM

## 2018-01-29 DIAGNOSIS — I50.32 CHRONIC DIASTOLIC HEART FAILURE: ICD-10-CM

## 2018-01-29 DIAGNOSIS — I48.0 PAF (PAROXYSMAL ATRIAL FIBRILLATION): ICD-10-CM

## 2018-01-29 DIAGNOSIS — R09.89 DECREASED PULSES IN FEET: ICD-10-CM

## 2018-01-29 DIAGNOSIS — H25.13 CATARACT, NUCLEAR SCLEROTIC SENILE, BILATERAL: Primary | ICD-10-CM

## 2018-01-29 DIAGNOSIS — H52.4 PRESBYOPIA: ICD-10-CM

## 2018-01-29 DIAGNOSIS — Z91.89 POTENTIAL FOR COGNITIVE IMPAIRMENT: ICD-10-CM

## 2018-01-29 DIAGNOSIS — J45.20 INTERMITTENT ASTHMA WITHOUT COMPLICATION, UNSPECIFIED ASTHMA SEVERITY: ICD-10-CM

## 2018-01-29 DIAGNOSIS — N18.4 CKD (CHRONIC KIDNEY DISEASE), STAGE IV: ICD-10-CM

## 2018-01-29 DIAGNOSIS — I70.0 ATHEROSCLEROSIS OF AORTA: ICD-10-CM

## 2018-01-29 DIAGNOSIS — K57.30 DIVERTICULOSIS OF LARGE INTESTINE WITHOUT HEMORRHAGE: ICD-10-CM

## 2018-01-29 DIAGNOSIS — M47.816 LUMBAR SPONDYLOSIS: ICD-10-CM

## 2018-01-29 DIAGNOSIS — H25.013 CATARACT CORTICAL, SENILE, BILATERAL: ICD-10-CM

## 2018-01-29 DIAGNOSIS — I25.10 CORONARY ARTERY DISEASE INVOLVING NATIVE CORONARY ARTERY OF NATIVE HEART WITHOUT ANGINA PECTORIS: ICD-10-CM

## 2018-01-29 DIAGNOSIS — I20.9 ANGINA PECTORIS: ICD-10-CM

## 2018-01-29 DIAGNOSIS — H52.203 HYPEROPIC ASTIGMATISM OF BOTH EYES: ICD-10-CM

## 2018-01-29 DIAGNOSIS — D64.9 ANEMIA, UNSPECIFIED TYPE: ICD-10-CM

## 2018-01-29 PROCEDURE — 92015 DETERMINE REFRACTIVE STATE: CPT | Mod: S$GLB,,, | Performed by: OPTOMETRIST

## 2018-01-29 PROCEDURE — 99999 PR PBB SHADOW E&M-EST. PATIENT-LVL IV: CPT | Mod: PBBFAC,,, | Performed by: FAMILY MEDICINE

## 2018-01-29 PROCEDURE — 99397 PER PM REEVAL EST PAT 65+ YR: CPT | Mod: S$GLB,,, | Performed by: FAMILY MEDICINE

## 2018-01-29 PROCEDURE — 92014 COMPRE OPH EXAM EST PT 1/>: CPT | Mod: S$GLB,,, | Performed by: OPTOMETRIST

## 2018-01-29 PROCEDURE — 99999 PR PBB SHADOW E&M-EST. PATIENT-LVL I: CPT | Mod: PBBFAC,,, | Performed by: OPTOMETRIST

## 2018-01-29 PROCEDURE — 99499 UNLISTED E&M SERVICE: CPT | Mod: S$GLB,,, | Performed by: FAMILY MEDICINE

## 2018-01-29 NOTE — PROGRESS NOTES
HPI     Last [facility] exam 10/12/2016  Cataract, nuclear, bilateral  Screening for glaucoma  RE    Last edited by George Laws MA on 1/29/2018  2:09 PM. (History)            Assessment /Plan     For exam results, see Encounter Report.    Cataract, nuclear sclerotic senile, bilateral    Cataract cortical, senile, bilateral    Glaucoma screening    Hyperopic astigmatism of both eyes    Presbyopia      Moderate NS cataracts OU = discussed CE evaluation versus changing specs.  She is to decide.  Mild ARMD OU = will follow.  OH OK OU otherwise.  Spec Rx given.  RTC one year or prn for CE evaluation with Wang.

## 2018-01-29 NOTE — PROGRESS NOTES
Subjective:       Patient ID: Elke Laws is a 83 y.o. female.    Chief Complaint: Follow-up (review HRA)    Patient presents to clinic today for annual physical exam/review HRA.      Review of Systems   Constitutional: Negative for chills, fatigue, fever and unexpected weight change.   HENT: Negative for congestion, dental problem, ear pain, hearing loss, rhinorrhea and trouble swallowing.    Eyes: Negative for pain and visual disturbance.   Respiratory: Negative for cough and shortness of breath.    Cardiovascular: Negative for chest pain, palpitations and leg swelling.   Gastrointestinal: Negative for abdominal distention, abdominal pain, blood in stool, constipation, diarrhea, nausea and vomiting.   Genitourinary: Negative for difficulty urinating and vaginal discharge.   Musculoskeletal: Negative for arthralgias and myalgias.   Skin: Negative for rash.   Neurological: Negative for dizziness, weakness, numbness and headaches.   Hematological: Negative for adenopathy. Does not bruise/bleed easily.   Psychiatric/Behavioral: Negative for dysphoric mood and sleep disturbance. The patient is not nervous/anxious.        Objective:      Physical Exam   Constitutional: She is oriented to person, place, and time. Vital signs are normal. She appears well-developed and well-nourished. No distress.   HENT:   Head: Normocephalic and atraumatic.   Right Ear: Tympanic membrane, external ear and ear canal normal.   Left Ear: Tympanic membrane, external ear and ear canal normal.   Nose: Nose normal. No mucosal edema or rhinorrhea.   Mouth/Throat: Uvula is midline, oropharynx is clear and moist and mucous membranes are normal.   Eyes: Conjunctivae, EOM and lids are normal. Pupils are equal, round, and reactive to light. No scleral icterus.   Neck: Normal range of motion. Neck supple. No thyromegaly present.   Cardiovascular: Normal rate and regular rhythm.  Exam reveals no gallop and no friction rub.    No murmur  heard.  Pulmonary/Chest: Effort normal and breath sounds normal. She has no wheezes. She has no rhonchi. She has no rales.   Abdominal: Soft. Normal appearance and bowel sounds are normal. She exhibits no distension and no mass. There is no hepatosplenomegaly. There is no tenderness.   Musculoskeletal: Normal range of motion.   Lymphadenopathy:     She has no cervical adenopathy.   Neurological: She is alert and oriented to person, place, and time. She has normal strength. No cranial nerve deficit or sensory deficit. Gait normal.   Reflex Scores:       Patellar reflexes are 2+ on the right side and 2+ on the left side.  Skin: Skin is warm and dry. No lesion and no rash noted. No cyanosis. Nails show no clubbing.   Psychiatric: She has a normal mood and affect.   Vitals reviewed.      Assessment:       1. Routine general medical examination at a health care facility    2. Atherosclerosis of aorta    3. CKD (chronic kidney disease), stage IV    4. PAF (paroxysmal atrial fibrillation)    5. Angina pectoris    6. Severe obesity (BMI 35.0-39.9)    7. Potential for cognitive impairment    8. Chronic diastolic heart failure    9. Intermittent asthma without complication, unspecified asthma severity    10. Hyperlipidemia, unspecified hyperlipidemia type    11. Lumbar spondylosis    12. Bilateral hearing loss, unspecified hearing loss type    13. Essential hypertension    14. Coronary artery disease involving native coronary artery of native heart without angina pectoris    15. Osteoarthritis, unspecified osteoarthritis type, unspecified site    16. Urge incontinence of urine    17. Left ventricular diastolic dysfunction with preserved systolic function    18. Diverticulosis of large intestine without hemorrhage    19. Decreased pulses in feet    20. Bilateral carotid bruits    21. Anemia, unspecified type        Plan:     Problem List Items Addressed This Visit     Hypertension (Chronic)    Current Assessment & Plan      Controlled on current meds         Relevant Orders    Comprehensive metabolic panel (Completed)    CBC auto differential (Completed)    TSH (Completed)    Hyperlipidemia (Chronic)    Current Assessment & Plan     Status pending labs, continue current meds         Relevant Orders    Lipid panel (Completed)    Asthma (Chronic)    Current Assessment & Plan     Reports occasional allergic symptoms, reports rare use of albuterol         Atherosclerosis of aorta (Chronic)    Overview     CXR 12/30/2016--- Aorta is tortuous with underlying calcification.     Xray chest 5/2015---Stable aortic tortuosity and atherosclerosis.   Patient also has noncompressible vascular abnormalities on CANDICE exam of the left lower extremity consistent with atherosclerosis.  CANDICE done on 7/1/2015.         PAF (paroxysmal atrial fibrillation) (Chronic)    Overview     12/2013 and 5/2015-- Sxc with DIAZ-- now in nSR (7/2015)         Current Assessment & Plan     Followed by Cardiology         Chronic diastolic heart failure (Chronic)    Overview     CONCLUSIONS     1 - Severe left atrial enlargement.     2 - Concentric hypertrophy.     3 - No wall motion abnormalities.     4 - Normal left ventricular systolic function (EF 60-65%).     5 - Impaired LV relaxation, elevated LAP (grade 2 diastolic dysfunction).     6 - Normal right ventricular systolic function .     7 - The estimated PA systolic pressure is 28 mmHg.     8 - Trivial aortic regurgitation.     9 - Mild mitral regurgitation.   This document has been electronically    SIGNED BY: Tish Angulo MD On: 04/13/2017 15:52    Office visit 6/28/17 with Dr. Jones for Acute on chronic diastolic congestive heart failure  note showed. PAF, diastolic CHF, non obstructive CAD per [facility] done in , HTN and HLD. She was admitted to [facility] [place] on [place]/2017 for CHF exacerbation.          Current Assessment & Plan     Followed by Cardiology         Left ventricular diastolic dysfunction with preserved systolic  function (Chronic)    Overview     Echo 6/16/2015---CONCLUSIONS     1 - Normal left ventricular systolic function (EF 60-65%).     2 - Left ventricular diastolic dysfunction.     3 - Normal right ventricular systolic function .                Current Assessment & Plan     Followed by Cardiology         Coronary artery disease involving native coronary artery of native heart without angina pectoris (Chronic)    Overview     Cath 6/30/2016---B. Summary/Post-Operative Diagnosis   1.   Mild non-obstructive CAD.   2.   Normal LVEF.                3.  Elevated LVEDP.         Current Assessment & Plan     Followed by Cardiology         Diverticulosis of large intestine without hemorrhage (Chronic)    Overview     Colonoscopy not recommended per GI unless patient is symptomatic; she is without problems at this time         Relevant Orders    Ambulatory referral to Gastroenterology    Severe obesity (BMI 35.0-39.9)    Current Assessment & Plan     Encouraged healthy diet to improve weight         Lumbar spondylosis    Overview      abdominal xray 8/24/18: Thoracolumbar spondylosis and dextroscoliosis         Current Assessment & Plan     stable         Osteoarthritis    Current Assessment & Plan     Stable          Angina pectoris    Current Assessment & Plan     Followed by Cardiology           CKD (chronic kidney disease), stage IV    Relevant Orders    Ambulatory referral to Nephrology    Urge incontinence of urine    Current Assessment & Plan     Discussed risks of bladder control meds; she will continue briefs         Bilateral hearing loss    Current Assessment & Plan     Upcoming appointments with ENT and Audiology         Decreased pulses in feet    Current Assessment & Plan     Feet are cool to touch, will repeat CANDICE, last CANDICE done 2015         Relevant Orders    Cardiology Lab CANDICE Resting, Lower Extremities    Bilateral carotid bruits    Current Assessment & Plan     Carotid ultrasound ordered to assess further          Relevant Orders    US Carotid Bilateral    Anemia    Current Assessment & Plan     Status pending labs             Other Visit Diagnoses     Routine general medical examination at a health care facility    -  Primary    Potential for cognitive impairment        patient denies significant problems, she reports having trouble with clock drawing task          Health Maintenance reviewed/updated.  HRA reviewed.

## 2018-01-30 ENCOUNTER — TELEPHONE (OUTPATIENT)
Dept: GASTROENTEROLOGY | Facility: CLINIC | Age: 83
End: 2018-01-30

## 2018-01-30 NOTE — TELEPHONE ENCOUNTER
"----- Message from Ananda Mei III, MD sent at 1/29/2018  5:57 PM CST -----  Considering this patient's age and history of multiple medical problems and the fact that she has already had her colon looked at and has no other abnormalities and she has been treated; I would not repeat this colon evaluation unless the patient bhs new issues and then I would go to CT 1st. If there is a problem with Jin I will discuss with him. Thanks, GH  ----- Message -----  From: Aminta Sheriff MA  Sent: 1/29/2018   7:49 AM  To: Ananda Mei III, MD        ----- Message -----  From: Madelyn Graham NP  Sent: 1/28/2018  12:49 PM  To: Jin Whitt IIIInocente Laws had her HRA 1/26/17. Upon review of notes, I found that the patient was to have a follow up colonoscopy 3 months after the colonoscopy 8/29/17  from the report from Dr. Abdi dated 8/29/17 . She was unaware she was due for this procedure. I know that she is past due if this is the case. Please schedule patient for follow up procedure to check for wound healing per excerpt from note below. Patient was made aware of report. Thanks much!      " The examined portion of the ileum was normal.                        - Severe diverticulosis in the sigmoid colon.                         Erythema was seen in association with the                         diverticular opening. Sheryl-diverticular erythema                         was seen. Purulent discharge was seen in                         association with the diverticular opening,                         suspicious of diverticulitis.   -Repeat colonoscopy in 3 months to check healing."Toño Abdi MD  8/29/2017 8:01:48 AM  This report has been verified and signed electronically.    "

## 2018-01-30 NOTE — TELEPHONE ENCOUNTER
Spoke with patient and she is not having any problems, the only situation is constipation and she is taking something for this and it is working.

## 2018-01-31 ENCOUNTER — LAB VISIT (OUTPATIENT)
Dept: LAB | Facility: HOSPITAL | Age: 83
End: 2018-01-31
Attending: FAMILY MEDICINE
Payer: MEDICARE

## 2018-01-31 ENCOUNTER — OFFICE VISIT (OUTPATIENT)
Dept: OTOLARYNGOLOGY | Facility: CLINIC | Age: 83
End: 2018-01-31
Payer: MEDICARE

## 2018-01-31 ENCOUNTER — CLINICAL SUPPORT (OUTPATIENT)
Dept: AUDIOLOGY | Facility: CLINIC | Age: 83
End: 2018-01-31
Payer: MEDICARE

## 2018-01-31 VITALS
DIASTOLIC BLOOD PRESSURE: 65 MMHG | BODY MASS INDEX: 39.61 KG/M2 | WEIGHT: 188.69 LBS | HEIGHT: 58 IN | HEART RATE: 67 BPM | SYSTOLIC BLOOD PRESSURE: 140 MMHG | TEMPERATURE: 97 F

## 2018-01-31 DIAGNOSIS — H93.11 TINNITUS OF RIGHT EAR: ICD-10-CM

## 2018-01-31 DIAGNOSIS — H90.3 SENSORINEURAL HEARING LOSS (SNHL) OF BOTH EARS: Primary | ICD-10-CM

## 2018-01-31 DIAGNOSIS — H93.11 TINNITUS, RIGHT: ICD-10-CM

## 2018-01-31 DIAGNOSIS — I10 ESSENTIAL HYPERTENSION: ICD-10-CM

## 2018-01-31 DIAGNOSIS — E78.5 HYPERLIPIDEMIA, UNSPECIFIED HYPERLIPIDEMIA TYPE: ICD-10-CM

## 2018-01-31 LAB
ALBUMIN SERPL BCP-MCNC: 3 G/DL
ALP SERPL-CCNC: 109 U/L
ALT SERPL W/O P-5'-P-CCNC: 10 U/L
ANION GAP SERPL CALC-SCNC: 10 MMOL/L
AST SERPL-CCNC: 18 U/L
BASOPHILS # BLD AUTO: 0.03 K/UL
BASOPHILS NFR BLD: 0.6 %
BILIRUB SERPL-MCNC: 0.5 MG/DL
BUN SERPL-MCNC: 26 MG/DL
CALCIUM SERPL-MCNC: 9.8 MG/DL
CHLORIDE SERPL-SCNC: 102 MMOL/L
CHOLEST SERPL-MCNC: 147 MG/DL
CHOLEST/HDLC SERPL: 2.2 {RATIO}
CO2 SERPL-SCNC: 29 MMOL/L
CREAT SERPL-MCNC: 1.1 MG/DL
DIFFERENTIAL METHOD: ABNORMAL
EOSINOPHIL # BLD AUTO: 0.2 K/UL
EOSINOPHIL NFR BLD: 3.9 %
ERYTHROCYTE [DISTWIDTH] IN BLOOD BY AUTOMATED COUNT: 15.4 %
EST. GFR  (AFRICAN AMERICAN): 53.7 ML/MIN/1.73 M^2
EST. GFR  (NON AFRICAN AMERICAN): 46.5 ML/MIN/1.73 M^2
GLUCOSE SERPL-MCNC: 103 MG/DL
HCT VFR BLD AUTO: 35.8 %
HDLC SERPL-MCNC: 66 MG/DL
HDLC SERPL: 44.9 %
HGB BLD-MCNC: 10.3 G/DL
IMM GRANULOCYTES # BLD AUTO: 0.02 K/UL
IMM GRANULOCYTES NFR BLD AUTO: 0.4 %
LDLC SERPL CALC-MCNC: 69 MG/DL
LYMPHOCYTES # BLD AUTO: 1.6 K/UL
LYMPHOCYTES NFR BLD: 30.2 %
MCH RBC QN AUTO: 25.6 PG
MCHC RBC AUTO-ENTMCNC: 28.8 G/DL
MCV RBC AUTO: 89 FL
MONOCYTES # BLD AUTO: 0.7 K/UL
MONOCYTES NFR BLD: 13.4 %
NEUTROPHILS # BLD AUTO: 2.8 K/UL
NEUTROPHILS NFR BLD: 51.5 %
NONHDLC SERPL-MCNC: 81 MG/DL
NRBC BLD-RTO: 0 /100 WBC
PLATELET # BLD AUTO: 236 K/UL
PMV BLD AUTO: 11.9 FL
POTASSIUM SERPL-SCNC: 5.2 MMOL/L
PROT SERPL-MCNC: 7.1 G/DL
RBC # BLD AUTO: 4.03 M/UL
SODIUM SERPL-SCNC: 141 MMOL/L
TRIGL SERPL-MCNC: 60 MG/DL
TSH SERPL DL<=0.005 MIU/L-ACNC: 2.21 UIU/ML
WBC # BLD AUTO: 5.36 K/UL

## 2018-01-31 PROCEDURE — 36415 COLL VENOUS BLD VENIPUNCTURE: CPT

## 2018-01-31 PROCEDURE — 3008F BODY MASS INDEX DOCD: CPT | Mod: S$GLB,,, | Performed by: PHYSICIAN ASSISTANT

## 2018-01-31 PROCEDURE — 99999 PR PBB SHADOW E&M-EST. PATIENT-LVL IV: CPT | Mod: PBBFAC,,, | Performed by: PHYSICIAN ASSISTANT

## 2018-01-31 PROCEDURE — 92567 TYMPANOMETRY: CPT | Mod: S$GLB,,, | Performed by: AUDIOLOGIST-HEARING AID FITTER

## 2018-01-31 PROCEDURE — 80053 COMPREHEN METABOLIC PANEL: CPT

## 2018-01-31 PROCEDURE — 99204 OFFICE O/P NEW MOD 45 MIN: CPT | Mod: S$GLB,,, | Performed by: PHYSICIAN ASSISTANT

## 2018-01-31 PROCEDURE — 1159F MED LIST DOCD IN RCRD: CPT | Mod: S$GLB,,, | Performed by: PHYSICIAN ASSISTANT

## 2018-01-31 PROCEDURE — 92557 COMPREHENSIVE HEARING TEST: CPT | Mod: S$GLB,,, | Performed by: AUDIOLOGIST-HEARING AID FITTER

## 2018-01-31 PROCEDURE — 80061 LIPID PANEL: CPT

## 2018-01-31 PROCEDURE — 85025 COMPLETE CBC W/AUTO DIFF WBC: CPT

## 2018-01-31 PROCEDURE — 1126F AMNT PAIN NOTED NONE PRSNT: CPT | Mod: S$GLB,,, | Performed by: PHYSICIAN ASSISTANT

## 2018-01-31 PROCEDURE — 84443 ASSAY THYROID STIM HORMONE: CPT

## 2018-01-31 NOTE — PROGRESS NOTES
Elke Laws was seen 01/31/2018 for an audiological evaluation.  Patient complains of tinnitus in her right ear over the past six months.  She describes a buzzing sound, most aware at night going to be or when first waking in morning.  It does not cause sleep disturbance. She also reports bilateral progressive hearing loss for a few years, noting poorer hearing in her right ear.  She denies dizziness.  No otalgia or drainage.  No family history of hearing loss; she is the only one with hearing problems.      Results reveal a moderate sensorineural hearing loss 250-8000 Hz for the right ear, and  moderate sensorineural hearing loss 250-8000 Hz for the left ear.   Speech Reception Thresholds were  55 dBHL for the right ear and 35 dBHL for the left ear.   Word recognition scores were fair for the right ear and good for the left ear.   Tympanograms were Type As for the right ear and Type As for the left ear.    Patient was counseled on the above findings.    Recommendations:  1. ENT  2. Recheck per ENT or annual to monitor asymmetry in hearing  3. Hearing aids, binaural, pending medical clearance.  Information packet was provided.  Patient with check insurance [employer] benefits. She was provided a copy of audiogram.

## 2018-01-31 NOTE — PROGRESS NOTES
"Subjective:       Patient ID: Elke Laws is a 83 y.o. female.    Chief Complaint: Tinnitus    Patient is a very pleasant 83 year old female here to see me today for the first time for evaluation of tinnitus in the RIGHT ear for the past 6+ months.  She usually notices it at night while in bed.  She describes a hissing sound, "like a snake."  She reports hearing loss that has been gradually progressing over the last several years.  She has noted a difference in hearing between the ears, with the left ear being the better hearing ear.  She has not had any recent issues with ear pain or ear drainage.  She denies a family history of hearing loss, and has not had any previous otologic surgery.  She denies any history of significant loud noise exposure. She denies issues with dizziness.        Review of Systems   Constitutional: Negative for fever.   HENT: Positive for hearing loss, rhinorrhea and tinnitus. Negative for ear discharge, ear pain, postnasal drip, sinus pain, sinus pressure and trouble swallowing.    Eyes: Positive for visual disturbance (left eye seems blurry lately). Negative for discharge.   Respiratory: Negative for shortness of breath.    Cardiovascular: Negative for chest pain.   Gastrointestinal: Negative for vomiting.   Musculoskeletal: Positive for arthralgias, back pain and gait problem (uses cane).   Neurological: Negative for dizziness, light-headedness and headaches.   Psychiatric/Behavioral: Negative for confusion.       Objective:      Physical Exam   Constitutional: She is oriented to person, place, and time. She appears well-developed and well-nourished. She is cooperative. No distress.   HENT:   Head: Normocephalic and atraumatic.   Right Ear: Tympanic membrane, external ear and ear canal normal. No middle ear effusion. Decreased hearing is noted.   Left Ear: Tympanic membrane, external ear and ear canal normal.  No middle ear effusion. Decreased hearing is noted.   Nose: Nose normal. No " mucosal edema, rhinorrhea, nasal deformity or septal deviation. No epistaxis. Right sinus exhibits no maxillary sinus tenderness and no frontal sinus tenderness. Left sinus exhibits no maxillary sinus tenderness and no frontal sinus tenderness.   Mouth/Throat: Uvula is midline, oropharynx is clear and moist and mucous membranes are normal. Mucous membranes are not pale and not dry. She has dentures. No oropharyngeal exudate or posterior oropharyngeal erythema.   Eyes: Conjunctivae, EOM and lids are normal. Pupils are equal, round, and reactive to light. Right eye exhibits no chemosis. Left eye exhibits no chemosis. Right conjunctiva is not injected. Left conjunctiva is not injected. No scleral icterus. Right eye exhibits normal extraocular motion and no nystagmus. Left eye exhibits normal extraocular motion and no nystagmus.   eyeglasses   Neck: Trachea normal and phonation normal. No tracheal tenderness present. No tracheal deviation present. No thyroid mass and no thyromegaly present.   Cardiovascular: Intact distal pulses.    Pulmonary/Chest: Effort normal. No stridor. No respiratory distress.   Abdominal: She exhibits no distension.   Lymphadenopathy:        Head (right side): No submental, no submandibular, no preauricular and no posterior auricular adenopathy present.        Head (left side): No submental, no submandibular, no preauricular and no posterior auricular adenopathy present.     She has no cervical adenopathy.   Neurological: She is alert and oriented to person, place, and time. No cranial nerve deficit.   Skin: Skin is warm and dry. No rash noted. No erythema.   Psychiatric: She has a normal mood and affect. Her behavior is normal.         AUDIOGRAM:  Results reveal a moderate sensorineural hearing loss 250-8000 Hz for the right ear, and  moderate sensorineural hearing loss 250-8000 Hz for the left ear.   Speech Reception Thresholds were  55 dBHL for the right ear and 35 dBHL for the left ear.    Word recognition scores were fair for the right ear and good for the left ear.   Tympanograms were Type As for the right ear and Type As for the left ear.          Assessment:       1. Sensorineural hearing loss (SNHL) of both ears    2. Tinnitus of right ear        Plan:          1.  SNHL:  We reviewed the patient's recent audiogram and hearing loss in detail.  Recommend repeat audiogram in six months to monitor her asymmetry and if stable or worsens, would recommend MRI IAC to evaluate for retrocochlear lesions.  We also discussed that she is a good candidate for hearing aids, if and when she the patient is motivated.  She was given handouts with information and pricing of hearing aids, and will contact audiology when ready to proceed.  Patient will check insurance [employer] benefits. She was provided a copy of audiogram.  We also discussed the use hearing protection when exposed to loud noise, including lawn equipment.   2.  Tinnitus:  We reviewed her audiogram together in detail.  We also discussed that tinnitus is most often caused by a hearing loss, and that as the hair cells are damaged, either genetic or as a result of loud noise exposure, they then cause tinnitus.  Some patients find that restricting the salt or caffeine in their diet helps, and there is also an OTC supplement, lipoflavinoids, that some people find to be effective though their benefit is not fully proven.  Tinnitus tends to be louder in times of stress and fatigue, and may decrease with time.  Sound machines may also be an effective masking technique if needed at night.

## 2018-01-31 NOTE — LETTER
January 31, 2018      Madelyn Graham, KOSTAS  [address] [facility] [address]           [facility]  [address] [place], [place] [address]on [facility] [address]  Phone: [phone]  Fax: [phone]          Patient: Elke Laws   MR Number: [id number]   YOB: 1934   Date of Visit: 1/31/2018       Dear Madelyn Graham:    Thank you for referring Elke Laws to me for evaluation. Attached you will find relevant portions of my assessment and plan of care.    If you have questions, please do not hesitate to call me. I look forward to following Elke Laws along with you.    Sincerely,    Jennifer Wilson PA-C    Enclosure  CC:  No Recipients    If you would like to receive this communication electronically, please contact [email or web address] or [phone] to request more information on [employer] Link access.    For providers and/or their staff who would like to refer a patient to [facility], please contact us through our one-stop-shop provider referral line, [facility], at [phone].    If you feel you have received this communication in error or would no longer like to receive these types of communications, please e-mail [email or web address]

## 2018-02-04 ENCOUNTER — TELEPHONE (OUTPATIENT)
Dept: INTERNAL MEDICINE | Facility: CLINIC | Age: 83
End: 2018-02-04

## 2018-02-04 DIAGNOSIS — E87.5 HYPERKALEMIA: Primary | ICD-10-CM

## 2018-02-04 NOTE — TELEPHONE ENCOUNTER
Labs are relatively stable. Her anemia and kidney function have improved. Her potassium is a little elevated, this needs to be rechecked.

## 2018-02-08 RX ORDER — APIXABAN 5 MG/1
TABLET, FILM COATED ORAL
Qty: 60 TABLET | Refills: 6 | Status: SHIPPED | OUTPATIENT
Start: 2018-02-08 | End: 2019-04-01 | Stop reason: SDUPTHER

## 2018-02-13 ENCOUNTER — TELEPHONE (OUTPATIENT)
Dept: INTERNAL MEDICINE | Facility: CLINIC | Age: 83
End: 2018-02-13

## 2018-02-13 NOTE — TELEPHONE ENCOUNTER
----- Message from Kaci Keating sent at 2/13/2018  9:26 AM CST -----  Contact: Pt  Pt called and stated she needed to speak to the nurse. She stated that she needs to know if she needs to bring in a urine sample to the office tomorrow. She can be reached at [phone] (home) [phone] (work).    Thanks,  Tf

## 2018-02-14 ENCOUNTER — LAB VISIT (OUTPATIENT)
Dept: LAB | Facility: HOSPITAL | Age: 83
End: 2018-02-14
Attending: FAMILY MEDICINE
Payer: MEDICARE

## 2018-02-14 DIAGNOSIS — E87.5 HYPERKALEMIA: ICD-10-CM

## 2018-02-14 LAB — POTASSIUM SERPL-SCNC: 5.6 MMOL/L

## 2018-02-14 PROCEDURE — 36415 COLL VENOUS BLD VENIPUNCTURE: CPT

## 2018-02-14 PROCEDURE — 84132 ASSAY OF SERUM POTASSIUM: CPT

## 2018-02-16 ENCOUNTER — TELEPHONE (OUTPATIENT)
Dept: INTERNAL MEDICINE | Facility: CLINIC | Age: 83
End: 2018-02-16

## 2018-02-16 DIAGNOSIS — E87.5 HYPERKALEMIA: Primary | ICD-10-CM

## 2018-02-16 NOTE — TELEPHONE ENCOUNTER
Please notify patient that her potassium is still elevated. Referral entered in January for her to establish with Nephrology, please assist with scheduling. If they can't see her next week, we need to recheck potassium. If she has any problems in the meantime, she should seek medical attention.

## 2018-02-21 ENCOUNTER — LAB VISIT (OUTPATIENT)
Dept: LAB | Facility: HOSPITAL | Age: 83
End: 2018-02-21
Attending: FAMILY MEDICINE
Payer: MEDICARE

## 2018-02-21 DIAGNOSIS — E87.5 HYPERKALEMIA: ICD-10-CM

## 2018-02-21 LAB
ANION GAP SERPL CALC-SCNC: 8 MMOL/L
BUN SERPL-MCNC: 31 MG/DL
CALCIUM SERPL-MCNC: 9.4 MG/DL
CHLORIDE SERPL-SCNC: 104 MMOL/L
CO2 SERPL-SCNC: 30 MMOL/L
CREAT SERPL-MCNC: 1.1 MG/DL
EST. GFR  (AFRICAN AMERICAN): 53.7 ML/MIN/1.73 M^2
EST. GFR  (NON AFRICAN AMERICAN): 46.5 ML/MIN/1.73 M^2
GLUCOSE SERPL-MCNC: 87 MG/DL
POTASSIUM SERPL-SCNC: 4.7 MMOL/L
SODIUM SERPL-SCNC: 142 MMOL/L

## 2018-02-21 PROCEDURE — 36415 COLL VENOUS BLD VENIPUNCTURE: CPT

## 2018-02-21 PROCEDURE — 80048 BASIC METABOLIC PNL TOTAL CA: CPT

## 2018-02-23 ENCOUNTER — TELEPHONE (OUTPATIENT)
Dept: INTERNAL MEDICINE | Facility: CLINIC | Age: 83
End: 2018-02-23

## 2018-02-23 NOTE — TELEPHONE ENCOUNTER
----- Message from Isidra Benavidez MD sent at 2/23/2018 11:51 AM CST -----  Stable lab, potassium normal. Keep appointment with Dr. Solis in Nephrology.

## 2018-03-01 ENCOUNTER — OFFICE VISIT (OUTPATIENT)
Dept: NEPHROLOGY | Facility: CLINIC | Age: 83
End: 2018-03-01
Payer: MEDICARE

## 2018-03-01 ENCOUNTER — LAB VISIT (OUTPATIENT)
Dept: LAB | Facility: HOSPITAL | Age: 83
End: 2018-03-01
Attending: INTERNAL MEDICINE
Payer: MEDICARE

## 2018-03-01 VITALS
DIASTOLIC BLOOD PRESSURE: 65 MMHG | SYSTOLIC BLOOD PRESSURE: 158 MMHG | WEIGHT: 189.63 LBS | BODY MASS INDEX: 40.91 KG/M2 | HEIGHT: 57 IN | HEART RATE: 51 BPM

## 2018-03-01 DIAGNOSIS — N18.30 CKD (CHRONIC KIDNEY DISEASE), STAGE III: Primary | ICD-10-CM

## 2018-03-01 DIAGNOSIS — N39.0 RECURRENT UTI (URINARY TRACT INFECTION): ICD-10-CM

## 2018-03-01 DIAGNOSIS — N17.9 AKI (ACUTE KIDNEY INJURY): ICD-10-CM

## 2018-03-01 DIAGNOSIS — I10 ESSENTIAL HYPERTENSION: Chronic | ICD-10-CM

## 2018-03-01 DIAGNOSIS — N18.30 STAGE 3 CHRONIC KIDNEY DISEASE: Primary | ICD-10-CM

## 2018-03-01 DIAGNOSIS — N18.30 STAGE 3 CHRONIC KIDNEY DISEASE: ICD-10-CM

## 2018-03-01 LAB — PROCALCITONIN SERPL IA-MCNC: 0.03 NG/ML

## 2018-03-01 PROCEDURE — 99499 UNLISTED E&M SERVICE: CPT | Mod: S$GLB,,, | Performed by: INTERNAL MEDICINE

## 2018-03-01 PROCEDURE — 84145 PROCALCITONIN (PCT): CPT

## 2018-03-01 PROCEDURE — 99999 PR PBB SHADOW E&M-EST. PATIENT-LVL III: CPT | Mod: PBBFAC,,, | Performed by: INTERNAL MEDICINE

## 2018-03-01 PROCEDURE — 99203 OFFICE O/P NEW LOW 30 MIN: CPT | Mod: S$GLB,,, | Performed by: INTERNAL MEDICINE

## 2018-03-01 PROCEDURE — 3078F DIAST BP <80 MM HG: CPT | Mod: S$GLB,,, | Performed by: INTERNAL MEDICINE

## 2018-03-01 PROCEDURE — 36415 COLL VENOUS BLD VENIPUNCTURE: CPT

## 2018-03-01 PROCEDURE — 3077F SYST BP >= 140 MM HG: CPT | Mod: S$GLB,,, | Performed by: INTERNAL MEDICINE

## 2018-03-01 PROCEDURE — 99499 UNLISTED E&M SERVICE: CPT | Mod: ,,, | Performed by: INTERNAL MEDICINE

## 2018-03-01 NOTE — PROGRESS NOTES
Subjective:       Patient ID: Elke Laws is a 83 y.o. White female who presents for new evaluation of Chronic Kidney Disease; Acute Renal Failure; Hypertension; and recurrent UTI    HPI     Patient is an 83-year-old female with history of hypertension, coronary artery disease, atrial fibrillation.  Has off-and-on acute kidney injury with creatinine going up to 1.7.  Patient is taking diuretics.  Patient denies any nonsteroidals.  Has had history of recurrent UTI but no heavy proteinuria.  Also has history of mild anemia.    March 2018 patient evaluated for chronic kidney disease status post acute kidney injury.    Review of Systems   Constitutional: Negative for activity change, appetite change, chills, diaphoresis, fatigue, fever and unexpected weight change.   HENT: Negative for congestion, dental problem, drooling, postnasal drip, rhinorrhea and voice change.    Eyes: Negative for discharge.   Respiratory: Negative for apnea, cough, choking, chest tightness, shortness of breath, wheezing and stridor.    Cardiovascular: Negative for chest pain, palpitations and leg swelling.   Gastrointestinal: Negative for abdominal distention, blood in stool, constipation, diarrhea, nausea, rectal pain and vomiting.   Endocrine: Negative for cold intolerance, heat intolerance, polydipsia and polyuria.   Genitourinary: Negative for decreased urine volume, difficulty urinating, dysuria, enuresis, flank pain, frequency, hematuria and urgency.   Musculoskeletal: Negative for arthralgias, back pain, gait problem and joint swelling.   Skin: Negative for rash.   Allergic/Immunologic: Negative for food allergies and immunocompromised state.   Neurological: Negative for dizziness, tremors, syncope, numbness and headaches.   Hematological: Does not bruise/bleed easily.   Psychiatric/Behavioral: Negative for agitation, behavioral problems and self-injury. The patient is not nervous/anxious and is not hyperactive.    All other systems  "reviewed and are negative.      Objective:   BP (!) 158/65   Pulse (!) 51   Ht 4' 9" (1.448 m)   Wt 86 kg (189 lb 9.5 oz)   LMP 12/13/1973   BMI 41.03 kg/m²      Physical Exam   Constitutional: She is oriented to person, place, and time. No distress.   HENT:   Head: Normocephalic and atraumatic.   Nose: Nose normal.   Eyes: Conjunctivae and EOM are normal. Pupils are equal, round, and reactive to light.   Neck: Normal range of motion. No JVD present. No tracheal deviation present. No thyromegaly present.   Cardiovascular: Normal rate, regular rhythm, normal heart sounds and intact distal pulses.  Exam reveals no gallop and no friction rub.    No murmur heard.  Pulmonary/Chest: Effort normal and breath sounds normal. No respiratory distress. She has no wheezes. She has no rales. She exhibits no tenderness.   Abdominal: Soft. Bowel sounds are normal. She exhibits no distension and no mass. There is no tenderness. No hernia.   Musculoskeletal: Normal range of motion. She exhibits no edema, tenderness or deformity.   Multiple small joints of the hand have deformities with decreased range of motion.  Lower back pain with decreased range of motion.  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 She has normal reflexes. She displays normal reflexes. No cranial nerve deficit. She exhibits normal muscle tone. Coordination normal.   Skin: Skin is warm. She is not diaphoretic. No erythema. There is pallor.   Psychiatric: She has a normal mood and affect. Her behavior is normal. Judgment and thought content normal.   Nursing note and vitals reviewed.        Lab Results   Component Value Date    CREATININE 1.1 02/21/2018    BUN 31 (H) 02/21/2018     02/21/2018    K 4.7 02/21/2018     02/21/2018    CO2 30 (H) 02/21/2018     Lab Results   Component Value Date    WBC 5.36 01/31/2018    HGB 10.3 (L) 01/31/2018    HCT 35.8 " (L) 01/31/2018    MCV 89 01/31/2018     01/31/2018     Lab Results   Component Value Date    CALCIUM 9.4 02/21/2018    PHOS 6.1 (H) 05/30/2017         Assessment:    )    1. Stage 3 chronic kidney disease    2. JAE (acute kidney injury)    3. Essential hypertension    4. Recurrent UTI (urinary tract infection)        Plan:         1.  Chronic kidney disease stage III: Recheck for proteinuria.  Check serum protein electrophoresis on follow-up.  Check Cystatin C and screen for hyperparathyroidism and vitamin D deficiency on follow-up    2.  Acute kidney injury: Likely from diuretics.  Much improved now    3.  Essential hypertension: Agree with current medications much better controlled at home    4.  Recurrent UTI recheck urine today, check pro-calcitonin and follow-up      Follow-up in 4 months

## 2018-03-01 NOTE — LETTER
March 1, 2018      Isidra Benavidez MD  [address] [place] [address] [address] [place] [address]           [facility]  [address] [place] [address] [place] [place] [address]  Phone: [phone]  Fax: [phone]          Patient: Elke Laws   MR Number: [id number]   YOB: 1934   Date of Visit: 3/1/2018       Dear Dr. Isidra Benavidez:    Thank you for referring Elke Laws to me for evaluation. Attached you will find relevant portions of my assessment and plan of care.    If you have questions, please do not hesitate to call me. I look forward to following Elke Lasw along with you.    Sincerely,    Tamera Solis MD    Enclosure  CC:  No Recipients    If you would like to receive this communication electronically, please contact [email or web address] or [phone] to request more information on [employer] Link access.    For providers and/or their staff who would like to refer a patient to [facility], please contact us through our one-stop-shop provider referral line, [facility], at [phone].    If you feel you have received this communication in error or would no longer like to receive these types of communications, please e-mail [email or web address]

## 2018-03-01 NOTE — PATIENT INSTRUCTIONS
Avoid nonsteroidals; no Advil or Motrin; okay to take Tylenol for pain; if needs stronger pain medications please let us know

## 2018-03-02 NOTE — PROGRESS NOTES
Your test came back negative which shows that your symptoms are not due to infection but are not due to bacterial infection and does not merit antibiotics at this time.    Please feel free for further questions/clarifications    Tamera Solis MD

## 2018-03-26 DIAGNOSIS — I48.0 PAF (PAROXYSMAL ATRIAL FIBRILLATION): Primary | ICD-10-CM

## 2018-03-26 RX ORDER — FLECAINIDE ACETATE 50 MG/1
50 TABLET ORAL EVERY 12 HOURS
Qty: 60 TABLET | Refills: 6 | Status: SHIPPED | OUTPATIENT
Start: 2018-03-26 | End: 2018-04-06 | Stop reason: SDUPTHER

## 2018-04-06 DIAGNOSIS — I48.0 PAF (PAROXYSMAL ATRIAL FIBRILLATION): ICD-10-CM

## 2018-04-06 RX ORDER — FLECAINIDE ACETATE 50 MG/1
50 TABLET ORAL EVERY 12 HOURS
Qty: 60 TABLET | Refills: 6 | Status: SHIPPED | OUTPATIENT
Start: 2018-04-06 | End: 2019-04-08 | Stop reason: SDUPTHER

## 2018-05-04 DIAGNOSIS — E78.5 HYPERLIPIDEMIA, UNSPECIFIED HYPERLIPIDEMIA TYPE: ICD-10-CM

## 2018-05-04 DIAGNOSIS — I20.89 ANGINA EFFORT: ICD-10-CM

## 2018-05-04 DIAGNOSIS — I25.10 CORONARY ARTERY DISEASE INVOLVING NATIVE CORONARY ARTERY OF NATIVE HEART WITHOUT ANGINA PECTORIS: ICD-10-CM

## 2018-05-04 DIAGNOSIS — Z86.73 HISTORY OF TIA (TRANSIENT ISCHEMIC ATTACK): ICD-10-CM

## 2018-05-07 RX ORDER — PRAVASTATIN SODIUM 40 MG/1
TABLET ORAL
Qty: 90 TABLET | Refills: 1 | Status: SHIPPED | OUTPATIENT
Start: 2018-05-07 | End: 2018-09-21 | Stop reason: SDUPTHER

## 2018-05-07 RX ORDER — METOPROLOL SUCCINATE 100 MG/1
TABLET, EXTENDED RELEASE ORAL
Qty: 90 TABLET | Refills: 1 | Status: SHIPPED | OUTPATIENT
Start: 2018-05-07 | End: 2018-09-21 | Stop reason: SDUPTHER

## 2018-05-07 RX ORDER — FUROSEMIDE 40 MG/1
TABLET ORAL
Qty: 90 TABLET | Refills: 3 | OUTPATIENT
Start: 2018-05-07

## 2018-05-07 RX ORDER — ISOSORBIDE MONONITRATE 30 MG/1
TABLET, EXTENDED RELEASE ORAL
Qty: 90 TABLET | Refills: 3 | Status: SHIPPED | OUTPATIENT
Start: 2018-05-07 | End: 2019-04-26 | Stop reason: ALTCHOICE

## 2018-05-09 DIAGNOSIS — I38 HEART VALVE REGURGITATION: Primary | ICD-10-CM

## 2018-05-22 ENCOUNTER — OFFICE VISIT (OUTPATIENT)
Dept: INTERNAL MEDICINE | Facility: CLINIC | Age: 83
End: 2018-05-22
Payer: MEDICARE

## 2018-05-22 VITALS
HEART RATE: 61 BPM | WEIGHT: 188.06 LBS | TEMPERATURE: 98 F | SYSTOLIC BLOOD PRESSURE: 118 MMHG | DIASTOLIC BLOOD PRESSURE: 66 MMHG | BODY MASS INDEX: 40.57 KG/M2 | HEIGHT: 57 IN | OXYGEN SATURATION: 96 %

## 2018-05-22 DIAGNOSIS — N39.41 URGE INCONTINENCE OF URINE: ICD-10-CM

## 2018-05-22 DIAGNOSIS — R51.9 NONINTRACTABLE HEADACHE, UNSPECIFIED CHRONICITY PATTERN, UNSPECIFIED HEADACHE TYPE: Primary | ICD-10-CM

## 2018-05-22 PROCEDURE — 99999 PR PBB SHADOW E&M-EST. PATIENT-LVL V: CPT | Mod: PBBFAC,,, | Performed by: NURSE PRACTITIONER

## 2018-05-22 PROCEDURE — 99213 OFFICE O/P EST LOW 20 MIN: CPT | Mod: S$GLB,,, | Performed by: NURSE PRACTITIONER

## 2018-05-22 PROCEDURE — 3078F DIAST BP <80 MM HG: CPT | Mod: CPTII,S$GLB,, | Performed by: NURSE PRACTITIONER

## 2018-05-22 PROCEDURE — 3074F SYST BP LT 130 MM HG: CPT | Mod: CPTII,S$GLB,, | Performed by: NURSE PRACTITIONER

## 2018-05-22 NOTE — PROGRESS NOTES
Subjective:       Patient ID: Elke Laws is a 83 y.o. female.    Chief Complaint: Headache    Patient presents with headache that is off and on for past month. She does not currently have a HA.      Headache    This is a new problem. The current episode started 1 to 4 weeks ago (about a month). The problem has been waxing and waning. The pain is located in the occipital region. The pain does not radiate. The quality of the pain is described as aching. The pain is at a severity of 6/10. Associated symptoms include dizziness (rare) and tinnitus (right ear, occasional). Pertinent negatives include no anorexia, blurred vision, coughing, drainage, ear pain, eye pain, fever, insomnia, nausea, neck pain, numbness, phonophobia, photophobia, rhinorrhea, seizures, sinus pressure, sore throat, swollen glands, tingling, visual change, vomiting or weakness. Nothing aggravates the symptoms. She has tried acetaminophen for the symptoms. The treatment provided moderate relief.     Review of Systems   Constitutional: Negative for fever.   HENT: Positive for tinnitus (right ear, occasional). Negative for ear pain, rhinorrhea, sinus pressure and sore throat.    Eyes: Negative for blurred vision, photophobia and pain.   Respiratory: Negative for cough.    Cardiovascular: Negative for chest pain and palpitations.   Gastrointestinal: Negative for anorexia, nausea and vomiting.   Musculoskeletal: Positive for gait problem (in wheelchair, chronic). Negative for neck pain.   Neurological: Positive for dizziness (rare) and headaches. Negative for tingling, tremors, seizures, syncope, facial asymmetry, speech difficulty, weakness, light-headedness and numbness.   Psychiatric/Behavioral: The patient does not have insomnia.        Objective:      Physical Exam   Constitutional: She is oriented to person, place, and time. She appears well-developed and well-nourished. No distress.   HENT:   Head: Normocephalic and atraumatic.   Right Ear:  Tympanic membrane and ear canal normal.   Left Ear: Tympanic membrane and ear canal normal.   Nose: Nose normal.   Mouth/Throat: Uvula is midline, oropharynx is clear and moist and mucous membranes are normal.   Eyes: Conjunctivae and EOM are normal. Pupils are equal, round, and reactive to light. No scleral icterus.   Cardiovascular: Normal rate and regular rhythm.  Exam reveals no gallop and no friction rub.    No murmur heard.  Pulmonary/Chest: Effort normal and breath sounds normal.   Neurological: She is alert and oriented to person, place, and time. No cranial nerve deficit.   Skin: Skin is warm and dry.   Psychiatric: She has a normal mood and affect.   Vitals reviewed.      Assessment:       1. Nonintractable headache, unspecified chronicity pattern, unspecified headache type    2. Urge incontinence of urine        Plan:   Nonintractable headache, unspecified chronicity pattern, unspecified headache type    Urge incontinence of urine      HA appears benign. No red flags. No current HA. Discussed with patient possible causes of HA and treatment options. If headache frequency increases or she develops fever, chills, vision changes, syncope, etc will consider imaging/referral.  Tylenol 650 mg Q8 hours  Patient requested diaper Rx for incontinence. She reports using 6 diapers a day. Advised to discuss with insurance if they will be covered and then we can order.   Follow-up if symptoms worsen or fail to improve, for keep routine follow up.

## 2018-07-13 ENCOUNTER — TELEPHONE (OUTPATIENT)
Dept: INTERNAL MEDICINE | Facility: CLINIC | Age: 83
End: 2018-07-13

## 2018-07-13 DIAGNOSIS — I51.89 LEFT VENTRICULAR DIASTOLIC DYSFUNCTION WITH PRESERVED SYSTOLIC FUNCTION: Primary | Chronic | ICD-10-CM

## 2018-07-13 RX ORDER — FUROSEMIDE 20 MG/1
20 TABLET ORAL DAILY
Qty: 90 TABLET | Refills: 2 | Status: ON HOLD | OUTPATIENT
Start: 2018-07-13 | End: 2019-03-08 | Stop reason: SDUPTHER

## 2018-07-13 NOTE — TELEPHONE ENCOUNTER
----- Message from Kamala Wilson sent at 7/13/2018 10:14 AM CDT -----  Contact: pt daughter-Keya Laura requesting a Rx refill for Furosemide.     Pt use..    [facility] - [place], [place] - [address]  [address]  [facility] [address]  Phone: [phone] Fax: [phone]    Please adv/call [phone].//cw

## 2018-08-07 ENCOUNTER — NURSE TRIAGE (OUTPATIENT)
Dept: ADMINISTRATIVE | Facility: CLINIC | Age: 83
End: 2018-08-07

## 2018-08-07 ENCOUNTER — HOSPITAL ENCOUNTER (EMERGENCY)
Facility: HOSPITAL | Age: 83
Discharge: HOME | End: 2018-08-07
Attending: EMERGENCY MEDICINE
Payer: MEDICARE

## 2018-08-07 ENCOUNTER — TELEPHONE (OUTPATIENT)
Dept: INTERNAL MEDICINE | Facility: CLINIC | Age: 83
End: 2018-08-07

## 2018-08-07 VITALS
DIASTOLIC BLOOD PRESSURE: 72 MMHG | RESPIRATION RATE: 22 BRPM | HEART RATE: 59 BPM | OXYGEN SATURATION: 96 % | SYSTOLIC BLOOD PRESSURE: 183 MMHG

## 2018-08-07 DIAGNOSIS — R06.02 SOB (SHORTNESS OF BREATH): Primary | ICD-10-CM

## 2018-08-07 LAB
ALBUMIN SERPL BCP-MCNC: 3 G/DL
ALP SERPL-CCNC: 96 U/L
ALT SERPL W/O P-5'-P-CCNC: 13 U/L
ANION GAP SERPL CALC-SCNC: 9 MMOL/L
AST SERPL-CCNC: 25 U/L
BASOPHILS # BLD AUTO: 0.02 K/UL
BASOPHILS NFR BLD: 0.3 %
BILIRUB SERPL-MCNC: 0.5 MG/DL
BNP SERPL-MCNC: 412 PG/ML
BUN SERPL-MCNC: 26 MG/DL
CALCIUM SERPL-MCNC: 9.2 MG/DL
CHLORIDE SERPL-SCNC: 104 MMOL/L
CK SERPL-CCNC: 50 U/L
CO2 SERPL-SCNC: 26 MMOL/L
CREAT SERPL-MCNC: 0.8 MG/DL
DIFFERENTIAL METHOD: ABNORMAL
EOSINOPHIL # BLD AUTO: 0.2 K/UL
EOSINOPHIL NFR BLD: 3.7 %
ERYTHROCYTE [DISTWIDTH] IN BLOOD BY AUTOMATED COUNT: 15 %
EST. GFR  (AFRICAN AMERICAN): >60 ML/MIN/1.73 M^2
EST. GFR  (NON AFRICAN AMERICAN): >60 ML/MIN/1.73 M^2
GLUCOSE SERPL-MCNC: 97 MG/DL
HCT VFR BLD AUTO: 39 %
HGB BLD-MCNC: 12.7 G/DL
LYMPHOCYTES # BLD AUTO: 2 K/UL
LYMPHOCYTES NFR BLD: 31.6 %
MCH RBC QN AUTO: 29.1 PG
MCHC RBC AUTO-ENTMCNC: 32.6 G/DL
MCV RBC AUTO: 89 FL
MONOCYTES # BLD AUTO: 0.7 K/UL
MONOCYTES NFR BLD: 10.8 %
NEUTROPHILS # BLD AUTO: 3.3 K/UL
NEUTROPHILS NFR BLD: 53.6 %
PLATELET # BLD AUTO: 205 K/UL
PMV BLD AUTO: 11 FL
POTASSIUM SERPL-SCNC: 5.2 MMOL/L
PROT SERPL-MCNC: 7 G/DL
RBC # BLD AUTO: 4.36 M/UL
SODIUM SERPL-SCNC: 139 MMOL/L
TROPONIN I SERPL DL<=0.01 NG/ML-MCNC: <0.006 NG/ML
WBC # BLD AUTO: 6.2 K/UL

## 2018-08-07 PROCEDURE — 84484 ASSAY OF TROPONIN QUANT: CPT

## 2018-08-07 PROCEDURE — 85025 COMPLETE CBC W/AUTO DIFF WBC: CPT

## 2018-08-07 PROCEDURE — 93010 ELECTROCARDIOGRAM REPORT: CPT | Mod: ,,, | Performed by: INTERNAL MEDICINE

## 2018-08-07 PROCEDURE — 36415 COLL VENOUS BLD VENIPUNCTURE: CPT

## 2018-08-07 PROCEDURE — 80053 COMPREHEN METABOLIC PANEL: CPT

## 2018-08-07 PROCEDURE — 96374 THER/PROPH/DIAG INJ IV PUSH: CPT

## 2018-08-07 PROCEDURE — 63600175 PHARM REV CODE 636 W HCPCS: Performed by: EMERGENCY MEDICINE

## 2018-08-07 PROCEDURE — 83880 ASSAY OF NATRIURETIC PEPTIDE: CPT

## 2018-08-07 PROCEDURE — 99284 EMERGENCY DEPT VISIT MOD MDM: CPT | Mod: 25

## 2018-08-07 PROCEDURE — 93005 ELECTROCARDIOGRAM TRACING: CPT

## 2018-08-07 PROCEDURE — 82550 ASSAY OF CK (CPK): CPT

## 2018-08-07 RX ORDER — FUROSEMIDE 10 MG/ML
40 INJECTION INTRAMUSCULAR; INTRAVENOUS
Status: COMPLETED | OUTPATIENT
Start: 2018-08-07 | End: 2018-08-07

## 2018-08-07 RX ADMIN — FUROSEMIDE 40 MG: 10 INJECTION, SOLUTION INTRAMUSCULAR; INTRAVENOUS at 09:08

## 2018-08-07 NOTE — ED NOTES
Noticed that cbc was cancelled at this time. Called lab to see who they spoke with. They state they do not recall the name of who they spoke with.

## 2018-08-07 NOTE — ED PROVIDER NOTES
SCRIBE #1 NOTE: I, Corinne Mack, am scribing for, and in the presence of, Emiliano Holt MD. I have scribed the entire note.      History      Chief Complaint   Patient presents with    Shortness of Breath     shortness of breath and weakness       Review of patient's allergies indicates:   Allergen Reactions    Iodine and iodide containing products Rash        HPI   HPI    8/7/2018, 8:55 AM   History obtained from the patient      History of Present Illness: Elke Laws is a 83 y.o. female patient with PMHx of Afib, CAD, MI, cardiac stent placement, and HTN who presents to the Emergency Department for L-sided CP which onset last night. Symptoms are constant and moderate in severity. No mitigating or exacerbating factors reported. Associated sxs include SOB. Pt reports she was unable to sleep last night because of her SOB. Patient denies any diaphoresis, N/V, abd pain, back pain, neck pain, HA, and all other sxs at this time. No prior Tx reported outside of the pt's daily medications. No further complaints or concerns at this time.         Arrival mode: Personal vehicle    PCP: Isidra Benavidez MD       Past Medical History:  Past Medical History:   Diagnosis Date    Abnormal nuclear stress test 6/30/2016    Acute congestive heart failure 5/27/2017    Acute kidney injury (nontraumatic) 5/27/2017    Acute kidney injury (nontraumatic) 5/27/2017    Acute on chronic congestive heart failure 5/21/2017    Acute on chronic diastolic congestive heart failure 8/27/2015    Anemia 5/9/2016    Anemia 5/9/2016    Angina pectoris 1/25/2018    Anticoagulant long-term use     Aortic regurgitation     Echo 11/2013---5 - Mild to moderate aortic regurgitation.      Asthma     Atrial fibrillation 5/15/2014    Back pain     s/p epidural steriod injections, no recent injections.    Baker's cyst     small, right, seen on US lower extremity 6/2014    Blood transfusion     Approximately 6 years ago    Chronic  "constipation     Coronary artery disease     Degenerative disc disease, lumbar     Diastolic dysfunction     Seen on echo 11/2013, stress test negative 5/2014    Diverticulosis     colonoscopy 3/27/2011    E coli bacteremia 5/23/2017    E. coli UTI (urinary tract infection) 5/23/2017    Hemorrhoids     colonoscopy 3/27/2011    Hiatal hernia     CXR 12/30/2016---Retrocardiac density again noted consistent with a hiatal hernia.    Hx of adenomatous colonic polyps     Hyperlipidemia     Hypertension     Hyponatremia 5/9/2016    Hypoxemia 5/27/2017    Hypoxia 6/28/2017    Leukocytosis 5/27/2017    Mitral regurgitation     Myocardial infarction     per patient was told in the past she had a "mild heart attack"    Obesity     Osteoarthritis, knee     Pneumonia     per patient "walking Pneumonia" did not require hospitalization    Seasonal allergies     Sepsis 5/22/2017    Trouble in sleeping     Urinary incontinence        Past Surgical History:  Past Surgical History:   Procedure Laterality Date    APPENDECTOMY      COLONOSCOPY N/A 8/29/2017    Procedure: COLONOSCOPY okay by dr. ramos;  Surgeon: Toño Abdi MD;  Location: [facility];  Service: Endoscopy;  Laterality: N/A;    EYE SURGERY Left     HYSTERECTOMY      benign reasons    KNEE SURGERY Bilateral     x2     Left heart cath      OLECRANON BURSECTOMY Right     6/2011    TONSILLECTOMY      URETHRA SURGERY           Family History:  Family History   Problem Relation Age of Onset    Heart disease Mother     Cancer Mother         colon    Hypertension Mother     Hyperlipidemia Mother     Heart disease Father     Hypertension Father     Hyperlipidemia Father     Heart disease Sister         MI    Heart disease Brother         MI    COPD Son     Hypertension Son     Diabetes Brother     Diabetes Son     Cancer Sister         breast    Kidney disease Neg Hx     Stroke Neg Hx        Social History:  Social History "     Social History Main Topics    Smoking status: Former Smoker     Packs/day: 0.05     Years: 1.00     Types: Cigarettes     Quit date: 1/1/1952    Smokeless tobacco: Never Used    Alcohol use No    Drug use: No    Sexual activity: No       ROS   Review of Systems   Constitutional: Negative for chills, diaphoresis and fever.   Respiratory: Positive for shortness of breath. Negative for cough.    Cardiovascular: Positive for chest pain. Negative for leg swelling.   Gastrointestinal: Negative for abdominal pain, diarrhea, nausea and vomiting.   Musculoskeletal: Negative for back pain, neck pain and neck stiffness.   Skin: Negative for rash and wound.   Neurological: Negative for dizziness, light-headedness, numbness and headaches.   All other systems reviewed and are negative.    Physical Exam      Initial Vitals   BP Pulse Resp Temp SpO2   08/07/18 0911 08/07/18 0910 08/07/18 0911 -- 08/07/18 0911   (!) 196/85 64 20  96 %      MAP       --                 Physical Exam  Nursing Notes and Vital Signs Reviewed.  Constitutional: Patient is in mild distress. Well-developed and well-nourished.  Head: Atraumatic. Normocephalic.  Eyes: PERRL. EOM intact. Conjunctivae are not pale. No scleral icterus.  ENT: Mucous membranes are moist. Oropharynx is clear and symmetric.    Neck: Supple. Full ROM. No lymphadenopathy.  Cardiovascular: Regular rate. Regular rhythm. No murmurs, rubs, or gallops. Distal pulses are 2+ and symmetric.  Pulmonary/Chest: Mild respiratory distress. Tachypnic. Bibasilar rales. No wheezing.  Abdominal: Soft and non-distended.  There is no tenderness.  No rebound, guarding, or rigidity. Obese.  Musculoskeletal: Moves all extremities. No obvious deformities.   Skin: Warm and dry.  Neurological:  Alert, awake, and appropriate.  Normal speech.  No acute focal neurological deficits are appreciated.  Psychiatric: Normal affect. Good eye contact. Appropriate in content.    ED Course    Procedures  ED Vital  Signs:  Vitals:    08/07/18 0910 08/07/18 0911 08/07/18 1032   BP:  (!) 196/85 (!) 160/72   Pulse: 64 64 61   Resp:  20 (!) 26   SpO2:  96% 96%       Abnormal Lab Results:  Labs Reviewed   COMPREHENSIVE METABOLIC PANEL - Abnormal; Notable for the following:        Result Value    Potassium 5.2 (*)     BUN, Bld 26 (*)     Albumin 3.0 (*)     All other components within normal limits   B-TYPE NATRIURETIC PEPTIDE - Abnormal; Notable for the following:      (*)     All other components within normal limits   CBC W/ AUTO DIFFERENTIAL - Abnormal; Notable for the following:     RDW 15.0 (*)     All other components within normal limits   CK   TROPONIN I   URINALYSIS, REFLEX TO URINE CULTURE        All Lab Results:  Results for orders placed or performed during the hospital encounter of 08/07/18   Comprehensive metabolic panel   Result Value Ref Range    Sodium 139 136 - 145 mmol/L    Potassium 5.2 (H) 3.5 - 5.1 mmol/L    Chloride 104 95 - 110 mmol/L    CO2 26 23 - 29 mmol/L    Glucose 97 70 - 110 mg/dL    BUN, Bld 26 (H) 8 - 23 mg/dL    Creatinine 0.8 0.5 - 1.4 mg/dL    Calcium 9.2 8.7 - 10.5 mg/dL    Total Protein 7.0 6.0 - 8.4 g/dL    Albumin 3.0 (L) 3.5 - 5.2 g/dL    Total Bilirubin 0.5 0.1 - 1.0 mg/dL    Alkaline Phosphatase 96 55 - 135 U/L    AST 25 10 - 40 U/L    ALT 13 10 - 44 U/L    Anion Gap 9 8 - 16 mmol/L    eGFR if African American >60 >60 mL/min/1.73 m^2    eGFR if non African American >60 >60 mL/min/1.73 m^2   Brain natriuretic peptide   Result Value Ref Range     (H) 0 - 99 pg/mL   CK   Result Value Ref Range    CPK 50 20 - 180 U/L   Troponin I   Result Value Ref Range    Troponin I <0.006 0.000 - 0.026 ng/mL   CBC auto differential   Result Value Ref Range    WBC 6.20 3.90 - 12.70 K/uL    RBC 4.36 4.00 - 5.40 M/uL    Hemoglobin 12.7 12.0 - 16.0 g/dL    Hematocrit 39.0 37.0 - 48.5 %    MCV 89 82 - 98 fL    MCH 29.1 27.0 - 31.0 pg    MCHC 32.6 32.0 - 36.0 g/dL    RDW 15.0 (H) 11.5 - 14.5 %     Platelets 205 150 - 350 K/uL    MPV 11.0 9.2 - 12.9 fL    Gran # (ANC) 3.3 1.8 - 7.7 K/uL    Lymph # 2.0 1.0 - 4.8 K/uL    Mono # 0.7 0.3 - 1.0 K/uL    Eos # 0.2 0.0 - 0.5 K/uL    Baso # 0.02 0.00 - 0.20 K/uL    Gran% 53.6 38.0 - 73.0 %    Lymph% 31.6 18.0 - 48.0 %    Mono% 10.8 4.0 - 15.0 %    Eosinophil% 3.7 0.0 - 8.0 %    Basophil% 0.3 0.0 - 1.9 %    Differential Method Automated          Imaging Results:  Imaging Results          X-Ray Chest AP Portable (Final result)  Result time 08/07/18 09:52:17    Final result by CARLITOS Mendez Sr., MD (08/07/18 09:52:17)                 Impression:      1. The lungs are clear.  2. There is mild cardiomegaly.  3. There is a mild amount of dextroconvex curvature of the thoracic spine.  .      Electronically signed by: Tai Mendez MD  Date:    08/07/2018  Time:    09:52             Narrative:    EXAMINATION:  XR CHEST AP PORTABLE    CLINICAL HISTORY:  sob;    COMPARISON:  05/27/2017    FINDINGS:  The size of the heart is prominent.  The lungs are clear. There is no pneumothorax.  The costophrenic angles are sharp.  There is a mild amount of dextroconvex curvature of the thoracic spine.                                 The EKG was ordered, reviewed, and independently interpreted by the ED provider.  Interpretation time: 0858  Rate: 64 BPM  Rhythm: Sinus rhythm with 1st degree AV block  Interpretation: No STEMI.             The Emergency Provider reviewed the vital signs and test results, which are outlined above.    ED Discussion     12:25 PM: Reassessed pt at this time. Pt is awake, alert, and in no distress. Discussed with pt all pertinent ED information and results. Discussed pt dx and plan of tx. Gave pt all f/u and return to the ED instructions. All questions and concerns were addressed at this time. Pt expresses understanding of information and instructions, and is comfortable with plan to discharge. Pt is stable for discharge.    I discussed with patient and/or  family/caretaker that evaluation in the ED does not suggest any emergent or life threatening medical conditions requiring immediate intervention beyond what was provided in the ED, and I believe patient is safe for discharge.  Regardless, an unremarkable evaluation in the ED does not preclude the development or presence of a serious of life threatening condition. As such, patient was instructed to return immediately for any worsening or change in current symptoms.      ED Medication(s):  Medications   furosemide injection 40 mg (40 mg Intravenous Given 8/7/18 0918)       New Prescriptions    No medications on file       Follow-up Information     Isidra Benavidez MD In 2 days.    Specialty:  Family Medicine  Contact information:  [address] [place] [address] DR Bushra GILL [id number] [phone]                     Medical Decision Making    Medical Decision Making:   Clinical Tests:   Lab Tests: Ordered and Reviewed  Radiological Study: Ordered and Reviewed  Medical Tests: Ordered and Reviewed           Scribe Attestation:   Scribe #1: I performed the above scribed service and the documentation accurately describes the services I performed. I attest to the accuracy of the note.    Attending:   Physician Attestation Statement for Scribe #1: I, Emiliano Holt MD, personally performed the services described in this documentation, as scribed by Corinne Mack, in my presence, and it is both accurate and complete.          Clinical Impression       ICD-10-CM ICD-9-CM   1. SOB (shortness of breath) R06.02 786.05       Disposition:   Disposition: Discharged  Condition: Stable           Emiliano Holt MD  08/07/18 [id number]

## 2018-08-07 NOTE — TELEPHONE ENCOUNTER
Reason for Disposition   Chest pain lasting longer than 5 minutes and ANY of the following:* Over 50 years old* Over 30 years old and at least one cardiac risk factor (i.e., high blood pressure, diabetes, high cholesterol, obesity, smoker or strong family history of heart disease)* Pain is crushing, pressure-like, or heavy * Took nitroglycerin and chest pain was not relieved* History of heart disease (i.e., angina, heart attack, bypass surgery, angioplasty, CHF)    Protocols used:  CHEST PAIN-A-OH    Calixto Laws is calling to report Elke has chest pain that has been present all night.  Also reports sob with exertion and weakness.  Per protocol advised to call 911 now.  Mr. Laws reports he was going to take her to hospital if she needs to go.  Again advised he should call 911 now.  He states ok.

## 2018-08-08 ENCOUNTER — PES CALL (OUTPATIENT)
Dept: ADMINISTRATIVE | Facility: CLINIC | Age: 83
End: 2018-08-08

## 2018-09-21 DIAGNOSIS — E78.5 HYPERLIPIDEMIA, UNSPECIFIED HYPERLIPIDEMIA TYPE: ICD-10-CM

## 2018-09-21 DIAGNOSIS — Z86.73 HISTORY OF TIA (TRANSIENT ISCHEMIC ATTACK): ICD-10-CM

## 2018-09-24 RX ORDER — PRAVASTATIN SODIUM 40 MG/1
TABLET ORAL
Qty: 90 TABLET | Refills: 1 | Status: SHIPPED | OUTPATIENT
Start: 2018-09-24 | End: 2019-05-28 | Stop reason: SDUPTHER

## 2018-09-24 RX ORDER — METOPROLOL SUCCINATE 100 MG/1
TABLET, EXTENDED RELEASE ORAL
Qty: 90 TABLET | Refills: 1 | Status: ON HOLD | OUTPATIENT
Start: 2018-09-24 | End: 2019-05-24 | Stop reason: HOSPADM

## 2018-09-25 NOTE — TELEPHONE ENCOUNTER
Patient notified and states she would rather wait until next month to schedule the ER follow up. Appointment has been scheduled for 10/23/18 at 9:40 with . Appointment slip has been mailed. Advised to call the office as needed, patient verbalized understanding.

## 2018-10-23 ENCOUNTER — OFFICE VISIT (OUTPATIENT)
Dept: INTERNAL MEDICINE | Facility: CLINIC | Age: 83
End: 2018-10-23
Payer: MEDICARE

## 2018-10-23 VITALS
DIASTOLIC BLOOD PRESSURE: 60 MMHG | HEART RATE: 53 BPM | TEMPERATURE: 97 F | OXYGEN SATURATION: 94 % | BODY MASS INDEX: 40.69 KG/M2 | WEIGHT: 188.06 LBS | SYSTOLIC BLOOD PRESSURE: 118 MMHG

## 2018-10-23 DIAGNOSIS — I48.0 PAF (PAROXYSMAL ATRIAL FIBRILLATION): Chronic | ICD-10-CM

## 2018-10-23 DIAGNOSIS — I25.10 CORONARY ARTERY DISEASE INVOLVING NATIVE CORONARY ARTERY OF NATIVE HEART WITHOUT ANGINA PECTORIS: Chronic | ICD-10-CM

## 2018-10-23 DIAGNOSIS — I10 ESSENTIAL HYPERTENSION: Primary | ICD-10-CM

## 2018-10-23 DIAGNOSIS — I50.32 CHRONIC DIASTOLIC HEART FAILURE: Chronic | ICD-10-CM

## 2018-10-23 DIAGNOSIS — E78.5 HYPERLIPIDEMIA, UNSPECIFIED HYPERLIPIDEMIA TYPE: Chronic | ICD-10-CM

## 2018-10-23 PROCEDURE — 3074F SYST BP LT 130 MM HG: CPT | Mod: CPTII,,, | Performed by: FAMILY MEDICINE

## 2018-10-23 PROCEDURE — 99215 OFFICE O/P EST HI 40 MIN: CPT | Mod: PBBFAC | Performed by: FAMILY MEDICINE

## 2018-10-23 PROCEDURE — 99999 PR PBB SHADOW E&M-EST. PATIENT-LVL V: CPT | Mod: PBBFAC,,, | Performed by: FAMILY MEDICINE

## 2018-10-23 PROCEDURE — 3078F DIAST BP <80 MM HG: CPT | Mod: CPTII,,, | Performed by: FAMILY MEDICINE

## 2018-10-23 PROCEDURE — 1101F PT FALLS ASSESS-DOCD LE1/YR: CPT | Mod: CPTII,,, | Performed by: FAMILY MEDICINE

## 2018-10-23 PROCEDURE — 99214 OFFICE O/P EST MOD 30 MIN: CPT | Mod: S$PBB,,, | Performed by: FAMILY MEDICINE

## 2018-10-23 PROCEDURE — 90662 IIV NO PRSV INCREASED AG IM: CPT | Mod: PBBFAC

## 2018-10-25 ENCOUNTER — OFFICE VISIT (OUTPATIENT)
Dept: CARDIOLOGY | Facility: CLINIC | Age: 83
End: 2018-10-25
Payer: MEDICARE

## 2018-10-25 ENCOUNTER — LAB VISIT (OUTPATIENT)
Dept: LAB | Facility: HOSPITAL | Age: 83
End: 2018-10-25
Attending: FAMILY MEDICINE
Payer: MEDICARE

## 2018-10-25 VITALS
HEART RATE: 56 BPM | SYSTOLIC BLOOD PRESSURE: 96 MMHG | HEIGHT: 57 IN | WEIGHT: 186.5 LBS | DIASTOLIC BLOOD PRESSURE: 66 MMHG | BODY MASS INDEX: 40.23 KG/M2

## 2018-10-25 DIAGNOSIS — R09.89 DECREASED PULSES IN FEET: ICD-10-CM

## 2018-10-25 DIAGNOSIS — E78.5 HYPERLIPIDEMIA, UNSPECIFIED HYPERLIPIDEMIA TYPE: Chronic | ICD-10-CM

## 2018-10-25 DIAGNOSIS — N18.30 STAGE 3 CHRONIC KIDNEY DISEASE: ICD-10-CM

## 2018-10-25 DIAGNOSIS — J45.20 INTERMITTENT ASTHMA WITHOUT COMPLICATION, UNSPECIFIED ASTHMA SEVERITY: Chronic | ICD-10-CM

## 2018-10-25 DIAGNOSIS — I70.0 ATHEROSCLEROSIS OF AORTA: Chronic | ICD-10-CM

## 2018-10-25 DIAGNOSIS — E66.01 SEVERE OBESITY WITH BODY MASS INDEX (BMI) OF 35.0 TO 39.9 WITH SERIOUS COMORBIDITY: ICD-10-CM

## 2018-10-25 DIAGNOSIS — K57.30 DIVERTICULOSIS OF LARGE INTESTINE WITHOUT HEMORRHAGE: Chronic | ICD-10-CM

## 2018-10-25 DIAGNOSIS — I10 ESSENTIAL HYPERTENSION: ICD-10-CM

## 2018-10-25 DIAGNOSIS — I50.32 CHRONIC DIASTOLIC HEART FAILURE: Chronic | ICD-10-CM

## 2018-10-25 DIAGNOSIS — R09.89 BILATERAL CAROTID BRUITS: ICD-10-CM

## 2018-10-25 DIAGNOSIS — I51.89 LEFT VENTRICULAR DIASTOLIC DYSFUNCTION WITH PRESERVED SYSTOLIC FUNCTION: Chronic | ICD-10-CM

## 2018-10-25 DIAGNOSIS — I48.0 PAF (PAROXYSMAL ATRIAL FIBRILLATION): Chronic | ICD-10-CM

## 2018-10-25 DIAGNOSIS — I25.10 CORONARY ARTERY DISEASE INVOLVING NATIVE CORONARY ARTERY OF NATIVE HEART WITHOUT ANGINA PECTORIS: Primary | Chronic | ICD-10-CM

## 2018-10-25 LAB
ALBUMIN SERPL BCP-MCNC: 3.4 G/DL
ALP SERPL-CCNC: 104 U/L
ALT SERPL W/O P-5'-P-CCNC: 11 U/L
ANION GAP SERPL CALC-SCNC: 7 MMOL/L
AST SERPL-CCNC: 20 U/L
BILIRUB SERPL-MCNC: 0.6 MG/DL
BUN SERPL-MCNC: 22 MG/DL
CALCIUM SERPL-MCNC: 9.9 MG/DL
CHLORIDE SERPL-SCNC: 102 MMOL/L
CHOLEST SERPL-MCNC: 156 MG/DL
CHOLEST/HDLC SERPL: 2.3 {RATIO}
CO2 SERPL-SCNC: 31 MMOL/L
CREAT SERPL-MCNC: 1 MG/DL
EST. GFR  (AFRICAN AMERICAN): 59.8 ML/MIN/1.73 M^2
EST. GFR  (NON AFRICAN AMERICAN): 51.9 ML/MIN/1.73 M^2
GLUCOSE SERPL-MCNC: 99 MG/DL
HDLC SERPL-MCNC: 68 MG/DL
HDLC SERPL: 43.6 %
LDLC SERPL CALC-MCNC: 76 MG/DL
NONHDLC SERPL-MCNC: 88 MG/DL
POTASSIUM SERPL-SCNC: 5.2 MMOL/L
PROT SERPL-MCNC: 7.1 G/DL
SODIUM SERPL-SCNC: 140 MMOL/L
TRIGL SERPL-MCNC: 60 MG/DL

## 2018-10-25 PROCEDURE — 99213 OFFICE O/P EST LOW 20 MIN: CPT | Mod: S$PBB,,, | Performed by: INTERNAL MEDICINE

## 2018-10-25 PROCEDURE — 1101F PT FALLS ASSESS-DOCD LE1/YR: CPT | Mod: CPTII,,, | Performed by: INTERNAL MEDICINE

## 2018-10-25 PROCEDURE — 99213 OFFICE O/P EST LOW 20 MIN: CPT | Mod: PBBFAC | Performed by: INTERNAL MEDICINE

## 2018-10-25 PROCEDURE — 80053 COMPREHEN METABOLIC PANEL: CPT

## 2018-10-25 PROCEDURE — 80061 LIPID PANEL: CPT

## 2018-10-25 PROCEDURE — 99999 PR PBB SHADOW E&M-EST. PATIENT-LVL III: CPT | Mod: PBBFAC,,, | Performed by: INTERNAL MEDICINE

## 2018-10-25 PROCEDURE — 3074F SYST BP LT 130 MM HG: CPT | Mod: CPTII,,, | Performed by: INTERNAL MEDICINE

## 2018-10-25 PROCEDURE — 3078F DIAST BP <80 MM HG: CPT | Mod: CPTII,,, | Performed by: INTERNAL MEDICINE

## 2018-10-25 PROCEDURE — 36415 COLL VENOUS BLD VENIPUNCTURE: CPT

## 2018-10-25 NOTE — LETTER
October 25, 2018      Isidra Benavidez MD  [address] [place] [address] Dr Bushra GILL [id number]           [facility]  [address] [place] [address] [place] [address]  Phone: [phone]  Fax: [phone]          Patient: Elke Laws   MR Number: [id number]   YOB: 1934   Date of Visit: 10/25/2018       Dear Dr. Isidra Benavidez:    Thank you for referring Elke Laws to me for evaluation. Attached you will find relevant portions of my assessment and plan of care.    If you have questions, please do not hesitate to call me. I look forward to following Elke Laws along with you.    Sincerely,    Tish Angulo MD    Enclosure  CC:  No Recipients    If you would like to receive this communication electronically, please contact [email or web address] or [phone] to request more information on [employer] access.    For providers and/or their staff who would like to refer a patient to [facility], please contact us through our one-stop-shop provider referral line, [facility], at [phone].    If you feel you have received this communication in error or would no longer like to receive these types of communications, please e-mail [email or web address]

## 2018-10-25 NOTE — PROGRESS NOTES
"Subjective:   Patient ID:  Elke Laws is a 84 y.o. female who presents for follow up of Atrial Fibrillation and Hypertension      HPI  An 85 yo female with afib ai cad carotid disease is here for f/u her back is a limitation in her modbility and  exercise tolerance has no chest pain or shortness of breath no palpitation syncope near syncope chf tia or claudication back pain is her issue.   Past Medical History:   Diagnosis Date    Abnormal nuclear stress test 6/30/2016    Acute congestive heart failure 5/27/2017    Acute coronary syndrome     Acute kidney injury (nontraumatic) 5/27/2017    Acute kidney injury (nontraumatic) 5/27/2017    Acute on chronic congestive heart failure 5/21/2017    Acute on chronic diastolic congestive heart failure 8/27/2015    Anemia 5/9/2016    Anemia 5/9/2016    Angina pectoris 1/25/2018    Anticoagulant long-term use     Aortic regurgitation     Echo 11/2013---5 - Mild to moderate aortic regurgitation.      Asthma     Atrial fibrillation 5/15/2014    Back pain     s/p epidural steriod injections, no recent injections.    Baker's cyst     small, right, seen on US lower extremity 6/2014    Blood transfusion     Approximately 6 years ago    Chronic constipation     Coronary artery disease     Degenerative disc disease, lumbar     Diastolic dysfunction     Seen on echo 11/2013, stress test negative 5/2014    Diverticulosis     colonoscopy 3/27/2011    E coli bacteremia 5/23/2017    E. coli UTI (urinary tract infection) 5/23/2017    Hemorrhoids     colonoscopy 3/27/2011    Hiatal hernia     CXR 12/30/2016---Retrocardiac density again noted consistent with a hiatal hernia.    Hx of adenomatous colonic polyps     Hyperlipidemia     Hypertension     Hyponatremia 5/9/2016    Hypoxemia 5/27/2017    Hypoxia 6/28/2017    Leukocytosis 5/27/2017    Mitral regurgitation     Myocardial infarction     per patient was told in the past she had a "mild heart attack" " "   Obesity     Osteoarthritis, knee     Pneumonia     per patient "walking Pneumonia" did not require hospitalization    Seasonal allergies     Sepsis 2017    Trouble in sleeping     Urinary incontinence        Past Surgical History:   Procedure Laterality Date    APPENDECTOMY      COLONOSCOPY N/A 2017    Procedure: COLONOSCOPY okay by dr. ramos;  Surgeon: Toño Abdi MD;  Location: [facility] ENDO;  Service: Endoscopy;  Laterality: N/A;    COLONOSCOPY okay by dr. ramos N/A 2017    Performed by Toño Abdi MD at [facility] ENDO    EYE SURGERY Left     HYSTERECTOMY      benign reasons    KNEE SURGERY Bilateral     x2     Left heart cath      OLECRANON BURSECTOMY Right     2011    TONSILLECTOMY      URETHRA SURGERY         Social History     Tobacco Use    Smoking status: Former Smoker     Packs/day: 0.05     Years: 1.00     Pack years: 0.05     Types: Cigarettes     Last attempt to quit: 1952     Years since quittin.8    Smokeless tobacco: Never Used   Substance Use Topics    Alcohol use: No     Alcohol/week: 0.0 oz    Drug use: No       Family History   Problem Relation Age of Onset    Heart disease Mother     Cancer Mother         colon    Hypertension Mother     Hyperlipidemia Mother     Heart disease Father     Hypertension Father     Hyperlipidemia Father     Heart disease Sister         MI    Heart disease Brother         MI    COPD Son     Hypertension Son     Diabetes Brother     Diabetes Son     Cancer Sister         breast    Kidney disease Neg Hx     Stroke Neg Hx        Current Outpatient Medications   Medication Sig    acetaminophen (TYLENOL) 500 MG tablet Take 1 tablet (500 mg total) by mouth 2 (two) times daily. (Patient taking differently: Take 500 mg by mouth 2 (two) times daily as needed. )    albuterol 90 mcg/actuation inhaler Inhale 2 puffs into the lungs 4 (four) times daily.    ASCORBATE CALCIUM (VITAMIN C ORAL) Take 500 mg by mouth " once daily.     DOCOSAHEXANOIC ACID/EPA (FISH OIL ORAL) Take 500 mg by mouth once daily.     ELIQUIS 5 mg Tab TAKE 1 TABLET BY MOUTH TWICE DAILY    flecainide (TAMBOCOR) 50 MG Tab Take 1 tablet (50 mg total) by mouth every 12 (twelve) hours.    furosemide (LASIX) 20 MG tablet Take 1 tablet (20 mg total) by mouth once daily.    isosorbide mononitrate (IMDUR) 30 MG 24 hr tablet TAKE 1 TABLET EVERY DAY    metoprolol succinate (TOPROL-XL) 100 MG 24 hr tablet TAKE 1 TABLET EVERY DAY    pravastatin (PRAVACHOL) 40 MG tablet TAKE 1 TABLET EVERY DAY    compressor, for nebulizer Lara Compressor use as directed by albuterol use.    fluticasone (FLOVENT HFA) 110 mcg/actuation inhaler Inhale 1 puff into the lungs 2 (two) times daily.    loratadine (CLARITIN) 10 mg tablet Take 1 tablet (10 mg total) by mouth once daily.     No current facility-administered medications for this visit.      Current Outpatient Medications on File Prior to Visit   Medication Sig    acetaminophen (TYLENOL) 500 MG tablet Take 1 tablet (500 mg total) by mouth 2 (two) times daily. (Patient taking differently: Take 500 mg by mouth 2 (two) times daily as needed. )    albuterol 90 mcg/actuation inhaler Inhale 2 puffs into the lungs 4 (four) times daily.    ASCORBATE CALCIUM (VITAMIN C ORAL) Take 500 mg by mouth once daily.     DOCOSAHEXANOIC ACID/EPA (FISH OIL ORAL) Take 500 mg by mouth once daily.     ELIQUIS 5 mg Tab TAKE 1 TABLET BY MOUTH TWICE DAILY    flecainide (TAMBOCOR) 50 MG Tab Take 1 tablet (50 mg total) by mouth every 12 (twelve) hours.    furosemide (LASIX) 20 MG tablet Take 1 tablet (20 mg total) by mouth once daily.    isosorbide mononitrate (IMDUR) 30 MG 24 hr tablet TAKE 1 TABLET EVERY DAY    metoprolol succinate (TOPROL-XL) 100 MG 24 hr tablet TAKE 1 TABLET EVERY DAY    pravastatin (PRAVACHOL) 40 MG tablet TAKE 1 TABLET EVERY DAY    compressor, for nebulizer Lara Compressor use as directed by albuterol use.     fluticasone (FLOVENT HFA) 110 mcg/actuation inhaler Inhale 1 puff into the lungs 2 (two) times daily.    loratadine (CLARITIN) 10 mg tablet Take 1 tablet (10 mg total) by mouth once daily.     No current facility-administered medications on file prior to visit.      Review of patient's allergies indicates:   Allergen Reactions    Iodine and iodide containing products Rash       Review of Systems   Constitution: Negative for diaphoresis, weakness, malaise/fatigue and weight gain.   HENT: Negative for hoarse voice.    Eyes: Negative for double vision and visual disturbance.   Cardiovascular: Negative for chest pain, claudication, cyanosis, dyspnea on exertion, irregular heartbeat, leg swelling, near-syncope, orthopnea, palpitations, paroxysmal nocturnal dyspnea and syncope.   Respiratory: Negative for cough, hemoptysis, shortness of breath and snoring.    Hematologic/Lymphatic: Negative for bleeding problem. Does not bruise/bleed easily.   Skin: Negative for color change and poor wound healing.   Musculoskeletal: Positive for arthritis, back pain and joint pain. Negative for muscle cramps, muscle weakness and myalgias.   Gastrointestinal: Negative for bloating, abdominal pain, change in bowel habit, diarrhea, heartburn, hematemesis, hematochezia, melena and nausea.   Neurological: Negative for excessive daytime sleepiness, dizziness, headaches, light-headedness, loss of balance and numbness.   Psychiatric/Behavioral: Negative for memory loss. The patient does not have insomnia.    Allergic/Immunologic: Negative for hives.       Objective:   Physical Exam   Constitutional: She is oriented to person, place, and time. She appears well-developed and well-nourished. She does not appear ill. No distress.   HENT:   Head: Normocephalic and atraumatic.   Eyes: EOM are normal. Pupils are equal, round, and reactive to light. No scleral icterus.   Neck: Normal range of motion. Neck supple. Normal carotid pulses, no  hepatojugular reflux and no JVD present. Carotid bruit is not present. No tracheal deviation present. No thyromegaly present.   Cardiovascular: Normal rate, regular rhythm, intact distal pulses and normal pulses. Exam reveals no gallop and no friction rub.   Murmur heard.   Harsh midsystolic murmur is present with a grade of 2/6 at the upper right sternal border radiating to the neck.  High-pitched blowing holosystolic murmur of grade 1/6 is also present at the apex.  High-pitched blowing decrescendo early diastolic murmur is present with a grade of 2/6 at the upper right sternal border radiating to the apex.  Pulses:       Carotid pulses are 2+ on the right side, and 2+ on the left side.       Radial pulses are 2+ on the right side, and 2+ on the left side.        Femoral pulses are 2+ on the right side, and 2+ on the left side.       Popliteal pulses are 2+ on the right side, and 2+ on the left side.        Dorsalis pedis pulses are 2+ on the right side, and 2+ on the left side.        Posterior tibial pulses are 2+ on the right side, and 2+ on the left side.   Pulmonary/Chest: Effort normal and breath sounds normal. No respiratory distress. She has no wheezes. She has no rhonchi. She has no rales. She exhibits no tenderness.   Abdominal: Soft. Normal appearance, normal aorta and bowel sounds are normal. She exhibits no distension, no abdominal bruit, no ascites and no pulsatile midline mass. There is no hepatomegaly. There is no tenderness.   Obese abdomen.   Musculoskeletal: She exhibits no edema.        Right shoulder: She exhibits no deformity.   Neurological: She is alert and oriented to person, place, and time. She has normal strength. No cranial nerve deficit. Coordination normal.   Skin: Skin is warm and dry. No rash noted. She is not diaphoretic. No cyanosis or erythema. Nails show no clubbing.   Spider veins in lower extremities,.   Psychiatric: She has a normal mood and affect. Her speech is normal and  "behavior is normal.   Nursing note and vitals reviewed.    Vitals:    10/25/18 1023   BP: 96/66   BP Method: Large (Manual)   Pulse: (!) 56   Weight: 84.6 kg (186 lb 8.2 oz)   Height: 4' 9" (1.448 m)     Lab Results   Component Value Date    CHOL 147 01/31/2018    CHOL 123 04/13/2017    CHOL 165 06/02/2016     Lab Results   Component Value Date    HDL 66 01/31/2018    HDL 59 04/13/2017    HDL 75 06/02/2016     Lab Results   Component Value Date    LDLCALC 69.0 01/31/2018    LDLCALC 51.2 (L) 04/13/2017    LDLCALC 79.8 06/02/2016     Lab Results   Component Value Date    TRIG 60 01/31/2018    TRIG 64 04/13/2017    TRIG 51 06/02/2016     Lab Results   Component Value Date    CHOLHDL 44.9 01/31/2018    CHOLHDL 48.0 04/13/2017    CHOLHDL 45.5 06/02/2016       Chemistry        Component Value Date/Time     08/07/2018 0915    K 5.2 (H) 08/07/2018 0915     08/07/2018 0915    CO2 26 08/07/2018 0915    BUN 26 (H) 08/07/2018 0915    CREATININE 0.8 08/07/2018 0915    GLU 97 08/07/2018 0915        Component Value Date/Time    CALCIUM 9.2 08/07/2018 0915    ALKPHOS 96 08/07/2018 0915    AST 25 08/07/2018 0915    ALT 13 08/07/2018 0915    BILITOT 0.5 08/07/2018 0915    ESTGFRAFRICA >60 08/07/2018 0915    EGFRNONAA >60 08/07/2018 0915          Lab Results   Component Value Date    TSH 2.207 01/31/2018     Lab Results   Component Value Date    INR 1.2 05/27/2017    INR 1.2 05/21/2017    INR 1.1 04/22/2017     Lab Results   Component Value Date    WBC 6.20 08/07/2018    HGB 12.7 08/07/2018    HCT 39.0 08/07/2018    MCV 89 08/07/2018     08/07/2018     BMP  Sodium   Date Value Ref Range Status   08/07/2018 139 136 - 145 mmol/L Final     Potassium   Date Value Ref Range Status   08/07/2018 5.2 (H) 3.5 - 5.1 mmol/L Final     Chloride   Date Value Ref Range Status   08/07/2018 104 95 - 110 mmol/L Final     CO2   Date Value Ref Range Status   08/07/2018 26 23 - 29 mmol/L Final     BUN, Bld   Date Value Ref Range Status "   08/07/2018 26 (H) 8 - 23 mg/dL Final     Creatinine   Date Value Ref Range Status   08/07/2018 0.8 0.5 - 1.4 mg/dL Final     Calcium   Date Value Ref Range Status   08/07/2018 9.2 8.7 - 10.5 mg/dL Final     Anion Gap   Date Value Ref Range Status   08/07/2018 9 8 - 16 mmol/L Final     eGFR if    Date Value Ref Range Status   08/07/2018 >60 >60 mL/min/1.73 m^2 Final     eGFR if non    Date Value Ref Range Status   08/07/2018 >60 >60 mL/min/1.73 m^2 Final     Comment:     Calculation used to obtain the estimated glomerular filtration  rate (eGFR) is the CKD-EPI equation.        CrCl cannot be calculated (Patient's most recent lab result is older than the maximum 7 days allowed.).    Assessment:     1. Coronary artery disease involving native coronary artery of native heart without angina pectoris    2. Hyperlipidemia, unspecified hyperlipidemia type    3. Intermittent asthma without complication, unspecified asthma severity    4. Atherosclerosis of aorta    5. PAF (paroxysmal atrial fibrillation)    6. Chronic diastolic heart failure    7. Left ventricular diastolic dysfunction with preserved systolic function    8. Diverticulosis of large intestine without hemorrhage    9. Essential hypertension    10. Severe obesity with body mass index (BMI) of 35.0 to 39.9 with serious comorbidity    11. Stage 3 chronic kidney disease    12. Bilateral carotid bruits    13. Decreased pulses in feet      Stable clinically  Has no new issues clinically needs to stay active.   Plan:   Continue current therapy  Cardiac low salt diet.  Risk factor modification and excercise program.  F/u in 6 months with lipid cmp .

## 2018-10-26 NOTE — PROGRESS NOTES
Please notify patient that her labs are stable. Her potassium remains elevated, but stable compared to previous. I will discuss this with her Cardiologist.

## 2018-10-28 NOTE — PROGRESS NOTES
Subjective:       Patient ID: Elke Laws is a 84 y.o. female.    Chief Complaint: Follow-up (/90 in ER)    Patient presents to clinic today for ER follow up. She was seen in ER August 7, 2018 for CP/SOB. Her blood pressure was elevated in the ER. She reports occasional SOB, no chest pain. Patient is otherwise without concerns today.        Review of Systems   Constitutional: Negative for chills, fatigue, fever and unexpected weight change.   Eyes: Negative for visual disturbance.   Respiratory: Negative for shortness of breath.    Cardiovascular: Negative for chest pain.   Musculoskeletal: Negative for myalgias.   Neurological: Negative for headaches.       Objective:      Physical Exam   Constitutional: She is oriented to person, place, and time. She appears well-developed and well-nourished. No distress.   HENT:   Head: Normocephalic and atraumatic.   Eyes: Conjunctivae and EOM are normal. Pupils are equal, round, and reactive to light. No scleral icterus.   Cardiovascular: Normal rate and regular rhythm. Exam reveals no gallop and no friction rub.   No murmur heard.  Pulmonary/Chest: Effort normal and breath sounds normal.   Neurological: She is alert and oriented to person, place, and time. No cranial nerve deficit. Gait normal.   Psychiatric: She has a normal mood and affect.   Vitals reviewed.      Assessment:       1. Essential hypertension    2. Hyperlipidemia, unspecified hyperlipidemia type    3. Chronic diastolic heart failure    4. Coronary artery disease involving native coronary artery of native heart without angina pectoris    5. PAF (paroxysmal atrial fibrillation)        Plan:     Problem List Items Addressed This Visit     Hyperlipidemia (Chronic)    Relevant Orders    Comprehensive metabolic panel (Completed)    Lipid panel (Completed)    Ambulatory referral to Cardiology    PAF (paroxysmal atrial fibrillation) (Chronic)    Overview     12/2013 and 5/2015-- Sxc with DIAZ-- now in nSR  (7/2015)         Relevant Orders    Ambulatory referral to Cardiology    Chronic diastolic heart failure (Chronic)    Overview     CONCLUSIONS     1 - Severe left atrial enlargement.     2 - Concentric hypertrophy.     3 - No wall motion abnormalities.     4 - Normal left ventricular systolic function (EF 60-65%).     5 - Impaired LV relaxation, elevated LAP (grade 2 diastolic dysfunction).     6 - Normal right ventricular systolic function .     7 - The estimated PA systolic pressure is 28 mmHg.     8 - Trivial aortic regurgitation.     9 - Mild mitral regurgitation.   This document has been electronically    SIGNED BY: Tish Angulo MD On: 04/13/2017 15:52    Office visit 6/28/17 with Dr. Jones for Acute on chronic diastolic congestive heart failure  note showed. PAF, diastolic CHF, non obstructive CAD per [facility] done in , HTN and HLD. She was admitted to [facility] on 5/21/2017 for CHF exacerbation.          Relevant Orders    Ambulatory referral to Cardiology    Coronary artery disease involving native coronary artery of native heart without angina pectoris (Chronic)    Overview     Cath 6/30/2016---B. Summary/Post-Operative Diagnosis   1.   Mild non-obstructive CAD.   2.   Normal LVEF.                3.  Elevated LVEDP.         Relevant Orders    Ambulatory referral to Cardiology    Essential hypertension - Primary    Relevant Orders    Ambulatory referral to Cardiology          Health Maintenance reviewed/updated. Flu vaccine today.  Will schedule for follow up with Cardiology as patient reports continued intermittent SOB.  Advised okay to take miralax daily for constipation, advised against magnesium sulfate to treat constipation, due to risk for electrolyte abnormalities and her heart history. Patient expressed understanding.

## 2018-10-31 ENCOUNTER — TELEPHONE (OUTPATIENT)
Dept: INTERNAL MEDICINE | Facility: CLINIC | Age: 83
End: 2018-10-31

## 2018-10-31 NOTE — TELEPHONE ENCOUNTER
Spoke with patient and patient's daughter and informed of labs. They both verbalized understanding.

## 2018-10-31 NOTE — TELEPHONE ENCOUNTER
----- Message from Jami Cao sent at 10/31/2018  1:01 PM CDT -----  Contact: daughter  Returning call please call back at [phone].      Thanks,  Jami Cao

## 2018-10-31 NOTE — TELEPHONE ENCOUNTER
----- Message from Shanda Guaman sent at 10/31/2018 10:48 AM CDT -----  Contact: pt daughter Keya  Calling in regards to pt blood work results and pt didn't hear and please advise [phone]

## 2019-02-16 ENCOUNTER — HOSPITAL ENCOUNTER (EMERGENCY)
Facility: HOSPITAL | Age: 84
Discharge: HOME | End: 2019-02-16
Attending: EMERGENCY MEDICINE
Payer: MEDICARE

## 2019-02-16 VITALS
SYSTOLIC BLOOD PRESSURE: 141 MMHG | OXYGEN SATURATION: 96 % | HEART RATE: 66 BPM | DIASTOLIC BLOOD PRESSURE: 67 MMHG | BODY MASS INDEX: 37.67 KG/M2 | TEMPERATURE: 98 F | RESPIRATION RATE: 18 BRPM | HEIGHT: 59 IN

## 2019-02-16 DIAGNOSIS — I48.20 CHRONIC ATRIAL FIBRILLATION: ICD-10-CM

## 2019-02-16 DIAGNOSIS — R06.09 DYSPNEA ON EXERTION: Primary | ICD-10-CM

## 2019-02-16 LAB
ALBUMIN SERPL BCP-MCNC: 3.3 G/DL
ALP SERPL-CCNC: 128 U/L
ALT SERPL W/O P-5'-P-CCNC: 18 U/L
ANION GAP SERPL CALC-SCNC: 9 MMOL/L
AST SERPL-CCNC: 22 U/L
BASOPHILS # BLD AUTO: 0.03 K/UL
BASOPHILS NFR BLD: 0.4 %
BILIRUB SERPL-MCNC: 0.5 MG/DL
BNP SERPL-MCNC: 423 PG/ML
BUN SERPL-MCNC: 31 MG/DL
CALCIUM SERPL-MCNC: 10 MG/DL
CHLORIDE SERPL-SCNC: 102 MMOL/L
CO2 SERPL-SCNC: 28 MMOL/L
CREAT SERPL-MCNC: 1.1 MG/DL
DIFFERENTIAL METHOD: ABNORMAL
EOSINOPHIL # BLD AUTO: 0.2 K/UL
EOSINOPHIL NFR BLD: 3.4 %
ERYTHROCYTE [DISTWIDTH] IN BLOOD BY AUTOMATED COUNT: 13.9 %
EST. GFR  (AFRICAN AMERICAN): 53 ML/MIN/1.73 M^2
EST. GFR  (NON AFRICAN AMERICAN): 46 ML/MIN/1.73 M^2
GLUCOSE SERPL-MCNC: 102 MG/DL
HCT VFR BLD AUTO: 41.4 %
HGB BLD-MCNC: 13.1 G/DL
INR PPP: 0.9
LYMPHOCYTES # BLD AUTO: 1.8 K/UL
LYMPHOCYTES NFR BLD: 26.6 %
MCH RBC QN AUTO: 29.5 PG
MCHC RBC AUTO-ENTMCNC: 31.6 G/DL
MCV RBC AUTO: 93 FL
MONOCYTES # BLD AUTO: 1 K/UL
MONOCYTES NFR BLD: 14.3 %
NEUTROPHILS # BLD AUTO: 3.7 K/UL
NEUTROPHILS NFR BLD: 55.3 %
PLATELET # BLD AUTO: 191 K/UL
PMV BLD AUTO: 11.5 FL
POTASSIUM SERPL-SCNC: 4.5 MMOL/L
PROT SERPL-MCNC: 6.9 G/DL
PROTHROMBIN TIME: 10.2 SEC
RBC # BLD AUTO: 4.44 M/UL
SODIUM SERPL-SCNC: 139 MMOL/L
TROPONIN I SERPL DL<=0.01 NG/ML-MCNC: 0.02 NG/ML
WBC # BLD AUTO: 6.76 K/UL

## 2019-02-16 PROCEDURE — 93010 ELECTROCARDIOGRAM REPORT: CPT | Mod: HCWC,,, | Performed by: INTERNAL MEDICINE

## 2019-02-16 PROCEDURE — 85025 COMPLETE CBC W/AUTO DIFF WBC: CPT | Mod: HCWC

## 2019-02-16 PROCEDURE — 80053 COMPREHEN METABOLIC PANEL: CPT | Mod: HCWC

## 2019-02-16 PROCEDURE — 99284 EMERGENCY DEPT VISIT MOD MDM: CPT | Mod: 25,HCWC

## 2019-02-16 PROCEDURE — 84484 ASSAY OF TROPONIN QUANT: CPT | Mod: HCWC

## 2019-02-16 PROCEDURE — 93010 EKG 12-LEAD: ICD-10-PCS | Mod: HCWC,,, | Performed by: INTERNAL MEDICINE

## 2019-02-16 PROCEDURE — 94640 AIRWAY INHALATION TREATMENT: CPT | Mod: HCWC

## 2019-02-16 PROCEDURE — 85610 PROTHROMBIN TIME: CPT | Mod: HCWC

## 2019-02-16 PROCEDURE — 96374 THER/PROPH/DIAG INJ IV PUSH: CPT | Mod: HCWC

## 2019-02-16 PROCEDURE — 83880 ASSAY OF NATRIURETIC PEPTIDE: CPT | Mod: HCWC

## 2019-02-16 PROCEDURE — 63600175 PHARM REV CODE 636 W HCPCS: Mod: HCWC | Performed by: EMERGENCY MEDICINE

## 2019-02-16 PROCEDURE — 25000242 PHARM REV CODE 250 ALT 637 W/ HCPCS: Mod: HCWC | Performed by: EMERGENCY MEDICINE

## 2019-02-16 PROCEDURE — 25000003 PHARM REV CODE 250: Mod: HCWC | Performed by: EMERGENCY MEDICINE

## 2019-02-16 RX ORDER — IPRATROPIUM BROMIDE AND ALBUTEROL SULFATE 2.5; .5 MG/3ML; MG/3ML
3 SOLUTION RESPIRATORY (INHALATION) ONCE
Status: COMPLETED | OUTPATIENT
Start: 2019-02-16 | End: 2019-02-16

## 2019-02-16 RX ORDER — FUROSEMIDE 10 MG/ML
60 INJECTION INTRAMUSCULAR; INTRAVENOUS
Status: COMPLETED | OUTPATIENT
Start: 2019-02-16 | End: 2019-02-16

## 2019-02-16 RX ADMIN — NITROGLYCERIN 1 INCH: 20 OINTMENT TOPICAL at 05:02

## 2019-02-16 RX ADMIN — FUROSEMIDE 60 MG: 10 INJECTION, SOLUTION INTRAMUSCULAR; INTRAVENOUS at 05:02

## 2019-02-16 RX ADMIN — IPRATROPIUM BROMIDE AND ALBUTEROL SULFATE 3 ML: .5; 3 SOLUTION RESPIRATORY (INHALATION) at 05:02

## 2019-02-16 NOTE — ED PROVIDER NOTES
SCRIBE #1 NOTE: I, Abena Delacruz, am scribing for, and in the presence of, Bakari Hooper Jr., MD . I have scribed the HPI, ROS, and PEx.     SCRIBE #2 NOTE: I, Tom Flores, am scribing for, and in the presence of,  Jose Fried MD. I have scribed the remaining portions of the note not scribed by Scribe #1.      History     Chief Complaint   Patient presents with    Shortness of Breath     x 1 month     Review of patient's allergies indicates:   Allergen Reactions    Iodine and iodide containing products Rash         History of Present Illness     HPI    2/16/2019, 5:16 AM  History obtained from the patient      History of Present Illness: Elke Laws is a 84 y.o. female patient with a PMHx of Afib, anemia, CAD, who presents to the Emergency Department for evaluation of DIAZ which onset gradually 1 month ago. Symptoms are constant and moderate in severity.  Better with rest; worse with exertion. Associated sxs include cough and wheezing. Patient denies any fever, chills, diaphoresis, CP, N/V, dizziness, leg swelling, palpitations, recent travel/long car rides, and all other sxs at this time. No further complaints or concerns at this time.         Arrival mode: Personal vehicle     PCP: Isidra Benavidez MD        Past Medical History:  Past Medical History:   Diagnosis Date    Abnormal nuclear stress test 6/30/2016    Acute congestive heart failure 5/27/2017    Acute coronary syndrome     Acute kidney injury (nontraumatic) 5/27/2017    Acute kidney injury (nontraumatic) 5/27/2017    Acute on chronic congestive heart failure 5/21/2017    Acute on chronic diastolic congestive heart failure 8/27/2015    Anemia 5/9/2016    Anemia 5/9/2016    Angina pectoris 1/25/2018    Anticoagulant long-term use     Aortic regurgitation     Echo 11/2013---5 - Mild to moderate aortic regurgitation.      Asthma     Atrial fibrillation 5/15/2014    Back pain     s/p epidural steriod injections, no recent  "injections.    Baker's cyst     small, right, seen on US lower extremity 6/2014    Blood transfusion     Approximately 6 years ago    Chronic constipation     Coronary artery disease     Degenerative disc disease, lumbar     Diastolic dysfunction     Seen on echo 11/2013, stress test negative 5/2014    Diverticulosis     colonoscopy 3/27/2011    E coli bacteremia 5/23/2017    E. coli UTI (urinary tract infection) 5/23/2017    Hemorrhoids     colonoscopy 3/27/2011    Hiatal hernia     CXR 12/30/2016---Retrocardiac density again noted consistent with a hiatal hernia.    Hx of adenomatous colonic polyps     Hyperlipidemia     Hypertension     Hyponatremia 5/9/2016    Hypoxemia 5/27/2017    Hypoxia 6/28/2017    Leukocytosis 5/27/2017    Mitral regurgitation     Myocardial infarction     per patient was told in the past she had a "mild heart attack"    Obesity     Osteoarthritis, knee     Pneumonia     per patient "walking Pneumonia" did not require hospitalization    Seasonal allergies     Sepsis 5/22/2017    Trouble in sleeping     Urinary incontinence        Past Surgical History:  Past Surgical History:   Procedure Laterality Date    APPENDECTOMY      COLONOSCOPY okay by dr. ramos N/A 8/29/2017    Performed by [facility]    EYE SURGERY Left     HYSTERECTOMY      benign reasons    KNEE SURGERY Bilateral     x2     Left heart cath      OLECRANON BURSECTOMY Right     6/2011    TONSILLECTOMY      URETHRA SURGERY           Family History:  Family History   Problem Relation Age of Onset    Heart disease Mother     Cancer Mother         colon    Hypertension Mother     Hyperlipidemia Mother     Heart disease Father     Hypertension Father     Hyperlipidemia Father     Heart disease Sister         MI    Heart disease Brother         MI    COPD Son     Hypertension Son     Diabetes Brother     Diabetes Son     Cancer Sister         breast    Kidney " disease Neg Hx     Stroke Neg Hx        Social History:  Social History     Tobacco Use    Smoking status: Former Smoker     Packs/day: 0.05     Years: 1.00     Pack years: 0.05     Types: Cigarettes     Last attempt to quit: 1952     Years since quittin.1    Smokeless tobacco: Never Used   Substance and Sexual Activity    Alcohol use: No     Alcohol/week: 0.0 oz    Drug use: No    Sexual activity: No     Partners: Male     Birth control/protection: See Surgical Hx        Review of Systems     Review of Systems   Constitutional: Negative for chills, diaphoresis and fever.   HENT: Negative for sore throat.    Respiratory: Positive for cough, shortness of breath and wheezing.    Cardiovascular: Negative for chest pain, palpitations and leg swelling.   Gastrointestinal: Negative for nausea and vomiting.   Genitourinary: Negative for dysuria.   Musculoskeletal: Negative for back pain.   Skin: Negative for rash.   Neurological: Negative for dizziness and weakness.   Hematological: Does not bruise/bleed easily.   All other systems reviewed and are negative.       Physical Exam     Initial Vitals [19 0513]   BP Pulse Resp Temp SpO2   (!) 159/83 67 20 98.1 °F (36.7 °C) 95 %      MAP       --          Physical Exam  Nursing Notes and Vital Signs Reviewed.  Constitutional: Patient is in no acute distress. Well-developed and well-nourished.  Head: Atraumatic. Normocephalic.  Eyes: PERRL. EOM intact. Conjunctivae are not pale. No scleral icterus.  ENT: Mucous membranes are moist. Oropharynx is clear and symmetric.    Neck: Supple. Full ROM. No lymphadenopathy.  Cardiovascular: Regular rate. Irregularly irregular rhythm. No murmurs, rubs, or gallops. Distal pulses are 2+ and symmetric.  Pulmonary/Chest: No respiratory distress. Clear to auscultation bilaterally. No wheezing or rales.  Abdominal: Soft and non-distended.  There is no tenderness.  No rebound, guarding, or rigidity. Good bowel  "sounds.  Genitourinary: No CVA tenderness  Musculoskeletal: Moves all extremities. No obvious deformities. No pedal edema. No calf tenderness.  Skin: Warm and dry.  Neurological:  Alert, awake, and appropriate.  Normal speech.  No acute focal neurological deficits are appreciated.  Psychiatric: Normal affect. Good eye contact. Appropriate in content.     ED Course   Procedures  ED Vital Signs:  Vitals:    02/16/19 0513 02/16/19 0525 02/16/19 0530 02/16/19 0536   BP: (!) 159/83  (!) 181/77    Pulse: 67 66 65 64   Resp: 20 (!) 22 (!) 27    Temp: 98.1 °F (36.7 °C)      TempSrc: Oral      SpO2: 95% 96% 96%    Height: 4' 11" (1.499 m)       02/16/19 0600 02/16/19 0719 02/16/19 0735   BP: (!) 194/77  (!) 180/86   Pulse: 67  66   Resp: (!) 21  20   Temp:  98.2 °F (36.8 °C)    TempSrc:  Oral    SpO2: 96%  99%   Height:          Abnormal Lab Results:  Labs Reviewed   CBC W/ AUTO DIFFERENTIAL - Abnormal; Notable for the following components:       Result Value    MCHC 31.6 (*)     All other components within normal limits   COMPREHENSIVE METABOLIC PANEL - Abnormal; Notable for the following components:    BUN, Bld 31 (*)     Albumin 3.3 (*)     eGFR if  53 (*)     eGFR if non  46 (*)     All other components within normal limits   B-TYPE NATRIURETIC PEPTIDE - Abnormal; Notable for the following components:     (*)     All other components within normal limits   TROPONIN I   PROTIME-INR        All Lab Results:  Results for orders placed or performed during the hospital encounter of 02/16/19   CBC auto differential   Result Value Ref Range    WBC 6.76 3.90 - 12.70 K/uL    RBC 4.44 4.00 - 5.40 M/uL    Hemoglobin 13.1 12.0 - 16.0 g/dL    Hematocrit 41.4 37.0 - 48.5 %    MCV 93 82 - 98 fL    MCH 29.5 27.0 - 31.0 pg    MCHC 31.6 (L) 32.0 - 36.0 g/dL    RDW 13.9 11.5 - 14.5 %    Platelets 191 150 - 350 K/uL    MPV 11.5 9.2 - 12.9 fL    Gran # (ANC) 3.7 1.8 - 7.7 K/uL    Lymph # 1.8 1.0 - 4.8 " K/uL    Mono # 1.0 0.3 - 1.0 K/uL    Eos # 0.2 0.0 - 0.5 K/uL    Baso # 0.03 0.00 - 0.20 K/uL    Gran% 55.3 38.0 - 73.0 %    Lymph% 26.6 18.0 - 48.0 %    Mono% 14.3 4.0 - 15.0 %    Eosinophil% 3.4 0.0 - 8.0 %    Basophil% 0.4 0.0 - 1.9 %    Differential Method Automated    Comprehensive metabolic panel   Result Value Ref Range    Sodium 139 136 - 145 mmol/L    Potassium 4.5 3.5 - 5.1 mmol/L    Chloride 102 95 - 110 mmol/L    CO2 28 23 - 29 mmol/L    Glucose 102 70 - 110 mg/dL    BUN, Bld 31 (H) 8 - 23 mg/dL    Creatinine 1.1 0.5 - 1.4 mg/dL    Calcium 10.0 8.7 - 10.5 mg/dL    Total Protein 6.9 6.0 - 8.4 g/dL    Albumin 3.3 (L) 3.5 - 5.2 g/dL    Total Bilirubin 0.5 0.1 - 1.0 mg/dL    Alkaline Phosphatase 128 55 - 135 U/L    AST 22 10 - 40 U/L    ALT 18 10 - 44 U/L    Anion Gap 9 8 - 16 mmol/L    eGFR if African American 53 (A) >60 mL/min/1.73 m^2    eGFR if non African American 46 (A) >60 mL/min/1.73 m^2   Brain natriuretic peptide   Result Value Ref Range     (H) 0 - 99 pg/mL   Troponin I   Result Value Ref Range    Troponin I 0.019 0.000 - 0.026 ng/mL   Protime-INR   Result Value Ref Range    Prothrombin Time 10.2 9.0 - 12.5 sec    INR 0.9 0.8 - 1.2         Imaging Results:  Imaging Results          X-Ray Chest AP Portable (Final result)  Result time 02/16/19 09:03:11    Final result by Barbara Haywood MD (02/16/19 09:03:11)                 Impression:      No cardiopulmonary process noted.      Electronically signed by: Barbara Haywood MD  Date:    02/16/2019  Time:    09:03             Narrative:    EXAMINATION:  XR CHEST AP PORTABLE    CLINICAL HISTORY:  dyspnea;    COMPARISON:  August 7, 2018    FINDINGS:  No infiltrate or effusion identified.  Cardiomediastinal silhouette is within normal limits.                              Per ED physician, pt's CXR results NAF.       The EKG was ordered, reviewed, and independently interpreted by the ED provider.  Interpretation time: 0521  Rate: 77  BPM  Rhythm: atrial fibrillation  Interpretation: No acute ST changes. No STEMI.           The Emergency Provider reviewed the vital signs and test results, which are outlined above.     ED Discussion     6:00 AM: Dr. Hooper transfers care of pt to Dr. Fried, pending lab results.    6:54 AM: Re-evaluated pt. Pt is resting comfortably and is in no acute distress.  D/w pt all pertinent results. D/w pt any concerns expressed at this time. Answered all questions. Pt expresses understanding at this time.    9:25 AM: Reassessed pt at this time. She has an ambulatory pulse ox of 95%.  Pt states her condition has improved at this time. Discussed with pt all pertinent ED information and results. Discussed pt dx and plan of tx. Gave pt all f/u and return to the ED instructions. All questions and concerns were addressed at this time. Pt expresses understanding of information and instructions, and is comfortable with plan to discharge. Pt is stable for discharge.    I discussed with patient and/or family/caretaker that evaluation in the ED does not suggest any emergent or life threatening medical conditions requiring immediate intervention beyond what was provided in the ED, and I believe patient is safe for discharge.  Regardless, an unremarkable evaluation in the ED does not preclude the development or presence of a serious of life threatening condition. As such, patient was instructed to return immediately for any worsening or change in current symptoms.    ED Medication(s):  Medications   albuterol-ipratropium 2.5 mg-0.5 mg/3 mL nebulizer solution 3 mL (3 mLs Nebulization Given 2/16/19 5446)   nitroGLYCERIN 2% TD oint ointment 1 inch (1 inch Topical (Top) Given by Other 2/16/19 7497)   furosemide injection 60 mg (60 mg Intravenous Given 2/16/19 9850)       New Prescriptions    No medications on file       Follow-up Information     Isidra Benavidez MD In 2 days.    Specialty:  Family Medicine  Contact information:  [id number] [place] [address]  [phone]             [facility] - .    Specialty:  Emergency Medicine  Why:  As needed, If symptoms worsen  Contact information:  [id number] [place] [address] [phone]                       Medical Decision Making:   Clinical Tests:   Lab Tests: Ordered and Reviewed  Radiological Study: Ordered and Reviewed  Medical Tests: Ordered and Reviewed  84-year-old female presented non ill-appearing with complaints of dyspnea on exertion over the past month.  Patient does have a history of atrial fibrillation and was in atrial fibrillation here in the emergency department, however patient's rate was controlled and was not hypotensive.  Patient is currently on rate-controlling medications.  Patient is also on Eliquis.  Do not suspect pulmonary embolism given her reported compliance with Eliquis.  Patient was initially seen by another provider and treated with albuterol, nitropaste, and Lasix.  Patient had good urinary output after Lasix.  Workup did not reveal pulmonary edema as the source of her dyspnea on exertion.  No evidence of MI.  No evidence of pneumonia.  Patient was ambulating around the emergency department with a pulse ox of 95%.  Patient is stable for discharge to continue evaluation for DIAZ x 1 month with her outpatient physicians.  ER return precautions provided             Scribe Attestation:   Scribe #1: I performed the above scribed service and the documentation accurately describes the services I performed. I attest to the accuracy of the note.     Attending:   Physician Attestation Statement for Scribe #1: I, Bakari Hooper Jr., MD, personally performed the services described in this documentation, as scribed by Abena Delacruz, in my presence, and it is both accurate and complete.       Scribe Attestation:   Scribe #2: I performed the above scribed service and the documentation accurately describes the services  I performed. I attest to the accuracy of the note.    Attending Attestation:           Physician Attestation for Scribe:    Physician Attestation Statement for Scribe #2: I, Jose Fried MD, reviewed documentation, as scribed by Tom Flores in my presence, and it is both accurate and complete. I also acknowledge and confirm the content of the note done by Scribe #1.           Clinical Impression       ICD-10-CM ICD-9-CM   1. Dyspnea on exertion R06.09 786.09   2. Chronic atrial fibrillation I48.2 427.31       Disposition:   Disposition: Discharged  Condition: Stable         Jose Fried MD  02/19/19 0736

## 2019-02-16 NOTE — ED NOTES
Completed ambulatory oxygen saturation levels per MD order - Patient ambulated approximately 16 ft prior to returning to bed. Patient states she felt winded upon returning to lying position. Oxygen saturation levels decreased from 97% to 95%. Also, HR decreased from 67 into upper 40's during her ambulation. Patient tolerated walk well - no accessory muscle use or tachypnea noted. Pt AAOx3, side rails raised, call bell within reach. NAD at this time. Will continue to monitor.    MD notified of decrease in O2 and HR with ambulation.

## 2019-02-16 NOTE — ED NOTES
Pt AAOx3, resting in bed with eyes closed, side rails up x 2, call bell within reach. Pt son at bedside. NAD at this time. Will continue to monitor.

## 2019-03-04 ENCOUNTER — NURSE TRIAGE (OUTPATIENT)
Dept: ADMINISTRATIVE | Facility: CLINIC | Age: 84
End: 2019-03-04

## 2019-03-04 NOTE — TELEPHONE ENCOUNTER
Received call from appt desk:    Caller reported pt is having worsening sob- cannot walk very short distances without extreme sob worse than normal.    Reason for Disposition   MODERATE difficulty breathing (e.g., speaks in phrases, SOB even at rest, pulse 100-120) of new onset or worse than normal    Protocols used: ST BREATHING DIFFICULTY-A-OH

## 2019-03-07 ENCOUNTER — HOSPITAL ENCOUNTER (OUTPATIENT)
Facility: HOSPITAL | Age: 84
Discharge: HOME | End: 2019-03-08
Attending: EMERGENCY MEDICINE | Admitting: INTERNAL MEDICINE
Payer: MEDICARE

## 2019-03-07 DIAGNOSIS — I50.9 CHF (CONGESTIVE HEART FAILURE): ICD-10-CM

## 2019-03-07 DIAGNOSIS — I51.89 LEFT VENTRICULAR DIASTOLIC DYSFUNCTION WITH PRESERVED SYSTOLIC FUNCTION: Chronic | ICD-10-CM

## 2019-03-07 DIAGNOSIS — I50.43 ACUTE ON CHRONIC COMBINED SYSTOLIC AND DIASTOLIC CONGESTIVE HEART FAILURE: Primary | ICD-10-CM

## 2019-03-07 DIAGNOSIS — R07.9 CHEST PAIN: ICD-10-CM

## 2019-03-07 PROBLEM — I50.33 ACUTE ON CHRONIC DIASTOLIC HEART FAILURE: Status: ACTIVE | Noted: 2019-03-07

## 2019-03-07 LAB
ALBUMIN SERPL BCP-MCNC: 3 G/DL
ALP SERPL-CCNC: 99 U/L
ALT SERPL W/O P-5'-P-CCNC: 30 U/L
ANION GAP SERPL CALC-SCNC: 10 MMOL/L
AORTIC VALVE REGURGITATION: ABNORMAL
AST SERPL-CCNC: 24 U/L
BASOPHILS # BLD AUTO: 0.01 K/UL
BASOPHILS NFR BLD: 0.1 %
BILIRUB SERPL-MCNC: 0.5 MG/DL
BNP SERPL-MCNC: 599 PG/ML
BUN SERPL-MCNC: 23 MG/DL
CALCIUM SERPL-MCNC: 9.6 MG/DL
CHLORIDE SERPL-SCNC: 103 MMOL/L
CO2 SERPL-SCNC: 27 MMOL/L
CREAT SERPL-MCNC: 0.9 MG/DL
DIFFERENTIAL METHOD: ABNORMAL
EOSINOPHIL # BLD AUTO: 0.2 K/UL
EOSINOPHIL NFR BLD: 2.1 %
ERYTHROCYTE [DISTWIDTH] IN BLOOD BY AUTOMATED COUNT: 13.9 %
EST. GFR  (AFRICAN AMERICAN): >60 ML/MIN/1.73 M^2
EST. GFR  (NON AFRICAN AMERICAN): 59 ML/MIN/1.73 M^2
ESTIMATED PA SYSTOLIC PRESSURE: 58.35
GLUCOSE SERPL-MCNC: 102 MG/DL
HCT VFR BLD AUTO: 39.3 %
HGB BLD-MCNC: 12.5 G/DL
INFLUENZA A, MOLECULAR: NEGATIVE
INFLUENZA B, MOLECULAR: NEGATIVE
LYMPHOCYTES # BLD AUTO: 1.6 K/UL
LYMPHOCYTES NFR BLD: 22.5 %
MCH RBC QN AUTO: 29.5 PG
MCHC RBC AUTO-ENTMCNC: 31.8 G/DL
MCV RBC AUTO: 93 FL
MONOCYTES # BLD AUTO: 0.8 K/UL
MONOCYTES NFR BLD: 11.6 %
NEUTROPHILS # BLD AUTO: 4.6 K/UL
NEUTROPHILS NFR BLD: 63.7 %
PLATELET # BLD AUTO: 170 K/UL
PMV BLD AUTO: 10.6 FL
POTASSIUM SERPL-SCNC: 3.8 MMOL/L
PROT SERPL-MCNC: 6.9 G/DL
RBC # BLD AUTO: 4.24 M/UL
RETIRED EF AND QEF - SEE NOTES: 60 (ref 55–65)
SODIUM SERPL-SCNC: 140 MMOL/L
SPECIMEN SOURCE: NORMAL
TRICUSPID VALVE REGURGITATION: ABNORMAL
TROPONIN I SERPL DL<=0.01 NG/ML-MCNC: <0.006 NG/ML
TROPONIN I SERPL DL<=0.01 NG/ML-MCNC: <0.006 NG/ML
WBC # BLD AUTO: 7.25 K/UL

## 2019-03-07 PROCEDURE — 93005 ELECTROCARDIOGRAM TRACING: CPT | Mod: HCWC

## 2019-03-07 PROCEDURE — C8929 TTE W OR WO FOL WCON,DOPPLER: HCPCS | Mod: HCWC

## 2019-03-07 PROCEDURE — 93306 2D ECHO WITH COLOR FLOW DOPPLER: ICD-10-PCS | Mod: 26,HCWC,, | Performed by: INTERNAL MEDICINE

## 2019-03-07 PROCEDURE — 63600175 PHARM REV CODE 636 W HCPCS: Mod: HCWC | Performed by: EMERGENCY MEDICINE

## 2019-03-07 PROCEDURE — 93306 TTE W/DOPPLER COMPLETE: CPT | Mod: 26,HCWC,, | Performed by: INTERNAL MEDICINE

## 2019-03-07 PROCEDURE — 63600175 PHARM REV CODE 636 W HCPCS: Mod: HCWC | Performed by: NURSE PRACTITIONER

## 2019-03-07 PROCEDURE — 80053 COMPREHEN METABOLIC PANEL: CPT | Mod: HCWC

## 2019-03-07 PROCEDURE — 85025 COMPLETE CBC W/AUTO DIFF WBC: CPT | Mod: HCWC

## 2019-03-07 PROCEDURE — 93010 EKG 12-LEAD: ICD-10-PCS | Mod: HCWC,,, | Performed by: INTERNAL MEDICINE

## 2019-03-07 PROCEDURE — 87502 INFLUENZA DNA AMP PROBE: CPT | Mod: HCWC

## 2019-03-07 PROCEDURE — G0378 HOSPITAL OBSERVATION PER HR: HCPCS | Mod: HCWC

## 2019-03-07 PROCEDURE — 96376 TX/PRO/DX INJ SAME DRUG ADON: CPT

## 2019-03-07 PROCEDURE — 93010 ELECTROCARDIOGRAM REPORT: CPT | Mod: HCWC,,, | Performed by: INTERNAL MEDICINE

## 2019-03-07 PROCEDURE — 83880 ASSAY OF NATRIURETIC PEPTIDE: CPT | Mod: HCWC

## 2019-03-07 PROCEDURE — 84484 ASSAY OF TROPONIN QUANT: CPT | Mod: 91,HCWC

## 2019-03-07 PROCEDURE — 25000003 PHARM REV CODE 250: Mod: HCWC | Performed by: NURSE PRACTITIONER

## 2019-03-07 PROCEDURE — 99285 EMERGENCY DEPT VISIT HI MDM: CPT | Mod: 25,HCWC

## 2019-03-07 PROCEDURE — 96374 THER/PROPH/DIAG INJ IV PUSH: CPT | Mod: HCWC

## 2019-03-07 RX ORDER — PANTOPRAZOLE SODIUM 40 MG/1
40 TABLET, DELAYED RELEASE ORAL DAILY
Status: DISCONTINUED | OUTPATIENT
Start: 2019-03-07 | End: 2019-03-08 | Stop reason: HOSPADM

## 2019-03-07 RX ORDER — METOPROLOL SUCCINATE 50 MG/1
100 TABLET, EXTENDED RELEASE ORAL DAILY
Status: DISCONTINUED | OUTPATIENT
Start: 2019-03-08 | End: 2019-03-08 | Stop reason: HOSPADM

## 2019-03-07 RX ORDER — MAG HYDROX/ALUMINUM HYD/SIMETH 200-200-20
30 SUSPENSION, ORAL (FINAL DOSE FORM) ORAL EVERY 6 HOURS PRN
Status: DISCONTINUED | OUTPATIENT
Start: 2019-03-07 | End: 2019-03-08 | Stop reason: HOSPADM

## 2019-03-07 RX ORDER — ACETAMINOPHEN 325 MG/1
650 TABLET ORAL EVERY 6 HOURS PRN
Status: DISCONTINUED | OUTPATIENT
Start: 2019-03-07 | End: 2019-03-08 | Stop reason: HOSPADM

## 2019-03-07 RX ORDER — SODIUM CHLORIDE 0.9 % (FLUSH) 0.9 %
5 SYRINGE (ML) INJECTION
Status: DISCONTINUED | OUTPATIENT
Start: 2019-03-07 | End: 2019-03-08 | Stop reason: HOSPADM

## 2019-03-07 RX ORDER — FUROSEMIDE 10 MG/ML
40 INJECTION INTRAMUSCULAR; INTRAVENOUS 2 TIMES DAILY
Status: DISCONTINUED | OUTPATIENT
Start: 2019-03-07 | End: 2019-03-08 | Stop reason: HOSPADM

## 2019-03-07 RX ORDER — ISOSORBIDE MONONITRATE 30 MG/1
30 TABLET, EXTENDED RELEASE ORAL DAILY
Status: DISCONTINUED | OUTPATIENT
Start: 2019-03-08 | End: 2019-03-08 | Stop reason: HOSPADM

## 2019-03-07 RX ORDER — ONDANSETRON 2 MG/ML
4 INJECTION INTRAMUSCULAR; INTRAVENOUS EVERY 8 HOURS PRN
Status: DISCONTINUED | OUTPATIENT
Start: 2019-03-07 | End: 2019-03-08 | Stop reason: HOSPADM

## 2019-03-07 RX ORDER — IPRATROPIUM BROMIDE AND ALBUTEROL SULFATE 2.5; .5 MG/3ML; MG/3ML
3 SOLUTION RESPIRATORY (INHALATION) EVERY 6 HOURS PRN
Status: DISCONTINUED | OUTPATIENT
Start: 2019-03-07 | End: 2019-03-08 | Stop reason: HOSPADM

## 2019-03-07 RX ORDER — FUROSEMIDE 10 MG/ML
40 INJECTION INTRAMUSCULAR; INTRAVENOUS
Status: COMPLETED | OUTPATIENT
Start: 2019-03-07 | End: 2019-03-07

## 2019-03-07 RX ORDER — PRAVASTATIN SODIUM 20 MG/1
40 TABLET ORAL DAILY
Status: DISCONTINUED | OUTPATIENT
Start: 2019-03-07 | End: 2019-03-08 | Stop reason: HOSPADM

## 2019-03-07 RX ORDER — FLECAINIDE ACETATE 50 MG/1
50 TABLET ORAL EVERY 12 HOURS
Status: DISCONTINUED | OUTPATIENT
Start: 2019-03-07 | End: 2019-03-08 | Stop reason: HOSPADM

## 2019-03-07 RX ADMIN — PRAVASTATIN SODIUM 40 MG: 20 TABLET ORAL at 02:03

## 2019-03-07 RX ADMIN — FLECAINIDE ACETATE 50 MG: 50 TABLET ORAL at 08:03

## 2019-03-07 RX ADMIN — FUROSEMIDE 40 MG: 10 INJECTION, SOLUTION INTRAMUSCULAR; INTRAVENOUS at 11:03

## 2019-03-07 RX ADMIN — PANTOPRAZOLE SODIUM 40 MG: 40 TABLET, DELAYED RELEASE ORAL at 02:03

## 2019-03-07 RX ADMIN — APIXABAN 5 MG: 2.5 TABLET, FILM COATED ORAL at 08:03

## 2019-03-07 RX ADMIN — FUROSEMIDE 40 MG: 10 INJECTION, SOLUTION INTRAVENOUS at 07:03

## 2019-03-07 NOTE — ED NOTES
Assisted patient onto bedside commode. Changed patient into a dry brief, and assisted patient back into bed. Call light within reach, no further complaints at this time. Will continue to monitor.

## 2019-03-07 NOTE — ED PROVIDER NOTES
SCRIBE #1 NOTE: I, Gabrielle Durbin, am scribing for, and in the presence of, Jass Rultedge Do, MD. I have scribed the entire note.       History     Chief Complaint   Patient presents with    Shortness of Breath     pt c/o SOB and cough      Review of patient's allergies indicates:   Allergen Reactions    Iodine and iodide containing products Rash         History of Present Illness     HPI    3/7/2019, 9:45 AM  History obtained from the patient      History of Present Illness: Elke Laws is a 84 y.o. female patient who presents to the Emergency Department for evaluation of SOB which onset gradually 3 days ago. Friend at bedside states pt cannot walk a few steps without becoming SOB. Symptoms are constant and moderate in severity. No mitigating or exacerbating factors reported. Associated sxs include cough. Patient denies any fever, chills, congestion, rhinorrhea, sore throat, leg swelling, CP, n/v/d, abd pain, weakness, numbness, and all other sxs at this time. No further complaints or concerns at this time.       Arrival mode: Personal vehicle     PCP: Isidra Benavidez MD        Past Medical History:  Past Medical History:   Diagnosis Date    Abnormal nuclear stress test 6/30/2016    Acute congestive heart failure 5/27/2017    Acute coronary syndrome     Acute kidney injury (nontraumatic) 5/27/2017    Acute kidney injury (nontraumatic) 5/27/2017    Acute on chronic congestive heart failure 5/21/2017    Acute on chronic diastolic congestive heart failure 8/27/2015    Anemia 5/9/2016    Anemia 5/9/2016    Angina pectoris 1/25/2018    Anticoagulant long-term use     Aortic regurgitation     Echo 11/2013---5 - Mild to moderate aortic regurgitation.      Asthma     Atrial fibrillation 5/15/2014    Back pain     s/p epidural steriod injections, no recent injections.    Baker's cyst     small, right, seen on US lower extremity 6/2014    Blood transfusion     Approximately 6 years ago    Chronic  "constipation     Coronary artery disease     Degenerative disc disease, lumbar     Diastolic dysfunction     Seen on echo 11/2013, stress test negative 5/2014    Diverticulosis     colonoscopy 3/27/2011    E coli bacteremia 5/23/2017    E. coli UTI (urinary tract infection) 5/23/2017    Hemorrhoids     colonoscopy 3/27/2011    Hiatal hernia     CXR 12/30/2016---Retrocardiac density again noted consistent with a hiatal hernia.    Hx of adenomatous colonic polyps     Hyperlipidemia     Hypertension     Hyponatremia 5/9/2016    Hypoxemia 5/27/2017    Hypoxia 6/28/2017    Leukocytosis 5/27/2017    Mitral regurgitation     Myocardial infarction     per patient was told in the past she had a "mild heart attack"    Obesity     Osteoarthritis, knee     Pneumonia     per patient "walking Pneumonia" did not require hospitalization    Seasonal allergies     Sepsis 5/22/2017    Trouble in sleeping     Urinary incontinence        Past Surgical History:  Past Surgical History:   Procedure Laterality Date    APPENDECTOMY      COLONOSCOPY okay by dr. ramos N/A 8/29/2017    [facility]    EYE SURGERY Left     HYSTERECTOMY      benign reasons    KNEE SURGERY Bilateral     x2     Left heart cath      OLECRANON BURSECTOMY Right     6/2011    TONSILLECTOMY      URETHRA SURGERY           Family History:  Family History   Problem Relation Age of Onset    Heart disease Mother     Cancer Mother         colon    Hypertension Mother     Hyperlipidemia Mother     Heart disease Father     Hypertension Father     Hyperlipidemia Father     Heart disease Sister         MI    Heart disease Brother         MI    COPD Son     Hypertension Son     Diabetes Brother     Diabetes Son     Cancer Sister         breast    Kidney disease Neg Hx     Stroke Neg Hx        Social History:  Social History     Tobacco Use    Smoking status: Former Smoker     Packs/day: 0.05     " Years: 1.00     Pack years: 0.05     Types: Cigarettes     Last attempt to quit: 1952     Years since quittin.2    Smokeless tobacco: Never Used   Substance and Sexual Activity    Alcohol use: No     Alcohol/week: 0.0 oz    Drug use: No    Sexual activity: No     Partners: Male     Birth control/protection: See Surgical Hx        Review of Systems     Review of Systems   Constitutional: Negative for chills, diaphoresis and fever.   HENT: Negative for congestion, rhinorrhea and sore throat.    Respiratory: Positive for cough and shortness of breath. Negative for choking and stridor.    Cardiovascular: Negative for chest pain and leg swelling.   Gastrointestinal: Negative for abdominal pain, diarrhea, nausea and vomiting.   Genitourinary: Negative for dysuria, frequency and hematuria.   Musculoskeletal: Negative for back pain and neck pain.   Skin: Negative for rash.   Neurological: Negative for dizziness, weakness, numbness and headaches.   Hematological: Does not bruise/bleed easily.   All other systems reviewed and are negative.       Physical Exam     Initial Vitals [19 0923]   BP Pulse Resp Temp SpO2   (!) 181/88 80 20 97.8 °F (36.6 °C) (!) 94 %      MAP       --          Physical Exam  Nursing Notes and Vital Signs Reviewed.  Constitutional: Patient is in mild distress. Well-developed and well-nourished.  Head: Atraumatic. Normocephalic.  Eyes: PERRL. EOM intact. Conjunctivae are not pale. No scleral icterus.  ENT: Mucous membranes are moist. Oropharynx is clear and symmetric.    Neck: Supple. Full ROM. No lymphadenopathy. No JVD.  Cardiovascular: Regular rate. Regular rhythm. No murmurs, rubs, or gallops. Distal pulses are 2+ and symmetric.  Pulmonary/Chest: Mild tachypnea. Bibasilar rales.  Abdominal: Soft and non-distended.  There is no tenderness.  No rebound, guarding, or rigidity.   Musculoskeletal: Moves all extremities. No obvious deformities. No pedal edema. No calf tenderness.  Skin:  "Warm and dry.  Neurological:  Alert, awake, and appropriate.  Normal speech.  No acute focal neurological deficits are appreciated.  Psychiatric: Normal affect. Good eye contact. Appropriate in content.     ED Course   Procedures  ED Vital Signs:  Vitals:    03/07/19 0923 03/07/19 0928 03/07/19 0940 03/07/19 0950   BP: (!) 181/88   (!) 182/92   Pulse: 80  84 77   Resp: 20   (!) 24   Temp: 97.8 °F (36.6 °C)      TempSrc: Oral      SpO2: (!) 94%   (!) 94%   Weight:  57 kg (125 lb 9.6 oz)     Height: 4' 11" (1.499 m)       03/07/19 1030 03/07/19 1130   BP: (!) 168/90 (!) 148/89   Pulse: 81 76   Resp: (!) 24 20   Temp:     TempSrc:     SpO2: (!) 94% (!) 93%   Weight:     Height:         Abnormal Lab Results:  Labs Reviewed   CBC W/ AUTO DIFFERENTIAL - Abnormal; Notable for the following components:       Result Value    MCHC 31.8 (*)     All other components within normal limits   COMPREHENSIVE METABOLIC PANEL - Abnormal; Notable for the following components:    Albumin 3.0 (*)     eGFR if non  59 (*)     All other components within normal limits   B-TYPE NATRIURETIC PEPTIDE - Abnormal; Notable for the following components:     (*)     All other components within normal limits   INFLUENZA A & B BY MOLECULAR   TROPONIN I   TROPONIN I        All Lab Results:  Results for orders placed or performed during the hospital encounter of 03/07/19   Influenza A & B by Molecular   Result Value Ref Range    Influenza A, Molecular Negative Negative    Influenza B, Molecular Negative Negative    Flu A & B Source Nasal swab    CBC auto differential   Result Value Ref Range    WBC 7.25 3.90 - 12.70 K/uL    RBC 4.24 4.00 - 5.40 M/uL    Hemoglobin 12.5 12.0 - 16.0 g/dL    Hematocrit 39.3 37.0 - 48.5 %    MCV 93 82 - 98 fL    MCH 29.5 27.0 - 31.0 pg    MCHC 31.8 (L) 32.0 - 36.0 g/dL    RDW 13.9 11.5 - 14.5 %    Platelets 170 150 - 350 K/uL    MPV 10.6 9.2 - 12.9 fL    Gran # (ANC) 4.6 1.8 - 7.7 K/uL    Lymph # 1.6 " 1.0 - 4.8 K/uL    Mono # 0.8 0.3 - 1.0 K/uL    Eos # 0.2 0.0 - 0.5 K/uL    Baso # 0.01 0.00 - 0.20 K/uL    Gran% 63.7 38.0 - 73.0 %    Lymph% 22.5 18.0 - 48.0 %    Mono% 11.6 4.0 - 15.0 %    Eosinophil% 2.1 0.0 - 8.0 %    Basophil% 0.1 0.0 - 1.9 %    Differential Method Automated    Comprehensive metabolic panel   Result Value Ref Range    Sodium 140 136 - 145 mmol/L    Potassium 3.8 3.5 - 5.1 mmol/L    Chloride 103 95 - 110 mmol/L    CO2 27 23 - 29 mmol/L    Glucose 102 70 - 110 mg/dL    BUN, Bld 23 8 - 23 mg/dL    Creatinine 0.9 0.5 - 1.4 mg/dL    Calcium 9.6 8.7 - 10.5 mg/dL    Total Protein 6.9 6.0 - 8.4 g/dL    Albumin 3.0 (L) 3.5 - 5.2 g/dL    Total Bilirubin 0.5 0.1 - 1.0 mg/dL    Alkaline Phosphatase 99 55 - 135 U/L    AST 24 10 - 40 U/L    ALT 30 10 - 44 U/L    Anion Gap 10 8 - 16 mmol/L    eGFR if African American >60 >60 mL/min/1.73 m^2    eGFR if non African American 59 (A) >60 mL/min/1.73 m^2   Troponin I #1   Result Value Ref Range    Troponin I <0.006 0.000 - 0.026 ng/mL   B-Type natriuretic peptide (BNP)   Result Value Ref Range     (H) 0 - 99 pg/mL       Imaging Results:  Imaging Results          X-Ray Chest PA And Lateral (Final result)  Result time 03/07/19 10:05:56    Final result by Magdaleno Kumar MD (03/07/19 10:05:56)                 Impression:      CHF with interstitial edema, new from comparison examination.      Electronically signed by: Magdaleno Kumar MD  Date:    03/07/2019  Time:    10:05             Narrative:    EXAMINATION:  XR CHEST PA AND LATERAL    CLINICAL HISTORY:  Chest Pain;    TECHNIQUE:  PA and lateral views of the chest were performed.    COMPARISON:  February 16, 2019    FINDINGS:  Mild cardiomegaly.  Mild aortic atherosclerosis.    Pulmonary vascular congestion.  Some interstitial thickening throughout the central to lower lungs.  No pleural effusions.    Chronically elevated right hemidiaphragm.                                 The EKG was ordered, reviewed, and  independently interpreted by the ED provider.  Interpretation time: 9:31  Rate: 81 BPM  Rhythm: atrial fibrillation w/ prematurely aberrantly conducted complexes  Interpretation: Marked ST abnormality, possible inferior subendocardial injury. No STEMI.             The Emergency Provider reviewed the vital signs and test results, which are outlined above.     ED Discussion     12:18 PM: Discussed case with SHAWNA Farmer ([facility]). Dr. Yoon agrees with current care and management of pt and accepts admission.   Admitting Service: Hospital medicine   Admitting Physician: Dr. Yoon  Admit to: Obs/Tele    12:21 PM: Re-evaluated pt. I have discussed test results, shared treatment plan, and the need for admission with patient and family at bedside. Pt and family express understanding at this time and agree with all information. All questions answered. Pt and family have no further questions or concerns at this time. Pt is ready for admit.    ED Medication(s):  Medications   furosemide injection 40 mg (40 mg Intravenous Given 3/7/19 1128)       New Prescriptions    No medications on file                 Medical Decision Making:   Clinical Tests:   Lab Tests: Reviewed and Ordered  Radiological Study: Reviewed and Ordered  Medical Tests: Reviewed and Ordered             Scribe Attestation:   Scribe #1: I performed the above scribed service and the documentation accurately describes the services I performed. I attest to the accuracy of the note.     Attending:   Physician Attestation Statement for Scribe #1: I, Jass Rutledge Do, MD, personally performed the services described in this documentation, as scribed by Gabrielle Durbin, in my presence, and it is both accurate and complete.           Clinical Impression       ICD-10-CM ICD-9-CM   1. Acute on chronic combined systolic and diastolic congestive heart failure I50.43 428.43     428.0   2. Chest pain R07.9 786.50       Disposition:   Disposition: Placed in  Observation  Condition: Fair         Jass Rutledge Do, MD  03/07/19 1369

## 2019-03-07 NOTE — SUBJECTIVE & OBJECTIVE
"Past Medical History:   Diagnosis Date    Abnormal nuclear stress test 6/30/2016    Acute congestive heart failure 5/27/2017    Acute coronary syndrome     Acute kidney injury (nontraumatic) 5/27/2017    Acute kidney injury (nontraumatic) 5/27/2017    Acute on chronic congestive heart failure 5/21/2017    Acute on chronic diastolic congestive heart failure 8/27/2015    Anemia 5/9/2016    Anemia 5/9/2016    Angina pectoris 1/25/2018    Anticoagulant long-term use     Aortic regurgitation     Echo 11/2013---5 - Mild to moderate aortic regurgitation.      Asthma     Atrial fibrillation 5/15/2014    Back pain     s/p epidural steriod injections, no recent injections.    Baker's cyst     small, right, seen on US lower extremity 6/2014    Blood transfusion     Approximately 6 years ago    Chronic constipation     Coronary artery disease     Degenerative disc disease, lumbar     Diastolic dysfunction     Seen on echo 11/2013, stress test negative 5/2014    Diverticulosis     colonoscopy 3/27/2011    E coli bacteremia 5/23/2017    E. coli UTI (urinary tract infection) 5/23/2017    Hemorrhoids     colonoscopy 3/27/2011    Hiatal hernia     CXR 12/30/2016---Retrocardiac density again noted consistent with a hiatal hernia.    Hx of adenomatous colonic polyps     Hyperlipidemia     Hypertension     Hyponatremia 5/9/2016    Hypoxemia 5/27/2017    Hypoxia 6/28/2017    Leukocytosis 5/27/2017    Mitral regurgitation     Myocardial infarction     per patient was told in the past she had a "mild heart attack"    Obesity     Osteoarthritis, knee     Pneumonia     per patient "walking Pneumonia" did not require hospitalization    Seasonal allergies     Sepsis 5/22/2017    Trouble in sleeping     Urinary incontinence        Past Surgical History:   Procedure Laterality Date    APPENDECTOMY      COLONOSCOPY okay by dr. ramos N/A 8/29/2017    Performed by To[facility]di [facility] at [facility] ENDO    EYE " SURGERY Left     HYSTERECTOMY      benign reasons    KNEE SURGERY Bilateral     x2     Left heart cath      OLECRANON BURSECTOMY Right     6/2011    TONSILLECTOMY      URETHRA SURGERY         Review of patient's allergies indicates:   Allergen Reactions    Iodine and iodide containing products Rash       No current facility-administered medications on file prior to encounter.      Current Outpatient Medications on File Prior to Encounter   Medication Sig    acetaminophen (TYLENOL) 500 MG tablet Take 1 tablet (500 mg total) by mouth 2 (two) times daily. (Patient taking differently: Take 500 mg by mouth 2 (two) times daily as needed. )    albuterol 90 mcg/actuation inhaler Inhale 2 puffs into the lungs 4 (four) times daily.    ASCORBATE CALCIUM (VITAMIN C ORAL) Take 500 mg by mouth once daily.     compressor, for nebulizer Lara Compressor use as directed by albuterol use.    DOCOSAHEXANOIC ACID/EPA (FISH OIL ORAL) Take 500 mg by mouth once daily.     ELIQUIS 5 mg Tab TAKE 1 TABLET BY MOUTH TWICE DAILY    flecainide (TAMBOCOR) 50 MG Tab Take 1 tablet (50 mg total) by mouth every 12 (twelve) hours.    fluticasone (FLOVENT HFA) 110 mcg/actuation inhaler Inhale 1 puff into the lungs 2 (two) times daily.    furosemide (LASIX) 20 MG tablet Take 1 tablet (20 mg total) by mouth once daily.    isosorbide mononitrate (IMDUR) 30 MG 24 hr tablet TAKE 1 TABLET EVERY DAY    loratadine (CLARITIN) 10 mg tablet Take 1 tablet (10 mg total) by mouth once daily.    metoprolol succinate (TOPROL-XL) 100 MG 24 hr tablet TAKE 1 TABLET EVERY DAY    pravastatin (PRAVACHOL) 40 MG tablet TAKE 1 TABLET EVERY DAY     Family History     Problem Relation (Age of Onset)    COPD Son    Cancer Mother, Sister    Diabetes Brother, Son    Heart disease Mother, Father, Sister, Brother    Hyperlipidemia Mother, Father    Hypertension Mother, Father, Son        Tobacco Use    Smoking status: Former Smoker     Packs/day: 0.05     Years:  1.00     Pack years: 0.05     Types: Cigarettes     Last attempt to quit: 1952     Years since quittin.2    Smokeless tobacco: Never Used   Substance and Sexual Activity    Alcohol use: No     Alcohol/week: 0.0 oz    Drug use: No    Sexual activity: No     Partners: Male     Birth control/protection: See Surgical Hx     Review of Systems   Constitutional: Negative for activity change, appetite change, chills, diaphoresis, fatigue and fever.   HENT: Negative.    Eyes: Negative for pain, redness and visual disturbance.   Respiratory: Positive for cough, shortness of breath and wheezing.    Cardiovascular: Positive for chest pain and palpitations. Negative for leg swelling.   Gastrointestinal: Positive for constipation. Negative for abdominal pain, diarrhea, nausea and vomiting.   Genitourinary: Negative for difficulty urinating, frequency and hematuria.   Musculoskeletal: Negative for arthralgias and myalgias.   Skin: Negative for pallor, rash and wound.   Neurological: Negative for dizziness, weakness, light-headedness and headaches.   Psychiatric/Behavioral: Negative for confusion. The patient is not nervous/anxious.      Objective:     Vital Signs (Most Recent):  Temp: 97.7 °F (36.5 °C) (19 1321)  Pulse: 71 (19 1321)  Resp: 20 (19 1321)  BP: (!) 150/74 (19 132)  SpO2: 95 % (19 132) Vital Signs (24h Range):  Temp:  [97.7 °F (36.5 °C)-97.8 °F (36.6 °C)] 97.7 °F (36.5 °C)  Pulse:  [71-95] 71  Resp:  [16-24] 20  SpO2:  [93 %-95 %] 95 %  BP: (148-182)/() 150/74     Weight: 57 kg (125 lb 9.6 oz)  Body mass index is 25.37 kg/m².    Physical Exam   Constitutional: She is oriented to person, place, and time. She appears well-developed and well-nourished.   HENT:   Head: Normocephalic and atraumatic.   Nose: Nose normal.   Mouth/Throat: Oropharynx is clear and moist.   Eyes: Conjunctivae are normal. Pupils are equal, round, and reactive to light. No scleral icterus.   Neck:  Normal range of motion. Neck supple.   Cardiovascular: Normal rate and normal heart sounds. An irregularly irregular rhythm present. Exam reveals no gallop and no friction rub.   No murmur heard.  Pulmonary/Chest: Effort normal. She has rales in the right lower field.   Abdominal: Soft. Bowel sounds are normal.   Musculoskeletal: Normal range of motion. She exhibits no edema or tenderness.   Neurological: She is alert and oriented to person, place, and time.   Skin: Skin is warm and dry.   Psychiatric: She has a normal mood and affect. Her behavior is normal.   Nursing note and vitals reviewed.        CRANIAL NERVES     CN III, IV, VI   Pupils are equal, round, and reactive to light.       Significant Labs:   CBC:   Recent Labs   Lab 03/07/19  0959   WBC 7.25   HGB 12.5   HCT 39.3        CMP:   Recent Labs   Lab 03/07/19  0959      K 3.8      CO2 27      BUN 23   CREATININE 0.9   CALCIUM 9.6   PROT 6.9   ALBUMIN 3.0*   BILITOT 0.5   ALKPHOS 99   AST 24   ALT 30   ANIONGAP 10   EGFRNONAA 59*     All pertinent labs within the past 24 hours have been reviewed.    Significant Imaging: I have reviewed all pertinent imaging results/findings within the past 24 hours.

## 2019-03-07 NOTE — HPI
Elke Laws is a 85 yo female with PMhx of Diastolic HF, Chronic Afib, HTN, CAD and HPL who presents with reports of DIAZ that worsened over a 2 day period. Her symptoms did not improve despite use of her nebulizer. Also, pt describes constipation and not having a bowel movement x 1 week. Pt reports passing flatus. She denies abdominal pain, N/V/D. Also, pt denies increased weight gain, swelling, abdominal distention, fever, chills, URI symptoms, signs of active bleeding and gross neuro deficits. Pt has noted a brief episode of chest pain with palpitations. Temp 97.8, pulse 80, resp 20, B/P 181/88 and SpO2 94 % room air. Labs are unremarkable, troponins normal and BNP = 599. CXR shows interstitial edema. Lasix 40 mg IV given in the ED. EKG shows A fib with controlled rate. Pt is placed on Observation for treatment of decompensated heart failure.

## 2019-03-07 NOTE — ASSESSMENT & PLAN NOTE
Place on Observation  Telemetry monitoring, daily weights  Cardiac diet, fluid restriction   2 D ECHO pending   Lasix IV  May need home oxygen eval prior to discharge

## 2019-03-07 NOTE — H&P
[facility] Hospital Medicine  History & Physical    Patient Name: Elke Laws  MRN: [id number]  Admission Date: 3/7/2019  Attending Physician: Magdaleno Yoon MD   Primary Care Provider: Isidra Benavidez MD         Patient information was obtained from patient, past medical records and ER records.     Subjective:     Principal Problem:Acute on chronic diastolic heart failure    Chief Complaint:   Chief Complaint   Patient presents with    Shortness of Breath     pt c/o SOB and cough         HPI: Elke Laws is a 83 yo female with PMhx of Diastolic HF, Chronic Afib, HTN, CAD and HPL who presents with reports of DIAZ that worsened over a 2 day period. Her symptoms did not improve despite use of her nebulizer. Also, pt describes constipation and not having a bowel movement x 1 week. Pt reports passing flatus. She denies abdominal pain, N/V/D. Also, pt denies increased weight gain, swelling, abdominal distention, fever, chills, URI symptoms, signs of active bleeding and gross neuro deficits. Pt has noted a brief episode of chest pain with palpitations. Temp 97.8, pulse 80, resp 20, B/P 181/88 and SpO2 94 % room air. Labs are unremarkable, troponins normal and BNP = 599. CXR shows interstitial edema. Lasix 40 mg IV given in the ED. EKG shows A fib with controlled rate. Pt is placed on Observation for treatment of decompensated heart failure.       Past Medical History:   Diagnosis Date    Abnormal nuclear stress test 6/30/2016    Acute congestive heart failure 5/27/2017    Acute coronary syndrome     Acute kidney injury (nontraumatic) 5/27/2017    Acute kidney injury (nontraumatic) 5/27/2017    Acute on chronic congestive heart failure 5/21/2017    Acute on chronic diastolic congestive heart failure 8/27/2015    Anemia 5/9/2016    Anemia 5/9/2016    Angina pectoris 1/25/2018    Anticoagulant long-term use     Aortic regurgitation     Echo 11/2013---5 - Mild to moderate aortic regurgitation.    "   Asthma     Atrial fibrillation 5/15/2014    Back pain     s/p epidural steriod injections, no recent injections.    Baker's cyst     small, right, seen on US lower extremity 6/2014    Blood transfusion     Approximately 6 years ago    Chronic constipation     Coronary artery disease     Degenerative disc disease, lumbar     Diastolic dysfunction     Seen on echo 11/2013, stress test negative 5/2014    Diverticulosis     colonoscopy 3/27/2011    E coli bacteremia 5/23/2017    E. coli UTI (urinary tract infection) 5/23/2017    Hemorrhoids     colonoscopy 3/27/2011    Hiatal hernia     CXR 12/30/2016---Retrocardiac density again noted consistent with a hiatal hernia.    Hx of adenomatous colonic polyps     Hyperlipidemia     Hypertension     Hyponatremia 5/9/2016    Hypoxemia 5/27/2017    Hypoxia 6/28/2017    Leukocytosis 5/27/2017    Mitral regurgitation     Myocardial infarction     per patient was told in the past she had a "mild heart attack"    Obesity     Osteoarthritis, knee     Pneumonia     per patient "walking Pneumonia" did not require hospitalization    Seasonal allergies     Sepsis 5/22/2017    Trouble in sleeping     Urinary incontinence        Past Surgical History:   Procedure Laterality Date    APPENDECTOMY      COLONOSCOPY okay by dr. ramos N/A 8/29/2017    Per[facility]    EYE SURGERY Left     HYSTERECTOMY      benign reasons    KNEE SURGERY Bilateral     x2     Left heart cath      OLECRANON BURSECTOMY Right     6/2011    TONSILLECTOMY      URETHRA SURGERY         Review of patient's allergies indicates:   Allergen Reactions    Iodine and iodide containing products Rash       No current facility-administered medications on file prior to encounter.      Current Outpatient Medications on File Prior to Encounter   Medication Sig    acetaminophen (TYLENOL) 500 MG tablet Take 1 tablet (500 mg total) by mouth 2 (two) times daily. " (Patient taking differently: Take 500 mg by mouth 2 (two) times daily as needed. )    albuterol 90 mcg/actuation inhaler Inhale 2 puffs into the lungs 4 (four) times daily.    ASCORBATE CALCIUM (VITAMIN C ORAL) Take 500 mg by mouth once daily.     compressor, for nebulizer Lara Compressor use as directed by albuterol use.    DOCOSAHEXANOIC ACID/EPA (FISH OIL ORAL) Take 500 mg by mouth once daily.     ELIQUIS 5 mg Tab TAKE 1 TABLET BY MOUTH TWICE DAILY    flecainide (TAMBOCOR) 50 MG Tab Take 1 tablet (50 mg total) by mouth every 12 (twelve) hours.    fluticasone (FLOVENT HFA) 110 mcg/actuation inhaler Inhale 1 puff into the lungs 2 (two) times daily.    furosemide (LASIX) 20 MG tablet Take 1 tablet (20 mg total) by mouth once daily.    isosorbide mononitrate (IMDUR) 30 MG 24 hr tablet TAKE 1 TABLET EVERY DAY    loratadine (CLARITIN) 10 mg tablet Take 1 tablet (10 mg total) by mouth once daily.    metoprolol succinate (TOPROL-XL) 100 MG 24 hr tablet TAKE 1 TABLET EVERY DAY    pravastatin (PRAVACHOL) 40 MG tablet TAKE 1 TABLET EVERY DAY     Family History     Problem Relation (Age of Onset)    COPD Son    Cancer Mother, Sister    Diabetes Brother, Son    Heart disease Mother, Father, Sister, Brother    Hyperlipidemia Mother, Father    Hypertension Mother, Father, Son        Tobacco Use    Smoking status: Former Smoker     Packs/day: 0.05     Years: 1.00     Pack years: 0.05     Types: Cigarettes     Last attempt to quit: 1952     Years since quittin.2    Smokeless tobacco: Never Used   Substance and Sexual Activity    Alcohol use: No     Alcohol/week: 0.0 oz    Drug use: No    Sexual activity: No     Partners: Male     Birth control/protection: See Surgical Hx     Review of Systems   Constitutional: Negative for activity change, appetite change, chills, diaphoresis, fatigue and fever.   HENT: Negative.    Eyes: Negative for pain, redness and visual disturbance.   Respiratory: Positive for  cough, shortness of breath and wheezing.    Cardiovascular: Positive for chest pain and palpitations. Negative for leg swelling.   Gastrointestinal: Positive for constipation. Negative for abdominal pain, diarrhea, nausea and vomiting.   Genitourinary: Negative for difficulty urinating, frequency and hematuria.   Musculoskeletal: Negative for arthralgias and myalgias.   Skin: Negative for pallor, rash and wound.   Neurological: Negative for dizziness, weakness, light-headedness and headaches.   Psychiatric/Behavioral: Negative for confusion. The patient is not nervous/anxious.      Objective:     Vital Signs (Most Recent):  Temp: 97.7 °F (36.5 °C) (03/07/19 1321)  Pulse: 71 (03/07/19 1321)  Resp: 20 (03/07/19 1321)  BP: (!) 150/74 (03/07/19 1321)  SpO2: 95 % (03/07/19 1321) Vital Signs (24h Range):  Temp:  [97.7 °F (36.5 °C)-97.8 °F (36.6 °C)] 97.7 °F (36.5 °C)  Pulse:  [71-95] 71  Resp:  [16-24] 20  SpO2:  [93 %-95 %] 95 %  BP: (148-182)/() 150/74     Weight: 57 kg (125 lb 9.6 oz)  Body mass index is 25.37 kg/m².    Physical Exam   Constitutional: She is oriented to person, place, and time. She appears well-developed and well-nourished.   HENT:   Head: Normocephalic and atraumatic.   Nose: Nose normal.   Mouth/Throat: Oropharynx is clear and moist.   Eyes: Conjunctivae are normal. Pupils are equal, round, and reactive to light. No scleral icterus.   Neck: Normal range of motion. Neck supple.   Cardiovascular: Normal rate and normal heart sounds. An irregularly irregular rhythm present. Exam reveals no gallop and no friction rub.   No murmur heard.  Pulmonary/Chest: Effort normal. She has rales in the right lower field.   Abdominal: Soft. Bowel sounds are normal.   Musculoskeletal: Normal range of motion. She exhibits no edema or tenderness.   Neurological: She is alert and oriented to person, place, and time.   Skin: Skin is warm and dry.   Psychiatric: She has a normal mood and affect. Her behavior is  normal.   Nursing note and vitals reviewed.        CRANIAL NERVES     CN III, IV, VI   Pupils are equal, round, and reactive to light.       Significant Labs:   CBC:   Recent Labs   Lab 03/07/19  0959   WBC 7.25   HGB 12.5   HCT 39.3        CMP:   Recent Labs   Lab 03/07/19  0959      K 3.8      CO2 27      BUN 23   CREATININE 0.9   CALCIUM 9.6   PROT 6.9   ALBUMIN 3.0*   BILITOT 0.5   ALKPHOS 99   AST 24   ALT 30   ANIONGAP 10   EGFRNONAA 59*     All pertinent labs within the past 24 hours have been reviewed.    Significant Imaging: I have reviewed all pertinent imaging results/findings within the past 24 hours.    Assessment/Plan:     * Acute on chronic diastolic heart failure    Place on Observation  Telemetry monitoring, daily weights  Cardiac diet, fluid restriction   2 D ECHO pending   Lasix IV  May need home oxygen eval prior to discharge           Coronary artery disease involving native coronary artery of native heart without angina pectoris    Continue home meds Imdur, Toprol XL, pravastatin  Pt is not prescribed an ACE or ARB       PAF (paroxysmal atrial fibrillation)    Continue Telemetry monitoring  Continue Eliquis, flecainide and Toprol XL         Hyperlipidemia    Continue pravastatin       Essential hypertension    Elevated  Continue IV lasix and Toprol XL  Hydralazine 25 mg po every 6 hours prn SBP > 180         VTE Risk Mitigation (From admission, onward)        Ordered     apixaban tablet 5 mg  2 times daily      03/07/19 1355     IP VTE HIGH RISK PATIENT  Once      03/07/19 1225     Place DELMY hose  Until discontinued      03/07/19 1225             Kylee Alvarez NP  Department of Hospital Medicine   [facility]

## 2019-03-08 ENCOUNTER — TELEPHONE (OUTPATIENT)
Dept: CARDIOLOGY | Facility: CLINIC | Age: 84
End: 2019-03-08

## 2019-03-08 VITALS
HEART RATE: 80 BPM | RESPIRATION RATE: 18 BRPM | OXYGEN SATURATION: 95 % | SYSTOLIC BLOOD PRESSURE: 126 MMHG | HEIGHT: 59 IN | BODY MASS INDEX: 25.32 KG/M2 | WEIGHT: 125.63 LBS | DIASTOLIC BLOOD PRESSURE: 66 MMHG | TEMPERATURE: 98 F

## 2019-03-08 LAB
ALBUMIN SERPL BCP-MCNC: 3.2 G/DL
ALP SERPL-CCNC: 101 U/L
ALT SERPL W/O P-5'-P-CCNC: 32 U/L
ANION GAP SERPL CALC-SCNC: 10 MMOL/L
AST SERPL-CCNC: 23 U/L
BASOPHILS # BLD AUTO: 0.01 K/UL
BASOPHILS NFR BLD: 0.2 %
BILIRUB SERPL-MCNC: 0.7 MG/DL
BUN SERPL-MCNC: 20 MG/DL
CALCIUM SERPL-MCNC: 9.8 MG/DL
CHLORIDE SERPL-SCNC: 96 MMOL/L
CO2 SERPL-SCNC: 35 MMOL/L
CREAT SERPL-MCNC: 1 MG/DL
DIFFERENTIAL METHOD: ABNORMAL
EOSINOPHIL # BLD AUTO: 0.2 K/UL
EOSINOPHIL NFR BLD: 2.5 %
ERYTHROCYTE [DISTWIDTH] IN BLOOD BY AUTOMATED COUNT: 13.7 %
EST. GFR  (AFRICAN AMERICAN): 60 ML/MIN/1.73 M^2
EST. GFR  (NON AFRICAN AMERICAN): 52 ML/MIN/1.73 M^2
GLUCOSE SERPL-MCNC: 93 MG/DL
HCT VFR BLD AUTO: 41.1 %
HGB BLD-MCNC: 13.3 G/DL
LYMPHOCYTES # BLD AUTO: 1.5 K/UL
LYMPHOCYTES NFR BLD: 24.1 %
MCH RBC QN AUTO: 29.8 PG
MCHC RBC AUTO-ENTMCNC: 32.4 G/DL
MCV RBC AUTO: 92 FL
MONOCYTES # BLD AUTO: 1 K/UL
MONOCYTES NFR BLD: 16.6 %
NEUTROPHILS # BLD AUTO: 3.5 K/UL
NEUTROPHILS NFR BLD: 56.6 %
PLATELET # BLD AUTO: 201 K/UL
PMV BLD AUTO: 11.1 FL
POTASSIUM SERPL-SCNC: 3.9 MMOL/L
PROT SERPL-MCNC: 7.2 G/DL
RBC # BLD AUTO: 4.47 M/UL
SODIUM SERPL-SCNC: 141 MMOL/L
WBC # BLD AUTO: 6.1 K/UL

## 2019-03-08 PROCEDURE — 85025 COMPLETE CBC W/AUTO DIFF WBC: CPT | Mod: HCWC

## 2019-03-08 PROCEDURE — 80053 COMPREHEN METABOLIC PANEL: CPT | Mod: HCWC

## 2019-03-08 PROCEDURE — G0378 HOSPITAL OBSERVATION PER HR: HCPCS | Mod: HCWC

## 2019-03-08 PROCEDURE — 94761 N-INVAS EAR/PLS OXIMETRY MLT: CPT | Mod: HCWC

## 2019-03-08 PROCEDURE — 63600175 PHARM REV CODE 636 W HCPCS: Mod: HCWC | Performed by: NURSE PRACTITIONER

## 2019-03-08 PROCEDURE — 25000003 PHARM REV CODE 250: Mod: HCWC | Performed by: NURSE PRACTITIONER

## 2019-03-08 PROCEDURE — 27000221 HC OXYGEN, UP TO 24 HOURS: Mod: HCWC

## 2019-03-08 PROCEDURE — 96376 TX/PRO/DX INJ SAME DRUG ADON: CPT

## 2019-03-08 PROCEDURE — 36415 COLL VENOUS BLD VENIPUNCTURE: CPT | Mod: HCWC

## 2019-03-08 RX ORDER — FUROSEMIDE 20 MG/1
40 TABLET ORAL DAILY
Qty: 90 TABLET | Refills: 2 | Status: SHIPPED | OUTPATIENT
Start: 2019-03-08 | End: 2019-04-23 | Stop reason: SDUPTHER

## 2019-03-08 RX ORDER — ALBUTEROL SULFATE 90 UG/1
2 AEROSOL, METERED RESPIRATORY (INHALATION) EVERY 6 HOURS PRN
Qty: 8 G | Refills: 1 | Status: ON HOLD | OUTPATIENT
Start: 2019-03-08 | End: 2019-03-25 | Stop reason: SDUPTHER

## 2019-03-08 RX ADMIN — FLECAINIDE ACETATE 50 MG: 50 TABLET ORAL at 10:03

## 2019-03-08 RX ADMIN — ISOSORBIDE MONONITRATE 30 MG: 30 TABLET, EXTENDED RELEASE ORAL at 10:03

## 2019-03-08 RX ADMIN — PRAVASTATIN SODIUM 40 MG: 20 TABLET ORAL at 10:03

## 2019-03-08 RX ADMIN — PANTOPRAZOLE SODIUM 40 MG: 40 TABLET, DELAYED RELEASE ORAL at 10:03

## 2019-03-08 RX ADMIN — FUROSEMIDE 40 MG: 10 INJECTION, SOLUTION INTRAVENOUS at 10:03

## 2019-03-08 RX ADMIN — METOPROLOL SUCCINATE 100 MG: 50 TABLET, EXTENDED RELEASE ORAL at 10:03

## 2019-03-08 RX ADMIN — APIXABAN 5 MG: 2.5 TABLET, FILM COATED ORAL at 10:03

## 2019-03-08 NOTE — ASSESSMENT & PLAN NOTE
Patient does not qualify for home O2 based on evaluation done by respiratory this morning.  Patient's symptoms have improved. patient's exam without any signs of decompensated heart failure at this time.    Patient's echo does not show any significant change from prior echo 2 years ago other than worsening pulmonary hypertension.  Today patient's EF normal.  IV Lasix increased to 40 mg p.o. daily.  Patient instructed to follow up with PCP and Cardiology.  Patient did not placed on ARB/ACE therapy at this time given patient's age and risk for adverse effects over benefit..

## 2019-03-08 NOTE — DISCHARGE SUMMARY
[facility] Hospital Medicine  Discharge Summary      Patient Name: Elke Laws  MRN: [id number]  Admission Date: 3/7/2019  Hospital Length of Stay: 0 days  Discharge Date and Time:  03/08/2019 10:45 AM  Attending Physician: Sekou Mccall MD   Discharging Provider: Jose Rayo NP  Primary Care Provider: Isidra Benavidez MD      HPI:   Elke Laws is a 83 yo female with PMhx of Diastolic HF, Chronic Afib, HTN, CAD and HPL who presents with reports of DIAZ that worsened over a 2 day period. Her symptoms did not improve despite use of her nebulizer. Also, pt describes constipation and not having a bowel movement x 1 week. Pt reports passing flatus. She denies abdominal pain, N/V/D. Also, pt denies increased weight gain, swelling, abdominal distention, fever, chills, URI symptoms, signs of active bleeding and gross neuro deficits. Pt has noted a brief episode of chest pain with palpitations. Temp 97.8, pulse 80, resp 20, B/P 181/88 and SpO2 94 % room air. Labs are unremarkable, troponins normal and BNP = 599. CXR shows interstitial edema. Lasix 40 mg IV given in the ED. EKG shows A fib with controlled rate. Pt is placed on Observation for treatment of decompensated heart failure.       * No surgery found *      Hospital Course:   Patient presented to the hospital with complaint of DIAZ over the past couple days.  Patient has significant medical history of diastolic heart failure, chronic AFib, hypertension.  Symptoms did not improve at home with nebulizer.  Patient at home on 20 mg Lasix p.o. daily.  Initially patient's  on admission.  Chest x-ray positive for interstitial edema. Patient was placed in observation for IV diuretics.  Patient's condition improved with ED/hospitalization interventions.  Patient vital signs stable, repeat echo no significant change from prior 2 year ago; except for some worsening pulmonary hypertension.  Today  EF normal.  Patient has been seen and examined.   Patient's Lasix increased to 40 mg daily.  Patient to follow up with PCP and Cardiology outpatient.  Patient has not been placed on ACE/ARB therapy given patient's age and increased risk for adverse effects over its benefit.  Case has been discussed with Dr. Mccall who agrees with care and is stable for discharge.     Physical Exam   Constitutional: She is oriented to person, place, and time. She appears well-developed. No distress.   Elderly, pleasant   HENT:   Head: Normocephalic and atraumatic.   Nose: Nose normal.   Eyes: Conjunctivae and EOM are normal. Pupils are equal, round, and reactive to light. No scleral icterus.   Neck: Normal range of motion. Neck supple. No JVD present. No tracheal deviation present.   Cardiovascular: Normal rate, regular rhythm and intact distal pulses.   Murmur ( Systolic mild) heard.  Pulmonary/Chest: Effort normal and breath sounds normal. No stridor. No respiratory distress. She has no wheezes. She has no rales.   Abdominal: Soft. Bowel sounds are normal. She exhibits no distension. There is no tenderness. There is no guarding.   Genitourinary:   Genitourinary Comments: Defer no complaint   Musculoskeletal: Normal range of motion. She exhibits no edema or deformity.   Neurological: She is alert and oriented to person, place, and time. No cranial nerve deficit.   Skin: Skin is warm and dry. Capillary refill takes less than 2 seconds. No rash noted. She is not diaphoretic.   Psychiatric: She has a normal mood and affect. Her behavior is normal. Judgment and thought content normal.   Nursing note and vitals reviewed.        Consults:   Consults (From admission, onward)        Status Ordering Provider     Inpatient consult to Social Work  Once     Provider:  (Not yet assigned)    Ordered JODEE MARSHALL          * Acute on chronic diastolic heart failure    Patient does not qualify for home O2 based on evaluation done by respiratory this morning.  Patient's symptoms have improved.  patient's exam without any signs of decompensated heart failure at this time.    Patient's echo does not show any significant change from prior echo 2 years ago other than worsening pulmonary hypertension.  Today patient's EF normal.  IV Lasix increased to 40 mg p.o. daily.  Patient instructed to follow up with PCP and Cardiology.  Patient did not placed on ARB/ACE therapy at this time given patient's age and risk for adverse effects over benefit..       Coronary artery disease involving native coronary artery of native heart without angina pectoris    Continue home meds , follow up outpatient with Cardiology and primary care.  Will not start ARB/ACE therapy given patient's age and risk for adverse effects over benefit.     PAF (paroxysmal atrial fibrillation)    Stable, Continue Eliquis, flecainide and Toprol XL  Follow-up outpatient.       Hyperlipidemia    Continue pravastatin  Follow-up with Cardiology outpatient.     Essential hypertension    Improved mildly.  Continue home meds with Home Lasix increased to 40 mg daily.  Outpatient follow-up with PCP and Cardiology for further management.         Final Active Diagnoses:    Diagnosis Date Noted POA    PRINCIPAL PROBLEM:  Acute on chronic diastolic heart failure [I50.33] 03/07/2019 Yes    Coronary artery disease involving native coronary artery of native heart without angina pectoris [I25.10] 12/15/2016 Yes     Chronic    PAF (paroxysmal atrial fibrillation) [I48.0] 07/17/2015 Yes     Chronic    Essential hypertension [I10]  Yes    Hyperlipidemia [E78.5]  Yes     Chronic      Problems Resolved During this Admission:       Discharged Condition: stable    Disposition: Home or Self Care    Follow Up:  Follow-up Information     Isidra Benavidez MD In 3 days.    Specialty:  Family Medicine  Why:  Post hospitalization follow-up.  Contact information:  [address] [place] [address] [address] Bushra [address] [address] [phone]             Abida Angulo MD In 1 week.     Specialty:  Cardiology  Why:  Post hospitalization follow-up for acute on chronic diastolic heart failure.  Contact information:  [address] [facility] [address] [phone]                 Patient Instructions:      Diet Cardiac     Activity as tolerated       Significant Diagnostic Studies:   Results for orders placed or performed during the hospital encounter of 03/07/19   Influenza A & B by Molecular   Result Value Ref Range    Influenza A, Molecular Negative Negative    Influenza B, Molecular Negative Negative    Flu A & B Source Nasal swab    CBC auto differential   Result Value Ref Range    WBC 7.25 3.90 - 12.70 K/uL    RBC 4.24 4.00 - 5.40 M/uL    Hemoglobin 12.5 12.0 - 16.0 g/dL    Hematocrit 39.3 37.0 - 48.5 %    MCV 93 82 - 98 fL    MCH 29.5 27.0 - 31.0 pg    MCHC 31.8 (L) 32.0 - 36.0 g/dL    RDW 13.9 11.5 - 14.5 %    Platelets 170 150 - 350 K/uL    MPV 10.6 9.2 - 12.9 fL    Gran # (ANC) 4.6 1.8 - 7.7 K/uL    Lymph # 1.6 1.0 - 4.8 K/uL    Mono # 0.8 0.3 - 1.0 K/uL    Eos # 0.2 0.0 - 0.5 K/uL    Baso # 0.01 0.00 - 0.20 K/uL    Gran% 63.7 38.0 - 73.0 %    Lymph% 22.5 18.0 - 48.0 %    Mono% 11.6 4.0 - 15.0 %    Eosinophil% 2.1 0.0 - 8.0 %    Basophil% 0.1 0.0 - 1.9 %    Differential Method Automated    Comprehensive metabolic panel   Result Value Ref Range    Sodium 140 136 - 145 mmol/L    Potassium 3.8 3.5 - 5.1 mmol/L    Chloride 103 95 - 110 mmol/L    CO2 27 23 - 29 mmol/L    Glucose 102 70 - 110 mg/dL    BUN, Bld 23 8 - 23 mg/dL    Creatinine 0.9 0.5 - 1.4 mg/dL    Calcium 9.6 8.7 - 10.5 mg/dL    Total Protein 6.9 6.0 - 8.4 g/dL    Albumin 3.0 (L) 3.5 - 5.2 g/dL    Total Bilirubin 0.5 0.1 - 1.0 mg/dL    Alkaline Phosphatase 99 55 - 135 U/L    AST 24 10 - 40 U/L    ALT 30 10 - 44 U/L    Anion Gap 10 8 - 16 mmol/L    eGFR if African American >60 >60 mL/min/1.73 m^2    eGFR if non African American 59 (A) >60 mL/min/1.73 m^2   Troponin I #1   Result Value Ref Range    Troponin I <0.006 0.000 -  0.026 ng/mL   B-Type natriuretic peptide (BNP)   Result Value Ref Range     (H) 0 - 99 pg/mL   Troponin I #2   Result Value Ref Range    Troponin I <0.006 0.000 - 0.026 ng/mL   CBC auto differential   Result Value Ref Range    WBC 6.10 3.90 - 12.70 K/uL    RBC 4.47 4.00 - 5.40 M/uL    Hemoglobin 13.3 12.0 - 16.0 g/dL    Hematocrit 41.1 37.0 - 48.5 %    MCV 92 82 - 98 fL    MCH 29.8 27.0 - 31.0 pg    MCHC 32.4 32.0 - 36.0 g/dL    RDW 13.7 11.5 - 14.5 %    Platelets 201 150 - 350 K/uL    MPV 11.1 9.2 - 12.9 fL    Gran # (ANC) 3.5 1.8 - 7.7 K/uL    Lymph # 1.5 1.0 - 4.8 K/uL    Mono # 1.0 0.3 - 1.0 K/uL    Eos # 0.2 0.0 - 0.5 K/uL    Baso # 0.01 0.00 - 0.20 K/uL    Gran% 56.6 38.0 - 73.0 %    Lymph% 24.1 18.0 - 48.0 %    Mono% 16.6 (H) 4.0 - 15.0 %    Eosinophil% 2.5 0.0 - 8.0 %    Basophil% 0.2 0.0 - 1.9 %    Differential Method Automated    Comprehensive metabolic panel   Result Value Ref Range    Sodium 141 136 - 145 mmol/L    Potassium 3.9 3.5 - 5.1 mmol/L    Chloride 96 95 - 110 mmol/L    CO2 35 (H) 23 - 29 mmol/L    Glucose 93 70 - 110 mg/dL    BUN, Bld 20 8 - 23 mg/dL    Creatinine 1.0 0.5 - 1.4 mg/dL    Calcium 9.8 8.7 - 10.5 mg/dL    Total Protein 7.2 6.0 - 8.4 g/dL    Albumin 3.2 (L) 3.5 - 5.2 g/dL    Total Bilirubin 0.7 0.1 - 1.0 mg/dL    Alkaline Phosphatase 101 55 - 135 U/L    AST 23 10 - 40 U/L    ALT 32 10 - 44 U/L    Anion Gap 10 8 - 16 mmol/L    eGFR if African American 60 >60 mL/min/1.73 m^2    eGFR if non African American 52 (A) >60 mL/min/1.73 m^2   2D echo with color flow doppler   Result Value Ref Range    QEF 60 55 - 65    Aortic Valve Regurgitation MILD     Est. PA Systolic Pressure 58.35 (A)     Tricuspid Valve Regurgitation MILD TO MODERATE      Imaging Results          X-Ray Chest PA And Lateral (Final result)  Result time 03/07/19 10:05:56    Final result by Magdaleno Kumar MD (03/07/19 10:05:56)                 Impression:      CHF with interstitial edema, new from comparison  examination.      Electronically signed by: Magdaleno Kumar MD  Date:    03/07/2019  Time:    10:05             Narrative:    EXAMINATION:  XR CHEST PA AND LATERAL    CLINICAL HISTORY:  Chest Pain;    TECHNIQUE:  PA and lateral views of the chest were performed.    COMPARISON:  February 16, 2019    FINDINGS:  Mild cardiomegaly.  Mild aortic atherosclerosis.    Pulmonary vascular congestion.  Some interstitial thickening throughout the central to lower lungs.  No pleural effusions.    Chronically elevated right hemidiaphragm.                                  Pending Diagnostic Studies:     None         Medications:  Reconciled Home Medications:      Medication List      CHANGE how you take these medications    acetaminophen 500 MG tablet  Commonly known as:  TYLENOL  Take 1 tablet (500 mg total) by mouth 2 (two) times daily.  What changed:    · when to take this  · reasons to take this     furosemide 20 MG tablet  Commonly known as:  LASIX  Take 2 tablets (40 mg total) by mouth once daily.  What changed:  how much to take        CONTINUE taking these medications    albuterol 90 mcg/actuation inhaler  Commonly known as:  PROVENTIL/VENTOLIN HFA  Inhale 2 puffs into the lungs 4 (four) times daily.     compressor, for nebulizer Lara  Compressor use as directed by albuterol use.     ELIQUIS 5 mg Tab  Generic drug:  apixaban  TAKE 1 TABLET BY MOUTH TWICE DAILY     FISH OIL ORAL  Take 500 mg by mouth once daily.     flecainide 50 MG Tab  Commonly known as:  TAMBOCOR  Take 1 tablet (50 mg total) by mouth every 12 (twelve) hours.     fluticasone 110 mcg/actuation inhaler  Commonly known as:  FLOVENT HFA  Inhale 1 puff into the lungs 2 (two) times daily.     isosorbide mononitrate 30 MG 24 hr tablet  Commonly known as:  IMDUR  TAKE 1 TABLET EVERY DAY     loratadine 10 mg tablet  Commonly known as:  CLARITIN  Take 1 tablet (10 mg total) by mouth once daily.     metoprolol succinate 100 MG 24 hr tablet  Commonly known as:   TOPROL-XL  TAKE 1 TABLET EVERY DAY     pravastatin 40 MG tablet  Commonly known as:  PRAVACHOL  TAKE 1 TABLET EVERY DAY     VITAMIN C ORAL  Take 500 mg by mouth once daily.            Indwelling Lines/Drains at time of discharge:   Lines/Drains/Airways          None          Time spent on the discharge of patient: 35 minutes  Patient was seen and examined on the date of discharge and determined to be suitable for discharge.      **Portions of this note has been dictated using speech recognition software, M*Modal Fluency Direct; although, time has been taken to proof read and revise it may still contain misspellings, grammatical and or other errors.*            Jose Rayo NP  Department of Hospital Medicine  [facility]

## 2019-03-08 NOTE — HOSPITAL COURSE
Patient presented to the hospital with complaint of DIAZ over the past couple days.  Patient has significant medical history of diastolic heart failure, chronic AFib, hypertension.  Symptoms did not improve at home with nebulizer.  Patient at home on 20 mg Lasix p.o. daily.  Initially patient's  on admission.  Chest x-ray positive for interstitial edema. Patient was placed in observation for IV diuretics.  Patient's condition improved with ED/hospitalization interventions.  Patient vital signs stable, repeat echo no significant change from prior 2 year ago; except for some worsening pulmonary hypertension.  Today  EF normal.  Patient has been seen and examined.  Patient's Lasix increased to 40 mg daily.  Patient to follow up with PCP and Cardiology outpatient.  Patient has not been placed on ACE/ARB therapy given patient's age and increased risk for adverse effects over its benefit.  Case has been discussed with Dr. Mccall who agrees with care and is stable for discharge.

## 2019-03-08 NOTE — CONSULTS
Patient does not qualify for home oxygen per home oxygen evaluation.:    Date Performed: 3/8/2019     1)         Patient's Home O2 Sat on room air, while at rest: 92 %                               2)         Patient's O2 Sat on room air while exercisin %

## 2019-03-08 NOTE — PLAN OF CARE
CM met with patient and family at bedside to assess discharge needs. Patient lives at home with son and daughter and is independent with needs. Patient ambulates safely independently and denies any recent falls. Patient is ambulatory, drives and is not homebound. No dc needs identified at this time. CM assessment and MOON completed, placed in chart, and copy given to patient. Updated patient white board with current d/c plan and CM contact information. Encouraged patient to contact CM if any questions or concerns arise.    DC Plan: Home    Transportation home at d/c: family       03/08/19 1210   Discharge Assessment   Assessment Type Discharge Planning Assessment   Confirmed/corrected address and phone number on facesheet? Yes   Assessment information obtained from? Patient   Prior to hospitilization cognitive status: Alert/Oriented   Prior to hospitalization functional status: Independent   Current cognitive status: Alert/Oriented   Current Functional Status: Independent   Lives With child(kennedy), adult  (son, THOMAS and Keya bowles )   Able to Return to Prior Arrangements yes   Is patient able to care for self after discharge? Yes   Patient's perception of discharge disposition home or selfcare   Readmission Within the Last 30 Days no previous admission in last 30 days   Patient currently being followed by outpatient case management? No   Patient currently receives any other outside agency services? No   Equipment Currently Used at Home none   Do you have any problems affording any of your prescribed medications? No   Is the patient taking medications as prescribed? yes   Does the patient have transportation home? Yes   Transportation Anticipated family or friend will provide   Does the patient receive services at the [facility]? No   Discharge Plan A Home   DME Needed Upon Discharge  none   Patient/Family in Agreement with Plan yes

## 2019-03-08 NOTE — ASSESSMENT & PLAN NOTE
Continue home meds , follow up outpatient with Cardiology and primary care.  Will not start ARB/ACE therapy given patient's age and risk for adverse effects over benefit.

## 2019-03-08 NOTE — NURSING
Pt discharged per MD order. Pt instructions and handout given to pt. Pt instructed to schedule and keep all f/u appointments. Pt verbalizes understanding. IV removed, tele box returned to monitor tech. Pt taken in a wheelchair with all pt belongings.

## 2019-03-08 NOTE — TELEPHONE ENCOUNTER
----- Message from Kirsten Watson sent at 3/8/2019  1:43 PM CST -----  Contact: friend- Dilma patiño   .Type:  Sooner Apoointment Request    Caller is requesting a sooner appointment.  Caller declined first available appointment listed below.  Caller will not accept being placed on the waitlist and is requesting a message be sent to doctor.  Name of Caller: rosa elena patiño  When is the first available appointment? 4/25  Symptoms: hosp f/u  Would the patient rather a call back or a response [facility]?  Call back   Best Call Back Number: [phone]   Additional Information:  Pt is already scheduled on 4/25 but needs a 1 week f/u visit

## 2019-03-08 NOTE — PLAN OF CARE
Problem: Adult Inpatient Plan of Care  Goal: Plan of Care Review  Outcome: Ongoing (interventions implemented as appropriate)  Patient AAOX3. No c/o pain at this time. Patient Afib during shift on Tele monitor. Patient's vitals wnl during shift. Family present at bedside. Will continue to monitor patient

## 2019-03-08 NOTE — PLAN OF CARE
Problem: Adult Inpatient Plan of Care  Goal: Plan of Care Review  Outcome: Ongoing (interventions implemented as appropriate)  Patient and vitals stable, a-fib on tele monitor. Medications administered as ordered. Ambulates independently to bathroom. I&Os measured. POC reviewed. Will continue to monitor.

## 2019-03-08 NOTE — ASSESSMENT & PLAN NOTE
Improved mildly.  Continue home meds with Home Lasix increased to 40 mg daily.  Outpatient follow-up with PCP and Cardiology for further management.

## 2019-03-08 NOTE — PROGRESS NOTES
Home Oxygen Evaluation    Date Performed: 3/8/2019    1) Patient's Home O2 Sat on room air, while at rest: 92 %        If O2 sats on room air at rest are 88% or below, patient qualifies. No additional testing needed. Document N/A in steps 2 and 3. If 89% or above, complete steps 2.      2) Patient's O2 Sat on room air while exercisin %      If O2 sats on room air while exercising remain 89% or above patient does not qualify, no further testing needed Document N/A in step 3. If O2 sats on room air while exercising are 88% or below, continue to step 3.      3) Patient's O2 Sat while exercising on O2:  at  LPM         (Must show improvement from #2 for patients to qualify)    If O2 sats improve on oxygen, patient qualifies for portable oxygen. If not, the patient does not qualify.

## 2019-03-11 ENCOUNTER — OFFICE VISIT (OUTPATIENT)
Dept: INTERNAL MEDICINE | Facility: CLINIC | Age: 84
End: 2019-03-11
Payer: MEDICARE

## 2019-03-11 VITALS
DIASTOLIC BLOOD PRESSURE: 70 MMHG | TEMPERATURE: 98 F | OXYGEN SATURATION: 98 % | BODY MASS INDEX: 38.81 KG/M2 | HEIGHT: 57 IN | WEIGHT: 179.88 LBS | SYSTOLIC BLOOD PRESSURE: 128 MMHG | HEART RATE: 68 BPM

## 2019-03-11 DIAGNOSIS — E66.01 SEVERE OBESITY WITH BODY MASS INDEX (BMI) OF 35.0 TO 39.9 WITH SERIOUS COMORBIDITY: ICD-10-CM

## 2019-03-11 DIAGNOSIS — I50.33 ACUTE ON CHRONIC DIASTOLIC HEART FAILURE: ICD-10-CM

## 2019-03-11 DIAGNOSIS — Z91.81 AT RISK FOR FALLS: Primary | ICD-10-CM

## 2019-03-11 DIAGNOSIS — R26.81 UNSTEADY GAIT: ICD-10-CM

## 2019-03-11 PROCEDURE — 99214 PR OFFICE/OUTPT VISIT, EST, LEVL IV, 30-39 MIN: ICD-10-PCS | Mod: HCWC,S$GLB,, | Performed by: NURSE PRACTITIONER

## 2019-03-11 PROCEDURE — 3074F SYST BP LT 130 MM HG: CPT | Mod: HCWC,CPTII,S$GLB, | Performed by: NURSE PRACTITIONER

## 2019-03-11 PROCEDURE — 99999 PR PBB SHADOW E&M-EST. PATIENT-LVL V: CPT | Mod: PBBFAC,HCWC,, | Performed by: NURSE PRACTITIONER

## 2019-03-11 PROCEDURE — 99999 PR PBB SHADOW E&M-EST. PATIENT-LVL V: ICD-10-PCS | Mod: PBBFAC,HCWC,, | Performed by: NURSE PRACTITIONER

## 2019-03-11 PROCEDURE — 1101F PR PT FALLS ASSESS DOC 0-1 FALLS W/OUT INJ PAST YR: ICD-10-PCS | Mod: HCWC,CPTII,S$GLB, | Performed by: NURSE PRACTITIONER

## 2019-03-11 PROCEDURE — 3074F PR MOST RECENT SYSTOLIC BLOOD PRESSURE < 130 MM HG: ICD-10-PCS | Mod: HCWC,CPTII,S$GLB, | Performed by: NURSE PRACTITIONER

## 2019-03-11 PROCEDURE — 3078F DIAST BP <80 MM HG: CPT | Mod: HCWC,CPTII,S$GLB, | Performed by: NURSE PRACTITIONER

## 2019-03-11 PROCEDURE — 3078F PR MOST RECENT DIASTOLIC BLOOD PRESSURE < 80 MM HG: ICD-10-PCS | Mod: HCWC,CPTII,S$GLB, | Performed by: NURSE PRACTITIONER

## 2019-03-11 PROCEDURE — 99214 OFFICE O/P EST MOD 30 MIN: CPT | Mod: HCWC,S$GLB,, | Performed by: NURSE PRACTITIONER

## 2019-03-11 PROCEDURE — 1101F PT FALLS ASSESS-DOCD LE1/YR: CPT | Mod: HCWC,CPTII,S$GLB, | Performed by: NURSE PRACTITIONER

## 2019-03-11 NOTE — PROGRESS NOTES
Elke STEINBERG Veto  03/11/2019  [id number]    Isidra Benavidez MD  Patient Care Team:  Isidra Benavidez MD as PCP - General (Family Medicine)  Tish Agnulo MD as Consulting Physician (Cardiology)  Toño Abdi MD as Consulting Physician (Gastroenterology)  Ananda Mei III, MD as Consulting Physician (Gastroenterology)  GEOFF Turner OD as Consulting Physician (Optometry)  Radhika Obrien LPN as Care Coordinator (Internal Medicine)  Has the patient seen any provider outside of the [facility] network since the last visit? (no). If yes, HIPPA forms completed and records requested.        Visit Type:a scheduled visit following a recent hospitalization    Chief Complaint:  Chief Complaint   Patient presents with    hospital followup       History of Present Illness:    Transitional Care Note    Family and/or Caretaker present at visit?  Yes.  Diagnostic tests reviewed/disposition: No diagnosic tests pending after this hospitalization.  Disease/illness education: acute on chronic HF  Home health/community services discussion/referrals: Patient does not have home health established from hospital visit.  They do not need home health.  If needed, we will set up home health for the patient.   Establishment or re-establishment of referral orders for community resources: No other necessary community resources.   Discussion with other health care providers: No discussion with other health care providers necessary.       Patient presents with her daughter for hospital follow up. She was treated 3/7 for acute on chronic HF. She was in obs overnight. Lasix increased to 40 mg daily. She reports respiratory status improved. Still feels weak with exertion. Denies cough or wheeze. No significant weight change. Reports chronic constipation. BM once weekly.     History:  Past Medical History:   Diagnosis Date    Abnormal nuclear stress test 6/30/2016    Acute congestive heart failure 5/27/2017    Acute coronary syndrome   "   Acute kidney injury (nontraumatic) 5/27/2017    Acute kidney injury (nontraumatic) 5/27/2017    Acute on chronic congestive heart failure 5/21/2017    Acute on chronic diastolic congestive heart failure 8/27/2015    Anemia 5/9/2016    Anemia 5/9/2016    Angina pectoris 1/25/2018    Anticoagulant long-term use     Aortic regurgitation     Echo 11/2013---5 - Mild to moderate aortic regurgitation.      Asthma     Atrial fibrillation 5/15/2014    Back pain     s/p epidural steriod injections, no recent injections.    Baker's cyst     small, right, seen on US lower extremity 6/2014    Blood transfusion     Approximately 6 years ago    Chronic constipation     Coronary artery disease     Degenerative disc disease, lumbar     Diastolic dysfunction     Seen on echo 11/2013, stress test negative 5/2014    Diverticulosis     colonoscopy 3/27/2011    E coli bacteremia 5/23/2017    E. coli UTI (urinary tract infection) 5/23/2017    Hemorrhoids     colonoscopy 3/27/2011    Hiatal hernia     CXR 12/30/2016---Retrocardiac density again noted consistent with a hiatal hernia.    Hx of adenomatous colonic polyps     Hyperlipidemia     Hypertension     Hyponatremia 5/9/2016    Hypoxemia 5/27/2017    Hypoxia 6/28/2017    Leukocytosis 5/27/2017    Mitral regurgitation     Myocardial infarction     per patient was told in the past she had a "mild heart attack"    Obesity     Osteoarthritis, knee     Pneumonia     per patient "walking Pneumonia" did not require hospitalization    Seasonal allergies     Sepsis 5/22/2017    Trouble in sleeping     Urinary incontinence      Past Surgical History:   Procedure Laterality Date    APPENDECTOMY      COLONOSCOPY okay by dr. ramos N/A 8/29/2017    Performed by [facility] ENDO    EYE SURGERY Left     HYSTERECTOMY      benign reasons    KNEE SURGERY Bilateral     x2     Left heart cath      OLECRANON BURSECTOMY Right     6/2011    " TONSILLECTOMY      URETHRA SURGERY       Family History   Problem Relation Age of Onset    Heart disease Mother     Cancer Mother         colon    Hypertension Mother     Hyperlipidemia Mother     Heart disease Father     Hypertension Father     Hyperlipidemia Father     Heart disease Sister         MI    Heart disease Brother         MI    COPD Son     Hypertension Son     Diabetes Brother     Diabetes Son     Cancer Sister         breast    Kidney disease Neg Hx     Stroke Neg Hx      Social History     Socioeconomic History    Marital status:      Spouse name: Not on file    Number of children: 4    Years of education: Not on file    Highest education level: Not on file   Social Needs    Financial resource strain: Not on file    Food insecurity - worry: Not on file    Food insecurity - inability: Not on file    Transportation needs - medical: Not on file    Transportation needs - non-medical: Not on file   Occupational History    Occupation: retired   Tobacco Use    Smoking status: Former Smoker     Packs/day: 0.05     Years: 1.00     Pack years: 0.05     Types: Cigarettes     Last attempt to quit: 1952     Years since quittin.2    Smokeless tobacco: Never Used   Substance and Sexual Activity    Alcohol use: No     Alcohol/week: 0.0 oz    Drug use: No    Sexual activity: No     Partners: Male     Birth control/protection: See Surgical Hx   Other Topics Concern    Not on file   Social History Narrative    Not on file     Patient Active Problem List   Diagnosis    Essential hypertension    Hyperlipidemia    Severe obesity with body mass index (BMI) of 35.0 to 39.9 with serious comorbidity    Lumbar spondylosis    Asthma    Atherosclerosis of aorta    PAF (paroxysmal atrial fibrillation)    Chronic diastolic heart failure    Left ventricular diastolic dysfunction with preserved systolic function    Heart valve regurgitation    Osteoarthritis    Coronary  artery disease involving native coronary artery of native heart without angina pectoris    History of colon polyps    Diverticulosis of large intestine without hemorrhage    Angina pectoris    Stage 3 chronic kidney disease    Urge incontinence of urine    Bilateral hearing loss    Decreased pulses in feet    Bilateral carotid bruits    Anemia    Acute on chronic diastolic heart failure     Review of patient's allergies indicates:   Allergen Reactions    Iodine and iodide containing products Rash       The following were reviewed at this visit: active problem list, medication list, allergies, family history, social history, and health maintenance.    Medications:  Current Outpatient Medications on File Prior to Visit   Medication Sig Dispense Refill    acetaminophen (TYLENOL) 500 MG tablet Take 1 tablet (500 mg total) by mouth 2 (two) times daily. (Patient taking differently: Take 500 mg by mouth 2 (two) times daily as needed. )  0    albuterol (PROVENTIL HFA) 90 mcg/actuation inhaler Inhale 2 puffs into the lungs every 6 (six) hours as needed for Wheezing or Shortness of Breath. Rescue 8 g 1    albuterol 90 mcg/actuation inhaler Inhale 2 puffs into the lungs 4 (four) times daily. 3 Inhaler 4    ASCORBATE CALCIUM (VITAMIN C ORAL) Take 500 mg by mouth once daily.       DOCOSAHEXANOIC ACID/EPA (FISH OIL ORAL) Take 500 mg by mouth once daily.       ELIQUIS 5 mg Tab TAKE 1 TABLET BY MOUTH TWICE DAILY 60 tablet 6    flecainide (TAMBOCOR) 50 MG Tab Take 1 tablet (50 mg total) by mouth every 12 (twelve) hours. 60 tablet 6    furosemide (LASIX) 20 MG tablet Take 2 tablets (40 mg total) by mouth once daily. 90 tablet 2    isosorbide mononitrate (IMDUR) 30 MG 24 hr tablet TAKE 1 TABLET EVERY DAY 90 tablet 3    metoprolol succinate (TOPROL-XL) 100 MG 24 hr tablet TAKE 1 TABLET EVERY DAY 90 tablet 1    pravastatin (PRAVACHOL) 40 MG tablet TAKE 1 TABLET EVERY DAY 90 tablet 1    compressor, for nebulizer  Lara Compressor use as directed by albuterol use. 1 Device 0    fluticasone (FLOVENT HFA) 110 mcg/actuation inhaler Inhale 1 puff into the lungs 2 (two) times daily. 12 g 4    [DISCONTINUED] loratadine (CLARITIN) 10 mg tablet Take 1 tablet (10 mg total) by mouth once daily.  0     No current facility-administered medications on file prior to visit.        Medications have been reviewed and reconciled with patient at this visit.  Barriers to medications present (no)    Adverse reactions to current medications (no)    Over the counter medications reviewed (Yes ), and if needed added to active Medication list at this visit.     Exam:  Wt Readings from Last 3 Encounters:   03/11/19 81.6 kg (179 lb 14.3 oz)   03/07/19 57 kg (125 lb 9.6 oz)   10/25/18 84.6 kg (186 lb 8.2 oz)     Temp Readings from Last 3 Encounters:   03/11/19 98.2 °F (36.8 °C) (Tympanic)   03/08/19 97.7 °F (36.5 °C) (Oral)   02/16/19 98.2 °F (36.8 °C) (Oral)     BP Readings from Last 3 Encounters:   03/11/19 128/70   03/08/19 126/66   02/16/19 (!) 141/67     Pulse Readings from Last 3 Encounters:   03/11/19 68   03/08/19 80   02/16/19 66     Body mass index is 38.93 kg/m².      Review of Systems   Constitutional: Negative for chills, fever and weight loss.   Eyes: Negative for pain and discharge.   Respiratory: Negative for cough, shortness of breath and wheezing.    Cardiovascular: Negative for chest pain, palpitations and leg swelling.   Gastrointestinal: Positive for constipation (last BM this morning; reports once weekly). Negative for abdominal pain, diarrhea, nausea and vomiting.   Musculoskeletal: Negative for joint pain and myalgias.   Neurological: Positive for weakness (worse with exertion) and headaches.     Physical Exam   Constitutional: She is oriented to person, place, and time. She appears well-developed and well-nourished. No distress.   HENT:   Head: Normocephalic and atraumatic.   Eyes: Conjunctivae and EOM are normal. Pupils are  equal, round, and reactive to light.   Cardiovascular: Normal rate. An irregularly irregular rhythm present.   Pulmonary/Chest: Effort normal and breath sounds normal. No respiratory distress.   Neurological: She is alert and oriented to person, place, and time.   Skin: Skin is warm and dry.   Psychiatric: She has a normal mood and affect.   Vitals reviewed.      Laboratory Reviewed ({Yes)  Lab Results   Component Value Date    WBC 6.10 03/08/2019    HGB 13.3 03/08/2019    HCT 41.1 03/08/2019     03/08/2019    CHOL 156 10/25/2018    TRIG 60 10/25/2018    HDL 68 10/25/2018    ALT 32 03/08/2019    AST 23 03/08/2019     03/08/2019    K 3.9 03/08/2019    CL 96 03/08/2019    CREATININE 1.0 03/08/2019    BUN 20 03/08/2019    CO2 35 (H) 03/08/2019    TSH 2.207 01/31/2018    INR 0.9 02/16/2019    HGBA1C 5.9 04/13/2017       Elke was seen today for hospital followup.    Diagnoses and all orders for this visit:    At risk for falls  -     Ambulatory Referral to Physical/Occupational Therapy    Unsteady gait  -     Ambulatory Referral to Physical/Occupational Therapy    Acute on chronic diastolic heart failure    Severe obesity with body mass index (BMI) of 35.0 to 39.9 with serious comorbidity      Follow up with cardiology 3/22.    Care Plan/Goals: Reviewed  (N/A)  Goals     None          Follow up: Follow-up if symptoms worsen or fail to improve, for keep routine follow up.    After visit summary was printed and given to patient upon discharge today.  Patient goals and care plan are included in After Visit Summary.

## 2019-03-22 ENCOUNTER — OFFICE VISIT (OUTPATIENT)
Dept: INTERNAL MEDICINE | Facility: CLINIC | Age: 84
End: 2019-03-22
Payer: MEDICARE

## 2019-03-22 ENCOUNTER — OFFICE VISIT (OUTPATIENT)
Dept: CARDIOLOGY | Facility: CLINIC | Age: 84
End: 2019-03-22
Payer: MEDICARE

## 2019-03-22 VITALS
DIASTOLIC BLOOD PRESSURE: 62 MMHG | HEART RATE: 60 BPM | BODY MASS INDEX: 40.2 KG/M2 | WEIGHT: 186.31 LBS | HEIGHT: 57 IN | SYSTOLIC BLOOD PRESSURE: 108 MMHG

## 2019-03-22 VITALS
TEMPERATURE: 97 F | HEIGHT: 57 IN | OXYGEN SATURATION: 97 % | HEART RATE: 88 BPM | SYSTOLIC BLOOD PRESSURE: 116 MMHG | WEIGHT: 186 LBS | BODY MASS INDEX: 40.13 KG/M2 | DIASTOLIC BLOOD PRESSURE: 82 MMHG

## 2019-03-22 DIAGNOSIS — N18.30 STAGE 3 CHRONIC KIDNEY DISEASE: ICD-10-CM

## 2019-03-22 DIAGNOSIS — E78.5 HYPERLIPIDEMIA, UNSPECIFIED HYPERLIPIDEMIA TYPE: Chronic | ICD-10-CM

## 2019-03-22 DIAGNOSIS — I48.0 PAF (PAROXYSMAL ATRIAL FIBRILLATION): Primary | Chronic | ICD-10-CM

## 2019-03-22 DIAGNOSIS — E66.01 SEVERE OBESITY WITH BODY MASS INDEX (BMI) OF 35.0 TO 39.9 WITH SERIOUS COMORBIDITY: ICD-10-CM

## 2019-03-22 DIAGNOSIS — R09.89 BILATERAL CAROTID BRUITS: ICD-10-CM

## 2019-03-22 DIAGNOSIS — J45.20 INTERMITTENT ASTHMA WITHOUT COMPLICATION, UNSPECIFIED ASTHMA SEVERITY: Chronic | ICD-10-CM

## 2019-03-22 DIAGNOSIS — J06.9 VIRAL URI WITH COUGH: Primary | ICD-10-CM

## 2019-03-22 DIAGNOSIS — I70.0 ATHEROSCLEROSIS OF AORTA: Chronic | ICD-10-CM

## 2019-03-22 DIAGNOSIS — D64.9 ANEMIA, UNSPECIFIED TYPE: ICD-10-CM

## 2019-03-22 DIAGNOSIS — I25.10 CORONARY ARTERY DISEASE INVOLVING NATIVE CORONARY ARTERY OF NATIVE HEART WITHOUT ANGINA PECTORIS: Chronic | ICD-10-CM

## 2019-03-22 DIAGNOSIS — I50.32 CHRONIC DIASTOLIC HEART FAILURE: Chronic | ICD-10-CM

## 2019-03-22 DIAGNOSIS — I10 ESSENTIAL HYPERTENSION: ICD-10-CM

## 2019-03-22 DIAGNOSIS — I51.89 LEFT VENTRICULAR DIASTOLIC DYSFUNCTION WITH PRESERVED SYSTOLIC FUNCTION: Chronic | ICD-10-CM

## 2019-03-22 LAB
INFLUENZA A, MOLECULAR: NEGATIVE
INFLUENZA B, MOLECULAR: NEGATIVE
SPECIMEN SOURCE: NORMAL

## 2019-03-22 PROCEDURE — 99999 PR PBB SHADOW E&M-EST. PATIENT-LVL III: CPT | Mod: PBBFAC,HCWC,, | Performed by: INTERNAL MEDICINE

## 2019-03-22 PROCEDURE — 99999 PR PBB SHADOW E&M-EST. PATIENT-LVL III: ICD-10-PCS | Mod: PBBFAC,HCWC,, | Performed by: INTERNAL MEDICINE

## 2019-03-22 PROCEDURE — 3078F PR MOST RECENT DIASTOLIC BLOOD PRESSURE < 80 MM HG: ICD-10-PCS | Mod: HCWC,CPTII,S$GLB, | Performed by: INTERNAL MEDICINE

## 2019-03-22 PROCEDURE — 87502 INFLUENZA DNA AMP PROBE: CPT | Mod: HCWC

## 2019-03-22 PROCEDURE — 1101F PT FALLS ASSESS-DOCD LE1/YR: CPT | Mod: HCWC,CPTII,S$GLB, | Performed by: PHYSICIAN ASSISTANT

## 2019-03-22 PROCEDURE — 3079F PR MOST RECENT DIASTOLIC BLOOD PRESSURE 80-89 MM HG: ICD-10-PCS | Mod: HCWC,CPTII,S$GLB, | Performed by: PHYSICIAN ASSISTANT

## 2019-03-22 PROCEDURE — 99214 PR OFFICE/OUTPT VISIT, EST, LEVL IV, 30-39 MIN: ICD-10-PCS | Mod: HCWC,S$GLB,, | Performed by: INTERNAL MEDICINE

## 2019-03-22 PROCEDURE — 93000 ELECTROCARDIOGRAM COMPLETE: CPT | Mod: HCWC,S$GLB,, | Performed by: INTERNAL MEDICINE

## 2019-03-22 PROCEDURE — 99499 RISK ADDL DX/OHS AUDIT: ICD-10-PCS | Mod: HCWC,S$GLB,, | Performed by: INTERNAL MEDICINE

## 2019-03-22 PROCEDURE — 99999 PR PBB SHADOW E&M-EST. PATIENT-LVL V: CPT | Mod: PBBFAC,HCWC,, | Performed by: PHYSICIAN ASSISTANT

## 2019-03-22 PROCEDURE — 1101F PR PT FALLS ASSESS DOC 0-1 FALLS W/OUT INJ PAST YR: ICD-10-PCS | Mod: HCWC,CPTII,S$GLB, | Performed by: PHYSICIAN ASSISTANT

## 2019-03-22 PROCEDURE — 99999 PR PBB SHADOW E&M-EST. PATIENT-LVL V: ICD-10-PCS | Mod: PBBFAC,HCWC,, | Performed by: PHYSICIAN ASSISTANT

## 2019-03-22 PROCEDURE — 1101F PT FALLS ASSESS-DOCD LE1/YR: CPT | Mod: HCWC,CPTII,S$GLB, | Performed by: INTERNAL MEDICINE

## 2019-03-22 PROCEDURE — 3078F DIAST BP <80 MM HG: CPT | Mod: HCWC,CPTII,S$GLB, | Performed by: INTERNAL MEDICINE

## 2019-03-22 PROCEDURE — 3074F SYST BP LT 130 MM HG: CPT | Mod: HCWC,CPTII,S$GLB, | Performed by: PHYSICIAN ASSISTANT

## 2019-03-22 PROCEDURE — 1101F PR PT FALLS ASSESS DOC 0-1 FALLS W/OUT INJ PAST YR: ICD-10-PCS | Mod: HCWC,CPTII,S$GLB, | Performed by: INTERNAL MEDICINE

## 2019-03-22 PROCEDURE — 3074F PR MOST RECENT SYSTOLIC BLOOD PRESSURE < 130 MM HG: ICD-10-PCS | Mod: HCWC,CPTII,S$GLB, | Performed by: INTERNAL MEDICINE

## 2019-03-22 PROCEDURE — 3074F SYST BP LT 130 MM HG: CPT | Mod: HCWC,CPTII,S$GLB, | Performed by: INTERNAL MEDICINE

## 2019-03-22 PROCEDURE — 99499 UNLISTED E&M SERVICE: CPT | Mod: HCWC,S$GLB,, | Performed by: INTERNAL MEDICINE

## 2019-03-22 PROCEDURE — 93000 EKG 12-LEAD: ICD-10-PCS | Mod: HCWC,S$GLB,, | Performed by: INTERNAL MEDICINE

## 2019-03-22 PROCEDURE — 3079F DIAST BP 80-89 MM HG: CPT | Mod: HCWC,CPTII,S$GLB, | Performed by: PHYSICIAN ASSISTANT

## 2019-03-22 PROCEDURE — 3074F PR MOST RECENT SYSTOLIC BLOOD PRESSURE < 130 MM HG: ICD-10-PCS | Mod: HCWC,CPTII,S$GLB, | Performed by: PHYSICIAN ASSISTANT

## 2019-03-22 PROCEDURE — 99214 OFFICE O/P EST MOD 30 MIN: CPT | Mod: HCWC,S$GLB,, | Performed by: PHYSICIAN ASSISTANT

## 2019-03-22 PROCEDURE — 99214 PR OFFICE/OUTPT VISIT, EST, LEVL IV, 30-39 MIN: ICD-10-PCS | Mod: HCWC,S$GLB,, | Performed by: PHYSICIAN ASSISTANT

## 2019-03-22 PROCEDURE — 99214 OFFICE O/P EST MOD 30 MIN: CPT | Mod: HCWC,S$GLB,, | Performed by: INTERNAL MEDICINE

## 2019-03-22 NOTE — PROGRESS NOTES
Subjective:      Patient ID: Elke Laws is a 84 y.o. female.    Chief Complaint: URI and Cough    URI    This is a new problem. The current episode started yesterday. The problem has been gradually worsening. There has been no fever. Associated symptoms include congestion, coughing, rhinorrhea and wheezing. Pertinent negatives include no abdominal pain, chest pain, diarrhea, dysuria, ear pain, headaches, joint pain, joint swelling, nausea, neck pain, plugged ear sensation, rash, sinus pain, sneezing, sore throat, swollen glands or vomiting. Treatments tried: inhaler. The treatment provided no relief.       Review of Systems   Constitutional: Negative for activity change, appetite change, chills, diaphoresis, fatigue, fever and unexpected weight change.   HENT: Positive for congestion, postnasal drip and rhinorrhea. Negative for ear pain, hearing loss, sinus pain, sneezing, sore throat, trouble swallowing and voice change.    Eyes: Negative.  Negative for visual disturbance.   Respiratory: Positive for cough and wheezing. Negative for apnea, choking, chest tightness, shortness of breath and stridor.    Cardiovascular: Negative for chest pain, palpitations and leg swelling.   Gastrointestinal: Negative for abdominal distention, abdominal pain, blood in stool, constipation, diarrhea, nausea and vomiting.   Endocrine: Negative for cold intolerance, heat intolerance, polydipsia and polyuria.   Genitourinary: Negative.  Negative for difficulty urinating, dysuria and frequency.   Musculoskeletal: Negative for arthralgias, back pain, gait problem, joint pain, joint swelling, myalgias and neck pain.   Skin: Negative for color change, pallor, rash and wound.   Neurological: Negative for dizziness, tremors, weakness, light-headedness, numbness and headaches.   Hematological: Negative for adenopathy.   Psychiatric/Behavioral: Negative for behavioral problems, confusion, self-injury, sleep disturbance and suicidal ideas. The  "patient is not nervous/anxious.      Objective:   /82   Pulse 88   Temp 97.4 °F (36.3 °C) (Axillary)   Ht 4' 9" (1.448 m)   Wt 84.4 kg (186 lb)   LMP 12/13/1973   SpO2 97%   BMI 40.25 kg/m²     Physical Exam   Constitutional: She is oriented to person, place, and time. She appears well-developed and well-nourished. No distress.   HENT:   Head: Normocephalic and atraumatic.   Right Ear: Hearing, tympanic membrane, external ear and ear canal normal. No tenderness.   Left Ear: Hearing, tympanic membrane, external ear and ear canal normal. No tenderness.   Nose: Mucosal edema and rhinorrhea present. No sinus tenderness. Right sinus exhibits no maxillary sinus tenderness and no frontal sinus tenderness. Left sinus exhibits no maxillary sinus tenderness and no frontal sinus tenderness.   Mouth/Throat: Uvula is midline and mucous membranes are normal. No oral lesions. Normal dentition. No dental abscesses, uvula swelling or dental caries. Posterior oropharyngeal erythema present. No oropharyngeal exudate, posterior oropharyngeal edema or tonsillar abscesses. No tonsillar exudate.   Eyes: Conjunctivae and EOM are normal. Pupils are equal, round, and reactive to light. Right eye exhibits no discharge. Left eye exhibits no discharge.   Neck: Normal range of motion. Neck supple.   Cardiovascular: Normal rate, regular rhythm and normal heart sounds. Exam reveals no gallop and no friction rub.   No murmur heard.  Pulmonary/Chest: Effort normal. No stridor. No respiratory distress. She has wheezes. She has no rales.   Lymphadenopathy:     She has no cervical adenopathy.   Neurological: She is alert and oriented to person, place, and time.   Skin: Skin is warm. No rash noted. She is not diaphoretic. No erythema. No pallor.   Psychiatric: She has a normal mood and affect. Her behavior is normal. Judgment and thought content normal.   Nursing note and vitals reviewed.      Assessment:     1. Viral URI with cough  "     Plan:   Viral URI with cough  -     Influenza A & B by Molecular    -start coricidin HBP chest congestion and cough  -increase fluids  -albuterol inhaler prn wheezing    Follow-up if symptoms worsen or fail to improve.

## 2019-03-22 NOTE — PATIENT INSTRUCTIONS
-start coricidin HBP chest congestion and cough  Viral Upper Respiratory Illness (Adult)  You have a viral upper respiratory illness (URI), which is another term for the common cold. This illness is contagious during the first few days. It is spread through the air by coughing and sneezing. It may also be spread by direct contact (touching the sick person and then touching your own eyes, nose, or mouth). Frequent handwashing will decrease risk of spread. Most viral illnesses go away within 7 to 10 days with rest and simple home remedies. Sometimes the illness may last for several weeks. Antibiotics will not kill a virus, and they are generally not prescribed for this condition.    Home care  · If symptoms are severe, rest at home for the first 2 to 3 days. When you resume activity, don't let yourself get too tired.  · Avoid being exposed to cigarette smoke (yours or others).  · You may use acetaminophen or ibuprofen to control pain and fever, unless another medicine was prescribed. (Note: If you have chronic liver or kidney disease, have ever had a stomach ulcer or gastrointestinal bleeding, or are taking blood-thinning medicines, talk with your healthcare provider before using these medicines.) Aspirin should never be given to anyone under 18 years of age who is ill with a viral infection or fever. It may cause severe liver or brain damage.  · Your appetite may be poor, so a light diet is fine. Avoid dehydration by drinking 6 to 8 glasses of fluids per day (water, soft drinks, juices, tea, or soup). Extra fluids will help loosen secretions in the nose and lungs.  · Over-the-counter cold medicines will not shorten the length of time youre sick, but they may be helpful for the following symptoms: cough, sore throat, and nasal and sinus congestion. (Note: Do not use decongestants if you have high blood pressure.)  Follow-up care  Follow up with your healthcare provider, or as advised.  When to seek medical  advice  Call your healthcare provider right away if any of these occur:  · Cough with lots of colored sputum (mucus)  · Severe headache; face, neck, or ear pain  · Difficulty swallowing due to throat pain  · Fever of 100.4°F (38°C)  Call 911, or get immediate medical care  Call emergency services right away if any of these occur:  · Chest pain, shortness of breath, wheezing, or difficulty breathing  · Coughing up blood  · Inability to swallow due to throat pain  Date Last Reviewed: 9/13/2015 © 2000-2017 [employer]. [address] [place] [address]. All rights reserved. This information is not intended as a substitute for professional medical care. Always follow your healthcare professional's instructions.        Bronchitis with Wheezing (Viral or Bacterial: Adult)    Bronchitis is an infection of the air passages. It often occurs during a cold and is usually caused by a virus. Symptoms include cough with mucus (phlegm) and low-grade fever. This illness is contagious during the first few days and is spread through the air by coughing and sneezing, or by direct contact (touching the sick person and then touching your own eyes, nose, or mouth).  If there is a lot of inflammation, air flow is restricted. The air passages may also go into spasm, especially if you have asthma. This causes wheezing and difficulty breathing even in people who do not have asthma.  Bronchitis usually lasts 7 to 14 days. The wheezing should improve with treatment during the first week. An inhaler is often prescribed to relax the air passages and stop wheezing. Antibiotics will be prescribed if your doctor thinks there is also a secondary bacterial infection.  Home care  · If symptoms are severe, rest at home for the first 2 to 3 days. When you go back to your usual activities, don't let yourself get too tired.  · Do not smoke. Also avoid being exposed to secondhand smoke.  · You may use over-the-counter medicine to  control fever or pain, unless another medicine was prescribed. Note: If you have chronic liver or kidney disease or have ever had a stomach ulcer or gastrointestinal bleeding, talk with your healthcare provider before using these medicines. Also talk to your provider if you are taking medicine to prevent blood clots.) Aspirin should never be given to anyone younger than 18 years of age who is ill with a viral infection or fever. It may cause severe liver or brain damage.  · Your appetite may be poor, so a light diet is fine. Avoid dehydration by drinking 6 to 8 glasses of fluids per day (such as water, soft drinks, sports drinks, juices, tea, or soup). Extra fluids will help loosen secretions in the nose and lungs.  · Over-the-counter cough, cold, and sore-throat medicines will not shorten the length of the illness, but they may be helpful to reduce symptoms. (Note: Do not use decongestants if you have high blood pressure.)  · If you were given an inhaler, use it exactly as directed. If you need to use it more often than prescribed, your condition may be worsening. If this happens, contact your healthcare provider.  · If prescribed, finish all antibiotic medicine, even if you are feeling better after only a few days.  Follow-up care  Follow up with your healthcare provider, or as advised. If you had an X-ray or ECG (electrocardiogram), a specialist will review it. You will be notified of any new findings that may affect your care.  Note: If you are age 65 or older, or if you have a chronic lung disease or condition that affects your immune system, or you smoke, talk to your healthcare provider about having a pneumococcal vaccinations and a yearly influenza vaccination (flu shot).  When to seek medical advice  Call your healthcare provider right away if any of these occur:  · Fever of 100.4°F (38°C) or higher  · Coughing up increasing amounts of colored sputum  · Weakness, drowsiness, headache, facial pain, ear pain,  or a stiff neck  Call 911, or get immediate medical care  Contact emergency services right away if any of these occur.  · Coughing up blood  · Worsening weakness, drowsiness, headache, or stiff neck  · Increased wheezing not helped with medication, shortness of breath, or pain with breathing  Date Last Reviewed: 9/13/2015  © 3690-3839 [employer]. [address] [place] [address]. All rights reserved. This information is not intended as a substitute for professional medical care. Always follow your healthcare professional's instructions.

## 2019-03-24 ENCOUNTER — HOSPITAL ENCOUNTER (OUTPATIENT)
Facility: HOSPITAL | Age: 84
Discharge: HOME HEALTH | End: 2019-03-25
Attending: EMERGENCY MEDICINE | Admitting: INTERNAL MEDICINE
Payer: MEDICARE

## 2019-03-24 DIAGNOSIS — I50.32 CHRONIC DIASTOLIC HEART FAILURE: Chronic | ICD-10-CM

## 2019-03-24 DIAGNOSIS — R09.02 HYPOXIA: Primary | ICD-10-CM

## 2019-03-24 DIAGNOSIS — I50.9 CHF EXACERBATION: ICD-10-CM

## 2019-03-24 PROBLEM — N18.30 STAGE 3 CHRONIC KIDNEY DISEASE: Chronic | Status: ACTIVE | Noted: 2018-01-26

## 2019-03-24 LAB
ANION GAP SERPL CALC-SCNC: 16 MMOL/L (ref 8–16)
BASOPHILS # BLD AUTO: 0 K/UL (ref 0–0.2)
BASOPHILS NFR BLD: 0 % (ref 0–1.9)
BUN SERPL-MCNC: 26 MG/DL (ref 8–23)
CALCIUM SERPL-MCNC: 9.5 MG/DL (ref 8.7–10.5)
CHLORIDE SERPL-SCNC: 90 MMOL/L (ref 95–110)
CO2 SERPL-SCNC: 29 MMOL/L (ref 23–29)
CREAT SERPL-MCNC: 1.2 MG/DL (ref 0.5–1.4)
DIFFERENTIAL METHOD: ABNORMAL
EOSINOPHIL # BLD AUTO: 0 K/UL (ref 0–0.5)
EOSINOPHIL NFR BLD: 0 % (ref 0–8)
ERYTHROCYTE [DISTWIDTH] IN BLOOD BY AUTOMATED COUNT: 14.2 % (ref 11.5–14.5)
EST. GFR  (AFRICAN AMERICAN): 48 ML/MIN/1.73 M^2
EST. GFR  (NON AFRICAN AMERICAN): 42 ML/MIN/1.73 M^2
GLUCOSE SERPL-MCNC: 266 MG/DL (ref 70–110)
HCT VFR BLD AUTO: 41.9 % (ref 37–48.5)
HGB BLD-MCNC: 13.5 G/DL (ref 12–16)
LYMPHOCYTES # BLD AUTO: 0.5 K/UL (ref 1–4.8)
LYMPHOCYTES NFR BLD: 9.4 % (ref 18–48)
MCH RBC QN AUTO: 29.9 PG (ref 27–31)
MCHC RBC AUTO-ENTMCNC: 32.2 G/DL (ref 32–36)
MCV RBC AUTO: 93 FL (ref 82–98)
MONOCYTES # BLD AUTO: 0.1 K/UL (ref 0.3–1)
MONOCYTES NFR BLD: 1.4 % (ref 4–15)
NEUTROPHILS # BLD AUTO: 4.6 K/UL (ref 1.8–7.7)
NEUTROPHILS NFR BLD: 89.2 % (ref 38–73)
PLATELET # BLD AUTO: 172 K/UL (ref 150–350)
PMV BLD AUTO: 11.5 FL (ref 9.2–12.9)
POTASSIUM SERPL-SCNC: 3.9 MMOL/L (ref 3.5–5.1)
RBC # BLD AUTO: 4.51 M/UL (ref 4–5.4)
SODIUM SERPL-SCNC: 135 MMOL/L (ref 136–145)
TROPONIN I SERPL DL<=0.01 NG/ML-MCNC: 0.01 NG/ML (ref 0–0.03)
TROPONIN I SERPL DL<=0.01 NG/ML-MCNC: <0.006 NG/ML (ref 0–0.03)
WBC # BLD AUTO: 5.13 K/UL (ref 3.9–12.7)

## 2019-03-24 PROCEDURE — A4216 STERILE WATER/SALINE, 10 ML: HCPCS | Mod: HCWC | Performed by: NURSE PRACTITIONER

## 2019-03-24 PROCEDURE — 80048 BASIC METABOLIC PNL TOTAL CA: CPT | Mod: HCWC

## 2019-03-24 PROCEDURE — 36415 COLL VENOUS BLD VENIPUNCTURE: CPT | Mod: HCWC

## 2019-03-24 PROCEDURE — 25000003 PHARM REV CODE 250: Mod: HCWC | Performed by: NURSE PRACTITIONER

## 2019-03-24 PROCEDURE — 84484 ASSAY OF TROPONIN QUANT: CPT | Mod: 91,HCWC

## 2019-03-24 PROCEDURE — 83880 ASSAY OF NATRIURETIC PEPTIDE: CPT | Mod: HCWC

## 2019-03-24 PROCEDURE — 83735 ASSAY OF MAGNESIUM: CPT | Mod: HCWC

## 2019-03-24 PROCEDURE — G0378 HOSPITAL OBSERVATION PER HR: HCPCS | Mod: HCWC

## 2019-03-24 PROCEDURE — 96366 THER/PROPH/DIAG IV INF ADDON: CPT | Mod: HCWC

## 2019-03-24 PROCEDURE — 99291 CRITICAL CARE FIRST HOUR: CPT | Mod: 25,HCWC

## 2019-03-24 PROCEDURE — C9113 INJ PANTOPRAZOLE SODIUM, VIA: HCPCS | Mod: HCWC | Performed by: NURSE PRACTITIONER

## 2019-03-24 PROCEDURE — 25000242 PHARM REV CODE 250 ALT 637 W/ HCPCS: Mod: HCWC | Performed by: NURSE PRACTITIONER

## 2019-03-24 PROCEDURE — 63600175 PHARM REV CODE 636 W HCPCS: Mod: HCWC | Performed by: EMERGENCY MEDICINE

## 2019-03-24 PROCEDURE — 96365 THER/PROPH/DIAG IV INF INIT: CPT | Mod: HCWC

## 2019-03-24 PROCEDURE — 96375 TX/PRO/DX INJ NEW DRUG ADDON: CPT | Mod: HCWC

## 2019-03-24 PROCEDURE — 96367 TX/PROPH/DG ADDL SEQ IV INF: CPT | Mod: HCWC

## 2019-03-24 PROCEDURE — 63600175 PHARM REV CODE 636 W HCPCS: Mod: HCWC | Performed by: NURSE PRACTITIONER

## 2019-03-24 PROCEDURE — 21400001 HC TELEMETRY ROOM: Mod: HCWC

## 2019-03-24 PROCEDURE — 84484 ASSAY OF TROPONIN QUANT: CPT | Mod: HCWC

## 2019-03-24 PROCEDURE — 94640 AIRWAY INHALATION TREATMENT: CPT | Mod: HCWC

## 2019-03-24 PROCEDURE — 27000221 HC OXYGEN, UP TO 24 HOURS: Mod: HCWC

## 2019-03-24 PROCEDURE — 80048 BASIC METABOLIC PNL TOTAL CA: CPT | Mod: 91,HCWC

## 2019-03-24 PROCEDURE — 85025 COMPLETE CBC W/AUTO DIFF WBC: CPT | Mod: 91,HCWC

## 2019-03-24 PROCEDURE — 25000242 PHARM REV CODE 250 ALT 637 W/ HCPCS: Mod: HCWC | Performed by: EMERGENCY MEDICINE

## 2019-03-24 PROCEDURE — 94761 N-INVAS EAR/PLS OXIMETRY MLT: CPT | Mod: HCWC

## 2019-03-24 RX ORDER — SODIUM CHLORIDE 0.9 % (FLUSH) 0.9 %
3 SYRINGE (ML) INJECTION EVERY 8 HOURS
Status: DISCONTINUED | OUTPATIENT
Start: 2019-03-24 | End: 2019-03-25 | Stop reason: HOSPADM

## 2019-03-24 RX ORDER — PANTOPRAZOLE SODIUM 40 MG/10ML
40 INJECTION, POWDER, LYOPHILIZED, FOR SOLUTION INTRAVENOUS DAILY
Status: DISCONTINUED | OUTPATIENT
Start: 2019-03-24 | End: 2019-03-25 | Stop reason: HOSPADM

## 2019-03-24 RX ORDER — FUROSEMIDE 10 MG/ML
INJECTION INTRAMUSCULAR; INTRAVENOUS
Status: DISPENSED
Start: 2019-03-24 | End: 2019-03-24

## 2019-03-24 RX ORDER — ACETAMINOPHEN 325 MG/1
650 TABLET ORAL EVERY 6 HOURS PRN
Status: DISCONTINUED | OUTPATIENT
Start: 2019-03-24 | End: 2019-03-25 | Stop reason: HOSPADM

## 2019-03-24 RX ORDER — METOPROLOL SUCCINATE 50 MG/1
100 TABLET, EXTENDED RELEASE ORAL DAILY
Status: DISCONTINUED | OUTPATIENT
Start: 2019-03-24 | End: 2019-03-25 | Stop reason: HOSPADM

## 2019-03-24 RX ORDER — IPRATROPIUM BROMIDE AND ALBUTEROL SULFATE 2.5; .5 MG/3ML; MG/3ML
3 SOLUTION RESPIRATORY (INHALATION)
Status: DISCONTINUED | OUTPATIENT
Start: 2019-03-24 | End: 2019-03-25 | Stop reason: HOSPADM

## 2019-03-24 RX ORDER — ONDANSETRON 2 MG/ML
4 INJECTION INTRAMUSCULAR; INTRAVENOUS EVERY 8 HOURS PRN
Status: DISCONTINUED | OUTPATIENT
Start: 2019-03-24 | End: 2019-03-25 | Stop reason: HOSPADM

## 2019-03-24 RX ORDER — PRAVASTATIN SODIUM 20 MG/1
40 TABLET ORAL DAILY
Status: DISCONTINUED | OUTPATIENT
Start: 2019-03-24 | End: 2019-03-25 | Stop reason: HOSPADM

## 2019-03-24 RX ORDER — METHYLPREDNISOLONE SOD SUCC 125 MG
VIAL (EA) INJECTION
Status: DISPENSED
Start: 2019-03-24 | End: 2019-03-24

## 2019-03-24 RX ORDER — IPRATROPIUM BROMIDE AND ALBUTEROL SULFATE 2.5; .5 MG/3ML; MG/3ML
3 SOLUTION RESPIRATORY (INHALATION)
Status: COMPLETED | OUTPATIENT
Start: 2019-03-24 | End: 2019-03-24

## 2019-03-24 RX ORDER — FUROSEMIDE 10 MG/ML
40 INJECTION INTRAMUSCULAR; INTRAVENOUS ONCE
Status: COMPLETED | OUTPATIENT
Start: 2019-03-24 | End: 2019-03-24

## 2019-03-24 RX ORDER — FUROSEMIDE 10 MG/ML
40 INJECTION INTRAMUSCULAR; INTRAVENOUS 2 TIMES DAILY
Status: DISCONTINUED | OUTPATIENT
Start: 2019-03-24 | End: 2019-03-25 | Stop reason: HOSPADM

## 2019-03-24 RX ORDER — FLECAINIDE ACETATE 50 MG/1
50 TABLET ORAL EVERY 12 HOURS
Status: DISCONTINUED | OUTPATIENT
Start: 2019-03-24 | End: 2019-03-25 | Stop reason: HOSPADM

## 2019-03-24 RX ORDER — FLECAINIDE ACETATE 50 MG/1
50 TABLET ORAL EVERY 12 HOURS
Status: DISCONTINUED | OUTPATIENT
Start: 2019-03-24 | End: 2019-03-24

## 2019-03-24 RX ADMIN — AZITHROMYCIN MONOHYDRATE 500 MG: 500 INJECTION, POWDER, LYOPHILIZED, FOR SOLUTION INTRAVENOUS at 06:03

## 2019-03-24 RX ADMIN — PRAVASTATIN SODIUM 40 MG: 20 TABLET ORAL at 09:03

## 2019-03-24 RX ADMIN — IPRATROPIUM BROMIDE AND ALBUTEROL SULFATE 3 ML: .5; 3 SOLUTION RESPIRATORY (INHALATION) at 01:03

## 2019-03-24 RX ADMIN — APIXABAN 5 MG: 2.5 TABLET, FILM COATED ORAL at 08:03

## 2019-03-24 RX ADMIN — IPRATROPIUM BROMIDE AND ALBUTEROL SULFATE 3 ML: .5; 3 SOLUTION RESPIRATORY (INHALATION) at 07:03

## 2019-03-24 RX ADMIN — IPRATROPIUM BROMIDE AND ALBUTEROL SULFATE 3 ML: .5; 3 SOLUTION RESPIRATORY (INHALATION) at 03:03

## 2019-03-24 RX ADMIN — Medication 3 ML: at 02:03

## 2019-03-24 RX ADMIN — FLECAINIDE ACETATE 50 MG: 50 TABLET ORAL at 09:03

## 2019-03-24 RX ADMIN — FLECAINIDE ACETATE 50 MG: 50 TABLET ORAL at 08:03

## 2019-03-24 RX ADMIN — PANTOPRAZOLE SODIUM 40 MG: 40 INJECTION, POWDER, LYOPHILIZED, FOR SOLUTION INTRAVENOUS at 09:03

## 2019-03-24 RX ADMIN — APIXABAN 5 MG: 2.5 TABLET, FILM COATED ORAL at 09:03

## 2019-03-24 RX ADMIN — METOPROLOL SUCCINATE 100 MG: 50 TABLET, EXTENDED RELEASE ORAL at 09:03

## 2019-03-24 RX ADMIN — Medication 3 ML: at 07:03

## 2019-03-24 RX ADMIN — FUROSEMIDE 40 MG: 10 INJECTION, SOLUTION INTRAMUSCULAR; INTRAVENOUS at 07:03

## 2019-03-24 RX ADMIN — CEFTRIAXONE 1 G: 1 INJECTION, SOLUTION INTRAVENOUS at 05:03

## 2019-03-24 RX ADMIN — FUROSEMIDE 40 MG: 10 INJECTION, SOLUTION INTRAVENOUS at 05:03

## 2019-03-24 NOTE — ASSESSMENT & PLAN NOTE
- Rate controlled, monitor telemetry.  - Continue beta-blocker and flecainide for HR/rhythm suppression.  - Continue Eliquis for anticoagulation.

## 2019-03-24 NOTE — HPI
"Ms. Laws is an 84 y.o. female with a PMHx of chronic AF on Eliquis, HTN, chronic diastolic HFpEF of 60-65%, CAD, HLD, CKD, and asthma,  the emergency department with complaints of shortness of breath is progressively worsened over the past 2-3 days.  Associated cough producing clear sputum, 3-4 pillow orthopnea, PND, and fatigue.  Shortness of breath is worse with lying flat; no alleviating factors.  Denies any CP, palpitations, edema, weight gain, abdominal pain or distention, nausea, vomiting, diarrhea, dysuria, hematuria, back pain, lightheadedness or dizziness, syncope, fever, or chills.  Patient reports being compliant with her 40 mg Lasix daily.  She admits to dietary noncompliance.  She was recently admitted for acute decompensated heart failure, diuresed with IV Lasix which she responded well to him was discharged on 3/08.  She reports feeling well until "a few" days ago.  In the ED, patient found to be in decompensated heart failure with BNP > 500, and CXR showing pulmonary vascular congestion. Hospital Medicine was called for admission.    "

## 2019-03-24 NOTE — ED NOTES
Up to [facility] - cont with good output from lasix earlier. Pt states still feeling SOB but not as bad as when she came in.  Awaiting disposition.

## 2019-03-24 NOTE — ED NOTES
See downtime charting prior to 0230. Pt cont to be SOB - O2 started at 3L/nc.Pt cont wheezing with productive cough - yellow sputum.  Awaiting disposition.

## 2019-03-24 NOTE — H&P
"[facility] Hospital Medicine  History & Physical    Patient Name: Elke Laws  MRN: [id number]  Admission Date: 3/24/2019  Attending Physician: Isaias Sheldon MD   Primary Care Provider: Isidra Benavidez MD         Patient information was obtained from patient, past medical records and ER records.     Subjective:     Principal Problem:Acute on chronic diastolic heart failure    Chief Complaint:   Chief Complaint   Patient presents with    Shortness of Breath     states SOB with productive cough        HPI: Ms. Laws is an 84 y.o. female with a PMHx of chronic AF on Eliquis, HTN, chronic diastolic HFpEF of 60-65%, CAD, HLD, CKD, and asthma,   the emergency department with complaints of shortness of breath is progressively worsened over the past 2-3 days.  Associated cough producing clear sputum, 3-4 pillow orthopnea, PND, and fatigue.  Shortness of breath is worse with lying flat; no alleviating factors.  Denies any CP, palpitations, edema, weight gain, abdominal pain or distention, nausea, vomiting, diarrhea, dysuria, hematuria, back pain, lightheadedness or dizziness, syncope, fever, or chills.  Patient reports being compliant with her 40 mg Lasix daily.  She admits to dietary noncompliance.  She was recently admitted for acute decompensated heart failure, diuresed with IV Lasix which she responded well to him was discharged on 3/08.  She reports feeling well until "a few" days ago.  In the ED, patient found to be in decompensated heart failure with BNP > 500, and CXR showing pulmonary vascular congestion. Hospital Medicine was called for admission.      Past Medical History:   Diagnosis Date    Abnormal nuclear stress test 6/30/2016    Acute congestive heart failure 5/27/2017    Acute coronary syndrome     Acute kidney injury (nontraumatic) 5/27/2017    Acute kidney injury (nontraumatic) 5/27/2017    Acute on chronic congestive heart failure 5/21/2017    Acute on chronic diastolic " "congestive heart failure 8/27/2015    Anemia 5/9/2016    Anemia 5/9/2016    Angina pectoris 1/25/2018    Anticoagulant long-term use     Aortic regurgitation     Echo 11/2013---5 - Mild to moderate aortic regurgitation.      Asthma     Atrial fibrillation 5/15/2014    Back pain     s/p epidural steriod injections, no recent injections.    Baker's cyst     small, right, seen on US lower extremity 6/2014    Blood transfusion     Approximately 6 years ago    Chronic constipation     Coronary artery disease     Degenerative disc disease, lumbar     Diastolic dysfunction     Seen on echo 11/2013, stress test negative 5/2014    Diverticulosis     colonoscopy 3/27/2011    E coli bacteremia 5/23/2017    E. coli UTI (urinary tract infection) 5/23/2017    Hemorrhoids     colonoscopy 3/27/2011    Hiatal hernia     CXR 12/30/2016---Retrocardiac density again noted consistent with a hiatal hernia.    Hx of adenomatous colonic polyps     Hyperlipidemia     Hypertension     Hyponatremia 5/9/2016    Hypoxemia 5/27/2017    Hypoxia 6/28/2017    Leukocytosis 5/27/2017    Mitral regurgitation     Myocardial infarction     per patient was told in the past she had a "mild heart attack"    Obesity     Osteoarthritis, knee     Pneumonia     per patient "walking Pneumonia" did not require hospitalization    Seasonal allergies     Sepsis 5/22/2017    Trouble in sleeping     Urinary incontinence        Past Surgical History:   Procedure Laterality Date    APPENDECTOMY      COLONOSCOPY okay by dr. ramos N/A 8/29/2017    [facility]    EYE SURGERY Left     HYSTERECTOMY      benign reasons    KNEE SURGERY Bilateral     x2     Left heart cath      OLECRANON BURSECTOMY Right     6/2011    TONSILLECTOMY      URETHRA SURGERY         Review of patient's allergies indicates:   Allergen Reactions    Iodine and iodide containing products Rash       No current " facility-administered medications on file prior to encounter.      Current Outpatient Medications on File Prior to Encounter   Medication Sig    acetaminophen (TYLENOL) 500 MG tablet Take 1 tablet (500 mg total) by mouth 2 (two) times daily. (Patient taking differently: Take 500 mg by mouth 2 (two) times daily as needed. )    albuterol (PROVENTIL HFA) 90 mcg/actuation inhaler Inhale 2 puffs into the lungs every 6 (six) hours as needed for Wheezing or Shortness of Breath. Rescue    albuterol 90 mcg/actuation inhaler Inhale 2 puffs into the lungs 4 (four) times daily.    ASCORBATE CALCIUM (VITAMIN C ORAL) Take 500 mg by mouth once daily.     compressor, for nebulizer Lara Compressor use as directed by albuterol use.    DOCOSAHEXANOIC ACID/EPA (FISH OIL ORAL) Take 500 mg by mouth once daily.     ELIQUIS 5 mg Tab TAKE 1 TABLET BY MOUTH TWICE DAILY    flecainide (TAMBOCOR) 50 MG Tab Take 1 tablet (50 mg total) by mouth every 12 (twelve) hours.    furosemide (LASIX) 20 MG tablet Take 2 tablets (40 mg total) by mouth once daily.    metoprolol succinate (TOPROL-XL) 100 MG 24 hr tablet TAKE 1 TABLET EVERY DAY    pravastatin (PRAVACHOL) 40 MG tablet TAKE 1 TABLET EVERY DAY    fluticasone (FLOVENT HFA) 110 mcg/actuation inhaler Inhale 1 puff into the lungs 2 (two) times daily.    isosorbide mononitrate (IMDUR) 30 MG 24 hr tablet TAKE 1 TABLET EVERY DAY     Family History     Problem Relation (Age of Onset)    COPD Son    Cancer Mother, Sister    Diabetes Brother, Son    Heart disease Mother, Father, Sister, Brother    Hyperlipidemia Mother, Father    Hypertension Mother, Father, Son        Tobacco Use    Smoking status: Former Smoker     Packs/day: 0.05     Years: 1.00     Pack years: 0.05     Types: Cigarettes     Last attempt to quit: 1952     Years since quittin.2    Smokeless tobacco: Never Used   Substance and Sexual Activity    Alcohol use: No     Alcohol/week: 0.0 oz    Drug use: No    Sexual  activity: Never     Partners: Male     Birth control/protection: See Surgical Hx     Review of Systems   Constitutional: Negative for chills, diaphoresis, fatigue, fever and unexpected weight change.   HENT: Negative for congestion, postnasal drip, rhinorrhea, sinus pressure and sore throat.    Respiratory: Positive for cough, PND, orthopnea and shortness of breath. Negative for chest tightness and wheezing.    Cardiovascular: Negative for chest pain, palpitations and leg swelling.   Gastrointestinal: Negative for abdominal distention, abdominal pain, blood in stool, constipation, diarrhea, nausea and vomiting.   Genitourinary: Negative for difficulty urinating, dysuria, frequency, hematuria and urgency.   Musculoskeletal: Negative for arthralgias, back pain and myalgias.   Skin: Negative for pallor, rash and wound.   Neurological: Negative for dizziness, syncope, weakness, light-headedness, numbness and headaches.   Psychiatric/Behavioral: Negative for confusion. The patient is not nervous/anxious.    All other systems reviewed and are negative.    Objective:     Vital Signs (Most Recent):  Temp: 97.8 °F (36.6 °C) (03/24/19 0302)  Pulse: 80 (03/24/19 0502)  Resp: (!) 36 (03/24/19 0502)  BP: (!) 155/90 (03/24/19 0502)  SpO2: 95 % (03/24/19 0502) Vital Signs (24h Range):  Temp:  [97.8 °F (36.6 °C)] 97.8 °F (36.6 °C)  Pulse:  [74-97] 80  Resp:  [24-36] 36  SpO2:  [86 %-97 %] 95 %  BP: (153-186)/(68-90) 155/90        There is no height or weight on file to calculate BMI.    Physical Exam   Constitutional: She is oriented to person, place, and time. She appears well-developed and well-nourished. No distress.   HENT:   Head: Normocephalic and atraumatic.   Eyes: Conjunctivae are normal.   PERRL; EOM intact.   Neck: Normal range of motion. Neck supple. JVD present.   Cardiovascular: Normal rate, S1 normal, S2 normal and intact distal pulses. An irregularly irregular rhythm present.  No extrasystoles are present. Exam  reveals no gallop and no friction rub.   No murmur heard.  Pulses:       Radial pulses are 2+ on the right side, and 2+ on the left side.        Dorsalis pedis pulses are 2+ on the right side, and 2+ on the left side.        Posterior tibial pulses are 2+ on the right side, and 2+ on the left side.   Pulmonary/Chest: Effort normal. No accessory muscle usage. Tachypnea noted. No respiratory distress. She has no decreased breath sounds. She has no wheezes. She has no rhonchi. She has rales in the right lower field and the left lower field.   Abdominal: Soft. Bowel sounds are normal. She exhibits no distension. There is no tenderness. There is no rebound, no guarding and no CVA tenderness.   Musculoskeletal: Normal range of motion. She exhibits no edema, tenderness or deformity.   Neurological: She is alert and oriented to person, place, and time. No cranial nerve deficit or sensory deficit.   Skin: Skin is warm, dry and intact. Capillary refill takes less than 2 seconds. No rash noted. She is not diaphoretic. No cyanosis or erythema.   Psychiatric: She has a normal mood and affect. Her speech is normal and behavior is normal. Cognition and memory are normal.   Nursing note and vitals reviewed.          Significant Labs: All pertinent labs within the past 24 hours have been reviewed.    Significant Imaging: I have reviewed all pertinent imaging results/findings within the past 24 hours.     EKG: (personally reviewed)  Atrial fibrillation with controlled rate, no acute ischemic ST-T changes.  No significant change from previous tracings.            Assessment/Plan:     * Acute on chronic diastolic HFpEF of 60-65%  - Likely due to dietary noncompliance.  - Diureses with IV Lasix 40 mg BID.  - Strict I&Os, daily weights, Na/fluid restriction.  - BP/HR control.  - Recent TTE reviewed.    Stage 3 chronic kidney disease  - Creatinine stable.  - Avoid nephrotoxic agents.  - Follow daily labs.    Mild nonobstructive CAD per  [facility] in 2016  - Stable, no chest pain.  - Continue medical management.  - Followed by Dr. Angulo.    PAF (paroxysmal atrial fibrillation)  - Rate controlled, monitor telemetry.  - Continue beta-blocker and flecainide for HR/rhythm suppression.  - Continue Eliquis for anticoagulation.    Asthma with acute exacerbation  - Bronchodilators q.6 hr.  - Supplemental O2 to as needed.    Essential hypertension  - Blood pressure stable.  - Continue beta-blocker.  Diuretics as above.      VTE Risk Mitigation (From admission, onward)        Ordered     apixaban tablet 5 mg  2 times daily      03/24/19 0556     IP VTE HIGH RISK PATIENT  Once      03/24/19 0556     Place sequential compression device  Until discontinued      03/24/19 0556     Reason for No Pharmacological VTE Prophylaxis  Once      03/24/19 0556             Renee Anderson NP  Department of Hospital Medicine   [facility]

## 2019-03-24 NOTE — HOSPITAL COURSE
3/24/19 The patient reports improvement in symptoms. She is diuresing well with 2.8 liters out. Continue IV diuresis.     3/25/19- patient has diuresed 3.5L since admission. Her breathing has significantly improved. She qualified for home oxygen per desat study. She was counseled on sodium and fluid restriction. Patient was asked to follow up with Cardiology in two weeks. Patient seen and examined on date of discharge and deemed suitable.

## 2019-03-24 NOTE — SUBJECTIVE & OBJECTIVE
Interval History: The patient reports improvement in symptoms. She is diuresing well with 2.8 liters out. Continue IV diuresis.    Review of Systems   Constitutional: Negative for activity change, appetite change, chills, diaphoresis, fatigue, fever and unexpected weight change.   HENT: Negative for congestion, drooling, facial swelling, postnasal drip, rhinorrhea, sinus pressure, sneezing and sore throat.    Eyes: Negative for discharge, redness, itching and visual disturbance.   Respiratory: Positive for chest tightness and shortness of breath. Negative for apnea, cough, choking, wheezing and stridor.         (+) Orthopnea.   Cardiovascular: Negative for chest pain, palpitations and leg swelling.   Gastrointestinal: Negative for abdominal distention, abdominal pain, anal bleeding, blood in stool, constipation, diarrhea, nausea and vomiting.   Genitourinary: Negative for decreased urine volume, difficulty urinating, dysuria, frequency, hematuria, pelvic pain, urgency, vaginal bleeding and vaginal discharge.   Musculoskeletal: Negative for arthralgias, back pain, gait problem, joint swelling, myalgias, neck pain and neck stiffness.   Skin: Negative for color change, pallor, rash and wound.   Neurological: Negative for dizziness, seizures, syncope, facial asymmetry, speech difficulty, weakness, light-headedness, numbness and headaches.   Psychiatric/Behavioral: Negative for agitation, confusion, hallucinations and suicidal ideas. The patient is not nervous/anxious.    All other systems reviewed and are negative.    Objective:     Vital Signs (Most Recent):  Temp: 98.2 °F (36.8 °C) (03/24/19 1640)  Pulse: 79 (03/24/19 1640)  Resp: 18 (03/24/19 1640)  BP: (!) 140/60 (03/24/19 1640)  SpO2: 98 % (03/24/19 1640) Vital Signs (24h Range):  Temp:  [97.8 °F (36.6 °C)-98.3 °F (36.8 °C)] 98.2 °F (36.8 °C)  Pulse:  [70-97] 79  Resp:  [18-36] 18  SpO2:  [86 %-100 %] 98 %  BP: (130-186)/(60-90) 140/60     Weight: 81.4 kg (179 lb  7.3 oz)  Body mass index is 38.83 kg/m².    Intake/Output Summary (Last 24 hours) at 3/24/2019 1703  Last data filed at 3/24/2019 1200  Gross per 24 hour   Intake 350 ml   Output 3550 ml   Net -3200 ml      Physical Exam   Constitutional: She is oriented to person, place, and time. She appears well-developed and well-nourished. No distress.   HENT:   Head: Normocephalic and atraumatic.   Eyes: Conjunctivae are normal.   PERRL; EOM intact.   Neck: Normal range of motion. Neck supple. JVD present.   Cardiovascular: Normal rate, S1 normal, S2 normal and intact distal pulses. An irregularly irregular rhythm present.  No extrasystoles are present. Exam reveals no gallop and no friction rub.   No murmur heard.  Pulses:       Radial pulses are 2+ on the right side, and 2+ on the left side.        Dorsalis pedis pulses are 2+ on the right side, and 2+ on the left side.        Posterior tibial pulses are 2+ on the right side, and 2+ on the left side.   Pulmonary/Chest: Effort normal. No accessory muscle usage. Tachypnea noted. No respiratory distress. She has no decreased breath sounds. She has no wheezes. She has no rhonchi. She has rales in the right lower field and the left lower field.   Abdominal: Soft. Bowel sounds are normal. She exhibits no distension. There is no tenderness. There is no rebound, no guarding and no CVA tenderness.   Musculoskeletal: Normal range of motion. She exhibits no edema, tenderness or deformity.   Neurological: She is alert and oriented to person, place, and time. No cranial nerve deficit or sensory deficit.   Skin: Skin is warm, dry and intact. Capillary refill takes less than 2 seconds. No rash noted. She is not diaphoretic. No cyanosis or erythema.   Psychiatric: She has a normal mood and affect. Her speech is normal and behavior is normal. Cognition and memory are normal.   Nursing note and vitals reviewed.      Significant Labs: All pertinent labs within the past 24 hours have been  reviewed.    Significant Imaging:   Imaging Results          X-Ray Chest PA And Lateral (Final result)  Result time 03/24/19 08:21:38   Procedure changed from X-Ray Chest AP Portable     Final result by Magdaleno Kumar MD (03/24/19 08:21:38)                 Impression:      Stable chest x-ray with no acute finding      Electronically signed by: Magdaleno Kumar MD  Date:    03/24/2019  Time:    08:21             Narrative:    EXAMINATION:  XR CHEST PA AND LATERAL    CLINICAL HISTORY:  sob;    TECHNIQUE:  PA and lateral views of the chest were performed.    COMPARISON:  03/07/2019    FINDINGS:  Mild cardiomegaly.  Aortic atherosclerosis.    Chronic elevation right hemidiaphragm accentuating the right hilar vasculature.    Negative for acute infiltrate or effusion.

## 2019-03-24 NOTE — ED NOTES
Up to bedside commode. Pt diaper, gown and linens saturated with urine. Gown, diaper and linens changed.  Pt back to bed with help. Cont with A-fib. Pt gets SOB with exertion.

## 2019-03-24 NOTE — SUBJECTIVE & OBJECTIVE
"Past Medical History:   Diagnosis Date    Abnormal nuclear stress test 6/30/2016    Acute congestive heart failure 5/27/2017    Acute coronary syndrome     Acute kidney injury (nontraumatic) 5/27/2017    Acute kidney injury (nontraumatic) 5/27/2017    Acute on chronic congestive heart failure 5/21/2017    Acute on chronic diastolic congestive heart failure 8/27/2015    Anemia 5/9/2016    Anemia 5/9/2016    Angina pectoris 1/25/2018    Anticoagulant long-term use     Aortic regurgitation     Echo 11/2013---5 - Mild to moderate aortic regurgitation.      Asthma     Atrial fibrillation 5/15/2014    Back pain     s/p epidural steriod injections, no recent injections.    Baker's cyst     small, right, seen on US lower extremity 6/2014    Blood transfusion     Approximately 6 years ago    Chronic constipation     Coronary artery disease     Degenerative disc disease, lumbar     Diastolic dysfunction     Seen on echo 11/2013, stress test negative 5/2014    Diverticulosis     colonoscopy 3/27/2011    E coli bacteremia 5/23/2017    E. coli UTI (urinary tract infection) 5/23/2017    Hemorrhoids     colonoscopy 3/27/2011    Hiatal hernia     CXR 12/30/2016---Retrocardiac density again noted consistent with a hiatal hernia.    Hx of adenomatous colonic polyps     Hyperlipidemia     Hypertension     Hyponatremia 5/9/2016    Hypoxemia 5/27/2017    Hypoxia 6/28/2017    Leukocytosis 5/27/2017    Mitral regurgitation     Myocardial infarction     per patient was told in the past she had a "mild heart attack"    Obesity     Osteoarthritis, knee     Pneumonia     per patient "walking Pneumonia" did not require hospitalization    Seasonal allergies     Sepsis 5/22/2017    Trouble in sleeping     Urinary incontinence        Past Surgical History:   Procedure Laterality Date    APPENDECTOMY      COLONOSCOPY okay by dr. ramos N/A 8/29/2017    Performed by Toño Abdi MD at [facility]    EYE " SURGERY Left     HYSTERECTOMY      benign reasons    KNEE SURGERY Bilateral     x2     Left heart cath      OLECRANON BURSECTOMY Right     6/2011    TONSILLECTOMY      URETHRA SURGERY         Review of patient's allergies indicates:   Allergen Reactions    Iodine and iodide containing products Rash       No current facility-administered medications on file prior to encounter.      Current Outpatient Medications on File Prior to Encounter   Medication Sig    acetaminophen (TYLENOL) 500 MG tablet Take 1 tablet (500 mg total) by mouth 2 (two) times daily. (Patient taking differently: Take 500 mg by mouth 2 (two) times daily as needed. )    albuterol (PROVENTIL HFA) 90 mcg/actuation inhaler Inhale 2 puffs into the lungs every 6 (six) hours as needed for Wheezing or Shortness of Breath. Rescue    albuterol 90 mcg/actuation inhaler Inhale 2 puffs into the lungs 4 (four) times daily.    ASCORBATE CALCIUM (VITAMIN C ORAL) Take 500 mg by mouth once daily.     compressor, for nebulizer Lara Compressor use as directed by albuterol use.    DOCOSAHEXANOIC ACID/EPA (FISH OIL ORAL) Take 500 mg by mouth once daily.     ELIQUIS 5 mg Tab TAKE 1 TABLET BY MOUTH TWICE DAILY    flecainide (TAMBOCOR) 50 MG Tab Take 1 tablet (50 mg total) by mouth every 12 (twelve) hours.    furosemide (LASIX) 20 MG tablet Take 2 tablets (40 mg total) by mouth once daily.    metoprolol succinate (TOPROL-XL) 100 MG 24 hr tablet TAKE 1 TABLET EVERY DAY    pravastatin (PRAVACHOL) 40 MG tablet TAKE 1 TABLET EVERY DAY    fluticasone (FLOVENT HFA) 110 mcg/actuation inhaler Inhale 1 puff into the lungs 2 (two) times daily.    isosorbide mononitrate (IMDUR) 30 MG 24 hr tablet TAKE 1 TABLET EVERY DAY     Family History     Problem Relation (Age of Onset)    COPD Son    Cancer Mother, Sister    Diabetes Brother, Son    Heart disease Mother, Father, Sister, Brother    Hyperlipidemia Mother, Father    Hypertension Mother, Father, Son         Tobacco Use    Smoking status: Former Smoker     Packs/day: 0.05     Years: 1.00     Pack years: 0.05     Types: Cigarettes     Last attempt to quit: 1952     Years since quittin.2    Smokeless tobacco: Never Used   Substance and Sexual Activity    Alcohol use: No     Alcohol/week: 0.0 oz    Drug use: No    Sexual activity: Never     Partners: Male     Birth control/protection: See Surgical Hx     Review of Systems   Constitutional: Negative for chills, diaphoresis, fatigue, fever and unexpected weight change.   HENT: Negative for congestion, postnasal drip, rhinorrhea, sinus pressure and sore throat.    Respiratory: Positive for chest tightness and shortness of breath. Negative for cough and wheezing.         (+) Orthopnea.   Cardiovascular: Negative for chest pain, palpitations and leg swelling.   Gastrointestinal: Negative for abdominal distention, abdominal pain, blood in stool, constipation, diarrhea, nausea and vomiting.   Genitourinary: Negative for difficulty urinating, dysuria, frequency, hematuria and urgency.   Musculoskeletal: Negative for arthralgias, back pain and myalgias.   Skin: Negative for pallor, rash and wound.   Neurological: Negative for dizziness, syncope, weakness, light-headedness, numbness and headaches.   Psychiatric/Behavioral: Negative for confusion. The patient is not nervous/anxious.    All other systems reviewed and are negative.    Objective:     Vital Signs (Most Recent):  Temp: 97.8 °F (36.6 °C) (19 0302)  Pulse: 80 (19 0502)  Resp: (!) 36 (19 0502)  BP: (!) 155/90 (19 0502)  SpO2: 95 % (19 0502) Vital Signs (24h Range):  Temp:  [97.8 °F (36.6 °C)] 97.8 °F (36.6 °C)  Pulse:  [74-97] 80  Resp:  [24-36] 36  SpO2:  [86 %-97 %] 95 %  BP: (153-186)/(68-90) 155/90        There is no height or weight on file to calculate BMI.    Physical Exam   Constitutional: She is oriented to person, place, and time. She appears well-developed and  well-nourished. No distress.   HENT:   Head: Normocephalic and atraumatic.   Eyes: Conjunctivae are normal.   PERRL; EOM intact.   Neck: Normal range of motion. Neck supple. JVD present.   Cardiovascular: Normal rate, S1 normal, S2 normal and intact distal pulses. An irregularly irregular rhythm present.  No extrasystoles are present. Exam reveals no gallop and no friction rub.   No murmur heard.  Pulses:       Radial pulses are 2+ on the right side, and 2+ on the left side.        Dorsalis pedis pulses are 2+ on the right side, and 2+ on the left side.        Posterior tibial pulses are 2+ on the right side, and 2+ on the left side.   Pulmonary/Chest: Effort normal. No accessory muscle usage. Tachypnea noted. No respiratory distress. She has no decreased breath sounds. She has no wheezes. She has no rhonchi. She has rales in the right lower field and the left lower field.   Abdominal: Soft. Bowel sounds are normal. She exhibits no distension. There is no tenderness. There is no rebound, no guarding and no CVA tenderness.   Musculoskeletal: Normal range of motion. She exhibits no edema, tenderness or deformity.   Neurological: She is alert and oriented to person, place, and time. No cranial nerve deficit or sensory deficit.   Skin: Skin is warm, dry and intact. Capillary refill takes less than 2 seconds. No rash noted. She is not diaphoretic. No cyanosis or erythema.   Psychiatric: She has a normal mood and affect. Her speech is normal and behavior is normal. Cognition and memory are normal.   Nursing note and vitals reviewed.          Significant Labs: All pertinent labs within the past 24 hours have been reviewed.    Significant Imaging: I have reviewed all pertinent imaging results/findings within the past 24 hours.     EKG: (personally reviewed)  Atrial fibrillation with controlled rate, no acute ischemic ST-T changes.  No significant change from previous tracings.

## 2019-03-24 NOTE — ASSESSMENT & PLAN NOTE
- Likely due to dietary noncompliance.  - Diureses with IV Lasix 40 mg BID.  - Strict I&Os, daily weights, Na/fluid restriction.  - BP/HR control.  - Recent TTE reviewed.

## 2019-03-24 NOTE — PROGRESS NOTES
"[facility] Hospital Medicine  Progress Note    Patient Name: Elke Laws  MRN: [id number]  Patient Class: IP- Inpatient   Admission Date: 3/24/2019  Length of Stay: 0 days  Attending Physician: Dayo Brewer, *  Primary Care Provider: Isidra Benavidez MD        Subjective:     Principal Problem:Acute on chronic diastolic heart failure    HPI:  Ms. Laws is an 84 y.o. female with a PMHx of chronic AF on Eliquis, HTN, chronic diastolic HFpEF of 60-65%, CAD, HLD, CKD, and asthma,   the emergency department with complaints of shortness of breath is progressively worsened over the past 2-3 days.  Associated cough producing clear sputum, 3-4 pillow orthopnea, PND, and fatigue.  Shortness of breath is worse with lying flat; no alleviating factors.  Denies any CP, palpitations, edema, weight gain, abdominal pain or distention, nausea, vomiting, diarrhea, dysuria, hematuria, back pain, lightheadedness or dizziness, syncope, fever, or chills.  Patient reports being compliant with her 40 mg Lasix daily.  She admits to dietary noncompliance.  She was recently admitted for acute decompensated heart failure, diuresed with IV Lasix which she responded well to him was discharged on 3/08.  She reports feeling well until "a few" days ago.  In the ED, patient found to be in decompensated heart failure with BNP > 500, and CXR showing pulmonary vascular congestion. Hospital Medicine was called for admission.      Hospital Course:  3/24/19 The patient reports improvement in symptoms. She is diuresing well with 2.8 liters out. Continue IV diuresis.     Interval History: The patient reports improvement in symptoms. She is diuresing well with 2.8 liters out. Continue IV diuresis.    Review of Systems   Constitutional: Negative for activity change, appetite change, chills, diaphoresis, fatigue, fever and unexpected weight change.   HENT: Negative for congestion, drooling, facial swelling, postnasal drip, " rhinorrhea, sinus pressure, sneezing and sore throat.    Eyes: Negative for discharge, redness, itching and visual disturbance.   Respiratory: Positive for chest tightness and shortness of breath. Negative for apnea, cough, choking, wheezing and stridor.         (+) Orthopnea.   Cardiovascular: Negative for chest pain, palpitations and leg swelling.   Gastrointestinal: Negative for abdominal distention, abdominal pain, anal bleeding, blood in stool, constipation, diarrhea, nausea and vomiting.   Genitourinary: Negative for decreased urine volume, difficulty urinating, dysuria, frequency, hematuria, pelvic pain, urgency, vaginal bleeding and vaginal discharge.   Musculoskeletal: Negative for arthralgias, back pain, gait problem, joint swelling, myalgias, neck pain and neck stiffness.   Skin: Negative for color change, pallor, rash and wound.   Neurological: Negative for dizziness, seizures, syncope, facial asymmetry, speech difficulty, weakness, light-headedness, numbness and headaches.   Psychiatric/Behavioral: Negative for agitation, confusion, hallucinations and suicidal ideas. The patient is not nervous/anxious.    All other systems reviewed and are negative.    Objective:     Vital Signs (Most Recent):  Temp: 98.2 °F (36.8 °C) (03/24/19 1640)  Pulse: 79 (03/24/19 1640)  Resp: 18 (03/24/19 1640)  BP: (!) 140/60 (03/24/19 1640)  SpO2: 98 % (03/24/19 1640) Vital Signs (24h Range):  Temp:  [97.8 °F (36.6 °C)-98.3 °F (36.8 °C)] 98.2 °F (36.8 °C)  Pulse:  [70-97] 79  Resp:  [18-36] 18  SpO2:  [86 %-100 %] 98 %  BP: (130-186)/(60-90) 140/60     Weight: 81.4 kg (179 lb 7.3 oz)  Body mass index is 38.83 kg/m².    Intake/Output Summary (Last 24 hours) at 3/24/2019 1703  Last data filed at 3/24/2019 1200  Gross per 24 hour   Intake 350 ml   Output 3550 ml   Net -3200 ml      Physical Exam   Constitutional: She is oriented to person, place, and time. She appears well-developed and well-nourished. No distress.   HENT:    Head: Normocephalic and atraumatic.   Eyes: Conjunctivae are normal.   PERRL; EOM intact.   Neck: Normal range of motion. Neck supple. JVD present.   Cardiovascular: Normal rate, S1 normal, S2 normal and intact distal pulses. An irregularly irregular rhythm present.  No extrasystoles are present. Exam reveals no gallop and no friction rub.   No murmur heard.  Pulses:       Radial pulses are 2+ on the right side, and 2+ on the left side.        Dorsalis pedis pulses are 2+ on the right side, and 2+ on the left side.        Posterior tibial pulses are 2+ on the right side, and 2+ on the left side.   Pulmonary/Chest: Effort normal. No accessory muscle usage. Tachypnea noted. No respiratory distress. She has no decreased breath sounds. She has no wheezes. She has no rhonchi. She has rales in the right lower field and the left lower field.   Abdominal: Soft. Bowel sounds are normal. She exhibits no distension. There is no tenderness. There is no rebound, no guarding and no CVA tenderness.   Musculoskeletal: Normal range of motion. She exhibits no edema, tenderness or deformity.   Neurological: She is alert and oriented to person, place, and time. No cranial nerve deficit or sensory deficit.   Skin: Skin is warm, dry and intact. Capillary refill takes less than 2 seconds. No rash noted. She is not diaphoretic. No cyanosis or erythema.   Psychiatric: She has a normal mood and affect. Her speech is normal and behavior is normal. Cognition and memory are normal.   Nursing note and vitals reviewed.      Significant Labs: All pertinent labs within the past 24 hours have been reviewed.    Significant Imaging:   Imaging Results          X-Ray Chest PA And Lateral (Final result)  Result time 03/24/19 08:21:38   Procedure changed from X-Ray Chest AP Portable     Final result by Magdaleno Kumar MD (03/24/19 08:21:38)                 Impression:      Stable chest x-ray with no acute finding      Electronically signed by: Magdaleno  MD José  Date:    03/24/2019  Time:    08:21             Narrative:    EXAMINATION:  XR CHEST PA AND LATERAL    CLINICAL HISTORY:  sob;    TECHNIQUE:  PA and lateral views of the chest were performed.    COMPARISON:  03/07/2019    FINDINGS:  Mild cardiomegaly.  Aortic atherosclerosis.    Chronic elevation right hemidiaphragm accentuating the right hilar vasculature.    Negative for acute infiltrate or effusion.                                  Assessment/Plan:      * Acute on chronic diastolic HFpEF of 60-65%  - Likely due to dietary noncompliance.  - Diureses with IV Lasix 40 mg BID.  - Strict I&Os, daily weights, Na/fluid restriction.  - BP/HR control.  - Recent TTE reviewed.    Stage 3 chronic kidney disease  - Creatinine stable.  - Avoid nephrotoxic agents.  - Follow daily labs.    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  - Continue medical management.  - Followed by Dr. Angulo.    PAF (paroxysmal atrial fibrillation)  - Rate controlled, monitor telemetry.  - Continue beta-blocker and flecainide for HR/rhythm suppression.  - Continue Eliquis for anticoagulation.    Asthma with acute exacerbation  - Bronchodilators q.6 hr.  - Supplemental O2 to as needed.    Essential hypertension  - Blood pressure stable.  - Continue beta-blocker.  Diuretics as above.      VTE Risk Mitigation (From admission, onward)        Ordered     apixaban tablet 5 mg  2 times daily      03/24/19 0556     IP VTE HIGH RISK PATIENT  Once      03/24/19 0556     Place sequential compression device  Until discontinued      03/24/19 0556     Reason for No Pharmacological VTE Prophylaxis  Once      03/24/19 0556              Manish Desai NP  Department of Hospital Medicine   [facility]

## 2019-03-24 NOTE — ED PROVIDER NOTES
"SCRIBE #1 NOTE: I, Nadia Turner, am scribing for, and in the presence of, Vijay Petit MD. I have scribed the entire note.      History      Chief Complaint   Patient presents with    Shortness of Breath     states SOB with productive cough       Review of patient's allergies indicates:   Allergen Reactions    Iodine and iodide containing products Rash        HPI   HPI    3/24/2019, 01:00 AM   History obtained from the patient      History of Present Illness: Elke Laws is a 84 y.o. female patient with a PMHx of PAF, HLD, atherosclerosis of aorta, chronic diastolic heart failure, CAD, HTN, Stage 3 CKD, who presents to the Emergency Department for SOB which onset gradually 2-3 days ago. Sxs worsening last night around 10 PM. Symptoms are constant and moderate in severity. Sxs exacerbated with lying flat. No mitigating factors reported. Pt was evaluated by Dr. Angulo (Cardiology) on 3/22/19 for PAF who recommended admission to the patient, but the patient declined. Pt had a negative flu test. Pt was advised to come to the ER for any worsening sxs. Associated sxs include fatigue, intermittent "sharp" CP, productive cough with clear sputum, and congestion. Patient denies any diaphoresis, palpitations, extremity weakness/numbness, leg pain/swelling, dizziness, n/v/d, abd pain, fever, chills, and all other sxs at this time. Prior Tx includes breathing tx last night at 10 PM with minimal improvement. Pt takes lasix 40 mg daily. No further complaints or concerns at this time.         Arrival mode: Personal vehicle      PCP: Isidra Benavidez MD       Past Medical History:  Past Medical History:   Diagnosis Date    Abnormal nuclear stress test 6/30/2016    Acute congestive heart failure 5/27/2017    Acute coronary syndrome     Acute kidney injury (nontraumatic) 5/27/2017    Acute kidney injury (nontraumatic) 5/27/2017    Acute on chronic congestive heart failure 5/21/2017    Acute on chronic diastolic " "congestive heart failure 8/27/2015    Anemia 5/9/2016    Anemia 5/9/2016    Angina pectoris 1/25/2018    Anticoagulant long-term use     Aortic regurgitation     Echo 11/2013---5 - Mild to moderate aortic regurgitation.      Asthma     Atrial fibrillation 5/15/2014    Back pain     s/p epidural steriod injections, no recent injections.    Baker's cyst     small, right, seen on US lower extremity 6/2014    Blood transfusion     Approximately 6 years ago    Chronic constipation     Coronary artery disease     Degenerative disc disease, lumbar     Diastolic dysfunction     Seen on echo 11/2013, stress test negative 5/2014    Diverticulosis     colonoscopy 3/27/2011    E coli bacteremia 5/23/2017    E. coli UTI (urinary tract infection) 5/23/2017    Hemorrhoids     colonoscopy 3/27/2011    Hiatal hernia     CXR 12/30/2016---Retrocardiac density again noted consistent with a hiatal hernia.    Hx of adenomatous colonic polyps     Hyperlipidemia     Hypertension     Hyponatremia 5/9/2016    Hypoxemia 5/27/2017    Hypoxia 6/28/2017    Leukocytosis 5/27/2017    Mitral regurgitation     Myocardial infarction     per patient was told in the past she had a "mild heart attack"    Obesity     Osteoarthritis, knee     Pneumonia     per patient "walking Pneumonia" did not require hospitalization    Seasonal allergies     Sepsis 5/22/2017    Trouble in sleeping     Urinary incontinence        Past Surgical History:  Past Surgical History:   Procedure Laterality Date    APPENDECTOMY      COLONOSCOPY okay by dr. ramos N/A 8/29/2017    Performed by [facility] at [facility]    EYE SURGERY Left     HYSTERECTOMY      benign reasons    KNEE SURGERY Bilateral     x2     Left heart cath      OLECRANON BURSECTOMY Right     6/2011    TONSILLECTOMY      URETHRA SURGERY           Family History:  Family History   Problem Relation Age of Onset    Heart disease Mother     Cancer Mother         " colon    Hypertension Mother     Hyperlipidemia Mother     Heart disease Father     Hypertension Father     Hyperlipidemia Father     Heart disease Sister         MI    Heart disease Brother         MI    COPD Son     Hypertension Son     Diabetes Brother     Diabetes Son     Cancer Sister         breast    Kidney disease Neg Hx     Stroke Neg Hx        Social History:  Social History     Tobacco Use    Smoking status: Former Smoker     Packs/day: 0.05     Years: 1.00     Pack years: 0.05     Types: Cigarettes     Last attempt to quit: 1952     Years since quittin.2    Smokeless tobacco: Never Used   Substance and Sexual Activity    Alcohol use: No     Alcohol/week: 0.0 oz    Drug use: No    Sexual activity: Never     Partners: Male     Birth control/protection: See Surgical Hx       ROS   Review of Systems   Constitutional: Negative for chills, diaphoresis and fever.   HENT: Positive for congestion. Negative for sore throat.    Respiratory: Positive for cough and shortness of breath. Negative for chest tightness.    Cardiovascular: Positive for chest pain. Negative for palpitations and leg swelling.   Gastrointestinal: Negative for abdominal pain, diarrhea, nausea and vomiting.   Genitourinary: Negative for dysuria.   Musculoskeletal: Negative for back pain and myalgias.   Skin: Negative for rash.   Neurological: Negative for dizziness, weakness and numbness.   Hematological: Does not bruise/bleed easily.   All other systems reviewed and are negative.      Physical Exam      Initial Vitals   BP Pulse Resp Temp SpO2   19 0232 19 0232 19 0232 19 0302 19 0232   (!) 186/86 78 (!) 34 97.8 °F (36.6 °C) 97 %      MAP       --                 Physical Exam  Nursing Notes and Vital Signs Reviewed.  Constitutional: Patient is in no acute distress. Well-developed and well-nourished.  Head: Atraumatic. Normocephalic.  Eyes: PERRL. EOM intact. Conjunctivae are not pale.  No scleral icterus.  ENT: Mucous membranes are moist. Oropharynx is clear and symmetric.    Neck: Supple. Full ROM. No lymphadenopathy.  Cardiovascular: Mild tachycardia. Irregularly irregular rhythm. No murmurs, rubs, or gallops. Distal pulses are 2+ and symmetric.  Pulmonary/Chest: Tachypneic. Bilateral coarse breath sounds throughout with diffuse expiratory wheezing.  Abdominal: Soft and non-distended.  There is no tenderness.  No rebound, guarding, or rigidity.   Musculoskeletal: Moves all extremities. No obvious deformities. No edema. No calf tenderness.  Skin: Warm and dry.  Neurological:  Alert, awake, and appropriate.  Normal speech.  No acute focal neurological deficits are appreciated.  Psychiatric: Normal affect. Good eye contact. Appropriate in content.    ED Course    Critical Care  Date/Time: 3/24/2019 5:37 AM  Performed by: Vijay Petit MD  Authorized by: Vijay Petit MD   Direct patient critical care time: 10 minutes  Additional history critical care time: 4 minutes  Ordering / reviewing critical care time: 4 minutes  Documentation critical care time: 4 minutes  Consulting other physicians critical care time: 4 minutes  Consult with family critical care time: 4 minutes  Total critical care time (exclusive of procedural time) : 30 minutes  Critical care time was exclusive of separately billable procedures and treating other patients and teaching time.  Critical care was necessary to treat or prevent imminent or life-threatening deterioration of the following conditions: hypoxia, CHF.  Critical care was time spent personally by me on the following activities: blood draw for specimens, discussions with consultants, evaluation of patient's response to treatment, obtaining history from patient or surrogate, ordering and review of laboratory studies, pulse oximetry, review of old charts, re-evaluation of patient's condition, ordering and review of radiographic studies, ordering and performing  "treatments and interventions, examination of patient, interpretation of cardiac output measurements and development of treatment plan with patient or surrogate.        ED Vital Signs:  Vitals:    03/24/19 0502 03/24/19 0601 03/24/19 0701 03/24/19 0708   BP: (!) 155/90 (!) 168/70     Pulse: 80 77 73 76   Resp: (!) 36 (!) 27 18 (!) 31   Temp:       TempSrc:       SpO2: 95% 98% 99% 100%   Weight:       Height:        03/24/19 0800 03/24/19 0900 03/24/19 1000 03/24/19 1030   BP: (!) 141/76 (!) 149/66 (!) 142/60 (!) 140/62   Pulse: 85 81 70 79   Resp: (!) 24 (!) 26 (!) 26 (!) 22   Temp:    98.2 °F (36.8 °C)   TempSrc:    Oral   SpO2: 97% (!) 94% (!) 94% (!) 92%   Weight:       Height:        03/24/19 1046 03/24/19 1052 03/24/19 1105 03/24/19 1205   BP: (!) 141/64 (!) 141/64  130/61   Pulse: 76 76 79 78   Resp: (!) 22 (!) 22  18   Temp: 98.2 °F (36.8 °C) 98.2 °F (36.8 °C)  98.3 °F (36.8 °C)   TempSrc: Oral   Oral   SpO2: 98%   99%   Weight:  81.4 kg (179 lb 7.3 oz)     Height:  4' 9" (1.448 m)      03/24/19 1305 03/24/19 1332 03/24/19 1505   BP:      Pulse: 91 89 74   Resp:  18    Temp:      TempSrc:      SpO2:  96%    Weight:      Height:          Abnormal Lab Results:  Labs Reviewed   CBC W/ AUTO DIFFERENTIAL - Abnormal; Notable for the following components:       Result Value    Lymph # 0.5 (*)     Mono # 0.1 (*)     Gran% 89.2 (*)     Lymph% 9.4 (*)     Mono% 1.4 (*)     All other components within normal limits   BASIC METABOLIC PANEL - Abnormal; Notable for the following components:    Sodium 135 (*)     Chloride 90 (*)     Glucose 266 (*)     BUN, Bld 26 (*)     eGFR if  48 (*)     eGFR if non  42 (*)     All other components within normal limits   TROPONIN I   MAGNESIUM      Imaging Results:  Imaging Results          X-Ray Chest PA And Lateral (Final result)  Result time 03/24/19 08:21:38   Procedure changed from X-Ray Chest AP Portable     Final result by Magdaleno Kumar MD " (03/24/19 08:21:38)                 Impression:      Stable chest x-ray with no acute finding      Electronically signed by: Magdaleno Kumar MD  Date:    03/24/2019  Time:    08:21             Narrative:    EXAMINATION:  XR CHEST PA AND LATERAL    CLINICAL HISTORY:  sob;    TECHNIQUE:  PA and lateral views of the chest were performed.    COMPARISON:  03/07/2019    FINDINGS:  Mild cardiomegaly.  Aortic atherosclerosis.    Chronic elevation right hemidiaphragm accentuating the right hilar vasculature.    Negative for acute infiltrate or effusion.                               Per ED physician, pt's CXR results shows a possible faint L lower lobe infiltrate.    The EKG was ordered, reviewed, and independently interpreted by the ED provider.  Interpretation time: 0034  Rate: 65 BPM  Rhythm: atrial fibrillation  Interpretation: No acute ST changes. No STEMI.             The Emergency Provider reviewed the vital signs and test results, which are outlined above.    ED Discussion     5:29 AM: Re-evaluated pt.  D/w pt all pertinent results. Discussed CXR results. Discussed possible need for admission. D/w pt any concerns expressed at this time. Answered all questions. Pt expresses understanding at this time.Cannot rule out small LLL infiltrate on CXR.  Will treat for pneumonia given productive cough, as well as CHF, and admit. spO2 declined to 86% on effort when using bathroom.      5:32 AM: Discussed case with Dr. Sheldon ([facility]) who agrees with current care and management of pt and accepts admission.   Admitting Service: Hospital medicine   Admitting Physician: Dr. Sheldon  Admit to: In-pt Tele    5:35 AM: Re-evaluated pt. I have discussed test results, shared treatment plan, and the need for admission with patient and family at bedside. Pt and family express understanding at this time and agree with all information. All questions answered. Pt and family have no further questions or concerns at this time. Pt is  ready for admit.      ED Medication(s):  Medications   apixaban tablet 5 mg (5 mg Oral Given 3/24/19 0913)   metoprolol succinate (TOPROL-XL) 24 hr tablet 100 mg (100 mg Oral Given 3/24/19 0913)   pravastatin tablet 40 mg (40 mg Oral Given 3/24/19 0914)   sodium chloride 0.9% flush 3 mL (3 mLs Intravenous Given 3/24/19 1418)   acetaminophen tablet 650 mg (has no administration in time range)   pantoprazole injection 40 mg (40 mg Intravenous Given 3/24/19 0913)   ondansetron injection 4 mg (has no administration in time range)   promethazine (PHENERGAN) 6.25 mg in dextrose 5 % 50 mL IVPB (has no administration in time range)   furosemide injection 40 mg (0 mg Intravenous Hold 3/24/19 0900)   albuterol-ipratropium 2.5 mg-0.5 mg/3 mL nebulizer solution 3 mL (3 mLs Nebulization Given 3/24/19 1332)   flecainide tablet 50 mg (50 mg Oral Given 3/24/19 0913)   albuterol-ipratropium 2.5 mg-0.5 mg/3 mL nebulizer solution 3 mL (3 mLs Nebulization Given 3/24/19 0325)   cefTRIAXone (ROCEPHIN) 1 g in dextrose 5 % 50 mL IVPB (0 g Intravenous Stopped 3/24/19 0624)   azithromycin 500 mg in dextrose 5 % 250 mL IVPB (ready to mix system) (0 mg Intravenous Stopped 3/24/19 0800)   furosemide injection 40 mg (40 mg Intravenous Given 3/24/19 0702)             Medical Decision Making    Medical Decision Making:   Clinical Tests:   Lab Tests: Ordered and Reviewed  Radiological Study: Ordered and Reviewed  Medical Tests: Reviewed and Ordered           Scribe Attestation:   Scribe #1: I performed the above scribed service and the documentation accurately describes the services I performed. I attest to the accuracy of the note.    Attending:   Physician Attestation Statement for Scribe #1: I, Vijay Petit MD, personally performed the services described in this documentation, as scribed by Nadia Turner, in my presence, and it is both accurate and complete.          Clinical Impression       ICD-10-CM ICD-9-CM   1. CHF exacerbation  I50.9 428.0       Disposition:   Disposition: Admitted  Condition: Serious         Vijay Petit MD  03/24/19 5647

## 2019-03-25 VITALS
RESPIRATION RATE: 20 BRPM | BODY MASS INDEX: 38.34 KG/M2 | SYSTOLIC BLOOD PRESSURE: 127 MMHG | HEIGHT: 57 IN | DIASTOLIC BLOOD PRESSURE: 60 MMHG | HEART RATE: 78 BPM | WEIGHT: 177.69 LBS | OXYGEN SATURATION: 97 % | TEMPERATURE: 98 F

## 2019-03-25 PROBLEM — I50.9 CHF EXACERBATION: Status: ACTIVE | Noted: 2019-03-25

## 2019-03-25 LAB
ANION GAP SERPL CALC-SCNC: 10 MMOL/L (ref 8–16)
BASOPHILS # BLD AUTO: 0.01 K/UL (ref 0–0.2)
BASOPHILS NFR BLD: 0.1 % (ref 0–1.9)
BUN SERPL-MCNC: 26 MG/DL (ref 8–23)
CALCIUM SERPL-MCNC: 9.9 MG/DL (ref 8.7–10.5)
CHLORIDE SERPL-SCNC: 91 MMOL/L (ref 95–110)
CO2 SERPL-SCNC: 36 MMOL/L (ref 23–29)
CREAT SERPL-MCNC: 1.1 MG/DL (ref 0.5–1.4)
DIFFERENTIAL METHOD: ABNORMAL
EOSINOPHIL # BLD AUTO: 0 K/UL (ref 0–0.5)
EOSINOPHIL NFR BLD: 0 % (ref 0–8)
ERYTHROCYTE [DISTWIDTH] IN BLOOD BY AUTOMATED COUNT: 14.2 % (ref 11.5–14.5)
EST. GFR  (AFRICAN AMERICAN): 53 ML/MIN/1.73 M^2
EST. GFR  (NON AFRICAN AMERICAN): 46 ML/MIN/1.73 M^2
GLUCOSE SERPL-MCNC: 127 MG/DL (ref 70–110)
HCT VFR BLD AUTO: 42.3 % (ref 37–48.5)
HGB BLD-MCNC: 13.1 G/DL (ref 12–16)
LYMPHOCYTES # BLD AUTO: 0.6 K/UL (ref 1–4.8)
LYMPHOCYTES NFR BLD: 6.3 % (ref 18–48)
MCH RBC QN AUTO: 29.2 PG (ref 27–31)
MCHC RBC AUTO-ENTMCNC: 31 G/DL (ref 32–36)
MCV RBC AUTO: 94 FL (ref 82–98)
MONOCYTES # BLD AUTO: 1.4 K/UL (ref 0.3–1)
MONOCYTES NFR BLD: 15.1 % (ref 4–15)
NEUTROPHILS # BLD AUTO: 7.4 K/UL (ref 1.8–7.7)
NEUTROPHILS NFR BLD: 78.5 % (ref 38–73)
PLATELET # BLD AUTO: 171 K/UL (ref 150–350)
PMV BLD AUTO: 11.9 FL (ref 9.2–12.9)
POTASSIUM SERPL-SCNC: 5 MMOL/L (ref 3.5–5.1)
RBC # BLD AUTO: 4.48 M/UL (ref 4–5.4)
SODIUM SERPL-SCNC: 137 MMOL/L (ref 136–145)
WBC # BLD AUTO: 9.36 K/UL (ref 3.9–12.7)

## 2019-03-25 PROCEDURE — 94761 N-INVAS EAR/PLS OXIMETRY MLT: CPT | Mod: HCWC

## 2019-03-25 PROCEDURE — 94640 AIRWAY INHALATION TREATMENT: CPT | Mod: HCWC

## 2019-03-25 PROCEDURE — 36415 COLL VENOUS BLD VENIPUNCTURE: CPT | Mod: HCWC

## 2019-03-25 PROCEDURE — 63600175 PHARM REV CODE 636 W HCPCS: Mod: HCWC | Performed by: NURSE PRACTITIONER

## 2019-03-25 PROCEDURE — 25000242 PHARM REV CODE 250 ALT 637 W/ HCPCS: Mod: HCWC | Performed by: NURSE PRACTITIONER

## 2019-03-25 PROCEDURE — 80048 BASIC METABOLIC PNL TOTAL CA: CPT | Mod: HCWC

## 2019-03-25 PROCEDURE — C9113 INJ PANTOPRAZOLE SODIUM, VIA: HCPCS | Mod: HCWC | Performed by: NURSE PRACTITIONER

## 2019-03-25 PROCEDURE — G0378 HOSPITAL OBSERVATION PER HR: HCPCS | Mod: HCWC

## 2019-03-25 PROCEDURE — 25000003 PHARM REV CODE 250: Mod: HCWC | Performed by: NURSE PRACTITIONER

## 2019-03-25 PROCEDURE — 85025 COMPLETE CBC W/AUTO DIFF WBC: CPT | Mod: HCWC

## 2019-03-25 PROCEDURE — 27000221 HC OXYGEN, UP TO 24 HOURS: Mod: HCWC

## 2019-03-25 RX ORDER — ALBUTEROL SULFATE 90 UG/1
2 AEROSOL, METERED RESPIRATORY (INHALATION) EVERY 6 HOURS PRN
Qty: 8 G | Refills: 1 | Status: ON HOLD | OUTPATIENT
Start: 2019-03-25 | End: 2019-05-08 | Stop reason: HOSPADM

## 2019-03-25 RX ORDER — HYDRALAZINE HYDROCHLORIDE 10 MG/1
10 TABLET, FILM COATED ORAL EVERY 8 HOURS PRN
Status: DISCONTINUED | OUTPATIENT
Start: 2019-03-25 | End: 2019-03-25 | Stop reason: HOSPADM

## 2019-03-25 RX ORDER — ALBUTEROL SULFATE 0.63 MG/3ML
0.63 SOLUTION RESPIRATORY (INHALATION) EVERY 6 HOURS PRN
Qty: 1 BOX | Refills: 0 | Status: SHIPPED | OUTPATIENT
Start: 2019-03-25 | End: 2020-03-24

## 2019-03-25 RX ADMIN — IPRATROPIUM BROMIDE AND ALBUTEROL SULFATE 3 ML: .5; 3 SOLUTION RESPIRATORY (INHALATION) at 01:03

## 2019-03-25 RX ADMIN — METOPROLOL SUCCINATE 100 MG: 50 TABLET, EXTENDED RELEASE ORAL at 09:03

## 2019-03-25 RX ADMIN — FUROSEMIDE 40 MG: 10 INJECTION, SOLUTION INTRAVENOUS at 09:03

## 2019-03-25 RX ADMIN — APIXABAN 5 MG: 2.5 TABLET, FILM COATED ORAL at 09:03

## 2019-03-25 RX ADMIN — PANTOPRAZOLE SODIUM 40 MG: 40 INJECTION, POWDER, LYOPHILIZED, FOR SOLUTION INTRAVENOUS at 09:03

## 2019-03-25 RX ADMIN — IPRATROPIUM BROMIDE AND ALBUTEROL SULFATE 3 ML: .5; 3 SOLUTION RESPIRATORY (INHALATION) at 07:03

## 2019-03-25 RX ADMIN — ONDANSETRON 4 MG: 2 INJECTION INTRAMUSCULAR; INTRAVENOUS at 05:03

## 2019-03-25 RX ADMIN — FLECAINIDE ACETATE 50 MG: 50 TABLET ORAL at 09:03

## 2019-03-25 RX ADMIN — PRAVASTATIN SODIUM 40 MG: 20 TABLET ORAL at 09:03

## 2019-03-25 NOTE — NURSING
Expressed readiness to go home. Denies any other complaints other than dyspnea on exertion. Home oxygen at bedside. Adequate for dc.

## 2019-03-25 NOTE — PLAN OF CARE
03/25/19 1036   Medicare Message   Important Message from Medicare regarding Discharge Appeal Rights Given to patient/caregiver;Explained to patient/caregiver;Signed/date by patient/caregiver   Date IMM was signed 03/25/19   Time IMM was signed 1025

## 2019-03-25 NOTE — PLAN OF CARE
Referral made to [facility]     03/25/19 1332   Post-Acute Status   Post-Acute Authorization Home Health/Hospice   Home Health/Hospice Status Referrals Sent       [facility] declined referral because of PCP with [facility].  Referral made to [facility].  Awaiting response    [facility] declined referral, can not meet needs.  CM made referral to [facility].  Awaiting response

## 2019-03-25 NOTE — PLAN OF CARE
[facility] accepted referral     03/25/19 6926   Post-Acute Status   Post-Acute Authorization Home Health/Hospice   Home Health/Hospice Status Set-up Complete

## 2019-03-25 NOTE — DISCHARGE SUMMARY
"[facility] Hospital Medicine  Discharge Summary      Patient Name: Elke Laws  MRN: [id number]  Admission Date: 3/24/2019  Hospital Length of Stay: 1 days  Discharge Date and Time:  03/25/2019 3:53 PM  Attending Physician: Dayo Brewer, *   Discharging Provider: Isiah Mcgowan NP  Primary Care Provider: Isidra Benavidez MD      HPI:   Ms. Laws is an 84 y.o. female with a PMHx of chronic AF on Eliquis, HTN, chronic diastolic HFpEF of 60-65%, CAD, HLD, CKD, and asthma,   the emergency department with complaints of shortness of breath is progressively worsened over the past 2-3 days.  Associated cough producing clear sputum, 3-4 pillow orthopnea, PND, and fatigue.  Shortness of breath is worse with lying flat; no alleviating factors.  Denies any CP, palpitations, edema, weight gain, abdominal pain or distention, nausea, vomiting, diarrhea, dysuria, hematuria, back pain, lightheadedness or dizziness, syncope, fever, or chills.  Patient reports being compliant with her 40 mg Lasix daily.  She admits to dietary noncompliance.  She was recently admitted for acute decompensated heart failure, diuresed with IV Lasix which she responded well to him was discharged on 3/08.  She reports feeling well until "a few" days ago.  In the ED, patient found to be in decompensated heart failure with BNP > 500, and CXR showing pulmonary vascular congestion. Hospital Medicine was called for admission.      * No surgery found *      Hospital Course:   3/24/19 The patient reports improvement in symptoms. She is diuresing well with 2.8 liters out. Continue IV diuresis.     3/25/19- patient has diuresed 3.5L since admission. Her breathing has significantly improved. She qualified for home oxygen per desat study. She was counseled on sodium and fluid restriction. Patient was asked to follow up with Cardiology in two weeks. Patient seen and examined on date of discharge and deemed suitable.        Final Active " "Diagnoses:    Diagnosis Date Noted POA    PRINCIPAL PROBLEM:  Acute on chronic diastolic HFpEF of 60-65% [I50.33] 03/07/2019 Yes    CHF exacerbation [I50.9] 03/25/2019 Yes    Stage 3 chronic kidney disease [N18.3] 01/26/2018 Yes     Chronic    Mild nonobstructive CAD per [facility] in 2016 [I25.10] 12/15/2016 Yes     Chronic    PAF (paroxysmal atrial fibrillation) [I48.0] 07/17/2015 Yes     Chronic    Asthma with acute exacerbation [J45.901] 05/14/2015 Yes    Essential hypertension [I10]  Yes     Chronic      Problems Resolved During this Admission:       Discharged Condition: stable    Disposition: Home or Self Care    Follow Up:  Follow-up Information     Kee MD Robert In 2 weeks.    Specialties:  Cardiology, Cardiovascular Disease  Contact information:  [address] [facility] [address]  [phone]             [facility].    Specialty:  DME Provider  Why:  DME- Home Oxygen  Contact information:  [address] [facility] [address]  [phone]             [facility]    Specialties:  DME Provider, Home Health Services  Why:  Home Health  Contact information:  [address] [facility] [address]  [phone]                 Patient Instructions:      OXYGEN FOR HOME USE     Order Specific Question Answer Comments   Liter Flow 2    Duration Continuous    Qualifying SpO2: 81    Testing done at: Rest    Route nasal cannula    Portable mode: pulse dose acceptable    Device home concentrator with portable unit    Height: 4' 9" (1.448 m)    Weight: 80.6 kg (177 lb 11.1 oz)    Does patient have medical equipment at home? cane, straight    Does patient have medical equipment at home? grab bar    Alternative treatment measures have been tried or considered and deemed clinically ineffective. Yes      Ambulatory referral to Home Health   Referral Priority: Routine Referral Type: Home Health   Referral Reason: Specialty Services Required   Requested " Specialty: Home Health Services   Number of Visits Requested: 1       Significant Diagnostic Studies: Labs:   CMP   Recent Labs   Lab 03/24/19  0755 03/25/19  0433   * 137   K 3.9 5.0   CL 90* 91*   CO2 29 36*   * 127*   BUN 26* 26*   CREATININE 1.2 1.1   CALCIUM 9.5 9.9   ANIONGAP 16 10   ESTGFRAFRICA 48* 53*   EGFRNONAA 42* 46*    and CBC   Recent Labs   Lab 03/24/19  0755 03/25/19  0433   WBC 5.13 9.36   HGB 13.5 13.1   HCT 41.9 42.3    171       Pending Diagnostic Studies:     None         Medications:  Reconciled Home Medications:      Medication List      CHANGE how you take these medications    acetaminophen 500 MG tablet  Commonly known as:  TYLENOL  Take 1 tablet (500 mg total) by mouth 2 (two) times daily.  What changed:    · when to take this  · reasons to take this        CONTINUE taking these medications    * albuterol 90 mcg/actuation inhaler  Commonly known as:  PROVENTIL/VENTOLIN HFA  Inhale 2 puffs into the lungs 4 (four) times daily.     * albuterol 90 mcg/actuation inhaler  Commonly known as:  PROVENTIL HFA  Inhale 2 puffs into the lungs every 6 (six) hours as needed for Wheezing or Shortness of Breath. Rescue     compressor, for nebulizer Lara  Compressor use as directed by albuterol use.     ELIQUIS 5 mg Tab  Generic drug:  apixaban  TAKE 1 TABLET BY MOUTH TWICE DAILY     FISH OIL ORAL  Take 500 mg by mouth once daily.     flecainide 50 MG Tab  Commonly known as:  TAMBOCOR  Take 1 tablet (50 mg total) by mouth every 12 (twelve) hours.     fluticasone 110 mcg/actuation inhaler  Commonly known as:  FLOVENT HFA  Inhale 1 puff into the lungs 2 (two) times daily.     furosemide 20 MG tablet  Commonly known as:  LASIX  Take 2 tablets (40 mg total) by mouth once daily.     isosorbide mononitrate 30 MG 24 hr tablet  Commonly known as:  IMDUR  TAKE 1 TABLET EVERY DAY     metoprolol succinate 100 MG 24 hr tablet  Commonly known as:  TOPROL-XL  TAKE 1 TABLET EVERY DAY     pravastatin 40  MG tablet  Commonly known as:  PRAVACHOL  TAKE 1 TABLET EVERY DAY     VITAMIN C ORAL  Take 500 mg by mouth once daily.         * This list has 2 medication(s) that are the same as other medications prescribed for you. Read the directions carefully, and ask your doctor or other care provider to review them with you.                Indwelling Lines/Drains at time of discharge:   Lines/Drains/Airways          None          Time spent on the discharge of patient: >35 minutes  Patient was seen and examined on the date of discharge and determined to be suitable for discharge.      Isiah Mcgowan NP  Department of Hospital Medicine  [facility]

## 2019-03-25 NOTE — PLAN OF CARE
Pt in bed AAOx4.   Plan of Care reviewed, Verbalized understanding.  Patient remained free from falls, fall precautions in place.   Pt is A- fib on monitor. VSS.   PIV CDI.  Call bell and personal belongings within reach.   Hourly rounding complete. Reminded to call for assistance.   No complaints at this time.   Will continue to monitor.

## 2019-03-25 NOTE — PROGRESS NOTES
Home Oxygen Evaluation    Date Performed: 3/25/2019    1) Patient's Home O2 Sat on room air, while at rest: 81        If O2 sats on room air at rest are 88% or below, patient qualifies. No additional testing needed. Document N/A in steps 2 and 3. If 89% or above, complete steps 2.      2) Patient's O2 Sat on room air while exercising: na        If O2 sats on room air while exercising remain 89% or above patient does not qualify, no further testing needed Document N/A in step 3. If O2 sats on room air while exercising are 88% or below, continue to step 3.      3) Patient's O2 Sat while exercising on O2: 92 at 2 LPM         (Must show improvement from #2 for patients to qualify)    If O2 sats improve on oxygen, patient qualifies for portable oxygen. If not, the patient does not qualify.

## 2019-03-25 NOTE — PLAN OF CARE
CM met with patient/friend (Dilma Mak) at the bedside to assess for discharge needs.  Patient lives at home and her adult son lives with her.  She has a walker, cane, and nebulizer at home.  She states that she feels she may need oxygen at home.  CM requested a home oxygen evaluation to determine if she qualifies.  Patient would also like to have home health services.  She does not have a preference and is agreeable to use [facility].  Choice form completed and placed in patient blue folder.  CM provided a transitional care folder, information on advanced directives, information on pharmacy bedside delivery, and discharge planning begins on admission with contact information for any needs/questions.    D/C Plan: Home, possible oxygen  PCP: Dr Benavidez  Pharmacy:  [facility] ([facility])  Discharge transportation:  Dilmakelly Mak       03/25/19 1028   Discharge Assessment   Assessment Type Discharge Planning Assessment   Confirmed/corrected address and phone number on facesheet? Yes   Assessment information obtained from? Patient;Caregiver;Medical Record   Expected Length of Stay (days)   (1)   Communicated expected length of stay with patient/caregiver yes   Prior to hospitilization cognitive status: Alert/Oriented   Prior to hospitalization functional status: Independent;Assistive Equipment   Current cognitive status: Alert/Oriented   Current Functional Status: Independent;Assistive Equipment   Facility Arrived From: home   Lives With child(kennedy), adult   Able to Return to Prior Arrangements yes   Is patient able to care for self after discharge? Yes   Who are your caregiver(s) and their phone number(s)? Dilma Mak, friend [phone]   Patient's perception of discharge disposition home or selfcare   Readmission Within the Last 30 Days no previous admission in last 30 days   Patient currently being followed by outpatient case management? No   Patient currently receives home health services? No    Patient currently receives any other outside agency services? No   Equipment Currently Used at Home walker, rolling;nebulizer;cane, straight   Do you have any problems affording any of your prescribed medications? No   Is the patient taking medications as prescribed? yes   Does the patient have transportation home? Yes   Transportation Anticipated family or friend will provide   Dialysis Name and Scheduled days NA   Does the patient receive services at the [facility]? No   Discharge Plan A Home with family   DME Needed Upon Discharge  oxygen   Patient/Family in Agreement with Plan yes

## 2019-03-26 LAB
ANION GAP SERPL CALC-SCNC: 11 MMOL/L (ref 8–16)
BASOPHILS # BLD AUTO: 0.01 K/UL (ref 0–0.2)
BASOPHILS NFR BLD: 0.2 % (ref 0–1.9)
BNP SERPL-MCNC: 479 PG/ML (ref 0–99)
BUN SERPL-MCNC: 30 MG/DL (ref 8–23)
CALCIUM SERPL-MCNC: 9 MG/DL (ref 8.7–10.5)
CHLORIDE SERPL-SCNC: 98 MMOL/L (ref 95–110)
CO2 SERPL-SCNC: 25 MMOL/L (ref 23–29)
CREAT SERPL-MCNC: 1.1 MG/DL (ref 0.5–1.4)
DIFFERENTIAL METHOD: ABNORMAL
EOSINOPHIL # BLD AUTO: 0.1 K/UL (ref 0–0.5)
EOSINOPHIL NFR BLD: 1.7 % (ref 0–8)
ERYTHROCYTE [DISTWIDTH] IN BLOOD BY AUTOMATED COUNT: 14.4 % (ref 11.5–14.5)
EST. GFR  (AFRICAN AMERICAN): 53 ML/MIN/1.73 M^2
EST. GFR  (NON AFRICAN AMERICAN): 46 ML/MIN/1.73 M^2
GLUCOSE SERPL-MCNC: 110 MG/DL (ref 70–110)
HCT VFR BLD AUTO: 39.8 % (ref 37–48.5)
HGB BLD-MCNC: 12.7 G/DL (ref 12–16)
LYMPHOCYTES # BLD AUTO: 1.2 K/UL (ref 1–4.8)
LYMPHOCYTES NFR BLD: 18.7 % (ref 18–48)
MAGNESIUM SERPL-MCNC: 2.1 MG/DL (ref 1.6–2.6)
MCH RBC QN AUTO: 29.7 PG (ref 27–31)
MCHC RBC AUTO-ENTMCNC: 31.9 G/DL (ref 32–36)
MCV RBC AUTO: 93 FL (ref 82–98)
MONOCYTES # BLD AUTO: 1 K/UL (ref 0.3–1)
MONOCYTES NFR BLD: 15 % (ref 4–15)
NEUTROPHILS # BLD AUTO: 4.2 K/UL (ref 1.8–7.7)
NEUTROPHILS NFR BLD: 64.4 % (ref 38–73)
PLATELET # BLD AUTO: 154 K/UL (ref 150–350)
PMV BLD AUTO: 11.3 FL (ref 9.2–12.9)
POTASSIUM SERPL-SCNC: 4.1 MMOL/L (ref 3.5–5.1)
RBC # BLD AUTO: 4.27 M/UL (ref 4–5.4)
SODIUM SERPL-SCNC: 134 MMOL/L (ref 136–145)
TROPONIN I SERPL DL<=0.01 NG/ML-MCNC: 0.01 NG/ML (ref 0–0.03)
WBC # BLD AUTO: 6.54 K/UL (ref 3.9–12.7)

## 2019-03-26 NOTE — PLAN OF CARE
Apr1 Established Patient Visit with Isidra Benavidez MD   Monday Apr 1, 2019 3:20 PM   Arrive at check-in approximately 15 minutes before your scheduled appointment time. Bring all outside medical records and imaging, along with a list of your current medications and insurance card.    ([facility]) 1st floor  [facility]   [id number] [place] [id number]   [phone]                 03/26/19 0752   Final Note   Assessment Type Final Discharge Note   Anticipated Discharge Disposition Home-Health   Hospital Follow Up  Appt(s) scheduled? Yes   Right Care Referral Info   Post Acute Recommendation Home-care   Facility Name [facility]

## 2019-03-27 ENCOUNTER — TELEPHONE (OUTPATIENT)
Dept: INTERNAL MEDICINE | Facility: CLINIC | Age: 84
End: 2019-03-27

## 2019-03-27 NOTE — TELEPHONE ENCOUNTER
Spoke with [place] and informed her that she has a inhaler and if she get's any worse before her appt then to seek medical attention. She verbalized understanding.

## 2019-03-27 NOTE — TELEPHONE ENCOUNTER
----- Message from Isabela Wheeler MA sent at 3/27/2019  1:35 PM CDT -----  Contact: [facility]   Please advise    ----- Message -----  From: Edilia Smith  Sent: 3/27/2019  11:29 AM  To: Pr[facility] Staff    States the patient was discharged from the hospital on Monday with CHF and oxygen 2 liters. She has a cough and she would like to get something called in or what can she take over the counter. Please call G[facility] at [phone] or call the patient at [phone]. Thank you

## 2019-04-01 ENCOUNTER — HOSPITAL ENCOUNTER (EMERGENCY)
Facility: HOSPITAL | Age: 84
Discharge: HOME | End: 2019-04-01
Attending: EMERGENCY MEDICINE
Payer: MEDICARE

## 2019-04-01 ENCOUNTER — TELEPHONE (OUTPATIENT)
Dept: INTERNAL MEDICINE | Facility: CLINIC | Age: 84
End: 2019-04-01

## 2019-04-01 VITALS
RESPIRATION RATE: 17 BRPM | BODY MASS INDEX: 38.45 KG/M2 | HEART RATE: 86 BPM | HEIGHT: 57 IN | OXYGEN SATURATION: 96 % | TEMPERATURE: 98 F | SYSTOLIC BLOOD PRESSURE: 178 MMHG | DIASTOLIC BLOOD PRESSURE: 86 MMHG

## 2019-04-01 DIAGNOSIS — R06.02 SOB (SHORTNESS OF BREATH): Primary | ICD-10-CM

## 2019-04-01 DIAGNOSIS — I48.19 ATRIAL FIBRILLATION, PERSISTENT: Primary | ICD-10-CM

## 2019-04-01 DIAGNOSIS — J40 BRONCHITIS: ICD-10-CM

## 2019-04-01 DIAGNOSIS — I50.9 CHRONIC CONGESTIVE HEART FAILURE, UNSPECIFIED HEART FAILURE TYPE: ICD-10-CM

## 2019-04-01 LAB
ALBUMIN SERPL BCP-MCNC: 3.3 G/DL (ref 3.5–5.2)
ALP SERPL-CCNC: 107 U/L (ref 55–135)
ALT SERPL W/O P-5'-P-CCNC: 26 U/L (ref 10–44)
ANION GAP SERPL CALC-SCNC: 9 MMOL/L (ref 8–16)
AST SERPL-CCNC: 26 U/L (ref 10–40)
BASOPHILS # BLD AUTO: 0.01 K/UL (ref 0–0.2)
BASOPHILS NFR BLD: 0.1 % (ref 0–1.9)
BILIRUB SERPL-MCNC: 0.7 MG/DL (ref 0.1–1)
BILIRUB UR QL STRIP: NEGATIVE
BNP SERPL-MCNC: 863 PG/ML (ref 0–99)
BUN SERPL-MCNC: 21 MG/DL (ref 8–23)
CALCIUM SERPL-MCNC: 10 MG/DL (ref 8.7–10.5)
CHLORIDE SERPL-SCNC: 92 MMOL/L (ref 95–110)
CK SERPL-CCNC: 30 U/L (ref 20–180)
CLARITY UR: CLEAR
CO2 SERPL-SCNC: 39 MMOL/L (ref 23–29)
COLOR UR: YELLOW
CREAT SERPL-MCNC: 0.8 MG/DL (ref 0.5–1.4)
DIFFERENTIAL METHOD: ABNORMAL
EOSINOPHIL # BLD AUTO: 0.1 K/UL (ref 0–0.5)
EOSINOPHIL NFR BLD: 1.1 % (ref 0–8)
ERYTHROCYTE [DISTWIDTH] IN BLOOD BY AUTOMATED COUNT: 13.7 % (ref 11.5–14.5)
EST. GFR  (AFRICAN AMERICAN): >60 ML/MIN/1.73 M^2
EST. GFR  (NON AFRICAN AMERICAN): >60 ML/MIN/1.73 M^2
GLUCOSE SERPL-MCNC: 126 MG/DL (ref 70–110)
GLUCOSE UR QL STRIP: NEGATIVE
HCT VFR BLD AUTO: 43.1 % (ref 37–48.5)
HGB BLD-MCNC: 13.5 G/DL (ref 12–16)
HGB UR QL STRIP: ABNORMAL
KETONES UR QL STRIP: NEGATIVE
LEUKOCYTE ESTERASE UR QL STRIP: NEGATIVE
LYMPHOCYTES # BLD AUTO: 2 K/UL (ref 1–4.8)
LYMPHOCYTES NFR BLD: 19.5 % (ref 18–48)
MCH RBC QN AUTO: 29.4 PG (ref 27–31)
MCHC RBC AUTO-ENTMCNC: 31.3 G/DL (ref 32–36)
MCV RBC AUTO: 94 FL (ref 82–98)
MONOCYTES # BLD AUTO: 1.3 K/UL (ref 0.3–1)
MONOCYTES NFR BLD: 12.7 % (ref 4–15)
NEUTROPHILS # BLD AUTO: 6.8 K/UL (ref 1.8–7.7)
NEUTROPHILS NFR BLD: 66.6 % (ref 38–73)
NITRITE UR QL STRIP: NEGATIVE
PH UR STRIP: 8 [PH] (ref 5–8)
PLATELET # BLD AUTO: 207 K/UL (ref 150–350)
PMV BLD AUTO: 10.9 FL (ref 9.2–12.9)
POTASSIUM SERPL-SCNC: 4 MMOL/L (ref 3.5–5.1)
PROT SERPL-MCNC: 7.4 G/DL (ref 6–8.4)
PROT UR QL STRIP: NEGATIVE
RBC # BLD AUTO: 4.59 M/UL (ref 4–5.4)
SODIUM SERPL-SCNC: 140 MMOL/L (ref 136–145)
SP GR UR STRIP: 1.01 (ref 1–1.03)
TROPONIN I SERPL DL<=0.01 NG/ML-MCNC: 0.02 NG/ML (ref 0–0.03)
URN SPEC COLLECT METH UR: ABNORMAL
UROBILINOGEN UR STRIP-ACNC: NEGATIVE EU/DL
WBC # BLD AUTO: 10.21 K/UL (ref 3.9–12.7)

## 2019-04-01 PROCEDURE — 25000242 PHARM REV CODE 250 ALT 637 W/ HCPCS: Mod: HCWC | Performed by: EMERGENCY MEDICINE

## 2019-04-01 PROCEDURE — 81003 URINALYSIS AUTO W/O SCOPE: CPT | Mod: HCWC

## 2019-04-01 PROCEDURE — 93010 EKG 12-LEAD: ICD-10-PCS | Mod: HCWC,,, | Performed by: INTERNAL MEDICINE

## 2019-04-01 PROCEDURE — 63600175 PHARM REV CODE 636 W HCPCS: Mod: HCWC | Performed by: EMERGENCY MEDICINE

## 2019-04-01 PROCEDURE — 94640 AIRWAY INHALATION TREATMENT: CPT | Mod: HCWC

## 2019-04-01 PROCEDURE — 99285 EMERGENCY DEPT VISIT HI MDM: CPT | Mod: 25,HCWC

## 2019-04-01 PROCEDURE — 84484 ASSAY OF TROPONIN QUANT: CPT | Mod: HCWC

## 2019-04-01 PROCEDURE — 83880 ASSAY OF NATRIURETIC PEPTIDE: CPT | Mod: HCWC

## 2019-04-01 PROCEDURE — 82550 ASSAY OF CK (CPK): CPT | Mod: HCWC

## 2019-04-01 PROCEDURE — 80053 COMPREHEN METABOLIC PANEL: CPT | Mod: HCWC

## 2019-04-01 PROCEDURE — 93005 ELECTROCARDIOGRAM TRACING: CPT | Mod: HCWC

## 2019-04-01 PROCEDURE — 27000221 HC OXYGEN, UP TO 24 HOURS: Mod: HCWC

## 2019-04-01 PROCEDURE — 93010 ELECTROCARDIOGRAM REPORT: CPT | Mod: HCWC,,, | Performed by: INTERNAL MEDICINE

## 2019-04-01 PROCEDURE — 85025 COMPLETE CBC W/AUTO DIFF WBC: CPT | Mod: HCWC

## 2019-04-01 PROCEDURE — 96374 THER/PROPH/DIAG INJ IV PUSH: CPT | Mod: HCWC

## 2019-04-01 RX ORDER — IPRATROPIUM BROMIDE AND ALBUTEROL SULFATE 2.5; .5 MG/3ML; MG/3ML
3 SOLUTION RESPIRATORY (INHALATION)
Status: COMPLETED | OUTPATIENT
Start: 2019-04-01 | End: 2019-04-01

## 2019-04-01 RX ORDER — APIXABAN 5 MG/1
TABLET, FILM COATED ORAL
Qty: 60 TABLET | Refills: 6 | Status: ON HOLD | OUTPATIENT
Start: 2019-04-01 | End: 2019-05-24 | Stop reason: SDUPTHER

## 2019-04-01 RX ORDER — FUROSEMIDE 10 MG/ML
40 INJECTION INTRAMUSCULAR; INTRAVENOUS
Status: COMPLETED | OUTPATIENT
Start: 2019-04-01 | End: 2019-04-01

## 2019-04-01 RX ADMIN — FUROSEMIDE 40 MG: 10 INJECTION, SOLUTION INTRAMUSCULAR; INTRAVENOUS at 02:04

## 2019-04-01 RX ADMIN — IPRATROPIUM BROMIDE AND ALBUTEROL SULFATE 3 ML: .5; 3 SOLUTION RESPIRATORY (INHALATION) at 02:04

## 2019-04-01 NOTE — TELEPHONE ENCOUNTER
----- Message from Garima Escobar sent at 4/1/2019  8:45 AM CDT -----  Type:  Needs Medical Advice    Who Called:  Pt daughter Keya  Symptoms (please be specific):  Like fluid building up around her heart  How long has patient had these symptoms: off and on for last 3 weeks  Pharmacy name and phone #:    Would the patient rather a call back or a response via [facility]?  Call back  Best Call Back Number:   [phone]  Additional Information:  States pt had an appt over a week ago and she was not able to come with her to the appt/// she has questions//pt was in afib when she went to her last appt//please call asap//thanks

## 2019-04-01 NOTE — TELEPHONE ENCOUNTER
Telephoned daughter, Keya and just wanted to review last visit and and confirm follow up   Needs assurance of mother's cardiac status

## 2019-04-01 NOTE — TELEPHONE ENCOUNTER
----- Message from Deann Gutierrez sent at 4/1/2019  8:13 AM CDT -----  Contact: Friend (Dilma Mak)  Type:  Patient Returning Call    Who Called: Dilma Mak (friend)  Who Left Message for Patient: Hope  Does the patient know what this is regarding?: Appointment  Would the patient rather a call back or a response via [facility]? Callback  Best Call Back Number: [phone]  Additional Information: Caller says pt does not want to see America Laws

## 2019-04-01 NOTE — ED NOTES
Pt presents to ED c/o SOB ongoing xs 3 days. Currently on 2LPM 02 via NC daily, symptoms unrelieved with 02/breathing treatments. Audible wheeze noted to left lobe. Pt also c/o cough and bilat earache xs 1 day. Pt states on blood thinners.

## 2019-04-01 NOTE — TELEPHONE ENCOUNTER
Appt rescheduled with Dr. Benavidez for 4/2/19, pt refused to see America Acosta today for hosp follow up.

## 2019-04-01 NOTE — ED PROVIDER NOTES
"SCRIBE #1 NOTE: I, Gabrielle Durbin, am scribing for, and in the presence of, Emiliano Holt MD. I have scribed the entire note.       History     Chief Complaint   Patient presents with    Shortness of Breath     pt states she cant breathe and that her ears are "blocked up"     Review of patient's allergies indicates:   Allergen Reactions    Iodine and iodide containing products Rash         History of Present Illness     HPI    4/1/2019, 12:28 PM  History obtained from the patient      History of Present Illness: Elke Laws is a 84 y.o. female patient with a PMHx of CHF, HTN, CAD, and a-fib who presents to the Emergency Department for evaluation of SOB which onset gradually last night. Symptoms are constant and moderate in severity. No mitigating or exacerbating factors reported. No associated sxs. Patient denies any fever, chills, cough, CP, weakness, numbness, n/v/d, abd pain, and all other sxs at this time. Pt is on 2L of O2 at home. No further complaints or concerns at this time.       Arrival mode: Personal vehicle      PCP: Isidra Benavidez MD        Past Medical History:  Past Medical History:   Diagnosis Date    Abnormal nuclear stress test 6/30/2016    Acute congestive heart failure 5/27/2017    Acute coronary syndrome     Acute kidney injury (nontraumatic) 5/27/2017    Acute kidney injury (nontraumatic) 5/27/2017    Acute on chronic congestive heart failure 5/21/2017    Acute on chronic diastolic congestive heart failure 8/27/2015    Anemia 5/9/2016    Anemia 5/9/2016    Angina pectoris 1/25/2018    Anticoagulant long-term use     Aortic regurgitation     Echo 11/2013---5 - Mild to moderate aortic regurgitation.      Asthma     Atrial fibrillation 5/15/2014    Back pain     s/p epidural steriod injections, no recent injections.    Baker's cyst     small, right, seen on US lower extremity 6/2014    Blood transfusion     Approximately 6 years ago    Chronic constipation     " "Coronary artery disease     Degenerative disc disease, lumbar     Diastolic dysfunction     Seen on echo 11/2013, stress test negative 5/2014    Diverticulosis     colonoscopy 3/27/2011    E coli bacteremia 5/23/2017    E. coli UTI (urinary tract infection) 5/23/2017    Hemorrhoids     colonoscopy 3/27/2011    Hiatal hernia     CXR 12/30/2016---Retrocardiac density again noted consistent with a hiatal hernia.    Hx of adenomatous colonic polyps     Hyperlipidemia     Hypertension     Hyponatremia 5/9/2016    Hypoxemia 5/27/2017    Hypoxia 6/28/2017    Leukocytosis 5/27/2017    Mitral regurgitation     Myocardial infarction     per patient was told in the past she had a "mild heart attack"    Obesity     Osteoarthritis, knee     Pneumonia     per patient "walking Pneumonia" did not require hospitalization    Seasonal allergies     Sepsis 5/22/2017    Trouble in sleeping     Urinary incontinence        Past Surgical History:  Past Surgical History:   Procedure Laterality Date    APPENDECTOMY      COLONOSCOPY okay by dr. ramos N/A 8/29/2017    [facility] ENDO    EYE SURGERY Left     HYSTERECTOMY      benign reasons    KNEE SURGERY Bilateral     x2     Left heart cath      OLECRANON BURSECTOMY Right     6/2011    TONSILLECTOMY      URETHRA SURGERY           Family History:  Family History   Problem Relation Age of Onset    Heart disease Mother     Cancer Mother         colon    Hypertension Mother     Hyperlipidemia Mother     Heart disease Father     Hypertension Father     Hyperlipidemia Father     Heart disease Sister         MI    Heart disease Brother         MI    COPD Son     Hypertension Son     Diabetes Brother     Diabetes Son     Cancer Sister         breast    Kidney disease Neg Hx     Stroke Neg Hx        Social History:  Social History     Tobacco Use    Smoking status: Former Smoker     Packs/day: 0.05     Years: 1.00     Pack " years: 0.05     Types: Cigarettes     Last attempt to quit: 1952     Years since quittin.2    Smokeless tobacco: Never Used   Substance and Sexual Activity    Alcohol use: No     Alcohol/week: 0.0 oz    Drug use: No    Sexual activity: Never     Partners: Male     Birth control/protection: See Surgical Hx        Review of Systems     Review of Systems   Constitutional: Negative for chills, diaphoresis and fever.   HENT: Negative for congestion, rhinorrhea and sore throat.    Respiratory: Positive for shortness of breath. Negative for cough.    Cardiovascular: Negative for chest pain.   Gastrointestinal: Negative for abdominal pain, diarrhea, nausea and vomiting.   Genitourinary: Negative for dysuria, frequency and hematuria.   Musculoskeletal: Negative for back pain and neck pain.   Skin: Negative for rash.   Neurological: Negative for dizziness, weakness, numbness and headaches.   Hematological: Does not bruise/bleed easily.   All other systems reviewed and are negative.       Physical Exam     Initial Vitals [19 1219]   BP Pulse Resp Temp SpO2   127/71 69 18 98 °F (36.7 °C) (!) 89 %      MAP       --          Physical Exam  Nursing Notes and Vital Signs Reviewed.  Constitutional: Patient is in no acute distress. Well-developed and well-nourished.  Head: Atraumatic. Normocephalic.  Eyes: PERRL. EOM intact. Conjunctivae are not pale. No scleral icterus.  ENT: Mucous membranes are moist. Oropharynx is clear and symmetric.    Neck: Supple. Full ROM. No lymphadenopathy.  Cardiovascular: Regular rate. Regular rhythm. No murmurs, rubs, or gallops. Distal pulses are 2+ and symmetric.  Pulmonary/Chest: No respiratory distress. Crackles at bilat lung bases. Diffuse wheezing.  Abdominal: Soft and non-distended.  There is no tenderness.  No rebound, guarding, or rigidity. Good bowel sounds.  Genitourinary: No CVA tenderness  Musculoskeletal: Moves all extremities. No obvious deformities. No edema. No calf  "tenderness.  Skin: Warm and dry.  Neurological:  Alert, awake, and appropriate.  Normal speech.  No acute focal neurological deficits are appreciated.  Psychiatric: Normal affect. Good eye contact. Appropriate in content.     ED Course   Procedures  ED Vital Signs:  Vitals:    04/01/19 1219 04/01/19 1258 04/01/19 1439   BP: 127/71     Pulse: 69 69 78   Resp: 18  (!) 21   Temp: 98 °F (36.7 °C)     TempSrc: Oral     SpO2: (!) 89%  96%   Height: 4' 9" (1.448 m)         Abnormal Lab Results:  Labs Reviewed   CBC W/ AUTO DIFFERENTIAL - Abnormal; Notable for the following components:       Result Value    MCHC 31.3 (*)     Mono # 1.3 (*)     All other components within normal limits   COMPREHENSIVE METABOLIC PANEL - Abnormal; Notable for the following components:    Chloride 92 (*)     CO2 39 (*)     Glucose 126 (*)     Albumin 3.3 (*)     All other components within normal limits   B-TYPE NATRIURETIC PEPTIDE - Abnormal; Notable for the following components:     (*)     All other components within normal limits   CK   TROPONIN I   URINALYSIS, REFLEX TO URINE CULTURE        All Lab Results:  Results for orders placed or performed during the hospital encounter of 04/01/19   CBC auto differential   Result Value Ref Range    WBC 10.21 3.90 - 12.70 K/uL    RBC 4.59 4.00 - 5.40 M/uL    Hemoglobin 13.5 12.0 - 16.0 g/dL    Hematocrit 43.1 37.0 - 48.5 %    MCV 94 82 - 98 fL    MCH 29.4 27.0 - 31.0 pg    MCHC 31.3 (L) 32.0 - 36.0 g/dL    RDW 13.7 11.5 - 14.5 %    Platelets 207 150 - 350 K/uL    MPV 10.9 9.2 - 12.9 fL    Gran # (ANC) 6.8 1.8 - 7.7 K/uL    Lymph # 2.0 1.0 - 4.8 K/uL    Mono # 1.3 (H) 0.3 - 1.0 K/uL    Eos # 0.1 0.0 - 0.5 K/uL    Baso # 0.01 0.00 - 0.20 K/uL    Gran% 66.6 38.0 - 73.0 %    Lymph% 19.5 18.0 - 48.0 %    Mono% 12.7 4.0 - 15.0 %    Eosinophil% 1.1 0.0 - 8.0 %    Basophil% 0.1 0.0 - 1.9 %    Differential Method Automated    Comprehensive metabolic panel   Result Value Ref Range    Sodium 140 136 - " 145 mmol/L    Potassium 4.0 3.5 - 5.1 mmol/L    Chloride 92 (L) 95 - 110 mmol/L    CO2 39 (H) 23 - 29 mmol/L    Glucose 126 (H) 70 - 110 mg/dL    BUN, Bld 21 8 - 23 mg/dL    Creatinine 0.8 0.5 - 1.4 mg/dL    Calcium 10.0 8.7 - 10.5 mg/dL    Total Protein 7.4 6.0 - 8.4 g/dL    Albumin 3.3 (L) 3.5 - 5.2 g/dL    Total Bilirubin 0.7 0.1 - 1.0 mg/dL    Alkaline Phosphatase 107 55 - 135 U/L    AST 26 10 - 40 U/L    ALT 26 10 - 44 U/L    Anion Gap 9 8 - 16 mmol/L    eGFR if African American >60 >60 mL/min/1.73 m^2    eGFR if non African American >60 >60 mL/min/1.73 m^2   Brain natriuretic peptide   Result Value Ref Range     (H) 0 - 99 pg/mL   CK   Result Value Ref Range    CPK 30 20 - 180 U/L   Troponin I   Result Value Ref Range    Troponin I 0.017 0.000 - 0.026 ng/mL       Imaging Results:  Imaging Results          CT Head Without Contrast (Final result)  Result time 04/01/19 14:43:50    Final result by Magdaleno Kumar MD (04/01/19 14:43:50)                 Impression:      Negative for acute intracranial abnormality.    All CT scans at this facility are performed  using dose modulation techniques as appropriate to performed exam including the following:  automated exposure control; adjustment of mA and/or kV according to the patients size (this includes techniques or standardized protocols for targeted exams where dose is matched to indication/reason for exam: i.e. extremities or head);  iterative reconstruction technique.      Electronically signed by: Magdaleno Kumar MD  Date:    04/01/2019  Time:    14:43             Narrative:    EXAMINATION:  CT HEAD WITHOUT CONTRAST    CLINICAL HISTORY:  Headache, acute, norm neuro exam;    TECHNIQUE:  Axial CT images obtained throughout the head without intravenous contrast.    COMPARISON:  February 1, 2016    FINDINGS:  Negative for acute hemorrhage, mass effect, extraaxial collection, hydrocephalus.    Calcified plaque skull-base vasculature.  Mild chronic small vessel  ischemic changes in the deep periventricular white matter appearing similar to comparison exam.    Mild mucosal thickening left posterior ethmoid and left maxillary sinuses.    The calvarium is unremarkable with no fractures.                               X-Ray Chest AP Portable (Final result)  Result time 04/01/19 13:20:47    Final result by Magdaleno Kumar MD (04/01/19 13:20:47)                 Impression:      No acute findings.      Electronically signed by: Magdaleno Kumar MD  Date:    04/01/2019  Time:    13:20             Narrative:    EXAMINATION:  XR CHEST AP PORTABLE    CLINICAL HISTORY:  sob;    TECHNIQUE:  Single frontal view of the chest was performed.    COMPARISON:  03/24/2019    FINDINGS:  Mild cardiomegaly.  Aortic atherosclerosis.    No edema.  No acute infiltrates.    Chronic elevation right hemidiaphragm.  Hiatal hernia.                                 The EKG was ordered, reviewed, and independently interpreted by the ED provider.  Interpretation time: 12:58  Rate: 80 BPM  Rhythm: atrial fibrillation  Interpretation: No ST changes. No STEMI.             The Emergency Provider reviewed the vital signs and test results, which are outlined above.     ED Discussion     2:51 PM: Re-evaluated pt. Pt is resting comfortably and is in no acute distress.  D/w pt all pertinent results. D/w pt any concerns expressed at this time. Answered all questions. Pt expresses understanding at this time.    3:13 PM: Reassessed pt at this time. Pt states she feels much better following breathing tx. Patient is awake, alert, and in NAD. Pt states her condition has improved at this time. Discussed with pt all pertinent ED information and results. Discussed pt dx and plan of tx. Gave pt all f/u and return to the ED instructions. All questions and concerns were addressed at this time. Pt expresses understanding of information and instructions, and is comfortable with plan to discharge. Pt is stable for discharge.    I  discussed with patient and/or family/caretaker that evaluation in the ED does not suggest any emergent or life threatening medical conditions requiring immediate intervention beyond what was provided in the ED, and I believe patient is safe for discharge.  Regardless, an unremarkable evaluation in the ED does not preclude the development or presence of a serious of life threatening condition. As such, patient was instructed to return immediately for any worsening or change in current symptoms.    ED Medication(s):  Medications   furosemide injection 40 mg (40 mg Intravenous Given 4/1/19 0852)   albuterol-ipratropium 2.5 mg-0.5 mg/3 mL nebulizer solution 3 mL (3 mLs Nebulization Given 4/1/19 1433)       New Prescriptions    CHLORPHENIRAM-DM-ACETAMINOPHEN (CORICIDIN HBP) 2- MG TAB    Take 1 capsule by mouth 2 (two) times daily. for 10 days       Follow-up Information     Isidra Benavidez MD In 2 days.    Specialty:  Family Medicine  Contact information:  [address] [place] [address] DR Bushra GILL [address] [phone]                         Medical Decision Making:   Clinical Tests:   Lab Tests: Ordered and Reviewed  Radiological Study: Ordered and Reviewed  Medical Tests: Ordered and Reviewed             Scribe Attestation:   Scribe #1: I performed the above scribed service and the documentation accurately describes the services I performed. I attest to the accuracy of the note.     Attending:   Physician Attestation Statement for Scribe #1: I, Emiliano Holt MD, personally performed the services described in this documentation, as scribed by Gabrielle Durbin, in my presence, and it is both accurate and complete.           Clinical Impression       ICD-10-CM ICD-9-CM   1. SOB (shortness of breath) R06.02 786.05   2. Chronic congestive heart failure, unspecified heart failure type I50.9 428.0   3. Bronchitis J40 490       Disposition:   Disposition: Discharged  Condition: Stable         Emiliano Holt  MD  04/01/19 1539

## 2019-04-02 ENCOUNTER — OFFICE VISIT (OUTPATIENT)
Dept: INTERNAL MEDICINE | Facility: CLINIC | Age: 84
End: 2019-04-02
Payer: MEDICARE

## 2019-04-02 VITALS
TEMPERATURE: 98 F | BODY MASS INDEX: 38.69 KG/M2 | OXYGEN SATURATION: 97 % | HEART RATE: 70 BPM | WEIGHT: 178.81 LBS | SYSTOLIC BLOOD PRESSURE: 130 MMHG | DIASTOLIC BLOOD PRESSURE: 76 MMHG

## 2019-04-02 DIAGNOSIS — I50.32 CHRONIC DIASTOLIC HEART FAILURE: Chronic | ICD-10-CM

## 2019-04-02 PROCEDURE — 1101F PT FALLS ASSESS-DOCD LE1/YR: CPT | Mod: HCWC,CPTII,S$GLB, | Performed by: FAMILY MEDICINE

## 2019-04-02 PROCEDURE — 99214 OFFICE O/P EST MOD 30 MIN: CPT | Mod: HCWC,S$GLB,, | Performed by: FAMILY MEDICINE

## 2019-04-02 PROCEDURE — 99214 PR OFFICE/OUTPT VISIT, EST, LEVL IV, 30-39 MIN: ICD-10-PCS | Mod: HCWC,S$GLB,, | Performed by: FAMILY MEDICINE

## 2019-04-02 PROCEDURE — 3075F SYST BP GE 130 - 139MM HG: CPT | Mod: HCWC,CPTII,S$GLB, | Performed by: FAMILY MEDICINE

## 2019-04-02 PROCEDURE — 3078F DIAST BP <80 MM HG: CPT | Mod: HCWC,CPTII,S$GLB, | Performed by: FAMILY MEDICINE

## 2019-04-02 PROCEDURE — 1101F PR PT FALLS ASSESS DOC 0-1 FALLS W/OUT INJ PAST YR: ICD-10-PCS | Mod: HCWC,CPTII,S$GLB, | Performed by: FAMILY MEDICINE

## 2019-04-02 PROCEDURE — 99999 PR PBB SHADOW E&M-EST. PATIENT-LVL III: ICD-10-PCS | Mod: PBBFAC,HCWC,, | Performed by: FAMILY MEDICINE

## 2019-04-02 PROCEDURE — 3075F PR MOST RECENT SYSTOLIC BLOOD PRESS GE 130-139MM HG: ICD-10-PCS | Mod: HCWC,CPTII,S$GLB, | Performed by: FAMILY MEDICINE

## 2019-04-02 PROCEDURE — 3078F PR MOST RECENT DIASTOLIC BLOOD PRESSURE < 80 MM HG: ICD-10-PCS | Mod: HCWC,CPTII,S$GLB, | Performed by: FAMILY MEDICINE

## 2019-04-02 PROCEDURE — 99999 PR PBB SHADOW E&M-EST. PATIENT-LVL III: CPT | Mod: PBBFAC,HCWC,, | Performed by: FAMILY MEDICINE

## 2019-04-05 NOTE — PROGRESS NOTES
"Subjective:       Patient ID: Elke Laws is a 84 y.o. female.    Chief Complaint: Hospital Follow Up    Patient presents to clinic today with her daughter for hospital follow up. Daughter reports she has had URI symptoms and is taking coricidin HBP, improving, but her "ears are clogged". Patient reports taking lasix as directed and following salt/fluid restrictions. She reports improved overall. She has follow up with Cardiology on Monday.    Review of Systems   Constitutional: Negative for chills, fatigue, fever and unexpected weight change.   HENT: Positive for rhinorrhea.    Eyes: Negative for visual disturbance.   Respiratory: Negative for shortness of breath.    Cardiovascular: Negative for chest pain.   Musculoskeletal: Negative for myalgias.   Neurological: Negative for headaches.       Objective:      Physical Exam   Constitutional: She is oriented to person, place, and time. She appears well-developed and well-nourished. No distress.   HENT:   Head: Normocephalic and atraumatic.   Right Ear: Tympanic membrane and ear canal normal.   Left Ear: Tympanic membrane and ear canal normal.   Nose: Mucosal edema and rhinorrhea present.   Mouth/Throat: Oropharynx is clear and moist and mucous membranes are normal.   Eyes: Pupils are equal, round, and reactive to light. Conjunctivae and EOM are normal. Right eye exhibits no discharge. Left eye exhibits no discharge.   Neck: Normal range of motion. Neck supple.   Cardiovascular: Normal rate and regular rhythm. Exam reveals no gallop and no friction rub.   No murmur heard.  Pulmonary/Chest: Effort normal and breath sounds normal. No respiratory distress. She has no wheezes. She has no rales.   Musculoskeletal: She exhibits edema (minimal; no pitting).   Lymphadenopathy:     She has no cervical adenopathy.   Neurological: She is alert and oriented to person, place, and time.   Skin: Skin is warm and dry. No rash noted.   Vitals reviewed.      Assessment:       1. " Chronic diastolic heart failure        Plan:     Problem List Items Addressed This Visit     Chronic diastolic heart failure (Chronic)    Overview     CONCLUSIONS     1 - Severe left atrial enlargement.     2 - Concentric hypertrophy.     3 - No wall motion abnormalities.     4 - Normal left ventricular systolic function (EF 60-65%).     5 - Impaired LV relaxation, elevated LAP (grade 2 diastolic dysfunction).     6 - Normal right ventricular systolic function .     7 - The estimated PA systolic pressure is 28 mmHg.     8 - Trivial aortic regurgitation.     9 - Mild mitral regurgitation.   This document has been electronically    SIGNED BY: Tish Angulo MD On: 04/13/2017 15:52    Office visit 6/28/17 with Dr. Jones for Acute on chronic diastolic congestive heart failure  note showed. PAF, diastolic CHF, non obstructive CAD per [facility] done in , HTN and HLD. She was admitted to [facility] on 5/21/2017 for CHF exacerbation.          Current Assessment & Plan     Continue current medications and see Cardiology on Monday for follow up             URI resolving, continue coricidin, follow up if worse/persistent.

## 2019-04-08 ENCOUNTER — OFFICE VISIT (OUTPATIENT)
Dept: CARDIOLOGY | Facility: CLINIC | Age: 84
End: 2019-04-08
Payer: MEDICARE

## 2019-04-08 ENCOUNTER — TELEPHONE (OUTPATIENT)
Dept: CARDIOLOGY | Facility: CLINIC | Age: 84
End: 2019-04-08

## 2019-04-08 ENCOUNTER — CLINICAL SUPPORT (OUTPATIENT)
Dept: CARDIOLOGY | Facility: CLINIC | Age: 84
End: 2019-04-08
Payer: MEDICARE

## 2019-04-08 VITALS
WEIGHT: 174 LBS | HEIGHT: 57 IN | DIASTOLIC BLOOD PRESSURE: 52 MMHG | SYSTOLIC BLOOD PRESSURE: 118 MMHG | HEART RATE: 58 BPM | BODY MASS INDEX: 37.54 KG/M2

## 2019-04-08 DIAGNOSIS — R09.02 HYPOXEMIA: ICD-10-CM

## 2019-04-08 DIAGNOSIS — I38 HEART VALVE REGURGITATION: ICD-10-CM

## 2019-04-08 DIAGNOSIS — I10 ESSENTIAL HYPERTENSION: Chronic | ICD-10-CM

## 2019-04-08 DIAGNOSIS — I25.10 CORONARY ARTERY DISEASE INVOLVING NATIVE CORONARY ARTERY OF NATIVE HEART WITHOUT ANGINA PECTORIS: Chronic | ICD-10-CM

## 2019-04-08 DIAGNOSIS — N18.30 STAGE 3 CHRONIC KIDNEY DISEASE: Chronic | ICD-10-CM

## 2019-04-08 DIAGNOSIS — I48.0 PAF (PAROXYSMAL ATRIAL FIBRILLATION): ICD-10-CM

## 2019-04-08 DIAGNOSIS — I50.32 CHRONIC DIASTOLIC HEART FAILURE: Chronic | ICD-10-CM

## 2019-04-08 DIAGNOSIS — I50.33 ACUTE ON CHRONIC DIASTOLIC HEART FAILURE: Primary | ICD-10-CM

## 2019-04-08 PROCEDURE — 3074F SYST BP LT 130 MM HG: CPT | Mod: HCWC,CPTII,S$GLB, | Performed by: INTERNAL MEDICINE

## 2019-04-08 PROCEDURE — 99214 OFFICE O/P EST MOD 30 MIN: CPT | Mod: HCWC,S$GLB,, | Performed by: INTERNAL MEDICINE

## 2019-04-08 PROCEDURE — 1101F PT FALLS ASSESS-DOCD LE1/YR: CPT | Mod: HCWC,CPTII,S$GLB, | Performed by: INTERNAL MEDICINE

## 2019-04-08 PROCEDURE — 3078F DIAST BP <80 MM HG: CPT | Mod: HCWC,CPTII,S$GLB, | Performed by: INTERNAL MEDICINE

## 2019-04-08 PROCEDURE — 93000 ELECTROCARDIOGRAM COMPLETE: CPT | Mod: HCWC,S$GLB,, | Performed by: INTERNAL MEDICINE

## 2019-04-08 PROCEDURE — 99999 PR PBB SHADOW E&M-EST. PATIENT-LVL III: ICD-10-PCS | Mod: PBBFAC,HCWC,, | Performed by: INTERNAL MEDICINE

## 2019-04-08 PROCEDURE — 93000 EKG 12-LEAD: ICD-10-PCS | Mod: HCWC,S$GLB,, | Performed by: INTERNAL MEDICINE

## 2019-04-08 PROCEDURE — 1101F PR PT FALLS ASSESS DOC 0-1 FALLS W/OUT INJ PAST YR: ICD-10-PCS | Mod: HCWC,CPTII,S$GLB, | Performed by: INTERNAL MEDICINE

## 2019-04-08 PROCEDURE — 3078F PR MOST RECENT DIASTOLIC BLOOD PRESSURE < 80 MM HG: ICD-10-PCS | Mod: HCWC,CPTII,S$GLB, | Performed by: INTERNAL MEDICINE

## 2019-04-08 PROCEDURE — 99999 PR PBB SHADOW E&M-EST. PATIENT-LVL III: CPT | Mod: PBBFAC,HCWC,, | Performed by: INTERNAL MEDICINE

## 2019-04-08 PROCEDURE — 99214 PR OFFICE/OUTPT VISIT, EST, LEVL IV, 30-39 MIN: ICD-10-PCS | Mod: HCWC,S$GLB,, | Performed by: INTERNAL MEDICINE

## 2019-04-08 PROCEDURE — 3074F PR MOST RECENT SYSTOLIC BLOOD PRESSURE < 130 MM HG: ICD-10-PCS | Mod: HCWC,CPTII,S$GLB, | Performed by: INTERNAL MEDICINE

## 2019-04-08 RX ORDER — FLECAINIDE ACETATE 100 MG/1
100 TABLET ORAL EVERY 12 HOURS
Qty: 60 TABLET | Refills: 5 | Status: SHIPPED | OUTPATIENT
Start: 2019-04-08 | End: 2019-04-26 | Stop reason: SDUPTHER

## 2019-04-08 NOTE — TELEPHONE ENCOUNTER
Pt would like to have Melony call daughter Keya at [phone] for time on LISSETH/Cardioversion scheduled for 04/16.  Pt has a hard time hearing on the phone.  Will let Melony know.

## 2019-04-08 NOTE — H&P (VIEW-ONLY)
Subjective:   Patient ID:  Elke Laws is a 84 y.o. female who presents for follow up of Hospital Follow Up      85 yo female, came in for SOB f/u  PMH persistent AF, diastolic CHF, non obstructive CAD per [facility] done in , HTN and HLD.   In the , pt had 3 ER visits of CHF exacerbation. Echo showed EF 55%, mild AI and biatrial dilation. Elevated  to 800. CXR showed plum. Edema.  Serial ekg showed persistent afib since 03/07/2019.    Today, states that she feels better, DIAZ improved. Sob stable and started home O2 3 weeks ago. C/o cough and sputum. No chest pain. ekg showed afib with VR at 67 bpm. QTc 437ms        Past Medical History:   Diagnosis Date    Abnormal nuclear stress test 6/30/2016    Acute congestive heart failure 5/27/2017    Acute coronary syndrome     Acute kidney injury (nontraumatic) 5/27/2017    Acute kidney injury (nontraumatic) 5/27/2017    Acute on chronic congestive heart failure 5/21/2017    Acute on chronic diastolic congestive heart failure 8/27/2015    Anemia 5/9/2016    Anemia 5/9/2016    Angina pectoris 1/25/2018    Anticoagulant long-term use     Aortic regurgitation     Echo 11/2013---5 - Mild to moderate aortic regurgitation.      Asthma     Atrial fibrillation 5/15/2014    Back pain     s/p epidural steriod injections, no recent injections.    Baker's cyst     small, right, seen on US lower extremity 6/2014    Blood transfusion     Approximately 6 years ago    Chronic constipation     Coronary artery disease     Degenerative disc disease, lumbar     Diastolic dysfunction     Seen on echo 11/2013, stress test negative 5/2014    Diverticulosis     colonoscopy 3/27/2011    E coli bacteremia 5/23/2017    E. coli UTI (urinary tract infection) 5/23/2017    Hemorrhoids     colonoscopy 3/27/2011    Hiatal hernia     CXR 12/30/2016---Retrocardiac density again noted consistent with a hiatal hernia.    Hx of adenomatous colonic polyps      "Hyperlipidemia     Hypertension     Hyponatremia 2016    Hypoxemia 2017    Hypoxia 2017    Leukocytosis 2017    Mitral regurgitation     Myocardial infarction     per patient was told in the past she had a "mild heart attack"    Obesity     Osteoarthritis, knee     Pneumonia     per patient "walking Pneumonia" did not require hospitalization    Seasonal allergies     Sepsis 2017    Trouble in sleeping     Urinary incontinence        Past Surgical History:   Procedure Laterality Date    APPENDECTOMY      COLONOSCOPY okay by dr. ramos N/A 2017    [facility]    EYE SURGERY Left     HYSTERECTOMY      benign reasons    KNEE SURGERY Bilateral     x2     Left heart cath      OLECRANON BURSECTOMY Right     2011    TONSILLECTOMY      URETHRA SURGERY         Social History     Tobacco Use    Smoking status: Former Smoker     Packs/day: 0.05     Years: 1.00     Pack years: 0.05     Types: Cigarettes     Last attempt to quit: 1952     Years since quittin.3    Smokeless tobacco: Never Used   Substance Use Topics    Alcohol use: No     Alcohol/week: 0.0 oz    Drug use: No       Family History   Problem Relation Age of Onset    Heart disease Mother     Cancer Mother         colon    Hypertension Mother     Hyperlipidemia Mother     Heart disease Father     Hypertension Father     Hyperlipidemia Father     Heart disease Sister         MI    Heart disease Brother         MI    COPD Son     Hypertension Son     Diabetes Brother     Diabetes Son     Cancer Sister         breast    Kidney disease Neg Hx     Stroke Neg Hx          Review of Systems   Constitution: Negative for diaphoresis, malaise/fatigue and weight gain.   HENT: Negative for hoarse voice.    Eyes: Negative for double vision and visual disturbance.   Cardiovascular: Positive for dyspnea on exertion. Negative for chest pain, claudication, cyanosis, " irregular heartbeat, leg swelling, near-syncope, orthopnea, palpitations, paroxysmal nocturnal dyspnea and syncope.   Respiratory: Positive for cough, shortness of breath and wheezing. Negative for hemoptysis and snoring.    Hematologic/Lymphatic: Negative for bleeding problem. Does not bruise/bleed easily.   Skin: Negative for color change and poor wound healing.   Musculoskeletal: Negative for muscle cramps, muscle weakness and myalgias.   Gastrointestinal: Negative for bloating, abdominal pain, change in bowel habit, diarrhea, heartburn, hematemesis, hematochezia, melena and nausea.   Neurological: Negative for excessive daytime sleepiness, dizziness, headaches, light-headedness, loss of balance, numbness and weakness.   Psychiatric/Behavioral: Negative for memory loss. The patient does not have insomnia.    Allergic/Immunologic: Negative for hives.       Objective:   Physical Exam   Constitutional: She is oriented to person, place, and time. She appears well-nourished.   HENT:   Head: Normocephalic.   Eyes: Pupils are equal, round, and reactive to light.   Neck: Normal carotid pulses and no JVD present. Carotid bruit is not present. No thyromegaly present.   Cardiovascular: Normal rate, normal heart sounds and normal pulses. An irregularly irregular rhythm present.  No extrasystoles are present. PMI is not displaced. Exam reveals no gallop and no S3.   No murmur heard.  Pulmonary/Chest: Breath sounds normal. No stridor. No respiratory distress.   Abdominal: Soft. Bowel sounds are normal. There is no tenderness. There is no rebound.   Musculoskeletal: Normal range of motion. She exhibits edema.   B/l trace edema   Neurological: She is alert and oriented to person, place, and time.   Skin: Skin is intact. No rash noted.   Psychiatric: Her behavior is normal.       Lab Results   Component Value Date    CHOL 156 10/25/2018    CHOL 147 01/31/2018    CHOL 123 04/13/2017     Lab Results   Component Value Date    HDL 68  10/25/2018    HDL 66 01/31/2018    HDL 59 04/13/2017     Lab Results   Component Value Date    LDLCALC 76.0 10/25/2018    LDLCALC 69.0 01/31/2018    LDLCALC 51.2 (L) 04/13/2017     Lab Results   Component Value Date    TRIG 60 10/25/2018    TRIG 60 01/31/2018    TRIG 64 04/13/2017     Lab Results   Component Value Date    CHOLHDL 43.6 10/25/2018    CHOLHDL 44.9 01/31/2018    CHOLHDL 48.0 04/13/2017       Chemistry        Component Value Date/Time     04/01/2019 1255    K 4.0 04/01/2019 1255    CL 92 (L) 04/01/2019 1255    CO2 39 (H) 04/01/2019 1255    BUN 21 04/01/2019 1255    CREATININE 0.8 04/01/2019 1255     (H) 04/01/2019 1255        Component Value Date/Time    CALCIUM 10.0 04/01/2019 1255    ALKPHOS 107 04/01/2019 1255    AST 26 04/01/2019 1255    ALT 26 04/01/2019 1255    BILITOT 0.7 04/01/2019 1255    ESTGFRAFRICA >60 04/01/2019 1255    EGFRNONAA >60 04/01/2019 1255          Lab Results   Component Value Date    HGBA1C 5.9 04/13/2017     Lab Results   Component Value Date    TSH 2.207 01/31/2018     Lab Results   Component Value Date    INR 0.9 02/16/2019    INR 1.2 05/27/2017    INR 1.2 05/21/2017     Lab Results   Component Value Date    WBC 10.21 04/01/2019    HGB 13.5 04/01/2019    HCT 43.1 04/01/2019    MCV 94 04/01/2019     04/01/2019     BMP  Sodium   Date Value Ref Range Status   04/01/2019 140 136 - 145 mmol/L Final     Potassium   Date Value Ref Range Status   04/01/2019 4.0 3.5 - 5.1 mmol/L Final     Chloride   Date Value Ref Range Status   04/01/2019 92 (L) 95 - 110 mmol/L Final     CO2   Date Value Ref Range Status   04/01/2019 39 (H) 23 - 29 mmol/L Final     BUN, Bld   Date Value Ref Range Status   04/01/2019 21 8 - 23 mg/dL Final     Creatinine   Date Value Ref Range Status   04/01/2019 0.8 0.5 - 1.4 mg/dL Final     Calcium   Date Value Ref Range Status   04/01/2019 10.0 8.7 - 10.5 mg/dL Final     Anion Gap   Date Value Ref Range Status   04/01/2019 9 8 - 16 mmol/L Final      eGFR if    Date Value Ref Range Status   04/01/2019 >60 >60 mL/min/1.73 m^2 Final     eGFR if non    Date Value Ref Range Status   04/01/2019 >60 >60 mL/min/1.73 m^2 Final     Comment:     Calculation used to obtain the estimated glomerular filtration  rate (eGFR) is the CKD-EPI equation.        BNP  @LABRCNTIP(BNP,BNPTRIAGEBLO)@  @LABRCNTIP(troponini)@  CrCl cannot be calculated (Patient's most recent lab result is older than the maximum 7 days allowed.).  No results found in the last 24 hours.  No results found in the last 24 hours.  No results found in the last 24 hours.    Assessment:      1. Acute on chronic diastolic HFpEF of 60-65%    2. PAF (paroxysmal atrial fibrillation)    3. Chronic diastolic heart failure    4. Essential hypertension    5. Mild nonobstructive CAD per [facility] in 2016    6. Stage 3 chronic kidney disease    7. Hypoxemia      Frequent ER visits with CHfpEF exacerbation. Associated with recurrent afib.    Plan:   Increase Flecainide to 100 mg bid  Arrange LISSETH/DCCV in one week  Continue ELiqusi 5 mg bid, metoprolol, imdur, and statin  On lasix 40 mg daily  Advise Mucinex  RTC after the procedure

## 2019-04-08 NOTE — PROGRESS NOTES
Subjective:   Patient ID:  Elke Laws is a 84 y.o. female who presents for follow up of Hospital Follow Up      83 yo female, came in for SOB f/u  PMH persistent AF, diastolic CHF, non obstructive CAD per [facility] done in , HTN and HLD.   In the , pt had 3 ER visits of CHF exacerbation. Echo showed EF 55%, mild AI and biatrial dilation. Elevated  to 800. CXR showed plum. Edema.  Serial ekg showed persistent afib since 03/07/2019.    Today, states that she feels better, DIAZ improved. Sob stable and started home O2 3 weeks ago. C/o cough and sputum. No chest pain. ekg showed afib with VR at 67 bpm. QTc 437ms        Past Medical History:   Diagnosis Date    Abnormal nuclear stress test 6/30/2016    Acute congestive heart failure 5/27/2017    Acute coronary syndrome     Acute kidney injury (nontraumatic) 5/27/2017    Acute kidney injury (nontraumatic) 5/27/2017    Acute on chronic congestive heart failure 5/21/2017    Acute on chronic diastolic congestive heart failure 8/27/2015    Anemia 5/9/2016    Anemia 5/9/2016    Angina pectoris 1/25/2018    Anticoagulant long-term use     Aortic regurgitation     Echo 11/2013---5 - Mild to moderate aortic regurgitation.      Asthma     Atrial fibrillation 5/15/2014    Back pain     s/p epidural steriod injections, no recent injections.    Baker's cyst     small, right, seen on US lower extremity 6/2014    Blood transfusion     Approximately 6 years ago    Chronic constipation     Coronary artery disease     Degenerative disc disease, lumbar     Diastolic dysfunction     Seen on echo 11/2013, stress test negative 5/2014    Diverticulosis     colonoscopy 3/27/2011    E coli bacteremia 5/23/2017    E. coli UTI (urinary tract infection) 5/23/2017    Hemorrhoids     colonoscopy 3/27/2011    Hiatal hernia     CXR 12/30/2016---Retrocardiac density again noted consistent with a hiatal hernia.    Hx of adenomatous colonic polyps      "Hyperlipidemia     Hypertension     Hyponatremia 2016    Hypoxemia 2017    Hypoxia 2017    Leukocytosis 2017    Mitral regurgitation     Myocardial infarction     per patient was told in the past she had a "mild heart attack"    Obesity     Osteoarthritis, knee     Pneumonia     per patient "walking Pneumonia" did not require hospitalization    Seasonal allergies     Sepsis 2017    Trouble in sleeping     Urinary incontinence        Past Surgical History:   Procedure Laterality Date    APPENDECTOMY      COLONOSCOPY okay by dr. ramos N/A 2017    [facility] ENDO    EYE SURGERY Left     HYSTERECTOMY      benign reasons    KNEE SURGERY Bilateral     x2     Left heart cath      OLECRANON BURSECTOMY Right     2011    TONSILLECTOMY      URETHRA SURGERY         Social History     Tobacco Use    Smoking status: Former Smoker     Packs/day: 0.05     Years: 1.00     Pack years: 0.05     Types: Cigarettes     Last attempt to quit: 1952     Years since quittin.3    Smokeless tobacco: Never Used   Substance Use Topics    Alcohol use: No     Alcohol/week: 0.0 oz    Drug use: No       Family History   Problem Relation Age of Onset    Heart disease Mother     Cancer Mother         colon    Hypertension Mother     Hyperlipidemia Mother     Heart disease Father     Hypertension Father     Hyperlipidemia Father     Heart disease Sister         MI    Heart disease Brother         MI    COPD Son     Hypertension Son     Diabetes Brother     Diabetes Son     Cancer Sister         breast    Kidney disease Neg Hx     Stroke Neg Hx          Review of Systems   Constitution: Negative for diaphoresis, malaise/fatigue and weight gain.   HENT: Negative for hoarse voice.    Eyes: Negative for double vision and visual disturbance.   Cardiovascular: Positive for dyspnea on exertion. Negative for chest pain, claudication, cyanosis, " irregular heartbeat, leg swelling, near-syncope, orthopnea, palpitations, paroxysmal nocturnal dyspnea and syncope.   Respiratory: Positive for cough, shortness of breath and wheezing. Negative for hemoptysis and snoring.    Hematologic/Lymphatic: Negative for bleeding problem. Does not bruise/bleed easily.   Skin: Negative for color change and poor wound healing.   Musculoskeletal: Negative for muscle cramps, muscle weakness and myalgias.   Gastrointestinal: Negative for bloating, abdominal pain, change in bowel habit, diarrhea, heartburn, hematemesis, hematochezia, melena and nausea.   Neurological: Negative for excessive daytime sleepiness, dizziness, headaches, light-headedness, loss of balance, numbness and weakness.   Psychiatric/Behavioral: Negative for memory loss. The patient does not have insomnia.    Allergic/Immunologic: Negative for hives.       Objective:   Physical Exam   Constitutional: She is oriented to person, place, and time. She appears well-nourished.   HENT:   Head: Normocephalic.   Eyes: Pupils are equal, round, and reactive to light.   Neck: Normal carotid pulses and no JVD present. Carotid bruit is not present. No thyromegaly present.   Cardiovascular: Normal rate, normal heart sounds and normal pulses. An irregularly irregular rhythm present.  No extrasystoles are present. PMI is not displaced. Exam reveals no gallop and no S3.   No murmur heard.  Pulmonary/Chest: Breath sounds normal. No stridor. No respiratory distress.   Abdominal: Soft. Bowel sounds are normal. There is no tenderness. There is no rebound.   Musculoskeletal: Normal range of motion. She exhibits edema.   B/l trace edema   Neurological: She is alert and oriented to person, place, and time.   Skin: Skin is intact. No rash noted.   Psychiatric: Her behavior is normal.       Lab Results   Component Value Date    CHOL 156 10/25/2018    CHOL 147 01/31/2018    CHOL 123 04/13/2017     Lab Results   Component Value Date    HDL 68  10/25/2018    HDL 66 01/31/2018    HDL 59 04/13/2017     Lab Results   Component Value Date    LDLCALC 76.0 10/25/2018    LDLCALC 69.0 01/31/2018    LDLCALC 51.2 (L) 04/13/2017     Lab Results   Component Value Date    TRIG 60 10/25/2018    TRIG 60 01/31/2018    TRIG 64 04/13/2017     Lab Results   Component Value Date    CHOLHDL 43.6 10/25/2018    CHOLHDL 44.9 01/31/2018    CHOLHDL 48.0 04/13/2017       Chemistry        Component Value Date/Time     04/01/2019 1255    K 4.0 04/01/2019 1255    CL 92 (L) 04/01/2019 1255    CO2 39 (H) 04/01/2019 1255    BUN 21 04/01/2019 1255    CREATININE 0.8 04/01/2019 1255     (H) 04/01/2019 1255        Component Value Date/Time    CALCIUM 10.0 04/01/2019 1255    ALKPHOS 107 04/01/2019 1255    AST 26 04/01/2019 1255    ALT 26 04/01/2019 1255    BILITOT 0.7 04/01/2019 1255    ESTGFRAFRICA >60 04/01/2019 1255    EGFRNONAA >60 04/01/2019 1255          Lab Results   Component Value Date    HGBA1C 5.9 04/13/2017     Lab Results   Component Value Date    TSH 2.207 01/31/2018     Lab Results   Component Value Date    INR 0.9 02/16/2019    INR 1.2 05/27/2017    INR 1.2 05/21/2017     Lab Results   Component Value Date    WBC 10.21 04/01/2019    HGB 13.5 04/01/2019    HCT 43.1 04/01/2019    MCV 94 04/01/2019     04/01/2019     BMP  Sodium   Date Value Ref Range Status   04/01/2019 140 136 - 145 mmol/L Final     Potassium   Date Value Ref Range Status   04/01/2019 4.0 3.5 - 5.1 mmol/L Final     Chloride   Date Value Ref Range Status   04/01/2019 92 (L) 95 - 110 mmol/L Final     CO2   Date Value Ref Range Status   04/01/2019 39 (H) 23 - 29 mmol/L Final     BUN, Bld   Date Value Ref Range Status   04/01/2019 21 8 - 23 mg/dL Final     Creatinine   Date Value Ref Range Status   04/01/2019 0.8 0.5 - 1.4 mg/dL Final     Calcium   Date Value Ref Range Status   04/01/2019 10.0 8.7 - 10.5 mg/dL Final     Anion Gap   Date Value Ref Range Status   04/01/2019 9 8 - 16 mmol/L Final      eGFR if    Date Value Ref Range Status   04/01/2019 >60 >60 mL/min/1.73 m^2 Final     eGFR if non    Date Value Ref Range Status   04/01/2019 >60 >60 mL/min/1.73 m^2 Final     Comment:     Calculation used to obtain the estimated glomerular filtration  rate (eGFR) is the CKD-EPI equation.        BNP  @LABRCNTIP(BNP,BNPTRIAGEBLO)@  @LABRCNTIP(troponini)@  CrCl cannot be calculated (Patient's most recent lab result is older than the maximum 7 days allowed.).  No results found in the last 24 hours.  No results found in the last 24 hours.  No results found in the last 24 hours.    Assessment:      1. Acute on chronic diastolic HFpEF of 60-65%    2. PAF (paroxysmal atrial fibrillation)    3. Chronic diastolic heart failure    4. Essential hypertension    5. Mild nonobstructive CAD per [facility] in 2016    6. Stage 3 chronic kidney disease    7. Hypoxemia      Frequent ER visits with CHfpEF exacerbation. Associated with recurrent afib.    Plan:   Increase Flecainide to 100 mg bid  Arrange LISSETH/DCCV in one week  Continue ELiqusi 5 mg bid, metoprolol, imdur, and statin  On lasix 40 mg daily  Advise Mucinex  RTC after the procedure

## 2019-04-16 ENCOUNTER — ANESTHESIA (OUTPATIENT)
Dept: CARDIOLOGY | Facility: HOSPITAL | Age: 84
End: 2019-04-16
Payer: MEDICARE

## 2019-04-16 ENCOUNTER — HOSPITAL ENCOUNTER (OUTPATIENT)
Facility: HOSPITAL | Age: 84
Discharge: HOME | End: 2019-04-16
Attending: INTERNAL MEDICINE | Admitting: INTERNAL MEDICINE
Payer: MEDICARE

## 2019-04-16 ENCOUNTER — ANESTHESIA EVENT (OUTPATIENT)
Dept: CARDIOLOGY | Facility: HOSPITAL | Age: 84
End: 2019-04-16
Payer: MEDICARE

## 2019-04-16 VITALS
DIASTOLIC BLOOD PRESSURE: 57 MMHG | BODY MASS INDEX: 35.08 KG/M2 | TEMPERATURE: 98 F | RESPIRATION RATE: 18 BRPM | OXYGEN SATURATION: 100 % | HEIGHT: 59 IN | SYSTOLIC BLOOD PRESSURE: 123 MMHG | HEART RATE: 64 BPM | WEIGHT: 174 LBS

## 2019-04-16 DIAGNOSIS — I48.91 ATRIAL FIBRILLATION STATUS POST CARDIOVERSION: ICD-10-CM

## 2019-04-16 DIAGNOSIS — I48.91 A-FIB: ICD-10-CM

## 2019-04-16 PROCEDURE — 93320 DOPPLER ECHO COMPLETE: CPT | Performed by: INTERNAL MEDICINE

## 2019-04-16 PROCEDURE — 63600175 PHARM REV CODE 636 W HCPCS: Mod: HCWC | Performed by: NURSE ANESTHETIST, CERTIFIED REGISTERED

## 2019-04-16 PROCEDURE — 93325 DOPPLER ECHO COLOR FLOW MAPG: CPT | Performed by: INTERNAL MEDICINE

## 2019-04-16 PROCEDURE — 37000008 HC ANESTHESIA 1ST 15 MINUTES: Mod: HCWC | Performed by: INTERNAL MEDICINE

## 2019-04-16 PROCEDURE — 25000003 PHARM REV CODE 250: Mod: HCWC | Performed by: NURSE ANESTHETIST, CERTIFIED REGISTERED

## 2019-04-16 PROCEDURE — 93312 ECHO TRANSESOPHAGEAL: CPT | Performed by: INTERNAL MEDICINE

## 2019-04-16 PROCEDURE — 93005 ELECTROCARDIOGRAM TRACING: CPT | Mod: HCWC,59

## 2019-04-16 PROCEDURE — 25000003 PHARM REV CODE 250: Mod: HCWC

## 2019-04-16 PROCEDURE — 63600175 PHARM REV CODE 636 W HCPCS: Mod: HCWC

## 2019-04-16 PROCEDURE — 93010 EKG 12-LEAD: ICD-10-PCS | Mod: HCWC,,, | Performed by: INTERNAL MEDICINE

## 2019-04-16 PROCEDURE — 37000009 HC ANESTHESIA EA ADD 15 MINS: Mod: HCWC | Performed by: INTERNAL MEDICINE

## 2019-04-16 PROCEDURE — 93010 ELECTROCARDIOGRAM REPORT: CPT | Mod: HCWC,76,, | Performed by: INTERNAL MEDICINE

## 2019-04-16 RX ORDER — LIDOCAINE HYDROCHLORIDE 10 MG/ML
INJECTION INFILTRATION; PERINEURAL
Status: DISCONTINUED | OUTPATIENT
Start: 2019-04-16 | End: 2019-04-16

## 2019-04-16 RX ORDER — SODIUM CHLORIDE 0.9 % (FLUSH) 0.9 %
10 SYRINGE (ML) INJECTION
Status: DISCONTINUED | OUTPATIENT
Start: 2019-04-16 | End: 2019-04-16 | Stop reason: HOSPADM

## 2019-04-16 RX ORDER — PROPOFOL 10 MG/ML
VIAL (ML) INTRAVENOUS
Status: DISCONTINUED | OUTPATIENT
Start: 2019-04-16 | End: 2019-04-16

## 2019-04-16 RX ORDER — SODIUM CHLORIDE 9 MG/ML
INJECTION, SOLUTION INTRAVENOUS ONCE
Status: DISCONTINUED | OUTPATIENT
Start: 2019-04-16 | End: 2019-04-16 | Stop reason: HOSPADM

## 2019-04-16 RX ADMIN — PROPOFOL 30 MG: 10 INJECTION, EMULSION INTRAVENOUS at 11:04

## 2019-04-16 RX ADMIN — PROPOFOL 50 MG: 10 INJECTION, EMULSION INTRAVENOUS at 11:04

## 2019-04-16 RX ADMIN — LIDOCAINE HYDROCHLORIDE 50 MG: 10 INJECTION, SOLUTION INFILTRATION; PERINEURAL at 11:04

## 2019-04-16 NOTE — ANESTHESIA PREPROCEDURE EVALUATION
04/16/2019  Elke Laws is a 84 y.o., female.    Pre-op Assessment    I have reviewed the Patient Summary Reports.     I have reviewed the Nursing Notes.   I have reviewed the Medications.     Review of Systems  Anesthesia Hx:  No problems with previous Anesthesia  History of prior surgery of interest to airway management or planning: Previous anesthesia: General, MAC Denies Family Hx of Anesthesia complications.   Denies Personal Hx of Anesthesia complications.   Social:  Former Smoker    Cardiovascular:   Hypertension, well controlled Valvular problems/Murmurs, MVP Past MI CAD   CHF ECG has been reviewed. CONCLUSIONS     1 - Severe left atrial enlargement.     2 - Concentric hypertrophy.     3 - No wall motion abnormalities.     4 - Normal left ventricular systolic function (EF 60-65%).     5 - Impaired LV relaxation, elevated LAP (grade 2 diastolic dysfunction).     6 - Normal right ventricular systolic function .     7 - The estimated PA systolic pressure is 28 mmHg.     8 - Trivial aortic regurgitation.     9 - Mild mitral regurgitation.       Normal sinus rhythm  Low voltage QRS  Borderline Abnormal ECG  When compared with ECG of 22-MAY-2017 05:23,  Sinus rhythm has replaced Atrial flutter  Confirmed by MD CARLY, ERMA ([id number]) on 5/28/2017 3:37:10 PM   Pulmonary:   Pneumonia Asthma mild    Renal/:   Chronic Renal Disease    Hepatic/GI:   Hiatal Hernia,    Musculoskeletal:   Arthritis     Neurological:   CVA Neuromuscular Disease,        Physical Exam  General:  Well nourished    Airway/Jaw/Neck:  Airway Findings: Mouth Opening: Normal Tongue: Normal  General Airway Assessment: Adult  Mallampati: II  Improves to II with phonation.  TM Distance: Normal, at least 6 cm       Chest/Lungs:  Chest/Lungs Findings: Clear to auscultation, Normal Respiratory Rate     Heart/Vascular:  Heart Findings:       Mental  Status:  Mental Status Findings:  Cooperative, Alert and Oriented         Anesthesia Plan  Type of Anesthesia, risks & benefits discussed:  Anesthesia Type:  MAC  Patient's Preference:   Intra-op Monitoring Plan: standard ASA monitors  Intra-op Monitoring Plan Comments:   Post Op Pain Control Plan:   Post Op Pain Control Plan Comments:   Induction:   IV  Beta Blocker:  Patient is on a Beta-Blocker and has received one dose within the past 24 hours (No further documentation required).       Informed Consent: Patient understands risks and agrees with Anesthesia plan.  Questions answered. Anesthesia consent signed with patient.  ASA Score: 3     Day of Surgery Review of History & Physical: I have interviewed and examined the patient. I have reviewed the patient's H&P dated:  There are no significant changes.          Ready For Surgery From Anesthesia Perspective.

## 2019-04-16 NOTE — INTERVAL H&P NOTE
The patient has been examined and the H&P has been reviewed:    I concur with the findings and no changes have occurred since H&P was written.    Anesthesia/Surgery risks, benefits and alternative options discussed and understood by patient/family.          Active Hospital Problems    Diagnosis  POA    A-fib [I48.91]  Yes     Priority: High      Resolved Hospital Problems   No resolved problems to display.

## 2019-04-16 NOTE — ANESTHESIA RELEASE NOTE
"Anesthesia Release from PACU Note    Patient: Elke Laws    Procedure(s) Performed: Procedure(s) (LRB):  ECHOCARDIOGRAM,TRANSESOPHAGEAL (N/A)    Anesthesia type: MAC    Post pain: Adequate analgesia    Post assessment: no apparent anesthetic complications and tolerated procedure well    Last Vitals:   Visit Vitals  BP (!) 123/57   Pulse 64   Temp 36.7 °C (98 °F) (Oral)   Resp 18   Ht 4' 11" (1.499 m)   Wt 78.9 kg (174 lb)   LMP 12/13/1973   SpO2 100%   Breastfeeding? No   BMI 35.14 kg/m²       Post vital signs: stable    Level of consciousness: awake    Nausea/Vomiting: no nausea/no vomiting    Complications: none    Airway Patency: patent    Respiratory: unassisted, spontaneous ventilation    Cardiovascular: stable and blood pressure at baseline    Hydration: euvolemic  "

## 2019-04-16 NOTE — BRIEF OP NOTE
[facility]  Brief Operative Note     SUMMARY     Surgery Date: 4/16/2019     Surgeon(s) and Role:     * Kee Jones MD - Primary    Assisting Surgeon: None    Pre-op Diagnosis:  Persistent atrial fibrillation [I48.1]    Post-op Diagnosis:  Post-Op Diagnosis Codes:     * Persistent atrial fibrillation [I48.1]    Procedure(s) (LRB):  ECHOCARDIOGRAM,TRANSESOPHAGEAL (N/A)      Procedure Description: The risks, benefits, and alternatives of the procedure expalined in detail with patient and family. The patient voices understanding and all questions have been addressed. The patient agrees to proceed as planned.   The patient was sedated by anesthesiology service. The probe was advanced via throat into esophagus smoothly. The patient tolerated the procedure well and there was no complications. The probed was withdrawal at the end of study. The patient was hemodynamically stable.   Estimated Blood Loss: none.   Findings / Operative Note:   No ROXI thrombus.  Normal EF and mild AI/MR  Dilated LA  After LISSETH, dccv x1 with 200 joules and the afib was converted to sinus at 55 bpm. The rhythm was back to afib 5 minutes later and DCCV x1. Converted to sinus bradycardia.    Continue Eliquis, Flecainide 100 mg bid and metoprolol    Ok to discharge to home once hemodynamically stable.  F/U with me in one week at cardiology clinic.  DIET DASH  ACTIVITY as toleratde    Estimated Blood Loss: * No values recorded between 4/16/2019 12:00 AM and 4/16/2019 11:24 AM *         Specimens:   Specimen (12h ago, onward)    None          Discharge Note    SUMMARY     Admit Date: 4/16/2019    Discharge Date and Time:  04/16/2019 11:28 AM    Final Diagnosis: Post-Op Diagnosis Codes:     * Persistent atrial fibrillation [I48.1]    Disposition: Home or Self Care    Follow Up/Patient Instructions:     Medications:  Reconciled Home Medications:      Medication List      ASK your doctor about these medications    acetaminophen 500 MG  tablet  Commonly known as:  TYLENOL  Take 1 tablet (500 mg total) by mouth 2 (two) times daily.     * albuterol 90 mcg/actuation inhaler  Commonly known as:  PROVENTIL/VENTOLIN HFA  Inhale 2 puffs into the lungs 4 (four) times daily.     * albuterol 90 mcg/actuation inhaler  Commonly known as:  PROVENTIL HFA  Inhale 2 puffs into the lungs every 6 (six) hours as needed for Wheezing or Shortness of Breath. Rescue     * albuterol 0.63 mg/3 mL Nebu  Commonly known as:  ACCUNEB  Take 3 mLs (0.63 mg total) by nebulization every 6 (six) hours as needed. Rescue     compressor, for nebulizer Lara  Compressor use as directed by albuterol use.     ELIQUIS 5 mg Tab  Generic drug:  apixaban  TAKE 1 TABLET BY MOUTH TWICE DAILY     FISH OIL ORAL  Take 500 mg by mouth once daily.     flecainide 100 MG Tab  Commonly known as:  TAMBOCOR  Take 1 tablet (100 mg total) by mouth every 12 (twelve) hours.     fluticasone 110 mcg/actuation inhaler  Commonly known as:  FLOVENT HFA  Inhale 1 puff into the lungs 2 (two) times daily.     furosemide 20 MG tablet  Commonly known as:  LASIX  Take 2 tablets (40 mg total) by mouth once daily.     isosorbide mononitrate 30 MG 24 hr tablet  Commonly known as:  IMDUR  TAKE 1 TABLET EVERY DAY     metoprolol succinate 100 MG 24 hr tablet  Commonly known as:  TOPROL-XL  TAKE 1 TABLET EVERY DAY     pravastatin 40 MG tablet  Commonly known as:  PRAVACHOL  TAKE 1 TABLET EVERY DAY     VITAMIN C ORAL  Take 500 mg by mouth once daily.         * This list has 3 medication(s) that are the same as other medications prescribed for you. Read the directions carefully, and ask your doctor or other care provider to review them with you.              No discharge procedures on file.

## 2019-04-16 NOTE — TRANSFER OF CARE
"Anesthesia Transfer of Care Note    Patient: Elke Laws    Procedure(s) Performed: Procedure(s) (LRB):  ECHOCARDIOGRAM,TRANSESOPHAGEAL (N/A)    Patient location: Other:    Anesthesia Type: MAC    Transport from OR: Transported from OR on room air with adequate spontaneous ventilation    Post pain: adequate analgesia    Post assessment: no apparent anesthetic complications    Post vital signs: stable    Level of consciousness: awake    Nausea/Vomiting: no nausea/vomiting    Complications: none          Last vitals:   Visit Vitals  BP (!) 123/57   Pulse 64   Temp 36.7 °C (98 °F) (Oral)   Resp 18   Ht 4' 11" (1.499 m)   Wt 78.9 kg (174 lb)   LMP 12/13/1973   SpO2 100%   Breastfeeding? No   BMI 35.14 kg/m²     "

## 2019-04-16 NOTE — ANESTHESIA POSTPROCEDURE EVALUATION
Anesthesia Post Evaluation    Patient: Elke Laws    Procedure(s) Performed: Procedure(s) (LRB):  ECHOCARDIOGRAM,TRANSESOPHAGEAL (N/A)    Final Anesthesia Type: MAC  Patient location during evaluation: Cath Lab  Patient participation: Yes- Able to Participate  Level of consciousness: awake and alert  Post-procedure vital signs: reviewed and stable  Pain management: adequate  Airway patency: patent  PONV status at discharge: No PONV  Anesthetic complications: no      Cardiovascular status: blood pressure returned to baseline  Respiratory status: unassisted and spontaneous ventilation  Hydration status: euvolemic  Follow-up not needed.          Vitals Value Taken Time   /57 4/16/2019 10:27 AM   Temp 36.7 °C (98 °F) 4/16/2019 10:22 AM   Pulse 64 4/16/2019 10:22 AM   Resp 18 4/16/2019 10:22 AM   SpO2 100 % 4/16/2019 10:22 AM         No case tracking events are documented in the log.      Pain/Armani Score: No data recorded

## 2019-04-16 NOTE — PLAN OF CARE
1205 FULLY AWAKE.  DISCHARGE INST. REVIEWED AND COPY GIVEN.  1210 IV REMOVED.1220 DISCHARGED HOME. TO EXIT VIA W/C

## 2019-04-20 LAB — AORTIC VALVE REGURGITATION: NORMAL

## 2019-04-23 ENCOUNTER — TELEPHONE (OUTPATIENT)
Dept: INTERNAL MEDICINE | Facility: CLINIC | Age: 84
End: 2019-04-23

## 2019-04-23 DIAGNOSIS — I51.89 LEFT VENTRICULAR DIASTOLIC DYSFUNCTION WITH PRESERVED SYSTOLIC FUNCTION: Chronic | ICD-10-CM

## 2019-04-23 RX ORDER — FUROSEMIDE 20 MG/1
40 TABLET ORAL DAILY
Qty: 90 TABLET | Refills: 2 | Status: ON HOLD | OUTPATIENT
Start: 2019-04-23 | End: 2019-05-08 | Stop reason: SDUPTHER

## 2019-04-23 NOTE — TELEPHONE ENCOUNTER
Spoke with Kali states pt is suppose to be d/c from services this week but since pt had cardioversion last week asking to extend home health until next week?

## 2019-04-23 NOTE — TELEPHONE ENCOUNTER
----- Message from Chad Rowe sent at 4/23/2019 10:47 AM CDT -----  Contact: Kali ( home Health Nurse)   Need to get ok to continue services.       [phone] x [phone]

## 2019-04-25 DIAGNOSIS — I48.91 ATRIAL FIBRILLATION, UNSPECIFIED TYPE: Primary | ICD-10-CM

## 2019-04-26 ENCOUNTER — CLINICAL SUPPORT (OUTPATIENT)
Dept: CARDIOLOGY | Facility: CLINIC | Age: 84
End: 2019-04-26
Payer: MEDICARE

## 2019-04-26 ENCOUNTER — OFFICE VISIT (OUTPATIENT)
Dept: CARDIOLOGY | Facility: CLINIC | Age: 84
End: 2019-04-26
Payer: MEDICARE

## 2019-04-26 VITALS
BODY MASS INDEX: 36.63 KG/M2 | DIASTOLIC BLOOD PRESSURE: 64 MMHG | HEART RATE: 61 BPM | WEIGHT: 181.69 LBS | HEIGHT: 59 IN | SYSTOLIC BLOOD PRESSURE: 108 MMHG

## 2019-04-26 DIAGNOSIS — N18.30 STAGE 3 CHRONIC KIDNEY DISEASE: Chronic | ICD-10-CM

## 2019-04-26 DIAGNOSIS — E78.5 HYPERLIPIDEMIA, UNSPECIFIED HYPERLIPIDEMIA TYPE: Chronic | ICD-10-CM

## 2019-04-26 DIAGNOSIS — I48.0 PAF (PAROXYSMAL ATRIAL FIBRILLATION): ICD-10-CM

## 2019-04-26 DIAGNOSIS — I10 ESSENTIAL HYPERTENSION: Chronic | ICD-10-CM

## 2019-04-26 DIAGNOSIS — I48.91 ATRIAL FIBRILLATION, UNSPECIFIED TYPE: ICD-10-CM

## 2019-04-26 DIAGNOSIS — I38 HEART VALVE REGURGITATION: ICD-10-CM

## 2019-04-26 DIAGNOSIS — I50.33 ACUTE ON CHRONIC DIASTOLIC HEART FAILURE: Primary | ICD-10-CM

## 2019-04-26 DIAGNOSIS — I70.0 ATHEROSCLEROSIS OF AORTA: Chronic | ICD-10-CM

## 2019-04-26 DIAGNOSIS — I48.19 PERSISTENT ATRIAL FIBRILLATION: ICD-10-CM

## 2019-04-26 PROCEDURE — 93010 ELECTROCARDIOGRAM REPORT: CPT | Mod: HCWC,S$GLB,, | Performed by: NUCLEAR MEDICINE

## 2019-04-26 PROCEDURE — 1101F PT FALLS ASSESS-DOCD LE1/YR: CPT | Mod: HCWC,CPTII,S$GLB, | Performed by: INTERNAL MEDICINE

## 2019-04-26 PROCEDURE — 3074F SYST BP LT 130 MM HG: CPT | Mod: HCWC,CPTII,S$GLB, | Performed by: INTERNAL MEDICINE

## 2019-04-26 PROCEDURE — 99999 PR PBB SHADOW E&M-EST. PATIENT-LVL III: CPT | Mod: PBBFAC,HCWC,, | Performed by: INTERNAL MEDICINE

## 2019-04-26 PROCEDURE — 3074F PR MOST RECENT SYSTOLIC BLOOD PRESSURE < 130 MM HG: ICD-10-PCS | Mod: HCWC,CPTII,S$GLB, | Performed by: INTERNAL MEDICINE

## 2019-04-26 PROCEDURE — 99999 PR PBB SHADOW E&M-EST. PATIENT-LVL III: ICD-10-PCS | Mod: PBBFAC,HCWC,, | Performed by: INTERNAL MEDICINE

## 2019-04-26 PROCEDURE — 3078F DIAST BP <80 MM HG: CPT | Mod: HCWC,CPTII,S$GLB, | Performed by: INTERNAL MEDICINE

## 2019-04-26 PROCEDURE — 99214 OFFICE O/P EST MOD 30 MIN: CPT | Mod: HCWC,S$GLB,, | Performed by: INTERNAL MEDICINE

## 2019-04-26 PROCEDURE — 93005 ELECTROCARDIOGRAM TRACING: CPT | Mod: HCWC,S$GLB,, | Performed by: INTERNAL MEDICINE

## 2019-04-26 PROCEDURE — 3078F PR MOST RECENT DIASTOLIC BLOOD PRESSURE < 80 MM HG: ICD-10-PCS | Mod: HCWC,CPTII,S$GLB, | Performed by: INTERNAL MEDICINE

## 2019-04-26 PROCEDURE — 93005 EKG 12-LEAD: ICD-10-PCS | Mod: HCWC,S$GLB,, | Performed by: INTERNAL MEDICINE

## 2019-04-26 PROCEDURE — 1101F PR PT FALLS ASSESS DOC 0-1 FALLS W/OUT INJ PAST YR: ICD-10-PCS | Mod: HCWC,CPTII,S$GLB, | Performed by: INTERNAL MEDICINE

## 2019-04-26 PROCEDURE — 93010 EKG 12-LEAD: ICD-10-PCS | Mod: HCWC,S$GLB,, | Performed by: NUCLEAR MEDICINE

## 2019-04-26 PROCEDURE — 99214 PR OFFICE/OUTPT VISIT, EST, LEVL IV, 30-39 MIN: ICD-10-PCS | Mod: HCWC,S$GLB,, | Performed by: INTERNAL MEDICINE

## 2019-04-26 RX ORDER — FLECAINIDE ACETATE 100 MG/1
100 TABLET ORAL EVERY 12 HOURS
Qty: 60 TABLET | Refills: 5 | Status: SHIPPED | OUTPATIENT
Start: 2019-04-26 | End: 2019-05-13 | Stop reason: ALTCHOICE

## 2019-04-26 NOTE — PROGRESS NOTES
Subjective:   Patient ID:  Elke Laws is a 84 y.o. female who presents for follow up of Hospital Follow Up; Congestive Heart Failure; and Coronary Artery Disease      83 yo female, came in for CHF and afib  PMH persistent AF, diastolic CHF, non obstructive CAD per [facility] done in , HTN and HLD.   In the , pt had 3 ER visits of CHF exacerbation. Echo showed EF 55%, mild AI and biatrial dilation. Elevated  to 800. CXR showed plum. Edema.  Serial ekg showed persistent afib since 03/07/2019.  She has had 3 CHF admissions recently. Suspected afib related and had LISSETH/DCCV on 04/16 and increased Flecainide to 100 mg bid. But pt states that she thought the increased med was pravastatin. So she still on flecainide 50 mg bid.  Today, states that DIAZ improved after DCCV. Also home o2 helped a lot. No cough and sputum. No chest pain.  Today, states that she feels better, DIAZ improved. Sob stable and started home O2 3 weeks ago. C/o cough and sputum. No chest pain.   Blacked out one time when stood up from the chair. No confusion and incontinence.  Repeat ekg showed afib.      Past Medical History:   Diagnosis Date    Abnormal nuclear stress test 6/30/2016    Acute congestive heart failure 5/27/2017    Acute coronary syndrome     Acute kidney injury (nontraumatic) 5/27/2017    Acute kidney injury (nontraumatic) 5/27/2017    Acute on chronic congestive heart failure 5/21/2017    Acute on chronic diastolic congestive heart failure 8/27/2015    Anemia 5/9/2016    Anemia 5/9/2016    Angina pectoris 1/25/2018    Anticoagulant long-term use     Aortic regurgitation     Echo 11/2013---5 - Mild to moderate aortic regurgitation.      Asthma     Atrial fibrillation 5/15/2014    Back pain     s/p epidural steriod injections, no recent injections.    Baker's cyst     small, right, seen on US lower extremity 6/2014    Blood transfusion     Approximately 6 years ago    Chronic constipation     Coronary  "artery disease     Degenerative disc disease, lumbar     Diastolic dysfunction     Seen on echo 2013, stress test negative 2014    Diverticulosis     colonoscopy 3/27/2011    E coli bacteremia 2017    E. coli UTI (urinary tract infection) 2017    Hemorrhoids     colonoscopy 3/27/2011    Hiatal hernia     CXR 2016---Retrocardiac density again noted consistent with a hiatal hernia.    Hx of adenomatous colonic polyps     Hyperlipidemia     Hypertension     Hyponatremia 2016    Hypoxemia 2017    Hypoxia 2017    Leukocytosis 2017    Mitral regurgitation     Myocardial infarction     per patient was told in the past she had a "mild heart attack"    Obesity     Osteoarthritis, knee     Pneumonia     per patient "walking Pneumonia" did not require hospitalization    Seasonal allergies     Sepsis 2017    Trouble in sleeping     Urinary incontinence        Past Surgical History:   Procedure Laterality Date    APPENDECTOMY      CARDIOVERSION OR DEFIBRILLATION N/A 2019    [facility]formed [facility]ATH LAB    COLONOSCOPY okay by dr. ramos N/A 2017    Performed by To[facility] Abdi [facility] at [facility] ENDO    ECHOCARDIOGRAM,TRANSESOPHAGEAL N/A 2019    Performed by [facility] at [facility] CATH LAB    EYE SURGERY Left     HYSTERECTOMY      benign reasons    KNEE SURGERY Bilateral     x2     Left heart cath      OLECRANON BURSECTOMY Right     2011    TONSILLECTOMY      URETHRA SURGERY         Social History     Tobacco Use    Smoking status: Former Smoker     Packs/day: 0.05     Years: 1.00     Pack years: 0.05     Types: Cigarettes     Last attempt to quit: 1952     Years since quittin.3    Smokeless tobacco: Never Used   Substance Use Topics    Alcohol use: No     Alcohol/week: 0.0 oz    Drug use: No       Family History   Problem Relation Age of Onset    Heart disease Mother     Cancer Mother         colon    Hypertension " Mother     Hyperlipidemia Mother     Heart disease Father     Hypertension Father     Hyperlipidemia Father     Heart disease Sister         MI    Heart disease Brother         MI    COPD Son     Hypertension Son     Diabetes Brother     Diabetes Son     Cancer Sister         breast    Kidney disease Neg Hx     Stroke Neg Hx          Review of Systems   Constitution: Negative for decreased appetite, diaphoresis, fever, malaise/fatigue, night sweats and weight gain.   HENT: Negative for hoarse voice and nosebleeds.    Eyes: Negative for blurred vision, double vision and visual disturbance.   Cardiovascular: Positive for dyspnea on exertion. Negative for chest pain, claudication, cyanosis, irregular heartbeat, leg swelling, near-syncope, orthopnea, palpitations, paroxysmal nocturnal dyspnea and syncope.   Respiratory: Positive for shortness of breath. Negative for hemoptysis, sleep disturbances due to breathing, snoring, sputum production and wheezing.    Endocrine: Negative for cold intolerance and polyuria.   Hematologic/Lymphatic: Negative for bleeding problem. Does not bruise/bleed easily.   Skin: Negative for color change, poor wound healing and rash.   Musculoskeletal: Negative for back pain, falls, joint pain, joint swelling, muscle cramps, muscle weakness, myalgias and neck pain.   Gastrointestinal: Negative for bloating, abdominal pain, change in bowel habit, diarrhea, heartburn, hematemesis, hematochezia, melena, nausea and vomiting.   Genitourinary: Negative for dysuria, frequency and hematuria.   Neurological: Negative for difficulty with concentration, excessive daytime sleepiness, dizziness, focal weakness, headaches, light-headedness, loss of balance, numbness, seizures and weakness.   Psychiatric/Behavioral: Negative for depression, memory loss and substance abuse. The patient does not have insomnia.    Allergic/Immunologic: Negative for HIV exposure and hives.       Objective:   Physical  Exam   Constitutional: She is oriented to person, place, and time. She appears well-nourished.   HENT:   Head: Normocephalic.   Eyes: Pupils are equal, round, and reactive to light.   Neck: Normal carotid pulses and no JVD present. Carotid bruit is not present. No thyromegaly present.   Cardiovascular: Normal rate, normal heart sounds and normal pulses. An irregularly irregular rhythm present.  No extrasystoles are present. PMI is not displaced. Exam reveals no gallop and no S3.   No murmur heard.  Pulmonary/Chest: Breath sounds normal. No stridor. No respiratory distress.   Abdominal: Soft. Bowel sounds are normal. There is no tenderness. There is no rebound.   Musculoskeletal: Normal range of motion. She exhibits edema.   B/l trace edema   Neurological: She is alert and oriented to person, place, and time.   Skin: Skin is intact. No rash noted.   Psychiatric: Her behavior is normal.       Lab Results   Component Value Date    CHOL 156 10/25/2018    CHOL 147 01/31/2018    CHOL 123 04/13/2017     Lab Results   Component Value Date    HDL 68 10/25/2018    HDL 66 01/31/2018    HDL 59 04/13/2017     Lab Results   Component Value Date    LDLCALC 76.0 10/25/2018    LDLCALC 69.0 01/31/2018    LDLCALC 51.2 (L) 04/13/2017     Lab Results   Component Value Date    TRIG 60 10/25/2018    TRIG 60 01/31/2018    TRIG 64 04/13/2017     Lab Results   Component Value Date    CHOLHDL 43.6 10/25/2018    CHOLHDL 44.9 01/31/2018    CHOLHDL 48.0 04/13/2017       Chemistry        Component Value Date/Time     04/01/2019 1255    K 4.0 04/01/2019 1255    CL 92 (L) 04/01/2019 1255    CO2 39 (H) 04/01/2019 1255    BUN 21 04/01/2019 1255    CREATININE 0.8 04/01/2019 1255     (H) 04/01/2019 1255        Component Value Date/Time    CALCIUM 10.0 04/01/2019 1255    ALKPHOS 107 04/01/2019 1255    AST 26 04/01/2019 1255    ALT 26 04/01/2019 1255    BILITOT 0.7 04/01/2019 1255    ESTGFRAFRICA >60 04/01/2019 1255    EGFRNONAA >60  04/01/2019 1255          Lab Results   Component Value Date    HGBA1C 5.9 04/13/2017     Lab Results   Component Value Date    TSH 2.207 01/31/2018     Lab Results   Component Value Date    INR 0.9 02/16/2019    INR 1.2 05/27/2017    INR 1.2 05/21/2017     Lab Results   Component Value Date    WBC 10.21 04/01/2019    HGB 13.5 04/01/2019    HCT 43.1 04/01/2019    MCV 94 04/01/2019     04/01/2019     BMP  Sodium   Date Value Ref Range Status   04/01/2019 140 136 - 145 mmol/L Final     Potassium   Date Value Ref Range Status   04/01/2019 4.0 3.5 - 5.1 mmol/L Final     Chloride   Date Value Ref Range Status   04/01/2019 92 (L) 95 - 110 mmol/L Final     CO2   Date Value Ref Range Status   04/01/2019 39 (H) 23 - 29 mmol/L Final     BUN, Bld   Date Value Ref Range Status   04/01/2019 21 8 - 23 mg/dL Final     Creatinine   Date Value Ref Range Status   04/01/2019 0.8 0.5 - 1.4 mg/dL Final     Calcium   Date Value Ref Range Status   04/01/2019 10.0 8.7 - 10.5 mg/dL Final     Anion Gap   Date Value Ref Range Status   04/01/2019 9 8 - 16 mmol/L Final     eGFR if    Date Value Ref Range Status   04/01/2019 >60 >60 mL/min/1.73 m^2 Final     eGFR if non    Date Value Ref Range Status   04/01/2019 >60 >60 mL/min/1.73 m^2 Final     Comment:     Calculation used to obtain the estimated glomerular filtration  rate (eGFR) is the CKD-EPI equation.        BNP  @LABRCNTIP(BNP,BNPTRIAGEBLO)@  @LABRCNTIP(troponini)@  CrCl cannot be calculated (Patient's most recent lab result is older than the maximum 7 days allowed.).  No results found in the last 24 hours.  No results found in the last 24 hours.  No results found in the last 24 hours.    Assessment:      1. Acute on chronic diastolic HFpEF of 60-65%    2. Persistent atrial fibrillation    3. Atherosclerosis of aorta    4. Essential hypertension    5. Heart valve regurgitation    6. Hyperlipidemia, unspecified hyperlipidemia type    7. Stage 3  chronic kidney disease    8. PAF (paroxysmal atrial fibrillation)      CHfpEf improved, FC III  Recurrent afib with controlled VR  hypoxemia on home o2.    Plan:   clarfiy the med  Flecainide 100 mg bid  Pravastatin 40 mg daily  D/c Imdur due to soft BP    Decaf julian  Daily weight. Please call the office if gain 3 pounds in 1 day or 5 pounds in 1 week.  Fluid restriction 1.5 liters a day  Na< 2 gm  RTC in 3 weeks

## 2019-05-05 ENCOUNTER — HOSPITAL ENCOUNTER (OUTPATIENT)
Facility: HOSPITAL | Age: 84
Discharge: HOME HEALTH | End: 2019-05-08
Attending: INTERNAL MEDICINE | Admitting: INTERNAL MEDICINE
Payer: MEDICARE

## 2019-05-05 DIAGNOSIS — I51.89 LEFT VENTRICULAR DIASTOLIC DYSFUNCTION WITH PRESERVED SYSTOLIC FUNCTION: Chronic | ICD-10-CM

## 2019-05-05 DIAGNOSIS — R06.02 SHORTNESS OF BREATH: ICD-10-CM

## 2019-05-05 DIAGNOSIS — I10 ESSENTIAL HYPERTENSION: Chronic | ICD-10-CM

## 2019-05-05 DIAGNOSIS — R06.02 SOB (SHORTNESS OF BREATH): ICD-10-CM

## 2019-05-05 DIAGNOSIS — J45.51 SEVERE PERSISTENT ASTHMA WITH ACUTE EXACERBATION: ICD-10-CM

## 2019-05-05 DIAGNOSIS — I50.33 ACUTE ON CHRONIC DIASTOLIC HEART FAILURE: Primary | ICD-10-CM

## 2019-05-05 DIAGNOSIS — I48.0 PAF (PAROXYSMAL ATRIAL FIBRILLATION): Chronic | ICD-10-CM

## 2019-05-05 LAB
ALBUMIN SERPL BCP-MCNC: 3.2 G/DL (ref 3.5–5.2)
ALP SERPL-CCNC: 128 U/L (ref 55–135)
ALT SERPL W/O P-5'-P-CCNC: 80 U/L (ref 10–44)
ANION GAP SERPL CALC-SCNC: 10 MMOL/L (ref 8–16)
AST SERPL-CCNC: 56 U/L (ref 10–40)
BASOPHILS # BLD AUTO: 0.01 K/UL (ref 0–0.2)
BASOPHILS NFR BLD: 0.1 % (ref 0–1.9)
BILIRUB SERPL-MCNC: 0.9 MG/DL (ref 0.1–1)
BILIRUB UR QL STRIP: NEGATIVE
BNP SERPL-MCNC: 947 PG/ML (ref 0–99)
BUN SERPL-MCNC: 20 MG/DL (ref 8–23)
CALCIUM SERPL-MCNC: 9.3 MG/DL (ref 8.7–10.5)
CHLORIDE SERPL-SCNC: 104 MMOL/L (ref 95–110)
CLARITY UR: CLEAR
CO2 SERPL-SCNC: 26 MMOL/L (ref 23–29)
COLOR UR: YELLOW
CREAT SERPL-MCNC: 0.9 MG/DL (ref 0.5–1.4)
DIFFERENTIAL METHOD: ABNORMAL
EOSINOPHIL # BLD AUTO: 0.1 K/UL (ref 0–0.5)
EOSINOPHIL NFR BLD: 1.2 % (ref 0–8)
ERYTHROCYTE [DISTWIDTH] IN BLOOD BY AUTOMATED COUNT: 14.8 % (ref 11.5–14.5)
EST. GFR  (AFRICAN AMERICAN): >60 ML/MIN/1.73 M^2
EST. GFR  (NON AFRICAN AMERICAN): 59 ML/MIN/1.73 M^2
GLUCOSE SERPL-MCNC: 104 MG/DL (ref 70–110)
GLUCOSE UR QL STRIP: NEGATIVE
HCT VFR BLD AUTO: 36.7 % (ref 37–48.5)
HGB BLD-MCNC: 11.6 G/DL (ref 12–16)
HGB UR QL STRIP: ABNORMAL
KETONES UR QL STRIP: NEGATIVE
LACTATE SERPL-SCNC: 0.8 MMOL/L (ref 0.5–2.2)
LEUKOCYTE ESTERASE UR QL STRIP: NEGATIVE
LYMPHOCYTES # BLD AUTO: 1.3 K/UL (ref 1–4.8)
LYMPHOCYTES NFR BLD: 16.6 % (ref 18–48)
MCH RBC QN AUTO: 30.2 PG (ref 27–31)
MCHC RBC AUTO-ENTMCNC: 31.6 G/DL (ref 32–36)
MCV RBC AUTO: 96 FL (ref 82–98)
MONOCYTES # BLD AUTO: 1.2 K/UL (ref 0.3–1)
MONOCYTES NFR BLD: 14.9 % (ref 4–15)
NEUTROPHILS # BLD AUTO: 5.2 K/UL (ref 1.8–7.7)
NEUTROPHILS NFR BLD: 67.2 % (ref 38–73)
NITRITE UR QL STRIP: NEGATIVE
PH UR STRIP: 6 [PH] (ref 5–8)
PLATELET # BLD AUTO: 149 K/UL (ref 150–350)
PMV BLD AUTO: 11.1 FL (ref 9.2–12.9)
POTASSIUM SERPL-SCNC: 4.4 MMOL/L (ref 3.5–5.1)
PROT SERPL-MCNC: 6.9 G/DL (ref 6–8.4)
PROT UR QL STRIP: NEGATIVE
RBC # BLD AUTO: 3.84 M/UL (ref 4–5.4)
SODIUM SERPL-SCNC: 140 MMOL/L (ref 136–145)
SP GR UR STRIP: 1.01 (ref 1–1.03)
TROPONIN I SERPL DL<=0.01 NG/ML-MCNC: <0.006 NG/ML (ref 0–0.03)
URN SPEC COLLECT METH UR: ABNORMAL
UROBILINOGEN UR STRIP-ACNC: NEGATIVE EU/DL
WBC # BLD AUTO: 7.76 K/UL (ref 3.9–12.7)

## 2019-05-05 PROCEDURE — 25000003 PHARM REV CODE 250: Mod: HCWC | Performed by: NURSE PRACTITIONER

## 2019-05-05 PROCEDURE — 83605 ASSAY OF LACTIC ACID: CPT | Mod: HCWC

## 2019-05-05 PROCEDURE — 93005 ELECTROCARDIOGRAM TRACING: CPT | Mod: HCWC

## 2019-05-05 PROCEDURE — 93010 ELECTROCARDIOGRAM REPORT: CPT | Mod: HCWC,,, | Performed by: INTERNAL MEDICINE

## 2019-05-05 PROCEDURE — G0378 HOSPITAL OBSERVATION PER HR: HCPCS | Mod: HCWC

## 2019-05-05 PROCEDURE — 27000221 HC OXYGEN, UP TO 24 HOURS: Mod: HCWC

## 2019-05-05 PROCEDURE — 81003 URINALYSIS AUTO W/O SCOPE: CPT | Mod: HCWC

## 2019-05-05 PROCEDURE — 25000242 PHARM REV CODE 250 ALT 637 W/ HCPCS: Mod: HCWC | Performed by: INTERNAL MEDICINE

## 2019-05-05 PROCEDURE — 99285 EMERGENCY DEPT VISIT HI MDM: CPT | Mod: 25,HCWC

## 2019-05-05 PROCEDURE — 63600175 PHARM REV CODE 636 W HCPCS: Mod: HCWC | Performed by: NURSE PRACTITIONER

## 2019-05-05 PROCEDURE — 93010 EKG 12-LEAD: ICD-10-PCS | Mod: HCWC,,, | Performed by: INTERNAL MEDICINE

## 2019-05-05 PROCEDURE — 63600175 PHARM REV CODE 636 W HCPCS: Mod: HCWC | Performed by: INTERNAL MEDICINE

## 2019-05-05 PROCEDURE — 83880 ASSAY OF NATRIURETIC PEPTIDE: CPT | Mod: HCWC

## 2019-05-05 PROCEDURE — 36415 COLL VENOUS BLD VENIPUNCTURE: CPT | Mod: HCWC

## 2019-05-05 PROCEDURE — 96376 TX/PRO/DX INJ SAME DRUG ADON: CPT

## 2019-05-05 PROCEDURE — 84484 ASSAY OF TROPONIN QUANT: CPT | Mod: HCWC

## 2019-05-05 PROCEDURE — 85025 COMPLETE CBC W/AUTO DIFF WBC: CPT | Mod: HCWC

## 2019-05-05 PROCEDURE — 96374 THER/PROPH/DIAG INJ IV PUSH: CPT | Mod: HCWC

## 2019-05-05 PROCEDURE — 94640 AIRWAY INHALATION TREATMENT: CPT | Mod: HCWC

## 2019-05-05 PROCEDURE — 80053 COMPREHEN METABOLIC PANEL: CPT | Mod: HCWC

## 2019-05-05 PROCEDURE — 25000242 PHARM REV CODE 250 ALT 637 W/ HCPCS: Mod: HCWC | Performed by: NURSE PRACTITIONER

## 2019-05-05 RX ORDER — FUROSEMIDE 10 MG/ML
40 INJECTION INTRAMUSCULAR; INTRAVENOUS 2 TIMES DAILY
Status: DISCONTINUED | OUTPATIENT
Start: 2019-05-05 | End: 2019-05-05

## 2019-05-05 RX ORDER — FUROSEMIDE 10 MG/ML
40 INJECTION INTRAMUSCULAR; INTRAVENOUS EVERY 8 HOURS
Status: DISCONTINUED | OUTPATIENT
Start: 2019-05-05 | End: 2019-05-08 | Stop reason: HOSPADM

## 2019-05-05 RX ORDER — FUROSEMIDE 10 MG/ML
20 INJECTION INTRAMUSCULAR; INTRAVENOUS
Status: COMPLETED | OUTPATIENT
Start: 2019-05-05 | End: 2019-05-05

## 2019-05-05 RX ORDER — METOPROLOL SUCCINATE 50 MG/1
100 TABLET, EXTENDED RELEASE ORAL DAILY
Status: DISCONTINUED | OUTPATIENT
Start: 2019-05-05 | End: 2019-05-08 | Stop reason: HOSPADM

## 2019-05-05 RX ORDER — IPRATROPIUM BROMIDE AND ALBUTEROL SULFATE 2.5; .5 MG/3ML; MG/3ML
3 SOLUTION RESPIRATORY (INHALATION)
Status: COMPLETED | OUTPATIENT
Start: 2019-05-05 | End: 2019-05-05

## 2019-05-05 RX ORDER — PRAVASTATIN SODIUM 20 MG/1
40 TABLET ORAL DAILY
Status: DISCONTINUED | OUTPATIENT
Start: 2019-05-05 | End: 2019-05-08 | Stop reason: HOSPADM

## 2019-05-05 RX ORDER — SYRING-NEEDL,DISP,INSUL,0.3 ML 29 G X1/2"
296 SYRINGE, EMPTY DISPOSABLE MISCELLANEOUS ONCE
Status: COMPLETED | OUTPATIENT
Start: 2019-05-05 | End: 2019-05-05

## 2019-05-05 RX ORDER — BISACODYL 10 MG
10 SUPPOSITORY, RECTAL RECTAL DAILY PRN
Status: DISCONTINUED | OUTPATIENT
Start: 2019-05-05 | End: 2019-05-08 | Stop reason: HOSPADM

## 2019-05-05 RX ORDER — PREDNISONE 20 MG/1
60 TABLET ORAL
Status: COMPLETED | OUTPATIENT
Start: 2019-05-05 | End: 2019-05-05

## 2019-05-05 RX ORDER — FLECAINIDE ACETATE 50 MG/1
100 TABLET ORAL EVERY 12 HOURS
Status: DISCONTINUED | OUTPATIENT
Start: 2019-05-05 | End: 2019-05-08 | Stop reason: HOSPADM

## 2019-05-05 RX ORDER — SODIUM CHLORIDE 0.9 % (FLUSH) 0.9 %
10 SYRINGE (ML) INJECTION
Status: DISCONTINUED | OUTPATIENT
Start: 2019-05-05 | End: 2019-05-08 | Stop reason: HOSPADM

## 2019-05-05 RX ORDER — ALBUTEROL SULFATE 0.83 MG/ML
0.63 SOLUTION RESPIRATORY (INHALATION) EVERY 6 HOURS PRN
Status: DISCONTINUED | OUTPATIENT
Start: 2019-05-05 | End: 2019-05-08 | Stop reason: HOSPADM

## 2019-05-05 RX ORDER — HEPARIN SODIUM 5000 [USP'U]/ML
5000 INJECTION, SOLUTION INTRAVENOUS; SUBCUTANEOUS EVERY 8 HOURS
Status: DISCONTINUED | OUTPATIENT
Start: 2019-05-05 | End: 2019-05-05 | Stop reason: SDUPTHER

## 2019-05-05 RX ADMIN — PRAVASTATIN SODIUM 40 MG: 20 TABLET ORAL at 01:05

## 2019-05-05 RX ADMIN — Medication 296 ML: at 04:05

## 2019-05-05 RX ADMIN — APIXABAN 5 MG: 2.5 TABLET, FILM COATED ORAL at 01:05

## 2019-05-05 RX ADMIN — FUROSEMIDE 40 MG: 10 INJECTION, SOLUTION INTRAVENOUS at 11:05

## 2019-05-05 RX ADMIN — IPRATROPIUM BROMIDE AND ALBUTEROL SULFATE 3 ML: .5; 3 SOLUTION RESPIRATORY (INHALATION) at 08:05

## 2019-05-05 RX ADMIN — FLECAINIDE ACETATE 100 MG: 50 TABLET ORAL at 11:05

## 2019-05-05 RX ADMIN — FUROSEMIDE 20 MG: 10 INJECTION, SOLUTION INTRAMUSCULAR; INTRAVENOUS at 10:05

## 2019-05-05 RX ADMIN — METOPROLOL SUCCINATE 100 MG: 50 TABLET, EXTENDED RELEASE ORAL at 01:05

## 2019-05-05 RX ADMIN — PREDNISONE 60 MG: 20 TABLET ORAL at 08:05

## 2019-05-05 RX ADMIN — APIXABAN 5 MG: 2.5 TABLET, FILM COATED ORAL at 11:05

## 2019-05-05 RX ADMIN — ALBUTEROL SULFATE 2.5 MG: 2.5 SOLUTION RESPIRATORY (INHALATION) at 04:05

## 2019-05-05 RX ADMIN — FLECAINIDE ACETATE 100 MG: 50 TABLET ORAL at 01:05

## 2019-05-05 RX ADMIN — FUROSEMIDE 40 MG: 10 INJECTION, SOLUTION INTRAVENOUS at 04:05

## 2019-05-05 NOTE — PLAN OF CARE
Problem: Adult Inpatient Plan of Care  Goal: Plan of Care Review  Outcome: Ongoing (interventions implemented as appropriate)  Plan of care reviewed with patient and family    Comments: Patient arrived to floor around 1430 with SOB. Admitted for CHF exac. Receiving IV Lasix. A&Ox4. Independent in room. Currently on 2L of oxygen which she uses 2L of oxygen at home. Runs afib on tele. Up to bathroom. Has depends on. Denies pain. All questions answered. Will continue to monitor.    Jacklyn Salmeron RN

## 2019-05-05 NOTE — ASSESSMENT & PLAN NOTE
--iv furosemide TID  --strict I&O  --daily weights  --supplemental oxygen to maintain sats >92%

## 2019-05-05 NOTE — ED NOTES
"Pt reports SOB yesterday and increasing this morning. Pt states "it feels hard to breathe".    Patient moved to ED room 13, patient assisted onto stretcher and changed into a gown. Patient placed on cardiac monitor, continuous pulse oximetry and automatic blood pressure cuff. Bed placed in low locked position, side rails up x 2, call light is within reach of patient or family, orientation to room and explanation of wait provided to family and patient, alarms set and turned on for monitor and pulse ox, awaiting MD evaluation and orders, will continue to monitor.    Patient identifies self as Elke Laws      LOC: The patient is awake, alert and aware of environment with an appropriate affect, the patient is oriented x 3 and speaking appropriately.  APPEARANCE: Patient resting comfortably and in no acute distress, patient is clean and well groomed, patient's clothing is properly fastened.  SKIN: The skin is warm and dry, color consistent with ethnicity, patient has normal skin turgor and moist mucus membranes, skin intact, no breakdown or bruising noted.  MUSCULOSKELETAL: Patient moving all extremities well, no obvious swelling or deformities noted.  RESPIRATORY: Airway is open and patent, respirations are spontaneous, patient has a increased effort and rate, some accessory muscle use noted.  CARDIAC: Patient has a normal rate and rhythm, no periphreal edema noted, capillary refill < 3 seconds.  ABDOMEN: Soft and non tender to palpation, no distention noted.  NEUROLOGIC: PERRL, eyes open spontaneously, behavior appropriate to situation, follows commands, facial expression symmetrical, bilateral hand grasp equal and even, purposeful motor response noted, normal sensation in all extremities when touched with a finger.    "

## 2019-05-05 NOTE — SUBJECTIVE & OBJECTIVE
"Past Medical History:   Diagnosis Date    Abnormal nuclear stress test 6/30/2016    Acute congestive heart failure 5/27/2017    Acute coronary syndrome     Acute kidney injury (nontraumatic) 5/27/2017    Acute kidney injury (nontraumatic) 5/27/2017    Acute on chronic congestive heart failure 5/21/2017    Acute on chronic diastolic congestive heart failure 8/27/2015    Anemia 5/9/2016    Anemia 5/9/2016    Angina pectoris 1/25/2018    Anticoagulant long-term use     Aortic regurgitation     Echo 11/2013---5 - Mild to moderate aortic regurgitation.      Asthma     Atrial fibrillation 5/15/2014    Back pain     s/p epidural steriod injections, no recent injections.    Baker's cyst     small, right, seen on US lower extremity 6/2014    Blood transfusion     Approximately 6 years ago    Chronic constipation     Coronary artery disease     Degenerative disc disease, lumbar     Diastolic dysfunction     Seen on echo 11/2013, stress test negative 5/2014    Diverticulosis     colonoscopy 3/27/2011    E coli bacteremia 5/23/2017    E. coli UTI (urinary tract infection) 5/23/2017    Hemorrhoids     colonoscopy 3/27/2011    Hiatal hernia     CXR 12/30/2016---Retrocardiac density again noted consistent with a hiatal hernia.    Hx of adenomatous colonic polyps     Hyperlipidemia     Hypertension     Hyponatremia 5/9/2016    Hypoxemia 5/27/2017    Hypoxia 6/28/2017    Leukocytosis 5/27/2017    Mitral regurgitation     Myocardial infarction     per patient was told in the past she had a "mild heart attack"    Obesity     Osteoarthritis, knee     Pneumonia     per patient "walking Pneumonia" did not require hospitalization    Seasonal allergies     Sepsis 5/22/2017    Trouble in sleeping     Urinary incontinence        Past Surgical History:   Procedure Laterality Date    APPENDECTOMY      CARDIOVERSION OR DEFIBRILLATION N/A 4/16/2019    Performed by [facility] at [facility] CATH LAB    " COLONOSCOPY okay by dr. ramos N/A 8/29/2017    Performed by Toño Abdi MD at [facility] ENDO    ECHOCARDIOGRAM,TRANSESOPHAGEAL N/A 4/16/2019    Performed by Kee Jones MD at [facility] CATH LAB    EYE SURGERY Left     HYSTERECTOMY      benign reasons    KNEE SURGERY Bilateral     x2     Left heart cath      OLECRANON BURSECTOMY Right     6/2011    TONSILLECTOMY      URETHRA SURGERY         Review of patient's allergies indicates:   Allergen Reactions    Iodine and iodide containing products Rash       No current facility-administered medications on file prior to encounter.      Current Outpatient Medications on File Prior to Encounter   Medication Sig    albuterol (ACCUNEB) 0.63 mg/3 mL Nebu Take 3 mLs (0.63 mg total) by nebulization every 6 (six) hours as needed. Rescue    ASCORBATE CALCIUM (VITAMIN C ORAL) Take 500 mg by mouth once daily.     DOCOSAHEXANOIC ACID/EPA (FISH OIL ORAL) Take 500 mg by mouth once daily.     ELIQUIS 5 mg Tab TAKE 1 TABLET BY MOUTH TWICE DAILY    flecainide (TAMBOCOR) 100 MG Tab Take 1 tablet (100 mg total) by mouth every 12 (twelve) hours.    furosemide (LASIX) 20 MG tablet Take 2 tablets (40 mg total) by mouth once daily. (Patient taking differently: Take 40 mg by mouth 2 (two) times daily. )    metoprolol succinate (TOPROL-XL) 100 MG 24 hr tablet TAKE 1 TABLET EVERY DAY    pravastatin (PRAVACHOL) 40 MG tablet TAKE 1 TABLET EVERY DAY (Patient taking differently: TAKE 1 TABLET EVERY DAY pt taking two daily)    acetaminophen (TYLENOL) 500 MG tablet Take 1 tablet (500 mg total) by mouth 2 (two) times daily. (Patient taking differently: Take 500 mg by mouth 2 (two) times daily as needed. )    albuterol (PROVENTIL HFA) 90 mcg/actuation inhaler Inhale 2 puffs into the lungs every 6 (six) hours as needed for Wheezing or Shortness of Breath. Rescue    albuterol 90 mcg/actuation inhaler Inhale 2 puffs into the lungs 4 (four) times daily.    compressor, for nebulizer Lara Compressor  use as directed by albuterol use.    fluticasone (FLOVENT HFA) 110 mcg/actuation inhaler Inhale 1 puff into the lungs 2 (two) times daily.     Family History     Problem Relation (Age of Onset)    COPD Son    Cancer Mother, Sister    Diabetes Brother, Son    Heart disease Mother, Father, Sister, Brother    Hyperlipidemia Mother, Father    Hypertension Mother, Father, Son        Tobacco Use    Smoking status: Former Smoker     Packs/day: 0.05     Years: 1.00     Pack years: 0.05     Types: Cigarettes     Last attempt to quit: 1952     Years since quittin.3    Smokeless tobacco: Never Used   Substance and Sexual Activity    Alcohol use: No     Alcohol/week: 0.0 oz    Drug use: No    Sexual activity: Never     Partners: Male     Birth control/protection: See Surgical Hx     Review of Systems   Constitutional: Positive for fatigue. Negative for activity change, appetite change, chills and fever.   HENT: Negative for congestion, dental problem, ear pain, hearing loss, mouth sores, sinus pressure, sore throat, tinnitus and trouble swallowing.    Eyes: Negative for pain, discharge, redness and visual disturbance.   Respiratory: Positive for cough and shortness of breath. Negative for apnea, choking, chest tightness and wheezing.    Cardiovascular: Negative for chest pain, palpitations and leg swelling.   Gastrointestinal: Negative for abdominal distention, abdominal pain, anal bleeding, blood in stool, constipation, diarrhea, nausea, rectal pain and vomiting.   Endocrine: Negative for cold intolerance, heat intolerance, polydipsia and polyuria.   Genitourinary: Negative for difficulty urinating, dysuria, flank pain, frequency, hematuria and urgency.   Musculoskeletal: Negative for arthralgias, back pain, gait problem, joint swelling, myalgias, neck pain and neck stiffness.   Skin: Negative for color change, rash and wound.   Allergic/Immunologic: Negative for food allergies and immunocompromised state.    Neurological: Positive for weakness. Negative for dizziness, tremors, seizures, syncope, speech difficulty, light-headedness and headaches.   Hematological: Negative for adenopathy. Does not bruise/bleed easily.   Psychiatric/Behavioral: Negative for agitation, confusion and sleep disturbance. The patient is not nervous/anxious.    All other systems reviewed and are negative.    Objective:     Vital Signs (Most Recent):  Temp: 98.2 °F (36.8 °C) (05/05/19 1040)  Pulse: 81 (05/05/19 1405)  Resp: 20 (05/05/19 1405)  BP: 129/71 (05/05/19 1405)  SpO2: 98 % (05/05/19 1405) Vital Signs (24h Range):  Temp:  [97.7 °F (36.5 °C)-98.2 °F (36.8 °C)] 98.2 °F (36.8 °C)  Pulse:  [64-88] 81  Resp:  [18-32] 20  SpO2:  [90 %-100 %] 98 %  BP: (129-176)/() 129/71     Weight: 82.1 kg (181 lb)  Body mass index is 39.17 kg/m².    Physical Exam   Constitutional: She is oriented to person, place, and time. She appears well-developed and well-nourished. No distress.   HENT:   Head: Normocephalic and atraumatic.   Nose: Nose normal.   Eyes: Conjunctivae and EOM are normal. No scleral icterus.   Neck: Normal range of motion. Neck supple. No JVD present.   Cardiovascular: Normal rate, normal heart sounds and intact distal pulses. An irregularly irregular rhythm present. Exam reveals no gallop and no friction rub.   No murmur heard.  Pulses:       Radial pulses are 2+ on the right side, and 2+ on the left side.        Dorsalis pedis pulses are 2+ on the right side, and 2+ on the left side.   Pulmonary/Chest: Effort normal and breath sounds normal. No respiratory distress. She has no wheezes. She has no rales. She exhibits no tenderness.   Abdominal: Soft. Bowel sounds are normal. She exhibits no distension and no mass. There is no tenderness.   Musculoskeletal: Normal range of motion. She exhibits no edema, tenderness or deformity.   Neurological: She is alert and oriented to person, place, and time. No cranial nerve deficit. She exhibits  normal muscle tone. Coordination normal.   Skin: Skin is warm and dry. Capillary refill takes less than 2 seconds. No rash noted. She is not diaphoretic. No erythema. No pallor.   Psychiatric: She has a normal mood and affect. Her behavior is normal.   Nursing note and vitals reviewed.        CRANIAL NERVES     CN III, IV, VI   Extraocular motions are normal.        Significant Labs:   Recent Lab Results       05/05/19  1100   05/05/19  0830   05/05/19  0825        Albumin     3.2     Alkaline Phosphatase     128     ALT     80     Anion Gap     10     Appearance, UA Clear         AST     56     Baso #     0.01     Basophil%     0.1     Bilirubin (UA) Negative         BILIRUBIN TOTAL     0.9  Comment:  For infants and newborns, interpretation of results should be based  on gestational age, weight and in agreement with clinical  observations.  Premature Infant recommended reference ranges:  Up to 24 hours.............<8.0 mg/dL  Up to 48 hours............<12.0 mg/dL  3-5 days..................<15.0 mg/dL  6-29 days.................<15.0 mg/dL       BNP     947  Comment:  Values of less than 100 pg/ml are consistent with non-CHF populations.     BUN, Bld     20     Calcium     9.3     Chloride     104     CO2     26     Color, UA Yellow         Creatinine     0.9     Differential Method     Automated     eGFR if      >60     eGFR if non      59  Comment:  Calculation used to obtain the estimated glomerular filtration  rate (eGFR) is the CKD-EPI equation.        Eos #     0.1     Eosinophil%     1.2     Glucose     104     Glucose, UA Negative         Gran # (ANC)     5.2     Gran%     67.2     Hematocrit     36.7     Hemoglobin     11.6     Ketones, UA Negative         Lactate, Tj   0.8  Comment:  Falsely low lactic acid results can be found in samples   containing >=13.0 mg/dL total bilirubin and/or >=3.5 mg/dL   direct bilirubin.         Leukocytes, UA Negative         Lymph #      1.3     Lymph%     16.6     MCH     30.2     MCHC     31.6     MCV     96     Mono #     1.2     Mono%     14.9     MPV     11.1     NITRITE UA Negative         Occult Blood UA Trace         pH, UA 6.0         Platelets     149     Potassium     4.4     PROTEIN TOTAL     6.9     Protein, UA Negative  Comment:  Recommend a 24 hour urine protein or a urine   protein/creatinine ratio if globulin induced proteinuria is  clinically suspected.           RBC     3.84     RDW     14.8     Sodium     140     Specific [place], UA 1.010         Specimen UA Urine, Clean Catch         Troponin I     <0.006  Comment:  The reference interval for Troponin I represents the 99th percentile   cutoff   for our facility and is consistent with 3rd generation assay   performance.       Urobilinogen, UA Negative         WBC     7.76         All pertinent labs within the past 24 hours have been reviewed.    Significant Imaging: I have reviewed all pertinent imaging results/findings within the past 24 hours.

## 2019-05-05 NOTE — H&P
[facility] Hospital Medicine  History & Physical    Patient Name: Elke Laws  MRN: [id number]  Admission Date: 5/5/2019  Attending Physician: Magdaleno Yoon MD   Primary Care Provider: Isidra Benavidez MD         Patient information was obtained from patient, relative(s), past medical records and ER records.     Subjective:     Principal Problem:Acute on chronic diastolic heart failure    Chief Complaint:   Chief Complaint   Patient presents with    Shortness of Breath     woke up with shortness of breath; history of CHF- wears home O2.         HPI: 85 y/o female with PMHx of CHF, HTN, HLD who presented to the ED with c/o SOB that onset gradually over the past 2 days. Associated symptoms include weakness, fatigue, cough, and abd distention. Symptoms are constant and moderate in severity. No mitigating or exacerbating factors reported.  She is a DNR and her surrogate decision maker is her son, Christiano.   She uses continuous home oxygen at 2L/min via [place].    Past Medical History:   Diagnosis Date    Abnormal nuclear stress test 6/30/2016    Acute congestive heart failure 5/27/2017    Acute coronary syndrome     Acute kidney injury (nontraumatic) 5/27/2017    Acute kidney injury (nontraumatic) 5/27/2017    Acute on chronic congestive heart failure 5/21/2017    Acute on chronic diastolic congestive heart failure 8/27/2015    Anemia 5/9/2016    Anemia 5/9/2016    Angina pectoris 1/25/2018    Anticoagulant long-term use     Aortic regurgitation     Echo 11/2013---5 - Mild to moderate aortic regurgitation.      Asthma     Atrial fibrillation 5/15/2014    Back pain     s/p epidural steriod injections, no recent injections.    Baker's cyst     small, right, seen on US lower extremity 6/2014    Blood transfusion     Approximately 6 years ago    Chronic constipation     Coronary artery disease     Degenerative disc disease, lumbar     Diastolic dysfunction     Seen on echo 11/2013, stress  "test negative 5/2014    Diverticulosis     colonoscopy 3/27/2011    E coli bacteremia 5/23/2017    E. coli UTI (urinary tract infection) 5/23/2017    Hemorrhoids     colonoscopy 3/27/2011    Hiatal hernia     CXR 12/30/2016---Retrocardiac density again noted consistent with a hiatal hernia.    Hx of adenomatous colonic polyps     Hyperlipidemia     Hypertension     Hyponatremia 5/9/2016    Hypoxemia 5/27/2017    Hypoxia 6/28/2017    Leukocytosis 5/27/2017    Mitral regurgitation     Myocardial infarction     per patient was told in the past she had a "mild heart attack"    Obesity     Osteoarthritis, knee     Pneumonia     per patient "walking Pneumonia" did not require hospitalization    Seasonal allergies     Sepsis 5/22/2017    Trouble in sleeping     Urinary incontinence        Past Surgical History:   Procedure Laterality Date    APPENDECTOMY      CARDIOVERSION OR DEFIBRILLATION N/A 4/16/2019    Performed by [facility] at [facility] CATH LAB    COLONOSCOPY okay by dr. ramos N/A 8/29/2017    Performed by Toño Abdi MD at [facility] ENDO    ECHOCARDIOGRAM,TRANSESOPHAGEAL N/A 4/16/2019    Performed by Kee Jones MD at [facility] CATH LAB    EYE SURGERY Left     HYSTERECTOMY      benign reasons    KNEE SURGERY Bilateral     x2     Left heart cath      OLECRANON BURSECTOMY Right     6/2011    TONSILLECTOMY      URETHRA SURGERY         Review of patient's allergies indicates:   Allergen Reactions    Iodine and iodide containing products Rash       No current facility-administered medications on file prior to encounter.      Current Outpatient Medications on File Prior to Encounter   Medication Sig    albuterol (ACCUNEB) 0.63 mg/3 mL Nebu Take 3 mLs (0.63 mg total) by nebulization every 6 (six) hours as needed. Rescue    ASCORBATE CALCIUM (VITAMIN C ORAL) Take 500 mg by mouth once daily.     DOCOSAHEXANOIC ACID/EPA (FISH OIL ORAL) Take 500 mg by mouth once daily.     ELIQUIS 5 mg Tab TAKE 1 " TABLET BY MOUTH TWICE DAILY    flecainide (TAMBOCOR) 100 MG Tab Take 1 tablet (100 mg total) by mouth every 12 (twelve) hours.    furosemide (LASIX) 20 MG tablet Take 2 tablets (40 mg total) by mouth once daily. (Patient taking differently: Take 40 mg by mouth 2 (two) times daily. )    metoprolol succinate (TOPROL-XL) 100 MG 24 hr tablet TAKE 1 TABLET EVERY DAY    pravastatin (PRAVACHOL) 40 MG tablet TAKE 1 TABLET EVERY DAY (Patient taking differently: TAKE 1 TABLET EVERY DAY pt taking two daily)    acetaminophen (TYLENOL) 500 MG tablet Take 1 tablet (500 mg total) by mouth 2 (two) times daily. (Patient taking differently: Take 500 mg by mouth 2 (two) times daily as needed. )    albuterol (PROVENTIL HFA) 90 mcg/actuation inhaler Inhale 2 puffs into the lungs every 6 (six) hours as needed for Wheezing or Shortness of Breath. Rescue    albuterol 90 mcg/actuation inhaler Inhale 2 puffs into the lungs 4 (four) times daily.    compressor, for nebulizer Lara Compressor use as directed by albuterol use.    fluticasone (FLOVENT HFA) 110 mcg/actuation inhaler Inhale 1 puff into the lungs 2 (two) times daily.     Family History     Problem Relation (Age of Onset)    COPD Son    Cancer Mother, Sister    Diabetes Brother, Son    Heart disease Mother, Father, Sister, Brother    Hyperlipidemia Mother, Father    Hypertension Mother, Father, Son        Tobacco Use    Smoking status: Former Smoker     Packs/day: 0.05     Years: 1.00     Pack years: 0.05     Types: Cigarettes     Last attempt to quit: 1952     Years since quittin.3    Smokeless tobacco: Never Used   Substance and Sexual Activity    Alcohol use: No     Alcohol/week: 0.0 oz    Drug use: No    Sexual activity: Never     Partners: Male     Birth control/protection: See Surgical Hx     Review of Systems   Constitutional: Positive for fatigue. Negative for activity change, appetite change, chills and fever.   HENT: Negative for congestion, dental  problem, ear pain, hearing loss, mouth sores, sinus pressure, sore throat, tinnitus and trouble swallowing.    Eyes: Negative for pain, discharge, redness and visual disturbance.   Respiratory: Positive for cough and shortness of breath. Negative for apnea, choking, chest tightness and wheezing.    Cardiovascular: Negative for chest pain, palpitations and leg swelling.   Gastrointestinal: Negative for abdominal distention, abdominal pain, anal bleeding, blood in stool, constipation, diarrhea, nausea, rectal pain and vomiting.   Endocrine: Negative for cold intolerance, heat intolerance, polydipsia and polyuria.   Genitourinary: Negative for difficulty urinating, dysuria, flank pain, frequency, hematuria and urgency.   Musculoskeletal: Negative for arthralgias, back pain, gait problem, joint swelling, myalgias, neck pain and neck stiffness.   Skin: Negative for color change, rash and wound.   Allergic/Immunologic: Negative for food allergies and immunocompromised state.   Neurological: Positive for weakness. Negative for dizziness, tremors, seizures, syncope, speech difficulty, light-headedness and headaches.   Hematological: Negative for adenopathy. Does not bruise/bleed easily.   Psychiatric/Behavioral: Negative for agitation, confusion and sleep disturbance. The patient is not nervous/anxious.    All other systems reviewed and are negative.    Objective:     Vital Signs (Most Recent):  Temp: 98.2 °F (36.8 °C) (05/05/19 1040)  Pulse: 81 (05/05/19 1405)  Resp: 20 (05/05/19 1405)  BP: 129/71 (05/05/19 1405)  SpO2: 98 % (05/05/19 1405) Vital Signs (24h Range):  Temp:  [97.7 °F (36.5 °C)-98.2 °F (36.8 °C)] 98.2 °F (36.8 °C)  Pulse:  [64-88] 81  Resp:  [18-32] 20  SpO2:  [90 %-100 %] 98 %  BP: (129-176)/() 129/71     Weight: 82.1 kg (181 lb)  Body mass index is 39.17 kg/m².    Physical Exam   Constitutional: She is oriented to person, place, and time. She appears well-developed and well-nourished. No distress.    HENT:   Head: Normocephalic and atraumatic.   Nose: Nose normal.   Eyes: Conjunctivae and EOM are normal. No scleral icterus.   Neck: Normal range of motion. Neck supple. No JVD present.   Cardiovascular: Normal rate, normal heart sounds and intact distal pulses. An irregularly irregular rhythm present. Exam reveals no gallop and no friction rub.   No murmur heard.  Pulses:       Radial pulses are 2+ on the right side, and 2+ on the left side.        Dorsalis pedis pulses are 2+ on the right side, and 2+ on the left side.   Pulmonary/Chest: Effort normal and breath sounds normal. No respiratory distress. She has no wheezes. She has no rales. She exhibits no tenderness.   Abdominal: Soft. Bowel sounds are normal. She exhibits no distension and no mass. There is no tenderness.   Musculoskeletal: Normal range of motion. She exhibits no edema, tenderness or deformity.   Neurological: She is alert and oriented to person, place, and time. No cranial nerve deficit. She exhibits normal muscle tone. Coordination normal.   Skin: Skin is warm and dry. Capillary refill takes less than 2 seconds. No rash noted. She is not diaphoretic. No erythema. No pallor.   Psychiatric: She has a normal mood and affect. Her behavior is normal.   Nursing note and vitals reviewed.        CRANIAL NERVES     CN III, IV, VI   Extraocular motions are normal.        Significant Labs:   Recent Lab Results       05/05/19  1100   05/05/19  0830   05/05/19  0825        Albumin     3.2     Alkaline Phosphatase     128     ALT     80     Anion Gap     10     Appearance, UA Clear         AST     56     Baso #     0.01     Basophil%     0.1     Bilirubin (UA) Negative         BILIRUBIN TOTAL     0.9  Comment:  For infants and newborns, interpretation of results should be based  on gestational age, weight and in agreement with clinical  observations.  Premature Infant recommended reference ranges:  Up to 24 hours.............<8.0 mg/dL  Up to 48  hours............<12.0 mg/dL  3-5 days..................<15.0 mg/dL  6-29 days.................<15.0 mg/dL       BNP     947  Comment:  Values of less than 100 pg/ml are consistent with non-CHF populations.     BUN, Bld     20     Calcium     9.3     Chloride     104     CO2     26     Color, UA Yellow         Creatinine     0.9     Differential Method     Automated     eGFR if      >60     eGFR if non      59  Comment:  Calculation used to obtain the estimated glomerular filtration  rate (eGFR) is the CKD-EPI equation.        Eos #     0.1     Eosinophil%     1.2     Glucose     104     Glucose, UA Negative         Gran # (ANC)     5.2     Gran%     67.2     Hematocrit     36.7     Hemoglobin     11.6     Ketones, UA Negative         Lactate, Tj   0.8  Comment:  Falsely low lactic acid results can be found in samples   containing >=13.0 mg/dL total bilirubin and/or >=3.5 mg/dL   direct bilirubin.         Leukocytes, UA Negative         Lymph #     1.3     Lymph%     16.6     MCH     30.2     MCHC     31.6     MCV     96     Mono #     1.2     Mono%     14.9     MPV     11.1     NITRITE UA Negative         Occult Blood UA Trace         pH, UA 6.0         Platelets     149     Potassium     4.4     PROTEIN TOTAL     6.9     Protein, UA Negative  Comment:  Recommend a 24 hour urine protein or a urine   protein/creatinine ratio if globulin induced proteinuria is  clinically suspected.           RBC     3.84     RDW     14.8     Sodium     140     Specific [place], UA 1.010         Specimen UA Urine, Clean Catch         Troponin I     <0.006  Comment:  The reference interval for Troponin I represents the 99th percentile   cutoff   for our facility and is consistent with 3rd generation assay   performance.       Urobilinogen, UA Negative         WBC     7.76         All pertinent labs within the past 24 hours have been reviewed.    Significant Imaging: I have reviewed all pertinent  imaging results/findings within the past 24 hours.    Assessment/Plan:     * Acute on chronic diastolic HFpEF of 60-65%  --iv furosemide TID  --strict I&O  --daily weights  --supplemental oxygen to maintain sats >92%        Essential hypertension  --continue metoprolol  --IV furosemide  --cardiac monitoring  --cardiac diet    A-fib  --continue eliquis, flecainide, metoprolol  --cardiac monitoring    Anemia  --H/H 11.6/36.7  --monitor and transfuse if indicated      Hyperlipidemia  --continue pravastatin  --cardiac diet        VTE Risk Mitigation (From admission, onward)        Ordered     IP VTE HIGH RISK PATIENT  Once      05/05/19 1451     apixaban tablet 5 mg  2 times daily      05/05/19 1104             KRYSTIN Huston-REILLY  Department of Hospital Medicine   [facility]

## 2019-05-05 NOTE — ED PROVIDER NOTES
SCRIBE #1 NOTE: I, Jenelle Michaud, am scribing for, and in the presence of, Tamera Solis MD. I have scribed the entire note.      History      Chief Complaint   Patient presents with    Shortness of Breath     woke up with shortness of breath; history of CHF- wears home O2.        Review of patient's allergies indicates:   Allergen Reactions    Iodine and iodide containing products Rash        HPI   HPI    5/5/2019, 8:05 AM   History obtained from the patient      History of Present Illness: Elke Laws is a 84 y.o. female patient with a PMHx of asthma, Afib, CHF, HLD, HTN who presents to the Emergency Department for SOB which onset this AM when pt woke up. Pt reprots she was cleaning yesterday. Symptoms are constant and moderate in severity. Sxs worse with exertion, no mitigating factors. No associated sxs. Patient denies any fever, chills, CP, cough, BLE edema/pain, n/v, extremity weakness/numbness, dizziness, recent travel/long car rides, and all other sxs at this time. No further complaints or concerns at this time.       Arrival mode: Personal vehicle    PCP: Isidra Benavidez MD       Past Medical History:  Past Medical History:   Diagnosis Date    Abnormal nuclear stress test 6/30/2016    Acute congestive heart failure 5/27/2017    Acute coronary syndrome     Acute kidney injury (nontraumatic) 5/27/2017    Acute kidney injury (nontraumatic) 5/27/2017    Acute on chronic congestive heart failure 5/21/2017    Acute on chronic diastolic congestive heart failure 8/27/2015    Anemia 5/9/2016    Anemia 5/9/2016    Angina pectoris 1/25/2018    Anticoagulant long-term use     Aortic regurgitation     Echo 11/2013---5 - Mild to moderate aortic regurgitation.      Asthma     Atrial fibrillation 5/15/2014    Back pain     s/p epidural steriod injections, no recent injections.    Baker's cyst     small, right, seen on US lower extremity 6/2014    Blood transfusion     Approximately 6 years ago  "   Chronic constipation     Coronary artery disease     Degenerative disc disease, lumbar     Diastolic dysfunction     Seen on echo 11/2013, stress test negative 5/2014    Diverticulosis     colonoscopy 3/27/2011    E coli bacteremia 5/23/2017    E. coli UTI (urinary tract infection) 5/23/2017    Hemorrhoids     colonoscopy 3/27/2011    Hiatal hernia     CXR 12/30/2016---Retrocardiac density again noted consistent with a hiatal hernia.    Hx of adenomatous colonic polyps     Hyperlipidemia     Hypertension     Hyponatremia 5/9/2016    Hypoxemia 5/27/2017    Hypoxia 6/28/2017    Leukocytosis 5/27/2017    Mitral regurgitation     Myocardial infarction     per patient was told in the past she had a "mild heart attack"    Obesity     Osteoarthritis, knee     Pneumonia     per patient "walking Pneumonia" did not require hospitalization    Seasonal allergies     Sepsis 5/22/2017    Trouble in sleeping     Urinary incontinence        Past Surgical History:  Past Surgical History:   Procedure Laterality Date    APPENDECTOMY      CARDIOVERSION OR DEFIBRILLATION N/A 4/16/2019    Performed by [facility] at [facility] CATH LAB    COLONOSCOPY okay by dr. ramos N/A 8/29/2017    Performed by To[facility] Abdi MD at [facility] ENDO    ECHOCARDIOGRAM,TRANSESOPHAGEAL N/A 4/16/2019    Performed by [facility] at [facility] CATH LAB    EYE SURGERY Left     HYSTERECTOMY      benign reasons    KNEE SURGERY Bilateral     x2     Left heart cath      OLECRANON BURSECTOMY Right     6/2011    TONSILLECTOMY      URETHRA SURGERY           Family History:  Family History   Problem Relation Age of Onset    Heart disease Mother     Cancer Mother         colon    Hypertension Mother     Hyperlipidemia Mother     Heart disease Father     Hypertension Father     Hyperlipidemia Father     Heart disease Sister         MI    Heart disease Brother         MI    COPD Son     Hypertension Son     Diabetes Brother     " Diabetes Son     Cancer Sister         breast    Kidney disease Neg Hx     Stroke Neg Hx        Social History:  Social History     Tobacco Use    Smoking status: Former Smoker     Packs/day: 0.05     Years: 1.00     Pack years: 0.05     Types: Cigarettes     Last attempt to quit: 1952     Years since quittin.3    Smokeless tobacco: Never Used   Substance and Sexual Activity    Alcohol use: No     Alcohol/week: 0.0 oz    Drug use: No    Sexual activity: Never     Partners: Male     Birth control/protection: See Surgical Hx       ROS   Review of Systems   Constitutional: Negative for chills, diaphoresis and fever.   HENT: Negative for congestion and sore throat.    Eyes: Negative for visual disturbance.   Respiratory: Positive for shortness of breath. Negative for cough.    Cardiovascular: Negative for chest pain, palpitations and leg swelling.   Gastrointestinal: Negative for nausea and vomiting.   Genitourinary: Negative for dysuria.   Musculoskeletal: Negative for back pain and myalgias.   Skin: Negative for rash.   Neurological: Negative for dizziness, weakness and numbness.   Hematological: Does not bruise/bleed easily.   All other systems reviewed and are negative.    Physical Exam      Initial Vitals [19 0757]   BP Pulse Resp Temp SpO2   (!) 168/114 64 20 97.7 °F (36.5 °C) (!) 90 %      MAP       --          Physical Exam  Nursing Notes and Vital Signs Reviewed.  Constitutional: Patient is in mild distress. Well-developed and well-nourished.  Head: Atraumatic. Normocephalic.  Eyes: PERRL. EOM intact. Conjunctivae are not pale. No scleral icterus.  ENT: Mucous membranes are moist. Oropharynx is clear and symmetric.    Neck: Supple. Full ROM. No lymphadenopathy.  Cardiovascular: Regular rate. Irregularly irregular rhythm. No murmurs, rubs, or gallops. Distal pulses are 2+ and symmetric.  Pulmonary/Chest: Tachypnea. Wheezing bilaterally with prolonged expirations.   Abdominal: Soft and  "non-distended.  There is no tenderness.  No rebound, guarding, or rigidity.  Musculoskeletal: Moves all extremities. No obvious deformities. No edema. No calf tenderness.  Skin: Warm and dry. Surgical scars to bilateral knees.   Neurological:  Alert, awake, and appropriate.  Normal speech.  No acute focal neurological deficits are appreciated.  Psychiatric: Normal affect. Good eye contact. Appropriate in content.    ED Course    Procedures  ED Vital Signs:  Vitals:    05/05/19 0820 05/05/19 0824 05/05/19 0825 05/05/19 0900   BP:    (!) 159/71   Pulse: 66 68 73 72   Resp: (!) 31  (!) 32 (!) 22   Temp:       TempSrc:       SpO2: 100%  100% 99%   Weight:       Height:        05/05/19 0935 05/05/19 1005 05/05/19 1035 05/05/19 1040   BP: (!) 162/71 (!) 150/66 (!) 161/75    Pulse: 71 82 74    Resp: 18 (!) 21 18    Temp:    98.2 °F (36.8 °C)   TempSrc:    Oral   SpO2: 99% 98% 98%    Weight:       Height:        05/05/19 1230 05/05/19 1300 05/05/19 1335 05/05/19 1405   BP: (!) 157/84 (!) 176/76 (!) 147/82 129/71   Pulse: 81 88 88 81   Resp: 20 19 (!) 22 20   Temp:       TempSrc:       SpO2: 99% 95% 100% 98%   Weight:       Height:        05/05/19 1447 05/05/19 1509 05/05/19 1611   BP: 130/84     Pulse: 93 76 84   Resp: 16  20   Temp: 97.5 °F (36.4 °C)     TempSrc: Axillary     SpO2: 95%  98%   Weight: 83.1 kg (183 lb 3.2 oz)     Height: 4' 9" (1.448 m)         Abnormal Lab Results:  Labs Reviewed   CBC W/ AUTO DIFFERENTIAL - Abnormal; Notable for the following components:       Result Value    RBC 3.84 (*)     Hemoglobin 11.6 (*)     Hematocrit 36.7 (*)     Mean Corpuscular Hemoglobin Conc 31.6 (*)     RDW 14.8 (*)     Platelets 149 (*)     Mono # 1.2 (*)     Lymph% 16.6 (*)     All other components within normal limits   B-TYPE NATRIURETIC PEPTIDE - Abnormal; Notable for the following components:     (*)     All other components within normal limits   COMPREHENSIVE METABOLIC PANEL - Abnormal; Notable for the " following components:    Albumin 3.2 (*)     AST 56 (*)     ALT 80 (*)     eGFR if non  59 (*)     All other components within normal limits   URINALYSIS, REFLEX TO URINE CULTURE - Abnormal; Notable for the following components:    Occult Blood UA Trace (*)     All other components within normal limits    Narrative:     Preferred Collection Type->Urine, Clean Catch   LACTIC ACID, PLASMA   TROPONIN I        All Lab Results:  Results for orders placed or performed during the hospital encounter of 05/05/19   CBC auto differential   Result Value Ref Range    WBC 7.76 3.90 - 12.70 K/uL    RBC 3.84 (L) 4.00 - 5.40 M/uL    Hemoglobin 11.6 (L) 12.0 - 16.0 g/dL    Hematocrit 36.7 (L) 37.0 - 48.5 %    Mean Corpuscular Volume 96 82 - 98 fL    Mean Corpuscular Hemoglobin 30.2 27.0 - 31.0 pg    Mean Corpuscular Hemoglobin Conc 31.6 (L) 32.0 - 36.0 g/dL    RDW 14.8 (H) 11.5 - 14.5 %    Platelets 149 (L) 150 - 350 K/uL    MPV 11.1 9.2 - 12.9 fL    Gran # (ANC) 5.2 1.8 - 7.7 K/uL    Lymph # 1.3 1.0 - 4.8 K/uL    Mono # 1.2 (H) 0.3 - 1.0 K/uL    Eos # 0.1 0.0 - 0.5 K/uL    Baso # 0.01 0.00 - 0.20 K/uL    Gran% 67.2 38.0 - 73.0 %    Lymph% 16.6 (L) 18.0 - 48.0 %    Mono% 14.9 4.0 - 15.0 %    Eosinophil% 1.2 0.0 - 8.0 %    Basophil% 0.1 0.0 - 1.9 %    Differential Method Automated    Brain natriuretic peptide   Result Value Ref Range     (H) 0 - 99 pg/mL   Comprehensive metabolic panel   Result Value Ref Range    Sodium 140 136 - 145 mmol/L    Potassium 4.4 3.5 - 5.1 mmol/L    Chloride 104 95 - 110 mmol/L    CO2 26 23 - 29 mmol/L    Glucose 104 70 - 110 mg/dL    BUN, Bld 20 8 - 23 mg/dL    Creatinine 0.9 0.5 - 1.4 mg/dL    Calcium 9.3 8.7 - 10.5 mg/dL    Total Protein 6.9 6.0 - 8.4 g/dL    Albumin 3.2 (L) 3.5 - 5.2 g/dL    Total Bilirubin 0.9 0.1 - 1.0 mg/dL    Alkaline Phosphatase 128 55 - 135 U/L    AST 56 (H) 10 - 40 U/L    ALT 80 (H) 10 - 44 U/L    Anion Gap 10 8 - 16 mmol/L    eGFR if African American >60  >60 mL/min/1.73 m^2    eGFR if non African American 59 (A) >60 mL/min/1.73 m^2   Lactic acid, plasma   Result Value Ref Range    Lactate (Lactic Acid) 0.8 0.5 - 2.2 mmol/L   Troponin I   Result Value Ref Range    Troponin I <0.006 0.000 - 0.026 ng/mL   Urinalysis, Reflex to Urine Culture Urine, Clean Catch   Result Value Ref Range    Specimen UA Urine, Clean Catch     Color, UA Yellow Yellow, Straw, Ruth    Appearance, UA Clear Clear    pH, UA 6.0 5.0 - 8.0    Specific Gravity, UA 1.010 1.005 - 1.030    Protein, UA Negative Negative    Glucose, UA Negative Negative    Ketones, UA Negative Negative    Bilirubin (UA) Negative Negative    Occult Blood UA Trace (A) Negative    Nitrite, UA Negative Negative    Urobilinogen, UA Negative <2.0 EU/dL    Leukocytes, UA Negative Negative       Imaging Results:  Imaging Results          X-Ray Chest AP Portable (Final result)  Result time 05/05/19 08:53:43    Final result by Shekhar Cui MD (05/05/19 08:53:43)                 Impression:      Interstitial edema pattern.  Recommend close follow-up.      Electronically signed by: Shekhar Cui MD  Date:    05/05/2019  Time:    08:53             Narrative:    EXAMINATION:  XR CHEST AP PORTABLE    CLINICAL HISTORY:  Asthma;    TECHNIQUE:  AP view of the chest was performed.    COMPARISON:  04/01/2019    FINDINGS:  Perihilar lower lobe reticular interstitial infiltrates with mild cardiomegaly and pulmonary vascular prominence.  Chronic elevation the right hemidiaphragm.    No pneumothorax.  Aortic atherosclerosis.  No acute osseous findings demonstrated.                               The EKG was ordered, reviewed, and independently interpreted by the ED provider.  Interpretation time: 0812  Rate: 73 BPM  Rhythm: atrial fibrillation  Interpretation: Rightward axis. No STEMI.         The Emergency Provider reviewed the vital signs and test results, which are outlined above.    ED Discussion     10:53 AM: Discussed case with Caroline  Aaron, NP ([facility]). Dr. Yoon agrees with current care and management of pt and accepts admission.   Admitting Service: Hospital medicine   Admitting Physician: Dr. Yoon  Admit to: Telemetry (Observation)    10:58 AM: Re-evaluated pt. I have discussed test results, shared treatment plan, and the need for admission with patient and family at bedside. Pt and family express understanding at this time and agree with all information. All questions answered. Pt and family have no further questions or concerns at this time. Pt is ready for admit.    ED Medication(s):  Medications   sodium chloride 0.9% flush 10 mL (has no administration in time range)   albuterol nebulizer solution 0.6667 mg (2.5 mg Nebulization Given 5/5/19 1611)   apixaban tablet 5 mg (5 mg Oral Given 5/5/19 1302)   flecainide tablet 100 mg (100 mg Oral Given 5/5/19 1301)   metoprolol succinate (TOPROL-XL) 24 hr tablet 100 mg (100 mg Oral Given 5/5/19 1300)   pravastatin tablet 40 mg (40 mg Oral Given 5/5/19 1301)   furosemide injection 40 mg (40 mg Intravenous Given 5/5/19 1609)   bisacodyl suppository 10 mg (has no administration in time range)   albuterol-ipratropium 2.5 mg-0.5 mg/3 mL nebulizer solution 3 mL (3 mLs Nebulization Given 5/5/19 0825)   predniSONE tablet 60 mg (60 mg Oral Given 5/5/19 0833)   furosemide injection 20 mg (20 mg Intravenous Given 5/5/19 1037)   magnesium citrate solution 296 mL (296 mLs Oral Given 5/5/19 1610)             Medical Decision Making    Medical Decision Making:   Clinical Tests:   Lab Tests: Ordered and Reviewed  Radiological Study: Ordered and Reviewed  Medical Tests: Ordered and Reviewed           Scribe Attestation:   Scribe #1: I performed the above scribed service and the documentation accurately describes the services I performed. I attest to the accuracy of the note.    Attending:   Physician Attestation Statement for Scribe #1: I, Tamera Solis MD, personally performed the services  described in this documentation, as scribed by Jenelle Michaud, in my presence, and it is both accurate and complete.          Clinical Impression       ICD-10-CM ICD-9-CM   1. Acute on chronic diastolic HFpEF of 60-65% I50.33 428.33   2. Shortness of breath R06.02 786.05   3. SOB (shortness of breath) R06.02 786.05   4. PAF (paroxysmal atrial fibrillation) I48.0 427.31   5. Essential hypertension I10 401.9   6. Severe persistent asthma with acute exacerbation J45.51 493.92       Disposition:   Disposition: Placed in Observation  Condition: Valeriano Solis MD  05/05/19 1181

## 2019-05-05 NOTE — ED NOTES
Pt AAOx3, resting in bed, side rails up x 2, call bell within reach. NAD at this time. Will continue to monitor.

## 2019-05-05 NOTE — ED NOTES
Pt AAOx3, resting in bed with eyes closed , side rails up x 2 with bed in lowest and locked position, call bell within reach. NAD at this time. Will continue to monitor.

## 2019-05-05 NOTE — HPI
83 y/o female with PMHx of CHF, HTN, HLD who presented to the ED with c/o SOB that onset gradually over the past 2 days. Associated symptoms include weakness, fatigue, cough, and abd distention. Symptoms are constant and moderate in severity. No mitigating or exacerbating factors reported.  She is a DNR and her surrogate decision maker is her son, Christiano.   She uses continuous home oxygen at 2L/min via NC.

## 2019-05-06 PROBLEM — J96.20 ACUTE ON CHRONIC RESPIRATORY FAILURE: Status: ACTIVE | Noted: 2019-05-06

## 2019-05-06 LAB
ANION GAP SERPL CALC-SCNC: 7 MMOL/L (ref 8–16)
BASOPHILS # BLD AUTO: 0.01 K/UL (ref 0–0.2)
BASOPHILS NFR BLD: 0.1 % (ref 0–1.9)
BUN SERPL-MCNC: 32 MG/DL (ref 8–23)
CALCIUM SERPL-MCNC: 9.1 MG/DL (ref 8.7–10.5)
CHLORIDE SERPL-SCNC: 100 MMOL/L (ref 95–110)
CO2 SERPL-SCNC: 34 MMOL/L (ref 23–29)
CREAT SERPL-MCNC: 0.9 MG/DL (ref 0.5–1.4)
DIFFERENTIAL METHOD: ABNORMAL
EOSINOPHIL # BLD AUTO: 0 K/UL (ref 0–0.5)
EOSINOPHIL NFR BLD: 0.4 % (ref 0–8)
ERYTHROCYTE [DISTWIDTH] IN BLOOD BY AUTOMATED COUNT: 15 % (ref 11.5–14.5)
EST. GFR  (AFRICAN AMERICAN): >60 ML/MIN/1.73 M^2
EST. GFR  (NON AFRICAN AMERICAN): 59 ML/MIN/1.73 M^2
GLUCOSE SERPL-MCNC: 87 MG/DL (ref 70–110)
HCT VFR BLD AUTO: 33.3 % (ref 37–48.5)
HGB BLD-MCNC: 10.4 G/DL (ref 12–16)
LYMPHOCYTES # BLD AUTO: 1.1 K/UL (ref 1–4.8)
LYMPHOCYTES NFR BLD: 16.4 % (ref 18–48)
MCH RBC QN AUTO: 29.7 PG (ref 27–31)
MCHC RBC AUTO-ENTMCNC: 31.2 G/DL (ref 32–36)
MCV RBC AUTO: 95 FL (ref 82–98)
MONOCYTES # BLD AUTO: 1 K/UL (ref 0.3–1)
MONOCYTES NFR BLD: 14.5 % (ref 4–15)
NEUTROPHILS # BLD AUTO: 4.7 K/UL (ref 1.8–7.7)
NEUTROPHILS NFR BLD: 68.6 % (ref 38–73)
PLATELET # BLD AUTO: 141 K/UL (ref 150–350)
PMV BLD AUTO: 11.1 FL (ref 9.2–12.9)
POTASSIUM SERPL-SCNC: 3.6 MMOL/L (ref 3.5–5.1)
RBC # BLD AUTO: 3.5 M/UL (ref 4–5.4)
SODIUM SERPL-SCNC: 141 MMOL/L (ref 136–145)
WBC # BLD AUTO: 6.78 K/UL (ref 3.9–12.7)

## 2019-05-06 PROCEDURE — 80048 BASIC METABOLIC PNL TOTAL CA: CPT | Mod: HCWC

## 2019-05-06 PROCEDURE — 27000221 HC OXYGEN, UP TO 24 HOURS: Mod: HCWC

## 2019-05-06 PROCEDURE — 25000003 PHARM REV CODE 250: Mod: HCWC | Performed by: NURSE PRACTITIONER

## 2019-05-06 PROCEDURE — 94761 N-INVAS EAR/PLS OXIMETRY MLT: CPT | Mod: HCWC

## 2019-05-06 PROCEDURE — 96376 TX/PRO/DX INJ SAME DRUG ADON: CPT

## 2019-05-06 PROCEDURE — G0378 HOSPITAL OBSERVATION PER HR: HCPCS | Mod: HCWC

## 2019-05-06 PROCEDURE — 85025 COMPLETE CBC W/AUTO DIFF WBC: CPT | Mod: HCWC

## 2019-05-06 PROCEDURE — 25000242 PHARM REV CODE 250 ALT 637 W/ HCPCS: Mod: HCWC | Performed by: NURSE PRACTITIONER

## 2019-05-06 PROCEDURE — 63600175 PHARM REV CODE 636 W HCPCS: Mod: HCWC | Performed by: NURSE PRACTITIONER

## 2019-05-06 PROCEDURE — 94640 AIRWAY INHALATION TREATMENT: CPT | Mod: HCWC

## 2019-05-06 PROCEDURE — 36415 COLL VENOUS BLD VENIPUNCTURE: CPT | Mod: HCWC

## 2019-05-06 RX ORDER — AMLODIPINE BESYLATE 2.5 MG/1
2.5 TABLET ORAL DAILY
Status: DISCONTINUED | OUTPATIENT
Start: 2019-05-06 | End: 2019-05-06

## 2019-05-06 RX ORDER — LORAZEPAM 1 MG/1
1 TABLET ORAL EVERY 6 HOURS PRN
Status: DISCONTINUED | OUTPATIENT
Start: 2019-05-06 | End: 2019-05-08 | Stop reason: HOSPADM

## 2019-05-06 RX ADMIN — LORAZEPAM 1 MG: 1 TABLET ORAL at 10:05

## 2019-05-06 RX ADMIN — APIXABAN 5 MG: 2.5 TABLET, FILM COATED ORAL at 08:05

## 2019-05-06 RX ADMIN — FUROSEMIDE 40 MG: 10 INJECTION, SOLUTION INTRAVENOUS at 03:05

## 2019-05-06 RX ADMIN — METOPROLOL SUCCINATE 100 MG: 50 TABLET, EXTENDED RELEASE ORAL at 08:05

## 2019-05-06 RX ADMIN — FLECAINIDE ACETATE 100 MG: 50 TABLET ORAL at 08:05

## 2019-05-06 RX ADMIN — FUROSEMIDE 40 MG: 10 INJECTION, SOLUTION INTRAVENOUS at 09:05

## 2019-05-06 RX ADMIN — FLECAINIDE ACETATE 100 MG: 50 TABLET ORAL at 10:05

## 2019-05-06 RX ADMIN — APIXABAN 5 MG: 2.5 TABLET, FILM COATED ORAL at 10:05

## 2019-05-06 RX ADMIN — LORAZEPAM 1 MG: 1 TABLET ORAL at 03:05

## 2019-05-06 RX ADMIN — PRAVASTATIN SODIUM 40 MG: 20 TABLET ORAL at 08:05

## 2019-05-06 RX ADMIN — ALBUTEROL SULFATE 0.67 MG: 2.5 SOLUTION RESPIRATORY (INHALATION) at 11:05

## 2019-05-06 RX ADMIN — FUROSEMIDE 40 MG: 10 INJECTION, SOLUTION INTRAVENOUS at 05:05

## 2019-05-06 NOTE — PROGRESS NOTES
[facility] Hospital Medicine  Progress Note    Patient Name: Elke Laws  MRN: [id number]  Patient Class: OP- Observation   Admission Date: 5/5/2019  Length of Stay: 0 days  Attending Physician: Sekou Mccall MD  Primary Care Provider: Isidra Benavidez MD        Subjective:     Principal Problem:Acute on chronic diastolic heart failure    HPI:  85 y/o female with PMHx of CHF, HTN, HLD who presented to the ED with c/o SOB that onset gradually over the past 2 days. Associated symptoms include weakness, fatigue, cough, and abd distention. Symptoms are constant and moderate in severity. No mitigating or exacerbating factors reported.  She is a DNR and her surrogate decision maker is her son, Christiano.   She uses continuous home oxygen at 2L/min via NC.      Interval History: Patient was kept on OBS for acute on chronic heart failure under the care of Hospital Medicine. She was given IV furosemide and diuresed. Patient continues to be SOB with conversation today and is unable to drop oxygen below 4L/min NC without additional distress. Will continue with IV furosemide and monitor closely.    Review of Systems   Constitutional: Positive for fatigue. Negative for activity change, appetite change, chills and fever.   HENT: Negative for congestion, dental problem, ear pain, hearing loss, mouth sores, sinus pressure, sore throat, tinnitus and trouble swallowing.    Eyes: Negative for pain, discharge, redness and visual disturbance.   Respiratory: Positive for cough and shortness of breath. Negative for apnea, choking, chest tightness and wheezing.    Cardiovascular: Negative for chest pain, palpitations and leg swelling.   Gastrointestinal: Negative for abdominal distention, abdominal pain, anal bleeding, blood in stool, constipation, diarrhea, nausea, rectal pain and vomiting.   Endocrine: Negative for cold intolerance, heat intolerance, polydipsia and polyuria.   Genitourinary: Negative for difficulty  urinating, dysuria, flank pain, frequency, hematuria and urgency.   Musculoskeletal: Negative for arthralgias, back pain, gait problem, joint swelling, myalgias, neck pain and neck stiffness.   Skin: Negative for color change, rash and wound.   Allergic/Immunologic: Negative for food allergies and immunocompromised state.   Neurological: Positive for weakness. Negative for dizziness, tremors, seizures, syncope, speech difficulty, light-headedness and headaches.   Hematological: Negative for adenopathy. Does not bruise/bleed easily.   Psychiatric/Behavioral: Negative for agitation, confusion and sleep disturbance. The patient is not nervous/anxious.    All other systems reviewed and are negative.    Objective:     Vital Signs (Most Recent):  Temp: 97.9 °F (36.6 °C) (05/06/19 0722)  Pulse: 66 (05/06/19 0849)  Resp: 18 (05/06/19 0849)  BP: (!) 162/71 (05/06/19 0722)  SpO2: 100 % (05/06/19 0849) Vital Signs (24h Range):  Temp:  [97.4 °F (36.3 °C)-98.2 °F (36.8 °C)] 97.9 °F (36.6 °C)  Pulse:  [64-93] 66  Resp:  [16-22] 18  SpO2:  [95 %-100 %] 100 %  BP: (116-176)/(61-84) 162/71     Weight: 81.4 kg (179 lb 5.5 oz)  Body mass index is 38.81 kg/m².  No intake or output data in the 24 hours ending 05/06/19 1036   Physical Exam   Constitutional: She is oriented to person, place, and time. She appears well-developed and well-nourished. No distress.   HENT:   Head: Normocephalic and atraumatic.   Nose: Nose normal.   Eyes: Conjunctivae and EOM are normal. No scleral icterus.   Neck: Normal range of motion. Neck supple. No JVD present.   Cardiovascular: Normal rate, normal heart sounds and intact distal pulses. An irregularly irregular rhythm present. Exam reveals no gallop and no friction rub.   No murmur heard.  Pulses:       Radial pulses are 2+ on the right side, and 2+ on the left side.        Dorsalis pedis pulses are 2+ on the right side, and 2+ on the left side.   Pulmonary/Chest: Effort normal and breath sounds normal.  No respiratory distress. She has no wheezes. She has no rales. She exhibits no tenderness.   Abdominal: Soft. Bowel sounds are normal. She exhibits no distension and no mass. There is no tenderness.   Musculoskeletal: Normal range of motion. She exhibits no edema, tenderness or deformity.   Neurological: She is alert and oriented to person, place, and time. No cranial nerve deficit. She exhibits normal muscle tone. Coordination normal.   Skin: Skin is warm and dry. Capillary refill takes less than 2 seconds. No rash noted. She is not diaphoretic. No erythema. No pallor.   Psychiatric: She has a normal mood and affect. Her behavior is normal.   Nursing note and vitals reviewed.      Significant Labs:   Recent Lab Results       05/06/19  0511   05/05/19  1100        Anion Gap 7       Appearance, UA   Clear     Baso # 0.01       Basophil% 0.1       Bilirubin (UA)   Negative     BUN, Bld 32       Calcium 9.1       Chloride 100       CO2 34       Color, UA   Yellow     Creatinine 0.9       Differential Method Automated       eGFR if  >60       eGFR if non  59  Comment:  Calculation used to obtain the estimated glomerular filtration  rate (eGFR) is the CKD-EPI equation.          Eos # 0.0       Eosinophil% 0.4       Glucose 87       Glucose, UA   Negative     Gran # (ANC) 4.7       Gran% 68.6       Hematocrit 33.3       Hemoglobin 10.4       Ketones, UA   Negative     Leukocytes, UA   Negative     Lymph # 1.1       Lymph% 16.4       MCH 29.7       MCHC 31.2       MCV 95       Mono # 1.0       Mono% 14.5       MPV 11.1       NITRITE UA   Negative     Occult Blood UA   Trace     pH, UA   6.0     Platelets 141       Potassium 3.6       Protein, UA   Negative  Comment:  Recommend a 24 hour urine protein or a urine   protein/creatinine ratio if globulin induced proteinuria is  clinically suspected.       RBC 3.50       RDW 15.0       Sodium 141       Specific Gravity, UA   1.010     Specimen UA    Urine, Clean Catch     Urobilinogen, UA   Negative     WBC 6.78           All pertinent labs within the past 24 hours have been reviewed.    Significant Imaging: I have reviewed all pertinent imaging results/findings within the past 24 hours.    Assessment/Plan:      * Acute on chronic diastolic HFpEF of 60-65%  --iv furosemide TID  --strict I&O  --daily weights  --supplemental oxygen to maintain sats >92%  --cardiac diet with fluid restrictions        Essential hypertension  --continue metoprolol  --IV furosemide  --cardiac monitoring  --cardiac diet    Acute on chronic respiratory failure  --pt uses continuous home oxygen at 2L/min via NC  --unable to drop below 4L/min today  --continue supplemental oxygen to maintain sats >92%  --likely 2/2 pulmonary hypertension  --continue IV furosemide, low sodium diet      A-fib  --continue eliquis, flecainide, metoprolol  --cardiac monitoring    Anemia  --H/H 11.6/36.7  --monitor and transfuse if indicated      Hyperlipidemia  --continue pravastatin  --cardiac diet      VTE Risk Mitigation (From admission, onward)        Ordered     IP VTE HIGH RISK PATIENT  Once      05/05/19 1451     apixaban tablet 5 mg  2 times daily      05/05/19 1105              KRYSTIN Huston-C  Department of Hospital Medicine   [facility]

## 2019-05-06 NOTE — PLAN OF CARE
Pt AAOx4, confused on place at times but oriented back easily. Glasses on. POC discussed w/patient, verbalized understanding. A-fib on monitor. VSS. Voids per [facility] with incontinence brief for infrequent bouts of incontinence and dribbling. Patient turns independently in bed. Fall precautions in place, bed alarm on, bed in lowest, call light and personal items within reach. SKIN: scar to lisandra.knees. Pale skin. Bruising to BUE.

## 2019-05-06 NOTE — ASSESSMENT & PLAN NOTE
--pt uses continuous home oxygen at 2L/min via NC  --unable to drop below 4L/min today  --continue supplemental oxygen to maintain sats >92%  --likely 2/2 pulmonary hypertension  --continue IV furosemide, low sodium diet

## 2019-05-06 NOTE — SUBJECTIVE & OBJECTIVE
Interval History: Patient was kept on OBS for acute on chronic heart failure under the care of Hospital Medicine. She was given IV furosemide and diuresed. Patient continues to be SOB with conversation today and is unable to drop oxygen below 4L/min NC without additional distress. Will continue with IV furosemide and monitor closely.    Review of Systems   Constitutional: Positive for fatigue. Negative for activity change, appetite change, chills and fever.   HENT: Negative for congestion, dental problem, ear pain, hearing loss, mouth sores, sinus pressure, sore throat, tinnitus and trouble swallowing.    Eyes: Negative for pain, discharge, redness and visual disturbance.   Respiratory: Positive for cough and shortness of breath. Negative for apnea, choking, chest tightness and wheezing.    Cardiovascular: Negative for chest pain, palpitations and leg swelling.   Gastrointestinal: Negative for abdominal distention, abdominal pain, anal bleeding, blood in stool, constipation, diarrhea, nausea, rectal pain and vomiting.   Endocrine: Negative for cold intolerance, heat intolerance, polydipsia and polyuria.   Genitourinary: Negative for difficulty urinating, dysuria, flank pain, frequency, hematuria and urgency.   Musculoskeletal: Negative for arthralgias, back pain, gait problem, joint swelling, myalgias, neck pain and neck stiffness.   Skin: Negative for color change, rash and wound.   Allergic/Immunologic: Negative for food allergies and immunocompromised state.   Neurological: Positive for weakness. Negative for dizziness, tremors, seizures, syncope, speech difficulty, light-headedness and headaches.   Hematological: Negative for adenopathy. Does not bruise/bleed easily.   Psychiatric/Behavioral: Negative for agitation, confusion and sleep disturbance. The patient is not nervous/anxious.    All other systems reviewed and are negative.    Objective:     Vital Signs (Most Recent):  Temp: 97.9 °F (36.6 °C) (05/06/19  0722)  Pulse: 66 (05/06/19 0849)  Resp: 18 (05/06/19 0849)  BP: (!) 162/71 (05/06/19 0722)  SpO2: 100 % (05/06/19 0849) Vital Signs (24h Range):  Temp:  [97.4 °F (36.3 °C)-98.2 °F (36.8 °C)] 97.9 °F (36.6 °C)  Pulse:  [64-93] 66  Resp:  [16-22] 18  SpO2:  [95 %-100 %] 100 %  BP: (116-176)/(61-84) 162/71     Weight: 81.4 kg (179 lb 5.5 oz)  Body mass index is 38.81 kg/m².  No intake or output data in the 24 hours ending 05/06/19 1036   Physical Exam   Constitutional: She is oriented to person, place, and time. She appears well-developed and well-nourished. No distress.   HENT:   Head: Normocephalic and atraumatic.   Nose: Nose normal.   Eyes: Conjunctivae and EOM are normal. No scleral icterus.   Neck: Normal range of motion. Neck supple. No JVD present.   Cardiovascular: Normal rate, normal heart sounds and intact distal pulses. An irregularly irregular rhythm present. Exam reveals no gallop and no friction rub.   No murmur heard.  Pulses:       Radial pulses are 2+ on the right side, and 2+ on the left side.        Dorsalis pedis pulses are 2+ on the right side, and 2+ on the left side.   Pulmonary/Chest: Effort normal and breath sounds normal. No respiratory distress. She has no wheezes. She has no rales. She exhibits no tenderness.   Abdominal: Soft. Bowel sounds are normal. She exhibits no distension and no mass. There is no tenderness.   Musculoskeletal: Normal range of motion. She exhibits no edema, tenderness or deformity.   Neurological: She is alert and oriented to person, place, and time. No cranial nerve deficit. She exhibits normal muscle tone. Coordination normal.   Skin: Skin is warm and dry. Capillary refill takes less than 2 seconds. No rash noted. She is not diaphoretic. No erythema. No pallor.   Psychiatric: She has a normal mood and affect. Her behavior is normal.   Nursing note and vitals reviewed.      Significant Labs:   Recent Lab Results       05/06/19  0511   05/05/19  1100        Anion Gap  7       Appearance, UA   Clear     Baso # 0.01       Basophil% 0.1       Bilirubin (UA)   Negative     BUN, Bld 32       Calcium 9.1       Chloride 100       CO2 34       Color, UA   Yellow     Creatinine 0.9       Differential Method Automated       eGFR if  >60       eGFR if non  59  Comment:  Calculation used to obtain the estimated glomerular filtration  rate (eGFR) is the CKD-EPI equation.          Eos # 0.0       Eosinophil% 0.4       Glucose 87       Glucose, UA   Negative     Gran # (ANC) 4.7       Gran% 68.6       Hematocrit 33.3       Hemoglobin 10.4       Ketones, UA   Negative     Leukocytes, UA   Negative     Lymph # 1.1       Lymph% 16.4       MCH 29.7       MCHC 31.2       MCV 95       Mono # 1.0       Mono% 14.5       MPV 11.1       NITRITE UA   Negative     Occult Blood UA   Trace     pH, UA   6.0     Platelets 141       Potassium 3.6       Protein, UA   Negative  Comment:  Recommend a 24 hour urine protein or a urine   protein/creatinine ratio if globulin induced proteinuria is  clinically suspected.       RBC 3.50       RDW 15.0       Sodium 141       Specific Gravity, UA   1.010     Specimen UA   Urine, Clean Catch     Urobilinogen, UA   Negative     WBC 6.78           All pertinent labs within the past 24 hours have been reviewed.    Significant Imaging: I have reviewed all pertinent imaging results/findings within the past 24 hours.

## 2019-05-06 NOTE — PLAN OF CARE
05/06/19 1222   GUTIERRES Message   Medicare Outpatient and Observation Notification regarding financial responsibility Given to patient/caregiver;Explained to patient/caregiver;Signed/date by patient/caregiver   Date GUTIERRES was signed 05/06/19   Time GUTIERRES was signed 1052

## 2019-05-06 NOTE — PLAN OF CARE
05/06/19 1505   Post-Acute Status   Post-Acute Authorization Home Health/Hospice   Home Health/Hospice Status Referrals Sent           Consult received patient wishes to resume with [facility]. Met with patient and daughter. Preference letter obtained for [facility]. Referral faxed via [facility].

## 2019-05-06 NOTE — ASSESSMENT & PLAN NOTE
--iv furosemide TID  --strict I&O  --daily weights  --supplemental oxygen to maintain sats >92%  --cardiac diet with fluid restrictions

## 2019-05-07 LAB
ANION GAP SERPL CALC-SCNC: 10 MMOL/L (ref 8–16)
BASOPHILS # BLD AUTO: 0.01 K/UL (ref 0–0.2)
BASOPHILS NFR BLD: 0.2 % (ref 0–1.9)
BUN SERPL-MCNC: 31 MG/DL (ref 8–23)
CALCIUM SERPL-MCNC: 9.6 MG/DL (ref 8.7–10.5)
CHLORIDE SERPL-SCNC: 97 MMOL/L (ref 95–110)
CO2 SERPL-SCNC: 35 MMOL/L (ref 23–29)
CREAT SERPL-MCNC: 0.9 MG/DL (ref 0.5–1.4)
DIFFERENTIAL METHOD: ABNORMAL
EOSINOPHIL # BLD AUTO: 0.1 K/UL (ref 0–0.5)
EOSINOPHIL NFR BLD: 2 % (ref 0–8)
ERYTHROCYTE [DISTWIDTH] IN BLOOD BY AUTOMATED COUNT: 15.2 % (ref 11.5–14.5)
EST. GFR  (AFRICAN AMERICAN): >60 ML/MIN/1.73 M^2
EST. GFR  (NON AFRICAN AMERICAN): 59 ML/MIN/1.73 M^2
GLUCOSE SERPL-MCNC: 94 MG/DL (ref 70–110)
HCT VFR BLD AUTO: 36.6 % (ref 37–48.5)
HGB BLD-MCNC: 11.1 G/DL (ref 12–16)
LYMPHOCYTES # BLD AUTO: 1.1 K/UL (ref 1–4.8)
LYMPHOCYTES NFR BLD: 17.7 % (ref 18–48)
MCH RBC QN AUTO: 29.4 PG (ref 27–31)
MCHC RBC AUTO-ENTMCNC: 30.3 G/DL (ref 32–36)
MCV RBC AUTO: 97 FL (ref 82–98)
MONOCYTES # BLD AUTO: 1 K/UL (ref 0.3–1)
MONOCYTES NFR BLD: 16.7 % (ref 4–15)
NEUTROPHILS # BLD AUTO: 3.9 K/UL (ref 1.8–7.7)
NEUTROPHILS NFR BLD: 63.4 % (ref 38–73)
PLATELET # BLD AUTO: 180 K/UL (ref 150–350)
PMV BLD AUTO: 11.6 FL (ref 9.2–12.9)
POTASSIUM SERPL-SCNC: 3.7 MMOL/L (ref 3.5–5.1)
RBC # BLD AUTO: 3.78 M/UL (ref 4–5.4)
SODIUM SERPL-SCNC: 142 MMOL/L (ref 136–145)
WBC # BLD AUTO: 6.15 K/UL (ref 3.9–12.7)

## 2019-05-07 PROCEDURE — 63600175 PHARM REV CODE 636 W HCPCS: Mod: HCWC | Performed by: NURSE PRACTITIONER

## 2019-05-07 PROCEDURE — 80048 BASIC METABOLIC PNL TOTAL CA: CPT | Mod: HCWC

## 2019-05-07 PROCEDURE — 25000003 PHARM REV CODE 250: Mod: HCWC | Performed by: NURSE PRACTITIONER

## 2019-05-07 PROCEDURE — 94761 N-INVAS EAR/PLS OXIMETRY MLT: CPT | Mod: HCWC

## 2019-05-07 PROCEDURE — 96376 TX/PRO/DX INJ SAME DRUG ADON: CPT

## 2019-05-07 PROCEDURE — 36415 COLL VENOUS BLD VENIPUNCTURE: CPT | Mod: HCWC

## 2019-05-07 PROCEDURE — 27000221 HC OXYGEN, UP TO 24 HOURS: Mod: HCWC

## 2019-05-07 PROCEDURE — 85025 COMPLETE CBC W/AUTO DIFF WBC: CPT | Mod: HCWC

## 2019-05-07 PROCEDURE — G0378 HOSPITAL OBSERVATION PER HR: HCPCS | Mod: HCWC

## 2019-05-07 RX ADMIN — METOPROLOL SUCCINATE 100 MG: 50 TABLET, EXTENDED RELEASE ORAL at 09:05

## 2019-05-07 RX ADMIN — FUROSEMIDE 40 MG: 10 INJECTION, SOLUTION INTRAVENOUS at 09:05

## 2019-05-07 RX ADMIN — APIXABAN 5 MG: 2.5 TABLET, FILM COATED ORAL at 09:05

## 2019-05-07 RX ADMIN — LORAZEPAM 1 MG: 1 TABLET ORAL at 09:05

## 2019-05-07 RX ADMIN — FUROSEMIDE 40 MG: 10 INJECTION, SOLUTION INTRAVENOUS at 01:05

## 2019-05-07 RX ADMIN — PRAVASTATIN SODIUM 40 MG: 20 TABLET ORAL at 09:05

## 2019-05-07 RX ADMIN — FLECAINIDE ACETATE 100 MG: 50 TABLET ORAL at 09:05

## 2019-05-07 RX ADMIN — FUROSEMIDE 40 MG: 10 INJECTION, SOLUTION INTRAVENOUS at 06:05

## 2019-05-07 NOTE — SUBJECTIVE & OBJECTIVE
Interval History: Patient is slowly improving but remains SOB with conversation. Oxygen Sats dropped to 89% overnight. Pt needs additional diuresing. Continue IV furosemide TID. Discussed with MD, who agrees. She will dc with [facility] when her breathing is stabilized.     Review of Systems   Constitutional: Positive for fatigue. Negative for activity change, appetite change, chills and fever.   HENT: Negative for congestion, dental problem, ear pain, hearing loss, mouth sores, sinus pressure, sore throat, tinnitus and trouble swallowing.    Eyes: Negative for pain, discharge, redness and visual disturbance.   Respiratory: Positive for shortness of breath. Negative for apnea, cough, choking, chest tightness and wheezing.    Cardiovascular: Negative for chest pain, palpitations and leg swelling.   Gastrointestinal: Negative for abdominal distention, abdominal pain, anal bleeding, blood in stool, constipation, diarrhea, nausea, rectal pain and vomiting.   Endocrine: Negative for cold intolerance, heat intolerance, polydipsia and polyuria.   Genitourinary: Negative for difficulty urinating, dysuria, flank pain, frequency, hematuria and urgency.   Musculoskeletal: Negative for arthralgias, back pain, gait problem, joint swelling, myalgias, neck pain and neck stiffness.   Skin: Negative for color change, rash and wound.   Allergic/Immunologic: Negative for food allergies and immunocompromised state.   Neurological: Positive for weakness. Negative for dizziness, tremors, seizures, syncope, speech difficulty, light-headedness and headaches.   Hematological: Negative for adenopathy. Does not bruise/bleed easily.   Psychiatric/Behavioral: Negative for agitation, confusion and sleep disturbance. The patient is not nervous/anxious.    All other systems reviewed and are negative.    Objective:     Vital Signs (Most Recent):  Temp: 98.4 °F (36.9 °C) (05/07/19 0734)  Pulse: 65 (05/07/19 0734)  Resp: 18 (05/07/19 0734)  BP:  (!) 116/58 (05/07/19 0734)  SpO2: 96 % (05/07/19 0800) Vital Signs (24h Range):  Temp:  [96.8 °F (36 °C)-98.4 °F (36.9 °C)] 98.4 °F (36.9 °C)  Pulse:  [62-70] 65  Resp:  [16-20] 18  SpO2:  [89 %-100 %] 96 %  BP: (116-160)/(58-79) 116/58     Weight: 83.4 kg (183 lb 13.8 oz)  Body mass index is 39.79 kg/m².    Intake/Output Summary (Last 24 hours) at 5/7/2019 1037  Last data filed at 5/7/2019 0900  Gross per 24 hour   Intake 360 ml   Output 400 ml   Net -40 ml      Physical Exam   Constitutional: She is oriented to person, place, and time. She appears well-developed and well-nourished. No distress.   HENT:   Head: Normocephalic and atraumatic.   Nose: Nose normal.   Eyes: Conjunctivae and EOM are normal. No scleral icterus.   Neck: Normal range of motion. Neck supple. No JVD present.   Cardiovascular: Normal rate, normal heart sounds and intact distal pulses. An irregularly irregular rhythm present. Exam reveals no gallop and no friction rub.   No murmur heard.  Pulses:       Radial pulses are 2+ on the right side, and 2+ on the left side.        Dorsalis pedis pulses are 2+ on the right side, and 2+ on the left side.   Pulmonary/Chest: Effort normal and breath sounds normal. No respiratory distress. She has no wheezes. She has no rales. She exhibits no tenderness.   Abdominal: Soft. Bowel sounds are normal. She exhibits no distension and no mass. There is no tenderness.   Musculoskeletal: Normal range of motion. She exhibits no edema, tenderness or deformity.   Neurological: She is alert and oriented to person, place, and time. No cranial nerve deficit. She exhibits normal muscle tone. Coordination normal.   Skin: Skin is warm and dry. Capillary refill takes less than 2 seconds. No rash noted. She is not diaphoretic. No erythema. No pallor.   Psychiatric: She has a normal mood and affect. Her behavior is normal.   Nursing note and vitals reviewed.      Significant Labs:   Recent Lab Results       05/07/19  0507         Anion Gap 10     Baso # 0.01     Basophil% 0.2     BUN, Bld 31     Calcium 9.6     Chloride 97     CO2 35     Creatinine 0.9     Differential Method Automated     eGFR if  >60     eGFR if non  59  Comment:  Calculation used to obtain the estimated glomerular filtration  rate (eGFR) is the CKD-EPI equation.        Eos # 0.1     Eosinophil% 2.0     Glucose 94     Gran # (ANC) 3.9     Gran% 63.4     Hematocrit 36.6     Hemoglobin 11.1     Lymph # 1.1     Lymph% 17.7     MCH 29.4     MCHC 30.3     MCV 97     Mono # 1.0     Mono% 16.7     MPV 11.6     Platelets 180     Potassium 3.7     RBC 3.78     RDW 15.2     Sodium 142     WBC 6.15         All pertinent labs within the past 24 hours have been reviewed.    Significant Imaging: I have reviewed all pertinent imaging results/findings within the past 24 hours.

## 2019-05-07 NOTE — PLAN OF CARE
Problem: Adult Inpatient Plan of Care  Goal: Plan of Care Review  Outcome: Ongoing (interventions implemented as appropriate)  Pt AAO x4.  VSS.  Pt remained afebrile throughout this shift.   Pt ambulates self, gait steady.   Pt remained free of falls this shift.   Pt denies pain this shift.  Pain meds administered as ordered.   Plan of care reviewed. Patient verbalizes understanding.   Pt moving/turing independently. Frequent weight shifting encouraged.  Patient sinus rhythm on monitor.   Bed low, side rails up x 2, wheels locked, call light in reach.   Patient instructed to call for assistance.   Hourly rounding completed.   Will continue to monitor.

## 2019-05-07 NOTE — PROGRESS NOTES
[facility] Hospital Medicine  Progress Note    Patient Name: Elke Laws  MRN: [id number]  Patient Class: OP- Observation   Admission Date: 5/5/2019  Length of Stay: 0 days  Attending Physician: Sekou Mccall MD  Primary Care Provider: Isidra Benavidez MD        Subjective:     Principal Problem:Acute on chronic diastolic heart failure    HPI:  83 y/o female with PMHx of CHF, HTN, HLD who presented to the ED with c/o SOB that onset gradually over the past 2 days. Associated symptoms include weakness, fatigue, cough, and abd distention. Symptoms are constant and moderate in severity. No mitigating or exacerbating factors reported.  She is a DNR and her surrogate decision maker is her son, Christiano.   She uses continuous home oxygen at 2L/min via NC.    Hospital Course:  Patient was kept on OBS for acute on chronic heart failure under the care of [facility] Medicine. She was given IV furosemide and diuresed. Patient continues to be SOB with conversation today and is unable to drop oxygen below 4L/min NC without additional distress. Will continue with IV furosemide and monitor closely.    Interval History: Patient is slowly improving but remains SOB with conversation. Oxygen Sats dropped to 89% overnight. Pt needs additional diuresing. Continue IV furosemide TID. Discussed with MD, who agrees. She will dc with [facility] when her breathing is stabilized.     Review of Systems   Constitutional: Positive for fatigue. Negative for activity change, appetite change, chills and fever.   HENT: Negative for congestion, dental problem, ear pain, hearing loss, mouth sores, sinus pressure, sore throat, tinnitus and trouble swallowing.    Eyes: Negative for pain, discharge, redness and visual disturbance.   Respiratory: Positive for shortness of breath. Negative for apnea, cough, choking, chest tightness and wheezing.    Cardiovascular: Negative for chest pain, palpitations and leg swelling.    Gastrointestinal: Negative for abdominal distention, abdominal pain, anal bleeding, blood in stool, constipation, diarrhea, nausea, rectal pain and vomiting.   Endocrine: Negative for cold intolerance, heat intolerance, polydipsia and polyuria.   Genitourinary: Negative for difficulty urinating, dysuria, flank pain, frequency, hematuria and urgency.   Musculoskeletal: Negative for arthralgias, back pain, gait problem, joint swelling, myalgias, neck pain and neck stiffness.   Skin: Negative for color change, rash and wound.   Allergic/Immunologic: Negative for food allergies and immunocompromised state.   Neurological: Positive for weakness. Negative for dizziness, tremors, seizures, syncope, speech difficulty, light-headedness and headaches.   Hematological: Negative for adenopathy. Does not bruise/bleed easily.   Psychiatric/Behavioral: Negative for agitation, confusion and sleep disturbance. The patient is not nervous/anxious.    All other systems reviewed and are negative.    Objective:     Vital Signs (Most Recent):  Temp: 98.4 °F (36.9 °C) (05/07/19 0734)  Pulse: 65 (05/07/19 0734)  Resp: 18 (05/07/19 0734)  BP: (!) 116/58 (05/07/19 0734)  SpO2: 96 % (05/07/19 0800) Vital Signs (24h Range):  Temp:  [96.8 °F (36 °C)-98.4 °F (36.9 °C)] 98.4 °F (36.9 °C)  Pulse:  [62-70] 65  Resp:  [16-20] 18  SpO2:  [89 %-100 %] 96 %  BP: (116-160)/(58-79) 116/58     Weight: 83.4 kg (183 lb 13.8 oz)  Body mass index is 39.79 kg/m².    Intake/Output Summary (Last 24 hours) at 5/7/2019 1037  Last data filed at 5/7/2019 0900  Gross per 24 hour   Intake 360 ml   Output 400 ml   Net -40 ml      Physical Exam   Constitutional: She is oriented to person, place, and time. She appears well-developed and well-nourished. No distress.   HENT:   Head: Normocephalic and atraumatic.   Nose: Nose normal.   Eyes: Conjunctivae and EOM are normal. No scleral icterus.   Neck: Normal range of motion. Neck supple. No JVD present.   Cardiovascular:  Normal rate, normal heart sounds and intact distal pulses. An irregularly irregular rhythm present. Exam reveals no gallop and no friction rub.   No murmur heard.  Pulses:       Radial pulses are 2+ on the right side, and 2+ on the left side.        Dorsalis pedis pulses are 2+ on the right side, and 2+ on the left side.   Pulmonary/Chest: Effort normal and breath sounds normal. No respiratory distress. She has no wheezes. She has no rales. She exhibits no tenderness.   Abdominal: Soft. Bowel sounds are normal. She exhibits no distension and no mass. There is no tenderness.   Musculoskeletal: Normal range of motion. She exhibits no edema, tenderness or deformity.   Neurological: She is alert and oriented to person, place, and time. No cranial nerve deficit. She exhibits normal muscle tone. Coordination normal.   Skin: Skin is warm and dry. Capillary refill takes less than 2 seconds. No rash noted. She is not diaphoretic. No erythema. No pallor.   Psychiatric: She has a normal mood and affect. Her behavior is normal.   Nursing note and vitals reviewed.      Significant Labs:   Recent Lab Results       05/07/19  0505        Anion Gap 10     Baso # 0.01     Basophil% 0.2     BUN, Bld 31     Calcium 9.6     Chloride 97     CO2 35     Creatinine 0.9     Differential Method Automated     eGFR if  >60     eGFR if non  59  Comment:  Calculation used to obtain the estimated glomerular filtration  rate (eGFR) is the CKD-EPI equation.        Eos # 0.1     Eosinophil% 2.0     Glucose 94     Gran # (ANC) 3.9     Gran% 63.4     Hematocrit 36.6     Hemoglobin 11.1     Lymph # 1.1     Lymph% 17.7     MCH 29.4     MCHC 30.3     MCV 97     Mono # 1.0     Mono% 16.7     MPV 11.6     Platelets 180     Potassium 3.7     RBC 3.78     RDW 15.2     Sodium 142     WBC 6.15         All pertinent labs within the past 24 hours have been reviewed.    Significant Imaging: I have reviewed all pertinent imaging  results/findings within the past 24 hours.    Assessment/Plan:      * Acute on chronic diastolic HFpEF of 60-65%  --iv furosemide TID  --strict I&O  --daily weights  --supplemental oxygen to maintain sats >92%  --cardiac diet with fluid restrictions        Essential hypertension  --continue metoprolol  --IV furosemide  --cardiac monitoring  --cardiac diet    Acute on chronic respiratory failure  --pt uses continuous home oxygen at 2L/min via NC  --unable to drop below 4L/min today  --continue supplemental oxygen to maintain sats >92%  --likely 2/2 pulmonary hypertension  --continue IV furosemide, low sodium diet      A-fib  --continue eliquis, flecainide, metoprolol  --cardiac monitoring    Anemia  --H/H 11.6/36.7  --monitor and transfuse if indicated      Hyperlipidemia  --continue pravastatin  --cardiac diet        VTE Risk Mitigation (From admission, onward)        Ordered     IP VTE HIGH RISK PATIENT  Once      05/05/19 3132     apixaban tablet 5 mg  2 times daily      05/05/19 1108              SRIDEVI Huston  Department of Hospital Medicine   [facility]

## 2019-05-07 NOTE — HOSPITAL COURSE
Patient was kept on OBS for acute on chronic heart failure under the care of Hospital Medicine. She was given IV furosemide and diuresed. Patient continues to be SOB with conversation today and is unable to drop oxygen below 4L/min NC without additional distress. Will continue with IV furosemide and monitor closely. As of 05/07: Patient is slowly improving but remains SOB with conversation. Oxygen Sats dropped to 89% overnight. Pt needs additional diuresing. Continue IV furosemide TID. Discussed with MD, who agrees. She will dc [facility] when her breathing is stabilized. As of 05/08: patient is much improved today, saying she is back to her baseline. Long discussion about low sodium diet and fluid restrictions. Patient and caregiver educated on reading labels and keeping sodium below 1.5Gm/daily. Patient will have [facility] for CHF education and monitoring. Patient was seen and examined today and deemed stable for discharge home.

## 2019-05-08 VITALS
BODY MASS INDEX: 39.67 KG/M2 | DIASTOLIC BLOOD PRESSURE: 93 MMHG | HEART RATE: 61 BPM | OXYGEN SATURATION: 96 % | WEIGHT: 183.88 LBS | SYSTOLIC BLOOD PRESSURE: 146 MMHG | RESPIRATION RATE: 20 BRPM | HEIGHT: 57 IN | TEMPERATURE: 98 F

## 2019-05-08 PROBLEM — J96.20 ACUTE ON CHRONIC RESPIRATORY FAILURE: Status: RESOLVED | Noted: 2019-05-06 | Resolved: 2019-05-08

## 2019-05-08 LAB
ANION GAP SERPL CALC-SCNC: 11 MMOL/L (ref 8–16)
BASOPHILS # BLD AUTO: 0.01 K/UL (ref 0–0.2)
BASOPHILS NFR BLD: 0.2 % (ref 0–1.9)
BUN SERPL-MCNC: 34 MG/DL (ref 8–23)
CALCIUM SERPL-MCNC: 9.3 MG/DL (ref 8.7–10.5)
CHLORIDE SERPL-SCNC: 96 MMOL/L (ref 95–110)
CO2 SERPL-SCNC: 34 MMOL/L (ref 23–29)
CREAT SERPL-MCNC: 0.9 MG/DL (ref 0.5–1.4)
DIFFERENTIAL METHOD: ABNORMAL
EOSINOPHIL # BLD AUTO: 0.2 K/UL (ref 0–0.5)
EOSINOPHIL NFR BLD: 2.7 % (ref 0–8)
ERYTHROCYTE [DISTWIDTH] IN BLOOD BY AUTOMATED COUNT: 14.8 % (ref 11.5–14.5)
EST. GFR  (AFRICAN AMERICAN): >60 ML/MIN/1.73 M^2
EST. GFR  (NON AFRICAN AMERICAN): 59 ML/MIN/1.73 M^2
GLUCOSE SERPL-MCNC: 101 MG/DL (ref 70–110)
HCT VFR BLD AUTO: 38 % (ref 37–48.5)
HGB BLD-MCNC: 11.8 G/DL (ref 12–16)
LYMPHOCYTES # BLD AUTO: 1.4 K/UL (ref 1–4.8)
LYMPHOCYTES NFR BLD: 24.4 % (ref 18–48)
MCH RBC QN AUTO: 29.8 PG (ref 27–31)
MCHC RBC AUTO-ENTMCNC: 31.1 G/DL (ref 32–36)
MCV RBC AUTO: 96 FL (ref 82–98)
MONOCYTES # BLD AUTO: 0.9 K/UL (ref 0.3–1)
MONOCYTES NFR BLD: 16 % (ref 4–15)
NEUTROPHILS # BLD AUTO: 3.3 K/UL (ref 1.8–7.7)
NEUTROPHILS NFR BLD: 56.7 % (ref 38–73)
PLATELET # BLD AUTO: 183 K/UL (ref 150–350)
PMV BLD AUTO: 11.3 FL (ref 9.2–12.9)
POTASSIUM SERPL-SCNC: 3.7 MMOL/L (ref 3.5–5.1)
RBC # BLD AUTO: 3.96 M/UL (ref 4–5.4)
SODIUM SERPL-SCNC: 141 MMOL/L (ref 136–145)
WBC # BLD AUTO: 5.82 K/UL (ref 3.9–12.7)

## 2019-05-08 PROCEDURE — 63600175 PHARM REV CODE 636 W HCPCS: Mod: HCWC | Performed by: NURSE PRACTITIONER

## 2019-05-08 PROCEDURE — 96376 TX/PRO/DX INJ SAME DRUG ADON: CPT

## 2019-05-08 PROCEDURE — 36415 COLL VENOUS BLD VENIPUNCTURE: CPT | Mod: HCWC

## 2019-05-08 PROCEDURE — G0378 HOSPITAL OBSERVATION PER HR: HCPCS | Mod: HCWC

## 2019-05-08 PROCEDURE — 80048 BASIC METABOLIC PNL TOTAL CA: CPT | Mod: HCWC

## 2019-05-08 PROCEDURE — 85025 COMPLETE CBC W/AUTO DIFF WBC: CPT | Mod: HCWC

## 2019-05-08 PROCEDURE — 25000003 PHARM REV CODE 250: Mod: HCWC | Performed by: NURSE PRACTITIONER

## 2019-05-08 RX ORDER — LORAZEPAM 1 MG/1
1 TABLET ORAL EVERY 6 HOURS PRN
Qty: 120 TABLET | Refills: 0 | Status: SHIPPED | OUTPATIENT
Start: 2019-05-08 | End: 2019-06-11

## 2019-05-08 RX ORDER — FUROSEMIDE 20 MG/1
40 TABLET ORAL 2 TIMES DAILY
Qty: 60 TABLET | Refills: 1 | Status: SHIPPED | OUTPATIENT
Start: 2019-05-08 | End: 2019-05-28

## 2019-05-08 RX ADMIN — FLECAINIDE ACETATE 100 MG: 50 TABLET ORAL at 09:05

## 2019-05-08 RX ADMIN — FUROSEMIDE 40 MG: 10 INJECTION, SOLUTION INTRAVENOUS at 05:05

## 2019-05-08 RX ADMIN — APIXABAN 5 MG: 2.5 TABLET, FILM COATED ORAL at 09:05

## 2019-05-08 RX ADMIN — METOPROLOL SUCCINATE 100 MG: 50 TABLET, EXTENDED RELEASE ORAL at 09:05

## 2019-05-08 RX ADMIN — PRAVASTATIN SODIUM 40 MG: 20 TABLET ORAL at 09:05

## 2019-05-08 NOTE — PLAN OF CARE
Problem: Adult Inpatient Plan of Care  Goal: Plan of Care Review  Outcome: Ongoing (interventions implemented as appropriate)  Observed pt lying in bed nad able to communicate needs, daughter at bedside, pt progressing toward goal

## 2019-05-08 NOTE — PLAN OF CARE
Discharge orders noted, Faxed to [facility]. Patient accepted by [facility].       05/08/19 9903   Post-Acute Status   Post-Acute Authorization Home Health/Hospice   Home Health/Hospice Status Set-up Complete

## 2019-05-08 NOTE — NURSING
Pt discharged per MD order. Pt instructions and handout given to pt. Pt instructed to schedule and keep all f/u appointments. Pt verbalizes understanding. IV removed, tele box returned to monitor tech. Pt advised to call out when ready to go down..

## 2019-05-08 NOTE — PLAN OF CARE
05/08/19 1507   Final Note   Assessment Type Final Discharge Note   Anticipated Discharge Disposition Home-Health   Right Care Referral Info   Post Acute Recommendation Home-care   Facility Name [facility]

## 2019-05-09 DIAGNOSIS — I48.0 PAF (PAROXYSMAL ATRIAL FIBRILLATION): Primary | ICD-10-CM

## 2019-05-13 ENCOUNTER — CLINICAL SUPPORT (OUTPATIENT)
Dept: CARDIOLOGY | Facility: CLINIC | Age: 84
End: 2019-05-13
Payer: MEDICARE

## 2019-05-13 ENCOUNTER — OFFICE VISIT (OUTPATIENT)
Dept: CARDIOLOGY | Facility: CLINIC | Age: 84
End: 2019-05-13
Payer: MEDICARE

## 2019-05-13 VITALS — SYSTOLIC BLOOD PRESSURE: 108 MMHG | DIASTOLIC BLOOD PRESSURE: 58 MMHG | HEART RATE: 66 BPM

## 2019-05-13 DIAGNOSIS — I48.0 PAF (PAROXYSMAL ATRIAL FIBRILLATION): ICD-10-CM

## 2019-05-13 DIAGNOSIS — I10 ESSENTIAL HYPERTENSION: Chronic | ICD-10-CM

## 2019-05-13 DIAGNOSIS — I48.19 PERSISTENT ATRIAL FIBRILLATION: ICD-10-CM

## 2019-05-13 DIAGNOSIS — R09.02 HYPOXEMIA: ICD-10-CM

## 2019-05-13 DIAGNOSIS — I50.32 CHRONIC DIASTOLIC HEART FAILURE: Primary | Chronic | ICD-10-CM

## 2019-05-13 DIAGNOSIS — E78.5 HYPERLIPIDEMIA, UNSPECIFIED HYPERLIPIDEMIA TYPE: Chronic | ICD-10-CM

## 2019-05-13 PROCEDURE — 3074F SYST BP LT 130 MM HG: CPT | Mod: HCWC,CPTII,S$GLB, | Performed by: INTERNAL MEDICINE

## 2019-05-13 PROCEDURE — 99214 PR OFFICE/OUTPT VISIT, EST, LEVL IV, 30-39 MIN: ICD-10-PCS | Mod: HCWC,S$GLB,, | Performed by: INTERNAL MEDICINE

## 2019-05-13 PROCEDURE — 93005 ELECTROCARDIOGRAM TRACING: CPT | Mod: HCWC,S$GLB,, | Performed by: INTERNAL MEDICINE

## 2019-05-13 PROCEDURE — 3074F PR MOST RECENT SYSTOLIC BLOOD PRESSURE < 130 MM HG: ICD-10-PCS | Mod: HCWC,CPTII,S$GLB, | Performed by: INTERNAL MEDICINE

## 2019-05-13 PROCEDURE — 99999 PR PBB SHADOW E&M-EST. PATIENT-LVL III: ICD-10-PCS | Mod: PBBFAC,HCWC,, | Performed by: INTERNAL MEDICINE

## 2019-05-13 PROCEDURE — 1101F PT FALLS ASSESS-DOCD LE1/YR: CPT | Mod: HCWC,CPTII,S$GLB, | Performed by: INTERNAL MEDICINE

## 2019-05-13 PROCEDURE — 99214 OFFICE O/P EST MOD 30 MIN: CPT | Mod: HCWC,S$GLB,, | Performed by: INTERNAL MEDICINE

## 2019-05-13 PROCEDURE — 93010 EKG 12-LEAD: ICD-10-PCS | Mod: HCWC,S$GLB,, | Performed by: INTERNAL MEDICINE

## 2019-05-13 PROCEDURE — 3078F DIAST BP <80 MM HG: CPT | Mod: HCWC,CPTII,S$GLB, | Performed by: INTERNAL MEDICINE

## 2019-05-13 PROCEDURE — 1101F PR PT FALLS ASSESS DOC 0-1 FALLS W/OUT INJ PAST YR: ICD-10-PCS | Mod: HCWC,CPTII,S$GLB, | Performed by: INTERNAL MEDICINE

## 2019-05-13 PROCEDURE — 99999 PR PBB SHADOW E&M-EST. PATIENT-LVL III: CPT | Mod: PBBFAC,HCWC,, | Performed by: INTERNAL MEDICINE

## 2019-05-13 PROCEDURE — 93005 EKG 12-LEAD: ICD-10-PCS | Mod: HCWC,S$GLB,, | Performed by: INTERNAL MEDICINE

## 2019-05-13 PROCEDURE — 93010 ELECTROCARDIOGRAM REPORT: CPT | Mod: HCWC,S$GLB,, | Performed by: INTERNAL MEDICINE

## 2019-05-13 PROCEDURE — 3078F PR MOST RECENT DIASTOLIC BLOOD PRESSURE < 80 MM HG: ICD-10-PCS | Mod: HCWC,CPTII,S$GLB, | Performed by: INTERNAL MEDICINE

## 2019-05-13 RX ORDER — METOLAZONE 2.5 MG/1
2.5 TABLET ORAL
Qty: 12 TABLET | Refills: 11 | Status: SHIPPED | OUTPATIENT
Start: 2019-05-13 | End: 2019-05-28 | Stop reason: SDUPTHER

## 2019-05-13 NOTE — PROGRESS NOTES
Subjective:   Patient ID:  Elke Laws is a 84 y.o. female who presents for follow up of Follow-up and Congestive Heart Failure      83 yo female, came in for CHF and afib  PMH persistent AF, diastolic CHF, non obstructive CAD per [facility] done in , HTN and HLD.   In the , pt had 3 ER visits of CHF exacerbation. Echo showed EF 55%, mild AI and biatrial dilation. Elevated  to 800. CXR showed plum. Edema.  Serial ekgs showed persistent afib since 03/07/2019.  She has had 3 CHF admissions recently. Suspected afib related and had LISSETH/DCCV on 04/16 and increased Flecainide to 100 mg bid. But pt states that she thought the increased med was pravastatin. So she still on flecainide 50 mg bid. Back to afib few days later  Admitted to [facility] in  for CHF exacerbation. Had IV diuresis.  Today, repeat ekg showed afib. Positive PND. On home O2. Walking around 50 feet and has DIAZ      Past Medical History:   Diagnosis Date    Abnormal nuclear stress test 6/30/2016    Acute congestive heart failure 5/27/2017    Acute coronary syndrome     Acute kidney injury (nontraumatic) 5/27/2017    Acute kidney injury (nontraumatic) 5/27/2017    Acute on chronic congestive heart failure 5/21/2017    Acute on chronic diastolic congestive heart failure 8/27/2015    Acute on chronic respiratory failure 5/6/2019    Anemia 5/9/2016    Anemia 5/9/2016    Angina pectoris 1/25/2018    Anticoagulant long-term use     Aortic regurgitation     Echo 11/2013---5 - Mild to moderate aortic regurgitation.      Asthma     Atrial fibrillation 5/15/2014    Back pain     s/p epidural steriod injections, no recent injections.    Baker's cyst     small, right, seen on US lower extremity 6/2014    Blood transfusion     Approximately 6 years ago    Chronic constipation     Coronary artery disease     Degenerative disc disease, lumbar     Diastolic dysfunction     Seen on echo 11/2013, stress test negative 5/2014     "Diverticulosis     colonoscopy 3/27/2011    E coli bacteremia 2017    E. coli UTI (urinary tract infection) 2017    Hemorrhoids     colonoscopy 3/27/2011    Hiatal hernia     CXR 2016---Retrocardiac density again noted consistent with a hiatal hernia.    Hx of adenomatous colonic polyps     Hyperlipidemia     Hypertension     Hyponatremia 2016    Hypoxemia 2017    Hypoxia 2017    Leukocytosis 2017    Mitral regurgitation     Myocardial infarction     per patient was told in the past she had a "mild heart attack"    Obesity     Osteoarthritis, knee     Pneumonia     per patient "walking Pneumonia" did not require hospitalization    Seasonal allergies     Sepsis 2017    Trouble in sleeping     Urinary incontinence        Past Surgical History:   Procedure Laterality Date    APPENDECTOMY      CARDIOVERSION OR DEFIBRILLATION N/A 2019    [facility] at [facility] CATH LAB    COLONOSCOPY okay by dr. ramos N/A 2017    Performed by Toño Abdi MD at [facility] ENDO    ECHOCARDIOGRAM,TRANSESOPHAGEAL N/A 2019    Per[facility] by [facility] at [facility] CATH LAB    EYE SURGERY Left     HYSTERECTOMY      benign reasons    KNEE SURGERY Bilateral     x2     Left heart cath      OLECRANON BURSECTOMY Right     2011    TONSILLECTOMY      URETHRA SURGERY         Social History     Tobacco Use    Smoking status: Former Smoker     Packs/day: 0.05     Years: 1.00     Pack years: 0.05     Types: Cigarettes     Last attempt to quit: 1952     Years since quittin.4    Smokeless tobacco: Never Used   Substance Use Topics    Alcohol use: No     Alcohol/week: 0.0 oz    Drug use: No       Family History   Problem Relation Age of Onset    Heart disease Mother     Cancer Mother         colon    Hypertension Mother     Hyperlipidemia Mother     Heart disease Father     Hypertension Father     Hyperlipidemia Father     Heart disease Sister  "        MI    Heart disease Brother         MI    COPD Son     Hypertension Son     Diabetes Brother     Diabetes Son     Cancer Sister         breast    Kidney disease Neg Hx     Stroke Neg Hx          Review of Systems   Constitution: Negative for decreased appetite, diaphoresis, fever, malaise/fatigue, night sweats and weight gain.   HENT: Negative for hoarse voice and nosebleeds.    Eyes: Negative for blurred vision, double vision and visual disturbance.   Cardiovascular: Positive for dyspnea on exertion. Negative for chest pain, claudication, cyanosis, irregular heartbeat, leg swelling, near-syncope, orthopnea, palpitations, paroxysmal nocturnal dyspnea and syncope.   Respiratory: Positive for shortness of breath. Negative for hemoptysis, sleep disturbances due to breathing, snoring, sputum production and wheezing.    Endocrine: Negative for cold intolerance and polyuria.   Hematologic/Lymphatic: Negative for bleeding problem. Does not bruise/bleed easily.   Skin: Negative for color change, poor wound healing and rash.   Musculoskeletal: Negative for back pain, falls, joint pain, joint swelling, muscle cramps, muscle weakness, myalgias and neck pain.   Gastrointestinal: Negative for bloating, abdominal pain, change in bowel habit, diarrhea, heartburn, hematemesis, hematochezia, melena, nausea and vomiting.   Genitourinary: Negative for dysuria, frequency and hematuria.   Neurological: Negative for difficulty with concentration, excessive daytime sleepiness, dizziness, focal weakness, headaches, light-headedness, loss of balance, numbness, seizures and weakness.   Psychiatric/Behavioral: Negative for depression, memory loss and substance abuse. The patient does not have insomnia.    Allergic/Immunologic: Negative for HIV exposure and hives.       Objective:   Physical Exam   Constitutional: She is oriented to person, place, and time. She appears well-nourished.   HENT:   Head: Normocephalic.   Eyes:  Pupils are equal, round, and reactive to light.   Neck: Normal carotid pulses and no JVD present. Carotid bruit is not present. No thyromegaly present.   Cardiovascular: Normal rate, normal heart sounds and normal pulses. An irregularly irregular rhythm present.  No extrasystoles are present. PMI is not displaced. Exam reveals no gallop and no S3.   No murmur heard.  Pulmonary/Chest: Breath sounds normal. No stridor. No respiratory distress.   Abdominal: Soft. Bowel sounds are normal. There is no tenderness. There is no rebound.   Musculoskeletal: Normal range of motion. She exhibits edema.   B/l trace edema   Neurological: She is alert and oriented to person, place, and time.   Skin: Skin is intact. No rash noted.   Psychiatric: Her behavior is normal.       Lab Results   Component Value Date    CHOL 156 10/25/2018    CHOL 147 01/31/2018    CHOL 123 04/13/2017     Lab Results   Component Value Date    HDL 68 10/25/2018    HDL 66 01/31/2018    HDL 59 04/13/2017     Lab Results   Component Value Date    LDLCALC 76.0 10/25/2018    LDLCALC 69.0 01/31/2018    LDLCALC 51.2 (L) 04/13/2017     Lab Results   Component Value Date    TRIG 60 10/25/2018    TRIG 60 01/31/2018    TRIG 64 04/13/2017     Lab Results   Component Value Date    CHOLHDL 43.6 10/25/2018    CHOLHDL 44.9 01/31/2018    CHOLHDL 48.0 04/13/2017       Chemistry        Component Value Date/Time     05/08/2019 0522    K 3.7 05/08/2019 0522    CL 96 05/08/2019 0522    CO2 34 (H) 05/08/2019 0522    BUN 34 (H) 05/08/2019 0522    CREATININE 0.9 05/08/2019 0522     05/08/2019 0522        Component Value Date/Time    CALCIUM 9.3 05/08/2019 0522    ALKPHOS 128 05/05/2019 0825    AST 56 (H) 05/05/2019 0825    ALT 80 (H) 05/05/2019 0825    BILITOT 0.9 05/05/2019 0825    ESTGFRAFRICA >60 05/08/2019 0522    EGFRNONAA 59 (A) 05/08/2019 0522          Lab Results   Component Value Date    HGBA1C 5.9 04/13/2017     Lab Results   Component Value Date    TSH 2.207  01/31/2018     Lab Results   Component Value Date    INR 0.9 02/16/2019    INR 1.2 05/27/2017    INR 1.2 05/21/2017     Lab Results   Component Value Date    WBC 5.82 05/08/2019    HGB 11.8 (L) 05/08/2019    HCT 38.0 05/08/2019    MCV 96 05/08/2019     05/08/2019     BMP  Sodium   Date Value Ref Range Status   05/08/2019 141 136 - 145 mmol/L Final     Potassium   Date Value Ref Range Status   05/08/2019 3.7 3.5 - 5.1 mmol/L Final     Chloride   Date Value Ref Range Status   05/08/2019 96 95 - 110 mmol/L Final     CO2   Date Value Ref Range Status   05/08/2019 34 (H) 23 - 29 mmol/L Final     BUN, Bld   Date Value Ref Range Status   05/08/2019 34 (H) 8 - 23 mg/dL Final     Creatinine   Date Value Ref Range Status   05/08/2019 0.9 0.5 - 1.4 mg/dL Final     Calcium   Date Value Ref Range Status   05/08/2019 9.3 8.7 - 10.5 mg/dL Final     Anion Gap   Date Value Ref Range Status   05/08/2019 11 8 - 16 mmol/L Final     eGFR if    Date Value Ref Range Status   05/08/2019 >60 >60 mL/min/1.73 m^2 Final     eGFR if non    Date Value Ref Range Status   05/08/2019 59 (A) >60 mL/min/1.73 m^2 Final     Comment:     Calculation used to obtain the estimated glomerular filtration  rate (eGFR) is the CKD-EPI equation.        BNP  @LABRCNTIP(BNP,BNPTRIAGEBLO)@  @LABRCNTIP(troponini)@  CrCl cannot be calculated (Unknown ideal weight.).  No results found in the last 24 hours.  No results found in the last 24 hours.  No results found in the last 24 hours.    Assessment:      1. Chronic diastolic heart failure    2. Persistent atrial fibrillation    3. Essential hypertension    4. Hyperlipidemia, unspecified hyperlipidemia type    5. Hypoxemia      CHFpEF FC III, fluid overloaded  afib with controlled VR  Plan:   D/c Flecainide  Continue lasix 40 mg bid and add Metolazone 2.5 mg on MWF  Continue Eliqui, Metoprolol and  Statin  Daily weight. Please call the office if gain 3 pounds in 1 day or 5 pounds  in 1 week.  Fluid restriction 1.5 liters a day  Na< 2 gm  RTC in 2 months

## 2019-05-21 ENCOUNTER — HOSPITAL ENCOUNTER (OUTPATIENT)
Facility: HOSPITAL | Age: 84
Discharge: HOME | End: 2019-05-24
Attending: EMERGENCY MEDICINE | Admitting: EMERGENCY MEDICINE
Payer: MEDICARE

## 2019-05-21 ENCOUNTER — OFFICE VISIT (OUTPATIENT)
Dept: INTERNAL MEDICINE | Facility: CLINIC | Age: 84
End: 2019-05-21
Payer: MEDICARE

## 2019-05-21 VITALS
TEMPERATURE: 98 F | SYSTOLIC BLOOD PRESSURE: 110 MMHG | OXYGEN SATURATION: 96 % | DIASTOLIC BLOOD PRESSURE: 60 MMHG | WEIGHT: 174.38 LBS | HEART RATE: 74 BPM | BODY MASS INDEX: 37.74 KG/M2

## 2019-05-21 DIAGNOSIS — I48.91 A-FIB: ICD-10-CM

## 2019-05-21 DIAGNOSIS — R06.02 SOB (SHORTNESS OF BREATH): ICD-10-CM

## 2019-05-21 DIAGNOSIS — I50.33 ACUTE ON CHRONIC DIASTOLIC HEART FAILURE: Primary | ICD-10-CM

## 2019-05-21 DIAGNOSIS — I48.0 PAF (PAROXYSMAL ATRIAL FIBRILLATION): ICD-10-CM

## 2019-05-21 DIAGNOSIS — I48.19 ATRIAL FIBRILLATION, PERSISTENT: ICD-10-CM

## 2019-05-21 DIAGNOSIS — R06.02 SHORTNESS OF BREATH: ICD-10-CM

## 2019-05-21 DIAGNOSIS — I48.91 ATRIAL FIBRILLATION STATUS POST CARDIOVERSION: ICD-10-CM

## 2019-05-21 DIAGNOSIS — I50.33 ACUTE ON CHRONIC DIASTOLIC HEART FAILURE: ICD-10-CM

## 2019-05-21 PROBLEM — R79.89 ELEVATED TROPONIN: Status: ACTIVE | Noted: 2019-05-21

## 2019-05-21 LAB
ALBUMIN SERPL BCP-MCNC: 3.7 G/DL (ref 3.5–5.2)
ALP SERPL-CCNC: 138 U/L (ref 55–135)
ALT SERPL W/O P-5'-P-CCNC: 25 U/L (ref 10–44)
ANION GAP SERPL CALC-SCNC: 15 MMOL/L (ref 8–16)
ANION GAP SERPL CALC-SCNC: 15 MMOL/L (ref 8–16)
AST SERPL-CCNC: 35 U/L (ref 10–40)
BASOPHILS # BLD AUTO: 0.01 K/UL (ref 0–0.2)
BASOPHILS NFR BLD: 0.1 % (ref 0–1.9)
BILIRUB SERPL-MCNC: 0.6 MG/DL (ref 0.1–1)
BNP SERPL-MCNC: 434 PG/ML (ref 0–99)
BUN SERPL-MCNC: 51 MG/DL (ref 8–23)
BUN SERPL-MCNC: 52 MG/DL (ref 8–23)
CALCIUM SERPL-MCNC: 10.3 MG/DL (ref 8.7–10.5)
CALCIUM SERPL-MCNC: 10.4 MG/DL (ref 8.7–10.5)
CHLORIDE SERPL-SCNC: 90 MMOL/L (ref 95–110)
CHLORIDE SERPL-SCNC: 90 MMOL/L (ref 95–110)
CO2 SERPL-SCNC: 34 MMOL/L (ref 23–29)
CO2 SERPL-SCNC: 36 MMOL/L (ref 23–29)
CREAT SERPL-MCNC: 1.2 MG/DL (ref 0.5–1.4)
CREAT SERPL-MCNC: 1.3 MG/DL (ref 0.5–1.4)
DIFFERENTIAL METHOD: ABNORMAL
EOSINOPHIL # BLD AUTO: 0.1 K/UL (ref 0–0.5)
EOSINOPHIL NFR BLD: 1.2 % (ref 0–8)
ERYTHROCYTE [DISTWIDTH] IN BLOOD BY AUTOMATED COUNT: 14.1 % (ref 11.5–14.5)
EST. GFR  (AFRICAN AMERICAN): 44 ML/MIN/1.73 M^2
EST. GFR  (AFRICAN AMERICAN): 48 ML/MIN/1.73 M^2
EST. GFR  (NON AFRICAN AMERICAN): 38 ML/MIN/1.73 M^2
EST. GFR  (NON AFRICAN AMERICAN): 42 ML/MIN/1.73 M^2
GLUCOSE SERPL-MCNC: 116 MG/DL (ref 70–110)
GLUCOSE SERPL-MCNC: 121 MG/DL (ref 70–110)
HCT VFR BLD AUTO: 42.3 % (ref 37–48.5)
HGB BLD-MCNC: 14 G/DL (ref 12–16)
INR PPP: 1 (ref 0.8–1.2)
LYMPHOCYTES # BLD AUTO: 1.4 K/UL (ref 1–4.8)
LYMPHOCYTES NFR BLD: 16.6 % (ref 18–48)
MAGNESIUM SERPL-MCNC: 2.6 MG/DL (ref 1.6–2.6)
MCH RBC QN AUTO: 30.8 PG (ref 27–31)
MCHC RBC AUTO-ENTMCNC: 33.1 G/DL (ref 32–36)
MCV RBC AUTO: 93 FL (ref 82–98)
MONOCYTES # BLD AUTO: 1.3 K/UL (ref 0.3–1)
MONOCYTES NFR BLD: 15.1 % (ref 4–15)
NEUTROPHILS # BLD AUTO: 5.8 K/UL (ref 1.8–7.7)
NEUTROPHILS NFR BLD: 67 % (ref 38–73)
PHOSPHATE SERPL-MCNC: 4.2 MG/DL (ref 2.7–4.5)
PLATELET # BLD AUTO: 194 K/UL (ref 150–350)
PMV BLD AUTO: 11.7 FL (ref 9.2–12.9)
POTASSIUM SERPL-SCNC: 2.8 MMOL/L (ref 3.5–5.1)
POTASSIUM SERPL-SCNC: 4.1 MMOL/L (ref 3.5–5.1)
PROT SERPL-MCNC: 8 G/DL (ref 6–8.4)
PROTHROMBIN TIME: 10.7 SEC (ref 9–12.5)
RBC # BLD AUTO: 4.55 M/UL (ref 4–5.4)
SODIUM SERPL-SCNC: 139 MMOL/L (ref 136–145)
SODIUM SERPL-SCNC: 141 MMOL/L (ref 136–145)
TROPONIN I SERPL DL<=0.01 NG/ML-MCNC: 0.04 NG/ML (ref 0–0.03)
TROPONIN I SERPL DL<=0.01 NG/ML-MCNC: 0.04 NG/ML (ref 0–0.03)
TROPONIN I SERPL DL<=0.01 NG/ML-MCNC: 0.07 NG/ML (ref 0–0.03)
WBC # BLD AUTO: 8.61 K/UL (ref 3.9–12.7)

## 2019-05-21 PROCEDURE — 84100 ASSAY OF PHOSPHORUS: CPT | Mod: HCWC

## 2019-05-21 PROCEDURE — 83880 ASSAY OF NATRIURETIC PEPTIDE: CPT | Mod: HCWC

## 2019-05-21 PROCEDURE — 99285 EMERGENCY DEPT VISIT HI MDM: CPT | Mod: 25,HCWC

## 2019-05-21 PROCEDURE — 96365 THER/PROPH/DIAG IV INF INIT: CPT | Performed by: EMERGENCY MEDICINE

## 2019-05-21 PROCEDURE — 93005 ELECTROCARDIOGRAM TRACING: CPT | Mod: HCWC

## 2019-05-21 PROCEDURE — 80048 BASIC METABOLIC PNL TOTAL CA: CPT | Mod: HCWC

## 2019-05-21 PROCEDURE — 93010 EKG 12-LEAD: ICD-10-PCS | Mod: HCWC,,, | Performed by: INTERNAL MEDICINE

## 2019-05-21 PROCEDURE — 25000003 PHARM REV CODE 250: Mod: HCWC | Performed by: NURSE PRACTITIONER

## 2019-05-21 PROCEDURE — 96375 TX/PRO/DX INJ NEW DRUG ADDON: CPT | Mod: HCWC

## 2019-05-21 PROCEDURE — 93010 ELECTROCARDIOGRAM REPORT: CPT | Mod: HCWC,,, | Performed by: INTERNAL MEDICINE

## 2019-05-21 PROCEDURE — 3078F DIAST BP <80 MM HG: CPT | Mod: HCWC,CPTII,S$GLB, | Performed by: FAMILY MEDICINE

## 2019-05-21 PROCEDURE — G0378 HOSPITAL OBSERVATION PER HR: HCPCS | Mod: HCWC

## 2019-05-21 PROCEDURE — 80053 COMPREHEN METABOLIC PANEL: CPT | Mod: HCWC

## 2019-05-21 PROCEDURE — 3074F SYST BP LT 130 MM HG: CPT | Mod: HCWC,CPTII,S$GLB, | Performed by: FAMILY MEDICINE

## 2019-05-21 PROCEDURE — 94761 N-INVAS EAR/PLS OXIMETRY MLT: CPT | Mod: HCWC

## 2019-05-21 PROCEDURE — 99999 PR PBB SHADOW E&M-EST. PATIENT-LVL III: CPT | Mod: PBBFAC,HCWC,, | Performed by: FAMILY MEDICINE

## 2019-05-21 PROCEDURE — 99215 OFFICE O/P EST HI 40 MIN: CPT | Mod: 25,HCWC,, | Performed by: INTERNAL MEDICINE

## 2019-05-21 PROCEDURE — 63600175 PHARM REV CODE 636 W HCPCS: Mod: HCWC | Performed by: EMERGENCY MEDICINE

## 2019-05-21 PROCEDURE — 96375 TX/PRO/DX INJ NEW DRUG ADDON: CPT | Performed by: EMERGENCY MEDICINE

## 2019-05-21 PROCEDURE — 63600175 PHARM REV CODE 636 W HCPCS: Mod: HCWC | Performed by: NURSE PRACTITIONER

## 2019-05-21 PROCEDURE — 36415 COLL VENOUS BLD VENIPUNCTURE: CPT | Mod: HCWC

## 2019-05-21 PROCEDURE — 99213 PR OFFICE/OUTPT VISIT, EST, LEVL III, 20-29 MIN: ICD-10-PCS | Mod: HCWC,S$GLB,, | Performed by: FAMILY MEDICINE

## 2019-05-21 PROCEDURE — 63600175 PHARM REV CODE 636 W HCPCS: Mod: HCWC | Performed by: INTERNAL MEDICINE

## 2019-05-21 PROCEDURE — 84484 ASSAY OF TROPONIN QUANT: CPT | Mod: 91,HCWC

## 2019-05-21 PROCEDURE — 83735 ASSAY OF MAGNESIUM: CPT | Mod: HCWC

## 2019-05-21 PROCEDURE — 1101F PT FALLS ASSESS-DOCD LE1/YR: CPT | Mod: HCWC,CPTII,S$GLB, | Performed by: FAMILY MEDICINE

## 2019-05-21 PROCEDURE — 96376 TX/PRO/DX INJ SAME DRUG ADON: CPT | Performed by: EMERGENCY MEDICINE

## 2019-05-21 PROCEDURE — 3078F PR MOST RECENT DIASTOLIC BLOOD PRESSURE < 80 MM HG: ICD-10-PCS | Mod: HCWC,CPTII,S$GLB, | Performed by: FAMILY MEDICINE

## 2019-05-21 PROCEDURE — 85610 PROTHROMBIN TIME: CPT | Mod: HCWC

## 2019-05-21 PROCEDURE — 25000003 PHARM REV CODE 250: Mod: HCWC | Performed by: EMERGENCY MEDICINE

## 2019-05-21 PROCEDURE — 3074F PR MOST RECENT SYSTOLIC BLOOD PRESSURE < 130 MM HG: ICD-10-PCS | Mod: HCWC,CPTII,S$GLB, | Performed by: FAMILY MEDICINE

## 2019-05-21 PROCEDURE — 99213 OFFICE O/P EST LOW 20 MIN: CPT | Mod: HCWC,S$GLB,, | Performed by: FAMILY MEDICINE

## 2019-05-21 PROCEDURE — 1101F PR PT FALLS ASSESS DOC 0-1 FALLS W/OUT INJ PAST YR: ICD-10-PCS | Mod: HCWC,CPTII,S$GLB, | Performed by: FAMILY MEDICINE

## 2019-05-21 PROCEDURE — 85025 COMPLETE CBC W/AUTO DIFF WBC: CPT | Mod: HCWC

## 2019-05-21 PROCEDURE — 99215 PR OFFICE/OUTPT VISIT, EST, LEVL V, 40-54 MIN: ICD-10-PCS | Mod: 25,HCWC,, | Performed by: INTERNAL MEDICINE

## 2019-05-21 PROCEDURE — 99999 PR PBB SHADOW E&M-EST. PATIENT-LVL III: ICD-10-PCS | Mod: PBBFAC,HCWC,, | Performed by: FAMILY MEDICINE

## 2019-05-21 PROCEDURE — 96366 THER/PROPH/DIAG IV INF ADDON: CPT | Performed by: EMERGENCY MEDICINE

## 2019-05-21 RX ORDER — FUROSEMIDE 10 MG/ML
40 INJECTION INTRAMUSCULAR; INTRAVENOUS 2 TIMES DAILY
Status: DISCONTINUED | OUTPATIENT
Start: 2019-05-21 | End: 2019-05-23

## 2019-05-21 RX ORDER — SODIUM CHLORIDE 0.9 % (FLUSH) 0.9 %
10 SYRINGE (ML) INJECTION
Status: DISCONTINUED | OUTPATIENT
Start: 2019-05-21 | End: 2019-05-24 | Stop reason: HOSPADM

## 2019-05-21 RX ORDER — ONDANSETRON 2 MG/ML
4 INJECTION INTRAMUSCULAR; INTRAVENOUS EVERY 8 HOURS PRN
Status: DISCONTINUED | OUTPATIENT
Start: 2019-05-21 | End: 2019-05-24 | Stop reason: HOSPADM

## 2019-05-21 RX ORDER — POTASSIUM CHLORIDE 20 MEQ/1
40 TABLET, EXTENDED RELEASE ORAL EVERY 4 HOURS
Status: COMPLETED | OUTPATIENT
Start: 2019-05-21 | End: 2019-05-21

## 2019-05-21 RX ORDER — ASPIRIN 325 MG
325 TABLET ORAL
Status: COMPLETED | OUTPATIENT
Start: 2019-05-21 | End: 2019-05-21

## 2019-05-21 RX ORDER — METOPROLOL SUCCINATE 50 MG/1
100 TABLET, EXTENDED RELEASE ORAL DAILY
Status: DISCONTINUED | OUTPATIENT
Start: 2019-05-22 | End: 2019-05-21

## 2019-05-21 RX ORDER — IPRATROPIUM BROMIDE AND ALBUTEROL SULFATE 2.5; .5 MG/3ML; MG/3ML
3 SOLUTION RESPIRATORY (INHALATION) EVERY 6 HOURS PRN
Status: DISCONTINUED | OUTPATIENT
Start: 2019-05-21 | End: 2019-05-24 | Stop reason: HOSPADM

## 2019-05-21 RX ORDER — LORAZEPAM 1 MG/1
1 TABLET ORAL EVERY 6 HOURS PRN
Status: DISCONTINUED | OUTPATIENT
Start: 2019-05-21 | End: 2019-05-24 | Stop reason: HOSPADM

## 2019-05-21 RX ORDER — AMIODARONE HYDROCHLORIDE 200 MG/1
400 TABLET ORAL 2 TIMES DAILY
Status: DISCONTINUED | OUTPATIENT
Start: 2019-05-21 | End: 2019-05-21

## 2019-05-21 RX ORDER — FUROSEMIDE 10 MG/ML
80 INJECTION INTRAMUSCULAR; INTRAVENOUS
Status: COMPLETED | OUTPATIENT
Start: 2019-05-21 | End: 2019-05-21

## 2019-05-21 RX ORDER — POTASSIUM CHLORIDE 20 MEQ/1
40 TABLET, EXTENDED RELEASE ORAL
Status: COMPLETED | OUTPATIENT
Start: 2019-05-21 | End: 2019-05-21

## 2019-05-21 RX ORDER — PANTOPRAZOLE SODIUM 40 MG/1
40 TABLET, DELAYED RELEASE ORAL DAILY
Status: DISCONTINUED | OUTPATIENT
Start: 2019-05-21 | End: 2019-05-24 | Stop reason: HOSPADM

## 2019-05-21 RX ORDER — PRAVASTATIN SODIUM 20 MG/1
40 TABLET ORAL NIGHTLY
Status: DISCONTINUED | OUTPATIENT
Start: 2019-05-21 | End: 2019-05-24 | Stop reason: HOSPADM

## 2019-05-21 RX ADMIN — LORAZEPAM 1 MG: 1 TABLET ORAL at 09:05

## 2019-05-21 RX ADMIN — ASPIRIN 325 MG ORAL TABLET 325 MG: 325 PILL ORAL at 11:05

## 2019-05-21 RX ADMIN — POTASSIUM CHLORIDE 40 MEQ: 1500 TABLET, EXTENDED RELEASE ORAL at 02:05

## 2019-05-21 RX ADMIN — FUROSEMIDE 80 MG: 10 INJECTION, SOLUTION INTRAMUSCULAR; INTRAVENOUS at 09:05

## 2019-05-21 RX ADMIN — PANTOPRAZOLE SODIUM 40 MG: 40 TABLET, DELAYED RELEASE ORAL at 03:05

## 2019-05-21 RX ADMIN — POTASSIUM CHLORIDE 40 MEQ: 1500 TABLET, EXTENDED RELEASE ORAL at 09:05

## 2019-05-21 RX ADMIN — FUROSEMIDE 40 MG: 10 INJECTION, SOLUTION INTRAMUSCULAR; INTRAVENOUS at 05:05

## 2019-05-21 RX ADMIN — AMIODARONE HYDROCHLORIDE 150 MG: 1.5 INJECTION, SOLUTION INTRAVENOUS at 02:05

## 2019-05-21 RX ADMIN — APIXABAN 5 MG: 2.5 TABLET, FILM COATED ORAL at 09:05

## 2019-05-21 RX ADMIN — POTASSIUM CHLORIDE 40 MEQ: 1500 TABLET, EXTENDED RELEASE ORAL at 11:05

## 2019-05-21 RX ADMIN — PRAVASTATIN SODIUM 40 MG: 20 TABLET ORAL at 09:05

## 2019-05-21 RX ADMIN — AMIODARONE HYDROCHLORIDE 0.5 MG/MIN: 1.8 INJECTION, SOLUTION INTRAVENOUS at 09:05

## 2019-05-21 RX ADMIN — AMIODARONE HYDROCHLORIDE 1 MG/MIN: 1.8 INJECTION, SOLUTION INTRAVENOUS at 03:05

## 2019-05-21 RX ADMIN — POTASSIUM CHLORIDE 40 MEQ: 1500 TABLET, EXTENDED RELEASE ORAL at 05:05

## 2019-05-21 RX ADMIN — NITROGLYCERIN 0.5 INCH: 20 OINTMENT TOPICAL at 09:05

## 2019-05-21 NOTE — ASSESSMENT & PLAN NOTE
- Diureses with IV Lasix 40 mg BID.  - Strict I&Os, daily weights, Na/fluid restriction.  - BP/HR control.  - Recent TTE reviewed.  -Cardiology consult

## 2019-05-21 NOTE — CONSULTS
[facility] -   Cardiology  Consult Note    Patient Name: Elke Laws  MRN: [id number]  Admission Date: 5/21/2019  Hospital Length of Stay: 0 days  Code Status: DNR   Attending Provider: Sekou Mccall MD   Consulting Provider: KRYSTIN Huitron  Primary Care Physician: Isidra Benavidez MD  Principal Problem:Acute on chronic diastolic heart failure    Patient information was obtained from patient and ER records.     Inpatient consult to Cardiology  Consult performed by: KRYSTIN Stevens  Consult ordered by: Bakari Hooper Jr., MD  Reason for consult: CHF, A-fib and elevated troponin         Subjective:     Chief Complaint:  Fatigue      HPI:   Ms. Laws is a 83 y/o female with PMH persistent AF, diastolic CHF, non obstructive CAD per [facility] done in , HTN and HLD. She presented to the ED with complaints of weakness.   Echo showed EF 55%, mild AI and biatrial dilation  She has had several admission this year for CHF exacerbation. EKG shows A-fib. Rate non controlled on tele monitor.Attempted LISSETH/DCCV on 04/16 and started on flecainide as an OP, but HR Still not controlled. Flecainide has been increased multiple times. Is anticoagulated with Eliqius.   Troponin flat at 0.045, 0.040. , K 2.8, Cr 1.3, BUN 52, H/H 14.0/42.3.   Currently being admitted for CHF exacerbation likely secondary to A-fib     Past Medical History:   Diagnosis Date    Abnormal nuclear stress test 6/30/2016    Acute congestive heart failure 5/27/2017    Acute coronary syndrome     Acute kidney injury (nontraumatic) 5/27/2017    Acute kidney injury (nontraumatic) 5/27/2017    Acute on chronic congestive heart failure 5/21/2017    Acute on chronic diastolic congestive heart failure 8/27/2015    Acute on chronic respiratory failure 5/6/2019    Anemia 5/9/2016    Anemia 5/9/2016    Angina pectoris 1/25/2018    Anticoagulant long-term use     Aortic regurgitation     Echo 11/2013---5 - Mild to moderate  "aortic regurgitation.      Asthma     Atrial fibrillation 5/15/2014    Back pain     s/p epidural steriod injections, no recent injections.    Baker's cyst     small, right, seen on US lower extremity 6/2014    Blood transfusion     Approximately 6 years ago    Chronic constipation     Coronary artery disease     Degenerative disc disease, lumbar     Diastolic dysfunction     Seen on echo 11/2013, stress test negative 5/2014    Diverticulosis     colonoscopy 3/27/2011    E coli bacteremia 5/23/2017    E. coli UTI (urinary tract infection) 5/23/2017    Hemorrhoids     colonoscopy 3/27/2011    Hiatal hernia     CXR 12/30/2016---Retrocardiac density again noted consistent with a hiatal hernia.    Hx of adenomatous colonic polyps     Hyperlipidemia     Hypertension     Hyponatremia 5/9/2016    Hypoxemia 5/27/2017    Hypoxia 6/28/2017    Leukocytosis 5/27/2017    Mitral regurgitation     Myocardial infarction     per patient was told in the past she had a "mild heart attack"    Obesity     Osteoarthritis, knee     Pneumonia     per patient "walking Pneumonia" did not require hospitalization    Seasonal allergies     Sepsis 5/22/2017    Trouble in sleeping     Urinary incontinence        Past Surgical History:   Procedure Laterality Date    APPENDECTOMY      CARDIOVERSION OR DEFIBRILLATION N/A 4/16/2019    [facility] CATH LAB    COLONOSCOPY okay by dr. ramos N/A 8/29/2017    Performed by Toño Abdi MD at [facility] ENDO    ECHOCARDIOGRAM,TRANSESOPHAGEAL N/A 4/16/2019    Performed by [facility] at [facility] CATH LAB    EYE SURGERY Left     HYSTERECTOMY      benign reasons    KNEE SURGERY Bilateral     x2     Left heart cath      OLECRANON BURSECTOMY Right     6/2011    TONSILLECTOMY      URETHRA SURGERY         Review of patient's allergies indicates:   Allergen Reactions    Iodine and iodide containing products Rash       No current facility-administered " medications on file prior to encounter.      Current Outpatient Medications on File Prior to Encounter   Medication Sig    acetaminophen (TYLENOL) 500 MG tablet Take 1 tablet (500 mg total) by mouth 2 (two) times daily. (Patient taking differently: Take 500 mg by mouth 2 (two) times daily as needed. )    albuterol (ACCUNEB) 0.63 mg/3 mL Nebu Take 3 mLs (0.63 mg total) by nebulization every 6 (six) hours as needed. Rescue    albuterol 90 mcg/actuation inhaler Inhale 2 puffs into the lungs 4 (four) times daily.    ASCORBATE CALCIUM (VITAMIN C ORAL) Take 500 mg by mouth once daily.     DOCOSAHEXANOIC ACID/EPA (FISH OIL ORAL) Take 500 mg by mouth once daily.     ELIQUIS 5 mg Tab TAKE 1 TABLET BY MOUTH TWICE DAILY    furosemide (LASIX) 20 MG tablet Take 2 tablets (40 mg total) by mouth 2 (two) times daily.    LORazepam (ATIVAN) 1 MG tablet Take 1 tablet (1 mg total) by mouth every 6 (six) hours as needed for Anxiety.    metOLazone (ZAROXOLYN) 2.5 MG tablet Take 1 tablet (2.5 mg total) by mouth every Mon, Wed, Fri.    metoprolol succinate (TOPROL-XL) 100 MG 24 hr tablet TAKE 1 TABLET EVERY DAY    pravastatin (PRAVACHOL) 40 MG tablet TAKE 1 TABLET EVERY DAY (Patient taking differently: TAKE 1 TABLET EVERY DAY pt taking two daily)    compressor, for nebulizer Lara Compressor use as directed by albuterol use.    fluticasone (FLOVENT HFA) 110 mcg/actuation inhaler Inhale 1 puff into the lungs 2 (two) times daily.     Family History     Problem Relation (Age of Onset)    COPD Son    Cancer Mother, Sister    Diabetes Brother, Son    Heart disease Mother, Father, Sister, Brother    Hyperlipidemia Mother, Father    Hypertension Mother, Father, Son        Tobacco Use    Smoking status: Former Smoker     Packs/day: 0.05     Years: 1.00     Pack years: 0.05     Types: Cigarettes     Last attempt to quit: 1952     Years since quittin.4    Smokeless tobacco: Never Used   Substance and Sexual Activity     Alcohol use: No     Alcohol/week: 0.0 oz    Drug use: No    Sexual activity: Never     Partners: Male     Birth control/protection: See Surgical Hx     Review of Systems   Constitution: Positive for malaise/fatigue. Negative for diaphoresis, weight gain and weight loss.   HENT: Negative for congestion and nosebleeds.    Cardiovascular: Positive for dyspnea on exertion. Negative for chest pain, claudication, cyanosis, irregular heartbeat, leg swelling, near-syncope, orthopnea, palpitations, paroxysmal nocturnal dyspnea and syncope.   Respiratory: Positive for shortness of breath. Negative for cough, hemoptysis, sleep disturbances due to breathing, snoring, sputum production and wheezing.    Hematologic/Lymphatic: Negative for bleeding problem. Bruises/bleeds easily.   Skin: Negative for rash.   Musculoskeletal: Negative for arthritis, back pain, falls, joint pain, muscle cramps and muscle weakness.   Gastrointestinal: Negative for abdominal pain, constipation, diarrhea, heartburn, hematemesis, hematochezia, melena and nausea.   Genitourinary: Negative for dysuria, hematuria and nocturia.   Neurological: Negative for excessive daytime sleepiness, dizziness, headaches, light-headedness, loss of balance, numbness, vertigo and weakness.     Objective:     Vital Signs (Most Recent):  Temp: 97.8 °F (36.6 °C) (05/21/19 1347)  Pulse: 84 (05/21/19 1347)  Resp: 18 (05/21/19 1347)  BP: 127/77 (05/21/19 1347)  SpO2: 98 % (05/21/19 1347) Vital Signs (24h Range):  Temp:  [97.5 °F (36.4 °C)-97.8 °F (36.6 °C)] 97.8 °F (36.6 °C)  Pulse:  [66-84] 84  Resp:  [12-20] 18  SpO2:  [96 %-100 %] 98 %  BP: (110-147)/(59-77) 127/77     Weight: 78.9 kg (173 lb 15.1 oz)  Body mass index is 37.64 kg/m².    SpO2: 98 %  O2 Device (Oxygen Therapy): nasal cannula    No intake or output data in the 24 hours ending 05/21/19 1416    Lines/Drains/Airways     Peripheral Intravenous Line                 Peripheral IV - Single Lumen 05/21/19 1100 20 G  Right Antecubital less than 1 day                Physical Exam   Constitutional: She is oriented to person, place, and time. She appears well-developed and well-nourished.   Neck: Neck supple. No JVD present.   Cardiovascular: Normal rate, normal heart sounds and normal pulses. An irregularly irregular rhythm present. Exam reveals no friction rub.   No murmur heard.  Pulmonary/Chest: Effort normal and breath sounds normal. No respiratory distress. She has no wheezes. She has no rales.   Abdominal: Soft. Bowel sounds are normal. She exhibits no distension.   Musculoskeletal: She exhibits no edema or tenderness.   Neurological: She is alert and oriented to person, place, and time.   Skin: Skin is warm and dry. No rash noted.   Psychiatric: She has a normal mood and affect. Her behavior is normal.   Nursing note and vitals reviewed.      Significant Labs:   All pertinent lab results from the last 24 hours have been reviewed. and   Recent Lab Results       05/21/19  1240   05/21/19  0957        Albumin   3.7     Alkaline Phosphatase   138     ALT   25     Anion Gap   15     AST   35     Baso #   0.01     Basophil%   0.1     BILIRUBIN TOTAL   0.6  Comment:  For infants and newborns, interpretation of results should be based  on gestational age, weight and in agreement with clinical  observations.  Premature Infant recommended reference ranges:  Up to 24 hours.............<8.0 mg/dL  Up to 48 hours............<12.0 mg/dL  3-5 days..................<15.0 mg/dL  6-29 days.................<15.0 mg/dL       BNP   434  Comment:  Values of less than 100 pg/ml are consistent with non-CHF populations.     BUN, Bld   52     Calcium   10.3     Chloride   90     CO2   34     Creatinine   1.3     Differential Method   Automated     eGFR if    44     eGFR if non    38  Comment:  Calculation used to obtain the estimated glomerular filtration  rate (eGFR) is the CKD-EPI equation.        Eos #   0.1      Eosinophil%   1.2     Glucose   121     Gran # (ANC)   5.8     Gran%   67.0     Hematocrit   42.3     Hemoglobin   14.0     Coumadin Monitoring INR   1.0  Comment:  Coumadin Therapy:  2.0 - 3.0 for INR for all indicators except mechanical heart valves  and antiphospholipid syndromes which should use 2.5 - 3.5.       Lymph #   1.4     Lymph%   16.6     MCH   30.8     MCHC   33.1     MCV   93     Mono #   1.3     Mono%   15.1     MPV   11.7     Platelets   194     Potassium   2.8     PROTEIN TOTAL   8.0     Protime   10.7     RBC   4.55     RDW   14.1     Sodium   139     Troponin I 0.040  Comment:  The reference interval for Troponin I represents the 99th percentile   cutoff   for our facility and is consistent with 3rd generation assay   performance.   0.045  Comment:  The reference interval for Troponin I represents the 99th percentile   cutoff   for our facility and is consistent with 3rd generation assay   performance.       WBC   8.61           Significant Imaging: Echocardiogram:   2D echo with color flow doppler:   Results for orders placed or performed during the hospital encounter of 03/07/19   2D echo with color flow doppler   Result Value Ref Range    QEF 60 55 - 65    Aortic Valve Regurgitation MILD     Est. PA Systolic Pressure 58.35 (A)     Tricuspid Valve Regurgitation MILD TO MODERATE      Assessment and Plan:     * Acute on chronic diastolic HFpEF of 60-65%  Echo in March 2019 shows DD with preserved EF     CXR normal  Decompensated HF likely secondary to A-fib   Diurese with Lasix, but K needs to be replaced   BB on hold for now due to Bradycardia     Elevated troponin  Troponin flat at 0.045, 0.040  Likely demand from A-fib and CHF  Will trend enzymes for now     A-fib  A-fib likely cause of current decompensated HF  Has failed flecainide therapy as an OP  Will load with Amio and start gtt   Metoprolol on hold for now due to bradycardia, but will give amio only   Needs to resume Eliquis        Hyperlipidemia  Continue Statin     Essential hypertension  BP stable at this time.   toprol on hold due to bradycardia         VTE Risk Mitigation (From admission, onward)        Ordered     apixaban tablet 5 mg  2 times daily      05/21/19 1369        Chart reviewed. Patient examined by Dr. Gooden and agrees with plan that has been outlined.     Thank you for your consult. I will follow-up with patient. Please contact us if you have any additional questions.    Eve Joiner, KRYSTIN  Cardiology   [facility]

## 2019-05-21 NOTE — SUBJECTIVE & OBJECTIVE
"Past Medical History:   Diagnosis Date    Abnormal nuclear stress test 6/30/2016    Acute congestive heart failure 5/27/2017    Acute coronary syndrome     Acute kidney injury (nontraumatic) 5/27/2017    Acute kidney injury (nontraumatic) 5/27/2017    Acute on chronic congestive heart failure 5/21/2017    Acute on chronic diastolic congestive heart failure 8/27/2015    Acute on chronic respiratory failure 5/6/2019    Anemia 5/9/2016    Anemia 5/9/2016    Angina pectoris 1/25/2018    Anticoagulant long-term use     Aortic regurgitation     Echo 11/2013---5 - Mild to moderate aortic regurgitation.      Asthma     Atrial fibrillation 5/15/2014    Back pain     s/p epidural steriod injections, no recent injections.    Baker's cyst     small, right, seen on US lower extremity 6/2014    Blood transfusion     Approximately 6 years ago    Chronic constipation     Coronary artery disease     Degenerative disc disease, lumbar     Diastolic dysfunction     Seen on echo 11/2013, stress test negative 5/2014    Diverticulosis     colonoscopy 3/27/2011    E coli bacteremia 5/23/2017    E. coli UTI (urinary tract infection) 5/23/2017    Hemorrhoids     colonoscopy 3/27/2011    Hiatal hernia     CXR 12/30/2016---Retrocardiac density again noted consistent with a hiatal hernia.    Hx of adenomatous colonic polyps     Hyperlipidemia     Hypertension     Hyponatremia 5/9/2016    Hypoxemia 5/27/2017    Hypoxia 6/28/2017    Leukocytosis 5/27/2017    Mitral regurgitation     Myocardial infarction     per patient was told in the past she had a "mild heart attack"    Obesity     Osteoarthritis, knee     Pneumonia     per patient "walking Pneumonia" did not require hospitalization    Seasonal allergies     Sepsis 5/22/2017    Trouble in sleeping     Urinary incontinence        Past Surgical History:   Procedure Laterality Date    APPENDECTOMY      CARDIOVERSION OR DEFIBRILLATION N/A 4/16/2019    " Performed by K[facility] at [facility] CATH LAB    COLONOSCOPY okay by dr. ramos N/A 8/29/2017    Performed by Toño Abdi MD at [facility] ENDO    ECHOCARDIOGRAM,TRANSESOPHAGEAL N/A 4/16/2019    Performed by Kee Jones MD at [facility] CATH LAB    EYE SURGERY Left     HYSTERECTOMY      benign reasons    KNEE SURGERY Bilateral     x2     Left heart cath      OLECRANON BURSECTOMY Right     6/2011    TONSILLECTOMY      URETHRA SURGERY         Review of patient's allergies indicates:   Allergen Reactions    Iodine and iodide containing products Rash       No current facility-administered medications on file prior to encounter.      Current Outpatient Medications on File Prior to Encounter   Medication Sig    acetaminophen (TYLENOL) 500 MG tablet Take 1 tablet (500 mg total) by mouth 2 (two) times daily. (Patient taking differently: Take 500 mg by mouth 2 (two) times daily as needed. )    albuterol (ACCUNEB) 0.63 mg/3 mL Nebu Take 3 mLs (0.63 mg total) by nebulization every 6 (six) hours as needed. Rescue    albuterol 90 mcg/actuation inhaler Inhale 2 puffs into the lungs 4 (four) times daily.    ASCORBATE CALCIUM (VITAMIN C ORAL) Take 500 mg by mouth once daily.     DOCOSAHEXANOIC ACID/EPA (FISH OIL ORAL) Take 500 mg by mouth once daily.     ELIQUIS 5 mg Tab TAKE 1 TABLET BY MOUTH TWICE DAILY    furosemide (LASIX) 20 MG tablet Take 2 tablets (40 mg total) by mouth 2 (two) times daily.    LORazepam (ATIVAN) 1 MG tablet Take 1 tablet (1 mg total) by mouth every 6 (six) hours as needed for Anxiety.    metOLazone (ZAROXOLYN) 2.5 MG tablet Take 1 tablet (2.5 mg total) by mouth every Mon, Wed, Fri.    metoprolol succinate (TOPROL-XL) 100 MG 24 hr tablet TAKE 1 TABLET EVERY DAY    pravastatin (PRAVACHOL) 40 MG tablet TAKE 1 TABLET EVERY DAY (Patient taking differently: TAKE 1 TABLET EVERY DAY pt taking two daily)    compressor, for nebulizer Lara Compressor use as directed by albuterol use.    fluticasone (FLOVENT  HFA) 110 mcg/actuation inhaler Inhale 1 puff into the lungs 2 (two) times daily.     Family History     Problem Relation (Age of Onset)    COPD Son    Cancer Mother, Sister    Diabetes Brother, Son    Heart disease Mother, Father, Sister, Brother    Hyperlipidemia Mother, Father    Hypertension Mother, Father, Son        Tobacco Use    Smoking status: Former Smoker     Packs/day: 0.05     Years: 1.00     Pack years: 0.05     Types: Cigarettes     Last attempt to quit: 1952     Years since quittin.4    Smokeless tobacco: Never Used   Substance and Sexual Activity    Alcohol use: No     Alcohol/week: 0.0 oz    Drug use: No    Sexual activity: Never     Partners: Male     Birth control/protection: See Surgical Hx     Review of Systems   Constitution: Positive for malaise/fatigue. Negative for diaphoresis, weight gain and weight loss.   HENT: Negative for congestion and nosebleeds.    Cardiovascular: Positive for dyspnea on exertion. Negative for chest pain, claudication, cyanosis, irregular heartbeat, leg swelling, near-syncope, orthopnea, palpitations, paroxysmal nocturnal dyspnea and syncope.   Respiratory: Positive for shortness of breath. Negative for cough, hemoptysis, sleep disturbances due to breathing, snoring, sputum production and wheezing.    Hematologic/Lymphatic: Negative for bleeding problem. Bruises/bleeds easily.   Skin: Negative for rash.   Musculoskeletal: Negative for arthritis, back pain, falls, joint pain, muscle cramps and muscle weakness.   Gastrointestinal: Negative for abdominal pain, constipation, diarrhea, heartburn, hematemesis, hematochezia, melena and nausea.   Genitourinary: Negative for dysuria, hematuria and nocturia.   Neurological: Negative for excessive daytime sleepiness, dizziness, headaches, light-headedness, loss of balance, numbness, vertigo and weakness.     Objective:     Vital Signs (Most Recent):  Temp: 97.8 °F (36.6 °C) (19 1347)  Pulse: 84 (19  1347)  Resp: 18 (05/21/19 1347)  BP: 127/77 (05/21/19 1347)  SpO2: 98 % (05/21/19 1347) Vital Signs (24h Range):  Temp:  [97.5 °F (36.4 °C)-97.8 °F (36.6 °C)] 97.8 °F (36.6 °C)  Pulse:  [66-84] 84  Resp:  [12-20] 18  SpO2:  [96 %-100 %] 98 %  BP: (110-147)/(59-77) 127/77     Weight: 78.9 kg (173 lb 15.1 oz)  Body mass index is 37.64 kg/m².    SpO2: 98 %  O2 Device (Oxygen Therapy): nasal cannula    No intake or output data in the 24 hours ending 05/21/19 1416    Lines/Drains/Airways     Peripheral Intravenous Line                 Peripheral IV - Single Lumen 05/21/19 1100 20 G Right Antecubital less than 1 day                Physical Exam   Constitutional: She is oriented to person, place, and time. She appears well-developed and well-nourished.   Neck: Neck supple. No JVD present.   Cardiovascular: Normal rate, normal heart sounds and normal pulses. An irregularly irregular rhythm present. Exam reveals no friction rub.   No murmur heard.  Pulmonary/Chest: Effort normal and breath sounds normal. No respiratory distress. She has no wheezes. She has no rales.   Abdominal: Soft. Bowel sounds are normal. She exhibits no distension.   Musculoskeletal: She exhibits no edema or tenderness.   Neurological: She is alert and oriented to person, place, and time.   Skin: Skin is warm and dry. No rash noted.   Psychiatric: She has a normal mood and affect. Her behavior is normal.   Nursing note and vitals reviewed.      Significant Labs:   All pertinent lab results from the last 24 hours have been reviewed. and   Recent Lab Results       05/21/19  1240   05/21/19  0957        Albumin   3.7     Alkaline Phosphatase   138     ALT   25     Anion Gap   15     AST   35     Baso #   0.01     Basophil%   0.1     BILIRUBIN TOTAL   0.6  Comment:  For infants and newborns, interpretation of results should be based  on gestational age, weight and in agreement with clinical  observations.  Premature Infant recommended reference ranges:  Up  to 24 hours.............<8.0 mg/dL  Up to 48 hours............<12.0 mg/dL  3-5 days..................<15.0 mg/dL  6-29 days.................<15.0 mg/dL       BNP   434  Comment:  Values of less than 100 pg/ml are consistent with non-CHF populations.     BUN, Bld   52     Calcium   10.3     Chloride   90     CO2   34     Creatinine   1.3     Differential Method   Automated     eGFR if    44     eGFR if non    38  Comment:  Calculation used to obtain the estimated glomerular filtration  rate (eGFR) is the CKD-EPI equation.        Eos #   0.1     Eosinophil%   1.2     Glucose   121     Gran # (ANC)   5.8     Gran%   67.0     Hematocrit   42.3     Hemoglobin   14.0     Coumadin Monitoring INR   1.0  Comment:  Coumadin Therapy:  2.0 - 3.0 for INR for all indicators except mechanical heart valves  and antiphospholipid syndromes which should use 2.5 - 3.5.       Lymph #   1.4     Lymph%   16.6     MCH   30.8     MCHC   33.1     MCV   93     Mono #   1.3     Mono%   15.1     MPV   11.7     Platelets   194     Potassium   2.8     PROTEIN TOTAL   8.0     Protime   10.7     RBC   4.55     RDW   14.1     Sodium   139     Troponin I 0.040  Comment:  The reference interval for Troponin I represents the 99th percentile   cutoff   for our facility and is consistent with 3rd generation assay   performance.   0.045  Comment:  The reference interval for Troponin I represents the 99th percentile   cutoff   for our facility and is consistent with 3rd generation assay   performance.       WBC   8.61           Significant Imaging: Echocardiogram:   2D echo with color flow doppler:   Results for orders placed or performed during the hospital encounter of 03/07/19   2D echo with color flow doppler   Result Value Ref Range    QEF 60 55 - 65    Aortic Valve Regurgitation MILD     Est. PA Systolic Pressure 58.35 (A)     Tricuspid Valve Regurgitation MILD TO MODERATE

## 2019-05-21 NOTE — ASSESSMENT & PLAN NOTE
Echo in March 2019 shows DD with preserved EF     CXR normal  Decompensated HF likely secondary to A-fib   Diurese with Lasix, but K needs to be replaced   BB on hold for now due to Bradycardia

## 2019-05-21 NOTE — NURSING
Upon entering pt's room, vital sign machine was disconnected from pt. Machine was set to cycle VS for amiodarone drip. Unable to obtain last 15min or 2-30 mins.

## 2019-05-21 NOTE — H&P
[facility] Hospital Medicine  History & Physical    Patient Name: Elke Laws  MRN: [id number]  Admission Date: 5/21/2019  Attending Physician: Sekou Mccall MD   Primary Care Provider: Isidra Benavidez MD         Patient information was obtained from patient and ER records.     Subjective:     Principal Problem:Acute on chronic diastolic heart failure    Chief Complaint:   Chief Complaint   Patient presents with    Fatigue     generalized weakness with SOB started last night; sent by dr benavidez         HPI: Elke Laws is a 84 y.o. female patient  with PMHx of chronic AF on Eliquis, HTN, chronic diastolic HFpEF of 60-65%, CAD, HLD, CKD, and asthma, who presents to the Emergency Department for generalized weakness which onset gradually last night. Symptoms are constant and moderate in severity. No mitigating or exacerbating factors reported. Associated sxs include SOB, nausea, constipation, CP. Patient denies any V/D, fever, chills, dizziness, cough, back pain, neck pain, and all other sxs at this time. She was recently admitted for acute decompensated heart failure, diuresed with IV Lasix which she responded well to and  was discharged on 5/8. She uses continuous home oxygen at 2L/min via [place]. ED workup resulted K+ 2.8, BUN 52, troponin 0.040, , and CXR shows no acute findings. Today, ekg showed afib. Hospital Medicine was called for admission. She is a DNR and her surrogate decision maker is her son, Christiano.             Past Medical History:   Diagnosis Date    Abnormal nuclear stress test 6/30/2016    Acute congestive heart failure 5/27/2017    Acute coronary syndrome     Acute kidney injury (nontraumatic) 5/27/2017    Acute kidney injury (nontraumatic) 5/27/2017    Acute on chronic congestive heart failure 5/21/2017    Acute on chronic diastolic congestive heart failure 8/27/2015    Acute on chronic respiratory failure 5/6/2019    Anemia 5/9/2016    Anemia 5/9/2016     "Angina pectoris 1/25/2018    Anticoagulant long-term use     Aortic regurgitation     Echo 11/2013---5 - Mild to moderate aortic regurgitation.      Asthma     Atrial fibrillation 5/15/2014    Back pain     s/p epidural steriod injections, no recent injections.    Baker's cyst     small, right, seen on US lower extremity 6/2014    Blood transfusion     Approximately 6 years ago    Chronic constipation     Coronary artery disease     Degenerative disc disease, lumbar     Diastolic dysfunction     Seen on echo 11/2013, stress test negative 5/2014    Diverticulosis     colonoscopy 3/27/2011    E coli bacteremia 5/23/2017    E. coli UTI (urinary tract infection) 5/23/2017    Hemorrhoids     colonoscopy 3/27/2011    Hiatal hernia     CXR 12/30/2016---Retrocardiac density again noted consistent with a hiatal hernia.    Hx of adenomatous colonic polyps     Hyperlipidemia     Hypertension     Hyponatremia 5/9/2016    Hypoxemia 5/27/2017    Hypoxia 6/28/2017    Leukocytosis 5/27/2017    Mitral regurgitation     Myocardial infarction     per patient was told in the past she had a "mild heart attack"    Obesity     Osteoarthritis, knee     Pneumonia     per patient "walking Pneumonia" did not require hospitalization    Seasonal allergies     Sepsis 5/22/2017    Trouble in sleeping     Urinary incontinence        Past Surgical History:   Procedure Laterality Date    APPENDECTOMY      CARDIOVERSION OR DEFIBRILLATION N/A 4/16/2019    Performed by [facility] at [facility] CATH LAB    COLONOSCOPY okay by dr. ramos N/A 8/29/2017    Performed by Toño Abdi MD at [facility] ENDO    ECHOCARDIOGRAM,TRANSESOPHAGEAL N/A 4/16/2019    Performed by Kee Jones MD at [facility] CATH LAB    EYE SURGERY Left     HYSTERECTOMY      benign reasons    KNEE SURGERY Bilateral     x2     Left heart cath      OLECRANON BURSECTOMY Right     6/2011    TONSILLECTOMY      URETHRA SURGERY         Review of patient's allergies " indicates:   Allergen Reactions    Iodine and iodide containing products Rash       No current facility-administered medications on file prior to encounter.      Current Outpatient Medications on File Prior to Encounter   Medication Sig    acetaminophen (TYLENOL) 500 MG tablet Take 1 tablet (500 mg total) by mouth 2 (two) times daily. (Patient taking differently: Take 500 mg by mouth 2 (two) times daily as needed. )    albuterol (ACCUNEB) 0.63 mg/3 mL Nebu Take 3 mLs (0.63 mg total) by nebulization every 6 (six) hours as needed. Rescue    albuterol 90 mcg/actuation inhaler Inhale 2 puffs into the lungs 4 (four) times daily.    ASCORBATE CALCIUM (VITAMIN C ORAL) Take 500 mg by mouth once daily.     DOCOSAHEXANOIC ACID/EPA (FISH OIL ORAL) Take 500 mg by mouth once daily.     ELIQUIS 5 mg Tab TAKE 1 TABLET BY MOUTH TWICE DAILY    furosemide (LASIX) 20 MG tablet Take 2 tablets (40 mg total) by mouth 2 (two) times daily.    LORazepam (ATIVAN) 1 MG tablet Take 1 tablet (1 mg total) by mouth every 6 (six) hours as needed for Anxiety.    metOLazone (ZAROXOLYN) 2.5 MG tablet Take 1 tablet (2.5 mg total) by mouth every Mon, Wed, Fri.    metoprolol succinate (TOPROL-XL) 100 MG 24 hr tablet TAKE 1 TABLET EVERY DAY    pravastatin (PRAVACHOL) 40 MG tablet TAKE 1 TABLET EVERY DAY (Patient taking differently: TAKE 1 TABLET EVERY DAY pt taking two daily)    compressor, for nebulizer Lara Compressor use as directed by albuterol use.    fluticasone (FLOVENT HFA) 110 mcg/actuation inhaler Inhale 1 puff into the lungs 2 (two) times daily.     Family History     Problem Relation (Age of Onset)    COPD Son    Cancer Mother, Sister    Diabetes Brother, Son    Heart disease Mother, Father, Sister, Brother    Hyperlipidemia Mother, Father    Hypertension Mother, Father, Son        Tobacco Use    Smoking status: Former Smoker     Packs/day: 0.05     Years: 1.00     Pack years: 0.05     Types: Cigarettes     Last attempt to  quit: 1952     Years since quittin.4    Smokeless tobacco: Never Used   Substance and Sexual Activity    Alcohol use: No     Alcohol/week: 0.0 oz    Drug use: No    Sexual activity: Never     Partners: Male     Birth control/protection: See Surgical Hx     Review of Systems   Constitutional: Negative.  Negative for activity change, appetite change, fatigue and fever.   HENT: Negative.    Eyes: Negative.    Respiratory: Positive for shortness of breath. Negative for cough and wheezing.    Cardiovascular: Positive for chest pain. Negative for palpitations and leg swelling.   Gastrointestinal: Positive for constipation and nausea. Negative for abdominal pain and vomiting.   Endocrine: Negative.    Genitourinary: Negative.  Negative for dysuria, frequency and urgency.   Musculoskeletal: Negative.    Skin: Negative.    Allergic/Immunologic: Negative.    Neurological: Positive for weakness. Negative for dizziness and headaches.   Hematological: Negative.    Psychiatric/Behavioral: Negative.      Objective:     Vital Signs (Most Recent):  Temp: 97.7 °F (36.5 °C) (19 0910)  Pulse: 72 (19 1302)  Resp: 18 (19 1302)  BP: (!) 112/59 (19 1302)  SpO2: 97 % (19 1302) Vital Signs (24h Range):  Temp:  [97.5 °F (36.4 °C)-97.7 °F (36.5 °C)] 97.7 °F (36.5 °C)  Pulse:  [66-74] 72  Resp:  [12-20] 18  SpO2:  [96 %-100 %] 97 %  BP: (110-147)/(59-68) 112/59     Weight: 78.9 kg (174 lb)  Body mass index is 37.65 kg/m².    Physical Exam   Constitutional: She is oriented to person, place, and time. She appears well-developed and well-nourished.   HENT:   Head: Normocephalic and atraumatic.   Eyes: Pupils are equal, round, and reactive to light. Conjunctivae and EOM are normal.   Neck: Normal range of motion. Neck supple. No JVD present.   Cardiovascular: Normal rate, normal heart sounds and intact distal pulses. An irregularly irregular rhythm present.   Pulmonary/Chest: Effort normal and breath  sounds normal. She has no rales.   Abdominal: Soft. Bowel sounds are normal.   Musculoskeletal: Normal range of motion.   Neurological: She is alert and oriented to person, place, and time. She has normal reflexes.   Skin: Skin is warm and dry.   Psychiatric: She has a normal mood and affect. Her behavior is normal. Judgment and thought content normal.   Nursing note and vitals reviewed.       Significant Labs:   BMP:   Recent Labs   Lab 05/21/19  0957   *      K 2.8*   CL 90*   CO2 34*   BUN 52*   CREATININE 1.3   CALCIUM 10.3     CBC:   Recent Labs   Lab 05/21/19  0957   WBC 8.61   HGB 14.0   HCT 42.3        CMP:   Recent Labs   Lab 05/21/19  0957      K 2.8*   CL 90*   CO2 34*   *   BUN 52*   CREATININE 1.3   CALCIUM 10.3   PROT 8.0   ALBUMIN 3.7   BILITOT 0.6   ALKPHOS 138*   AST 35   ALT 25   ANIONGAP 15   EGFRNONAA 38*     Cardiac Markers:   Recent Labs   Lab 05/21/19  0957   *     Troponin:   Recent Labs   Lab 05/21/19  0957 05/21/19  1240   TROPONINI 0.045* 0.040*     All pertinent labs within the past 24 hours have been reviewed.    Significant Imaging:   Imaging Results          X-Ray Chest AP Portable (Final result)  Result time 05/21/19 11:02:53    Final result by Ganga Jo MD (05/21/19 11:02:53)                 Impression:      No acute abnormality.      Electronically signed by: Ganga Jo  Date:    05/21/2019  Time:    11:02             Narrative:    EXAMINATION:  XR CHEST AP PORTABLE    CLINICAL HISTORY:  CHF;.    TECHNIQUE:  Single frontal portable view of the chest was performed.    COMPARISON:  05/05/2019    FINDINGS:  Support devices: Telemetry leads    Elevation right hemidiaphragm.The lungs are clear, with normal appearance of pulmonary vasculature and no pleural effusion or pneumothorax.    The cardiac silhouette is normal in size. The hilar and mediastinal contours are unremarkable.    Bones are intact.                               Assessment/Plan:     * Acute on chronic diastolic HFpEF of 60-65%  - Diureses with IV Lasix 40 mg BID.  - Strict I&Os, daily weights, Na/fluid restriction.  - BP/HR control.  - Recent TTE reviewed.  -Cardiology consult     A-fib  Rate controlled,  Telemetry monitoring   - Continue beta-blocker for HR/rhythm suppression.  - Continue Eliquis for anticoagulation.  - Amiodarone added   -Cardiology consult     Elevated troponin  Denies chest pain  Initial troponin 0.045  Trend serial troponin's   Cardiology aware         Stage 3 chronic kidney disease  Creatinine stable.  - Avoid nephrotoxic agents.  - BMP daily       Hyperlipidemia  Continue statin       Essential hypertension  Blood pressure stable.  - Continue beta-blocker.  Diuretics as above        VTE Risk Mitigation (From admission, onward)    None             Kathrine Mauricio NP  Department of Hospital Medicine   [facility]

## 2019-05-21 NOTE — ASSESSMENT & PLAN NOTE
Rate controlled,  Telemetry monitoring   - Continue beta-blocker for HR/rhythm suppression.  - Continue Eliquis for anticoagulation.  - Amiodarone added   -Cardiology consult

## 2019-05-21 NOTE — HPI
Ms. Laws is a 83 y/o female with PMH persistent AF, diastolic CHF, non obstructive CAD per [facility] done in , HTN and HLD. She presented to the ED with complaints of weakness.   Echo showed EF 55%, mild AI and biatrial dilation  She has had several admission this year for CHF exacerbation. EKG shows A-fib. Rate non controlled on tele monitor.Attempted LISSETH/DCCV on 04/16 and started on flecainide as an OP, but HR Still not controlled. Flecainide has been increased multiple times. Is anticoagulated with Eliqius.   Troponin flat at 0.045, 0.040. , K 2.8, Cr 1.3, BUN 52, H/H 14.0/42.3.   Currently being admitted for CHF exacerbation likely secondary to A-fib

## 2019-05-21 NOTE — SUBJECTIVE & OBJECTIVE
"Past Medical History:   Diagnosis Date    Abnormal nuclear stress test 6/30/2016    Acute congestive heart failure 5/27/2017    Acute coronary syndrome     Acute kidney injury (nontraumatic) 5/27/2017    Acute kidney injury (nontraumatic) 5/27/2017    Acute on chronic congestive heart failure 5/21/2017    Acute on chronic diastolic congestive heart failure 8/27/2015    Acute on chronic respiratory failure 5/6/2019    Anemia 5/9/2016    Anemia 5/9/2016    Angina pectoris 1/25/2018    Anticoagulant long-term use     Aortic regurgitation     Echo 11/2013---5 - Mild to moderate aortic regurgitation.      Asthma     Atrial fibrillation 5/15/2014    Back pain     s/p epidural steriod injections, no recent injections.    Baker's cyst     small, right, seen on US lower extremity 6/2014    Blood transfusion     Approximately 6 years ago    Chronic constipation     Coronary artery disease     Degenerative disc disease, lumbar     Diastolic dysfunction     Seen on echo 11/2013, stress test negative 5/2014    Diverticulosis     colonoscopy 3/27/2011    E coli bacteremia 5/23/2017    E. coli UTI (urinary tract infection) 5/23/2017    Hemorrhoids     colonoscopy 3/27/2011    Hiatal hernia     CXR 12/30/2016---Retrocardiac density again noted consistent with a hiatal hernia.    Hx of adenomatous colonic polyps     Hyperlipidemia     Hypertension     Hyponatremia 5/9/2016    Hypoxemia 5/27/2017    Hypoxia 6/28/2017    Leukocytosis 5/27/2017    Mitral regurgitation     Myocardial infarction     per patient was told in the past she had a "mild heart attack"    Obesity     Osteoarthritis, knee     Pneumonia     per patient "walking Pneumonia" did not require hospitalization    Seasonal allergies     Sepsis 5/22/2017    Trouble in sleeping     Urinary incontinence        Past Surgical History:   Procedure Laterality Date    APPENDECTOMY      CARDIOVERSION OR DEFIBRILLATION N/A 4/16/2019    " Performed by [facility] at [facility] CATH LAB    COLONOSCOPY okay by dr. ramos N/A 8/29/2017    Performed by Toño Abdi MD at [facility] ENDO    ECHOCARDIOGRAM,TRANSESOPHAGEAL N/A 4/16/2019    Performed by Kee Jones MD at [facility] CATH LAB    EYE SURGERY Left     HYSTERECTOMY      benign reasons    KNEE SURGERY Bilateral     x2     Left heart cath      OLECRANON BURSECTOMY Right     6/2011    TONSILLECTOMY      URETHRA SURGERY         Review of patient's allergies indicates:   Allergen Reactions    Iodine and iodide containing products Rash       No current facility-administered medications on file prior to encounter.      Current Outpatient Medications on File Prior to Encounter   Medication Sig    acetaminophen (TYLENOL) 500 MG tablet Take 1 tablet (500 mg total) by mouth 2 (two) times daily. (Patient taking differently: Take 500 mg by mouth 2 (two) times daily as needed. )    albuterol (ACCUNEB) 0.63 mg/3 mL Nebu Take 3 mLs (0.63 mg total) by nebulization every 6 (six) hours as needed. Rescue    albuterol 90 mcg/actuation inhaler Inhale 2 puffs into the lungs 4 (four) times daily.    ASCORBATE CALCIUM (VITAMIN C ORAL) Take 500 mg by mouth once daily.     DOCOSAHEXANOIC ACID/EPA (FISH OIL ORAL) Take 500 mg by mouth once daily.     ELIQUIS 5 mg Tab TAKE 1 TABLET BY MOUTH TWICE DAILY    furosemide (LASIX) 20 MG tablet Take 2 tablets (40 mg total) by mouth 2 (two) times daily.    LORazepam (ATIVAN) 1 MG tablet Take 1 tablet (1 mg total) by mouth every 6 (six) hours as needed for Anxiety.    metOLazone (ZAROXOLYN) 2.5 MG tablet Take 1 tablet (2.5 mg total) by mouth every Mon, Wed, Fri.    metoprolol succinate (TOPROL-XL) 100 MG 24 hr tablet TAKE 1 TABLET EVERY DAY    pravastatin (PRAVACHOL) 40 MG tablet TAKE 1 TABLET EVERY DAY (Patient taking differently: TAKE 1 TABLET EVERY DAY pt taking two daily)    compressor, for nebulizer Lara Compressor use as directed by albuterol use.    fluticasone (FLOVENT  HFA) 110 mcg/actuation inhaler Inhale 1 puff into the lungs 2 (two) times daily.     Family History     Problem Relation (Age of Onset)    COPD Son    Cancer Mother, Sister    Diabetes Brother, Son    Heart disease Mother, Father, Sister, Brother    Hyperlipidemia Mother, Father    Hypertension Mother, Father, Son        Tobacco Use    Smoking status: Former Smoker     Packs/day: 0.05     Years: 1.00     Pack years: 0.05     Types: Cigarettes     Last attempt to quit: 1952     Years since quittin.4    Smokeless tobacco: Never Used   Substance and Sexual Activity    Alcohol use: No     Alcohol/week: 0.0 oz    Drug use: No    Sexual activity: Never     Partners: Male     Birth control/protection: See Surgical Hx     Review of Systems   Constitutional: Negative.  Negative for activity change, appetite change, fatigue and fever.   HENT: Negative.    Eyes: Negative.    Respiratory: Positive for shortness of breath. Negative for cough and wheezing.    Cardiovascular: Positive for chest pain. Negative for palpitations and leg swelling.   Gastrointestinal: Positive for constipation and nausea. Negative for abdominal pain and vomiting.   Endocrine: Negative.    Genitourinary: Negative.  Negative for dysuria, frequency and urgency.   Musculoskeletal: Negative.    Skin: Negative.    Allergic/Immunologic: Negative.    Neurological: Positive for weakness. Negative for dizziness and headaches.   Hematological: Negative.    Psychiatric/Behavioral: Negative.      Objective:     Vital Signs (Most Recent):  Temp: 97.7 °F (36.5 °C) (19 0910)  Pulse: 72 (19 1302)  Resp: 18 (19 1302)  BP: (!) 112/59 (19 1302)  SpO2: 97 % (19 1302) Vital Signs (24h Range):  Temp:  [97.5 °F (36.4 °C)-97.7 °F (36.5 °C)] 97.7 °F (36.5 °C)  Pulse:  [66-74] 72  Resp:  [12-20] 18  SpO2:  [96 %-100 %] 97 %  BP: (110-147)/(59-68) 112/59     Weight: 78.9 kg (174 lb)  Body mass index is 37.65 kg/m².    Physical Exam    Constitutional: She is oriented to person, place, and time. She appears well-developed and well-nourished.   HENT:   Head: Normocephalic and atraumatic.   Eyes: Pupils are equal, round, and reactive to light. Conjunctivae and EOM are normal.   Neck: Normal range of motion. Neck supple. No JVD present.   Cardiovascular: Normal rate, normal heart sounds and intact distal pulses. An irregularly irregular rhythm present.   Pulmonary/Chest: Effort normal and breath sounds normal. She has no rales.   Abdominal: Soft. Bowel sounds are normal.   Musculoskeletal: Normal range of motion.   Neurological: She is alert and oriented to person, place, and time. She has normal reflexes.   Skin: Skin is warm and dry.   Psychiatric: She has a normal mood and affect. Her behavior is normal. Judgment and thought content normal.   Nursing note and vitals reviewed.       Significant Labs:   BMP:   Recent Labs   Lab 05/21/19  0957   *      K 2.8*   CL 90*   CO2 34*   BUN 52*   CREATININE 1.3   CALCIUM 10.3     CBC:   Recent Labs   Lab 05/21/19  0957   WBC 8.61   HGB 14.0   HCT 42.3        CMP:   Recent Labs   Lab 05/21/19  0957      K 2.8*   CL 90*   CO2 34*   *   BUN 52*   CREATININE 1.3   CALCIUM 10.3   PROT 8.0   ALBUMIN 3.7   BILITOT 0.6   ALKPHOS 138*   AST 35   ALT 25   ANIONGAP 15   EGFRNONAA 38*     Cardiac Markers:   Recent Labs   Lab 05/21/19  0957   *     Troponin:   Recent Labs   Lab 05/21/19  0957 05/21/19  1240   TROPONINI 0.045* 0.040*     All pertinent labs within the past 24 hours have been reviewed.    Significant Imaging:   Imaging Results          X-Ray Chest AP Portable (Final result)  Result time 05/21/19 11:02:53    Final result by Ganga Jo MD (05/21/19 11:02:53)                 Impression:      No acute abnormality.      Electronically signed by: Ganga Jo  Date:    05/21/2019  Time:    11:02             Narrative:    EXAMINATION:  XR CHEST AP PORTABLE    CLINICAL  HISTORY:  CHF;.    TECHNIQUE:  Single frontal portable view of the chest was performed.    COMPARISON:  05/05/2019    FINDINGS:  Support devices: Telemetry leads    Elevation right hemidiaphragm.The lungs are clear, with normal appearance of pulmonary vasculature and no pleural effusion or pneumothorax.    The cardiac silhouette is normal in size. The hilar and mediastinal contours are unremarkable.    Bones are intact.

## 2019-05-21 NOTE — H&P (VIEW-ONLY)
[facility] -   Cardiology  Consult Note    Patient Name: Elke Laws  MRN: [id number]  Admission Date: 5/21/2019  Hospital Length of Stay: 0 days  Code Status: DNR   Attending Provider: Sekou Mccall MD   Consulting Provider: KRYSTIN Huitron  Primary Care Physician: Isidra Benavidez MD  Principal Problem:Acute on chronic diastolic heart failure    Patient information was obtained from patient and ER records.     Inpatient consult to Cardiology  Consult performed by: KRYSTIN Stevens  Consult ordered by: Bakari Hooper Jr., MD  Reason for consult: CHF, A-fib and elevated troponin         Subjective:     Chief Complaint:  Fatigue      HPI:   Ms. Laws is a 83 y/o female with PMH persistent AF, diastolic CHF, non obstructive CAD per [facility] [place] done in , HTN and HLD. She presented to the ED with complaints of weakness.   Echo showed EF 55%, mild AI and biatrial dilation  She has had several admission this year for CHF exacerbation. EKG shows A-fib. Rate non controlled on tele monitor.Attempted LISSETH/DCCV on 04/16 and started on flecainide as an OP, but HR Still not controlled. Flecainide has been increased multiple times. Is anticoagulated with Eliqius.   Troponin flat at 0.045, 0.040. , K 2.8, Cr 1.3, BUN 52, H/H 14.0/42.3.   Currently being admitted for CHF exacerbation likely secondary to A-fib     Past Medical History:   Diagnosis Date    Abnormal nuclear stress test 6/30/2016    Acute congestive heart failure 5/27/2017    Acute coronary syndrome     Acute kidney injury (nontraumatic) 5/27/2017    Acute kidney injury (nontraumatic) 5/27/2017    Acute on chronic congestive heart failure 5/21/2017    Acute on chronic diastolic congestive heart failure 8/27/2015    Acute on chronic respiratory failure 5/6/2019    Anemia 5/9/2016    Anemia 5/9/2016    Angina pectoris 1/25/2018    Anticoagulant long-term use     Aortic regurgitation     Echo 11/2013---5 - Mild to moderate  "aortic regurgitation.      Asthma     Atrial fibrillation 5/15/2014    Back pain     s/p epidural steriod injections, no recent injections.    Baker's cyst     small, right, seen on US lower extremity 6/2014    Blood transfusion     Approximately 6 years ago    Chronic constipation     Coronary artery disease     Degenerative disc disease, lumbar     Diastolic dysfunction     Seen on echo 11/2013, stress test negative 5/2014    Diverticulosis     colonoscopy 3/27/2011    E coli bacteremia 5/23/2017    E. coli UTI (urinary tract infection) 5/23/2017    Hemorrhoids     colonoscopy 3/27/2011    Hiatal hernia     CXR 12/30/2016---Retrocardiac density again noted consistent with a hiatal hernia.    Hx of adenomatous colonic polyps     Hyperlipidemia     Hypertension     Hyponatremia 5/9/2016    Hypoxemia 5/27/2017    Hypoxia 6/28/2017    Leukocytosis 5/27/2017    Mitral regurgitation     Myocardial infarction     per patient was told in the past she had a "mild heart attack"    Obesity     Osteoarthritis, knee     Pneumonia     per patient "walking Pneumonia" did not require hospitalization    Seasonal allergies     Sepsis 5/22/2017    Trouble in sleeping     Urinary incontinence        Past Surgical History:   Procedure Laterality Date    APPENDECTOMY      CARDIOVERSION OR DEFIBRILLATION N/A 4/16/2019    [facility]formed [facility] at [facility] CATH LAB    COLONOSCOPY okay by dr. ramos N/A 8/29/2017    Performed by Toño Abdi MD at [facility] ENDO    ECHOCARDIOGRAM,TRANSESOPHAGEAL N/A 4/16/2019    Performed by Kee Jones MD at [facility] CATH LAB    EYE SURGERY Left     HYSTERECTOMY      benign reasons    KNEE SURGERY Bilateral     x2     Left heart cath      OLECRANON BURSECTOMY Right     6/2011    TONSILLECTOMY      URETHRA SURGERY         Review of patient's allergies indicates:   Allergen Reactions    Iodine and iodide containing products Rash       No current facility-administered " medications on file prior to encounter.      Current Outpatient Medications on File Prior to Encounter   Medication Sig    acetaminophen (TYLENOL) 500 MG tablet Take 1 tablet (500 mg total) by mouth 2 (two) times daily. (Patient taking differently: Take 500 mg by mouth 2 (two) times daily as needed. )    albuterol (ACCUNEB) 0.63 mg/3 mL Nebu Take 3 mLs (0.63 mg total) by nebulization every 6 (six) hours as needed. Rescue    albuterol 90 mcg/actuation inhaler Inhale 2 puffs into the lungs 4 (four) times daily.    ASCORBATE CALCIUM (VITAMIN C ORAL) Take 500 mg by mouth once daily.     DOCOSAHEXANOIC ACID/EPA (FISH OIL ORAL) Take 500 mg by mouth once daily.     ELIQUIS 5 mg Tab TAKE 1 TABLET BY MOUTH TWICE DAILY    furosemide (LASIX) 20 MG tablet Take 2 tablets (40 mg total) by mouth 2 (two) times daily.    LORazepam (ATIVAN) 1 MG tablet Take 1 tablet (1 mg total) by mouth every 6 (six) hours as needed for Anxiety.    metOLazone (ZAROXOLYN) 2.5 MG tablet Take 1 tablet (2.5 mg total) by mouth every Mon, Wed, Fri.    metoprolol succinate (TOPROL-XL) 100 MG 24 hr tablet TAKE 1 TABLET EVERY DAY    pravastatin (PRAVACHOL) 40 MG tablet TAKE 1 TABLET EVERY DAY (Patient taking differently: TAKE 1 TABLET EVERY DAY pt taking two daily)    compressor, for nebulizer Lara Compressor use as directed by albuterol use.    fluticasone (FLOVENT HFA) 110 mcg/actuation inhaler Inhale 1 puff into the lungs 2 (two) times daily.     Family History     Problem Relation (Age of Onset)    COPD Son    Cancer Mother, Sister    Diabetes Brother, Son    Heart disease Mother, Father, Sister, Brother    Hyperlipidemia Mother, Father    Hypertension Mother, Father, Son        Tobacco Use    Smoking status: Former Smoker     Packs/day: 0.05     Years: 1.00     Pack years: 0.05     Types: Cigarettes     Last attempt to quit: 1952     Years since quittin.4    Smokeless tobacco: Never Used   Substance and Sexual Activity     Alcohol use: No     Alcohol/week: 0.0 oz    Drug use: No    Sexual activity: Never     Partners: Male     Birth control/protection: See Surgical Hx     Review of Systems   Constitution: Positive for malaise/fatigue. Negative for diaphoresis, weight gain and weight loss.   HENT: Negative for congestion and nosebleeds.    Cardiovascular: Positive for dyspnea on exertion. Negative for chest pain, claudication, cyanosis, irregular heartbeat, leg swelling, near-syncope, orthopnea, palpitations, paroxysmal nocturnal dyspnea and syncope.   Respiratory: Positive for shortness of breath. Negative for cough, hemoptysis, sleep disturbances due to breathing, snoring, sputum production and wheezing.    Hematologic/Lymphatic: Negative for bleeding problem. Bruises/bleeds easily.   Skin: Negative for rash.   Musculoskeletal: Negative for arthritis, back pain, falls, joint pain, muscle cramps and muscle weakness.   Gastrointestinal: Negative for abdominal pain, constipation, diarrhea, heartburn, hematemesis, hematochezia, melena and nausea.   Genitourinary: Negative for dysuria, hematuria and nocturia.   Neurological: Negative for excessive daytime sleepiness, dizziness, headaches, light-headedness, loss of balance, numbness, vertigo and weakness.     Objective:     Vital Signs (Most Recent):  Temp: 97.8 °F (36.6 °C) (05/21/19 1347)  Pulse: 84 (05/21/19 1347)  Resp: 18 (05/21/19 1347)  BP: 127/77 (05/21/19 1347)  SpO2: 98 % (05/21/19 1347) Vital Signs (24h Range):  Temp:  [97.5 °F (36.4 °C)-97.8 °F (36.6 °C)] 97.8 °F (36.6 °C)  Pulse:  [66-84] 84  Resp:  [12-20] 18  SpO2:  [96 %-100 %] 98 %  BP: (110-147)/(59-77) 127/77     Weight: 78.9 kg (173 lb 15.1 oz)  Body mass index is 37.64 kg/m².    SpO2: 98 %  O2 Device (Oxygen Therapy): nasal cannula    No intake or output data in the 24 hours ending 05/21/19 1416    Lines/Drains/Airways     Peripheral Intravenous Line                 Peripheral IV - Single Lumen 05/21/19 1100 20 G  Right Antecubital less than 1 day                Physical Exam   Constitutional: She is oriented to person, place, and time. She appears well-developed and well-nourished.   Neck: Neck supple. No JVD present.   Cardiovascular: Normal rate, normal heart sounds and normal pulses. An irregularly irregular rhythm present. Exam reveals no friction rub.   No murmur heard.  Pulmonary/Chest: Effort normal and breath sounds normal. No respiratory distress. She has no wheezes. She has no rales.   Abdominal: Soft. Bowel sounds are normal. She exhibits no distension.   Musculoskeletal: She exhibits no edema or tenderness.   Neurological: She is alert and oriented to person, place, and time.   Skin: Skin is warm and dry. No rash noted.   Psychiatric: She has a normal mood and affect. Her behavior is normal.   Nursing note and vitals reviewed.      Significant Labs:   All pertinent lab results from the last 24 hours have been reviewed. and   Recent Lab Results       05/21/19  1240   05/21/19  0957        Albumin   3.7     Alkaline Phosphatase   138     ALT   25     Anion Gap   15     AST   35     Baso #   0.01     Basophil%   0.1     BILIRUBIN TOTAL   0.6  Comment:  For infants and newborns, interpretation of results should be based  on gestational age, weight and in agreement with clinical  observations.  Premature Infant recommended reference ranges:  Up to 24 hours.............<8.0 mg/dL  Up to 48 hours............<12.0 mg/dL  3-5 days..................<15.0 mg/dL  6-29 days.................<15.0 mg/dL       BNP   434  Comment:  Values of less than 100 pg/ml are consistent with non-CHF populations.     BUN, Bld   52     Calcium   10.3     Chloride   90     CO2   34     Creatinine   1.3     Differential Method   Automated     eGFR if    44     eGFR if non    38  Comment:  Calculation used to obtain the estimated glomerular filtration  rate (eGFR) is the CKD-EPI equation.        Eos #   0.1      Eosinophil%   1.2     Glucose   121     Gran # (ANC)   5.8     Gran%   67.0     Hematocrit   42.3     Hemoglobin   14.0     Coumadin Monitoring INR   1.0  Comment:  Coumadin Therapy:  2.0 - 3.0 for INR for all indicators except mechanical heart valves  and antiphospholipid syndromes which should use 2.5 - 3.5.       Lymph #   1.4     Lymph%   16.6     MCH   30.8     MCHC   33.1     MCV   93     Mono #   1.3     Mono%   15.1     MPV   11.7     Platelets   194     Potassium   2.8     PROTEIN TOTAL   8.0     Protime   10.7     RBC   4.55     RDW   14.1     Sodium   139     Troponin I 0.040  Comment:  The reference interval for Troponin I represents the 99th percentile   cutoff   for our facility and is consistent with 3rd generation assay   performance.   0.045  Comment:  The reference interval for Troponin I represents the 99th percentile   cutoff   for our facility and is consistent with 3rd generation assay   performance.       WBC   8.61           Significant Imaging: Echocardiogram:   2D echo with color flow doppler:   Results for orders placed or performed during the hospital encounter of 03/07/19   2D echo with color flow doppler   Result Value Ref Range    QEF 60 55 - 65    Aortic Valve Regurgitation MILD     Est. PA Systolic Pressure 58.35 (A)     Tricuspid Valve Regurgitation MILD TO MODERATE      Assessment and Plan:     * Acute on chronic diastolic HFpEF of 60-65%  Echo in March 2019 shows DD with preserved EF     CXR normal  Decompensated HF likely secondary to A-fib   Diurese with Lasix, but K needs to be replaced   BB on hold for now due to Bradycardia     Elevated troponin  Troponin flat at 0.045, 0.040  Likely demand from A-fib and CHF  Will trend enzymes for now     A-fib  A-fib likely cause of current decompensated HF  Has failed flecainide therapy as an OP  Will load with Amio and start gtt   Metoprolol on hold for now due to bradycardia, but will give amio only   Needs to resume Eliquis        Hyperlipidemia  Continue Statin     Essential hypertension  BP stable at this time.   toprol on hold due to bradycardia         VTE Risk Mitigation (From admission, onward)        Ordered     apixaban tablet 5 mg  2 times daily      05/21/19 0840        Chart reviewed. Patient examined by Dr. Gooden and agrees with plan that has been outlined.     Thank you for your consult. I will follow-up with patient. Please contact us if you have any additional questions.    Eve Joiner, KRYSTIN  Cardiology   [facility]

## 2019-05-21 NOTE — ED PROVIDER NOTES
SCRIBE #1 NOTE: I, Magdaleno Gruber, am scribing for, and in the presence of, Bakari Hooper Jr., MD. I have scribed the entire note.      History      Chief Complaint   Patient presents with    Fatigue     generalized weakness with SOB started last night; sent by dr joyce        Review of patient's allergies indicates:   Allergen Reactions    Iodine and iodide containing products Rash        HPI   HPI    5/21/2019, 9:33 AM   History obtained from the patient      History of Present Illness: Elke Laws is a 84 y.o. female patient PMHx sepsis, allergies, hypoxia, anemia, asthma, constipation who presents to the Emergency Department for generalized weakness which onset gradually last night. Symptoms are constant and moderate in severity. No mitigating or exacerbating factors reported. Associated sxs include SOB, nausea, constipation, CP. Patient denies any V/D, fever, chills, dizziness, cough, back pain, neck pain, and all other sxs at this time. No further complaints or concerns at this time.         Arrival mode: Personal vehicle     PCP: Isidra Joyce MD       Past Medical History:  Past Medical History:   Diagnosis Date    Abnormal nuclear stress test 6/30/2016    Acute congestive heart failure 5/27/2017    Acute coronary syndrome     Acute kidney injury (nontraumatic) 5/27/2017    Acute kidney injury (nontraumatic) 5/27/2017    Acute on chronic congestive heart failure 5/21/2017    Acute on chronic diastolic congestive heart failure 8/27/2015    Acute on chronic respiratory failure 5/6/2019    Anemia 5/9/2016    Anemia 5/9/2016    Angina pectoris 1/25/2018    Anticoagulant long-term use     Aortic regurgitation     Echo 11/2013---5 - Mild to moderate aortic regurgitation.      Asthma     Atrial fibrillation 5/15/2014    Back pain     s/p epidural steriod injections, no recent injections.    Baker's cyst     small, right, seen on US lower extremity 6/2014    Blood transfusion      "Approximately 6 years ago    Chronic constipation     Coronary artery disease     Degenerative disc disease, lumbar     Diastolic dysfunction     Seen on echo 11/2013, stress test negative 5/2014    Diverticulosis     colonoscopy 3/27/2011    E coli bacteremia 5/23/2017    E. coli UTI (urinary tract infection) 5/23/2017    Hemorrhoids     colonoscopy 3/27/2011    Hiatal hernia     CXR 12/30/2016---Retrocardiac density again noted consistent with a hiatal hernia.    Hx of adenomatous colonic polyps     Hyperlipidemia     Hypertension     Hyponatremia 5/9/2016    Hypoxemia 5/27/2017    Hypoxia 6/28/2017    Leukocytosis 5/27/2017    Mitral regurgitation     Myocardial infarction     per patient was told in the past she had a "mild heart attack"    Obesity     Osteoarthritis, knee     Pneumonia     per patient "walking Pneumonia" did not require hospitalization    Seasonal allergies     Sepsis 5/22/2017    Trouble in sleeping     Urinary incontinence        Past Surgical History:  Past Surgical History:   Procedure Laterality Date    APPENDECTOMY      CARDIOVERSION OR DEFIBRILLATION N/A 4/16/2019    Performed by [facility] at [facility] CATH LAB    COLONOSCOPY okay by dr. ramos N/A 8/29/2017    Performed by Toño Abdi MD at [facility] ENDO    ECHOCARDIOGRAM,TRANSESOPHAGEAL N/A 4/16/2019    Performed by Kee Jones [facility] at [facility] CATH LAB    EYE SURGERY Left     HYSTERECTOMY      benign reasons    KNEE SURGERY Bilateral     x2     Left heart cath      OLECRANON BURSECTOMY Right     6/2011    TONSILLECTOMY      URETHRA SURGERY           Family History:  Family History   Problem Relation Age of Onset    Heart disease Mother     Cancer Mother         colon    Hypertension Mother     Hyperlipidemia Mother     Heart disease Father     Hypertension Father     Hyperlipidemia Father     Heart disease Sister         MI    Heart disease Brother         MI    COPD Son     Hypertension Son     " Diabetes Brother     Diabetes Son     Cancer Sister         breast    Kidney disease Neg Hx     Stroke Neg Hx        Social History:  Social History     Tobacco Use    Smoking status: Former Smoker     Packs/day: 0.05     Years: 1.00     Pack years: 0.05     Types: Cigarettes     Last attempt to quit: 1952     Years since quittin.4    Smokeless tobacco: Never Used   Substance and Sexual Activity    Alcohol use: No     Alcohol/week: 0.0 oz    Drug use: No    Sexual activity: Never     Partners: Male     Birth control/protection: See Surgical Hx       ROS   Review of Systems   Constitutional: Negative for chills and fever.   HENT: Negative for sore throat.    Respiratory: Positive for shortness of breath. Negative for cough.    Cardiovascular: Positive for chest pain. Negative for leg swelling.   Gastrointestinal: Positive for constipation and nausea. Negative for abdominal pain, diarrhea and vomiting.   Genitourinary: Negative for dysuria.   Musculoskeletal: Negative for back pain, neck pain and neck stiffness.   Skin: Negative for rash and wound.   Neurological: Positive for weakness (generalized). Negative for dizziness, light-headedness, numbness and headaches.   Hematological: Does not bruise/bleed easily.   All other systems reviewed and are negative.    Physical Exam      Initial Vitals [19 0910]   BP Pulse Resp Temp SpO2   126/65 69 20 97.7 °F (36.5 °C) 96 %      MAP       --          Physical Exam  Nursing Notes and Vital Signs Reviewed.  Constitutional: Patient is in no acute distress. Well-developed and well-nourished.  Head: Atraumatic. Normocephalic.  Eyes: PERRL. EOM intact. Conjunctivae are not pale. No scleral icterus.  ENT: Mucous membranes are moist. Oropharynx is clear and symmetric.    Neck: Supple. Full ROM. No lymphadenopathy.  Cardiovascular: Regular rate. Irregularly irregular rhythm. No murmurs, rubs, or gallops. Distal pulses are 2+ and symmetric.  Pulmonary/Chest: No  "respiratory distress. Clear to auscultation bilaterally. No wheezing or rales. Mild tachypnea.  Abdominal: Soft and non-distended.  There is no tenderness.  No rebound, guarding, or rigidity. Good bowel sounds.  Musculoskeletal: Moves all extremities. No obvious deformities. No edema. No calf tenderness.  Skin: Warm and dry.  Neurological:  Alert, awake, and appropriate.  Normal speech.  No acute focal neurological deficits are appreciated.  Psychiatric: Normal affect. Good eye contact. Appropriate in content.    ED Course    Procedures  ED Vital Signs:  Vitals:    05/21/19 0910 05/21/19 0930 05/21/19 0942 05/21/19 1018   BP: 126/65 (!) 145/65  (!) 147/68   Pulse: 69 72 69 70   Resp: 20 12  18   Temp: 97.7 °F (36.5 °C)      TempSrc: Oral      SpO2: 96% 100%  100%   Weight: 78.9 kg (174 lb)      Height: 4' 9" (1.448 m)       05/21/19 1114 05/21/19 1117   BP:  (!) 141/65   Pulse: 68    Resp: 14    Temp:     TempSrc:     SpO2: 100%    Weight:     Height:         Abnormal Lab Results:  Labs Reviewed   CBC W/ AUTO DIFFERENTIAL - Abnormal; Notable for the following components:       Result Value    Mono # 1.3 (*)     Lymph% 16.6 (*)     Mono% 15.1 (*)     All other components within normal limits   COMPREHENSIVE METABOLIC PANEL - Abnormal; Notable for the following components:    Potassium 2.8 (*)     Chloride 90 (*)     CO2 34 (*)     Glucose 121 (*)     BUN, Bld 52 (*)     Alkaline Phosphatase 138 (*)     eGFR if  44 (*)     eGFR if non  38 (*)     All other components within normal limits   TROPONIN I - Abnormal; Notable for the following components:    Troponin I 0.045 (*)     All other components within normal limits   B-TYPE NATRIURETIC PEPTIDE - Abnormal; Notable for the following components:     (*)     All other components within normal limits   PROTIME-INR   TROPONIN I        All Lab Results:  Results for orders placed or performed during the hospital encounter of 05/21/19 "   CBC auto differential   Result Value Ref Range    WBC 8.61 3.90 - 12.70 K/uL    RBC 4.55 4.00 - 5.40 M/uL    Hemoglobin 14.0 12.0 - 16.0 g/dL    Hematocrit 42.3 37.0 - 48.5 %    Mean Corpuscular Volume 93 82 - 98 fL    Mean Corpuscular Hemoglobin 30.8 27.0 - 31.0 pg    Mean Corpuscular Hemoglobin Conc 33.1 32.0 - 36.0 g/dL    RDW 14.1 11.5 - 14.5 %    Platelets 194 150 - 350 K/uL    MPV 11.7 9.2 - 12.9 fL    Gran # (ANC) 5.8 1.8 - 7.7 K/uL    Lymph # 1.4 1.0 - 4.8 K/uL    Mono # 1.3 (H) 0.3 - 1.0 K/uL    Eos # 0.1 0.0 - 0.5 K/uL    Baso # 0.01 0.00 - 0.20 K/uL    Gran% 67.0 38.0 - 73.0 %    Lymph% 16.6 (L) 18.0 - 48.0 %    Mono% 15.1 (H) 4.0 - 15.0 %    Eosinophil% 1.2 0.0 - 8.0 %    Basophil% 0.1 0.0 - 1.9 %    Differential Method Automated    Comprehensive metabolic panel   Result Value Ref Range    Sodium 139 136 - 145 mmol/L    Potassium 2.8 (L) 3.5 - 5.1 mmol/L    Chloride 90 (L) 95 - 110 mmol/L    CO2 34 (H) 23 - 29 mmol/L    Glucose 121 (H) 70 - 110 mg/dL    BUN, Bld 52 (H) 8 - 23 mg/dL    Creatinine 1.3 0.5 - 1.4 mg/dL    Calcium 10.3 8.7 - 10.5 mg/dL    Total Protein 8.0 6.0 - 8.4 g/dL    Albumin 3.7 3.5 - 5.2 g/dL    Total Bilirubin 0.6 0.1 - 1.0 mg/dL    Alkaline Phosphatase 138 (H) 55 - 135 U/L    AST 35 10 - 40 U/L    ALT 25 10 - 44 U/L    Anion Gap 15 8 - 16 mmol/L    eGFR if African American 44 (A) >60 mL/min/1.73 m^2    eGFR if non African American 38 (A) >60 mL/min/1.73 m^2   Troponin I   Result Value Ref Range    Troponin I 0.045 (H) 0.000 - 0.026 ng/mL   Brain natriuretic peptide   Result Value Ref Range     (H) 0 - 99 pg/mL   Protime-INR   Result Value Ref Range    Prothrombin Time 10.7 9.0 - 12.5 sec    INR 1.0 0.8 - 1.2         Imaging Results:  Imaging Results          X-Ray Chest AP Portable (Final result)  Result time 05/21/19 11:02:53    Final result by Ganga Jo MD (05/21/19 11:02:53)                 Impression:      No acute abnormality.      Electronically signed by: Ganga  Jo  Date:    05/21/2019  Time:    11:02             Narrative:    EXAMINATION:  XR CHEST AP PORTABLE    CLINICAL HISTORY:  CHF;.    TECHNIQUE:  Single frontal portable view of the chest was performed.    COMPARISON:  05/05/2019    FINDINGS:  Support devices: Telemetry leads    Elevation right hemidiaphragm.The lungs are clear, with normal appearance of pulmonary vasculature and no pleural effusion or pneumothorax.    The cardiac silhouette is normal in size. The hilar and mediastinal contours are unremarkable.    Bones are intact.                               The EKG was ordered, reviewed, and independently interpreted by the ED provider.  Interpretation time: 9:25  Rate: 62 BPM  Rhythm: atrial fibrillation  Interpretation: Normal QRS. No STEMI.             The Emergency Provider reviewed the vital signs and test results, which are outlined above.    ED Discussion     11:23 AM: Discussed pt's case with Dr. Gooden (Cardiology) who recommends admission for continued CHF Tx, diuresis, and trend the pt's troponin.    11:35 AM: Re-evaluated pt. I have discussed test results, shared treatment plan, and the need for admission with patient and family at bedside. Pt and family express understanding at this time and agree with all information. All questions answered. Pt and family have no further questions or concerns at this time. Pt is ready for admit.    11:38 AM: Discussed case with SHAWNA Vasquez (Hospital Medicine).   agrees with current care and management of pt and accepts admission.   Admitting Service:   Admitting Physician: Dr. Mccall  Admit to: Observation    11:41: Dr. Gooden is at bedside.    ED Medication(s):  Medications   nitroGLYCERIN 2% TD oint ointment 0.5 inch (0.5 inches Topical (Top) Given 5/21/19 0950)   furosemide injection 80 mg (80 mg Intravenous Given 5/21/19 0958)   aspirin tablet 325 mg (325 mg Oral Given 5/21/19 1127)   potassium chloride SA CR tablet 40 mEq (40 mEq Oral Given 5/21/19  1127)           Medical Decision Making    Medical Decision Making:   Clinical Tests:   Lab Tests: Ordered and Reviewed  Radiological Study: Ordered and Reviewed  Medical Tests: Ordered and Reviewed           Scribe Attestation:   Scribe #1: I performed the above scribed service and the documentation accurately describes the services I performed. I attest to the accuracy of the note.    Attending:   Physician Attestation Statement for Scribe #1: I, Bakari Hooper Jr., MD, personally performed the services described in this documentation, as scribed by Magdaleno Gruber, in my presence, and it is both accurate and complete.          Clinical Impression       ICD-10-CM ICD-9-CM   1. SOB (shortness of breath) R06.02 786.05   2. Shortness of breath R06.02 786.05       Disposition:   Disposition: Placed in Observation  Condition: Fair         Bakari Hooper Jr., MD  05/21/19 4824

## 2019-05-21 NOTE — HPI
Elke Laws is a 84 y.o. female patient with PMHx of chronic AF on Eliquis, HTN, chronic diastolic HFpEF of 60-65%, CAD, HLD, CKD, and asthma, who presents to the Emergency Department for generalized weakness which onset gradually last night. Symptoms are constant and moderate in severity. No mitigating or exacerbating factors reported. Associated sxs include SOB, nausea, constipation, CP. Patient denies any V/D, fever, chills, dizziness, cough, back pain, neck pain, and all other sxs at this time. She was recently admitted for acute decompensated heart failure, diuresed with IV Lasix which she responded well to and  was discharged on 5/8. She uses continuous home oxygen at 2L/min via [place]. ED workup resulted K+ 2.8, BUN 52, troponin 0.040, , and CXR shows no acute findings. Today, ekg showed afib. Hospital Medicine was called for admission. She is a DNR and her surrogate decision maker is her son, Christiano.

## 2019-05-21 NOTE — ASSESSMENT & PLAN NOTE
Likely having CHF exacerbation after eating ribs over the weekend; caregiver comfortable with transporting patient next door by private car; patient in no acute distress at this time; spoke with Dr. Hooper regarding patient.

## 2019-05-21 NOTE — PLAN OF CARE
Problem: Adult Inpatient Plan of Care  Goal: Plan of Care Review  Outcome: Ongoing (interventions implemented as appropriate)  POC reviewed with pt, verbalized understanding. Pt remained free from falls, fall precautions in place. Pt is a fib on monitor, 60s-80s. VS monitored. Pt independent and able to turn self. Up to bathroom with standby due to being connected to IV, oxygen, and VS machine.  Pt IV intact, infusing amiodorone. No other c/o at this time. Call bell and personal belongings within reach. Hourly rounding complete. Reminded to call for assistance. Will continue to monitor.

## 2019-05-21 NOTE — ASSESSMENT & PLAN NOTE
A-fib likely cause of current decompensated HF  Has failed flecainide therapy as an OP  Will load with Amio and start gtt   Metoprolol on hold for now due to bradycardia, but will give amio only   Needs to resume Eliquis

## 2019-05-22 LAB
ANION GAP SERPL CALC-SCNC: 12 MMOL/L (ref 8–16)
BASOPHILS # BLD AUTO: 0.02 K/UL (ref 0–0.2)
BASOPHILS NFR BLD: 0.3 % (ref 0–1.9)
BUN SERPL-MCNC: 59 MG/DL (ref 8–23)
CALCIUM SERPL-MCNC: 9.7 MG/DL (ref 8.7–10.5)
CHLORIDE SERPL-SCNC: 95 MMOL/L (ref 95–110)
CO2 SERPL-SCNC: 30 MMOL/L (ref 23–29)
CREAT SERPL-MCNC: 1.2 MG/DL (ref 0.5–1.4)
DIFFERENTIAL METHOD: ABNORMAL
EOSINOPHIL # BLD AUTO: 0.1 K/UL (ref 0–0.5)
EOSINOPHIL NFR BLD: 1.8 % (ref 0–8)
ERYTHROCYTE [DISTWIDTH] IN BLOOD BY AUTOMATED COUNT: 14.3 % (ref 11.5–14.5)
EST. GFR  (AFRICAN AMERICAN): 48 ML/MIN/1.73 M^2
EST. GFR  (NON AFRICAN AMERICAN): 42 ML/MIN/1.73 M^2
GLUCOSE SERPL-MCNC: 119 MG/DL (ref 70–110)
HCT VFR BLD AUTO: 39.6 % (ref 37–48.5)
HGB BLD-MCNC: 12.6 G/DL (ref 12–16)
LYMPHOCYTES # BLD AUTO: 1.9 K/UL (ref 1–4.8)
LYMPHOCYTES NFR BLD: 31.5 % (ref 18–48)
MCH RBC QN AUTO: 29.7 PG (ref 27–31)
MCHC RBC AUTO-ENTMCNC: 31.8 G/DL (ref 32–36)
MCV RBC AUTO: 93 FL (ref 82–98)
MONOCYTES # BLD AUTO: 0.9 K/UL (ref 0.3–1)
MONOCYTES NFR BLD: 14.2 % (ref 4–15)
NEUTROPHILS # BLD AUTO: 3.2 K/UL (ref 1.8–7.7)
NEUTROPHILS NFR BLD: 52.2 % (ref 38–73)
PLATELET # BLD AUTO: 189 K/UL (ref 150–350)
PMV BLD AUTO: 12.2 FL (ref 9.2–12.9)
POTASSIUM SERPL-SCNC: 4.3 MMOL/L (ref 3.5–5.1)
RBC # BLD AUTO: 4.24 M/UL (ref 4–5.4)
SODIUM SERPL-SCNC: 137 MMOL/L (ref 136–145)
WBC # BLD AUTO: 6.06 K/UL (ref 3.9–12.7)

## 2019-05-22 PROCEDURE — 93010 ELECTROCARDIOGRAM REPORT: CPT | Mod: HCWC,,, | Performed by: INTERNAL MEDICINE

## 2019-05-22 PROCEDURE — 96366 THER/PROPH/DIAG IV INF ADDON: CPT | Mod: 59 | Performed by: EMERGENCY MEDICINE

## 2019-05-22 PROCEDURE — 96365 THER/PROPH/DIAG IV INF INIT: CPT | Mod: 59 | Performed by: EMERGENCY MEDICINE

## 2019-05-22 PROCEDURE — 63600175 PHARM REV CODE 636 W HCPCS: Mod: HCWC | Performed by: INTERNAL MEDICINE

## 2019-05-22 PROCEDURE — 93041 RHYTHM ECG TRACING: CPT | Mod: HCWC

## 2019-05-22 PROCEDURE — 96376 TX/PRO/DX INJ SAME DRUG ADON: CPT | Mod: 59 | Performed by: EMERGENCY MEDICINE

## 2019-05-22 PROCEDURE — 93010 EKG 12-LEAD: ICD-10-PCS | Mod: HCWC,,, | Performed by: INTERNAL MEDICINE

## 2019-05-22 PROCEDURE — 80048 BASIC METABOLIC PNL TOTAL CA: CPT | Mod: HCWC

## 2019-05-22 PROCEDURE — G0378 HOSPITAL OBSERVATION PER HR: HCPCS | Mod: HCWC

## 2019-05-22 PROCEDURE — 36415 COLL VENOUS BLD VENIPUNCTURE: CPT | Mod: HCWC

## 2019-05-22 PROCEDURE — 99225 PR SUBSEQUENT OBSERVATION CARE,LEVEL II: CPT | Mod: HCWC,,, | Performed by: INTERNAL MEDICINE

## 2019-05-22 PROCEDURE — 25000003 PHARM REV CODE 250: Mod: HCWC | Performed by: NURSE PRACTITIONER

## 2019-05-22 PROCEDURE — 63600175 PHARM REV CODE 636 W HCPCS: Mod: HCWC | Performed by: NURSE PRACTITIONER

## 2019-05-22 PROCEDURE — 96375 TX/PRO/DX INJ NEW DRUG ADDON: CPT | Mod: 59 | Performed by: EMERGENCY MEDICINE

## 2019-05-22 PROCEDURE — 85025 COMPLETE CBC W/AUTO DIFF WBC: CPT | Mod: HCWC

## 2019-05-22 PROCEDURE — 99225 PR SUBSEQUENT OBSERVATION CARE,LEVEL II: ICD-10-PCS | Mod: HCWC,,, | Performed by: INTERNAL MEDICINE

## 2019-05-22 PROCEDURE — 93005 ELECTROCARDIOGRAM TRACING: CPT | Mod: HCWC

## 2019-05-22 RX ADMIN — AMIODARONE HYDROCHLORIDE 0.5 MG/MIN: 1.8 INJECTION, SOLUTION INTRAVENOUS at 09:05

## 2019-05-22 RX ADMIN — AMIODARONE HYDROCHLORIDE 0.5 MG/MIN: 1.8 INJECTION, SOLUTION INTRAVENOUS at 10:05

## 2019-05-22 RX ADMIN — FUROSEMIDE 40 MG: 10 INJECTION, SOLUTION INTRAMUSCULAR; INTRAVENOUS at 05:05

## 2019-05-22 RX ADMIN — APIXABAN 5 MG: 2.5 TABLET, FILM COATED ORAL at 09:05

## 2019-05-22 RX ADMIN — PANTOPRAZOLE SODIUM 40 MG: 40 TABLET, DELAYED RELEASE ORAL at 09:05

## 2019-05-22 RX ADMIN — PRAVASTATIN SODIUM 40 MG: 20 TABLET ORAL at 09:05

## 2019-05-22 RX ADMIN — FUROSEMIDE 40 MG: 10 INJECTION, SOLUTION INTRAMUSCULAR; INTRAVENOUS at 09:05

## 2019-05-22 RX ADMIN — LORAZEPAM 1 MG: 1 TABLET ORAL at 09:05

## 2019-05-22 NOTE — SUBJECTIVE & OBJECTIVE
Interval History:  No acute events overnight. Remains in A-fib. HR controlled. Still on Amio gtt. Cardiology following.     Review of Systems   Constitutional: Negative.  Negative for activity change, appetite change, fatigue and fever.   HENT: Negative.    Eyes: Negative.    Respiratory: Positive for shortness of breath. Negative for cough and wheezing.    Cardiovascular: Positive for chest pain. Negative for palpitations and leg swelling.   Gastrointestinal: Positive for constipation and nausea. Negative for abdominal pain and vomiting.   Endocrine: Negative.    Genitourinary: Negative.  Negative for dysuria, frequency and urgency.   Musculoskeletal: Negative.    Skin: Negative.    Allergic/Immunologic: Negative.    Neurological: Positive for weakness. Negative for dizziness and headaches.   Hematological: Negative.    Psychiatric/Behavioral: Negative.      Objective:     Vital Signs (Most Recent):  Temp: 97.9 °F (36.6 °C) (05/22/19 1235)  Pulse: 81 (05/22/19 1235)  Resp: 20 (05/22/19 1235)  BP: (!) 111/59 (05/22/19 1235)  SpO2: 100 % (05/22/19 1235) Vital Signs (24h Range):  Temp:  [96.8 °F (36 °C)-98 °F (36.7 °C)] 97.9 °F (36.6 °C)  Pulse:  [66-81] 81  Resp:  [16-20] 20  SpO2:  [96 %-100 %] 100 %  BP: (104-142)/(55-73) 111/59     Weight: 78.9 kg (173 lb 15.1 oz)  Body mass index is 37.64 kg/m².    Intake/Output Summary (Last 24 hours) at 5/22/2019 1457  Last data filed at 5/22/2019 0300  Gross per 24 hour   Intake 720 ml   Output 250 ml   Net 470 ml      Physical Exam   Constitutional: She is oriented to person, place, and time. She appears well-developed and well-nourished.   HENT:   Head: Normocephalic and atraumatic.   Eyes: Pupils are equal, round, and reactive to light. Conjunctivae and EOM are normal.   Neck: Normal range of motion. Neck supple. No JVD present.   Cardiovascular: Normal rate, normal heart sounds and intact distal pulses. An irregularly irregular rhythm present.   Pulmonary/Chest: Effort  normal and breath sounds normal. She has no rales.   Abdominal: Soft. Bowel sounds are normal.   Musculoskeletal: Normal range of motion.   Neurological: She is alert and oriented to person, place, and time. She has normal reflexes.   Skin: Skin is warm and dry.   Psychiatric: She has a normal mood and affect. Her behavior is normal. Judgment and thought content normal.   Nursing note and vitals reviewed.      Significant Labs:   BMP:   Recent Labs   Lab 05/21/19  1440 05/22/19  0507   * 119*    137   K 4.1 4.3   CL 90* 95   CO2 36* 30*   BUN 51* 59*   CREATININE 1.2 1.2   CALCIUM 10.4 9.7   MG 2.6  --      CBC:   Recent Labs   Lab 05/21/19  0957 05/22/19  0507   WBC 8.61 6.06   HGB 14.0 12.6   HCT 42.3 39.6    189     CMP:   Recent Labs   Lab 05/21/19  0957 05/21/19  1440 05/22/19  0507    141 137   K 2.8* 4.1 4.3   CL 90* 90* 95   CO2 34* 36* 30*   * 116* 119*   BUN 52* 51* 59*   CREATININE 1.3 1.2 1.2   CALCIUM 10.3 10.4 9.7   PROT 8.0  --   --    ALBUMIN 3.7  --   --    BILITOT 0.6  --   --    ALKPHOS 138*  --   --    AST 35  --   --    ALT 25  --   --    ANIONGAP 15 15 12   EGFRNONAA 38* 42* 42*     Troponin:   Recent Labs   Lab 05/21/19  0957 05/21/19  1240 05/21/19  1750   TROPONINI 0.045* 0.040* 0.066*     All pertinent labs within the past 24 hours have been reviewed.    Significant Imaging:   Imaging Results          X-Ray Chest AP Portable (Final result)  Result time 05/21/19 11:02:53    Final result by Ganga Jo MD (05/21/19 11:02:53)                 Impression:      No acute abnormality.      Electronically signed by: Ganga Jo  Date:    05/21/2019  Time:    11:02             Narrative:    EXAMINATION:  XR CHEST AP PORTABLE    CLINICAL HISTORY:  CHF;.    TECHNIQUE:  Single frontal portable view of the chest was performed.    COMPARISON:  05/05/2019    FINDINGS:  Support devices: Telemetry leads    Elevation right hemidiaphragm.The lungs are clear, with normal  appearance of pulmonary vasculature and no pleural effusion or pneumothorax.    The cardiac silhouette is normal in size. The hilar and mediastinal contours are unremarkable.    Bones are intact.

## 2019-05-22 NOTE — HOSPITAL COURSE
5/22/19- Remains in A-fib this morning . HR controlled. Still on Amio gtt. K has been replaced. Has no angina or equivalent     5/23/19- Remains in A-fib. Still on amio gtt. Diuresing well.     5/24/19- Patient is s/p attempted Cardioversion today x 2. Failed to convert to NSR. Amio continued . Has no CNS Complaints to suggest TIA or CVA . No abnormal bleeding on OAC

## 2019-05-22 NOTE — PLAN OF CARE
Problem: Adult Inpatient Plan of Care  Goal: Plan of Care Review  Outcome: Ongoing (interventions implemented as appropriate)  POC reviewed, verbalized understanding. Uneventful shift overnight. Afib on tele monitor rate 60s-80s, O2 at 2 L, VSS. Amiodarone gtt infusing per order. States SOB is improving, denies CP/discomfort. Voiding per BRP, I/Os monitored. Turns/repositions in bed independently, up with 1 assist. Brief on pt for occasional dribbling. Bed locked in low position, nonskid socks, call light and personal items within reach. Family at bedside. Remains free from injury/falls this shift. Instructed to call for assistance

## 2019-05-22 NOTE — PROGRESS NOTES
[facility] -   Cardiology  Progress Note    Patient Name: Elke Laws  MRN: [id number]  Admission Date: 5/21/2019  Hospital Length of Stay: 0 days  Code Status: DNR   Attending Physician: Sekou Mccall MD   Primary Care Physician: Isidra Benavidez MD  Expected Discharge Date:   Principal Problem:Acute on chronic diastolic heart failure    Subjective:   Brief HPI:  Ms. Laws is a 85 y/o female with PMH persistent AF, diastolic CHF, non obstructive CAD per [facility] done in , HTN and HLD. She presented to the ED with complaints of weakness.   Echo showed EF 55%, mild AI and biatrial dilation  She has had several admission this year for CHF exacerbation. EKG shows A-fib. Rate non controlled on tele monitor.Attempted LISSETH/DCCV on 04/16 and started on flecainide as an OP, but HR Still not controlled. Flecainide has been increased multiple times. Is anticoagulated with Eliqius.   Troponin flat at 0.045, 0.040. , K 2.8, Cr 1.3, BUN 52, H/H 14.0/42.3.   Currently being admitted for CHF exacerbation likely secondary to A-fib     Hospital Course:   5/22/19- Remains in A-fib this morning . HR controlled. Still on Amio gtt. K has been replaced. Has no angina or equivalent         Review of Systems   Constitution: Positive for malaise/fatigue. Negative for diaphoresis, weight gain and weight loss.   HENT: Negative for congestion and nosebleeds.    Cardiovascular: Positive for dyspnea on exertion. Negative for chest pain, claudication, cyanosis, irregular heartbeat, leg swelling, near-syncope, orthopnea, palpitations, paroxysmal nocturnal dyspnea and syncope.   Respiratory: Positive for shortness of breath. Negative for cough, hemoptysis, sleep disturbances due to breathing, snoring, sputum production and wheezing.    Hematologic/Lymphatic: Negative for bleeding problem. Bruises/bleeds easily.   Skin: Negative for rash.   Musculoskeletal: Negative for arthritis, back pain, falls, joint pain, muscle cramps  and muscle weakness.   Gastrointestinal: Negative for abdominal pain, constipation, diarrhea, heartburn, hematemesis, hematochezia, melena and nausea.   Genitourinary: Negative for dysuria, hematuria and nocturia.   Neurological: Negative for excessive daytime sleepiness, dizziness, headaches, light-headedness, loss of balance, numbness, vertigo and weakness.     Objective:     Vital Signs (Most Recent):  Temp: 97.9 °F (36.6 °C) (05/22/19 1235)  Pulse: 81 (05/22/19 1235)  Resp: 20 (05/22/19 1235)  BP: (!) 111/59 (05/22/19 1235)  SpO2: 100 % (05/22/19 1235) Vital Signs (24h Range):  Temp:  [96.8 °F (36 °C)-98 °F (36.7 °C)] 97.9 °F (36.6 °C)  Pulse:  [66-81] 81  Resp:  [16-20] 20  SpO2:  [96 %-100 %] 100 %  BP: (104-142)/(55-73) 111/59     Weight: 78.9 kg (173 lb 15.1 oz)  Body mass index is 37.64 kg/m².     SpO2: 100 %  O2 Device (Oxygen Therapy): nasal cannula      Intake/Output Summary (Last 24 hours) at 5/22/2019 1405  Last data filed at 5/22/2019 0300  Gross per 24 hour   Intake 720 ml   Output 250 ml   Net 470 ml       Lines/Drains/Airways     Peripheral Intravenous Line                 Peripheral IV - Single Lumen 05/22/19 0049 20 G Left Forearm less than 1 day                Physical Exam   Constitutional: She is oriented to person, place, and time. She appears well-developed and well-nourished.   Neck: Neck supple. No JVD present.   Cardiovascular: Normal rate, normal heart sounds and normal pulses. An irregularly irregular rhythm present. Exam reveals no friction rub.   No murmur heard.  Pulmonary/Chest: Effort normal and breath sounds normal. No respiratory distress. She has no wheezes. She has no rales.   Abdominal: Soft. Bowel sounds are normal. She exhibits no distension.   Musculoskeletal: She exhibits no edema or tenderness.   Neurological: She is alert and oriented to person, place, and time.   Skin: Skin is warm and dry. No rash noted.   Psychiatric: She has a normal mood and affect. Her behavior is  normal.   Nursing note and vitals reviewed.      Significant Labs:   All pertinent lab results from the last 24 hours have been reviewed. and   Recent Lab Results       05/22/19  0507   05/21/19  1750   05/21/19  1440        Anion Gap 12   15     Baso # 0.02         Basophil% 0.3         BUN, Bld 59   51     Calcium 9.7   10.4     Chloride 95   90     CO2 30   36     Creatinine 1.2   1.2     Differential Method Automated         eGFR if  48   48     eGFR if non  42  Comment:  Calculation used to obtain the estimated glomerular filtration  rate (eGFR) is the CKD-EPI equation.      42  Comment:  Calculation used to obtain the estimated glomerular filtration  rate (eGFR) is the CKD-EPI equation.        Eos # 0.1         Eosinophil% 1.8         Glucose 119   116     Gran # (ANC) 3.2         Gran% 52.2         Hematocrit 39.6         Hemoglobin 12.6         Lymph # 1.9         Lymph% 31.5         Magnesium     2.6     MCH 29.7         MCHC 31.8         MCV 93         Mono # 0.9         Mono% 14.2         MPV 12.2         Phosphorus     4.2     Platelets 189         Potassium 4.3   4.1     RBC 4.24         RDW 14.3         Sodium 137   141     Troponin I   0.066  Comment:  The reference interval for Troponin I represents the 99th percentile   cutoff   for our facility and is consistent with 3rd generation assay   performance.         WBC 6.06               Significant Imaging: Echocardiogram:   2D echo with color flow doppler:   Results for orders placed or performed during the hospital encounter of 03/07/19   2D echo with color flow doppler   Result Value Ref Range    QEF 60 55 - 65    Aortic Valve Regurgitation MILD     Est. PA Systolic Pressure 58.35 (A)     Tricuspid Valve Regurgitation MILD TO MODERATE      Assessment and Plan:       * Acute on chronic diastolic HFpEF of 60-65%  Echo in March 2019 shows DD with preserved EF     CXR normal  Decompensated HF likely secondary to  A-fib   Diurese with Lasix, but K needs to be replaced   BB on hold for now due to Bradycardia     5/22/19  -Continue to diurese with lasix and replace lytes as needed     Elevated troponin  Troponin flat at 0.045, 0.040  Likely demand from A-fib and CHF  Will trend enzymes for now     A-fib  A-fib likely cause of current decompensated HF  Has failed flecainide therapy as an OP  Will load with Amio and start gtt   Metoprolol on hold for now due to bradycardia, but will give amio only   Needs to resume Eliquis     5/22/19  Continue amio gtt for now   -Will continue to diurese today.   If patient doesn't convert to NSR, will plan for cardioversion on Friday   No need for LISSETH due to uninterrupted OAC with Eliquis  No BB due to bradycardia     Hyperlipidemia  Continue Statin     Essential hypertension  BP stable at this time.   toprol on hold due to bradycardia         VTE Risk Mitigation (From admission, onward)        Ordered     apixaban tablet 5 mg  2 times daily      05/21/19 5159        Chart reviewed. Patient examined by Dr. Gooden and agrees with plan that has been outlined.     Eve Joiner, MAULIKP  Cardiology  [facility]

## 2019-05-22 NOTE — ASSESSMENT & PLAN NOTE
Rate controlled,  Telemetry monitoring   - Continue beta-blocker for HR/rhythm suppression.  - Continue Eliquis for anticoagulation.  - Amiodarone gtt started   -Cardiology consult   5/22/19  -BB held per cardiology due to bradycardia   -Will continue amio gtt   -If patient doesn't convert to NSR, will plan for cardioversion on Friday

## 2019-05-22 NOTE — ASSESSMENT & PLAN NOTE
- Diureses with IV Lasix 40 mg BID.  - Strict I&Os, daily weights, Na/fluid restriction.  - BP/HR control.  - Recent TTE reviewed.  -Cardiology consult   5/22/19  -Continue IV Lasix

## 2019-05-22 NOTE — ASSESSMENT & PLAN NOTE
Echo in March 2019 shows DD with preserved EF     CXR normal  Decompensated HF likely secondary to A-fib   Diurese with Lasix, but K needs to be replaced   BB on hold for now due to Bradycardia     5/22/19  -Continue to diurese with lasix and replace lytes as needed

## 2019-05-22 NOTE — ASSESSMENT & PLAN NOTE
Denies chest pain  Initial troponin 0.045  Trend serial troponin's   Cardiology aware   Troponin 0.040>0.066  Denies any chest pain

## 2019-05-22 NOTE — SUBJECTIVE & OBJECTIVE
Review of Systems   Constitution: Positive for malaise/fatigue. Negative for diaphoresis, weight gain and weight loss.   HENT: Negative for congestion and nosebleeds.    Cardiovascular: Positive for dyspnea on exertion. Negative for chest pain, claudication, cyanosis, irregular heartbeat, leg swelling, near-syncope, orthopnea, palpitations, paroxysmal nocturnal dyspnea and syncope.   Respiratory: Positive for shortness of breath. Negative for cough, hemoptysis, sleep disturbances due to breathing, snoring, sputum production and wheezing.    Hematologic/Lymphatic: Negative for bleeding problem. Bruises/bleeds easily.   Skin: Negative for rash.   Musculoskeletal: Negative for arthritis, back pain, falls, joint pain, muscle cramps and muscle weakness.   Gastrointestinal: Negative for abdominal pain, constipation, diarrhea, heartburn, hematemesis, hematochezia, melena and nausea.   Genitourinary: Negative for dysuria, hematuria and nocturia.   Neurological: Negative for excessive daytime sleepiness, dizziness, headaches, light-headedness, loss of balance, numbness, vertigo and weakness.     Objective:     Vital Signs (Most Recent):  Temp: 97.9 °F (36.6 °C) (05/22/19 1235)  Pulse: 81 (05/22/19 1235)  Resp: 20 (05/22/19 1235)  BP: (!) 111/59 (05/22/19 1235)  SpO2: 100 % (05/22/19 1235) Vital Signs (24h Range):  Temp:  [96.8 °F (36 °C)-98 °F (36.7 °C)] 97.9 °F (36.6 °C)  Pulse:  [66-81] 81  Resp:  [16-20] 20  SpO2:  [96 %-100 %] 100 %  BP: (104-142)/(55-73) 111/59     Weight: 78.9 kg (173 lb 15.1 oz)  Body mass index is 37.64 kg/m².     SpO2: 100 %  O2 Device (Oxygen Therapy): nasal cannula      Intake/Output Summary (Last 24 hours) at 5/22/2019 1405  Last data filed at 5/22/2019 0300  Gross per 24 hour   Intake 720 ml   Output 250 ml   Net 470 ml       Lines/Drains/Airways     Peripheral Intravenous Line                 Peripheral IV - Single Lumen 05/22/19 0049 20 G Left Forearm less than 1 day                 Physical Exam   Constitutional: She is oriented to person, place, and time. She appears well-developed and well-nourished.   Neck: Neck supple. No JVD present.   Cardiovascular: Normal rate, normal heart sounds and normal pulses. An irregularly irregular rhythm present. Exam reveals no friction rub.   No murmur heard.  Pulmonary/Chest: Effort normal and breath sounds normal. No respiratory distress. She has no wheezes. She has no rales.   Abdominal: Soft. Bowel sounds are normal. She exhibits no distension.   Musculoskeletal: She exhibits no edema or tenderness.   Neurological: She is alert and oriented to person, place, and time.   Skin: Skin is warm and dry. No rash noted.   Psychiatric: She has a normal mood and affect. Her behavior is normal.   Nursing note and vitals reviewed.      Significant Labs:   All pertinent lab results from the last 24 hours have been reviewed. and   Recent Lab Results       05/22/19  0507   05/21/19  1750   05/21/19  1440        Anion Gap 12   15     Baso # 0.02         Basophil% 0.3         BUN, Bld 59   51     Calcium 9.7   10.4     Chloride 95   90     CO2 30   36     Creatinine 1.2   1.2     Differential Method Automated         eGFR if  48   48     eGFR if non  42  Comment:  Calculation used to obtain the estimated glomerular filtration  rate (eGFR) is the CKD-EPI equation.      42  Comment:  Calculation used to obtain the estimated glomerular filtration  rate (eGFR) is the CKD-EPI equation.        Eos # 0.1         Eosinophil% 1.8         Glucose 119   116     Gran # (ANC) 3.2         Gran% 52.2         Hematocrit 39.6         Hemoglobin 12.6         Lymph # 1.9         Lymph% 31.5         Magnesium     2.6     MCH 29.7         MCHC 31.8         MCV 93         Mono # 0.9         Mono% 14.2         MPV 12.2         Phosphorus     4.2     Platelets 189         Potassium 4.3   4.1     RBC 4.24         RDW 14.3         Sodium 137   141      Troponin I   0.066  Comment:  The reference interval for Troponin I represents the 99th percentile   cutoff   for our facility and is consistent with 3rd generation assay   performance.         WBC 6.06               Significant Imaging: Echocardiogram:   2D echo with color flow doppler:   Results for orders placed or performed during the hospital encounter of 03/07/19   2D echo with color flow doppler   Result Value Ref Range    QEF 60 55 - 65    Aortic Valve Regurgitation MILD     Est. PA Systolic Pressure 58.35 (A)     Tricuspid Valve Regurgitation MILD TO MODERATE

## 2019-05-22 NOTE — HOSPITAL COURSE
83 y/o female admitted for CHF exacerbation likely secondary to A-fib. On IV lasix. Cardiology consult. Metoprolol on hold per cardiology due to bradycardia. Continue Eliquis for anticoagulation. Loading dose of Amiodarone given and gtt started. 5/22/19- Patient remains in A-fib, will continue amio gtt. Continue IV diuresis with lasix. Will plan for cardioversion on Friday, if  patient doesn't convert to NSR. 5/23/19- Creatinine increased to 1.7, IV lasix switched to po. Patient denies any chest pain or SOB. Remains in A-fib. Plan for cardioversion tomorrow.   5/24/19: Pt underwent successful cardioversion. Dr. Gooden recommended discharge on home medications. Metorololol held. Pt discharged on Amiodarone 200mg daily.

## 2019-05-22 NOTE — PLAN OF CARE
05/22/19 1555   GUTIERRES Message   Medicare Outpatient and Observation Notification regarding financial responsibility Given to patient/caregiver;Explained to patient/caregiver;Signed/date by patient/caregiver  (Gutierres form signed and placed in patient's blue folder)   Date GUTIERRES was signed 05/22/19   Time GUTIERRES was signed [id number]

## 2019-05-22 NOTE — PROGRESS NOTES
[facility] Hospital Medicine  Progress Note    Patient Name: Elke Laws  MRN: [id number]  Patient Class: OP- Observation   Admission Date: 5/21/2019  Length of Stay: 0 days  Attending Physician: Sekou Mccall MD  Primary Care Provider: Isidra Benavidez MD        Subjective:     Principal Problem:Acute on chronic diastolic heart failure    HPI:  Elke Laws is a 84 y.o. female patient  with PMHx of chronic AF on Eliquis, HTN, chronic diastolic HFpEF of 60-65%, CAD, HLD, CKD, and asthma, who presents to the Emergency Department for generalized weakness which onset gradually last night. Symptoms are constant and moderate in severity. No mitigating or exacerbating factors reported. Associated sxs include SOB, nausea, constipation, CP. Patient denies any V/D, fever, chills, dizziness, cough, back pain, neck pain, and all other sxs at this time. She was recently admitted for acute decompensated heart failure, diuresed with IV Lasix which she responded well to and  was discharged on 5/8. She uses continuous home oxygen at 2L/min via [place]. ED workup resulted K+ 2.8, BUN 52, troponin 0.040, , and CXR shows no acute findings. Today, ekg showed afib. Hospital Medicine was called for admission. She is a DNR and her surrogate decision maker is her son, Christiano.             Hospital Course:  83 y/o female admitted for CHF exacerbation likely secondary to A-fib. On IV lasix. Cardiology consult. Metoprolol on hold per cardiology due to bradycardia. Continue Eliquis for anticoagulation. Loading dose of Amiodarone given and gtt started. 5/22/19- Patient remains in A-fib, will continue amio gtt. Continue IV diuresis with lasix. Will plan for cardioversion on Friday, if  patient doesn't convert to NSR.       Interval History:  No acute events overnight. Remains in A-fib. HR controlled. Still on Amio gtt. Cardiology following.     Review of Systems   Constitutional: Negative.  Negative for activity change,  appetite change, fatigue and fever.   HENT: Negative.    Eyes: Negative.    Respiratory: Positive for shortness of breath. Negative for cough and wheezing.    Cardiovascular: Positive for chest pain. Negative for palpitations and leg swelling.   Gastrointestinal: Positive for constipation and nausea. Negative for abdominal pain and vomiting.   Endocrine: Negative.    Genitourinary: Negative.  Negative for dysuria, frequency and urgency.   Musculoskeletal: Negative.    Skin: Negative.    Allergic/Immunologic: Negative.    Neurological: Positive for weakness. Negative for dizziness and headaches.   Hematological: Negative.    Psychiatric/Behavioral: Negative.      Objective:     Vital Signs (Most Recent):  Temp: 97.9 °F (36.6 °C) (05/22/19 1235)  Pulse: 81 (05/22/19 1235)  Resp: 20 (05/22/19 1235)  BP: (!) 111/59 (05/22/19 1235)  SpO2: 100 % (05/22/19 1235) Vital Signs (24h Range):  Temp:  [96.8 °F (36 °C)-98 °F (36.7 °C)] 97.9 °F (36.6 °C)  Pulse:  [66-81] 81  Resp:  [16-20] 20  SpO2:  [96 %-100 %] 100 %  BP: (104-142)/(55-73) 111/59     Weight: 78.9 kg (173 lb 15.1 oz)  Body mass index is 37.64 kg/m².    Intake/Output Summary (Last 24 hours) at 5/22/2019 1457  Last data filed at 5/22/2019 0300  Gross per 24 hour   Intake 720 ml   Output 250 ml   Net 470 ml      Physical Exam   Constitutional: She is oriented to person, place, and time. She appears well-developed and well-nourished.   HENT:   Head: Normocephalic and atraumatic.   Eyes: Pupils are equal, round, and reactive to light. Conjunctivae and EOM are normal.   Neck: Normal range of motion. Neck supple. No JVD present.   Cardiovascular: Normal rate, normal heart sounds and intact distal pulses. An irregularly irregular rhythm present.   Pulmonary/Chest: Effort normal and breath sounds normal. She has no rales.   Abdominal: Soft. Bowel sounds are normal.   Musculoskeletal: Normal range of motion.   Neurological: She is alert and oriented to person, place, and  time. She has normal reflexes.   Skin: Skin is warm and dry.   Psychiatric: She has a normal mood and affect. Her behavior is normal. Judgment and thought content normal.   Nursing note and vitals reviewed.      Significant Labs:   BMP:   Recent Labs   Lab 05/21/19  1440 05/22/19  0507   * 119*    137   K 4.1 4.3   CL 90* 95   CO2 36* 30*   BUN 51* 59*   CREATININE 1.2 1.2   CALCIUM 10.4 9.7   MG 2.6  --      CBC:   Recent Labs   Lab 05/21/19  0957 05/22/19  0507   WBC 8.61 6.06   HGB 14.0 12.6   HCT 42.3 39.6    189     CMP:   Recent Labs   Lab 05/21/19  0957 05/21/19  1440 05/22/19  0507    141 137   K 2.8* 4.1 4.3   CL 90* 90* 95   CO2 34* 36* 30*   * 116* 119*   BUN 52* 51* 59*   CREATININE 1.3 1.2 1.2   CALCIUM 10.3 10.4 9.7   PROT 8.0  --   --    ALBUMIN 3.7  --   --    BILITOT 0.6  --   --    ALKPHOS 138*  --   --    AST 35  --   --    ALT 25  --   --    ANIONGAP 15 15 12   EGFRNONAA 38* 42* 42*     Troponin:   Recent Labs   Lab 05/21/19  0957 05/21/19  1240 05/21/19  1750   TROPONINI 0.045* 0.040* 0.066*     All pertinent labs within the past 24 hours have been reviewed.    Significant Imaging:   Imaging Results          X-Ray Chest AP Portable (Final result)  Result time 05/21/19 11:02:53    Final result by Ganga Jo MD (05/21/19 11:02:53)                 Impression:      No acute abnormality.      Electronically signed by: Ganga Jo  Date:    05/21/2019  Time:    11:02             Narrative:    EXAMINATION:  XR CHEST AP PORTABLE    CLINICAL HISTORY:  CHF;.    TECHNIQUE:  Single frontal portable view of the chest was performed.    COMPARISON:  05/05/2019    FINDINGS:  Support devices: Telemetry leads    Elevation right hemidiaphragm.The lungs are clear, with normal appearance of pulmonary vasculature and no pleural effusion or pneumothorax.    The cardiac silhouette is normal in size. The hilar and mediastinal contours are unremarkable.    Bones are intact.                               Assessment/Plan:      * Acute on chronic diastolic HFpEF of 60-65%  - Diureses with IV Lasix 40 mg BID.  - Strict I&Os, daily weights, Na/fluid restriction.  - BP/HR control.  - Recent TTE reviewed.  -Cardiology consult   5/22/19  -Continue IV Lasix     A-fib  Rate controlled,  Telemetry monitoring   - Continue beta-blocker for HR/rhythm suppression.  - Continue Eliquis for anticoagulation.  - Amiodarone gtt started   -Cardiology consult   5/22/19  -BB held per cardiology due to bradycardia   -Will continue amio gtt   -If patient doesn't convert to NSR, will plan for cardioversion on Friday    Elevated troponin  Denies chest pain  Initial troponin 0.045  Trend serial troponin's   Cardiology aware   Troponin 0.040>0.066  Denies any chest pain       Stage 3 chronic kidney disease  Creatinine stable.  - Avoid nephrotoxic agents.  - BMP daily       Hyperlipidemia  Continue statin       Essential hypertension  Blood pressure stable.  BB on hold per cardiology       VTE Risk Mitigation (From admission, onward)        Ordered     apixaban tablet 5 mg  2 times daily      05/21/19 8276              Kathrine Mauricio NP  Department of Hospital Medicine   [facility]

## 2019-05-23 PROBLEM — N17.9 ACUTE RENAL FAILURE SUPERIMPOSED ON STAGE 3 CHRONIC KIDNEY DISEASE: Status: ACTIVE | Noted: 2018-01-26

## 2019-05-23 LAB
ANION GAP SERPL CALC-SCNC: 13 MMOL/L (ref 8–16)
BASOPHILS # BLD AUTO: 0.01 K/UL (ref 0–0.2)
BASOPHILS NFR BLD: 0.1 % (ref 0–1.9)
BUN SERPL-MCNC: 61 MG/DL (ref 8–23)
CALCIUM SERPL-MCNC: 9.8 MG/DL (ref 8.7–10.5)
CHLORIDE SERPL-SCNC: 91 MMOL/L (ref 95–110)
CO2 SERPL-SCNC: 32 MMOL/L (ref 23–29)
CREAT SERPL-MCNC: 1.7 MG/DL (ref 0.5–1.4)
DIFFERENTIAL METHOD: NORMAL
EOSINOPHIL # BLD AUTO: 0.1 K/UL (ref 0–0.5)
EOSINOPHIL NFR BLD: 1.4 % (ref 0–8)
ERYTHROCYTE [DISTWIDTH] IN BLOOD BY AUTOMATED COUNT: 14.4 % (ref 11.5–14.5)
EST. GFR  (AFRICAN AMERICAN): 31 ML/MIN/1.73 M^2
EST. GFR  (NON AFRICAN AMERICAN): 27 ML/MIN/1.73 M^2
GLUCOSE SERPL-MCNC: 122 MG/DL (ref 70–110)
HCT VFR BLD AUTO: 38.7 % (ref 37–48.5)
HGB BLD-MCNC: 12.6 G/DL (ref 12–16)
LYMPHOCYTES # BLD AUTO: 1.6 K/UL (ref 1–4.8)
LYMPHOCYTES NFR BLD: 22.3 % (ref 18–48)
MCH RBC QN AUTO: 30.1 PG (ref 27–31)
MCHC RBC AUTO-ENTMCNC: 32.6 G/DL (ref 32–36)
MCV RBC AUTO: 92 FL (ref 82–98)
MONOCYTES # BLD AUTO: 0.9 K/UL (ref 0.3–1)
MONOCYTES NFR BLD: 12.7 % (ref 4–15)
NEUTROPHILS # BLD AUTO: 4.5 K/UL (ref 1.8–7.7)
NEUTROPHILS NFR BLD: 63.5 % (ref 38–73)
PLATELET # BLD AUTO: 185 K/UL (ref 150–350)
PMV BLD AUTO: 11.7 FL (ref 9.2–12.9)
POTASSIUM SERPL-SCNC: 3.7 MMOL/L (ref 3.5–5.1)
RBC # BLD AUTO: 4.19 M/UL (ref 4–5.4)
SODIUM SERPL-SCNC: 136 MMOL/L (ref 136–145)
WBC # BLD AUTO: 7.1 K/UL (ref 3.9–12.7)

## 2019-05-23 PROCEDURE — 36415 COLL VENOUS BLD VENIPUNCTURE: CPT | Mod: HCWC

## 2019-05-23 PROCEDURE — 93005 ELECTROCARDIOGRAM TRACING: CPT | Mod: HCWC

## 2019-05-23 PROCEDURE — 80048 BASIC METABOLIC PNL TOTAL CA: CPT | Mod: HCWC

## 2019-05-23 PROCEDURE — 63600175 PHARM REV CODE 636 W HCPCS: Mod: HCWC | Performed by: INTERNAL MEDICINE

## 2019-05-23 PROCEDURE — 94761 N-INVAS EAR/PLS OXIMETRY MLT: CPT | Mod: HCWC

## 2019-05-23 PROCEDURE — 93010 ELECTROCARDIOGRAM REPORT: CPT | Mod: HCWC,,, | Performed by: INTERNAL MEDICINE

## 2019-05-23 PROCEDURE — 25000003 PHARM REV CODE 250: Mod: HCWC | Performed by: NURSE PRACTITIONER

## 2019-05-23 PROCEDURE — 93041 RHYTHM ECG TRACING: CPT | Mod: HCWC

## 2019-05-23 PROCEDURE — 96366 THER/PROPH/DIAG IV INF ADDON: CPT | Mod: 59 | Performed by: EMERGENCY MEDICINE

## 2019-05-23 PROCEDURE — 99225 PR SUBSEQUENT OBSERVATION CARE,LEVEL II: CPT | Mod: HCWC,,, | Performed by: INTERNAL MEDICINE

## 2019-05-23 PROCEDURE — 27000221 HC OXYGEN, UP TO 24 HOURS: Mod: HCWC

## 2019-05-23 PROCEDURE — 85025 COMPLETE CBC W/AUTO DIFF WBC: CPT | Mod: HCWC

## 2019-05-23 PROCEDURE — 93010 EKG 12-LEAD: ICD-10-PCS | Mod: HCWC,,, | Performed by: INTERNAL MEDICINE

## 2019-05-23 PROCEDURE — G0378 HOSPITAL OBSERVATION PER HR: HCPCS | Mod: HCWC

## 2019-05-23 PROCEDURE — 99225 PR SUBSEQUENT OBSERVATION CARE,LEVEL II: ICD-10-PCS | Mod: HCWC,,, | Performed by: INTERNAL MEDICINE

## 2019-05-23 RX ORDER — SYRING-NEEDL,DISP,INSUL,0.3 ML 29 G X1/2"
296 SYRINGE, EMPTY DISPOSABLE MISCELLANEOUS ONCE
Status: COMPLETED | OUTPATIENT
Start: 2019-05-23 | End: 2019-05-23

## 2019-05-23 RX ORDER — BISACODYL 10 MG
10 SUPPOSITORY, RECTAL RECTAL ONCE
Status: COMPLETED | OUTPATIENT
Start: 2019-05-23 | End: 2019-05-23

## 2019-05-23 RX ORDER — FUROSEMIDE 40 MG/1
40 TABLET ORAL DAILY
Status: DISCONTINUED | OUTPATIENT
Start: 2019-05-23 | End: 2019-05-24 | Stop reason: HOSPADM

## 2019-05-23 RX ADMIN — LORAZEPAM 1 MG: 1 TABLET ORAL at 11:05

## 2019-05-23 RX ADMIN — PRAVASTATIN SODIUM 40 MG: 20 TABLET ORAL at 09:05

## 2019-05-23 RX ADMIN — APIXABAN 5 MG: 2.5 TABLET, FILM COATED ORAL at 09:05

## 2019-05-23 RX ADMIN — AMIODARONE HYDROCHLORIDE 0.5 MG/MIN: 1.8 INJECTION, SOLUTION INTRAVENOUS at 09:05

## 2019-05-23 RX ADMIN — FUROSEMIDE 40 MG: 40 TABLET ORAL at 09:05

## 2019-05-23 RX ADMIN — Medication 296 ML: at 07:05

## 2019-05-23 RX ADMIN — BISACODYL 10 MG: 10 SUPPOSITORY RECTAL at 05:05

## 2019-05-23 RX ADMIN — PANTOPRAZOLE SODIUM 40 MG: 40 TABLET, DELAYED RELEASE ORAL at 09:05

## 2019-05-23 NOTE — PLAN OF CARE
Problem: Adult Inpatient Plan of Care  Goal: Patient-Specific Goal (Individualization)  Outcome: Ongoing (interventions implemented as appropriate)  Patient had uneventful day.  Patient still on amiodarone drip for the next couple of days.  Patient may need another cardioversion if the amiodarone does not convert her.

## 2019-05-23 NOTE — ASSESSMENT & PLAN NOTE
Denies chest pain  Initial troponin 0.045  Trend serial troponin's   Cardiology aware   Troponin 0.040>0.066  5/23/19  Denies any chest pain

## 2019-05-23 NOTE — SUBJECTIVE & OBJECTIVE
Review of Systems   Constitution: Positive for malaise/fatigue. Negative for diaphoresis, weight gain and weight loss.   HENT: Negative for congestion and nosebleeds.    Cardiovascular: Positive for dyspnea on exertion. Negative for chest pain, claudication, cyanosis, irregular heartbeat, leg swelling, near-syncope, orthopnea, palpitations, paroxysmal nocturnal dyspnea and syncope.   Respiratory: Positive for shortness of breath. Negative for cough, hemoptysis, sleep disturbances due to breathing, snoring, sputum production and wheezing.    Hematologic/Lymphatic: Negative for bleeding problem. Bruises/bleeds easily.   Skin: Negative for rash.   Musculoskeletal: Negative for arthritis, back pain, falls, joint pain, muscle cramps and muscle weakness.   Gastrointestinal: Negative for abdominal pain, constipation, diarrhea, heartburn, hematemesis, hematochezia, melena and nausea.   Genitourinary: Negative for dysuria, hematuria and nocturia.   Neurological: Negative for excessive daytime sleepiness, dizziness, headaches, light-headedness, loss of balance, numbness, vertigo and weakness.     Objective:     Vital Signs (Most Recent):  Temp: 97.7 °F (36.5 °C) (05/23/19 1116)  Pulse: 70 (05/23/19 1115)  Resp: 18 (05/23/19 1115)  BP: (!) 110/52 (05/23/19 1115)  SpO2: 99 % (05/23/19 1115) Vital Signs (24h Range):  Temp:  [97.7 °F (36.5 °C)-98.5 °F (36.9 °C)] 97.7 °F (36.5 °C)  Pulse:  [69-82] 70  Resp:  [18-20] 18  SpO2:  [96 %-100 %] 99 %  BP: (108-148)/(52-70) 110/52     Weight: 78.9 kg (173 lb 15.1 oz)  Body mass index is 37.64 kg/m².     SpO2: 99 %  O2 Device (Oxygen Therapy): nasal cannula      Intake/Output Summary (Last 24 hours) at 5/23/2019 1153  Last data filed at 5/23/2019 0950  Gross per 24 hour   Intake 120 ml   Output 1100 ml   Net -980 ml       Lines/Drains/Airways     Peripheral Intravenous Line                 Peripheral IV - Single Lumen 05/22/19 0049 20 G Left Forearm 1 day                Physical Exam    Constitutional: She is oriented to person, place, and time. She appears well-developed and well-nourished.   Neck: Neck supple. No JVD present.   Cardiovascular: Normal rate, normal heart sounds and normal pulses. An irregularly irregular rhythm present. Exam reveals no friction rub.   No murmur heard.  Pulmonary/Chest: Effort normal and breath sounds normal. No respiratory distress. She has no wheezes. She has no rales.   Abdominal: Soft. Bowel sounds are normal. She exhibits no distension.   Musculoskeletal: She exhibits no edema or tenderness.   Neurological: She is alert and oriented to person, place, and time.   Skin: Skin is warm and dry. No rash noted.   Psychiatric: She has a normal mood and affect. Her behavior is normal.   Nursing note and vitals reviewed.      Significant Labs:   All pertinent lab results from the last 24 hours have been reviewed. and   Recent Lab Results       05/23/19  0543        Anion Gap 13     Baso # 0.01     Basophil% 0.1     BUN, Bld 61     Calcium 9.8     Chloride 91     CO2 32     Creatinine 1.7     Differential Method Automated     eGFR if  31     eGFR if non  27  Comment:  Calculation used to obtain the estimated glomerular filtration  rate (eGFR) is the CKD-EPI equation.        Eos # 0.1     Eosinophil% 1.4     Glucose 122     Gran # (ANC) 4.5     Gran% 63.5     Hematocrit 38.7     Hemoglobin 12.6     Lymph # 1.6     Lymph% 22.3     MCH 30.1     MCHC 32.6     MCV 92     Mono # 0.9     Mono% 12.7     MPV 11.7     Platelets 185     Potassium 3.7     RBC 4.19     RDW 14.4     Sodium 136     WBC 7.10           Significant Imaging: Echocardiogram:   2D echo with color flow doppler:   Results for orders placed or performed during the hospital encounter of 03/07/19   2D echo with color flow doppler   Result Value Ref Range    QEF 60 55 - 65    Aortic Valve Regurgitation MILD     Est. PA Systolic Pressure 58.35 (A)     Tricuspid Valve Regurgitation  MILD TO MODERATE

## 2019-05-23 NOTE — ASSESSMENT & PLAN NOTE
Creatinine stable.  - Avoid nephrotoxic agents.  - BMP daily   5/23/19  -Creatinine increased to 1.7  -IV lasix switched to po

## 2019-05-23 NOTE — PLAN OF CARE
"Problem: Adult Inpatient Plan of Care  Goal: Plan of Care Review  Outcome: Ongoing (interventions implemented as appropriate)  POC reviewed, verbalized understanding. Afib on tele monitor. Amio gtt infusing per order. I/Os monitored. Ambulating with standby assist. Feels more "rested". Fall precautions maintained, family at bedside. Remains free from injury/falls this shift. Instructed to call for assistance      "

## 2019-05-23 NOTE — PROGRESS NOTES
[facility] Hospital Medicine  Progress Note    Patient Name: Elke Laws  MRN: [id number]  Patient Class: OP- Observation   Admission Date: 5/21/2019  Length of Stay: 0 days  Attending Physician: Sekou Mccall MD  Primary Care Provider: Isidra Benavidez MD        Subjective:     Principal Problem:Acute on chronic diastolic heart failure    HPI:  Elke Laws is a 84 y.o. female patient  with PMHx of chronic AF on Eliquis, HTN, chronic diastolic HFpEF of 60-65%, CAD, HLD, CKD, and asthma, who presents to the Emergency Department for generalized weakness which onset gradually last night. Symptoms are constant and moderate in severity. No mitigating or exacerbating factors reported. Associated sxs include SOB, nausea, constipation, CP. Patient denies any V/D, fever, chills, dizziness, cough, back pain, neck pain, and all other sxs at this time. She was recently admitted for acute decompensated heart failure, diuresed with IV Lasix which she responded well to and  was discharged on 5/8. She uses continuous home oxygen at 2L/min via [place]. ED workup resulted K+ 2.8, BUN 52, troponin 0.040, , and CXR shows no acute findings. Today, ekg showed afib. Hospital Medicine was called for admission. She is a DNR and her surrogate decision maker is her son, Christiano.             Hospital Course:  83 y/o female admitted for CHF exacerbation likely secondary to A-fib. On IV lasix. Cardiology consult. Metoprolol on hold per cardiology due to bradycardia. Continue Eliquis for anticoagulation. Loading dose of Amiodarone given and gtt started. 5/22/19- Patient remains in A-fib, will continue amio gtt. Continue IV diuresis with lasix. Will plan for cardioversion on Friday, if  patient doesn't convert to NSR. 5/23/19- Creatinine increased to 1.7, IV lasix switched to po. Patient denies any chest pain or SOB. Remains in A-fib. Plan for cardioversion tomorrow.       Interval History: No acute events overnight. Remains  in A-fib. Continue amio gtt. Creatinine increased to 1.7, IV lasix switched to po.     Review of Systems   Constitutional: Negative.  Negative for activity change, appetite change, fatigue and fever.   HENT: Negative.    Eyes: Negative.    Respiratory: Negative for cough, shortness of breath and wheezing.    Cardiovascular: Negative for chest pain, palpitations and leg swelling.   Gastrointestinal: Negative for abdominal pain, constipation, nausea and vomiting.   Endocrine: Negative.    Genitourinary: Negative.  Negative for dysuria, frequency and urgency.   Musculoskeletal: Negative.    Skin: Negative.    Allergic/Immunologic: Negative.    Neurological: Positive for weakness. Negative for dizziness and headaches.   Hematological: Negative.    Psychiatric/Behavioral: Negative.      Objective:     Vital Signs (Most Recent):  Temp: 97.8 °F (36.6 °C) (05/23/19 0019)  Pulse: 73 (05/23/19 0525)  Resp: 18 (05/23/19 0019)  BP: (!) 108/55 (05/23/19 0019)  SpO2: 98 % (05/23/19 0741) Vital Signs (24h Range):  Temp:  [97.8 °F (36.6 °C)-98.5 °F (36.9 °C)] 97.8 °F (36.6 °C)  Pulse:  [69-82] 73  Resp:  [18-20] 18  SpO2:  [96 %-100 %] 98 %  BP: (108-148)/(55-70) 108/55     Weight: 78.9 kg (173 lb 15.1 oz)  Body mass index is 37.64 kg/m².    Intake/Output Summary (Last 24 hours) at 5/23/2019 1055  Last data filed at 5/23/2019 0950  Gross per 24 hour   Intake 120 ml   Output 1100 ml   Net -980 ml      Physical Exam   Constitutional: She is oriented to person, place, and time. She appears well-developed and well-nourished.   HENT:   Head: Normocephalic and atraumatic.   Eyes: Pupils are equal, round, and reactive to light. Conjunctivae and EOM are normal.   Neck: Normal range of motion. Neck supple. No JVD present.   Cardiovascular: Normal rate, normal heart sounds and intact distal pulses. An irregularly irregular rhythm present.   Pulmonary/Chest: Effort normal and breath sounds normal. She has no rales.   Abdominal: Soft. Bowel  sounds are normal.   Musculoskeletal: Normal range of motion.   Neurological: She is alert and oriented to person, place, and time. She has normal reflexes.   Skin: Skin is warm and dry.   Psychiatric: She has a normal mood and affect. Her behavior is normal. Judgment and thought content normal.   Nursing note and vitals reviewed.      Significant Labs:   BMP:   Recent Labs   Lab 05/21/19  1440  05/23/19  0543   *   < > 122*      < > 136   K 4.1   < > 3.7   CL 90*   < > 91*   CO2 36*   < > 32*   BUN 51*   < > 61*   CREATININE 1.2   < > 1.7*   CALCIUM 10.4   < > 9.8   MG 2.6  --   --     < > = values in this interval not displayed.     CBC:   Recent Labs   Lab 05/22/19  0507 05/23/19  0543   WBC 6.06 7.10   HGB 12.6 12.6   HCT 39.6 38.7    185     CMP:   Recent Labs   Lab 05/21/19  1440 05/22/19  0507 05/23/19  0543    137 136   K 4.1 4.3 3.7   CL 90* 95 91*   CO2 36* 30* 32*   * 119* 122*   BUN 51* 59* 61*   CREATININE 1.2 1.2 1.7*   CALCIUM 10.4 9.7 9.8   ANIONGAP 15 12 13   EGFRNONAA 42* 42* 27*     Cardiac Markers: No results for input(s): CKMB, MYOGLOBIN, BNP, TROPISTAT in the last 48 hours.  Troponin:   Recent Labs   Lab 05/21/19  1240 05/21/19  1750   TROPONINI 0.040* 0.066*     All pertinent labs within the past 24 hours have been reviewed.    Significant Imaging: I have reviewed all pertinent imaging results/findings within the past 24 hours.    Assessment/Plan:      * Acute on chronic diastolic HFpEF of 60-65%  - Diureses with IV Lasix 40 mg BID.  - Strict I&Os, daily weights, Na/fluid restriction.  - BP/HR control.  - Recent TTE reviewed.  -Cardiology consult   5/22/19  -Continue IV Lasix   5/23/19  -IV lasix switched to po       A-fib  Rate controlled,  Telemetry monitoring   - Continue beta-blocker for HR/rhythm suppression.  - Continue Eliquis for anticoagulation.  - Amiodarone gtt started   -Cardiology consult   5/22/19  -BB held per cardiology due to bradycardia    -Will continue amio gtt   -If patient doesn't convert to NSR, will plan for cardioversion on Friday 5/23/19  -Continue amio gtt  -Remains in A-fib, heart rate controlled   -Plan for cardioversion tomorrow     Elevated troponin  Denies chest pain  Initial troponin 0.045  Trend serial troponin's   Cardiology aware   Troponin 0.040>0.066  5/23/19  Denies any chest pain         Acute renal failure superimposed on stage 3 chronic kidney disease  Creatinine stable.  - Avoid nephrotoxic agents.  - BMP daily   5/23/19  -Creatinine increased to 1.7  -IV lasix switched to po       Hyperlipidemia  Continue statin       Essential hypertension  Blood pressure stable.  BB on hold per cardiology   Monitor       VTE Risk Mitigation (From admission, onward)        Ordered     apixaban tablet 5 mg  2 times daily      05/21/19 3159              Kathrine Mauricio NP  Department of Hospital Medicine   [facility]

## 2019-05-23 NOTE — ASSESSMENT & PLAN NOTE
A-fib likely cause of current decompensated HF  Has failed flecainide therapy as an OP  Will load with Amio and start gtt   Metoprolol on hold for now due to bradycardia, but will give amio only   Needs to resume Eliquis     5/22/19  Continue amio gtt for now   -Will continue to diurese today.   If patient doesn't convert to NSR, will plan for cardioversion on Friday   No need for LISSETH due to uninterrupted OAC with Eliquis  No BB due to bradycardia     5/23/19  -Continue to diurese today   Will continue amio gtt and plan for cardioversion tomorrow if still in A-fib   No need for LISSETH due to uninterrupted OAC with Eliquis  No BB due to bradycardia

## 2019-05-23 NOTE — ASSESSMENT & PLAN NOTE
Rate controlled,  Telemetry monitoring   - Continue beta-blocker for HR/rhythm suppression.  - Continue Eliquis for anticoagulation.  - Amiodarone gtt started   -Cardiology consult   5/22/19  -BB held per cardiology due to bradycardia   -Will continue amio gtt   -If patient doesn't convert to NSR, will plan for cardioversion on Friday 5/23/19  -Continue amio gtt  -Remains in A-fib, heart rate controlled   -Plan for cardioversion tomorrow

## 2019-05-23 NOTE — SUBJECTIVE & OBJECTIVE
Interval History: No acute events overnight. Remains in A-fib. Continue amio gtt. Creatinine increased to 1.7, IV lasix switched to po.     Review of Systems   Constitutional: Negative.  Negative for activity change, appetite change, fatigue and fever.   HENT: Negative.    Eyes: Negative.    Respiratory: Negative for cough, shortness of breath and wheezing.    Cardiovascular: Negative for chest pain, palpitations and leg swelling.   Gastrointestinal: Negative for abdominal pain, constipation, nausea and vomiting.   Endocrine: Negative.    Genitourinary: Negative.  Negative for dysuria, frequency and urgency.   Musculoskeletal: Negative.    Skin: Negative.    Allergic/Immunologic: Negative.    Neurological: Positive for weakness. Negative for dizziness and headaches.   Hematological: Negative.    Psychiatric/Behavioral: Negative.      Objective:     Vital Signs (Most Recent):  Temp: 97.8 °F (36.6 °C) (05/23/19 0019)  Pulse: 73 (05/23/19 0525)  Resp: 18 (05/23/19 0019)  BP: (!) 108/55 (05/23/19 0019)  SpO2: 98 % (05/23/19 0741) Vital Signs (24h Range):  Temp:  [97.8 °F (36.6 °C)-98.5 °F (36.9 °C)] 97.8 °F (36.6 °C)  Pulse:  [69-82] 73  Resp:  [18-20] 18  SpO2:  [96 %-100 %] 98 %  BP: (108-148)/(55-70) 108/55     Weight: 78.9 kg (173 lb 15.1 oz)  Body mass index is 37.64 kg/m².    Intake/Output Summary (Last 24 hours) at 5/23/2019 1055  Last data filed at 5/23/2019 0950  Gross per 24 hour   Intake 120 ml   Output 1100 ml   Net -980 ml      Physical Exam   Constitutional: She is oriented to person, place, and time. She appears well-developed and well-nourished.   HENT:   Head: Normocephalic and atraumatic.   Eyes: Pupils are equal, round, and reactive to light. Conjunctivae and EOM are normal.   Neck: Normal range of motion. Neck supple. No JVD present.   Cardiovascular: Normal rate, normal heart sounds and intact distal pulses. An irregularly irregular rhythm present.   Pulmonary/Chest: Effort normal and breath sounds  normal. She has no rales.   Abdominal: Soft. Bowel sounds are normal.   Musculoskeletal: Normal range of motion.   Neurological: She is alert and oriented to person, place, and time. She has normal reflexes.   Skin: Skin is warm and dry.   Psychiatric: She has a normal mood and affect. Her behavior is normal. Judgment and thought content normal.   Nursing note and vitals reviewed.      Significant Labs:   BMP:   Recent Labs   Lab 05/21/19  1440  05/23/19  0543   *   < > 122*      < > 136   K 4.1   < > 3.7   CL 90*   < > 91*   CO2 36*   < > 32*   BUN 51*   < > 61*   CREATININE 1.2   < > 1.7*   CALCIUM 10.4   < > 9.8   MG 2.6  --   --     < > = values in this interval not displayed.     CBC:   Recent Labs   Lab 05/22/19  0507 05/23/19  0543   WBC 6.06 7.10   HGB 12.6 12.6   HCT 39.6 38.7    185     CMP:   Recent Labs   Lab 05/21/19  1440 05/22/19  0507 05/23/19  0543    137 136   K 4.1 4.3 3.7   CL 90* 95 91*   CO2 36* 30* 32*   * 119* 122*   BUN 51* 59* 61*   CREATININE 1.2 1.2 1.7*   CALCIUM 10.4 9.7 9.8   ANIONGAP 15 12 13   EGFRNONAA 42* 42* 27*     Cardiac Markers: No results for input(s): CKMB, MYOGLOBIN, BNP, TROPISTAT in the last 48 hours.  Troponin:   Recent Labs   Lab 05/21/19  1240 05/21/19  1750   TROPONINI 0.040* 0.066*     All pertinent labs within the past 24 hours have been reviewed.    Significant Imaging: I have reviewed all pertinent imaging results/findings within the past 24 hours.

## 2019-05-23 NOTE — PROGRESS NOTES
[facility] -   Cardiology  Progress Note    Patient Name: Elke Laws  MRN: [id number]  Admission Date: 5/21/2019  Hospital Length of Stay: 0 days  Code Status: DNR   Attending Physician: Sekou Mccall MD   Primary Care Physician: Isidra Benavidez MD  Expected Discharge Date:   Principal Problem:Acute on chronic diastolic heart failure    Subjective:   Brief HPI:    Ms. Laws is a 85 y/o female with PMH persistent AF, diastolic CHF, non obstructive CAD per [facility] done in , HTN and HLD. She presented to the ED with complaints of weakness.   Echo showed EF 55%, mild AI and biatrial dilation  She has had several admission this year for CHF exacerbation. EKG shows A-fib. Rate non controlled on tele monitor.Attempted LISSETH/DCCV on 04/16 and started on flecainide as an OP, but HR Still not controlled. Flecainide has been increased multiple times. Is anticoagulated with Eliqius.   Troponin flat at 0.045, 0.040. , K 2.8, Cr 1.3, BUN 52, H/H 14.0/42.3.   Currently being admitted for CHF exacerbation likely secondary to A-fib       Hospital Course:   5/22/19- Remains in A-fib this morning . HR controlled. Still on Amio gtt. K has been replaced. Has no angina or equivalent     5/23/19- Remains in A-fib. Still on amio gtt. Diuresing well.         Review of Systems   Constitution: Positive for malaise/fatigue. Negative for diaphoresis, weight gain and weight loss.   HENT: Negative for congestion and nosebleeds.    Cardiovascular: Positive for dyspnea on exertion. Negative for chest pain, claudication, cyanosis, irregular heartbeat, leg swelling, near-syncope, orthopnea, palpitations, paroxysmal nocturnal dyspnea and syncope.   Respiratory: Positive for shortness of breath. Negative for cough, hemoptysis, sleep disturbances due to breathing, snoring, sputum production and wheezing.    Hematologic/Lymphatic: Negative for bleeding problem. Bruises/bleeds easily.   Skin: Negative for rash.   Musculoskeletal:  Negative for arthritis, back pain, falls, joint pain, muscle cramps and muscle weakness.   Gastrointestinal: Negative for abdominal pain, constipation, diarrhea, heartburn, hematemesis, hematochezia, melena and nausea.   Genitourinary: Negative for dysuria, hematuria and nocturia.   Neurological: Negative for excessive daytime sleepiness, dizziness, headaches, light-headedness, loss of balance, numbness, vertigo and weakness.     Objective:     Vital Signs (Most Recent):  Temp: 97.7 °F (36.5 °C) (05/23/19 1116)  Pulse: 70 (05/23/19 1115)  Resp: 18 (05/23/19 1115)  BP: (!) 110/52 (05/23/19 1115)  SpO2: 99 % (05/23/19 1115) Vital Signs (24h Range):  Temp:  [97.7 °F (36.5 °C)-98.5 °F (36.9 °C)] 97.7 °F (36.5 °C)  Pulse:  [69-82] 70  Resp:  [18-20] 18  SpO2:  [96 %-100 %] 99 %  BP: (108-148)/(52-70) 110/52     Weight: 78.9 kg (173 lb 15.1 oz)  Body mass index is 37.64 kg/m².     SpO2: 99 %  O2 Device (Oxygen Therapy): nasal cannula      Intake/Output Summary (Last 24 hours) at 5/23/2019 1153  Last data filed at 5/23/2019 0950  Gross per 24 hour   Intake 120 ml   Output 1100 ml   Net -980 ml       Lines/Drains/Airways     Peripheral Intravenous Line                 Peripheral IV - Single Lumen 05/22/19 0049 20 G Left Forearm 1 day                Physical Exam   Constitutional: She is oriented to person, place, and time. She appears well-developed and well-nourished.   Neck: Neck supple. No JVD present.   Cardiovascular: Normal rate, normal heart sounds and normal pulses. An irregularly irregular rhythm present. Exam reveals no friction rub.   No murmur heard.  Pulmonary/Chest: Effort normal and breath sounds normal. No respiratory distress. She has no wheezes. She has no rales.   Abdominal: Soft. Bowel sounds are normal. She exhibits no distension.   Musculoskeletal: She exhibits no edema or tenderness.   Neurological: She is alert and oriented to person, place, and time.   Skin: Skin is warm and dry. No rash noted.    Psychiatric: She has a normal mood and affect. Her behavior is normal.   Nursing note and vitals reviewed.      Significant Labs:   All pertinent lab results from the last 24 hours have been reviewed. and   Recent Lab Results       05/23/19  0543        Anion Gap 13     Baso # 0.01     Basophil% 0.1     BUN, Bld 61     Calcium 9.8     Chloride 91     CO2 32     Creatinine 1.7     Differential Method Automated     eGFR if  31     eGFR if non  27  Comment:  Calculation used to obtain the estimated glomerular filtration  rate (eGFR) is the CKD-EPI equation.        Eos # 0.1     Eosinophil% 1.4     Glucose 122     Gran # (ANC) 4.5     Gran% 63.5     Hematocrit 38.7     Hemoglobin 12.6     Lymph # 1.6     Lymph% 22.3     MCH 30.1     MCHC 32.6     MCV 92     Mono # 0.9     Mono% 12.7     MPV 11.7     Platelets 185     Potassium 3.7     RBC 4.19     RDW 14.4     Sodium 136     WBC 7.10           Significant Imaging: Echocardiogram:   2D echo with color flow doppler:   Results for orders placed or performed during the hospital encounter of 03/07/19   2D echo with color flow doppler   Result Value Ref Range    QEF 60 55 - 65    Aortic Valve Regurgitation MILD     Est. PA Systolic Pressure 58.35 (A)     Tricuspid Valve Regurgitation MILD TO MODERATE      Assessment and Plan:       * Acute on chronic diastolic HFpEF of 60-65%  Echo in March 2019 shows DD with preserved EF     CXR normal  Decompensated HF likely secondary to A-fib   Diurese with Lasix, but K needs to be replaced   BB on hold for now due to Bradycardia     5/22/19  -Continue to diurese with lasix and replace lytes as needed     Elevated troponin  Troponin flat at 0.045, 0.040  Likely demand from A-fib and CHF  Will trend enzymes for now     A-fib  A-fib likely cause of current decompensated HF  Has failed flecainide therapy as an OP  Will load with Amio and start gtt   Metoprolol on hold for now due to bradycardia, but  will give amio only   Needs to resume Eliquis     5/22/19  Continue amio gtt for now   -Will continue to diurese today.   If patient doesn't convert to NSR, will plan for cardioversion on Friday   No need for LISSETH due to uninterrupted OAC with Eliquis  No BB due to bradycardia     5/23/19  -Continue to diurese today   Will continue amio gtt and plan for cardioversion tomorrow if still in A-fib   No need for LISSETH due to uninterrupted OAC with Eliquis  No BB due to bradycardia       Hyperlipidemia  Continue Statin     Essential hypertension  BP stable at this time.   toprol on hold due to bradycardia         VTE Risk Mitigation (From admission, onward)        Ordered     apixaban tablet 5 mg  2 times daily      05/21/19 1454        Chart reviewed. Patient examined by Dr. Gooden and agrees with plan that has been outlined.     Eve Joiner, KRYSTIN  Cardiology  [facility]

## 2019-05-24 ENCOUNTER — ANESTHESIA (OUTPATIENT)
Dept: CARDIOLOGY | Facility: HOSPITAL | Age: 84
End: 2019-05-24
Payer: MEDICARE

## 2019-05-24 ENCOUNTER — ANESTHESIA EVENT (OUTPATIENT)
Dept: CARDIOLOGY | Facility: HOSPITAL | Age: 84
End: 2019-05-24
Payer: MEDICARE

## 2019-05-24 VITALS
RESPIRATION RATE: 18 BRPM | BODY MASS INDEX: 38.09 KG/M2 | DIASTOLIC BLOOD PRESSURE: 57 MMHG | HEART RATE: 85 BPM | WEIGHT: 176.56 LBS | TEMPERATURE: 98 F | HEIGHT: 57 IN | OXYGEN SATURATION: 99 % | SYSTOLIC BLOOD PRESSURE: 131 MMHG

## 2019-05-24 LAB
ANION GAP SERPL CALC-SCNC: 11 MMOL/L (ref 8–16)
BASOPHILS # BLD AUTO: 0.01 K/UL (ref 0–0.2)
BASOPHILS NFR BLD: 0.2 % (ref 0–1.9)
BUN SERPL-MCNC: 48 MG/DL (ref 8–23)
CALCIUM SERPL-MCNC: 9.6 MG/DL (ref 8.7–10.5)
CHLORIDE SERPL-SCNC: 91 MMOL/L (ref 95–110)
CO2 SERPL-SCNC: 37 MMOL/L (ref 23–29)
CREAT SERPL-MCNC: 1.1 MG/DL (ref 0.5–1.4)
DIFFERENTIAL METHOD: ABNORMAL
EOSINOPHIL # BLD AUTO: 0.1 K/UL (ref 0–0.5)
EOSINOPHIL NFR BLD: 1.2 % (ref 0–8)
ERYTHROCYTE [DISTWIDTH] IN BLOOD BY AUTOMATED COUNT: 14.2 % (ref 11.5–14.5)
EST. GFR  (AFRICAN AMERICAN): 53 ML/MIN/1.73 M^2
EST. GFR  (NON AFRICAN AMERICAN): 46 ML/MIN/1.73 M^2
GLUCOSE SERPL-MCNC: 109 MG/DL (ref 70–110)
HCT VFR BLD AUTO: 37.7 % (ref 37–48.5)
HGB BLD-MCNC: 12 G/DL (ref 12–16)
LYMPHOCYTES # BLD AUTO: 1.4 K/UL (ref 1–4.8)
LYMPHOCYTES NFR BLD: 21.9 % (ref 18–48)
MCH RBC QN AUTO: 29.4 PG (ref 27–31)
MCHC RBC AUTO-ENTMCNC: 31.8 G/DL (ref 32–36)
MCV RBC AUTO: 92 FL (ref 82–98)
MONOCYTES # BLD AUTO: 0.9 K/UL (ref 0.3–1)
MONOCYTES NFR BLD: 13.5 % (ref 4–15)
NEUTROPHILS # BLD AUTO: 4.1 K/UL (ref 1.8–7.7)
NEUTROPHILS NFR BLD: 63.2 % (ref 38–73)
PLATELET # BLD AUTO: 185 K/UL (ref 150–350)
PMV BLD AUTO: 11.9 FL (ref 9.2–12.9)
POTASSIUM SERPL-SCNC: 3.3 MMOL/L (ref 3.5–5.1)
RBC # BLD AUTO: 4.08 M/UL (ref 4–5.4)
SODIUM SERPL-SCNC: 139 MMOL/L (ref 136–145)
WBC # BLD AUTO: 6.54 K/UL (ref 3.9–12.7)

## 2019-05-24 PROCEDURE — 80048 BASIC METABOLIC PNL TOTAL CA: CPT | Mod: HCWC

## 2019-05-24 PROCEDURE — 93041 RHYTHM ECG TRACING: CPT | Mod: HCWC

## 2019-05-24 PROCEDURE — 93005 ELECTROCARDIOGRAM TRACING: CPT | Mod: HCWC

## 2019-05-24 PROCEDURE — 93010 EKG 12-LEAD: ICD-10-PCS | Mod: HCWC,,, | Performed by: INTERNAL MEDICINE

## 2019-05-24 PROCEDURE — 85025 COMPLETE CBC W/AUTO DIFF WBC: CPT | Mod: HCWC

## 2019-05-24 PROCEDURE — 25000003 PHARM REV CODE 250: Mod: HCWC | Performed by: NURSE ANESTHETIST, CERTIFIED REGISTERED

## 2019-05-24 PROCEDURE — 36415 COLL VENOUS BLD VENIPUNCTURE: CPT | Mod: HCWC

## 2019-05-24 PROCEDURE — 37000008 HC ANESTHESIA 1ST 15 MINUTES: Mod: HCWC | Performed by: INTERNAL MEDICINE

## 2019-05-24 PROCEDURE — 93010 ELECTROCARDIOGRAM REPORT: CPT | Mod: HCWC,,, | Performed by: INTERNAL MEDICINE

## 2019-05-24 PROCEDURE — 92960 CARDIOVERSION ELECTRIC EXT: CPT | Mod: HCWC,,, | Performed by: INTERNAL MEDICINE

## 2019-05-24 PROCEDURE — 96365 THER/PROPH/DIAG IV INF INIT: CPT | Mod: 59 | Performed by: EMERGENCY MEDICINE

## 2019-05-24 PROCEDURE — 25000003 PHARM REV CODE 250: Mod: HCWC | Performed by: NURSE PRACTITIONER

## 2019-05-24 PROCEDURE — 92960 CARDIOVERSION (DCCV): ICD-10-PCS | Mod: HCWC,,, | Performed by: INTERNAL MEDICINE

## 2019-05-24 PROCEDURE — 27000221 HC OXYGEN, UP TO 24 HOURS: Mod: HCWC

## 2019-05-24 PROCEDURE — 96366 THER/PROPH/DIAG IV INF ADDON: CPT | Mod: 59 | Performed by: EMERGENCY MEDICINE

## 2019-05-24 PROCEDURE — 63600175 PHARM REV CODE 636 W HCPCS: Mod: HCWC | Performed by: INTERNAL MEDICINE

## 2019-05-24 PROCEDURE — 93005 ELECTROCARDIOGRAM TRACING: CPT | Mod: HCWC,59

## 2019-05-24 PROCEDURE — 37000009 HC ANESTHESIA EA ADD 15 MINS: Mod: HCWC | Performed by: INTERNAL MEDICINE

## 2019-05-24 PROCEDURE — 63600175 PHARM REV CODE 636 W HCPCS: Mod: HCWC | Performed by: NURSE ANESTHETIST, CERTIFIED REGISTERED

## 2019-05-24 PROCEDURE — 92960 CARDIOVERSION ELECTRIC EXT: CPT | Mod: HCWC

## 2019-05-24 PROCEDURE — 94761 N-INVAS EAR/PLS OXIMETRY MLT: CPT | Mod: HCWC

## 2019-05-24 PROCEDURE — G0378 HOSPITAL OBSERVATION PER HR: HCPCS | Mod: HCWC

## 2019-05-24 RX ORDER — AMIODARONE HYDROCHLORIDE 200 MG/1
200 TABLET ORAL DAILY
Qty: 30 TABLET | Refills: 0 | Status: SHIPPED | OUTPATIENT
Start: 2019-05-24 | End: 2019-05-28 | Stop reason: SDUPTHER

## 2019-05-24 RX ORDER — SODIUM CHLORIDE 9 MG/ML
INJECTION, SOLUTION INTRAVENOUS CONTINUOUS PRN
Status: DISCONTINUED | OUTPATIENT
Start: 2019-05-24 | End: 2019-05-24

## 2019-05-24 RX ORDER — POTASSIUM CHLORIDE 20 MEQ/1
40 TABLET, EXTENDED RELEASE ORAL ONCE
Status: COMPLETED | OUTPATIENT
Start: 2019-05-24 | End: 2019-05-24

## 2019-05-24 RX ORDER — LIDOCAINE HYDROCHLORIDE 10 MG/ML
INJECTION INFILTRATION; PERINEURAL
Status: DISCONTINUED | OUTPATIENT
Start: 2019-05-24 | End: 2019-05-24

## 2019-05-24 RX ORDER — AMIODARONE HYDROCHLORIDE 200 MG/1
200 TABLET ORAL DAILY
Status: DISCONTINUED | OUTPATIENT
Start: 2019-05-24 | End: 2019-05-24 | Stop reason: HOSPADM

## 2019-05-24 RX ORDER — PROPOFOL 10 MG/ML
INJECTION, EMULSION INTRAVENOUS
Status: DISCONTINUED | OUTPATIENT
Start: 2019-05-24 | End: 2019-05-24

## 2019-05-24 RX ADMIN — SODIUM CHLORIDE: 9 INJECTION, SOLUTION INTRAVENOUS at 10:05

## 2019-05-24 RX ADMIN — PROPOFOL 40 MG: 10 INJECTION, EMULSION INTRAVENOUS at 10:05

## 2019-05-24 RX ADMIN — APIXABAN 5 MG: 2.5 TABLET, FILM COATED ORAL at 08:05

## 2019-05-24 RX ADMIN — PANTOPRAZOLE SODIUM 40 MG: 40 TABLET, DELAYED RELEASE ORAL at 08:05

## 2019-05-24 RX ADMIN — PROPOFOL 50 MG: 10 INJECTION, EMULSION INTRAVENOUS at 10:05

## 2019-05-24 RX ADMIN — FUROSEMIDE 40 MG: 40 TABLET ORAL at 08:05

## 2019-05-24 RX ADMIN — POTASSIUM CHLORIDE 40 MEQ: 1500 TABLET, EXTENDED RELEASE ORAL at 10:05

## 2019-05-24 RX ADMIN — LIDOCAINE HYDROCHLORIDE 30 MG: 10 INJECTION, SOLUTION INFILTRATION; PERINEURAL at 10:05

## 2019-05-24 RX ADMIN — AMIODARONE HYDROCHLORIDE 0.5 MG/MIN: 1.8 INJECTION, SOLUTION INTRAVENOUS at 08:05

## 2019-05-24 NOTE — ANESTHESIA PREPROCEDURE EVALUATION
05/24/2019  Elke Laws is a 84 y.o., female.    Pre-op Assessment    I have reviewed the Patient Summary Reports.     I have reviewed the Nursing Notes.   I have reviewed the Medications.     Review of Systems  Anesthesia Hx:  No problems with previous Anesthesia  History of prior surgery of interest to airway management or planning: Previous anesthesia: General, MAC Denies Family Hx of Anesthesia complications.   Denies Personal Hx of Anesthesia complications.   Social:  Former Smoker    Cardiovascular:   Exercise tolerance: good Hypertension, well controlled Valvular problems/Murmurs, MVP Past MI CAD   CHF ECG has been reviewed. CONCLUSIONS     1 - Severe left atrial enlargement.     2 - Concentric hypertrophy.     3 - No wall motion abnormalities.     4 - Normal left ventricular systolic function (EF 60-65%).     5 - Impaired LV relaxation, elevated LAP (grade 2 diastolic dysfunction).     6 - Normal right ventricular systolic function .     7 - The estimated PA systolic pressure is 28 mmHg.     8 - Trivial aortic regurgitation.     9 - Mild mitral regurgitation.       Normal sinus rhythm  Low voltage QRS  Borderline Abnormal ECG  When compared with ECG of 22-MAY-2017 05:23,  Sinus rhythm has replaced Atrial flutter  Confirmed by MD CARLY, ERMA ([id number]) on 5/28/2017 3:37:10 PM   Pulmonary:   Asthma mild    Musculoskeletal:   Arthritis     Neurological:  Neurology Normal    Endocrine:   Denies Diabetes.        Physical Exam  General:  Well nourished    Airway/Jaw/Neck:  Airway Findings: Mouth Opening: Normal Tongue: Normal  General Airway Assessment: Adult  Mallampati: II  Improves to II with phonation.  TM Distance: Normal, at least 6 cm  Jaw/Neck Findings:  Neck ROM: Normal ROM      Dental:  Dental Findings: Upper Dentures, Lower partial dentures   Chest/Lungs:  Chest/Lungs Findings: Clear to auscultation,  Normal Respiratory Rate     Heart/Vascular:  Heart Findings: Rate: Normal  AFIB      Mental Status:  Mental Status Findings:  Cooperative, Alert and Oriented         Anesthesia Plan  Type of Anesthesia, risks & benefits discussed:  Anesthesia Type:  MAC  Patient's Preference:   Intra-op Monitoring Plan: standard ASA monitors  Intra-op Monitoring Plan Comments:   Post Op Pain Control Plan:   Post Op Pain Control Plan Comments:   Induction:   IV  Beta Blocker:  Patient is not currently on a Beta-Blocker (No further documentation required).       Informed Consent: Patient understands risks and agrees with Anesthesia plan.  Questions answered. Anesthesia consent signed with patient.  ASA Score: 3     Day of Surgery Review of History & Physical:            Ready For Surgery From Anesthesia Perspective.

## 2019-05-24 NOTE — SUBJECTIVE & OBJECTIVE
Review of Systems   Constitution: Positive for malaise/fatigue. Negative for diaphoresis, weight gain and weight loss.   HENT: Negative for congestion and nosebleeds.    Cardiovascular: Negative for chest pain, claudication, cyanosis, dyspnea on exertion, irregular heartbeat, leg swelling, near-syncope, orthopnea, palpitations, paroxysmal nocturnal dyspnea and syncope.   Respiratory: Negative for cough, hemoptysis, shortness of breath, sleep disturbances due to breathing, snoring, sputum production and wheezing.    Hematologic/Lymphatic: Negative for bleeding problem. Bruises/bleeds easily.   Skin: Negative for rash.   Musculoskeletal: Negative for arthritis, back pain, falls, joint pain, muscle cramps and muscle weakness.   Gastrointestinal: Negative for abdominal pain, constipation, diarrhea, heartburn, hematemesis, hematochezia, melena and nausea.   Genitourinary: Negative for dysuria, hematuria and nocturia.   Neurological: Negative for excessive daytime sleepiness, dizziness, headaches, light-headedness, loss of balance, numbness, vertigo and weakness.     Objective:     Vital Signs (Most Recent):  Temp: 97.5 °F (36.4 °C) (05/24/19 1126)  Pulse: 85 (05/24/19 1300)  Resp: 18 (05/24/19 1126)  BP: (!) 131/57 (05/24/19 1126)  SpO2: 99 % (05/24/19 1126) Vital Signs (24h Range):  Temp:  [96.2 °F (35.7 °C)-98 °F (36.7 °C)] 97.5 °F (36.4 °C)  Pulse:  [] 85  Resp:  [17-19] 18  SpO2:  [97 %-100 %] 99 %  BP: (115-131)/(53-60) 131/57     Weight: 80.1 kg (176 lb 9.4 oz)  Body mass index is 38.21 kg/m².     SpO2: 99 %  O2 Device (Oxygen Therapy): nasal cannula      Intake/Output Summary (Last 24 hours) at 5/24/2019 1401  Last data filed at 5/24/2019 1020  Gross per 24 hour   Intake 1174.5 ml   Output 100 ml   Net 1074.5 ml       Lines/Drains/Airways     Peripheral Intravenous Line                 Peripheral IV - Single Lumen 05/23/19 2315 22 G Posterior;Right Forearm less than 1 day                Physical Exam    Constitutional: She is oriented to person, place, and time. She appears well-developed and well-nourished.   Neck: Neck supple. No JVD present.   Cardiovascular: Normal rate, normal heart sounds and normal pulses. An irregularly irregular rhythm present. Exam reveals no friction rub.   No murmur heard.  Pulmonary/Chest: Effort normal and breath sounds normal. No respiratory distress. She has no wheezes. She has no rales.   Abdominal: Soft. Bowel sounds are normal. She exhibits no distension.   Musculoskeletal: She exhibits no edema or tenderness.   Neurological: She is alert and oriented to person, place, and time.   Skin: Skin is warm and dry. No rash noted.   Psychiatric: She has a normal mood and affect. Her behavior is normal.   Nursing note and vitals reviewed.      Significant Labs:   All pertinent lab results from the last 24 hours have been reviewed. and   Recent Lab Results       05/24/19  0428        Anion Gap 11     Baso # 0.01     Basophil% 0.2     BUN, Bld 48     Calcium 9.6     Chloride 91     CO2 37     Creatinine 1.1     Differential Method Automated     eGFR if  53     eGFR if non  46  Comment:  Calculation used to obtain the estimated glomerular filtration  rate (eGFR) is the CKD-EPI equation.        Eos # 0.1     Eosinophil% 1.2     Glucose 109     Gran # (ANC) 4.1     Gran% 63.2     Hematocrit 37.7     Hemoglobin 12.0     Lymph # 1.4     Lymph% 21.9     MCH 29.4     MCHC 31.8     MCV 92     Mono # 0.9     Mono% 13.5     MPV 11.9     Platelets 185     Potassium 3.3     RBC 4.08     RDW 14.2     Sodium 139     WBC 6.54           Significant Imaging: Echocardiogram:   2D echo with color flow doppler:   Results for orders placed or performed during the hospital encounter of 03/07/19   2D echo with color flow doppler   Result Value Ref Range    QEF 60 55 - 65    Aortic Valve Regurgitation MILD     Est. PA Systolic Pressure 58.35 (A)     Tricuspid Valve Regurgitation  MILD TO MODERATE

## 2019-05-24 NOTE — INTERVAL H&P NOTE
The patient has been examined and the H&P has been reviewed:    I concur with the findings and no changes have occurred since H&P was written.    Anesthesia/Surgery risks, benefits and alternative options discussed and understood by patient/family.    DCCV today      Active Hospital Problems    Diagnosis  POA    *Acute on chronic diastolic HFpEF of 60-65% [I50.33]  Yes    Elevated troponin [R74.8]  Yes    A-fib [I48.91]  Yes    Acute renal failure superimposed on stage 3 chronic kidney disease [N17.9, N18.3]  No    Essential hypertension [I10]  Yes     Chronic    Hyperlipidemia [E78.5]  Yes     Chronic      Resolved Hospital Problems   No resolved problems to display.

## 2019-05-24 NOTE — PLAN OF CARE
Problem: Adult Inpatient Plan of Care  Goal: Plan of Care Review  Outcome: Ongoing (interventions implemented as appropriate)  Dx afib  poc instructed on dbc 10 x q1h wa. Instructed on turning q2h. Instructed to monitor fluid intact. No more than 1200cc fluid /24. Instructed on fall risk and precautions. No falls. Bed alarm on. Daughter at bedside. Iv site wnl. Pt npo after midnight for cardioversion in am . Pt denied needs. Pt did c/o no bm x5 days. ducolax supp and mag citrate ordered. Water, phone and call light in reach.

## 2019-05-24 NOTE — PROGRESS NOTES
[facility] unable to accept pt.  CM called pt and updated.  Pt agreeable to first available provider.  [facility]

## 2019-05-24 NOTE — PROGRESS NOTES
[facility] -   Cardiology  Progress Note    Patient Name: Elke Laws  MRN: [id number]  Admission Date: 5/21/2019  Hospital Length of Stay: 0 days  Code Status: DNR   Attending Physician: Sekou Mccall MD   Primary Care Physician: Isidra Benavidez MD  Expected Discharge Date: 5/24/2019  Principal Problem:Acute on chronic diastolic heart failure    Subjective:   Brief HPI:    Ms. Laws is a 83 y/o female with PMH persistent AF, diastolic CHF, non obstructive CAD per [facility] done in , HTN and HLD. She presented to the ED with complaints of weakness.   Echo showed EF 55%, mild AI and biatrial dilation  She has had several admission this year for CHF exacerbation. EKG shows A-fib. Rate non controlled on tele monitor.Attempted LISSETH/DCCV on 04/16 and started on flecainide as an OP, but HR Still not controlled. Flecainide has been increased multiple times. Is anticoagulated with Eliqius.   Troponin flat at 0.045, 0.040. , K 2.8, Cr 1.3, BUN 52, H/H 14.0/42.3.   Currently being admitted for CHF exacerbation likely secondary to A-fib     Hospital Course:   5/22/19- Remains in A-fib this morning . HR controlled. Still on Amio gtt. K has been replaced. Has no angina or equivalent     5/23/19- Remains in A-fib. Still on amio gtt. Diuresing well.     5/24/19- Patient is s/p attempted Cardioversion today x 2. Failed to convert to NSR. Amio continued . Has no CNS Complaints to suggest TIA or CVA . No abnormal bleeding on OAC        Review of Systems   Constitution: Positive for malaise/fatigue. Negative for diaphoresis, weight gain and weight loss.   HENT: Negative for congestion and nosebleeds.    Cardiovascular: Negative for chest pain, claudication, cyanosis, dyspnea on exertion, irregular heartbeat, leg swelling, near-syncope, orthopnea, palpitations, paroxysmal nocturnal dyspnea and syncope.   Respiratory: Negative for cough, hemoptysis, shortness of breath, sleep disturbances due to breathing,  snoring, sputum production and wheezing.    Hematologic/Lymphatic: Negative for bleeding problem. Bruises/bleeds easily.   Skin: Negative for rash.   Musculoskeletal: Negative for arthritis, back pain, falls, joint pain, muscle cramps and muscle weakness.   Gastrointestinal: Negative for abdominal pain, constipation, diarrhea, heartburn, hematemesis, hematochezia, melena and nausea.   Genitourinary: Negative for dysuria, hematuria and nocturia.   Neurological: Negative for excessive daytime sleepiness, dizziness, headaches, light-headedness, loss of balance, numbness, vertigo and weakness.     Objective:     Vital Signs (Most Recent):  Temp: 97.5 °F (36.4 °C) (05/24/19 1126)  Pulse: 85 (05/24/19 1300)  Resp: 18 (05/24/19 1126)  BP: (!) 131/57 (05/24/19 1126)  SpO2: 99 % (05/24/19 1126) Vital Signs (24h Range):  Temp:  [96.2 °F (35.7 °C)-98 °F (36.7 °C)] 97.5 °F (36.4 °C)  Pulse:  [] 85  Resp:  [17-19] 18  SpO2:  [97 %-100 %] 99 %  BP: (115-131)/(53-60) 131/57     Weight: 80.1 kg (176 lb 9.4 oz)  Body mass index is 38.21 kg/m².     SpO2: 99 %  O2 Device (Oxygen Therapy): nasal cannula      Intake/Output Summary (Last 24 hours) at 5/24/2019 1401  Last data filed at 5/24/2019 1020  Gross per 24 hour   Intake 1174.5 ml   Output 100 ml   Net 1074.5 ml       Lines/Drains/Airways     Peripheral Intravenous Line                 Peripheral IV - Single Lumen 05/23/19 2315 22 G Posterior;Right Forearm less than 1 day                Physical Exam   Constitutional: She is oriented to person, place, and time. She appears well-developed and well-nourished.   Neck: Neck supple. No JVD present.   Cardiovascular: Normal rate, normal heart sounds and normal pulses. An irregularly irregular rhythm present. Exam reveals no friction rub.   No murmur heard.  Pulmonary/Chest: Effort normal and breath sounds normal. No respiratory distress. She has no wheezes. She has no rales.   Abdominal: Soft. Bowel sounds are normal. She exhibits  no distension.   Musculoskeletal: She exhibits no edema or tenderness.   Neurological: She is alert and oriented to person, place, and time.   Skin: Skin is warm and dry. No rash noted.   Psychiatric: She has a normal mood and affect. Her behavior is normal.   Nursing note and vitals reviewed.      Significant Labs:   All pertinent lab results from the last 24 hours have been reviewed. and   Recent Lab Results       05/24/19  0428        Anion Gap 11     Baso # 0.01     Basophil% 0.2     BUN, Bld 48     Calcium 9.6     Chloride 91     CO2 37     Creatinine 1.1     Differential Method Automated     eGFR if  53     eGFR if non  46  Comment:  Calculation used to obtain the estimated glomerular filtration  rate (eGFR) is the CKD-EPI equation.        Eos # 0.1     Eosinophil% 1.2     Glucose 109     Gran # (ANC) 4.1     Gran% 63.2     Hematocrit 37.7     Hemoglobin 12.0     Lymph # 1.4     Lymph% 21.9     MCH 29.4     MCHC 31.8     MCV 92     Mono # 0.9     Mono% 13.5     MPV 11.9     Platelets 185     Potassium 3.3     RBC 4.08     RDW 14.2     Sodium 139     WBC 6.54           Significant Imaging: Echocardiogram:   2D echo with color flow doppler:   Results for orders placed or performed during the hospital encounter of 03/07/19   2D echo with color flow doppler   Result Value Ref Range    QEF 60 55 - 65    Aortic Valve Regurgitation MILD     Est. PA Systolic Pressure 58.35 (A)     Tricuspid Valve Regurgitation MILD TO MODERATE      Assessment and Plan:       * Acute on chronic diastolic HFpEF of 60-65%  Echo in March 2019 shows DD with preserved EF     CXR normal  Decompensated HF likely secondary to A-fib   Diurese with Lasix, but K needs to be replaced   BB on hold for now due to Bradycardia     5/22/19  -Continue to diurese with lasix and replace lytes as needed     5/24/19  -Continue current medical management     Elevated troponin  Troponin flat at 0.045, 0.040  Likely  demand from A-fib and CHF  Will trend enzymes for now     A-fib  A-fib likely cause of current decompensated HF  Has failed flecainide therapy as an OP  Will load with Amio and start gtt   Metoprolol on hold for now due to bradycardia, but will give amio only   Needs to resume Eliquis     5/22/19  Continue amio gtt for now   -Will continue to diurese today.   If patient doesn't convert to NSR, will plan for cardioversion on Friday   No need for LISSETH due to uninterrupted OAC with Eliquis  No BB due to bradycardia     5/23/19  -Continue to diurese today   Will continue amio gtt and plan for cardioversion tomorrow if still in A-fib   No need for LISSETH due to uninterrupted OAC with Eliquis  No BB due to bradycardia     5/24/19  -Failed attempt at CV x2.   Switch to Po Amio 200mg daily   Continue Eliquis.   Clear for DC from Cardiology standpoint. Needs to see EP as an OP for RFA consideration     Hyperlipidemia  Continue Statin     Essential hypertension  BP stable at this time.   toprol on hold due to bradycardia         VTE Risk Mitigation (From admission, onward)        Ordered     apixaban tablet 5 mg  2 times daily      05/21/19 1928        Chart reviewed. Patient examined by Dr. Gooden and agrees with plan that has been outlined.     Eve Joiner, KRYSTIN  Cardiology  [facility] [place]

## 2019-05-24 NOTE — PLAN OF CARE
Problem: Adult Inpatient Plan of Care  Goal: Plan of Care Review  Outcome: Ongoing (interventions implemented as appropriate)  POC reviewed, verbalized understanding. Pt remained free from falls, fall precautions in place. Pt is a-fib on monitor, 70s. VSS. No other c/o at this time. PIV intact and infusing amio gtt per orders. Pt NPO since MN for cardioversion in am. Call bell and personal belongings within reach. Hourly rounding complete. Reminded to call for assistance. Will continue to monitor.

## 2019-05-24 NOTE — ASSESSMENT & PLAN NOTE
Echo in March 2019 shows DD with preserved EF     CXR normal  Decompensated HF likely secondary to A-fib   Diurese with Lasix, but K needs to be replaced   BB on hold for now due to Bradycardia     5/22/19  -Continue to diurese with lasix and replace lytes as needed     5/24/19  -Continue current medical management

## 2019-05-24 NOTE — ANESTHESIA POSTPROCEDURE EVALUATION
Anesthesia Post Evaluation    Patient: Elke Laws    Procedure(s) Performed: Procedure(s) (LRB):  CARDIOVERSION (N/A)    Final Anesthesia Type: MAC  Patient location during evaluation: PACU  Patient participation: Yes- Able to Participate  Level of consciousness: awake and alert and oriented  Post-procedure vital signs: reviewed and stable  Pain management: adequate  Airway patency: patent  PONV status at discharge: No PONV  Anesthetic complications: no      Cardiovascular status: blood pressure returned to baseline  Respiratory status: unassisted and room air  Hydration status: euvolemic  Follow-up not needed.          Vitals Value Taken Time   /57 5/24/2019 11:26 AM   Temp 36.4 °C (97.5 °F) 5/24/2019 11:26 AM   Pulse 85 5/24/2019  1:00 PM   Resp 18 5/24/2019 11:26 AM   SpO2 99 % 5/24/2019 11:26 AM         No case tracking events are documented in the log.      Pain/Armani Score: No data recorded

## 2019-05-24 NOTE — BRIEF OP NOTE
Surgeon/Physician: Juan Manuel Gooden MD     Pre Op Diagnosis: Afib    Post OP Diagnosis: NSR    Procedure Performed: DCCV    Procedure Description: The risks, benefits, and alternatives of the procedure expalined in detail with patient and family. The patient voices understanding and all questions have been addressed. The patient agrees to proceed as planned.   The patient was sedated by anesthesiologist.      Estimated Blood Loss: none.     Findings / Operative Note:     Successful DCCV to NSR s/p 200 J x 1    Ok to discharge to home once hemodynamically stable.    Cont Amiodarone 200mg daily     F/U  at cardiology clinic.    Juan Manuel Gooden MD, [facility]  Cardiovascular Disease  [facility], [place]  [phone] (P)

## 2019-05-24 NOTE — PLAN OF CARE
Pt preference is [facility].  Choice form signed reflecting this.  Referral sent.         05/24/19 4306   Post-Acute Status   Post-Acute Authorization Home Health/Hospice   Home Health/Hospice Status Referrals Sent   Discharge Delays None known at this time

## 2019-05-24 NOTE — ASSESSMENT & PLAN NOTE
A-fib likely cause of current decompensated HF  Has failed flecainide therapy as an OP  Will load with Amio and start gtt   Metoprolol on hold for now due to bradycardia, but will give amio only   Needs to resume Eliquis     5/22/19  Continue amio gtt for now   -Will continue to diurese today.   If patient doesn't convert to NSR, will plan for cardioversion on Friday   No need for LISSETH due to uninterrupted OAC with Eliquis  No BB due to bradycardia     5/23/19  -Continue to diurese today   Will continue amio gtt and plan for cardioversion tomorrow if still in A-fib   No need for LISSETH due to uninterrupted OAC with Eliquis  No BB due to bradycardia     5/24/19  -Failed attempt at CV x2.   Switch to Po Amio 200mg daily   Continue Eliquis.   Clear for DC from Cardiology standpoint. Needs to see EP as an OP for RFA consideration

## 2019-05-24 NOTE — NURSING
Pt discharged per MD order. Pt instructions and handout given to pt. Pt instructed to schedule and keep all f/u appointments. Pt verbalizes understanding. IV removed, tele box returned to monitor tech. Pt advised to call out when ready to go down.

## 2019-05-24 NOTE — DISCHARGE SUMMARY
[facility] Hospital Medicine  Discharge Summary      Patient Name: Elke Laws  MRN: [id number]  Admission Date: 5/21/2019  Hospital Length of Stay: 0 days  Discharge Date and Time:  05/24/2019 2:45 PM  Attending Physician: Sekou Mccall MD   Discharging Provider: Jennifer Hernandez NP  Primary Care Provider: Isidra Benavidez MD      HPI:   Elke Laws is a 84 y.o. female patient  with PMHx of chronic AF on Eliquis, HTN, chronic diastolic HFpEF of 60-65%, CAD, HLD, CKD, and asthma, who presents to the Emergency Department for generalized weakness which onset gradually last night. Symptoms are constant and moderate in severity. No mitigating or exacerbating factors reported. Associated sxs include SOB, nausea, constipation, CP. Patient denies any V/D, fever, chills, dizziness, cough, back pain, neck pain, and all other sxs at this time. She was recently admitted for acute decompensated heart failure, diuresed with IV Lasix which she responded well to and  was discharged on 5/8. She uses continuous home oxygen at 2L/min via NC. ED workup resulted K+ 2.8, BUN 52, troponin 0.040, , and CXR shows no acute findings. Today, ekg showed afib. Hospital Medicine was called for admission. She is a DNR and her surrogate decision maker is her son, Christiano.             Procedure(s) (LRB):  CARDIOVERSION (N/A)      Hospital Course:   85 y/o female admitted for CHF exacerbation likely secondary to A-fib. On IV lasix. Cardiology consult. Metoprolol on hold per cardiology due to bradycardia. Continue Eliquis for anticoagulation. Loading dose of Amiodarone given and gtt started. 5/22/19- Patient remains in A-fib, will continue amio gtt. Continue IV diuresis with lasix. Will plan for cardioversion on Friday, if  patient doesn't convert to NSR. 5/23/19- Creatinine increased to 1.7, IV lasix switched to po. Patient denies any chest pain or SOB. Remains in A-fib. Plan for cardioversion tomorrow.   5/24/19: Pt  underwent successful cardioversion. Dr. Gooden recommended discharge on home medications. Pt continued on Eliquis. Metorololol held. Pt discharged on Amiodarone 200mg daily.              Consults:   Consults (From admission, onward)        Status Ordering Provider     Inpatient consult to Cardiology  Once     Provider:  Juan Manuel Gooden MD    Completed WILLEM WOOD JR     Inpatient consult to Social Work  Once     Provider:  (Not yet assigned)    Acknowledged KEON AL            Final Active Diagnoses:    Diagnosis Date Noted POA    PRINCIPAL PROBLEM:  Acute on chronic diastolic HFpEF of 60-65% [I50.33] 03/07/2019 Yes    Elevated troponin [R74.8] 05/21/2019 Yes    A-fib [I48.91] 04/16/2019 Yes    Acute renal failure superimposed on stage 3 chronic kidney disease [N17.9, N18.3] 01/26/2018 No    Essential hypertension [I10]  Yes     Chronic    Hyperlipidemia [E78.5]  Yes     Chronic      Problems Resolved During this Admission:       Discharged Condition: stable    Disposition: Home or Self Care    Follow Up:  Follow-up Information     Isidra Benavidez MD In 3 days.    Specialty:  Family Medicine  Contact information:  [id number] [place] [address] [address] Bushra [address] [address]  [phone]             [facility] In 1 week.    Specialty:  Cardiology  Contact information:  [address] [facility] [address]  [facility] [address]  [phone]               Patient Instructions:      Diet Cardiac   Order Comments: 2gm sodium, 1500 ml fluid restriction     Activity as tolerated       Significant Diagnostic Studies:  Imaging Results          X-Ray Chest AP Portable (Final result)  Result time 05/21/19 11:02:53    Final result by Ganga Jo MD (05/21/19 11:02:53)                 Impression:      No acute abnormality.      Electronically signed by: Ganga Jo  Date:    05/21/2019  Time:    11:02             Narrative:    EXAMINATION:  XR CHEST AP PORTABLE    CLINICAL HISTORY:  CHF;.    TECHNIQUE:  Single  frontal portable view of the chest was performed.    COMPARISON:  05/05/2019    FINDINGS:  Support devices: Telemetry leads    Elevation right hemidiaphragm.The lungs are clear, with normal appearance of pulmonary vasculature and no pleural effusion or pneumothorax.    The cardiac silhouette is normal in size. The hilar and mediastinal contours are unremarkable.    Bones are intact.                                Pending Diagnostic Studies:     None         Medications:  Reconciled Home Medications:      Medication List      START taking these medications    amiodarone 200 MG Tab  Commonly known as:  PACERONE  Take 1 tablet (200 mg total) by mouth once daily.     ELIQUIS 5 mg Tab  Generic drug:  apixaban  Take 1 tablet (5 mg total) by mouth 2 (two) times daily.        CHANGE how you take these medications    acetaminophen 500 MG tablet  Commonly known as:  TYLENOL  Take 1 tablet (500 mg total) by mouth 2 (two) times daily.  What changed:    · when to take this  · reasons to take this     pravastatin 40 MG tablet  Commonly known as:  PRAVACHOL  TAKE 1 TABLET EVERY DAY  What changed:    · how much to take  · how to take this  · when to take this        CONTINUE taking these medications    * albuterol 90 mcg/actuation inhaler  Commonly known as:  PROVENTIL/VENTOLIN HFA  Inhale 2 puffs into the lungs 4 (four) times daily.     * albuterol 0.63 mg/3 mL Nebu  Commonly known as:  ACCUNEB  Take 3 mLs (0.63 mg total) by nebulization every 6 (six) hours as needed. Rescue     FISH OIL ORAL  Take 500 mg by mouth once daily.     fluticasone propionate 110 mcg/actuation inhaler  Commonly known as:  FLOVENT HFA  Inhale 1 puff into the lungs 2 (two) times daily.     furosemide 20 MG tablet  Commonly known as:  LASIX  Take 2 tablets (40 mg total) by mouth 2 (two) times daily.     LORazepam 1 MG tablet  Commonly known as:  ATIVAN  Take 1 tablet (1 mg total) by mouth every 6 (six) hours as needed for Anxiety.     metOLazone 2.5 MG  tablet  Commonly known as:  ZAROXOLYN  Take 1 tablet (2.5 mg total) by mouth every Mon, Wed, Fri.     VITAMIN C ORAL  Take 500 mg by mouth once daily.         * This list has 2 medication(s) that are the same as other medications prescribed for you. Read the directions carefully, and ask your doctor or other care provider to review them with you.            STOP taking these medications    compressor, for nebulizer Lara     metoprolol succinate 100 MG 24 hr tablet  Commonly known as:  TOPROL-XL            Indwelling Lines/Drains at time of discharge:   Lines/Drains/Airways          None          Time spent on the discharge of patient: 42 minutes  Patient was seen and examined on the date of discharge and determined to be suitable for discharge.         Jennifer Hernandez NP  Department of Hospital Medicine  [facility]

## 2019-05-27 ENCOUNTER — TELEPHONE (OUTPATIENT)
Dept: CARDIOLOGY | Facility: CLINIC | Age: 84
End: 2019-05-27

## 2019-05-27 NOTE — TELEPHONE ENCOUNTER
"Follow up on pt since hospital discharge.  Wearing O2, no c/o of sob/willoughby. Denies any PND or orthopnea. Pt states "I feel good". Discharge med list reviewed with pt in detail. She is taking all meds as listed. No other complaints at this time. hosp appt re-scheduled for 6/3. Discussed with pt and moved up for this week.   I reviewed CHF education with pt in detail  Recommend 2 gram sodium restriction and 1500cc fluid restriction.  Encourage physical activity with graded exercise program.  Requested patient to weigh themselves daily, and to notify us if their weight increases by more than 3 lbs in 1 day or 5 lbs in 1 week.    "

## 2019-05-28 ENCOUNTER — OFFICE VISIT (OUTPATIENT)
Dept: CARDIOLOGY | Facility: CLINIC | Age: 84
End: 2019-05-28
Payer: MEDICARE

## 2019-05-28 ENCOUNTER — CLINICAL SUPPORT (OUTPATIENT)
Dept: CARDIOLOGY | Facility: CLINIC | Age: 84
End: 2019-05-28
Payer: MEDICARE

## 2019-05-28 VITALS
BODY MASS INDEX: 37.95 KG/M2 | DIASTOLIC BLOOD PRESSURE: 80 MMHG | HEART RATE: 91 BPM | HEIGHT: 57 IN | SYSTOLIC BLOOD PRESSURE: 142 MMHG | WEIGHT: 175.94 LBS

## 2019-05-28 DIAGNOSIS — Z86.73 HISTORY OF TIA (TRANSIENT ISCHEMIC ATTACK): ICD-10-CM

## 2019-05-28 DIAGNOSIS — I51.89 LEFT VENTRICULAR DIASTOLIC DYSFUNCTION WITH PRESERVED SYSTOLIC FUNCTION: Chronic | ICD-10-CM

## 2019-05-28 DIAGNOSIS — I48.19 PERSISTENT ATRIAL FIBRILLATION: Primary | ICD-10-CM

## 2019-05-28 DIAGNOSIS — E78.5 HYPERLIPIDEMIA, UNSPECIFIED HYPERLIPIDEMIA TYPE: ICD-10-CM

## 2019-05-28 DIAGNOSIS — I48.19 ATRIAL FIBRILLATION, PERSISTENT: ICD-10-CM

## 2019-05-28 DIAGNOSIS — I10 ESSENTIAL HYPERTENSION: Chronic | ICD-10-CM

## 2019-05-28 DIAGNOSIS — I48.19 PERSISTENT ATRIAL FIBRILLATION: ICD-10-CM

## 2019-05-28 DIAGNOSIS — I50.32 CHRONIC DIASTOLIC HEART FAILURE: Chronic | ICD-10-CM

## 2019-05-28 PROCEDURE — 99214 PR OFFICE/OUTPT VISIT, EST, LEVL IV, 30-39 MIN: ICD-10-PCS | Mod: HCWC,S$GLB,, | Performed by: NURSE PRACTITIONER

## 2019-05-28 PROCEDURE — 3079F DIAST BP 80-89 MM HG: CPT | Mod: HCWC,CPTII,S$GLB, | Performed by: NURSE PRACTITIONER

## 2019-05-28 PROCEDURE — 3079F PR MOST RECENT DIASTOLIC BLOOD PRESSURE 80-89 MM HG: ICD-10-PCS | Mod: HCWC,CPTII,S$GLB, | Performed by: NURSE PRACTITIONER

## 2019-05-28 PROCEDURE — 93000 ELECTROCARDIOGRAM COMPLETE: CPT | Mod: HCWC,S$GLB,, | Performed by: INTERNAL MEDICINE

## 2019-05-28 PROCEDURE — 3077F PR MOST RECENT SYSTOLIC BLOOD PRESSURE >= 140 MM HG: ICD-10-PCS | Mod: HCWC,CPTII,S$GLB, | Performed by: NURSE PRACTITIONER

## 2019-05-28 PROCEDURE — 93000 EKG 12-LEAD: ICD-10-PCS | Mod: HCWC,S$GLB,, | Performed by: INTERNAL MEDICINE

## 2019-05-28 PROCEDURE — 99999 PR PBB SHADOW E&M-EST. PATIENT-LVL III: CPT | Mod: PBBFAC,HCWC,, | Performed by: NURSE PRACTITIONER

## 2019-05-28 PROCEDURE — 1101F PT FALLS ASSESS-DOCD LE1/YR: CPT | Mod: HCWC,CPTII,S$GLB, | Performed by: NURSE PRACTITIONER

## 2019-05-28 PROCEDURE — 1101F PR PT FALLS ASSESS DOC 0-1 FALLS W/OUT INJ PAST YR: ICD-10-PCS | Mod: HCWC,CPTII,S$GLB, | Performed by: NURSE PRACTITIONER

## 2019-05-28 PROCEDURE — 3077F SYST BP >= 140 MM HG: CPT | Mod: HCWC,CPTII,S$GLB, | Performed by: NURSE PRACTITIONER

## 2019-05-28 PROCEDURE — 99999 PR PBB SHADOW E&M-EST. PATIENT-LVL III: ICD-10-PCS | Mod: PBBFAC,HCWC,, | Performed by: NURSE PRACTITIONER

## 2019-05-28 PROCEDURE — 99214 OFFICE O/P EST MOD 30 MIN: CPT | Mod: HCWC,S$GLB,, | Performed by: NURSE PRACTITIONER

## 2019-05-28 RX ORDER — AMIODARONE HYDROCHLORIDE 200 MG/1
200 TABLET ORAL DAILY
Qty: 30 TABLET | Refills: 6 | Status: SHIPPED | OUTPATIENT
Start: 2019-05-28 | End: 2020-02-07 | Stop reason: SDUPTHER

## 2019-05-28 RX ORDER — FUROSEMIDE 40 MG/1
40 TABLET ORAL 2 TIMES DAILY
Qty: 180 TABLET | Refills: 3 | Status: SHIPPED | OUTPATIENT
Start: 2019-05-28 | End: 2020-07-17

## 2019-05-28 RX ORDER — METOLAZONE 2.5 MG/1
2.5 TABLET ORAL
Qty: 15 TABLET | Refills: 11 | Status: SHIPPED | OUTPATIENT
Start: 2019-05-29 | End: 2020-04-21

## 2019-05-28 RX ORDER — PRAVASTATIN SODIUM 40 MG/1
40 TABLET ORAL DAILY
Qty: 90 TABLET | Refills: 3 | Status: SHIPPED | OUTPATIENT
Start: 2019-05-28 | End: 2020-07-14

## 2019-05-28 NOTE — PROGRESS NOTES
Subjective:   Patient ID:  Elke Laws is a 84 y.o. female who presents for follow up of Atrial Fibrillation; Hyperlipidemia; and Hypertension      HPI  Ms. Laws is a 83 y/o female who's current conditions include persistent AF, diastolic CHF, non obstructive CAD per [facility] done in , HTN and HLD    Attempted LISSETH/DCCV on 04/16 and started on flecainide as an OP, but HR Still not controlled. Flecainide has been increased multiple times  Admitted last week with symptomatic A-fib with RVR.  Started on Amio gtt for rate control and Cardioversion attempted during admission. She converted to NSR, but shortly after, went back in to A-fib. DC'd home on Amio 200mg daily. Toprol held due to bradycardia.   Has no abnormal bleeding on Eliqius.   Patient states that she feels well today.   Was discharged with oxygen at 2 liters .Uses [facility].     Past Medical History:   Diagnosis Date    Abnormal nuclear stress test 6/30/2016    Acute congestive heart failure 5/27/2017    Acute coronary syndrome     Acute kidney injury (nontraumatic) 5/27/2017    Acute kidney injury (nontraumatic) 5/27/2017    Acute on chronic congestive heart failure 5/21/2017    Acute on chronic diastolic congestive heart failure 8/27/2015    Acute on chronic respiratory failure 5/6/2019    Anemia 5/9/2016    Anemia 5/9/2016    Angina pectoris 1/25/2018    Anticoagulant long-term use     Aortic regurgitation     Echo 11/2013---5 - Mild to moderate aortic regurgitation.      Asthma     Atrial fibrillation 5/15/2014    Back pain     s/p epidural steriod injections, no recent injections.    Baker's cyst     small, right, seen on US lower extremity 6/2014    Blood transfusion     Approximately 6 years ago    Chronic constipation     Coronary artery disease     Degenerative disc disease, lumbar     Diastolic dysfunction     Seen on echo 11/2013, stress test negative 5/2014    Diverticulosis     colonoscopy 3/27/2011    E coli bacteremia  "2017    E. coli UTI (urinary tract infection) 2017    Hemorrhoids     colonoscopy 3/27/2011    Hiatal hernia     CXR 2016---Retrocardiac density again noted consistent with a hiatal hernia.    Hx of adenomatous colonic polyps     Hyperlipidemia     Hypertension     Hyponatremia 2016    Hypoxemia 2017    Hypoxia 2017    Leukocytosis 2017    Mitral regurgitation     Myocardial infarction     per patient was told in the past she had a "mild heart attack"    Obesity     Osteoarthritis, knee     Pneumonia     per patient "walking Pneumonia" did not require hospitalization    Seasonal allergies     Sepsis 2017    Trouble in sleeping     Urinary incontinence        Past Surgical History:   Procedure Laterality Date    APPENDECTOMY      CARDIOVERSION OR DEFIBRILLATION N/A 2019    [facility] CATH LAB    COLONOSCOPY okay by dr. ramos N/A 2017    Performed by Toño Abdi [facility] at [facility] ENDO    ECHOCARDIOGRAM,TRANSESOPHAGEAL N/A 2019    [facility] at [facility] CATH LAB    EYE SURGERY Left     HYSTERECTOMY      benign reasons    KNEE SURGERY Bilateral     x2     Left heart cath      OLECRANON BURSECTOMY Right     2011    TONSILLECTOMY      URETHRA SURGERY         Social History     Tobacco Use    Smoking status: Former Smoker     Packs/day: 0.05     Years: 1.00     Pack years: 0.05     Types: Cigarettes     Last attempt to quit: 1952     Years since quittin.4    Smokeless tobacco: Never Used   Substance Use Topics    Alcohol use: No     Alcohol/week: 0.0 oz    Drug use: No       Family History   Problem Relation Age of Onset    Heart disease Mother     Cancer Mother         colon    Hypertension Mother     Hyperlipidemia Mother     Heart disease Father     Hypertension Father     Hyperlipidemia Father     Heart disease Sister         MI    Heart disease Brother         MI    COPD Son  "    Hypertension Son     Diabetes Brother     Diabetes Son     Cancer Sister         breast    Kidney disease Neg Hx     Stroke Neg Hx        Current Outpatient Medications   Medication Sig    acetaminophen (TYLENOL) 500 MG tablet Take 1 tablet (500 mg total) by mouth 2 (two) times daily. (Patient taking differently: Take 500 mg by mouth 2 (two) times daily as needed. )    albuterol (ACCUNEB) 0.63 mg/3 mL Nebu Take 3 mLs (0.63 mg total) by nebulization every 6 (six) hours as needed. Rescue    albuterol 90 mcg/actuation inhaler Inhale 2 puffs into the lungs 4 (four) times daily.    amiodarone (PACERONE) 200 MG Tab Take 1 tablet (200 mg total) by mouth once daily.    apixaban (ELIQUIS) 5 mg Tab Take 1 tablet (5 mg total) by mouth 2 (two) times daily.    ASCORBATE CALCIUM (VITAMIN C ORAL) Take 500 mg by mouth once daily.     DOCOSAHEXANOIC ACID/EPA (FISH OIL ORAL) Take 500 mg by mouth once daily.     fluticasone (FLOVENT HFA) 110 mcg/actuation inhaler Inhale 1 puff into the lungs 2 (two) times daily.    furosemide (LASIX) 40 MG tablet Take 1 tablet (40 mg total) by mouth 2 (two) times daily.    LORazepam (ATIVAN) 1 MG tablet Take 1 tablet (1 mg total) by mouth every 6 (six) hours as needed for Anxiety.    [START ON 5/29/2019] metOLazone (ZAROXOLYN) 2.5 MG tablet Take 1 tablet (2.5 mg total) by mouth every Mon, Wed, Fri.    pravastatin (PRAVACHOL) 40 MG tablet Take 1 tablet (40 mg total) by mouth once daily.     No current facility-administered medications for this visit.      Current Outpatient Medications on File Prior to Visit   Medication Sig    acetaminophen (TYLENOL) 500 MG tablet Take 1 tablet (500 mg total) by mouth 2 (two) times daily. (Patient taking differently: Take 500 mg by mouth 2 (two) times daily as needed. )    albuterol (ACCUNEB) 0.63 mg/3 mL Nebu Take 3 mLs (0.63 mg total) by nebulization every 6 (six) hours as needed. Rescue    albuterol 90 mcg/actuation inhaler Inhale 2 puffs  into the lungs 4 (four) times daily.    ASCORBATE CALCIUM (VITAMIN C ORAL) Take 500 mg by mouth once daily.     DOCOSAHEXANOIC ACID/EPA (FISH OIL ORAL) Take 500 mg by mouth once daily.     fluticasone (FLOVENT HFA) 110 mcg/actuation inhaler Inhale 1 puff into the lungs 2 (two) times daily.    LORazepam (ATIVAN) 1 MG tablet Take 1 tablet (1 mg total) by mouth every 6 (six) hours as needed for Anxiety.    [DISCONTINUED] amiodarone (PACERONE) 200 MG Tab Take 1 tablet (200 mg total) by mouth once daily.    [DISCONTINUED] apixaban (ELIQUIS) 5 mg Tab Take 1 tablet (5 mg total) by mouth 2 (two) times daily.    [DISCONTINUED] furosemide (LASIX) 20 MG tablet Take 2 tablets (40 mg total) by mouth 2 (two) times daily.    [DISCONTINUED] metOLazone (ZAROXOLYN) 2.5 MG tablet Take 1 tablet (2.5 mg total) by mouth every Mon, Wed, Fri.    [DISCONTINUED] pravastatin (PRAVACHOL) 40 MG tablet TAKE 1 TABLET EVERY DAY (Patient taking differently: TAKE 1 TABLET EVERY DAY pt taking two daily)     No current facility-administered medications on file prior to visit.        Review of Systems   Constitution: Negative for diaphoresis, malaise/fatigue, weight gain and weight loss.   HENT: Negative for congestion and nosebleeds.    Cardiovascular: Negative for chest pain, claudication, cyanosis, dyspnea on exertion, irregular heartbeat, leg swelling, near-syncope, orthopnea, palpitations, paroxysmal nocturnal dyspnea and syncope.   Respiratory: Negative for cough, hemoptysis, shortness of breath, sleep disturbances due to breathing, snoring, sputum production and wheezing.         Wears oxygen at 2 ilters    Hematologic/Lymphatic: Negative for bleeding problem. Does not bruise/bleed easily.   Skin: Negative for rash.   Musculoskeletal: Negative for arthritis, back pain, falls, joint pain, muscle cramps and muscle weakness.   Gastrointestinal: Negative for abdominal pain, constipation, diarrhea, heartburn, hematemesis, hematochezia,  "melena and nausea.   Genitourinary: Negative for dysuria, hematuria and nocturia.   Neurological: Negative for excessive daytime sleepiness, dizziness, headaches, light-headedness, loss of balance, numbness, vertigo and weakness.       Objective:   Physical Exam   Constitutional: She is oriented to person, place, and time. She appears well-developed and well-nourished.   Neck: Neck supple. No JVD present.   Cardiovascular: Normal rate, normal heart sounds and normal pulses. An irregularly irregular rhythm present. Exam reveals no friction rub.   No murmur heard.  Pulmonary/Chest: Effort normal and breath sounds normal. No respiratory distress. She has no wheezes. She has no rales.   NC at 2 liters    Abdominal: Soft. Bowel sounds are normal. She exhibits no distension.   Musculoskeletal: She exhibits no edema or tenderness.   Neurological: She is alert and oriented to person, place, and time.   Skin: Skin is warm and dry. No rash noted.   Psychiatric: She has a normal mood and affect. Her behavior is normal.   Nursing note and vitals reviewed.    Vitals:    05/28/19 1325   BP: (!) 142/80   Pulse: 91   Weight: 79.8 kg (175 lb 14.8 oz)   Height: 4' 9" (1.448 m)     Lab Results   Component Value Date    CHOL 156 10/25/2018    CHOL 147 01/31/2018    CHOL 123 04/13/2017     Lab Results   Component Value Date    HDL 68 10/25/2018    HDL 66 01/31/2018    HDL 59 04/13/2017     Lab Results   Component Value Date    LDLCALC 76.0 10/25/2018    LDLCALC 69.0 01/31/2018    LDLCALC 51.2 (L) 04/13/2017     Lab Results   Component Value Date    TRIG 60 10/25/2018    TRIG 60 01/31/2018    TRIG 64 04/13/2017     Lab Results   Component Value Date    CHOLHDL 43.6 10/25/2018    CHOLHDL 44.9 01/31/2018    CHOLHDL 48.0 04/13/2017       Chemistry        Component Value Date/Time     05/24/2019 0428    K 3.3 (L) 05/24/2019 0428    CL 91 (L) 05/24/2019 0428    CO2 37 (H) 05/24/2019 0428    BUN 48 (H) 05/24/2019 0428    CREATININE 1.1 " 05/24/2019 0428     05/24/2019 0428        Component Value Date/Time    CALCIUM 9.6 05/24/2019 0428    ALKPHOS 138 (H) 05/21/2019 0957    AST 35 05/21/2019 0957    ALT 25 05/21/2019 0957    BILITOT 0.6 05/21/2019 0957    ESTGFRAFRICA 53 (A) 05/24/2019 0428    EGFRNONAA 46 (A) 05/24/2019 0428          Lab Results   Component Value Date    TSH 2.207 01/31/2018     Lab Results   Component Value Date    INR 1.0 05/21/2019    INR 0.9 02/16/2019    INR 1.2 05/27/2017     Lab Results   Component Value Date    WBC 6.54 05/24/2019    HGB 12.0 05/24/2019    HCT 37.7 05/24/2019    MCV 92 05/24/2019     05/24/2019     BMP  Sodium   Date Value Ref Range Status   05/24/2019 139 136 - 145 mmol/L Final     Potassium   Date Value Ref Range Status   05/24/2019 3.3 (L) 3.5 - 5.1 mmol/L Final     Chloride   Date Value Ref Range Status   05/24/2019 91 (L) 95 - 110 mmol/L Final     CO2   Date Value Ref Range Status   05/24/2019 37 (H) 23 - 29 mmol/L Final     BUN, Bld   Date Value Ref Range Status   05/24/2019 48 (H) 8 - 23 mg/dL Final     Creatinine   Date Value Ref Range Status   05/24/2019 1.1 0.5 - 1.4 mg/dL Final     Calcium   Date Value Ref Range Status   05/24/2019 9.6 8.7 - 10.5 mg/dL Final     Anion Gap   Date Value Ref Range Status   05/24/2019 11 8 - 16 mmol/L Final     eGFR if    Date Value Ref Range Status   05/24/2019 53 (A) >60 mL/min/1.73 m^2 Final     eGFR if non    Date Value Ref Range Status   05/24/2019 46 (A) >60 mL/min/1.73 m^2 Final     Comment:     Calculation used to obtain the estimated glomerular filtration  rate (eGFR) is the CKD-EPI equation.        Estimated Creatinine Clearance: 48 mL/min (based on SCr of 1.1 mg/dL).    Assessment:     1. Persistent atrial fibrillation    2. Chronic diastolic heart failure    3. Essential hypertension    4. Atrial fibrillation, persistent    5. Left ventricular diastolic dysfunction with preserved systolic function    6.  Hyperlipidemia, unspecified hyperlipidemia type    7. History of TIA (transient ischemic attack)      No abnormal bleeding on Eliquis  No CNS Complaints to suggest TIA or CVA  No angina or equivalent  EKG shows A-fib with controlled rate . BB stopped during recent admission due to bradycardia  Failed CV x2 during admission   No evidence of volume overload on exam   Plan:   Continue Amio 200mg daily. Will see how she does with just amio and Eliquis for now. May need to restart BB at some point. Had bradycardia during recent admission . May need PPM in the future   Patient wants to hold off on EP referral at this. Wishes for conservative medical management.   Continue Lasix 40mg BID with Metolazone on MWF.   Heart healthy diet  Limit fluid intake 50-60 oz   Daily weights and to notify clinic if weight increases by more than 3 lbs in 1 day or 5 lbs in 1 week.   Exercise routine as tolerated  RTC in 3 months or sooner if needed . Needs to follow up with Pulmonology

## 2019-05-29 ENCOUNTER — TELEPHONE (OUTPATIENT)
Dept: ADMINISTRATIVE | Facility: CLINIC | Age: 84
End: 2019-05-29

## 2019-05-29 NOTE — TELEPHONE ENCOUNTER
Home Health SOC 05/09/2019 - 07/07/2019 with [facility] - Dr. Kee Jones.  services.

## 2019-05-30 ENCOUNTER — TELEPHONE (OUTPATIENT)
Dept: INTERNAL MEDICINE | Facility: CLINIC | Age: 84
End: 2019-05-30

## 2019-05-30 DIAGNOSIS — I50.32 CHRONIC DIASTOLIC HEART FAILURE: Primary | ICD-10-CM

## 2019-05-30 DIAGNOSIS — Z91.81 AT RISK FOR FALLS: ICD-10-CM

## 2019-05-30 NOTE — TELEPHONE ENCOUNTER
----- Message from Betsy Iglesias sent at 5/30/2019 10:22 AM CDT -----  Contact: Gómez-Home Health Nurse  Requesting a call back regarding an order. She says she would like to order a rollator (per patient request) and would like to know if Dr. Benavidez would approve. Please call Gómez back at [phone].      Thanks,  Betsy Iglesias

## 2019-06-03 ENCOUNTER — TELEPHONE (OUTPATIENT)
Dept: FAMILY MEDICINE | Facility: CLINIC | Age: 84
End: 2019-06-03

## 2019-06-04 ENCOUNTER — TELEPHONE (OUTPATIENT)
Dept: CARDIOLOGY | Facility: CLINIC | Age: 84
End: 2019-06-04

## 2019-06-04 NOTE — TELEPHONE ENCOUNTER
"Spoke with Gómez at  who stated pt was not doing well.  Pts vitals are now stable.  Pt had a high HR.  Pt stated it's "time to go see Fernando."  Called pts daughter and spoke with her.  Pt was advised to go to the ER but declines.  Daughter stated pt was resting but was weak and wouldn't come into clinic today.  Daughter was told an appointment was made for tomorrow as pt didn't want to go today.  Daughter stated she would call when pt wakes up if pt will keep the appointment.    "

## 2019-06-05 ENCOUNTER — CLINICAL SUPPORT (OUTPATIENT)
Dept: CARDIOLOGY | Facility: CLINIC | Age: 84
End: 2019-06-05
Attending: NURSE PRACTITIONER
Payer: MEDICARE

## 2019-06-05 ENCOUNTER — OFFICE VISIT (OUTPATIENT)
Dept: CARDIOLOGY | Facility: CLINIC | Age: 84
End: 2019-06-05
Payer: MEDICARE

## 2019-06-05 ENCOUNTER — CLINICAL SUPPORT (OUTPATIENT)
Dept: CARDIOLOGY | Facility: CLINIC | Age: 84
End: 2019-06-05
Payer: MEDICARE

## 2019-06-05 VITALS
DIASTOLIC BLOOD PRESSURE: 60 MMHG | SYSTOLIC BLOOD PRESSURE: 132 MMHG | WEIGHT: 175.06 LBS | HEART RATE: 77 BPM | BODY MASS INDEX: 37.77 KG/M2 | HEIGHT: 57 IN

## 2019-06-05 DIAGNOSIS — I50.32 CHRONIC DIASTOLIC HEART FAILURE: Chronic | ICD-10-CM

## 2019-06-05 DIAGNOSIS — I10 ESSENTIAL HYPERTENSION: Chronic | ICD-10-CM

## 2019-06-05 DIAGNOSIS — I48.19 PERSISTENT ATRIAL FIBRILLATION: ICD-10-CM

## 2019-06-05 DIAGNOSIS — I48.19 PERSISTENT ATRIAL FIBRILLATION: Primary | ICD-10-CM

## 2019-06-05 PROCEDURE — 93000 EKG 12-LEAD: ICD-10-PCS | Mod: HCWC,S$GLB,, | Performed by: INTERNAL MEDICINE

## 2019-06-05 PROCEDURE — 93224 XTRNL ECG REC UP TO 48 HRS: CPT | Mod: HCWC,S$GLB,, | Performed by: INTERNAL MEDICINE

## 2019-06-05 PROCEDURE — 99214 OFFICE O/P EST MOD 30 MIN: CPT | Mod: HCWC,S$GLB,, | Performed by: NURSE PRACTITIONER

## 2019-06-05 PROCEDURE — 99999 PR PBB SHADOW E&M-EST. PATIENT-LVL III: ICD-10-PCS | Mod: PBBFAC,HCWC,, | Performed by: NURSE PRACTITIONER

## 2019-06-05 PROCEDURE — 3078F PR MOST RECENT DIASTOLIC BLOOD PRESSURE < 80 MM HG: ICD-10-PCS | Mod: HCWC,CPTII,S$GLB, | Performed by: NURSE PRACTITIONER

## 2019-06-05 PROCEDURE — 93000 ELECTROCARDIOGRAM COMPLETE: CPT | Mod: HCWC,S$GLB,, | Performed by: INTERNAL MEDICINE

## 2019-06-05 PROCEDURE — 99214 PR OFFICE/OUTPT VISIT, EST, LEVL IV, 30-39 MIN: ICD-10-PCS | Mod: HCWC,S$GLB,, | Performed by: NURSE PRACTITIONER

## 2019-06-05 PROCEDURE — 1101F PT FALLS ASSESS-DOCD LE1/YR: CPT | Mod: HCWC,CPTII,S$GLB, | Performed by: NURSE PRACTITIONER

## 2019-06-05 PROCEDURE — 3075F SYST BP GE 130 - 139MM HG: CPT | Mod: HCWC,CPTII,S$GLB, | Performed by: NURSE PRACTITIONER

## 2019-06-05 PROCEDURE — 93224 HOLTER MONITOR - 48 HOUR: ICD-10-PCS | Mod: HCWC,S$GLB,, | Performed by: INTERNAL MEDICINE

## 2019-06-05 PROCEDURE — 3075F PR MOST RECENT SYSTOLIC BLOOD PRESS GE 130-139MM HG: ICD-10-PCS | Mod: HCWC,CPTII,S$GLB, | Performed by: NURSE PRACTITIONER

## 2019-06-05 PROCEDURE — 99999 PR PBB SHADOW E&M-EST. PATIENT-LVL III: CPT | Mod: PBBFAC,HCWC,, | Performed by: NURSE PRACTITIONER

## 2019-06-05 PROCEDURE — 3078F DIAST BP <80 MM HG: CPT | Mod: HCWC,CPTII,S$GLB, | Performed by: NURSE PRACTITIONER

## 2019-06-05 PROCEDURE — 1101F PR PT FALLS ASSESS DOC 0-1 FALLS W/OUT INJ PAST YR: ICD-10-PCS | Mod: HCWC,CPTII,S$GLB, | Performed by: NURSE PRACTITIONER

## 2019-06-05 RX ORDER — METOPROLOL TARTRATE 25 MG/1
12.5 TABLET, FILM COATED ORAL 2 TIMES DAILY
Qty: 15 TABLET | Refills: 1 | Status: SHIPPED | OUTPATIENT
Start: 2019-06-05 | End: 2019-06-18 | Stop reason: SDUPTHER

## 2019-06-05 NOTE — PROGRESS NOTES
Subjective:   Patient ID:  Elke Laws is a 84 y.o. female who presents for follow up of Shortness of Breath; Dizziness; and Atrial Fibrillation      HPI  Ms. Laws is a 85 y/o female who's current conditions include persistent AF, diastolic CHF, non obstructive CAD per [facility] done in , HTN and HLD. She presents to clinic today with complaints of tachycardia on yesterday. Admitted in May for A-fib with RVR. She had failed CV x2 during admission. DC'd with Amio. No BB due to bradycardia during admission. Patient declined follow up with EP ablation and PPM consideration and opted for conservative medical management.      Currently taking Lasix 40mg BID and Metolazone 2.5mg on MWF.   Has no abnormal bleeding on Eliquis.    EKG today shows A-fib with controlled rate 77 bpm. Daughter states that patient's HR was up to 117 bpm this morning and was SOB.     Past Medical History:   Diagnosis Date    Abnormal nuclear stress test 6/30/2016    Acute congestive heart failure 5/27/2017    Acute coronary syndrome     Acute kidney injury (nontraumatic) 5/27/2017    Acute on chronic congestive heart failure 5/21/2017    Acute on chronic diastolic congestive heart failure 8/27/2015    Acute on chronic respiratory failure 5/6/2019    Anemia 5/9/2016    Angina pectoris 1/25/2018    Anticoagulant long-term use     Aortic regurgitation     Echo 11/2013---5 - Mild to moderate aortic regurgitation.      Asthma     Atrial fibrillation 5/15/2014    Back pain     s/p epidural steriod injections, no recent injections.    Baker's cyst     small, right, seen on US lower extremity 6/2014    Blood transfusion     Approximately 6 years ago    Chronic constipation     Coronary artery disease     Degenerative disc disease, lumbar     Diastolic dysfunction     Seen on echo 11/2013, stress test negative 5/2014    Diverticulosis     colonoscopy 3/27/2011    E coli bacteremia 5/23/2017    E. coli UTI (urinary tract infection)  "2017    Hemorrhoids     colonoscopy 3/27/2011    Hiatal hernia     CXR 2016---Retrocardiac density again noted consistent with a hiatal hernia.    Hx of adenomatous colonic polyps     Hyperlipidemia     Hypertension     Hyponatremia 2016    Hypoxemia 2017    Hypoxia 2017    Leukocytosis 2017    Mitral regurgitation     Myocardial infarction     per patient was told in the past she had a "mild heart attack"    Obesity     Osteoarthritis, knee     Pneumonia     per patient "walking Pneumonia" did not require hospitalization    Seasonal allergies     Sepsis 2017    Trouble in sleeping     Urinary incontinence        Past Surgical History:   Procedure Laterality Date    APPENDECTOMY      CARDIOVERSION N/A 2019    [facility] CATH LAB    CARDIOVERSION OR DEFIBRILLATION N/A 2019    Performed by Kee Jones [facility] at [facility] CATH LAB    COLONOSCOPY okay by dr. ramos N/A 2017    Performed by Toño Abdi [facility] at [facility] ENDO    ECHOCARDIOGRAM,TRANSESOPHAGEAL N/A 2019    Performed by [facility] CATH LAB    ECHOCARDIOGRAM,TRANSESOPHAGEAL N/A 2019    Per[facility] by [facility] CATH LAB    EYE SURGERY Left     HYSTERECTOMY      benign reasons    KNEE SURGERY Bilateral     x2     Left heart cath      OLECRANON BURSECTOMY Right     2011    TONSILLECTOMY      URETHRA SURGERY         Social History     Tobacco Use    Smoking status: Former Smoker     Packs/day: 0.05     Years: 1.00     Pack years: 0.05     Types: Cigarettes     Last attempt to quit: 1952     Years since quittin.4    Smokeless tobacco: Never Used   Substance Use Topics    Alcohol use: No     Alcohol/week: 0.0 oz    Drug use: No       Family History   Problem Relation Age of Onset    Heart disease Mother     Cancer Mother         colon    Hypertension Mother     Hyperlipidemia Mother     Heart disease Father     " Hypertension Father     Hyperlipidemia Father     Heart disease Sister         MI    Heart disease Brother         MI    COPD Son     Hypertension Son     Diabetes Brother     Diabetes Son     Cancer Sister         breast    Kidney disease Neg Hx     Stroke Neg Hx        Current Outpatient Medications   Medication Sig    acetaminophen (TYLENOL) 500 MG tablet Take 1 tablet (500 mg total) by mouth 2 (two) times daily. (Patient taking differently: Take 500 mg by mouth 2 (two) times daily as needed. )    albuterol (ACCUNEB) 0.63 mg/3 mL Nebu Take 3 mLs (0.63 mg total) by nebulization every 6 (six) hours as needed. Rescue    albuterol 90 mcg/actuation inhaler Inhale 2 puffs into the lungs 4 (four) times daily.    amiodarone (PACERONE) 200 MG Tab Take 1 tablet (200 mg total) by mouth once daily.    apixaban (ELIQUIS) 5 mg Tab Take 1 tablet (5 mg total) by mouth 2 (two) times daily.    ASCORBATE CALCIUM (VITAMIN C ORAL) Take 500 mg by mouth once daily.     DOCOSAHEXANOIC ACID/EPA (FISH OIL ORAL) Take 500 mg by mouth once daily.     fluticasone (FLOVENT HFA) 110 mcg/actuation inhaler Inhale 1 puff into the lungs 2 (two) times daily.    furosemide (LASIX) 40 MG tablet Take 1 tablet (40 mg total) by mouth 2 (two) times daily.    LORazepam (ATIVAN) 1 MG tablet Take 1 tablet (1 mg total) by mouth every 6 (six) hours as needed for Anxiety.    metOLazone (ZAROXOLYN) 2.5 MG tablet Take 1 tablet (2.5 mg total) by mouth every Mon, Wed, Fri.    pravastatin (PRAVACHOL) 40 MG tablet Take 1 tablet (40 mg total) by mouth once daily.     No current facility-administered medications for this visit.      Current Outpatient Medications on File Prior to Visit   Medication Sig    acetaminophen (TYLENOL) 500 MG tablet Take 1 tablet (500 mg total) by mouth 2 (two) times daily. (Patient taking differently: Take 500 mg by mouth 2 (two) times daily as needed. )    albuterol (ACCUNEB) 0.63 mg/3 mL Nebu Take 3 mLs (0.63 mg  total) by nebulization every 6 (six) hours as needed. Rescue    albuterol 90 mcg/actuation inhaler Inhale 2 puffs into the lungs 4 (four) times daily.    amiodarone (PACERONE) 200 MG Tab Take 1 tablet (200 mg total) by mouth once daily.    apixaban (ELIQUIS) 5 mg Tab Take 1 tablet (5 mg total) by mouth 2 (two) times daily.    ASCORBATE CALCIUM (VITAMIN C ORAL) Take 500 mg by mouth once daily.     DOCOSAHEXANOIC ACID/EPA (FISH OIL ORAL) Take 500 mg by mouth once daily.     fluticasone (FLOVENT HFA) 110 mcg/actuation inhaler Inhale 1 puff into the lungs 2 (two) times daily.    furosemide (LASIX) 40 MG tablet Take 1 tablet (40 mg total) by mouth 2 (two) times daily.    LORazepam (ATIVAN) 1 MG tablet Take 1 tablet (1 mg total) by mouth every 6 (six) hours as needed for Anxiety.    metOLazone (ZAROXOLYN) 2.5 MG tablet Take 1 tablet (2.5 mg total) by mouth every Mon, Wed, Fri.    pravastatin (PRAVACHOL) 40 MG tablet Take 1 tablet (40 mg total) by mouth once daily.     No current facility-administered medications on file prior to visit.        Review of Systems   Constitution: Negative for diaphoresis, malaise/fatigue, weight gain and weight loss.   HENT: Negative for congestion and nosebleeds.    Cardiovascular: Positive for irregular heartbeat and palpitations. Negative for chest pain, claudication, cyanosis, dyspnea on exertion, leg swelling, near-syncope, orthopnea, paroxysmal nocturnal dyspnea and syncope.   Respiratory: Negative for cough, hemoptysis, shortness of breath, sleep disturbances due to breathing, snoring, sputum production and wheezing.         Wears oxygen at 2 ilters    Hematologic/Lymphatic: Negative for bleeding problem. Does not bruise/bleed easily.   Skin: Negative for rash.   Musculoskeletal: Negative for arthritis, back pain, falls, joint pain, muscle cramps and muscle weakness.   Gastrointestinal: Negative for abdominal pain, constipation, diarrhea, heartburn, hematemesis, hematochezia,  "melena and nausea.   Genitourinary: Negative for dysuria, hematuria and nocturia.   Neurological: Negative for excessive daytime sleepiness, dizziness, headaches, light-headedness, loss of balance, numbness, vertigo and weakness.       Objective:   Physical Exam   Constitutional: She is oriented to person, place, and time. She appears well-developed and well-nourished.   Neck: Neck supple. No JVD present.   Cardiovascular: Normal rate, normal heart sounds and normal pulses. An irregularly irregular rhythm present. Exam reveals no friction rub.   No murmur heard.  Pulmonary/Chest: Effort normal and breath sounds normal. No respiratory distress. She has no wheezes. She has no rales.   NC at 2 liters    Abdominal: Soft. Bowel sounds are normal. She exhibits no distension.   Musculoskeletal: She exhibits no edema or tenderness.   Neurological: She is alert and oriented to person, place, and time.   Skin: Skin is warm and dry. No rash noted.   Psychiatric: She has a normal mood and affect. Her behavior is normal.   Nursing note and vitals reviewed.    Vitals:    06/05/19 1443   BP: 132/60   Pulse: 77   Weight: 79.4 kg (175 lb 0.7 oz)   Height: 4' 9" (1.448 m)     Lab Results   Component Value Date    CHOL 156 10/25/2018    CHOL 147 01/31/2018    CHOL 123 04/13/2017     Lab Results   Component Value Date    HDL 68 10/25/2018    HDL 66 01/31/2018    HDL 59 04/13/2017     Lab Results   Component Value Date    LDLCALC 76.0 10/25/2018    LDLCALC 69.0 01/31/2018    LDLCALC 51.2 (L) 04/13/2017     Lab Results   Component Value Date    TRIG 60 10/25/2018    TRIG 60 01/31/2018    TRIG 64 04/13/2017     Lab Results   Component Value Date    CHOLHDL 43.6 10/25/2018    CHOLHDL 44.9 01/31/2018    CHOLHDL 48.0 04/13/2017       Chemistry        Component Value Date/Time     05/24/2019 0428    K 3.3 (L) 05/24/2019 0428    CL 91 (L) 05/24/2019 0428    CO2 37 (H) 05/24/2019 0428    BUN 48 (H) 05/24/2019 0428    CREATININE 1.1 " 05/24/2019 0428     05/24/2019 0428        Component Value Date/Time    CALCIUM 9.6 05/24/2019 0428    ALKPHOS 138 (H) 05/21/2019 0957    AST 35 05/21/2019 0957    ALT 25 05/21/2019 0957    BILITOT 0.6 05/21/2019 0957    ESTGFRAFRICA 53 (A) 05/24/2019 0428    EGFRNONAA 46 (A) 05/24/2019 0428          Lab Results   Component Value Date    TSH 2.207 01/31/2018     Lab Results   Component Value Date    INR 1.0 05/21/2019    INR 0.9 02/16/2019    INR 1.2 05/27/2017     Lab Results   Component Value Date    WBC 6.54 05/24/2019    HGB 12.0 05/24/2019    HCT 37.7 05/24/2019    MCV 92 05/24/2019     05/24/2019     BMP  Sodium   Date Value Ref Range Status   05/24/2019 139 136 - 145 mmol/L Final     Potassium   Date Value Ref Range Status   05/24/2019 3.3 (L) 3.5 - 5.1 mmol/L Final     Chloride   Date Value Ref Range Status   05/24/2019 91 (L) 95 - 110 mmol/L Final     CO2   Date Value Ref Range Status   05/24/2019 37 (H) 23 - 29 mmol/L Final     BUN, Bld   Date Value Ref Range Status   05/24/2019 48 (H) 8 - 23 mg/dL Final     Creatinine   Date Value Ref Range Status   05/24/2019 1.1 0.5 - 1.4 mg/dL Final     Calcium   Date Value Ref Range Status   05/24/2019 9.6 8.7 - 10.5 mg/dL Final     Anion Gap   Date Value Ref Range Status   05/24/2019 11 8 - 16 mmol/L Final     eGFR if    Date Value Ref Range Status   05/24/2019 53 (A) >60 mL/min/1.73 m^2 Final     eGFR if non    Date Value Ref Range Status   05/24/2019 46 (A) >60 mL/min/1.73 m^2 Final     Comment:     Calculation used to obtain the estimated glomerular filtration  rate (eGFR) is the CKD-EPI equation.        CrCl cannot be calculated (Patient's most recent lab result is older than the maximum 7 days allowed.).    Assessment:     1. Persistent atrial fibrillation    2. Chronic diastolic heart failure    3. Essential hypertension      EKG today shows   No evidence of volume overload on exam   No CNS complaints to suggest TIA  or CVA  No abnormal bleeding on Eliquis   BP stable     Plan:     Patient still wanting conservative medical management of A-fib  Will see how she does with low dose lopressor 12.5mg BID  Check holter   May need to be referred for PPM implant if having trouble with bradycardia on low dose BB   Continue Amio and Eliquis for now   May need to stop Amio and optimize BB   Heart healthy diet  Limit fluid intake 50-60 oz   Daily weights and to notify clinic if weight increases by more than 3 lbs in 1 day or 5 lbs in 1 week.   Exercise routine as tolerated  RTC in 2 weeks for symptom check and monitor results review

## 2019-06-11 ENCOUNTER — TELEPHONE (OUTPATIENT)
Dept: CARDIOLOGY | Facility: HOSPITAL | Age: 84
End: 2019-06-11

## 2019-06-11 ENCOUNTER — OFFICE VISIT (OUTPATIENT)
Dept: INTERNAL MEDICINE | Facility: CLINIC | Age: 84
End: 2019-06-11
Payer: MEDICARE

## 2019-06-11 VITALS
OXYGEN SATURATION: 98 % | HEART RATE: 76 BPM | WEIGHT: 174.38 LBS | BODY MASS INDEX: 37.74 KG/M2 | SYSTOLIC BLOOD PRESSURE: 106 MMHG | TEMPERATURE: 97 F | DIASTOLIC BLOOD PRESSURE: 54 MMHG

## 2019-06-11 DIAGNOSIS — F41.9 ANXIETY: ICD-10-CM

## 2019-06-11 DIAGNOSIS — I50.32 CHRONIC DIASTOLIC HEART FAILURE: Primary | Chronic | ICD-10-CM

## 2019-06-11 DIAGNOSIS — K59.00 CONSTIPATION, UNSPECIFIED CONSTIPATION TYPE: ICD-10-CM

## 2019-06-11 PROCEDURE — 99999 PR PBB SHADOW E&M-EST. PATIENT-LVL III: CPT | Mod: PBBFAC,HCWC,, | Performed by: FAMILY MEDICINE

## 2019-06-11 PROCEDURE — 1101F PT FALLS ASSESS-DOCD LE1/YR: CPT | Mod: HCWC,CPTII,S$GLB, | Performed by: FAMILY MEDICINE

## 2019-06-11 PROCEDURE — 3074F PR MOST RECENT SYSTOLIC BLOOD PRESSURE < 130 MM HG: ICD-10-PCS | Mod: HCWC,CPTII,S$GLB, | Performed by: FAMILY MEDICINE

## 2019-06-11 PROCEDURE — 3078F DIAST BP <80 MM HG: CPT | Mod: HCWC,CPTII,S$GLB, | Performed by: FAMILY MEDICINE

## 2019-06-11 PROCEDURE — 3078F PR MOST RECENT DIASTOLIC BLOOD PRESSURE < 80 MM HG: ICD-10-PCS | Mod: HCWC,CPTII,S$GLB, | Performed by: FAMILY MEDICINE

## 2019-06-11 PROCEDURE — 99214 PR OFFICE/OUTPT VISIT, EST, LEVL IV, 30-39 MIN: ICD-10-PCS | Mod: HCWC,S$GLB,, | Performed by: FAMILY MEDICINE

## 2019-06-11 PROCEDURE — 1101F PR PT FALLS ASSESS DOC 0-1 FALLS W/OUT INJ PAST YR: ICD-10-PCS | Mod: HCWC,CPTII,S$GLB, | Performed by: FAMILY MEDICINE

## 2019-06-11 PROCEDURE — 3074F SYST BP LT 130 MM HG: CPT | Mod: HCWC,CPTII,S$GLB, | Performed by: FAMILY MEDICINE

## 2019-06-11 PROCEDURE — 99999 PR PBB SHADOW E&M-EST. PATIENT-LVL III: ICD-10-PCS | Mod: PBBFAC,HCWC,, | Performed by: FAMILY MEDICINE

## 2019-06-11 PROCEDURE — 99214 OFFICE O/P EST MOD 30 MIN: CPT | Mod: HCWC,S$GLB,, | Performed by: FAMILY MEDICINE

## 2019-06-11 RX ORDER — LORAZEPAM 0.5 MG/1
0.5 TABLET ORAL 2 TIMES DAILY PRN
Qty: 60 TABLET | Refills: 2 | Status: SHIPPED | OUTPATIENT
Start: 2019-06-11 | End: 2020-01-10 | Stop reason: ALTCHOICE

## 2019-06-11 NOTE — PATIENT INSTRUCTIONS
Try decreasing dose of lorazepam; you can try taking 1/2 tablet to see if that is effective, the lower the dose is better; use as infrequently as possible.    Regarding constipation: continue miralax daily, increase colace to 100 mg three times daily, consider adding prunes or prune juice; if persistent contact me for referral to gastroenterology; if you have persistent vomiting, severe abdominal pain or other concerning symptoms please seek immediate medical attention.

## 2019-06-12 NOTE — TELEPHONE ENCOUNTER
Please inform patient that her holter showed A-fib the entire time as expected. I did see how slower HR, but her average was in the 70's and showed no pauses. We will continue to optimize her BB as tolerated for conservative management of her A-fib. Please see how she is doing since we restarted her metoprolol

## 2019-06-12 NOTE — TELEPHONE ENCOUNTER
"Spoke with pt's emergency contact Keya notified her the results voiced understanding.    Pt's emergency contact Keya states pt seems to be doing better since restart of metoprolol "states pt does have weak spells but not as often as she did before restarting medication."    Pt will keep scheduled appt with Josemanuel on next week 6/18/19 [facility].   "

## 2019-06-16 PROBLEM — K59.00 CONSTIPATION: Status: ACTIVE | Noted: 2019-06-16

## 2019-06-16 PROBLEM — F41.9 ANXIETY: Status: ACTIVE | Noted: 2019-06-16

## 2019-06-17 NOTE — PROGRESS NOTES
Subjective:       Patient ID: Elke Laws is a 84 y.o. female.    Chief Complaint: Hospital Follow Up    Patient presents to clinic today for hospital follow up. She was admitted for acute on chronic heart failure. She has had follow up with Cardiology 5/28 and 6/5. Next appointment is 6/18. She reports she is doing well from that standpoint. Daughter reports she is having constipation despite taking miralax daily and colace bid. She reports taking magnesium citrate and using fleets enema. Reports small bowel movement this morning. Denies abdominal pain, nausea or vomiting. Patient also requests refill of ativan prn anxiety. She reports tolerating without problems. Patient and her daughter are otherwise without concerns today.      Review of Systems   Constitutional: Negative for chills, fatigue, fever and unexpected weight change.   Eyes: Negative for visual disturbance.   Respiratory: Negative for shortness of breath.    Cardiovascular: Negative for chest pain.   Gastrointestinal: Positive for constipation. Negative for abdominal pain, nausea and vomiting.   Musculoskeletal: Negative for myalgias.   Neurological: Negative for headaches.       Objective:      Physical Exam   Constitutional: She is oriented to person, place, and time. She appears well-developed and well-nourished. No distress.   HENT:   Head: Normocephalic and atraumatic.   Eyes: Pupils are equal, round, and reactive to light. Conjunctivae and EOM are normal. No scleral icterus.   Cardiovascular: Normal rate and regular rhythm. Exam reveals no gallop and no friction rub.   No murmur heard.  Pulmonary/Chest: Effort normal and breath sounds normal.   Neurological: She is alert and oriented to person, place, and time. No cranial nerve deficit.   Psychiatric: She has a normal mood and affect.   Vitals reviewed.      Assessment:       1. Chronic diastolic heart failure    2. Anxiety    3. Constipation, unspecified constipation type        Plan:      Problem List Items Addressed This Visit     Anxiety    Current Assessment & Plan     Refilled ativan to take as needed, patient understands risks/benefits         Relevant Medications    LORazepam (ATIVAN) 0.5 MG tablet    Chronic diastolic heart failure - Primary (Chronic)    Overview     CONCLUSIONS     1 - Severe left atrial enlargement.     2 - Concentric hypertrophy.     3 - No wall motion abnormalities.     4 - Normal left ventricular systolic function (EF 60-65%).     5 - Impaired LV relaxation, elevated LAP (grade 2 diastolic dysfunction).     6 - Normal right ventricular systolic function .     7 - The estimated PA systolic pressure is 28 mmHg.     8 - Trivial aortic regurgitation.     9 - Mild mitral regurgitation.   This document has been electronically    SIGNED BY: Tish Angulo MD On: 04/13/2017 15:52    Office visit 6/28/17 with Dr. Jones for Acute on chronic diastolic congestive heart failure  note showed. PAF, diastolic CHF, non obstructive CAD per C done in , HTN and HLD. She was admitted to [facility] on 5/21/2017 for CHF exacerbation.          Current Assessment & Plan     Followed by Cardiology         Constipation    Current Assessment & Plan     Advised to continue miralax daily, she can increase colace to tid, may take prunes or prune juice also; advised against using magnesium citrate as this can lead to life threatening electrolyte disturbances, patient and her daughter expressed understanding; if not improving will refer to GI; advised to seek immediate medical attention for abdominal pain, intractable vomiting or other concerning symptoms.

## 2019-06-17 NOTE — ASSESSMENT & PLAN NOTE
Advised to continue miralax daily, she can increase colace to tid, may take prunes or prune juice also; advised against using magnesium citrate as this can lead to life threatening electrolyte disturbances, patient and her daughter expressed understanding; if not improving will refer to GI; advised to seek immediate medical attention for abdominal pain, intractable vomiting or other concerning symptoms.

## 2019-06-18 ENCOUNTER — OFFICE VISIT (OUTPATIENT)
Dept: CARDIOLOGY | Facility: CLINIC | Age: 84
End: 2019-06-18
Payer: MEDICARE

## 2019-06-18 VITALS
BODY MASS INDEX: 37.53 KG/M2 | HEIGHT: 57 IN | HEART RATE: 78 BPM | DIASTOLIC BLOOD PRESSURE: 62 MMHG | WEIGHT: 173.94 LBS | SYSTOLIC BLOOD PRESSURE: 112 MMHG

## 2019-06-18 DIAGNOSIS — I10 ESSENTIAL HYPERTENSION: Chronic | ICD-10-CM

## 2019-06-18 DIAGNOSIS — I48.19 PERSISTENT ATRIAL FIBRILLATION: Primary | ICD-10-CM

## 2019-06-18 DIAGNOSIS — I50.32 CHRONIC DIASTOLIC HEART FAILURE: Chronic | ICD-10-CM

## 2019-06-18 PROCEDURE — 99999 PR PBB SHADOW E&M-EST. PATIENT-LVL III: ICD-10-PCS | Mod: PBBFAC,HCWC,, | Performed by: NURSE PRACTITIONER

## 2019-06-18 PROCEDURE — 99214 OFFICE O/P EST MOD 30 MIN: CPT | Mod: HCWC,S$GLB,, | Performed by: NURSE PRACTITIONER

## 2019-06-18 PROCEDURE — 99499 UNLISTED E&M SERVICE: CPT | Mod: S$GLB,,, | Performed by: NURSE PRACTITIONER

## 2019-06-18 PROCEDURE — 1101F PR PT FALLS ASSESS DOC 0-1 FALLS W/OUT INJ PAST YR: ICD-10-PCS | Mod: HCWC,CPTII,S$GLB, | Performed by: NURSE PRACTITIONER

## 2019-06-18 PROCEDURE — 1101F PT FALLS ASSESS-DOCD LE1/YR: CPT | Mod: HCWC,CPTII,S$GLB, | Performed by: NURSE PRACTITIONER

## 2019-06-18 PROCEDURE — 99999 PR PBB SHADOW E&M-EST. PATIENT-LVL III: CPT | Mod: PBBFAC,HCWC,, | Performed by: NURSE PRACTITIONER

## 2019-06-18 PROCEDURE — 99214 PR OFFICE/OUTPT VISIT, EST, LEVL IV, 30-39 MIN: ICD-10-PCS | Mod: HCWC,S$GLB,, | Performed by: NURSE PRACTITIONER

## 2019-06-18 PROCEDURE — 3074F SYST BP LT 130 MM HG: CPT | Mod: HCWC,CPTII,S$GLB, | Performed by: NURSE PRACTITIONER

## 2019-06-18 PROCEDURE — 99499 RISK ADDL DX/OHS AUDIT: ICD-10-PCS | Mod: S$GLB,,, | Performed by: NURSE PRACTITIONER

## 2019-06-18 PROCEDURE — 3078F DIAST BP <80 MM HG: CPT | Mod: HCWC,CPTII,S$GLB, | Performed by: NURSE PRACTITIONER

## 2019-06-18 PROCEDURE — 3074F PR MOST RECENT SYSTOLIC BLOOD PRESSURE < 130 MM HG: ICD-10-PCS | Mod: HCWC,CPTII,S$GLB, | Performed by: NURSE PRACTITIONER

## 2019-06-18 PROCEDURE — 3078F PR MOST RECENT DIASTOLIC BLOOD PRESSURE < 80 MM HG: ICD-10-PCS | Mod: HCWC,CPTII,S$GLB, | Performed by: NURSE PRACTITIONER

## 2019-06-18 RX ORDER — METOPROLOL TARTRATE 25 MG/1
12.5 TABLET, FILM COATED ORAL 2 TIMES DAILY
Qty: 45 TABLET | Refills: 3 | Status: SHIPPED | OUTPATIENT
Start: 2019-06-18 | End: 2020-01-02 | Stop reason: SDUPTHER

## 2019-06-18 NOTE — PROGRESS NOTES
Subjective:   Patient ID:  Elke Laws is a 84 y.o. female who presents for follow up of Atrial Fibrillation; Congestive Heart Failure; and Hyperlipidemia      HPI  Ms. Laws presents to clinic for palpitation check since restarting metoprolol. Her current conditions include persistent AF, diastolic CHF, non obstructive CAD per [facility] done in , HTN and HLD. She presents to clinic today with complaints of tachycardia on yesterday. Admitted in May for A-fib with RVR. She had failed CV x2 during admission. DC'd with Amio. No BB restarted at that time due to bradycardia during admission. Patient declined follow up with EP ablation and PPM consideration and opted for conservative medical management.   Currently taking Lasix 40mg BID and Metolazone 2.5mg on MWF.   Has no abnormal bleeding on Eliquis.    Holter showed persistent A-fib with average HR 73 bpm.       Past Medical History:   Diagnosis Date    Abnormal nuclear stress test 6/30/2016    Acute congestive heart failure 5/27/2017    Acute coronary syndrome     Acute kidney injury (nontraumatic) 5/27/2017    Acute on chronic congestive heart failure 5/21/2017    Acute on chronic diastolic congestive heart failure 8/27/2015    Acute on chronic respiratory failure 5/6/2019    Anemia 5/9/2016    Angina pectoris 1/25/2018    Anticoagulant long-term use     Anxiety 6/16/2019    Aortic regurgitation     Echo 11/2013---5 - Mild to moderate aortic regurgitation.      Asthma     Atrial fibrillation 5/15/2014    Back pain     s/p epidural steriod injections, no recent injections.    Baker's cyst     small, right, seen on US lower extremity 6/2014    Blood transfusion     Approximately 6 years ago    Chronic constipation     Coronary artery disease     Degenerative disc disease, lumbar     Diastolic dysfunction     Seen on echo 11/2013, stress test negative 5/2014    Diverticulosis     colonoscopy 3/27/2011    E coli bacteremia 5/23/2017    E. coli  "UTI (urinary tract infection) 2017    Hemorrhoids     colonoscopy 3/27/2011    Hiatal hernia     CXR 2016---Retrocardiac density again noted consistent with a hiatal hernia.    Hx of adenomatous colonic polyps     Hyperlipidemia     Hypertension     Hyponatremia 2016    Hypoxemia 2017    Hypoxia 2017    Leukocytosis 2017    Mitral regurgitation     Myocardial infarction     per patient was told in the past she had a "mild heart attack"    Obesity     Osteoarthritis, knee     Pneumonia     per patient "walking Pneumonia" did not require hospitalization    Seasonal allergies     Sepsis 2017    Trouble in sleeping     Urinary incontinence        Past Surgical History:   Procedure Laterality Date    APPENDECTOMY      CARDIOVERSION N/A 2019    [facility]formed [facility] CATH LAB    CARDIOVERSION OR DEFIBRILLATION N/A 2019    Performed by Kee Jones [facility] at [facility] CATH LAB    COLONOSCOPY okay by dr. ramos N/A 2017    Performed by [facility] Abdi [facility] ENDO    ECHOCARDIOGRAM,TRANSESOPHAGEAL N/A 2019    Performed by [facility] CATH LAB    ECHOCARDIOGRAM,TRANSESOPHAGEAL N/A 2019    Per[facility] by [facility] CATH LAB    EYE SURGERY Left     HYSTERECTOMY      benign reasons    KNEE SURGERY Bilateral     x2     Left heart cath      OLECRANON BURSECTOMY Right     2011    TONSILLECTOMY      URETHRA SURGERY         Social History     Tobacco Use    Smoking status: Former Smoker     Packs/day: 0.05     Years: 1.00     Pack years: 0.05     Types: Cigarettes     Last attempt to quit: 1952     Years since quittin.5    Smokeless tobacco: Never Used   Substance Use Topics    Alcohol use: No     Alcohol/week: 0.0 oz    Drug use: No       Family History   Problem Relation Age of Onset    Heart disease Mother     Cancer Mother         colon    Hypertension Mother     Hyperlipidemia Mother     Heart " disease Father     Hypertension Father     Hyperlipidemia Father     Heart disease Sister         MI    Heart disease Brother         MI    COPD Son     Hypertension Son     Diabetes Brother     Diabetes Son     Cancer Sister         breast    Kidney disease Neg Hx     Stroke Neg Hx        Current Outpatient Medications   Medication Sig    albuterol (ACCUNEB) 0.63 mg/3 mL Nebu Take 3 mLs (0.63 mg total) by nebulization every 6 (six) hours as needed. Rescue    albuterol 90 mcg/actuation inhaler Inhale 2 puffs into the lungs 4 (four) times daily.    amiodarone (PACERONE) 200 MG Tab Take 1 tablet (200 mg total) by mouth once daily.    apixaban (ELIQUIS) 5 mg Tab Take 1 tablet (5 mg total) by mouth 2 (two) times daily.    ASCORBATE CALCIUM (VITAMIN C ORAL) Take 500 mg by mouth once daily.     DOCOSAHEXANOIC ACID/EPA (FISH OIL ORAL) Take 500 mg by mouth once daily.     fluticasone (FLOVENT HFA) 110 mcg/actuation inhaler Inhale 1 puff into the lungs 2 (two) times daily.    furosemide (LASIX) 40 MG tablet Take 1 tablet (40 mg total) by mouth 2 (two) times daily.    LORazepam (ATIVAN) 0.5 MG tablet Take 1 tablet (0.5 mg total) by mouth 2 (two) times daily as needed for Anxiety.    metOLazone (ZAROXOLYN) 2.5 MG tablet Take 1 tablet (2.5 mg total) by mouth every Mon, Wed, Fri.    metoprolol tartrate (LOPRESSOR) 25 MG tablet Take 0.5 tablets (12.5 mg total) by mouth 2 (two) times daily.    pravastatin (PRAVACHOL) 40 MG tablet Take 1 tablet (40 mg total) by mouth once daily.    acetaminophen (TYLENOL) 500 MG tablet Take 1 tablet (500 mg total) by mouth 2 (two) times daily. (Patient taking differently: Take 500 mg by mouth 2 (two) times daily as needed. )     No current facility-administered medications for this visit.      Current Outpatient Medications on File Prior to Visit   Medication Sig    albuterol (ACCUNEB) 0.63 mg/3 mL Nebu Take 3 mLs (0.63 mg total) by nebulization every 6 (six) hours as  needed. Rescue    albuterol 90 mcg/actuation inhaler Inhale 2 puffs into the lungs 4 (four) times daily.    amiodarone (PACERONE) 200 MG Tab Take 1 tablet (200 mg total) by mouth once daily.    apixaban (ELIQUIS) 5 mg Tab Take 1 tablet (5 mg total) by mouth 2 (two) times daily.    ASCORBATE CALCIUM (VITAMIN C ORAL) Take 500 mg by mouth once daily.     DOCOSAHEXANOIC ACID/EPA (FISH OIL ORAL) Take 500 mg by mouth once daily.     fluticasone (FLOVENT HFA) 110 mcg/actuation inhaler Inhale 1 puff into the lungs 2 (two) times daily.    furosemide (LASIX) 40 MG tablet Take 1 tablet (40 mg total) by mouth 2 (two) times daily.    LORazepam (ATIVAN) 0.5 MG tablet Take 1 tablet (0.5 mg total) by mouth 2 (two) times daily as needed for Anxiety.    metOLazone (ZAROXOLYN) 2.5 MG tablet Take 1 tablet (2.5 mg total) by mouth every Mon, Wed, Fri.    pravastatin (PRAVACHOL) 40 MG tablet Take 1 tablet (40 mg total) by mouth once daily.    [DISCONTINUED] metoprolol tartrate (LOPRESSOR) 25 MG tablet Take 0.5 tablets (12.5 mg total) by mouth 2 (two) times daily. (Patient taking differently: Take 25 mg by mouth 2 (two) times daily. )    acetaminophen (TYLENOL) 500 MG tablet Take 1 tablet (500 mg total) by mouth 2 (two) times daily. (Patient taking differently: Take 500 mg by mouth 2 (two) times daily as needed. )     No current facility-administered medications on file prior to visit.        Review of Systems   Constitution: Negative for diaphoresis, malaise/fatigue, weight gain and weight loss.   HENT: Negative for congestion and nosebleeds.    Cardiovascular: Negative for chest pain, claudication, cyanosis, dyspnea on exertion, irregular heartbeat, leg swelling, near-syncope, orthopnea, palpitations, paroxysmal nocturnal dyspnea and syncope.   Respiratory: Negative for cough, hemoptysis, shortness of breath, sleep disturbances due to breathing, snoring, sputum production and wheezing.    Hematologic/Lymphatic: Negative for  "bleeding problem. Does not bruise/bleed easily.   Skin: Negative for rash.   Musculoskeletal: Negative for arthritis, back pain, falls, joint pain, muscle cramps and muscle weakness.   Gastrointestinal: Negative for abdominal pain, constipation, diarrhea, heartburn, hematemesis, hematochezia, melena and nausea.   Genitourinary: Negative for dysuria, hematuria and nocturia.   Neurological: Negative for excessive daytime sleepiness, dizziness, headaches, light-headedness, loss of balance, numbness, vertigo and weakness.       Objective:   Physical Exam   Constitutional: She is oriented to person, place, and time. She appears well-developed and well-nourished.   Neck: Neck supple. No JVD present.   Cardiovascular: Normal rate, normal heart sounds and normal pulses. An irregularly irregular rhythm present. Exam reveals no friction rub.   No murmur heard.  Pulmonary/Chest: Effort normal and breath sounds normal. No respiratory distress. She has no wheezes. She has no rales.   NC at 2 liters    Abdominal: Soft. Bowel sounds are normal. She exhibits no distension.   Musculoskeletal: She exhibits no edema or tenderness.   Neurological: She is alert and oriented to person, place, and time.   Skin: Skin is warm and dry. No rash noted.   Psychiatric: She has a normal mood and affect. Her behavior is normal.   Nursing note and vitals reviewed.    Vitals:    06/18/19 1055   BP: 112/62   Pulse: 78   Weight: 78.9 kg (173 lb 15.1 oz)   Height: 4' 9" (1.448 m)     Lab Results   Component Value Date    CHOL 156 10/25/2018    CHOL 147 01/31/2018    CHOL 123 04/13/2017     Lab Results   Component Value Date    HDL 68 10/25/2018    HDL 66 01/31/2018    HDL 59 04/13/2017     Lab Results   Component Value Date    LDLCALC 76.0 10/25/2018    LDLCALC 69.0 01/31/2018    LDLCALC 51.2 (L) 04/13/2017     Lab Results   Component Value Date    TRIG 60 10/25/2018    TRIG 60 01/31/2018    TRIG 64 04/13/2017     Lab Results   Component Value Date    " CHOLHDL 43.6 10/25/2018    CHOLHDL 44.9 01/31/2018    CHOLHDL 48.0 04/13/2017       Chemistry        Component Value Date/Time     05/24/2019 0428    K 3.3 (L) 05/24/2019 0428    CL 91 (L) 05/24/2019 0428    CO2 37 (H) 05/24/2019 0428    BUN 48 (H) 05/24/2019 0428    CREATININE 1.1 05/24/2019 0428     05/24/2019 0428        Component Value Date/Time    CALCIUM 9.6 05/24/2019 0428    ALKPHOS 138 (H) 05/21/2019 0957    AST 35 05/21/2019 0957    ALT 25 05/21/2019 0957    BILITOT 0.6 05/21/2019 0957    ESTGFRAFRICA 53 (A) 05/24/2019 0428    EGFRNONAA 46 (A) 05/24/2019 0428          Lab Results   Component Value Date    TSH 2.207 01/31/2018     Lab Results   Component Value Date    INR 1.0 05/21/2019    INR 0.9 02/16/2019    INR 1.2 05/27/2017     Lab Results   Component Value Date    WBC 6.54 05/24/2019    HGB 12.0 05/24/2019    HCT 37.7 05/24/2019    MCV 92 05/24/2019     05/24/2019     BMP  Sodium   Date Value Ref Range Status   05/24/2019 139 136 - 145 mmol/L Final     Potassium   Date Value Ref Range Status   05/24/2019 3.3 (L) 3.5 - 5.1 mmol/L Final     Chloride   Date Value Ref Range Status   05/24/2019 91 (L) 95 - 110 mmol/L Final     CO2   Date Value Ref Range Status   05/24/2019 37 (H) 23 - 29 mmol/L Final     BUN, Bld   Date Value Ref Range Status   05/24/2019 48 (H) 8 - 23 mg/dL Final     Creatinine   Date Value Ref Range Status   05/24/2019 1.1 0.5 - 1.4 mg/dL Final     Calcium   Date Value Ref Range Status   05/24/2019 9.6 8.7 - 10.5 mg/dL Final     Anion Gap   Date Value Ref Range Status   05/24/2019 11 8 - 16 mmol/L Final     eGFR if    Date Value Ref Range Status   05/24/2019 53 (A) >60 mL/min/1.73 m^2 Final     eGFR if non    Date Value Ref Range Status   05/24/2019 46 (A) >60 mL/min/1.73 m^2 Final     Comment:     Calculation used to obtain the estimated glomerular filtration  rate (eGFR) is the CKD-EPI equation.        CrCl cannot be calculated  (Patient's most recent lab result is older than the maximum 7 days allowed.).    Assessment:     1. Persistent atrial fibrillation    2. Chronic diastolic heart failure    3. Essential hypertension        Plan:   Persistent atrial fibrillation  Continue Lopressor 12.5mg BID and Amio  Continue Eliquis for CVA prophylaxis   Heart healthy diet   Heart healthy diet  Limit fluid intake 50-60 oz   Daily weights and to notify clinic if weight increases by more than 3 lbs in 1 day or 5 lbs in 1 week.   Exercise routine as tolerated  RTC in 3 months or sooner if needed

## 2019-07-02 NOTE — ASSESSMENT & PLAN NOTE
Avoid nephrotoxic agents  Monitor BMP  Vanco dosing monitored by pharmacy  Will continue ARB for now   There are no preventive care reminders to display for this patient.    Patient is up to date, no discussion needed.

## 2019-07-17 DIAGNOSIS — I51.89 LEFT VENTRICULAR DIASTOLIC DYSFUNCTION WITH PRESERVED SYSTOLIC FUNCTION: Chronic | ICD-10-CM

## 2019-07-17 RX ORDER — FUROSEMIDE 20 MG/1
TABLET ORAL
Qty: 90 TABLET | Refills: 0 | OUTPATIENT
Start: 2019-07-17

## 2019-07-18 ENCOUNTER — HOSPITAL ENCOUNTER (EMERGENCY)
Facility: HOSPITAL | Age: 84
Discharge: HOME | End: 2019-07-18
Attending: FAMILY MEDICINE
Payer: MEDICARE

## 2019-07-18 VITALS
OXYGEN SATURATION: 100 % | SYSTOLIC BLOOD PRESSURE: 110 MMHG | HEART RATE: 68 BPM | DIASTOLIC BLOOD PRESSURE: 58 MMHG | TEMPERATURE: 98 F | BODY MASS INDEX: 37.64 KG/M2 | RESPIRATION RATE: 18 BRPM | HEIGHT: 57 IN

## 2019-07-18 DIAGNOSIS — K59.00 CONSTIPATION: Primary | ICD-10-CM

## 2019-07-18 DIAGNOSIS — E87.6 HYPOKALEMIA: ICD-10-CM

## 2019-07-18 DIAGNOSIS — K56.41 FECAL IMPACTION: ICD-10-CM

## 2019-07-18 LAB
ALBUMIN SERPL BCP-MCNC: 3.2 G/DL (ref 3.5–5.2)
ALP SERPL-CCNC: 98 U/L (ref 55–135)
ALT SERPL W/O P-5'-P-CCNC: 19 U/L (ref 10–44)
ANION GAP SERPL CALC-SCNC: 15 MMOL/L (ref 8–16)
AST SERPL-CCNC: 26 U/L (ref 10–40)
BASOPHILS # BLD AUTO: 0.02 K/UL (ref 0–0.2)
BASOPHILS NFR BLD: 0.3 % (ref 0–1.9)
BILIRUB SERPL-MCNC: 0.9 MG/DL (ref 0.1–1)
BILIRUB UR QL STRIP: NEGATIVE
BUN SERPL-MCNC: 42 MG/DL (ref 8–23)
CALCIUM SERPL-MCNC: 9.7 MG/DL (ref 8.7–10.5)
CHLORIDE SERPL-SCNC: 85 MMOL/L (ref 95–110)
CLARITY UR: CLEAR
CO2 SERPL-SCNC: 35 MMOL/L (ref 23–29)
COLOR UR: YELLOW
CREAT SERPL-MCNC: 1.2 MG/DL (ref 0.5–1.4)
DIFFERENTIAL METHOD: ABNORMAL
EOSINOPHIL # BLD AUTO: 0.2 K/UL (ref 0–0.5)
EOSINOPHIL NFR BLD: 2.6 % (ref 0–8)
ERYTHROCYTE [DISTWIDTH] IN BLOOD BY AUTOMATED COUNT: 12.7 % (ref 11.5–14.5)
EST. GFR  (AFRICAN AMERICAN): 48 ML/MIN/1.73 M^2
EST. GFR  (NON AFRICAN AMERICAN): 42 ML/MIN/1.73 M^2
GLUCOSE SERPL-MCNC: 116 MG/DL (ref 70–110)
GLUCOSE UR QL STRIP: NEGATIVE
HCT VFR BLD AUTO: 35.8 % (ref 37–48.5)
HGB BLD-MCNC: 11.7 G/DL (ref 12–16)
HGB UR QL STRIP: NEGATIVE
KETONES UR QL STRIP: NEGATIVE
LEUKOCYTE ESTERASE UR QL STRIP: NEGATIVE
LYMPHOCYTES # BLD AUTO: 1.2 K/UL (ref 1–4.8)
LYMPHOCYTES NFR BLD: 16.4 % (ref 18–48)
MCH RBC QN AUTO: 30.8 PG (ref 27–31)
MCHC RBC AUTO-ENTMCNC: 32.7 G/DL (ref 32–36)
MCV RBC AUTO: 94 FL (ref 82–98)
MONOCYTES # BLD AUTO: 0.9 K/UL (ref 0.3–1)
MONOCYTES NFR BLD: 12.8 % (ref 4–15)
NEUTROPHILS # BLD AUTO: 4.9 K/UL (ref 1.8–7.7)
NEUTROPHILS NFR BLD: 67.9 % (ref 38–73)
NITRITE UR QL STRIP: NEGATIVE
PH UR STRIP: 6 [PH] (ref 5–8)
PLATELET # BLD AUTO: 186 K/UL (ref 150–350)
PMV BLD AUTO: 10.9 FL (ref 9.2–12.9)
POTASSIUM SERPL-SCNC: 2.6 MMOL/L (ref 3.5–5.1)
POTASSIUM SERPL-SCNC: 3.2 MMOL/L (ref 3.5–5.1)
PROT SERPL-MCNC: 7.5 G/DL (ref 6–8.4)
PROT UR QL STRIP: NEGATIVE
RBC # BLD AUTO: 3.8 M/UL (ref 4–5.4)
SODIUM SERPL-SCNC: 135 MMOL/L (ref 136–145)
SP GR UR STRIP: <=1.005 (ref 1–1.03)
URN SPEC COLLECT METH UR: ABNORMAL
UROBILINOGEN UR STRIP-ACNC: NEGATIVE EU/DL
WBC # BLD AUTO: 7.2 K/UL (ref 3.9–12.7)

## 2019-07-18 PROCEDURE — 80053 COMPREHEN METABOLIC PANEL: CPT | Mod: HCWC

## 2019-07-18 PROCEDURE — 85025 COMPLETE CBC W/AUTO DIFF WBC: CPT | Mod: HCWC

## 2019-07-18 PROCEDURE — 84132 ASSAY OF SERUM POTASSIUM: CPT | Mod: HCWC

## 2019-07-18 PROCEDURE — 36415 COLL VENOUS BLD VENIPUNCTURE: CPT | Mod: HCWC

## 2019-07-18 PROCEDURE — 93005 ELECTROCARDIOGRAM TRACING: CPT | Mod: HCWC

## 2019-07-18 PROCEDURE — 99285 EMERGENCY DEPT VISIT HI MDM: CPT | Mod: 25,HCWC

## 2019-07-18 PROCEDURE — 81003 URINALYSIS AUTO W/O SCOPE: CPT | Mod: HCWC

## 2019-07-18 PROCEDURE — 25000003 PHARM REV CODE 250: Mod: HCWC | Performed by: FAMILY MEDICINE

## 2019-07-18 PROCEDURE — 93010 EKG 12-LEAD: ICD-10-PCS | Mod: HCWC,,, | Performed by: INTERNAL MEDICINE

## 2019-07-18 PROCEDURE — 93010 ELECTROCARDIOGRAM REPORT: CPT | Mod: HCWC,,, | Performed by: INTERNAL MEDICINE

## 2019-07-18 RX ORDER — POTASSIUM CHLORIDE 20 MEQ/15ML
40 SOLUTION ORAL ONCE
Status: COMPLETED | OUTPATIENT
Start: 2019-07-18 | End: 2019-07-18

## 2019-07-18 RX ORDER — POLYETHYLENE GLYCOL 3350 17 G/17G
17 POWDER, FOR SOLUTION ORAL DAILY
Qty: 510 G | Refills: 0 | Status: SHIPPED | OUTPATIENT
Start: 2019-07-18 | End: 2021-10-08

## 2019-07-18 RX ORDER — DOCUSATE SODIUM 100 MG/1
100 CAPSULE, LIQUID FILLED ORAL 2 TIMES DAILY
Qty: 60 CAPSULE | Refills: 0 | Status: SHIPPED | OUTPATIENT
Start: 2019-07-18 | End: 2022-04-14

## 2019-07-18 RX ADMIN — POTASSIUM CHLORIDE 40 MEQ: 20 SOLUTION ORAL at 06:07

## 2019-07-18 NOTE — ED PROVIDER NOTES
SCRIBE #1 NOTE: I, Rudolph Oreilly, am scribing for, and in the presence of, Christiano Cortez Jr., P. I have scribed the entire note.      History      Chief Complaint   Patient presents with    Constipation     x 1 week       Review of patient's allergies indicates:   Allergen Reactions    Iodine and iodide containing products Rash        HPI   HPI    7/18/2019, 4:20 PM   History obtained from the patient      History of Present Illness: Elke Laws is a 84 y.o. female patient with a PMHx of CHF, CAD, HLD, HTN, and Afib who presents to the Emergency Department for constipation x 12 days. Symptoms are constant and moderate in severity. No mitigating or exacerbating factors reported. Associated sxs include abdominal pain and 1 episode of emesis this morning. Patient denies any fever, chills, SOB, CP, weakness, numbness, dizziness, headache, and all other sxs at this time. Prior Tx includes Milk of Magnesia and Miralax, with no improvement of sxs. No further complaints or concerns at this time.     Arrival mode: Personal vehicle    PCP: Isidra Benavidez MD       Past Medical History:  Past Medical History:   Diagnosis Date    Abnormal nuclear stress test 6/30/2016    Acute congestive heart failure 5/27/2017    Acute coronary syndrome     Acute kidney injury (nontraumatic) 5/27/2017    Acute on chronic congestive heart failure 5/21/2017    Acute on chronic diastolic congestive heart failure 8/27/2015    Acute on chronic respiratory failure 5/6/2019    Anemia 5/9/2016    Angina pectoris 1/25/2018    Anticoagulant long-term use     Anxiety 6/16/2019    Aortic regurgitation     Echo 11/2013---5 - Mild to moderate aortic regurgitation.      Asthma     Atrial fibrillation 5/15/2014    Back pain     s/p epidural steriod injections, no recent injections.    Baker's cyst     small, right, seen on US lower extremity 6/2014    Blood transfusion     Approximately 6 years ago    Chronic constipation      "Coronary artery disease     Degenerative disc disease, lumbar     Diastolic dysfunction     Seen on echo 11/2013, stress test negative 5/2014    Diverticulosis     colonoscopy 3/27/2011    E coli bacteremia 5/23/2017    E. coli UTI (urinary tract infection) 5/23/2017    Hemorrhoids     colonoscopy 3/27/2011    Hiatal hernia     CXR 12/30/2016---Retrocardiac density again noted consistent with a hiatal hernia.    Hx of adenomatous colonic polyps     Hyperlipidemia     Hypertension     Hyponatremia 5/9/2016    Hypoxemia 5/27/2017    Hypoxia 6/28/2017    Leukocytosis 5/27/2017    Mitral regurgitation     Myocardial infarction     per patient was told in the past she had a "mild heart attack"    Obesity     Osteoarthritis, knee     Pneumonia     per patient "walking Pneumonia" did not require hospitalization    Seasonal allergies     Sepsis 5/22/2017    Trouble in sleeping     Urinary incontinence        Past Surgical History:  Past Surgical History:   Procedure Laterality Date    APPENDECTOMY      CARDIOVERSION N/A 5/24/2019    [facility]    CARDIOVERSION OR DEFIBRILLATION N/A 4/16/2019    [facility]    COLONOSCOPY okay by dr. ramos N/A 8/29/2017    [facility]    ECHOCARDIOGRAM,TRANSESOPHAGEAL N/A 5/24/2019    [facility]    ECHOCARDIOGRAM,TRANSESOPHAGEAL N/A 4/16/2019    [facility]    EYE SURGERY Left     HYSTERECTOMY      benign reasons    KNEE SURGERY Bilateral     x2     Left heart cath      OLECRANON BURSECTOMY Right     6/2011    TONSILLECTOMY      URETHRA SURGERY           Family History:  Family History   Problem Relation Age of Onset    Heart disease Mother     Cancer Mother         colon    Hypertension Mother     Hyperlipidemia Mother     Heart disease Father     Hypertension Father     " Hyperlipidemia Father     Heart disease Sister         MI    Heart disease Brother         MI    COPD Son     Hypertension Son     Diabetes Brother     Diabetes Son     Cancer Sister         breast    Kidney disease Neg Hx     Stroke Neg Hx        Social History:  Social History     Tobacco Use    Smoking status: Former Smoker     Packs/day: 0.05     Years: 1.00     Pack years: 0.05     Types: Cigarettes     Last attempt to quit: 1952     Years since quittin.5    Smokeless tobacco: Never Used   Substance and Sexual Activity    Alcohol use: No     Alcohol/week: 0.0 oz    Drug use: No    Sexual activity: Never     Partners: Male     Birth control/protection: See Surgical Hx       ROS   Review of Systems   Constitutional: Negative for chills, diaphoresis, fatigue and fever.   HENT: Negative for sore throat.    Respiratory: Negative for shortness of breath.    Cardiovascular: Negative for chest pain.   Gastrointestinal: Positive for abdominal pain, constipation, nausea and vomiting (1 episode this morning).   Genitourinary: Negative for dysuria.   Musculoskeletal: Negative for back pain.   Skin: Negative for rash.   Neurological: Negative for dizziness, weakness, light-headedness, numbness and headaches.   Hematological: Does not bruise/bleed easily.   All other systems reviewed and are negative.    Physical Exam      Initial Vitals [19 1554]   BP Pulse Resp Temp SpO2   130/60 71 18 98.2 °F (36.8 °C) (!) 94 %      MAP       --          Physical Exam  Nursing Notes and Vital Signs Reviewed.  Constitutional: Patient is in no acute distress. Well-developed and well-nourished.  Head: Atraumatic. Normocephalic.  Eyes: PERRL. EOM intact. Conjunctivae are not pale. No scleral icterus.  ENT: Mucous membranes are moist. Oropharynx is clear and symmetric.    Neck: Supple. Full ROM. No lymphadenopathy.  Cardiovascular: Regular rate. Regular rhythm. No murmurs, rubs, or gallops. Distal pulses are 2+ and  "symmetric.  Pulmonary/Chest: No respiratory distress. Clear to auscultation bilaterally. No wheezing or rales.  Abdominal: Soft and non-distended.  There is no tenderness.  No rebound, guarding, or rigidity. Good bowel sounds.  Rectal: Female chaperone present for the duration of the rectal exam.There is no tenderness. No masses or hemorrhoids. Normal sphincter tone. The stool color is brown.  Musculoskeletal: Moves all extremities. No obvious deformities. No edema. No calf tenderness.  Skin: Warm and dry.  Neurological:  Alert, awake, and appropriate.  Normal speech.  No acute focal neurological deficits are appreciated.  Psychiatric: Normal affect. Good eye contact. Appropriate in content.    ED Course    Procedures  ED Vital Signs:  Vitals:    07/18/19 1554 07/18/19 1823 07/18/19 1824 07/18/19 1836   BP: 130/60 (!) 140/98     Pulse: 71  90 70   Resp: 18  16    Temp: 98.2 °F (36.8 °C)      TempSrc: Oral      SpO2: (!) 94% 100%     Height: 4' 9" (1.448 m)       07/18/19 1850 07/18/19 1900 07/18/19 2000 07/18/19 2030   BP: (!) 111/55 (!) 108/58 124/60 (!) 110/58   Pulse: 74 73 74 68   Resp: 20 15 19 18   Temp: 98.3 °F (36.8 °C)   98.1 °F (36.7 °C)   TempSrc: Oral   Oral   SpO2: 100% 100% 100% 100%   Height:           Abnormal Lab Results:  Labs Reviewed   CBC W/ AUTO DIFFERENTIAL - Abnormal; Notable for the following components:       Result Value    RBC 3.80 (*)     Hemoglobin 11.7 (*)     Hematocrit 35.8 (*)     Lymph% 16.4 (*)     All other components within normal limits   COMPREHENSIVE METABOLIC PANEL - Abnormal; Notable for the following components:    Sodium 135 (*)     Potassium 2.6 (*)     Chloride 85 (*)     CO2 35 (*)     Glucose 116 (*)     BUN, Bld 42 (*)     Albumin 3.2 (*)     eGFR if  48 (*)     eGFR if non  42 (*)     All other components within normal limits    Narrative:       POTASSIUM  critical result(s) called and verbal readback obtained   from FREDY ALVARADO RN, " 07/18/2019 18:09   URINALYSIS, REFLEX TO URINE CULTURE - Abnormal; Notable for the following components:    Specific Gravity, UA <=1.005 (*)     All other components within normal limits    Narrative:     Preferred Collection Type->Urine, Clean Catch   POTASSIUM - Abnormal; Notable for the following components:    Potassium 3.2 (*)     All other components within normal limits        All Lab Results:  Results for orders placed or performed during the hospital encounter of 07/18/19   CBC auto differential   Result Value Ref Range    WBC 7.20 3.90 - 12.70 K/uL    RBC 3.80 (L) 4.00 - 5.40 M/uL    Hemoglobin 11.7 (L) 12.0 - 16.0 g/dL    Hematocrit 35.8 (L) 37.0 - 48.5 %    Mean Corpuscular Volume 94 82 - 98 fL    Mean Corpuscular Hemoglobin 30.8 27.0 - 31.0 pg    Mean Corpuscular Hemoglobin Conc 32.7 32.0 - 36.0 g/dL    RDW 12.7 11.5 - 14.5 %    Platelets 186 150 - 350 K/uL    MPV 10.9 9.2 - 12.9 fL    Gran # (ANC) 4.9 1.8 - 7.7 K/uL    Lymph # 1.2 1.0 - 4.8 K/uL    Mono # 0.9 0.3 - 1.0 K/uL    Eos # 0.2 0.0 - 0.5 K/uL    Baso # 0.02 0.00 - 0.20 K/uL    Gran% 67.9 38.0 - 73.0 %    Lymph% 16.4 (L) 18.0 - 48.0 %    Mono% 12.8 4.0 - 15.0 %    Eosinophil% 2.6 0.0 - 8.0 %    Basophil% 0.3 0.0 - 1.9 %    Differential Method Automated    Comprehensive metabolic panel   Result Value Ref Range    Sodium 135 (L) 136 - 145 mmol/L    Potassium 2.6 (LL) 3.5 - 5.1 mmol/L    Chloride 85 (L) 95 - 110 mmol/L    CO2 35 (H) 23 - 29 mmol/L    Glucose 116 (H) 70 - 110 mg/dL    BUN, Bld 42 (H) 8 - 23 mg/dL    Creatinine 1.2 0.5 - 1.4 mg/dL    Calcium 9.7 8.7 - 10.5 mg/dL    Total Protein 7.5 6.0 - 8.4 g/dL    Albumin 3.2 (L) 3.5 - 5.2 g/dL    Total Bilirubin 0.9 0.1 - 1.0 mg/dL    Alkaline Phosphatase 98 55 - 135 U/L    AST 26 10 - 40 U/L    ALT 19 10 - 44 U/L    Anion Gap 15 8 - 16 mmol/L    eGFR if African American 48 (A) >60 mL/min/1.73 m^2    eGFR if non African American 42 (A) >60 mL/min/1.73 m^2   Urinalysis, Reflex to Urine Culture  Urine, Clean Catch   Result Value Ref Range    Specimen UA Urine, Clean Catch     Color, UA Yellow Yellow, Straw, Ruth    Appearance, UA Clear Clear    pH, UA 6.0 5.0 - 8.0    Specific Gravity, UA <=1.005 (A) 1.005 - 1.030    Protein, UA Negative Negative    Glucose, UA Negative Negative    Ketones, UA Negative Negative    Bilirubin (UA) Negative Negative    Occult Blood UA Negative Negative    Nitrite, UA Negative Negative    Urobilinogen, UA Negative <2.0 EU/dL    Leukocytes, UA Negative Negative   Potassium   Result Value Ref Range    Potassium 3.2 (L) 3.5 - 5.1 mmol/L     Imaging Results:  Imaging Results          X-Ray Abdomen Flat And Erect (Final result)  Result time 07/18/19 16:46:41    Final result by Shaq Posada III, MD (07/18/19 16:46:41)                 Impression:      Constipation.  No radiographic evidence of acute abdominal disease.      Electronically signed by: Shaq Posada MD  Date:    07/18/2019  Time:    16:46             Narrative:    EXAMINATION:  XR ABDOMEN FLAT AND ERECT    CLINICAL HISTORY:  Constipation, unspecified    FINDINGS:  No definite free air is seen.  There is a large amount of retained stool scattered throughout the colon consistent with constipation.  Bowel gas pattern is otherwise unremarkable.  No evidence of small-bowel obstruction.  No radiographic evidence of urolithiasis.  Stable severe thoracolumbar dextroscoliosis and multilevel spondylosis.                               The EKG was ordered, reviewed, and independently interpreted by the ED provider.  Interpretation time: 18:35  Rate: 71 BPM  Rhythm: atrial fibrillation  Interpretation: Prolonged QT. No STEMI.           The Emergency Provider reviewed the vital signs and test results, which are outlined above.    ED Discussion     6:20 PM: Discussed pt's case with Dr. Cooper (Emergency Medicine) who recommends giving the pt PO potassium in the ED.    8:34 PM: Reassessed pt at this time. Fecal disimpaction  performed; moderate amount of fecal matter removed. Pt's potassium level has increased to 3.2. Discussed with pt all pertinent ED information and results. Discussed pt dx and plan of tx. Gave pt all f/u and return to the ED instructions. All questions and concerns were addressed at this time. Pt expresses understanding of information and instructions, and is comfortable with plan to discharge. Pt is stable for discharge.    I discussed with patient and/or family/caretaker that evaluation in the ED does not suggest any emergent or life threatening medical conditions requiring immediate intervention beyond what was provided in the ED, and I believe patient is safe for discharge.  Regardless, an unremarkable evaluation in the ED does not preclude the development or presence of a serious of life threatening condition. As such, patient was instructed to return immediately for any worsening or change in current symptoms.    ED Medication(s):  Medications   potassium chloride 10% oral solution 40 mEq (40 mEq Oral Given 7/18/19 4988)       Follow-up Information     Isidra Benavidez MD In 3 days.    Specialty:  Family Medicine  Contact information:  [id number] [place] [address] [address] [place] [address]  [phone]             [facility]    Specialty:  Emergency Medicine  Why:  If symptoms worsen  Contact information:  [address]  [facility] [id number] [phone]                Discharge Medication List as of 7/18/2019  8:35 PM      START taking these medications    Details   docusate sodium (COLACE) 100 MG capsule Take 1 capsule (100 mg total) by mouth 2 (two) times daily., Starting Thu 7/18/2019, Normal      polyethylene glycol (GLYCOLAX) 17 gram/dose powder Take 17 g by mouth once daily., Starting Thu 7/18/2019, Normal                 Medical Decision Making    Medical Decision Making:   Clinical Tests:   Lab Tests: Ordered and Reviewed  Radiological Study: Ordered and  Reviewed  Medical Tests: Ordered and Reviewed           Scribe Attestation:   Scribe #1: I performed the above scribed service and the documentation accurately describes the services I performed. I attest to the accuracy of the note.    Attending:   Physician Attestation Statement for Scribe #1: I, KRYSTIN Colon Jr., personally performed the services described in this documentation, as scribed by Rudolph Oreilly, in my presence, and it is both accurate and complete.          Clinical Impression       ICD-10-CM ICD-9-CM   1. Constipation K59.00 564.00   2. Hypokalemia E87.6 276.8   3. Fecal impaction K56.41 560.32       Disposition:   Disposition: Discharged  Condition: Stable           KRYSTIN Colon Jr.  07/18/19 7371

## 2019-07-19 NOTE — ED NOTES
Report received from ADE Marin    Patient identifiers verified and correct for Elke Laws.    LOC: The patient is awake, alert and aware of environment with an appropriate affect, the patient is oriented x 3 and speaking appropriately.  APPEARANCE: Patient resting comfortably and in no acute distress, patient is clean and well groomed, patient's clothing is properly fastened.  SKIN: The skin is warm and dry, color consistent with ethnicity, patient has normal skin turgor and moist mucus membranes, skin intact, no breakdown or bruising noted.  MUSCULOSKELETAL: Patient moving all extremities spontaneously.  RESPIRATORY: Airway is open and patent, respirations are spontaneous. Pt on 2L O2 via nasal cannula.   CARDIAC: Patient has a normal rate, no periphreal edema noted, capillary refill < 3 seconds.  ABDOMEN: Pt c/o soreness to abdomen.

## 2019-08-12 ENCOUNTER — EXTERNAL HOME HEALTH (OUTPATIENT)
Dept: HOME HEALTH SERVICES | Facility: HOSPITAL | Age: 84
End: 2019-08-12
Payer: MEDICARE

## 2019-08-28 ENCOUNTER — LAB VISIT (OUTPATIENT)
Dept: LAB | Facility: HOSPITAL | Age: 84
End: 2019-08-28
Attending: NURSE PRACTITIONER
Payer: MEDICARE

## 2019-08-28 ENCOUNTER — TELEPHONE (OUTPATIENT)
Dept: CARDIOLOGY | Facility: CLINIC | Age: 84
End: 2019-08-28

## 2019-08-28 ENCOUNTER — OFFICE VISIT (OUTPATIENT)
Dept: CARDIOLOGY | Facility: CLINIC | Age: 84
End: 2019-08-28
Payer: MEDICARE

## 2019-08-28 VITALS
WEIGHT: 176.56 LBS | SYSTOLIC BLOOD PRESSURE: 122 MMHG | HEART RATE: 75 BPM | OXYGEN SATURATION: 97 % | BODY MASS INDEX: 38.09 KG/M2 | DIASTOLIC BLOOD PRESSURE: 60 MMHG | HEIGHT: 57 IN

## 2019-08-28 DIAGNOSIS — I48.91 ATRIAL FIBRILLATION: ICD-10-CM

## 2019-08-28 DIAGNOSIS — I10 ESSENTIAL HYPERTENSION: Chronic | ICD-10-CM

## 2019-08-28 DIAGNOSIS — I50.32 CHRONIC DIASTOLIC HEART FAILURE: Primary | Chronic | ICD-10-CM

## 2019-08-28 DIAGNOSIS — I48.91 ATRIAL FIBRILLATION: Primary | ICD-10-CM

## 2019-08-28 DIAGNOSIS — I48.19 PERSISTENT ATRIAL FIBRILLATION: ICD-10-CM

## 2019-08-28 DIAGNOSIS — I50.32 CHRONIC DIASTOLIC HEART FAILURE: Chronic | ICD-10-CM

## 2019-08-28 LAB
ANION GAP SERPL CALC-SCNC: 13 MMOL/L (ref 8–16)
BUN SERPL-MCNC: 43 MG/DL (ref 8–23)
CALCIUM SERPL-MCNC: 10.2 MG/DL (ref 8.7–10.5)
CHLORIDE SERPL-SCNC: 91 MMOL/L (ref 95–110)
CO2 SERPL-SCNC: 33 MMOL/L (ref 23–29)
CREAT SERPL-MCNC: 1.4 MG/DL (ref 0.5–1.4)
EST. GFR  (AFRICAN AMERICAN): 40 ML/MIN/1.73 M^2
EST. GFR  (NON AFRICAN AMERICAN): 35 ML/MIN/1.73 M^2
GLUCOSE SERPL-MCNC: 80 MG/DL (ref 70–110)
POTASSIUM SERPL-SCNC: 3.6 MMOL/L (ref 3.5–5.1)
SODIUM SERPL-SCNC: 137 MMOL/L (ref 136–145)

## 2019-08-28 PROCEDURE — 99999 PR PBB SHADOW E&M-EST. PATIENT-LVL III: ICD-10-PCS | Mod: PBBFAC,HCWC,, | Performed by: NURSE PRACTITIONER

## 2019-08-28 PROCEDURE — 3078F PR MOST RECENT DIASTOLIC BLOOD PRESSURE < 80 MM HG: ICD-10-PCS | Mod: HCWC,CPTII,S$GLB, | Performed by: NURSE PRACTITIONER

## 2019-08-28 PROCEDURE — 3074F PR MOST RECENT SYSTOLIC BLOOD PRESSURE < 130 MM HG: ICD-10-PCS | Mod: HCWC,CPTII,S$GLB, | Performed by: NURSE PRACTITIONER

## 2019-08-28 PROCEDURE — 93000 EKG 12-LEAD: ICD-10-PCS | Mod: HCWC,S$GLB,, | Performed by: INTERNAL MEDICINE

## 2019-08-28 PROCEDURE — 1101F PT FALLS ASSESS-DOCD LE1/YR: CPT | Mod: HCWC,CPTII,S$GLB, | Performed by: NURSE PRACTITIONER

## 2019-08-28 PROCEDURE — 3074F SYST BP LT 130 MM HG: CPT | Mod: HCWC,CPTII,S$GLB, | Performed by: NURSE PRACTITIONER

## 2019-08-28 PROCEDURE — 99999 PR PBB SHADOW E&M-EST. PATIENT-LVL III: CPT | Mod: PBBFAC,HCWC,, | Performed by: NURSE PRACTITIONER

## 2019-08-28 PROCEDURE — 3078F DIAST BP <80 MM HG: CPT | Mod: HCWC,CPTII,S$GLB, | Performed by: NURSE PRACTITIONER

## 2019-08-28 PROCEDURE — 36415 COLL VENOUS BLD VENIPUNCTURE: CPT | Mod: HCWC

## 2019-08-28 PROCEDURE — 99214 PR OFFICE/OUTPT VISIT, EST, LEVL IV, 30-39 MIN: ICD-10-PCS | Mod: HCWC,S$GLB,, | Performed by: NURSE PRACTITIONER

## 2019-08-28 PROCEDURE — 93000 ELECTROCARDIOGRAM COMPLETE: CPT | Mod: HCWC,S$GLB,, | Performed by: INTERNAL MEDICINE

## 2019-08-28 PROCEDURE — 1101F PR PT FALLS ASSESS DOC 0-1 FALLS W/OUT INJ PAST YR: ICD-10-PCS | Mod: HCWC,CPTII,S$GLB, | Performed by: NURSE PRACTITIONER

## 2019-08-28 PROCEDURE — 80048 BASIC METABOLIC PNL TOTAL CA: CPT | Mod: HCWC

## 2019-08-28 PROCEDURE — 99214 OFFICE O/P EST MOD 30 MIN: CPT | Mod: HCWC,S$GLB,, | Performed by: NURSE PRACTITIONER

## 2019-08-28 NOTE — TELEPHONE ENCOUNTER
Please inform patient that her labs are stable. She is not dehydrated. Her Potassium level looks fine. I think we can see how she responds to cutting her metolazone to Monday and Friday only. Let's see if this helps with her fatigue.  Continue other current medical management

## 2019-08-28 NOTE — PROGRESS NOTES
Subjective:   Patient ID:  Elke Laws is a 84 y.o. female who presents for follow up of Atrial Fibrillation; Congestive Heart Failure; and Hyperlipidemia      HPI  Ms. Laws' current conditions include persistent AF, diastolic CHF, non obstructive CAD per [facility] done in , HTN and HLD. Admitted in May 2019 for A-fib with RVR. She had failed CV x2 during admission. DC'd with Amio. No BB restarted at that time due to bradycardia during admission. Patient declined follow up with EP ablation and PPM consideration and opted for conservative medical management.   Currently taking Lasix 40mg BID and Metolazone 2.5mg on MWF.   Has no abnormal bleeding on Eliquis.       Denies any chest pain, SOB, DIAZ,  orthopnea, PND, dizziness, palpitations,  near syncope, syncope or edema .  Her only complaint today is fatigue. Her palpitations have nearly resolved. Has no symptoms concerning for angina or equivalent. No CNS Complaints to suggest TIA or CVA. Does well with limiting sodium intake.  EKG today shows A-fib with controlled rate 69 bpm     Past Medical History:   Diagnosis Date    Abnormal nuclear stress test 6/30/2016    Acute congestive heart failure 5/27/2017    Acute coronary syndrome     Acute kidney injury (nontraumatic) 5/27/2017    Acute on chronic congestive heart failure 5/21/2017    Acute on chronic diastolic congestive heart failure 8/27/2015    Acute on chronic respiratory failure 5/6/2019    Anemia 5/9/2016    Angina pectoris 1/25/2018    Anticoagulant long-term use     Anxiety 6/16/2019    Aortic regurgitation     Echo 11/2013---5 - Mild to moderate aortic regurgitation.      Asthma     Atrial fibrillation 5/15/2014    Back pain     s/p epidural steriod injections, no recent injections.    Baker's cyst     small, right, seen on US lower extremity 6/2014    Blood transfusion     Approximately 6 years ago    Chronic constipation     Coronary artery disease     Degenerative disc disease,  "lumbar     Diastolic dysfunction     Seen on echo 2013, stress test negative 2014    Diverticulosis     colonoscopy 3/27/2011    E coli bacteremia 2017    E. coli UTI (urinary tract infection) 2017    Hemorrhoids     colonoscopy 3/27/2011    Hiatal hernia     CXR 2016---Retrocardiac density again noted consistent with a hiatal hernia.    Hx of adenomatous colonic polyps     Hyperlipidemia     Hypertension     Hyponatremia 2016    Hypoxemia 2017    Hypoxia 2017    Leukocytosis 2017    Mitral regurgitation     Myocardial infarction     per patient was told in the past she had a "mild heart attack"    Obesity     Osteoarthritis, knee     Pneumonia     per patient "walking Pneumonia" did not require hospitalization    Seasonal allergies     Sepsis 2017    Trouble in sleeping     Urinary incontinence        Past Surgical History:   Procedure Laterality Date    APPENDECTOMY      CARDIOVERSION N/A 2019    [facility]    CARDIOVERSION OR DEFIBRILLATION N/A 2019    [facility] CATH LAB    COLONOSCOPY okay by dr. ramos N/A 2017    [facility] ENDO    ECHOCARDIOGRAM,TRANSESOPHAGEAL N/A 2019    [facility] CATH LAB    ECHOCARDIOGRAM,TRANSESOPHAGEAL N/A 2019    [facility] CATH LAB    EYE SURGERY Left     HYSTERECTOMY      benign reasons    KNEE SURGERY Bilateral     x2     Left heart cath      OLECRANON BURSECTOMY Right     2011    TONSILLECTOMY      URETHRA SURGERY         Social History     Tobacco Use    Smoking status: Former Smoker     Packs/day: 0.05     Years: 1.00     Pack years: 0.05     Types: Cigarettes     Last attempt to quit: 1952     Years since quittin.7    Smokeless tobacco: Never Used   Substance Use Topics    Alcohol use: No     Alcohol/week: 0.0 oz    Drug " use: No       Family History   Problem Relation Age of Onset    Heart disease Mother     Cancer Mother         colon    Hypertension Mother     Hyperlipidemia Mother     Heart disease Father     Hypertension Father     Hyperlipidemia Father     Heart disease Sister         MI    Heart disease Brother         MI    COPD Son     Hypertension Son     Diabetes Brother     Diabetes Son     Cancer Sister         breast    Kidney disease Neg Hx     Stroke Neg Hx        Current Outpatient Medications   Medication Sig    acetaminophen (TYLENOL) 500 MG tablet Take 1 tablet (500 mg total) by mouth 2 (two) times daily. (Patient taking differently: Take 500 mg by mouth 2 (two) times daily as needed. )    albuterol (ACCUNEB) 0.63 mg/3 mL Nebu Take 3 mLs (0.63 mg total) by nebulization every 6 (six) hours as needed. Rescue    albuterol 90 mcg/actuation inhaler Inhale 2 puffs into the lungs 4 (four) times daily.    amiodarone (PACERONE) 200 MG Tab Take 1 tablet (200 mg total) by mouth once daily.    apixaban (ELIQUIS) 5 mg Tab Take 1 tablet (5 mg total) by mouth 2 (two) times daily.    DOCOSAHEXANOIC ACID/EPA (FISH OIL ORAL) Take 500 mg by mouth once daily.     docusate sodium (COLACE) 100 MG capsule Take 1 capsule (100 mg total) by mouth 2 (two) times daily.    fluticasone (FLOVENT HFA) 110 mcg/actuation inhaler Inhale 1 puff into the lungs 2 (two) times daily.    furosemide (LASIX) 40 MG tablet Take 1 tablet (40 mg total) by mouth 2 (two) times daily.    LORazepam (ATIVAN) 0.5 MG tablet Take 1 tablet (0.5 mg total) by mouth 2 (two) times daily as needed for Anxiety.    metOLazone (ZAROXOLYN) 2.5 MG tablet Take 1 tablet (2.5 mg total) by mouth every Mon, Wed, Fri.    metoprolol tartrate (LOPRESSOR) 25 MG tablet Take 0.5 tablets (12.5 mg total) by mouth 2 (two) times daily.    polyethylene glycol (GLYCOLAX) 17 gram/dose powder Take 17 g by mouth once daily.    pravastatin (PRAVACHOL) 40 MG tablet Take  1 tablet (40 mg total) by mouth once daily.     No current facility-administered medications for this visit.      Current Outpatient Medications on File Prior to Visit   Medication Sig    acetaminophen (TYLENOL) 500 MG tablet Take 1 tablet (500 mg total) by mouth 2 (two) times daily. (Patient taking differently: Take 500 mg by mouth 2 (two) times daily as needed. )    albuterol (ACCUNEB) 0.63 mg/3 mL Nebu Take 3 mLs (0.63 mg total) by nebulization every 6 (six) hours as needed. Rescue    albuterol 90 mcg/actuation inhaler Inhale 2 puffs into the lungs 4 (four) times daily.    amiodarone (PACERONE) 200 MG Tab Take 1 tablet (200 mg total) by mouth once daily.    apixaban (ELIQUIS) 5 mg Tab Take 1 tablet (5 mg total) by mouth 2 (two) times daily.    DOCOSAHEXANOIC ACID/EPA (FISH OIL ORAL) Take 500 mg by mouth once daily.     docusate sodium (COLACE) 100 MG capsule Take 1 capsule (100 mg total) by mouth 2 (two) times daily.    fluticasone (FLOVENT HFA) 110 mcg/actuation inhaler Inhale 1 puff into the lungs 2 (two) times daily.    furosemide (LASIX) 40 MG tablet Take 1 tablet (40 mg total) by mouth 2 (two) times daily.    LORazepam (ATIVAN) 0.5 MG tablet Take 1 tablet (0.5 mg total) by mouth 2 (two) times daily as needed for Anxiety.    metOLazone (ZAROXOLYN) 2.5 MG tablet Take 1 tablet (2.5 mg total) by mouth every Mon, Wed, Fri.    metoprolol tartrate (LOPRESSOR) 25 MG tablet Take 0.5 tablets (12.5 mg total) by mouth 2 (two) times daily.    polyethylene glycol (GLYCOLAX) 17 gram/dose powder Take 17 g by mouth once daily.    pravastatin (PRAVACHOL) 40 MG tablet Take 1 tablet (40 mg total) by mouth once daily.     No current facility-administered medications on file prior to visit.        Review of Systems   Constitution: Positive for malaise/fatigue. Negative for diaphoresis, weight gain and weight loss.   HENT: Negative for congestion and nosebleeds.    Cardiovascular: Negative for chest pain,  "claudication, cyanosis, dyspnea on exertion, irregular heartbeat, leg swelling, near-syncope, orthopnea, palpitations, paroxysmal nocturnal dyspnea and syncope.   Respiratory: Negative for cough, hemoptysis, shortness of breath, sleep disturbances due to breathing, snoring, sputum production and wheezing.    Hematologic/Lymphatic: Negative for bleeding problem. Does not bruise/bleed easily.   Skin: Negative for rash.   Musculoskeletal: Negative for arthritis, back pain, falls, joint pain, muscle cramps and muscle weakness.   Gastrointestinal: Negative for abdominal pain, constipation, diarrhea, heartburn, hematemesis, hematochezia, melena and nausea.   Genitourinary: Negative for dysuria, hematuria and nocturia.   Neurological: Negative for excessive daytime sleepiness, dizziness, headaches, light-headedness, loss of balance, numbness, vertigo and weakness.       Objective:   Physical Exam   Constitutional: She is oriented to person, place, and time. She appears well-developed and well-nourished.   Neck: Neck supple. No JVD present.   Cardiovascular: Normal rate, normal heart sounds and normal pulses. An irregularly irregular rhythm present. Exam reveals no friction rub.   No murmur heard.  Pulmonary/Chest: Effort normal and breath sounds normal. No respiratory distress. She has no wheezes. She has no rales.   NC at 2 liters    Abdominal: Soft. Bowel sounds are normal. She exhibits no distension.   Musculoskeletal: She exhibits no edema or tenderness.   Neurological: She is alert and oriented to person, place, and time.   Skin: Skin is warm and dry. No rash noted.   Psychiatric: She has a normal mood and affect. Her behavior is normal.   Nursing note and vitals reviewed.    Vitals:    08/28/19 1051 08/28/19 1054   BP: 124/62 122/60   BP Location: Right arm Left arm   Pulse: 75    SpO2: 97%    Weight: 80.1 kg (176 lb 9.4 oz)    Height: 4' 9" (1.448 m)      Lab Results   Component Value Date    CHOL 156 10/25/2018    " CHOL 147 01/31/2018    CHOL 123 04/13/2017     Lab Results   Component Value Date    HDL 68 10/25/2018    HDL 66 01/31/2018    HDL 59 04/13/2017     Lab Results   Component Value Date    LDLCALC 76.0 10/25/2018    LDLCALC 69.0 01/31/2018    LDLCALC 51.2 (L) 04/13/2017     Lab Results   Component Value Date    TRIG 60 10/25/2018    TRIG 60 01/31/2018    TRIG 64 04/13/2017     Lab Results   Component Value Date    CHOLHDL 43.6 10/25/2018    CHOLHDL 44.9 01/31/2018    CHOLHDL 48.0 04/13/2017       Chemistry        Component Value Date/Time     (L) 07/18/2019 1708    K 3.2 (L) 07/18/2019 2009    CL 85 (L) 07/18/2019 1708    CO2 35 (H) 07/18/2019 1708    BUN 42 (H) 07/18/2019 1708    CREATININE 1.2 07/18/2019 1708     (H) 07/18/2019 1708        Component Value Date/Time    CALCIUM 9.7 07/18/2019 1708    ALKPHOS 98 07/18/2019 1708    AST 26 07/18/2019 1708    ALT 19 07/18/2019 1708    BILITOT 0.9 07/18/2019 1708    ESTGFRAFRICA 48 (A) 07/18/2019 1708    EGFRNONAA 42 (A) 07/18/2019 1708          Lab Results   Component Value Date    TSH 2.207 01/31/2018     Lab Results   Component Value Date    INR 1.0 05/21/2019    INR 0.9 02/16/2019    INR 1.2 05/27/2017     Lab Results   Component Value Date    WBC 7.20 07/18/2019    HGB 11.7 (L) 07/18/2019    HCT 35.8 (L) 07/18/2019    MCV 94 07/18/2019     07/18/2019     BMP  Sodium   Date Value Ref Range Status   07/18/2019 135 (L) 136 - 145 mmol/L Final     Potassium   Date Value Ref Range Status   07/18/2019 3.2 (L) 3.5 - 5.1 mmol/L Final     Chloride   Date Value Ref Range Status   07/18/2019 85 (L) 95 - 110 mmol/L Final     CO2   Date Value Ref Range Status   07/18/2019 35 (H) 23 - 29 mmol/L Final     BUN, Bld   Date Value Ref Range Status   07/18/2019 42 (H) 8 - 23 mg/dL Final     Creatinine   Date Value Ref Range Status   07/18/2019 1.2 0.5 - 1.4 mg/dL Final     Calcium   Date Value Ref Range Status   07/18/2019 9.7 8.7 - 10.5 mg/dL Final     Anion Gap    Date Value Ref Range Status   07/18/2019 15 8 - 16 mmol/L Final     eGFR if    Date Value Ref Range Status   07/18/2019 48 (A) >60 mL/min/1.73 m^2 Final     eGFR if non    Date Value Ref Range Status   07/18/2019 42 (A) >60 mL/min/1.73 m^2 Final     Comment:     Calculation used to obtain the estimated glomerular filtration  rate (eGFR) is the CKD-EPI equation.        CrCl cannot be calculated (Patient's most recent lab result is older than the maximum 7 days allowed.).    Assessment:     1. Chronic diastolic heart failure    2. Persistent atrial fibrillation    3. Essential hypertension        Plan:   Chronic diastolic heart failure  Continue metoprolol  Heart healthy diet  Limit fluid intake 50-60 oz   Daily weights and to notify clinic if weight increases by more than 3 lbs in 1 day or 5 lbs in 1 week.   Exercise routine as tolerated  Continue lasix and metolazone for now pending lab results   BMP today -phone review of results     Persistent atrial fibrillation  Continue metoprolol 12.5mg BID   Continue amio 200mg daily   Continue Eliqius for CVA prophylaxis     Essential hypertension  Continue metoprolol     RTC in 6 months or sooner if needed

## 2019-08-29 NOTE — TELEPHONE ENCOUNTER
I spoke with the patient regarding lab results and the frequency change in her metolazone. The patient expressed her understanding and had no further questions.

## 2019-08-29 NOTE — TELEPHONE ENCOUNTER
----- Message from Geovanna Feliciano sent at 8/29/2019  9:43 AM CDT -----  Contact: pt  .Type:  Patient Returning Call    Who Called: pt  Who Left Message for Patient: Ruth   Does the patient know what this is regarding?:   Would the patient rather a call back or a response via [facility]? Call back   Best Call Back Number:  [phone]    Additional Information:

## 2019-09-24 ENCOUNTER — TELEPHONE (OUTPATIENT)
Dept: HOME HEALTH SERVICES | Facility: HOSPITAL | Age: 84
End: 2019-09-24

## 2019-10-21 ENCOUNTER — IMMUNIZATION (OUTPATIENT)
Dept: PHARMACY | Facility: CLINIC | Age: 84
End: 2019-10-21
Payer: MEDICARE

## 2019-10-21 ENCOUNTER — OFFICE VISIT (OUTPATIENT)
Dept: INTERNAL MEDICINE | Facility: CLINIC | Age: 84
End: 2019-10-21
Payer: MEDICARE

## 2019-10-21 VITALS
BODY MASS INDEX: 39.67 KG/M2 | SYSTOLIC BLOOD PRESSURE: 132 MMHG | HEART RATE: 63 BPM | WEIGHT: 183.88 LBS | OXYGEN SATURATION: 98 % | DIASTOLIC BLOOD PRESSURE: 72 MMHG | HEIGHT: 57 IN

## 2019-10-21 DIAGNOSIS — I50.32 CHRONIC DIASTOLIC HEART FAILURE: Chronic | ICD-10-CM

## 2019-10-21 DIAGNOSIS — D64.9 ANEMIA, UNSPECIFIED TYPE: ICD-10-CM

## 2019-10-21 DIAGNOSIS — I48.0 PAF (PAROXYSMAL ATRIAL FIBRILLATION): Chronic | ICD-10-CM

## 2019-10-21 DIAGNOSIS — I10 ESSENTIAL HYPERTENSION: Chronic | ICD-10-CM

## 2019-10-21 DIAGNOSIS — Z99.81 ON HOME OXYGEN THERAPY: ICD-10-CM

## 2019-10-21 DIAGNOSIS — Z91.81 AT RISK FOR FALLS: ICD-10-CM

## 2019-10-21 DIAGNOSIS — Z00.00 ENCOUNTER FOR PREVENTIVE HEALTH EXAMINATION: Primary | ICD-10-CM

## 2019-10-21 DIAGNOSIS — I25.10 CORONARY ARTERY DISEASE, ANGINA PRESENCE UNSPECIFIED, UNSPECIFIED VESSEL OR LESION TYPE, UNSPECIFIED WHETHER NATIVE OR TRANSPLANTED HEART: ICD-10-CM

## 2019-10-21 DIAGNOSIS — E66.01 SEVERE OBESITY WITH BODY MASS INDEX (BMI) OF 35.0 TO 39.9 WITH SERIOUS COMORBIDITY: ICD-10-CM

## 2019-10-21 DIAGNOSIS — E78.5 HYPERLIPIDEMIA, UNSPECIFIED HYPERLIPIDEMIA TYPE: Chronic | ICD-10-CM

## 2019-10-21 DIAGNOSIS — F41.9 ANXIETY: ICD-10-CM

## 2019-10-21 DIAGNOSIS — I27.9 PULMONARY HEART DISEASE: ICD-10-CM

## 2019-10-21 DIAGNOSIS — N18.30 CKD (CHRONIC KIDNEY DISEASE) STAGE 3, GFR 30-59 ML/MIN: ICD-10-CM

## 2019-10-21 DIAGNOSIS — H91.90 HEARING DIFFICULTY, UNSPECIFIED LATERALITY: ICD-10-CM

## 2019-10-21 DIAGNOSIS — R73.03 PREDIABETES: ICD-10-CM

## 2019-10-21 DIAGNOSIS — I70.0 ATHEROSCLEROSIS OF AORTA: Chronic | ICD-10-CM

## 2019-10-21 DIAGNOSIS — Z91.89 POTENTIAL FOR COGNITIVE IMPAIRMENT: ICD-10-CM

## 2019-10-21 DIAGNOSIS — J45.909 ASTHMA, UNSPECIFIED ASTHMA SEVERITY, UNSPECIFIED WHETHER COMPLICATED, UNSPECIFIED WHETHER PERSISTENT: ICD-10-CM

## 2019-10-21 PROBLEM — Z86.79 HISTORY OF ANGINA PECTORIS: Status: ACTIVE | Noted: 2018-01-25

## 2019-10-21 PROCEDURE — 3075F SYST BP GE 130 - 139MM HG: CPT | Mod: HCWC,CPTII,S$GLB, | Performed by: NURSE PRACTITIONER

## 2019-10-21 PROCEDURE — 3075F PR MOST RECENT SYSTOLIC BLOOD PRESS GE 130-139MM HG: ICD-10-PCS | Mod: HCWC,CPTII,S$GLB, | Performed by: NURSE PRACTITIONER

## 2019-10-21 PROCEDURE — G0439 PPPS, SUBSEQ VISIT: HCPCS | Mod: HCWC,S$GLB,, | Performed by: NURSE PRACTITIONER

## 2019-10-21 PROCEDURE — 3078F PR MOST RECENT DIASTOLIC BLOOD PRESSURE < 80 MM HG: ICD-10-PCS | Mod: HCWC,CPTII,S$GLB, | Performed by: NURSE PRACTITIONER

## 2019-10-21 PROCEDURE — 99999 PR PBB SHADOW E&M-EST. PATIENT-LVL V: CPT | Mod: PBBFAC,HCWC,, | Performed by: NURSE PRACTITIONER

## 2019-10-21 PROCEDURE — 99999 PR PBB SHADOW E&M-EST. PATIENT-LVL V: ICD-10-PCS | Mod: PBBFAC,HCWC,, | Performed by: NURSE PRACTITIONER

## 2019-10-21 PROCEDURE — G0439 PR MEDICARE ANNUAL WELLNESS SUBSEQUENT VISIT: ICD-10-PCS | Mod: HCWC,S$GLB,, | Performed by: NURSE PRACTITIONER

## 2019-10-21 PROCEDURE — 3078F DIAST BP <80 MM HG: CPT | Mod: HCWC,CPTII,S$GLB, | Performed by: NURSE PRACTITIONER

## 2019-10-21 NOTE — PROGRESS NOTES
"Elke Laws presented for a  Medicare AWV and comprehensive Health Risk Assessment today. The following components were reviewed and updated:    · Medical history  · Family History  · Social history  · Allergies and Current Medications  · Health Risk Assessment  · Health Maintenance  · Care Team     ** See Completed Assessments for Annual Wellness Visit within the encounter summary.**       The following assessments were completed:  · Living Situation  · CAGE  · Depression Screening  · Timed Get Up and Go  · Whisper Test  · Cognitive Function Screening  · Nutrition Screening  · ADL Screening  · PAQ Screening    Vitals:    10/21/19 1057   BP: 132/72   BP Location: Left arm   Patient Position: Sitting   BP Method: Medium (Manual)   Pulse: 63   SpO2: 98%   Weight: 83.4 kg (183 lb 13.8 oz)   Height: 4' 9" (1.448 m)     Body mass index is 39.79 kg/m².  Physical Exam   Constitutional: She appears well-developed and well-nourished.   Patient accompanied by daughter , who was present throughout the visit and aided in information gathering.      HENT:   Head: Normocephalic.   Cardiovascular: Normal rate and normal heart sounds. An irregular rhythm present.   No murmur heard.  Pulses:       Dorsalis pedis pulses are 2+ on the right side, and 2+ on the left side.   Pulmonary/Chest: Effort normal and breath sounds normal. No respiratory distress.   O2 via NC   Abdominal: Soft. She exhibits no mass. There is no tenderness.   Musculoskeletal: Normal range of motion. She exhibits no edema.   No erythema, increased warmth, or tenderness noted to either calf.    Neurological: She is alert. She exhibits normal muscle tone.   Oriented to person, place, and month. Unable to give correct year.    Skin: Skin is warm, dry and intact.   Psychiatric: She has a normal mood and affect. Her speech is normal and behavior is normal.   Nursing note and vitals reviewed.        Diagnoses and health risks identified today and associated " recommendations/orders:    1. Encounter for preventive health examination  She will receive flu vaccine today.   She will receive Shingrix at pharmacy.     She would like to discuss scheduling follow up appointment with nephrology with PCP prior to scheduling.     2. Chronic diastolic heart failure  Stable. Continue current treatment plan as previously prescribed with your PCP and cardiology.     3. Pulmonary heart disease  Echo 3/2019  Advised to follow up with cardiology for further evaluation and recommendations. They expressed understanding.      4. Asthma, unspecified asthma severity, unspecified whether complicated, unspecified whether persistent  Stable. Continue current treatment plan as previously prescribed with your PCP.     5. Coronary artery disease, angina presence unspecified, unspecified vessel or lesion type, unspecified whether native or transplanted heart  Denies chest pain.   Stable. Continue current treatment plan as previously prescribed with your PCP and cardiology.     6. PAF (paroxysmal atrial fibrillation)  Stable. Continue current treatment plan as previously prescribed with your PCP and cardiology.     7. Essential hypertension  Stable and controlled. Continue current treatment plan as previously prescribed with your PCP and cardiology.     8. Hyperlipidemia, unspecified hyperlipidemia type  Continue current treatment plan as previously prescribed with your PCP and cardiology.     9. Atherosclerosis of aorta  cxr 5/2019  Stable. Continue current treatment plan as previously prescribed with your PCP and cardiology.     10. Anxiety  Continue current treatment plan as previously prescribed with your PCP.    11. CKD (chronic kidney disease) stage 3, GFR 30-59 ml/min  Decreased from prior check. Continue current treatment plan as previously prescribed with your nephrologist.     12. Anemia, unspecified type  Advised to follow up with PCP for further evaluation and recommendations. They  expressed understanding.      13. Prediabetes  A1C 5.9 in 2017  Advised to follow up with PCP for further evaluation and recommendations. They expressed understanding.      14. Severe obesity with body mass index (BMI) of 35.0 to 39.9 with serious comorbidity  Encouraged healthy diet and exercise as tolerated to help bring BMI into normal range.   Continue current treatment plan as previously prescribed with your PCP.     15. Potential for cognitive impairment  Reports mild memory difficulties.   Reports lives alone and still completes ADL independently. Reports family comes in daily to every other day.   Abnormal cognitive function screening (4).   Advised to follow up with PCP for further evaluation and recommendations. They expressed understanding.      16. At risk for falls  Abnormal timed get up and go test. Denies any falls in the last 12 months. Uses a walker prn to aid with ambulation.   Fall precautions reviewed with patient. Advised to follow up with PCP for further recommendations. They expressed understanding.       17. On home oxygen therapy  Reports wears 2L O2 via NC.   Continue current treatment plan as previously prescribed with your providers.     18. Hearing difficulty, unspecified laterality  Reports difficulty hearing.   Abnormal whisper test.   Advised to follow up with PCP for further evaluation and recommendations. Patient expressed understanding.     - Ambulatory Referral to Audiology      Provided Elke with a 5-10 year written screening schedule and personal prevention plan.   Education, counseling, and referrals were provided as needed. After Visit Summary printed and given to patient which includes a list of additional screenings\tests needed.    Follow up in about 1 year (around 10/21/2020) for AWV.    Beronica Walters NP

## 2019-10-21 NOTE — PATIENT INSTRUCTIONS
Counseling and Referral of Other Preventative  (Italic type indicates deductible and co-insurance are waived)    Patient Name: Elke Laws  Today's Date: 10/21/2019    Health Maintenance       Date Due Completion Date    Shingles Vaccine (1 of 2) 10/15/1984 ---    Influenza Vaccine (1) 09/01/2019 10/23/2018    Lipid Panel 10/25/2019 10/25/2018    DEXA SCAN 02/02/2021 2/2/2018 (Declined)    Override on 2/2/2018: Declined    Override on 11/12/2013: Not Clinically Appropriate (NOrmal in 2010. No repeat recommended.)    Override on 5/12/2010: Done (normal exam.  Repeat not recommended)    TETANUS VACCINE 06/10/2026 6/10/2016        Orders Placed This Encounter   Procedures    Ambulatory Referral to Audiology     The following information is provided to all patients.  This information is to help you find resources for any of the problems found today that may be affecting your health:                Living healthy guide: www.[facility].[place].gov      Understanding Diabetes: www.diabetes.org      Eating healthy: www.cdc.gov/healthyweight      CDC home safety checklist: www.cdc.gov/steadi/patient.html      Agency on Aging: www.goea.[place].[facility]      Alcoholics anonymous (AA): www.aa.org      Physical Activity: www.coyd.nih.gov/dm2mzym      Tobacco use: www.quitwithusla.org

## 2019-11-13 NOTE — PROGRESS NOTES
Administered high dose flu shot.  VIS given.  See immunization record.  Pt tolerated well.  Advised to wait at least 15 minutes to monitor for adverse reactions.  Pt verbalizes understanding.     5-Fu Pregnancy And Lactation Text: This medication is Pregnancy Category X and contraindicated in pregnancy and in women who may become pregnant. It is unknown if this medication is excreted in breast milk.

## 2019-11-19 ENCOUNTER — OFFICE VISIT (OUTPATIENT)
Dept: INTERNAL MEDICINE | Facility: CLINIC | Age: 84
End: 2019-11-19
Payer: MEDICARE

## 2019-11-19 ENCOUNTER — CLINICAL SUPPORT (OUTPATIENT)
Dept: AUDIOLOGY | Facility: CLINIC | Age: 84
End: 2019-11-19
Payer: MEDICARE

## 2019-11-19 VITALS
BODY MASS INDEX: 37.53 KG/M2 | OXYGEN SATURATION: 97 % | SYSTOLIC BLOOD PRESSURE: 130 MMHG | DIASTOLIC BLOOD PRESSURE: 70 MMHG | TEMPERATURE: 98 F | HEIGHT: 57 IN | HEART RATE: 78 BPM | WEIGHT: 173.94 LBS

## 2019-11-19 DIAGNOSIS — F41.9 ANXIETY: ICD-10-CM

## 2019-11-19 DIAGNOSIS — I25.10 CORONARY ARTERY DISEASE, ANGINA PRESENCE UNSPECIFIED, UNSPECIFIED VESSEL OR LESION TYPE, UNSPECIFIED WHETHER NATIVE OR TRANSPLANTED HEART: ICD-10-CM

## 2019-11-19 DIAGNOSIS — I48.0 PAF (PAROXYSMAL ATRIAL FIBRILLATION): ICD-10-CM

## 2019-11-19 DIAGNOSIS — Z91.81 AT RISK FOR FALLS: ICD-10-CM

## 2019-11-19 DIAGNOSIS — H91.90 HEARING DIFFICULTY, UNSPECIFIED LATERALITY: ICD-10-CM

## 2019-11-19 DIAGNOSIS — I27.9 PULMONARY HEART DISEASE: ICD-10-CM

## 2019-11-19 DIAGNOSIS — I70.0 ATHEROSCLEROSIS OF AORTA: Chronic | ICD-10-CM

## 2019-11-19 DIAGNOSIS — D64.9 ANEMIA, UNSPECIFIED TYPE: ICD-10-CM

## 2019-11-19 DIAGNOSIS — I50.32 CHRONIC DIASTOLIC HEART FAILURE: Chronic | ICD-10-CM

## 2019-11-19 DIAGNOSIS — E78.5 HYPERLIPIDEMIA, UNSPECIFIED HYPERLIPIDEMIA TYPE: ICD-10-CM

## 2019-11-19 DIAGNOSIS — Z00.00 ROUTINE GENERAL MEDICAL EXAMINATION AT A HEALTH CARE FACILITY: Primary | ICD-10-CM

## 2019-11-19 DIAGNOSIS — J45.909 ASTHMA, UNSPECIFIED ASTHMA SEVERITY, UNSPECIFIED WHETHER COMPLICATED, UNSPECIFIED WHETHER PERSISTENT: ICD-10-CM

## 2019-11-19 DIAGNOSIS — R73.03 PREDIABETES: ICD-10-CM

## 2019-11-19 DIAGNOSIS — E66.01 SEVERE OBESITY WITH BODY MASS INDEX (BMI) OF 35.0 TO 39.9 WITH SERIOUS COMORBIDITY: ICD-10-CM

## 2019-11-19 DIAGNOSIS — N18.30 CKD (CHRONIC KIDNEY DISEASE) STAGE 3, GFR 30-59 ML/MIN: ICD-10-CM

## 2019-11-19 DIAGNOSIS — H90.3 SENSORY HEARING LOSS, BILATERAL: Primary | ICD-10-CM

## 2019-11-19 DIAGNOSIS — I10 ESSENTIAL HYPERTENSION: ICD-10-CM

## 2019-11-19 DIAGNOSIS — Z91.89 POTENTIAL FOR COGNITIVE IMPAIRMENT: ICD-10-CM

## 2019-11-19 DIAGNOSIS — Z99.81 ON HOME OXYGEN THERAPY: ICD-10-CM

## 2019-11-19 PROCEDURE — 99397 PR PREVENTIVE VISIT,EST,65 & OVER: ICD-10-PCS | Mod: HCWC,S$GLB,, | Performed by: FAMILY MEDICINE

## 2019-11-19 PROCEDURE — 3075F PR MOST RECENT SYSTOLIC BLOOD PRESS GE 130-139MM HG: ICD-10-PCS | Mod: HCWC,CPTII,S$GLB, | Performed by: FAMILY MEDICINE

## 2019-11-19 PROCEDURE — 3078F DIAST BP <80 MM HG: CPT | Mod: HCWC,CPTII,S$GLB, | Performed by: FAMILY MEDICINE

## 2019-11-19 PROCEDURE — 92557 COMPREHENSIVE HEARING TEST: CPT | Mod: HCWC,S$GLB,, | Performed by: AUDIOLOGIST

## 2019-11-19 PROCEDURE — 92567 PR TYMPA2METRY: ICD-10-PCS | Mod: HCWC,S$GLB,, | Performed by: AUDIOLOGIST

## 2019-11-19 PROCEDURE — 99999 PR PBB SHADOW E&M-EST. PATIENT-LVL III: CPT | Mod: PBBFAC,HCWC,, | Performed by: FAMILY MEDICINE

## 2019-11-19 PROCEDURE — 99999 PR PBB SHADOW E&M-EST. PATIENT-LVL III: ICD-10-PCS | Mod: PBBFAC,HCWC,, | Performed by: FAMILY MEDICINE

## 2019-11-19 PROCEDURE — 99397 PER PM REEVAL EST PAT 65+ YR: CPT | Mod: HCWC,S$GLB,, | Performed by: FAMILY MEDICINE

## 2019-11-19 PROCEDURE — 92567 TYMPANOMETRY: CPT | Mod: HCWC,S$GLB,, | Performed by: AUDIOLOGIST

## 2019-11-19 PROCEDURE — 92557 PR COMPREHENSIVE HEARING TEST: ICD-10-PCS | Mod: HCWC,S$GLB,, | Performed by: AUDIOLOGIST

## 2019-11-19 PROCEDURE — 3075F SYST BP GE 130 - 139MM HG: CPT | Mod: HCWC,CPTII,S$GLB, | Performed by: FAMILY MEDICINE

## 2019-11-19 PROCEDURE — 3078F PR MOST RECENT DIASTOLIC BLOOD PRESSURE < 80 MM HG: ICD-10-PCS | Mod: HCWC,CPTII,S$GLB, | Performed by: FAMILY MEDICINE

## 2019-11-19 RX ORDER — GLUCOSAM/CHONDRO/HERB 149/HYAL 750-100 MG
1 TABLET ORAL DAILY
COMMUNITY

## 2019-11-19 RX ORDER — ASPIRIN 81 MG/1
81 TABLET ORAL DAILY
Status: ON HOLD | COMMUNITY
End: 2023-11-22 | Stop reason: HOSPADM

## 2019-11-19 RX ORDER — LANOLIN ALCOHOL/MO/W.PET/CERES
100 CREAM (GRAM) TOPICAL DAILY
COMMUNITY

## 2019-11-19 NOTE — PROGRESS NOTES
"Elke Laws was seen 11/19/2019 for an audiological evaluation.  Patient reports that her PCP referred her for an audiogram due to "failed whisper test."  She has a documented moderate to severe SNHL bilaterally.  She denies tinnitus and dizziness, as well as history of noise exposure.    Results reveal a moderate sensorineural hearing loss 250-8000 Hz for the right ear, and  moderate-to-severe sensorineural hearing loss 250-8000 Hz for the left ear.   Speech Reception Thresholds were  50 dBHL for the right ear and 35 dBHL for the left ear.   Word recognition scores were fair for the right ear and good for the left ear.   Tympanograms were Type As for the right ear and Type As for the left ear.    Patient was counseled on the above findings.    REC:  HAC should pt become motivated            "

## 2019-11-20 ENCOUNTER — LAB VISIT (OUTPATIENT)
Dept: LAB | Facility: HOSPITAL | Age: 84
End: 2019-11-20
Payer: MEDICARE

## 2019-11-20 DIAGNOSIS — R73.03 PREDIABETES: ICD-10-CM

## 2019-11-20 DIAGNOSIS — I27.9 PULMONARY HEART DISEASE: ICD-10-CM

## 2019-11-20 DIAGNOSIS — D64.9 ANEMIA, UNSPECIFIED TYPE: ICD-10-CM

## 2019-11-20 DIAGNOSIS — E78.5 HYPERLIPIDEMIA, UNSPECIFIED HYPERLIPIDEMIA TYPE: ICD-10-CM

## 2019-11-20 DIAGNOSIS — J45.909 ASTHMA, UNSPECIFIED ASTHMA SEVERITY, UNSPECIFIED WHETHER COMPLICATED, UNSPECIFIED WHETHER PERSISTENT: ICD-10-CM

## 2019-11-20 DIAGNOSIS — N18.30 CKD (CHRONIC KIDNEY DISEASE) STAGE 3, GFR 30-59 ML/MIN: ICD-10-CM

## 2019-11-20 DIAGNOSIS — I10 ESSENTIAL HYPERTENSION: ICD-10-CM

## 2019-11-20 LAB
ALBUMIN SERPL BCP-MCNC: 3.3 G/DL (ref 3.5–5.2)
ALP SERPL-CCNC: 78 U/L (ref 55–135)
ALT SERPL W/O P-5'-P-CCNC: 13 U/L (ref 10–44)
ANION GAP SERPL CALC-SCNC: 13 MMOL/L (ref 8–16)
AST SERPL-CCNC: 19 U/L (ref 10–40)
BASOPHILS # BLD AUTO: 0.03 K/UL (ref 0–0.2)
BASOPHILS NFR BLD: 0.5 % (ref 0–1.9)
BILIRUB SERPL-MCNC: 0.9 MG/DL (ref 0.1–1)
BUN SERPL-MCNC: 45 MG/DL (ref 8–23)
CALCIUM SERPL-MCNC: 9.7 MG/DL (ref 8.7–10.5)
CHLORIDE SERPL-SCNC: 88 MMOL/L (ref 95–110)
CHOLEST SERPL-MCNC: 164 MG/DL (ref 120–199)
CHOLEST/HDLC SERPL: 2.1 {RATIO} (ref 2–5)
CO2 SERPL-SCNC: 37 MMOL/L (ref 23–29)
CREAT SERPL-MCNC: 1.3 MG/DL (ref 0.5–1.4)
DIFFERENTIAL METHOD: ABNORMAL
EOSINOPHIL # BLD AUTO: 0.1 K/UL (ref 0–0.5)
EOSINOPHIL NFR BLD: 1.2 % (ref 0–8)
ERYTHROCYTE [DISTWIDTH] IN BLOOD BY AUTOMATED COUNT: 12.7 % (ref 11.5–14.5)
EST. GFR  (AFRICAN AMERICAN): 43.2 ML/MIN/1.73 M^2
EST. GFR  (NON AFRICAN AMERICAN): 37.5 ML/MIN/1.73 M^2
ESTIMATED AVG GLUCOSE: 120 MG/DL (ref 68–131)
GLUCOSE SERPL-MCNC: 109 MG/DL (ref 70–110)
HBA1C MFR BLD HPLC: 5.8 % (ref 4–5.6)
HCT VFR BLD AUTO: 38.6 % (ref 37–48.5)
HDLC SERPL-MCNC: 78 MG/DL (ref 40–75)
HDLC SERPL: 47.6 % (ref 20–50)
HGB BLD-MCNC: 12.3 G/DL (ref 12–16)
IMM GRANULOCYTES # BLD AUTO: 0.03 K/UL (ref 0–0.04)
IMM GRANULOCYTES NFR BLD AUTO: 0.5 % (ref 0–0.5)
LDLC SERPL CALC-MCNC: 73.6 MG/DL (ref 63–159)
LYMPHOCYTES # BLD AUTO: 1.5 K/UL (ref 1–4.8)
LYMPHOCYTES NFR BLD: 22.3 % (ref 18–48)
MCH RBC QN AUTO: 30.3 PG (ref 27–31)
MCHC RBC AUTO-ENTMCNC: 31.9 G/DL (ref 32–36)
MCV RBC AUTO: 95 FL (ref 82–98)
MONOCYTES # BLD AUTO: 0.8 K/UL (ref 0.3–1)
MONOCYTES NFR BLD: 12.3 % (ref 4–15)
NEUTROPHILS # BLD AUTO: 4.1 K/UL (ref 1.8–7.7)
NEUTROPHILS NFR BLD: 63.2 % (ref 38–73)
NONHDLC SERPL-MCNC: 86 MG/DL
NRBC BLD-RTO: 0 /100 WBC
PLATELET # BLD AUTO: 179 K/UL (ref 150–350)
PMV BLD AUTO: 11.7 FL (ref 9.2–12.9)
POTASSIUM SERPL-SCNC: 2.9 MMOL/L (ref 3.5–5.1)
PROT SERPL-MCNC: 7.2 G/DL (ref 6–8.4)
RBC # BLD AUTO: 4.06 M/UL (ref 4–5.4)
SODIUM SERPL-SCNC: 138 MMOL/L (ref 136–145)
TRIGL SERPL-MCNC: 62 MG/DL (ref 30–150)
TSH SERPL DL<=0.005 MIU/L-ACNC: 2.31 UIU/ML (ref 0.4–4)
WBC # BLD AUTO: 6.51 K/UL (ref 3.9–12.7)

## 2019-11-20 PROCEDURE — 83036 HEMOGLOBIN GLYCOSYLATED A1C: CPT | Mod: HCWC

## 2019-11-20 PROCEDURE — 36415 COLL VENOUS BLD VENIPUNCTURE: CPT | Mod: HCWC

## 2019-11-20 PROCEDURE — 80053 COMPREHEN METABOLIC PANEL: CPT | Mod: HCWC

## 2019-11-20 PROCEDURE — 84443 ASSAY THYROID STIM HORMONE: CPT | Mod: HCWC

## 2019-11-20 PROCEDURE — 85025 COMPLETE CBC W/AUTO DIFF WBC: CPT | Mod: HCWC

## 2019-11-20 PROCEDURE — 80061 LIPID PANEL: CPT | Mod: HCWC

## 2019-11-22 ENCOUNTER — EXTERNAL HOME HEALTH (OUTPATIENT)
Dept: HOME HEALTH SERVICES | Facility: HOSPITAL | Age: 84
End: 2019-11-22
Payer: MEDICARE

## 2019-11-22 DIAGNOSIS — T50.2X5A DIURETIC-INDUCED HYPOKALEMIA: Primary | ICD-10-CM

## 2019-11-22 DIAGNOSIS — E87.6 DIURETIC-INDUCED HYPOKALEMIA: Primary | ICD-10-CM

## 2019-11-22 RX ORDER — POTASSIUM CHLORIDE 750 MG/1
10 TABLET, EXTENDED RELEASE ORAL 2 TIMES DAILY
Qty: 60 TABLET | Refills: 5 | Status: SHIPPED | OUTPATIENT
Start: 2019-11-22 | End: 2020-10-08

## 2019-11-23 PROBLEM — H91.90 HEARING DIFFICULTY: Status: ACTIVE | Noted: 2019-11-23

## 2019-11-23 PROBLEM — R73.03 PREDIABETES: Status: ACTIVE | Noted: 2019-11-23

## 2019-11-23 PROBLEM — I25.10 CORONARY ARTERY DISEASE: Status: ACTIVE | Noted: 2019-11-23

## 2019-11-23 PROBLEM — Z91.89 POTENTIAL FOR COGNITIVE IMPAIRMENT: Status: ACTIVE | Noted: 2019-11-23

## 2019-11-23 NOTE — PROGRESS NOTES
Subjective:       Patient ID: Elke Laws is a 85 y.o. female.    Chief Complaint: Annual Exam    Patient presents to clinic today for annual physical exam and review HRA.    Review of Systems   Constitutional: Negative for activity change and unexpected weight change.   HENT: Negative for hearing loss, rhinorrhea and trouble swallowing.    Eyes: Negative for discharge and visual disturbance.   Respiratory: Negative for chest tightness and wheezing.    Cardiovascular: Negative for chest pain and palpitations.   Gastrointestinal: Negative for blood in stool, constipation, diarrhea and vomiting.   Endocrine: Positive for polydipsia. Negative for polyuria.   Genitourinary: Negative for difficulty urinating, dysuria, hematuria and menstrual problem.   Musculoskeletal: Positive for neck pain. Negative for arthralgias and joint swelling.   Neurological: Negative for weakness and headaches.   Psychiatric/Behavioral: Negative for confusion and dysphoric mood.       Objective:      Physical Exam   Constitutional: She is oriented to person, place, and time. Vital signs are normal. She appears well-developed and well-nourished. No distress.   HENT:   Head: Normocephalic and atraumatic.   Right Ear: Tympanic membrane, external ear and ear canal normal.   Left Ear: Tympanic membrane, external ear and ear canal normal.   Nose: Nose normal. No mucosal edema or rhinorrhea.   Mouth/Throat: Uvula is midline, oropharynx is clear and moist and mucous membranes are normal.   Eyes: Pupils are equal, round, and reactive to light. Conjunctivae, EOM and lids are normal.   Neck: Normal range of motion. Neck supple. No thyromegaly present.   Cardiovascular: Normal rate and regular rhythm. Exam reveals no gallop and no friction rub.   No murmur heard.  Pulmonary/Chest: Effort normal and breath sounds normal. She has no wheezes. She has no rhonchi. She has no rales.   Abdominal: Soft. Normal appearance and bowel sounds are normal. She exhibits  no distension and no mass. There is no hepatosplenomegaly. There is no tenderness.   Musculoskeletal: Normal range of motion.   Lymphadenopathy:     She has no cervical adenopathy.   Neurological: She is alert and oriented to person, place, and time. She has normal strength. No cranial nerve deficit or sensory deficit.   Skin: Skin is warm and dry. No lesion and no rash noted. No cyanosis. Nails show no clubbing.   Psychiatric: She has a normal mood and affect.   Vitals reviewed.      Assessment:       1. Routine general medical examination at a health care facility    2. Pulmonary heart disease    3. Asthma, unspecified asthma severity, unspecified whether complicated, unspecified whether persistent    4. Hyperlipidemia, unspecified hyperlipidemia type    5. Prediabetes    6. CKD (chronic kidney disease) stage 3, GFR 30-59 ml/min    7. Anemia, unspecified type    8. Essential hypertension    9. Chronic diastolic heart failure    10. Coronary artery disease, angina presence unspecified, unspecified vessel or lesion type, unspecified whether native or transplanted heart    11. PAF (paroxysmal atrial fibrillation)    12. Atherosclerosis of aorta    13. Anxiety    14. Severe obesity with body mass index (BMI) of 35.0 to 39.9 with serious comorbidity    15. Potential for cognitive impairment    16. At risk for falls    17. On home oxygen therapy    18. Hearing difficulty, unspecified laterality        Plan:     Problem List Items Addressed This Visit     Anemia    Current Assessment & Plan     Status pending labs         Relevant Orders    CBC auto differential (Completed)    TSH (Completed)    Anxiety    Current Assessment & Plan     Stable on current meds         Asthma    Relevant Orders    Ambulatory referral to Pulmonology    TSH (Completed)    At risk for falls    Current Assessment & Plan     Denies recent falls; encouraged to use walker for fall prevention.         Atherosclerosis of aorta (Chronic)    Overview      CXR 12/30/2016--- Aorta is tortuous with underlying calcification.     Xray chest 5/2015---Stable aortic tortuosity and atherosclerosis.   Patient also has noncompressible vascular abnormalities on CANDICE exam of the left lower extremity consistent with atherosclerosis.  CANDICE done on 7/1/2015.         Chronic diastolic heart failure (Chronic)    Overview     CONCLUSIONS     1 - Severe left atrial enlargement.     2 - Concentric hypertrophy.     3 - No wall motion abnormalities.     4 - Normal left ventricular systolic function (EF 60-65%).     5 - Impaired LV relaxation, elevated LAP (grade 2 diastolic dysfunction).     6 - Normal right ventricular systolic function .     7 - The estimated PA systolic pressure is 28 mmHg.     8 - Trivial aortic regurgitation.     9 - Mild mitral regurgitation.   This document has been electronically    SIGNED BY: Tish Angulo MD On: 04/13/2017 15:52    Office visit 6/28/17 with Dr. Jones for Acute on chronic diastolic congestive heart failure  note showed. PAF, diastolic CHF, non obstructive CAD per [facility] done in , HTN and HLD. She was admitted to [facility] on 5/21/2017 for CHF exacerbation.          Current Assessment & Plan     Followed by Cardiology         CKD (chronic kidney disease) stage 3, GFR 30-59 ml/min;Acute renal failure superimposed on stage 3 chronic kidney disease    Current Assessment & Plan     Status pending labs         Relevant Orders    TSH (Completed)    Coronary artery disease    Overview     Followed by Cardiology         Essential hypertension (Chronic)    Current Assessment & Plan     Controlled on current medications         Relevant Orders    TSH (Completed)    Hearing difficulty    Hyperlipidemia (Chronic)    Current Assessment & Plan     Status pending labs, continue current meds           Relevant Orders    Comprehensive metabolic panel (Completed)    Lipid panel (Completed)    TSH (Completed)    On home oxygen therapy    Overview     Per pt 2L O2  via [place]         PAF (paroxysmal atrial fibrillation) (Chronic)    Overview     12/2013 and 5/2015-- Sxc with DIAZ-- now in nSR (7/2015)  Followed by Cardiology           Potential for cognitive impairment    Current Assessment & Plan     Mild per patient/family; family lives nearby and checks on her frequently         Prediabetes    Current Assessment & Plan     Status pending labs         Relevant Orders    TSH (Completed)    Hemoglobin A1c (Completed)    Pulmonary heart disease    Overview     Echo 3/2019    Followed by Cardiology         Relevant Orders    Ambulatory referral to Pulmonology    TSH (Completed)    Severe obesity with body mass index (BMI) of 35.0 to 39.9 with serious comorbidity      Other Visit Diagnoses     Routine general medical examination at a health care facility    -  Primary          Health Maintenance reviewed/updated.

## 2019-11-27 ENCOUNTER — LAB VISIT (OUTPATIENT)
Dept: LAB | Facility: HOSPITAL | Age: 84
End: 2019-11-27
Attending: FAMILY MEDICINE
Payer: MEDICARE

## 2019-11-27 DIAGNOSIS — T50.2X5A DIURETIC-INDUCED HYPOKALEMIA: ICD-10-CM

## 2019-11-27 DIAGNOSIS — E87.6 DIURETIC-INDUCED HYPOKALEMIA: ICD-10-CM

## 2019-11-27 LAB — POTASSIUM SERPL-SCNC: 3.5 MMOL/L (ref 3.5–5.1)

## 2019-11-27 PROCEDURE — 36415 COLL VENOUS BLD VENIPUNCTURE: CPT | Mod: HCWC

## 2019-11-27 PROCEDURE — 84132 ASSAY OF SERUM POTASSIUM: CPT | Mod: HCWC

## 2020-01-02 RX ORDER — METOPROLOL TARTRATE 25 MG/1
12.5 TABLET, FILM COATED ORAL 2 TIMES DAILY
Qty: 45 TABLET | Refills: 3 | Status: SHIPPED | OUTPATIENT
Start: 2020-01-02 | End: 2020-07-17

## 2020-01-02 NOTE — TELEPHONE ENCOUNTER
----- Message from Irina Guaman sent at 1/2/2020  7:36 AM CST -----  Contact: Ms Felicia Benjamin- [phone]  Would like to consult with the nurse, Ms Irby is calling concerning an Rx Medication That was Fax Over, trying to get a renew for Metoprolol Please call back at  [phone], Thanks sj

## 2020-01-03 ENCOUNTER — OFFICE VISIT (OUTPATIENT)
Dept: PULMONOLOGY | Facility: CLINIC | Age: 85
End: 2020-01-03
Payer: MEDICARE

## 2020-01-03 ENCOUNTER — LAB VISIT (OUTPATIENT)
Dept: LAB | Facility: HOSPITAL | Age: 85
End: 2020-01-03
Attending: INTERNAL MEDICINE
Payer: MEDICARE

## 2020-01-03 VITALS
BODY MASS INDEX: 37.34 KG/M2 | OXYGEN SATURATION: 98 % | HEIGHT: 57 IN | WEIGHT: 173.06 LBS | RESPIRATION RATE: 18 BRPM | DIASTOLIC BLOOD PRESSURE: 70 MMHG | SYSTOLIC BLOOD PRESSURE: 120 MMHG | HEART RATE: 71 BPM

## 2020-01-03 DIAGNOSIS — I70.0 ATHEROSCLEROSIS OF AORTA: Chronic | ICD-10-CM

## 2020-01-03 DIAGNOSIS — E66.01 SEVERE OBESITY WITH BODY MASS INDEX (BMI) OF 35.0 TO 39.9 WITH SERIOUS COMORBIDITY: Chronic | ICD-10-CM

## 2020-01-03 DIAGNOSIS — I51.89 LEFT VENTRICULAR DIASTOLIC DYSFUNCTION WITH PRESERVED SYSTOLIC FUNCTION: Chronic | ICD-10-CM

## 2020-01-03 DIAGNOSIS — R06.09 DYSPNEA ON EXERTION: Primary | Chronic | ICD-10-CM

## 2020-01-03 DIAGNOSIS — I48.0 PAF (PAROXYSMAL ATRIAL FIBRILLATION): Chronic | ICD-10-CM

## 2020-01-03 DIAGNOSIS — R06.09 DYSPNEA ON EXERTION: Primary | ICD-10-CM

## 2020-01-03 DIAGNOSIS — R06.09 DYSPNEA ON EXERTION: ICD-10-CM

## 2020-01-03 DIAGNOSIS — R09.02 HYPOXEMIA: Chronic | ICD-10-CM

## 2020-01-03 DIAGNOSIS — N18.30 CKD (CHRONIC KIDNEY DISEASE) STAGE 3, GFR 30-59 ML/MIN: Chronic | ICD-10-CM

## 2020-01-03 PROBLEM — I50.9 CHF EXACERBATION: Status: RESOLVED | Noted: 2019-03-25 | Resolved: 2020-01-03

## 2020-01-03 PROBLEM — I50.33 ACUTE ON CHRONIC DIASTOLIC HEART FAILURE: Status: RESOLVED | Noted: 2019-03-07 | Resolved: 2020-01-03

## 2020-01-03 LAB — ERYTHROCYTE [SEDIMENTATION RATE] IN BLOOD BY WESTERGREN METHOD: 51 MM/HR (ref 0–20)

## 2020-01-03 PROCEDURE — 3078F DIAST BP <80 MM HG: CPT | Mod: HCWC,CPTII,S$GLB, | Performed by: INTERNAL MEDICINE

## 2020-01-03 PROCEDURE — 99999 PR PBB SHADOW E&M-EST. PATIENT-LVL III: ICD-10-PCS | Mod: PBBFAC,HCWC,, | Performed by: INTERNAL MEDICINE

## 2020-01-03 PROCEDURE — 99204 PR OFFICE/OUTPT VISIT, NEW, LEVL IV, 45-59 MIN: ICD-10-PCS | Mod: HCWC,S$GLB,, | Performed by: INTERNAL MEDICINE

## 2020-01-03 PROCEDURE — 36415 COLL VENOUS BLD VENIPUNCTURE: CPT | Mod: HCWC

## 2020-01-03 PROCEDURE — 3078F PR MOST RECENT DIASTOLIC BLOOD PRESSURE < 80 MM HG: ICD-10-PCS | Mod: HCWC,CPTII,S$GLB, | Performed by: INTERNAL MEDICINE

## 2020-01-03 PROCEDURE — 86140 C-REACTIVE PROTEIN: CPT | Mod: HCWC

## 2020-01-03 PROCEDURE — 99499 UNLISTED E&M SERVICE: CPT | Mod: HCNC,S$GLB,, | Performed by: INTERNAL MEDICINE

## 2020-01-03 PROCEDURE — 3074F PR MOST RECENT SYSTOLIC BLOOD PRESSURE < 130 MM HG: ICD-10-PCS | Mod: HCWC,CPTII,S$GLB, | Performed by: INTERNAL MEDICINE

## 2020-01-03 PROCEDURE — 1101F PR PT FALLS ASSESS DOC 0-1 FALLS W/OUT INJ PAST YR: ICD-10-PCS | Mod: HCWC,CPTII,S$GLB, | Performed by: INTERNAL MEDICINE

## 2020-01-03 PROCEDURE — 86039 ANTINUCLEAR ANTIBODIES (ANA): CPT | Mod: HCWC

## 2020-01-03 PROCEDURE — 86255 FLUORESCENT ANTIBODY SCREEN: CPT | Mod: 91,HCWC

## 2020-01-03 PROCEDURE — 1159F PR MEDICATION LIST DOCUMENTED IN MEDICAL RECORD: ICD-10-PCS | Mod: HCWC,S$GLB,, | Performed by: INTERNAL MEDICINE

## 2020-01-03 PROCEDURE — 1101F PT FALLS ASSESS-DOCD LE1/YR: CPT | Mod: HCWC,CPTII,S$GLB, | Performed by: INTERNAL MEDICINE

## 2020-01-03 PROCEDURE — 99999 PR PBB SHADOW E&M-EST. PATIENT-LVL III: CPT | Mod: PBBFAC,HCWC,, | Performed by: INTERNAL MEDICINE

## 2020-01-03 PROCEDURE — 86038 ANTINUCLEAR ANTIBODIES: CPT | Mod: HCWC

## 2020-01-03 PROCEDURE — 3074F SYST BP LT 130 MM HG: CPT | Mod: HCWC,CPTII,S$GLB, | Performed by: INTERNAL MEDICINE

## 2020-01-03 PROCEDURE — 99499 RISK ADDL DX/OHS AUDIT: ICD-10-PCS | Mod: HCNC,S$GLB,, | Performed by: INTERNAL MEDICINE

## 2020-01-03 PROCEDURE — 86235 NUCLEAR ANTIGEN ANTIBODY: CPT | Mod: 59,HCWC

## 2020-01-03 PROCEDURE — 99204 OFFICE O/P NEW MOD 45 MIN: CPT | Mod: HCWC,S$GLB,, | Performed by: INTERNAL MEDICINE

## 2020-01-03 PROCEDURE — 1159F MED LIST DOCD IN RCRD: CPT | Mod: HCWC,S$GLB,, | Performed by: INTERNAL MEDICINE

## 2020-01-03 PROCEDURE — 85651 RBC SED RATE NONAUTOMATED: CPT | Mod: HCWC

## 2020-01-03 NOTE — PROGRESS NOTES
New patient    Subjective:      Patient ID: Elke Laws is a 85 y.o. female.    Patient Active Problem List   Diagnosis    Essential hypertension    Hyperlipidemia    Severe obesity with body mass index (BMI) of 35.0 to 39.9 with serious comorbidity    Asthma    Lumbar spondylosis    Atherosclerosis of aorta    PAF (paroxysmal atrial fibrillation)    Left ventricular diastolic dysfunction with preserved systolic function    Heart valve regurgitation    Osteoarthritis    Mild nonobstructive CAD [facility]    Hypoxemia    History of colon polyps    Diverticulosis of large intestine without hemorrhage    History of angina pectoris    Urge incontinence of urine    Bilateral hearing loss    Decreased pulses in feet    Bilateral carotid bruits    Anemia    A-fib    Dyspnea on exertion    Elevated troponin    Anxiety    Constipation    CKD (chronic kidney disease) stage 3, GFR 30-59 ml/min;Acute renal failure superimposed on stage 3 chronic kidney disease    At risk for falls    Prediabetes    Coronary artery disease    Potential for cognitive impairment    Hearing difficulty       Problem list has been reviewed.    she has been referred by Isidra Benavidez MD for evaluation and management for   Chief Complaint   Patient presents with    Asthma       Chief Complaint: Asthma      HPI:    Patient reports intermittent difficulty breathing and denies cough and wheezing. Patient denies recent sick contacts.   Patient does not have new pets. Patient does have a history of asthma. She reports that she gets most of her asthma attacks in the winter when her neighbors arr burning leaves. Patient does have a history of environmental allergens.  Patient has not traveled recently. Patient does have a history of smoking. She smoked 1 pack per week for approximately 3 months. She quit smoking 20 years ago. Patient has had a previous chest x-ray. Patient has had a PPD done. She is on home oxygen  supplementation at 2 L /min for ? Hypoxemia. She denies snoring or excessive daytime sleepiness. Her current respiratory therapy regimen is VENTOLIN, ACCUNEB which provides some relief. She is  adherent with her regimen. Her CHF is well compensated at this time. Her CHF regimen is  FUROSEMIDE, ZAROXOLYN. A - Fib => RATE CONTROLLED on AMIODARONE, METOPROLOL and anticoagulated with ELIQUIS.       Previous Report Reviewed: lab reports, office notes and radiology reports     Past Medical History: The following portions of the patient's history were reviewed and updated as appropriate:   She  has a past surgical history that includes Knee surgery (Bilateral); Urethra surgery; Hysterectomy; Eye surgery (Left); Olecranon bursectomy (Right); Appendectomy; Tonsillectomy; Colonoscopy (N/A, 8/29/2017); Left heart cath; Treatment of cardiac arrhythmia (N/A, 4/16/2019); and Treatment of cardiac arrhythmia (N/A, 5/24/2019).  Her family history includes COPD in her son; Cancer in her mother and sister; Diabetes in her brother and son; Heart disease in her brother, father, mother, and sister; Hyperlipidemia in her father and mother; Hypertension in her father, mother, and son.  She  reports that she quit smoking about 68 years ago. Her smoking use included cigarettes. She has a 0.05 pack-year smoking history. She has never used smokeless tobacco. She reports that she does not drink alcohol or use drugs.  She has a current medication list which includes the following prescription(s): acetaminophen, albuterol, albuterol, amiodarone, apixaban, aspirin, cyanocobalamin, docosahexanoic acid/epa, docusate sodium, furosemide, lorazepam, metolazone, metoprolol tartrate, omega 3-dha-epa-fish oil, polyethylene glycol, potassium chloride sa, pravastatin, and fluticasone propionate.  She is allergic to iodine and iodide containing products..    Review of Systems   Constitutional: Negative for chills, fatigue and night sweats.   HENT: Negative  "for rhinorrhea, sore throat and congestion.    Eyes: Positive for itching. Negative for redness.   Respiratory: Positive for dyspnea on extertion. Negative for cough and wheezing.    Cardiovascular: Positive for leg swelling. Negative for chest pain and palpitations.   Genitourinary: Negative for difficulty urinating.   Endocrine: Negative for cold intolerance and heat intolerance.    Musculoskeletal: Positive for arthralgias and back pain.   Gastrointestinal: Positive for acid reflux. Negative for nausea, vomiting and abdominal pain.        Constipation   Neurological: Negative for dizziness and headaches.   Psychiatric/Behavioral: The patient is nervous/anxious.           Occupational History:  Retired. Transplant  for [employer].   Denies occupational exposure to asbestos, silica and petrochemicals.    Avocational Exposures:  none    Pet Exposures:  none      Objective:     Vitals:    01/03/20 1349   BP: 120/70   Pulse: 71   Resp: 18   SpO2: 98%   Weight: 78.5 kg (173 lb 1 oz)   Height: 4' 9" (1.448 m)     Body mass index is 37.45 kg/m².    Physical Exam   Constitutional: She is oriented to person, place, and time. Vital signs are normal. She appears well-developed and well-nourished. No distress.   HENT:   Head: Normocephalic and atraumatic.   Right Ear: Tympanic membrane and ear canal normal.   Left Ear: Tympanic membrane and ear canal normal.   Nose: No mucosal edema or rhinorrhea.   Mouth/Throat: Uvula is midline and mucous membranes are normal.   Eyes: Pupils are equal, round, and reactive to light. Conjunctivae, EOM and lids are normal.   Neck: Normal range of motion. Neck supple. No thyromegaly present.   Cardiovascular: Normal rate and regular rhythm. Exam reveals no gallop and no friction rub.   No murmur heard.  Pulmonary/Chest: Effort normal and breath sounds normal. She has no wheezes. She has no rhonchi. She has no rales.   Abdominal: Soft. Normal appearance and bowel sounds are normal. She " exhibits no distension and no mass. There is no hepatosplenomegaly. There is no tenderness.   Musculoskeletal: Normal range of motion.   Lymphadenopathy:     She has no cervical adenopathy.   Neurological: She is alert and oriented to person, place, and time. She has normal strength. No cranial nerve deficit or sensory deficit.   Skin: Skin is warm and dry. No lesion and no rash noted. No cyanosis. Nails show no clubbing.   Psychiatric: She has a normal mood and affect.   Vitals reviewed.      Personal Diagnostic Review  Chest x-ray: 04/01/19: Mild cardiomegaly.  Aortic atherosclerosis.    No edema.  No acute infiltrates.    Chronic elevation right hemidiaphragm.  Hiatal hernia.      Lab Results   Component Value Date    CHOL 164 11/20/2019    CHOL 156 10/25/2018    CHOL 147 01/31/2018     Lab Results   Component Value Date    HDL 78 (H) 11/20/2019    HDL 68 10/25/2018    HDL 66 01/31/2018     Lab Results   Component Value Date    LDLCALC 73.6 11/20/2019    LDLCALC 76.0 10/25/2018    LDLCALC 69.0 01/31/2018     Lab Results   Component Value Date    TRIG 62 11/20/2019    TRIG 60 10/25/2018    TRIG 60 01/31/2018     Lab Results   Component Value Date    CHOLHDL 47.6 11/20/2019    CHOLHDL 43.6 10/25/2018    CHOLHDL 44.9 01/31/2018       Chemistry        Component Value Date/Time     11/20/2019 1059    K 3.5 11/27/2019 0945    CL 88 (L) 11/20/2019 1059    CO2 37 (H) 11/20/2019 1059    BUN 45 (H) 11/20/2019 1059    CREATININE 1.3 11/20/2019 1059     11/20/2019 1059        Component Value Date/Time    CALCIUM 9.7 11/20/2019 1059    ALKPHOS 78 11/20/2019 1059    AST 19 11/20/2019 1059    ALT 13 11/20/2019 1059    BILITOT 0.9 11/20/2019 1059    ESTGFRAFRICA 43.2 (A) 11/20/2019 1059    EGFRNONAA 37.5 (A) 11/20/2019 1059          Lab Results   Component Value Date    TSH 2.307 11/20/2019     Lab Results   Component Value Date    INR 1.0 05/21/2019    INR 0.9 02/16/2019    INR 1.2 05/27/2017     Lab Results    Component Value Date    WBC 6.51 11/20/2019    HGB 12.3 11/20/2019    HCT 38.6 11/20/2019    MCV 95 11/20/2019     11/20/2019         Assessment /Plan:     Discussed diagnosis, its evaluation, treatment and usual course. All questions answered.    Problem List Items Addressed This Visit        Pulmonary    Hypoxemia    Current Assessment & Plan     Continue home oxygen supplementation at 2 L /min.            Cardiac/Vascular    Atherosclerosis of aorta (Chronic)    Overview     CXR 12/30/2016--- Aorta is tortuous with underlying calcification.     Xray chest 5/2015---Stable aortic tortuosity and atherosclerosis.   Patient also has noncompressible vascular abnormalities on CANDICE exam of the left lower extremity consistent with atherosclerosis.  CANDICE done on 7/1/2015.         Current Assessment & Plan     Per PCP         PAF (paroxysmal atrial fibrillation) (Chronic)    Overview     12/2013 and 5/2015-- Sxc with DIAZ-- now in nSR (7/2015)  Followed by Cardiology           Current Assessment & Plan     Rate Control: beta blocker, amiodarone    Anticoagulation: Eliquis      Bleeding signs/symptoms: None           Left ventricular diastolic dysfunction with preserved systolic function (Chronic)    Overview     Echo 6/16/2015---CONCLUSIONS     1 - Normal left ventricular systolic function (EF 60-65%).     2 - Left ventricular diastolic dysfunction.     3 - Normal right ventricular systolic function .                Current Assessment & Plan     Optimize CHF regimen.  Preload and afterload reduction.  Daily weighing.  Dietary salt restriction.  Alcohol and tobacco avoidance.            Renal/    CKD (chronic kidney disease) stage 3, GFR 30-59 ml/min;Acute renal failure superimposed on stage 3 chronic kidney disease    Current Assessment & Plan     GFR 37.5   Likely secondary to diuretics.       Monitoring deferred to PCP.             Other    Severe obesity with body mass index (BMI) of 35.0 to 39.9 with serious  comorbidity    Current Assessment & Plan     General weight loss/lifestyle modification strategies discussed (elicit support from others; identify saboteurs; non-food rewards, etc).  Behavioral treatment: Slim Fast.  Diet interventions: low calorie (1000 kCal/d) deficit diet.  Informal exercise measures discussed, e.g. taking stairs instead of elevator.  Regular aerobic exercise program discussed.         Dyspnea on exertion - Primary    Current Assessment & Plan     Etiology unclear.  Multifactorial etiology suspected.  Likely contributors to etiology (checked)    [x] Pulmonary airway disease    [x]  Pulmonary parenchymal disease  [] Pulmonary vascular disease   [] Pleural disease  [] Pulmonary vasculitis  [] Hypoventilation ( chest wall deformity, neuromuscular disease, obesity etc)  [] Anemia  [] Thyroid disease.  [x] Cardiac illess  []         Sleep disorder    EVALUATION:  [x]        Complete PFT with bronchodilator.  []        Bronchial challenge with methacholine.   [x]        Stress test, pulmonary.  [x]        PULM - Arterial Blood Gases  [x]        Chest X Ray  []        CT scan of chest.   [x]        RAVI  [x]        Sedimentation rate  [x]        C-reactive protein  [x]        Anti-neutrophilic cytoplasmic antibody          PLAN:  Discussed diagnosis, its evaluation, treatment and usual course.  All questions answered.        Call if shortness of breath worsens, blood in sputum, change in character of cough, development of fever or chills, inability to maintain nutrition and hydration.     Re evaluate in four weeks with results.           Relevant Orders    Complete PFT with bronchodilator    PULM - Arterial Blood Gases--in addition to PFT only    Pulmonary stress test    X-Ray Chest PA And Lateral              TIME SPENT WITH PATIENT: Time spent: 60 minutes in face to face  discussion concerning diagnosis, prognosis, review of lab and test results, benefits of treatment as well as management of disease,  counseling of patient and coordination of care between various health  care providers . Greater than half the time spent was used for coordination of care and counseling of patient.     Follow up in about 1 month (around 2/3/2020) for Obesity, Shortness of breath.

## 2020-01-03 NOTE — ASSESSMENT & PLAN NOTE
Rate Control: beta blocker, amiodarone    Anticoagulation: Eliquis      Bleeding signs/symptoms: None

## 2020-01-03 NOTE — LETTER
January 3, 2020      Isidra Benavidez MD  [address] [place] [address] [address] [place] [address]           [facility]  [address] [place] [address] [place] [address]  Phone: [phone]  Fax: [phone]          Patient: Elke Laws   MR Number: [id number]   YOB: 1934   Date of Visit: 1/3/2020       Dear Dr. Isidra Benavidez:    Thank you for referring Elke Laws to me for evaluation. Attached you will find relevant portions of my assessment and plan of care.    If you have questions, please do not hesitate to call me. I look forward to following Elke Laws along with you.    Sincerely,    Tom Fragoso MD    Enclosure  CC:  No Recipients    If you would like to receive this communication electronically, please contact [email or web address] or [phone] to request more information on [employer] access.    For providers and/or their staff who would like to refer a patient to [facility], please contact us through our one-stop-shop provider referral line, [facility], at [phone].    If you feel you have received this communication in error or would no longer like to receive these types of communications, please e-mail [email or web address]

## 2020-01-03 NOTE — PATIENT INSTRUCTIONS
Chest and Lung Problems  A number of problems can affect the lungs and chest. These include masses, infections, airway diseases, and other diseases. Common types of chest and lung problems are listed below.     Masses           A mass is a lump of abnormal tissue. It can be tissue that's not cancer (benign). Or it can be cancer (malignant). If a mass is found in the lung or chest, the healthcare provider will want to take a sample of tissue (biopsy) of it. This tissue sample helps the provider find if the mass is cancer. The mass may need to be removed even if it is not cancer.  Infections  Infections are illnesses caused by bacteria, viruses, or fungi. Examples of lung infections include tuberculosis and pneumonia. Lung infections can cause fluid to build up in the lungs or chest. Some lung infections are spread from one person to another through the air. Most lung infections can be treated with antibiotics if they are caused by bacteria. Or they may be treated with other medicines.   Airway diseases  Airway diseases affect the tubes (airways) that carry oxygen and other gases in and out of the lungs. They usually cause the airways to narrow or to become blocked. Examples of airway diseases are asthma and bronchiectasis.  Smoking is often the cause of airway diseases. For example, smoking can lead to chronic obstructive pulmonary disease (COPD). COPD refers to a group of diseases that destroy the lungs and make it hard to breathe. COPD includes emphysema and chronic bronchitis.  People with airway diseases often say they feel as if they are trying to breathe out through a straw.  Other serious diseases  Other things can also cause lung disease. These include exposure to asbestos.  Date Last Reviewed: 10/1/2016  © 3641-8953 [employer]. [address] [place] [address], [place] [address]. All rights reserved. This information is not intended as a substitute for professional medical care. Always follow your  healthcare professional's instructions.

## 2020-01-04 LAB — CRP SERPL-MCNC: 2.3 MG/L (ref 0–8.2)

## 2020-01-06 LAB
ANA SER QL IF: POSITIVE
ANA TITR SER IF: NORMAL {TITER}
ANCA AB TITR SER IF: NORMAL TITER
P-ANCA TITR SER IF: NORMAL TITER

## 2020-01-08 ENCOUNTER — TELEPHONE (OUTPATIENT)
Dept: INTERNAL MEDICINE | Facility: CLINIC | Age: 85
End: 2020-01-08

## 2020-01-08 LAB
ANTI SM ANTIBODY: 0.47 EU (ref 0–19.99)
ANTI SM/RNP ANTIBODY: 0.1 EU (ref 0–19.99)
ANTI-SM INTERPRETATION: NEGATIVE
ANTI-SM/RNP INTERPRETATION: NEGATIVE
ANTI-SSA ANTIBODY: 0.28 EU (ref 0–19.99)
ANTI-SSA INTERPRETATION: NEGATIVE
ANTI-SSB ANTIBODY: 0 EU (ref 0–19.99)
ANTI-SSB INTERPRETATION: NEGATIVE
DSDNA AB SER-ACNC: NORMAL [IU]/ML

## 2020-01-08 NOTE — TELEPHONE ENCOUNTER
----- Message from Anand De Dios sent at 1/8/2020  3:21 PM CST -----  Contact: Pt's daughter-keya  Type:  Sooner Apoointment Request    Caller is requesting a sooner appointment.  Caller declined first available appointment listed below.  Caller will not accept being placed on the waitlist and is requesting a message be sent to doctor.  Name of Caller:Pt's daughter Keya  When is the first available appointment? 02/04/2020  Symptoms: pre-op clearance prior to surgery on 01/20/2020  Would the patient rather a call back or a response via [facility]? Call back   Best Call Back Number: [phone]  Additional Information: n/a

## 2020-01-10 ENCOUNTER — OFFICE VISIT (OUTPATIENT)
Dept: INTERNAL MEDICINE | Facility: CLINIC | Age: 85
End: 2020-01-10
Payer: MEDICARE

## 2020-01-10 ENCOUNTER — LAB VISIT (OUTPATIENT)
Dept: LAB | Facility: HOSPITAL | Age: 85
End: 2020-01-10
Payer: MEDICARE

## 2020-01-10 VITALS
OXYGEN SATURATION: 96 % | HEART RATE: 97 BPM | BODY MASS INDEX: 37.28 KG/M2 | DIASTOLIC BLOOD PRESSURE: 66 MMHG | SYSTOLIC BLOOD PRESSURE: 116 MMHG | TEMPERATURE: 97 F | WEIGHT: 172.31 LBS

## 2020-01-10 DIAGNOSIS — E87.6 HYPOKALEMIA: ICD-10-CM

## 2020-01-10 DIAGNOSIS — N18.30 CKD (CHRONIC KIDNEY DISEASE) STAGE 3, GFR 30-59 ML/MIN: ICD-10-CM

## 2020-01-10 DIAGNOSIS — R09.02 HYPOXEMIA: ICD-10-CM

## 2020-01-10 DIAGNOSIS — M19.90 OSTEOARTHRITIS, UNSPECIFIED OSTEOARTHRITIS TYPE, UNSPECIFIED SITE: ICD-10-CM

## 2020-01-10 DIAGNOSIS — I25.10 CORONARY ARTERY DISEASE, ANGINA PRESENCE UNSPECIFIED, UNSPECIFIED VESSEL OR LESION TYPE, UNSPECIFIED WHETHER NATIVE OR TRANSPLANTED HEART: ICD-10-CM

## 2020-01-10 DIAGNOSIS — I51.89 LEFT VENTRICULAR DIASTOLIC DYSFUNCTION WITH PRESERVED SYSTOLIC FUNCTION: Chronic | ICD-10-CM

## 2020-01-10 DIAGNOSIS — E66.01 SEVERE OBESITY WITH BODY MASS INDEX (BMI) OF 35.0 TO 39.9 WITH SERIOUS COMORBIDITY: ICD-10-CM

## 2020-01-10 DIAGNOSIS — I48.91 ATRIAL FIBRILLATION, UNSPECIFIED TYPE: ICD-10-CM

## 2020-01-10 DIAGNOSIS — D64.9 ANEMIA, UNSPECIFIED TYPE: ICD-10-CM

## 2020-01-10 DIAGNOSIS — Z01.818 PREOP EXAMINATION: Primary | ICD-10-CM

## 2020-01-10 DIAGNOSIS — E78.5 HYPERLIPIDEMIA, UNSPECIFIED HYPERLIPIDEMIA TYPE: Chronic | ICD-10-CM

## 2020-01-10 DIAGNOSIS — R73.03 PREDIABETES: ICD-10-CM

## 2020-01-10 DIAGNOSIS — I10 ESSENTIAL HYPERTENSION: Chronic | ICD-10-CM

## 2020-01-10 DIAGNOSIS — J45.909 ASTHMA, UNSPECIFIED ASTHMA SEVERITY, UNSPECIFIED WHETHER COMPLICATED, UNSPECIFIED WHETHER PERSISTENT: ICD-10-CM

## 2020-01-10 PROCEDURE — 1126F PR PAIN SEVERITY QUANTIFIED, NO PAIN PRESENT: ICD-10-PCS | Mod: HCWC,S$GLB,, | Performed by: NURSE PRACTITIONER

## 2020-01-10 PROCEDURE — 1159F MED LIST DOCD IN RCRD: CPT | Mod: HCWC,S$GLB,, | Performed by: NURSE PRACTITIONER

## 2020-01-10 PROCEDURE — 3078F PR MOST RECENT DIASTOLIC BLOOD PRESSURE < 80 MM HG: ICD-10-PCS | Mod: HCWC,CPTII,S$GLB, | Performed by: NURSE PRACTITIONER

## 2020-01-10 PROCEDURE — 99999 PR PBB SHADOW E&M-EST. PATIENT-LVL IV: CPT | Mod: PBBFAC,HCWC,, | Performed by: NURSE PRACTITIONER

## 2020-01-10 PROCEDURE — 3074F SYST BP LT 130 MM HG: CPT | Mod: HCWC,CPTII,S$GLB, | Performed by: NURSE PRACTITIONER

## 2020-01-10 PROCEDURE — 1101F PT FALLS ASSESS-DOCD LE1/YR: CPT | Mod: HCWC,CPTII,S$GLB, | Performed by: NURSE PRACTITIONER

## 2020-01-10 PROCEDURE — 36415 COLL VENOUS BLD VENIPUNCTURE: CPT | Mod: HCWC

## 2020-01-10 PROCEDURE — 1159F PR MEDICATION LIST DOCUMENTED IN MEDICAL RECORD: ICD-10-PCS | Mod: HCWC,S$GLB,, | Performed by: NURSE PRACTITIONER

## 2020-01-10 PROCEDURE — 99214 PR OFFICE/OUTPT VISIT, EST, LEVL IV, 30-39 MIN: ICD-10-PCS | Mod: HCWC,S$GLB,, | Performed by: NURSE PRACTITIONER

## 2020-01-10 PROCEDURE — 1101F PR PT FALLS ASSESS DOC 0-1 FALLS W/OUT INJ PAST YR: ICD-10-PCS | Mod: HCWC,CPTII,S$GLB, | Performed by: NURSE PRACTITIONER

## 2020-01-10 PROCEDURE — 1126F AMNT PAIN NOTED NONE PRSNT: CPT | Mod: HCWC,S$GLB,, | Performed by: NURSE PRACTITIONER

## 2020-01-10 PROCEDURE — 80053 COMPREHEN METABOLIC PANEL: CPT | Mod: HCWC

## 2020-01-10 PROCEDURE — 99999 PR PBB SHADOW E&M-EST. PATIENT-LVL IV: ICD-10-PCS | Mod: PBBFAC,HCWC,, | Performed by: NURSE PRACTITIONER

## 2020-01-10 PROCEDURE — 3074F PR MOST RECENT SYSTOLIC BLOOD PRESSURE < 130 MM HG: ICD-10-PCS | Mod: HCWC,CPTII,S$GLB, | Performed by: NURSE PRACTITIONER

## 2020-01-10 PROCEDURE — 99214 OFFICE O/P EST MOD 30 MIN: CPT | Mod: HCWC,S$GLB,, | Performed by: NURSE PRACTITIONER

## 2020-01-10 PROCEDURE — 3078F DIAST BP <80 MM HG: CPT | Mod: HCWC,CPTII,S$GLB, | Performed by: NURSE PRACTITIONER

## 2020-01-10 NOTE — PROGRESS NOTES
Elke Laws 85 y.o. female     Chief Complaint:  Chief Complaint   Patient presents with    Pre-op Exam       History of Present Illness:  Pt is new to provider, but established in practice and presents with dtr for preop clearance.     Pending cataracts extractraion to bilateral eyes by Dr. Prashant Oseguera   1st (left) eye on 1/20   2nd (right) on 2/4    Chronic afib - anticoagulated   Per preop papers pt to hold all blood thinners 24 hour prior to sx     Has had previous eye surgeries ([place]) without complication      Most recent labs in November, stable, except potassium   on supplementation, frequently low     Exam:  Review of Systems   Constitutional: Negative for chills, diaphoresis, fever and malaise/fatigue.   HENT: Positive for hearing loss.    Eyes: Positive for blurred vision (chronic ).   Respiratory: Negative for cough and shortness of breath.    Cardiovascular: Negative for chest pain and leg swelling.   Gastrointestinal: Positive for constipation. Negative for abdominal pain, diarrhea, nausea and vomiting.   Genitourinary: Negative for dysuria and hematuria.   Musculoskeletal: Positive for joint pain (chronic arthralgias). Negative for myalgias.   Skin: Negative for rash.     Physical Exam   Constitutional: She is oriented to person, place, and time. Vital signs are normal. She appears well-developed and well-nourished. She is cooperative.  Non-toxic appearance. She does not have a sickly appearance. She does not appear ill. No distress.   HENT:   Head: Normocephalic and atraumatic.   Right Ear: Decreased hearing is noted.   Left Ear: Decreased hearing is noted.   Eyes: Pupils are equal, round, and reactive to light. Conjunctivae, EOM and lids are normal. Right eye exhibits no discharge. Left eye exhibits no discharge. No scleral icterus.   Glasses    Neck: Normal range of motion. No tracheal deviation present.   Cardiovascular: Normal rate and regular rhythm.   Pulmonary/Chest: Effort normal and  breath sounds normal. No stridor. No respiratory distress. She has no wheezes. She has no rales. She exhibits no tenderness.   Wearing continuous oxygen    Abdominal: Soft. Bowel sounds are normal. She exhibits no distension and no mass. There is no tenderness. There is no guarding.   Musculoskeletal: Normal range of motion. She exhibits edema (trace BLE ).   Neurological: She is alert and oriented to person, place, and time.   Skin: Skin is warm, dry and intact. No rash noted. She is not diaphoretic. No erythema. No pallor.   Psychiatric: Her speech is normal and behavior is normal. Judgment and thought content normal. Her mood appears anxious. Cognition and memory are impaired (mild).   Nursing note and vitals reviewed.      Most Recent Laboratory Results Reviewed ({Yes)  Lab Results   Component Value Date    WBC 6.51 11/20/2019    HGB 12.3 11/20/2019    HCT 38.6 11/20/2019     11/20/2019    CHOL 164 11/20/2019    TRIG 62 11/20/2019    HDL 78 (H) 11/20/2019    ALT 13 01/10/2020    AST 20 01/10/2020     01/10/2020    K 3.9 01/10/2020    CL 92 (L) 01/10/2020    CREATININE 1.3 01/10/2020    BUN 51 (H) 01/10/2020    CO2 34 (H) 01/10/2020    TSH 2.307 11/20/2019    INR 1.0 05/21/2019    HGBA1C 5.8 (H) 11/20/2019       Assessment     ICD-10-CM ICD-9-CM   1. Preop examination Z01.818 V72.84   2. Essential hypertension I10 401.9   3. Hyperlipidemia, unspecified hyperlipidemia type E78.5 272.4   4. Left ventricular diastolic dysfunction with preserved systolic function I51.9 429.9   5. Severe obesity with body mass index (BMI) of 35.0 to 39.9 with serious comorbidity E66.01 278.01   6. Asthma, unspecified asthma severity, unspecified whether complicated, unspecified whether persistent J45.909 493.90   7. Atrial fibrillation, unspecified type I48.91 427.31   8. Prediabetes R73.03 790.29   9. CKD (chronic kidney disease) stage 3, GFR 30-59 ml/min;Acute renal failure superimposed on stage 3 chronic kidney disease  N18.3 585.3   10. Hypokalemia E87.6 276.8   11. Osteoarthritis, unspecified osteoarthritis type, unspecified site M19.90 715.90   12. Anemia, unspecified type D64.9 285.9   13. Coronary artery disease, angina presence unspecified, unspecified vessel or lesion type, unspecified whether native or transplanted heart I25.10 414.00   14. Hypoxemia R09.02 799.02        Plan   Preop examination  High risk patient for low risk procedure    Essential hypertension   controlled, continue current meds     Hyperlipidemia, unspecified hyperlipidemia type  Continue statin    Left ventricular diastolic dysfunction with preserved systolic function  Followed  by Cardiology  No signs of overload today    Severe obesity with body mass index (BMI) of 35.0 to 39.9 with serious comorbidity  Contributing to chronic conditions    Asthma, unspecified asthma severity, unspecified whether complicated, unspecified whether persistent  Followed by pulmonology    Atrial fibrillation, unspecified type  Rate controlled  Anticoagulated  Verbalized understanding of holding anticoagulants prior to procedure    Prediabetes  A1c 5.8    CKD (chronic kidney disease) stage 3, GFR 30-59 ml/min;Acute renal failure superimposed on stage 3 chronic kidney disease  -     Comprehensive metabolic panel; Future; Expected date: 01/10/2020  - stable    Hypokalemia  -     Comprehensive metabolic panel; Future; Expected date: 01/10/2020  - potassium within normal limits today  - continue current supplementation    Osteoarthritis, unspecified osteoarthritis type, unspecified site    Anemia, unspecified type  Stable    Coronary artery disease, angina presence unspecified, unspecified vessel or lesion type, unspecified whether native or transplanted heart  Followed by cardiology    Hypoxemia  Continuous oxygen   Followed by pulmonology    No follow-ups on file.  Future Appointments     Date Provider Specialty Appt Notes    1/30/2020  Pulmonology Follow up in about 1 month  (around 2/3/2020) for Obesity, Shortness of breath    1/30/2020  Pulmonology Follow up in about 1 month (around 2/3/2020) for Obesity, Shortness of breath    1/30/2020  Pulmonology Follow up in about 1 month (around 2/3/2020) for Obesity, Shortness of breath    2/5/2020 KRYSTIN Stevens Cardiology 3 month F/U    2/5/2020 Tom Fragoso MD Pulmonology Follow up in about 1 month (around 2/3/2020) for Obesity, Shortness of breath    5/21/2020 Isidra Benavidez MD Internal Medicine 6 month f/u    5/26/2020 Isidra Benavidez MD Internal Medicine 6month fu/b

## 2020-01-11 LAB
ALBUMIN SERPL BCP-MCNC: 3.4 G/DL (ref 3.5–5.2)
ALP SERPL-CCNC: 115 U/L (ref 55–135)
ALT SERPL W/O P-5'-P-CCNC: 13 U/L (ref 10–44)
ANION GAP SERPL CALC-SCNC: 13 MMOL/L (ref 8–16)
AST SERPL-CCNC: 20 U/L (ref 10–40)
BILIRUB SERPL-MCNC: 0.5 MG/DL (ref 0.1–1)
BUN SERPL-MCNC: 51 MG/DL (ref 8–23)
CALCIUM SERPL-MCNC: 9.9 MG/DL (ref 8.7–10.5)
CHLORIDE SERPL-SCNC: 92 MMOL/L (ref 95–110)
CO2 SERPL-SCNC: 34 MMOL/L (ref 23–29)
CREAT SERPL-MCNC: 1.3 MG/DL (ref 0.5–1.4)
EST. GFR  (AFRICAN AMERICAN): 43.2 ML/MIN/1.73 M^2
EST. GFR  (NON AFRICAN AMERICAN): 37.5 ML/MIN/1.73 M^2
GLUCOSE SERPL-MCNC: 96 MG/DL (ref 70–110)
POTASSIUM SERPL-SCNC: 3.9 MMOL/L (ref 3.5–5.1)
PROT SERPL-MCNC: 7.5 G/DL (ref 6–8.4)
SODIUM SERPL-SCNC: 139 MMOL/L (ref 136–145)

## 2020-02-05 ENCOUNTER — OFFICE VISIT (OUTPATIENT)
Dept: CARDIOLOGY | Facility: CLINIC | Age: 85
End: 2020-02-05
Payer: MEDICARE

## 2020-02-05 VITALS
HEART RATE: 56 BPM | WEIGHT: 181 LBS | SYSTOLIC BLOOD PRESSURE: 132 MMHG | OXYGEN SATURATION: 96 % | BODY MASS INDEX: 39.05 KG/M2 | HEIGHT: 57 IN | DIASTOLIC BLOOD PRESSURE: 56 MMHG

## 2020-02-05 DIAGNOSIS — I10 ESSENTIAL HYPERTENSION: Chronic | ICD-10-CM

## 2020-02-05 DIAGNOSIS — Z01.810 PREOP CARDIOVASCULAR EXAM: ICD-10-CM

## 2020-02-05 DIAGNOSIS — I48.91 ATRIAL FIBRILLATION, UNSPECIFIED TYPE: Primary | ICD-10-CM

## 2020-02-05 PROCEDURE — 1126F PR PAIN SEVERITY QUANTIFIED, NO PAIN PRESENT: ICD-10-PCS | Mod: HCNC,S$GLB,, | Performed by: NURSE PRACTITIONER

## 2020-02-05 PROCEDURE — 93000 EKG 12-LEAD: ICD-10-PCS | Mod: HCNC,S$GLB,, | Performed by: INTERNAL MEDICINE

## 2020-02-05 PROCEDURE — 1126F AMNT PAIN NOTED NONE PRSNT: CPT | Mod: HCNC,S$GLB,, | Performed by: NURSE PRACTITIONER

## 2020-02-05 PROCEDURE — 1159F PR MEDICATION LIST DOCUMENTED IN MEDICAL RECORD: ICD-10-PCS | Mod: HCNC,S$GLB,, | Performed by: NURSE PRACTITIONER

## 2020-02-05 PROCEDURE — 1159F MED LIST DOCD IN RCRD: CPT | Mod: HCNC,S$GLB,, | Performed by: NURSE PRACTITIONER

## 2020-02-05 PROCEDURE — 3075F SYST BP GE 130 - 139MM HG: CPT | Mod: HCNC,CPTII,S$GLB, | Performed by: NURSE PRACTITIONER

## 2020-02-05 PROCEDURE — 1101F PT FALLS ASSESS-DOCD LE1/YR: CPT | Mod: HCNC,CPTII,S$GLB, | Performed by: NURSE PRACTITIONER

## 2020-02-05 PROCEDURE — 93000 ELECTROCARDIOGRAM COMPLETE: CPT | Mod: HCNC,S$GLB,, | Performed by: INTERNAL MEDICINE

## 2020-02-05 PROCEDURE — 99214 PR OFFICE/OUTPT VISIT, EST, LEVL IV, 30-39 MIN: ICD-10-PCS | Mod: HCNC,S$GLB,, | Performed by: NURSE PRACTITIONER

## 2020-02-05 PROCEDURE — 99214 OFFICE O/P EST MOD 30 MIN: CPT | Mod: HCNC,S$GLB,, | Performed by: NURSE PRACTITIONER

## 2020-02-05 PROCEDURE — 99999 PR PBB SHADOW E&M-EST. PATIENT-LVL IV: CPT | Mod: PBBFAC,HCNC,, | Performed by: NURSE PRACTITIONER

## 2020-02-05 PROCEDURE — 3078F DIAST BP <80 MM HG: CPT | Mod: HCNC,CPTII,S$GLB, | Performed by: NURSE PRACTITIONER

## 2020-02-05 PROCEDURE — 99999 PR PBB SHADOW E&M-EST. PATIENT-LVL IV: ICD-10-PCS | Mod: PBBFAC,HCNC,, | Performed by: NURSE PRACTITIONER

## 2020-02-05 PROCEDURE — 3078F PR MOST RECENT DIASTOLIC BLOOD PRESSURE < 80 MM HG: ICD-10-PCS | Mod: HCNC,CPTII,S$GLB, | Performed by: NURSE PRACTITIONER

## 2020-02-05 PROCEDURE — 3075F PR MOST RECENT SYSTOLIC BLOOD PRESS GE 130-139MM HG: ICD-10-PCS | Mod: HCNC,CPTII,S$GLB, | Performed by: NURSE PRACTITIONER

## 2020-02-05 PROCEDURE — 1101F PR PT FALLS ASSESS DOC 0-1 FALLS W/OUT INJ PAST YR: ICD-10-PCS | Mod: HCNC,CPTII,S$GLB, | Performed by: NURSE PRACTITIONER

## 2020-02-05 NOTE — PATIENT INSTRUCTIONS
She will not need to hold Eliquis prior to procedure[facility]    Patient needs to be certain to take her  Metoprolol and Amio the morning

## 2020-02-05 NOTE — PROGRESS NOTES
Subjective:   Patient ID:  Elke Laws is a 85 y.o. female who presents for follow up of Pre-op Exam      HPI  Ms. Laws' current conditions include persistent AF, diastolic CHF, non obstructive CAD per [facility] done in , HTN and HLD. Admitted in May 2019 for A-fib with RVR. She had failed CV x2 during admission. DC'd with Amio. No BB restarted at that time due to bradycardia during admission. Patient declined follow up with EP ablation and PPM consideration and opted for conservative medical management.     Planning for upcoming left cataract extraction on 2/26/2020 with Dr. Prashant Oseguera.     Denies any chest pain, SOB, DIAZ,  orthopnea, PND, dizziness, palpitations,  near syncope, syncope or edema . Has no symptoms concerning for angina or equivalent. No CNS Complaints to suggest TIA or CVA. Does well with limiting sodium intake. Has good activity tolerance and states that she has been doing very well since last visit.   Currently taking Lasix 40mg BID and Metolazone 2.5mg on MWF.   Has no abnormal bleeding on Eliquis   EKG today shows slow A-fib     Past Medical History:   Diagnosis Date    Abnormal nuclear stress test 6/30/2016    Acute congestive heart failure 5/27/2017    Acute coronary syndrome     Acute kidney injury (nontraumatic) 5/27/2017    Acute on chronic congestive heart failure 5/21/2017    Acute on chronic diastolic congestive heart failure 8/27/2015    Acute on chronic respiratory failure 5/6/2019    Acute renal failure superimposed on stage 3 chronic kidney disease 1/26/2018    Anemia 5/9/2016    Angina pectoris 1/25/2018    Angina pectoris 1/25/2018    Anticoagulant long-term use     Anxiety 6/16/2019    Aortic regurgitation     Echo 11/2013---5 - Mild to moderate aortic regurgitation.      Asthma     Atrial fibrillation 5/15/2014    Back pain     s/p epidural steriod injections, no recent injections.    Baker's cyst     small, right, seen on US lower extremity 6/2014     "Blood transfusion     Approximately 6 years ago    Chronic constipation     Coronary artery disease     Degenerative disc disease, lumbar     Diastolic dysfunction     Seen on echo 11/2013, stress test negative 5/2014    Diverticulosis     colonoscopy 3/27/2011    E coli bacteremia 5/23/2017    E. coli UTI (urinary tract infection) 5/23/2017    Hemorrhoids     colonoscopy 3/27/2011    Hiatal hernia     CXR 12/30/2016---Retrocardiac density again noted consistent with a hiatal hernia.    Hx of adenomatous colonic polyps     Hyperlipidemia     Hypertension     Hyponatremia 5/9/2016    Hypoxemia 5/27/2017    Hypoxia 6/28/2017    Leukocytosis 5/27/2017    Mitral regurgitation     Myocardial infarction     per patient was told in the past she had a "mild heart attack"    Obesity     Osteoarthritis, knee     Pneumonia     per patient "walking Pneumonia" did not require hospitalization    Seasonal allergies     Sepsis 5/22/2017    Trouble in sleeping     Urinary incontinence        Past Surgical History:   Procedure Laterality Date    APPENDECTOMY      COLONOSCOPY N/A 8/29/2017    Procedure: COLONOSCOPY okay by dr. ramos;  Surgeon: Toño Abdi MD;  Location: [facility] ENDO;  Service: Endoscopy;  Laterality: N/A;    EYE SURGERY Left     HYSTERECTOMY      benign reasons    KNEE SURGERY Bilateral     x2     Left heart cath      OLECRANON BURSECTOMY Right     6/2011    TONSILLECTOMY      TREATMENT OF CARDIAC ARRHYTHMIA N/A 4/16/2019    Procedure: CARDIOVERSION OR DEFIBRILLATION;  Surgeon: Kee Jones MD;  Location: [facility] CATH LAB;  Service: Cardiology;  Laterality: N/A;    TREATMENT OF CARDIAC ARRHYTHMIA N/A 5/24/2019    Procedure: CARDIOVERSION;  Surgeon: Juan Manuel Gooden MD;  Location: [facility] CATH LAB;  Service: Cardiology;  Laterality: N/A;    URETHRA SURGERY         Social History     Tobacco Use    Smoking status: Former Smoker     Packs/day: 0.05     Years: 1.00     Pack years: 0.05     Types: " Cigarettes     Last attempt to quit: 1952     Years since quittin.1    Smokeless tobacco: Never Used   Substance Use Topics    Alcohol use: No     Alcohol/week: 0.0 standard drinks    Drug use: No       Family History   Problem Relation Age of Onset    Heart disease Mother     Cancer Mother         colon    Hypertension Mother     Hyperlipidemia Mother     Heart disease Father     Hypertension Father     Hyperlipidemia Father     Heart disease Sister         MI    Heart disease Brother         MI    COPD Son     Hypertension Son     Diabetes Brother     Diabetes Son     Cancer Sister         breast    Kidney disease Neg Hx     Stroke Neg Hx        Current Outpatient Medications   Medication Sig    acetaminophen (TYLENOL) 500 MG tablet Take 1 tablet (500 mg total) by mouth 2 (two) times daily. (Patient taking differently: Take 500 mg by mouth 2 (two) times daily as needed. )    albuterol (ACCUNEB) 0.63 mg/3 mL Nebu Take 3 mLs (0.63 mg total) by nebulization every 6 (six) hours as needed. Rescue    albuterol 90 mcg/actuation inhaler Inhale 2 puffs into the lungs 4 (four) times daily.    amiodarone (PACERONE) 200 MG Tab Take 1 tablet (200 mg total) by mouth once daily.    apixaban (ELIQUIS) 5 mg Tab Take 1 tablet (5 mg total) by mouth 2 (two) times daily.    aspirin (ECOTRIN) 81 MG EC tablet Take 81 mg by mouth once daily.    cyanocobalamin (VITAMIN B-12) 1000 MCG tablet Take 100 mcg by mouth once daily.    docusate sodium (COLACE) 100 MG capsule Take 1 capsule (100 mg total) by mouth 2 (two) times daily.    fluticasone (FLOVENT HFA) 110 mcg/actuation inhaler Inhale 1 puff into the lungs 2 (two) times daily.    furosemide (LASIX) 40 MG tablet Take 1 tablet (40 mg total) by mouth 2 (two) times daily.    metOLazone (ZAROXOLYN) 2.5 MG tablet Take 1 tablet (2.5 mg total) by mouth every Mon, Wed, Fri.    metoprolol tartrate (LOPRESSOR) 25 MG tablet Take 0.5 tablets (12.5 mg total) by  mouth 2 (two) times daily.    omega 3-dha-epa-fish oil (FISH OIL) 1,000 mg (120 mg-180 mg) Cap Take 1 capsule by mouth once daily.    polyethylene glycol (GLYCOLAX) 17 gram/dose powder Take 17 g by mouth once daily.    potassium chloride SA (K-DUR,KLOR-CON) 10 MEQ tablet Take 1 tablet (10 mEq total) by mouth 2 (two) times daily.    pravastatin (PRAVACHOL) 40 MG tablet Take 1 tablet (40 mg total) by mouth once daily.     No current facility-administered medications for this visit.      Current Outpatient Medications on File Prior to Visit   Medication Sig    acetaminophen (TYLENOL) 500 MG tablet Take 1 tablet (500 mg total) by mouth 2 (two) times daily. (Patient taking differently: Take 500 mg by mouth 2 (two) times daily as needed. )    albuterol (ACCUNEB) 0.63 mg/3 mL Nebu Take 3 mLs (0.63 mg total) by nebulization every 6 (six) hours as needed. Rescue    albuterol 90 mcg/actuation inhaler Inhale 2 puffs into the lungs 4 (four) times daily.    amiodarone (PACERONE) 200 MG Tab Take 1 tablet (200 mg total) by mouth once daily.    apixaban (ELIQUIS) 5 mg Tab Take 1 tablet (5 mg total) by mouth 2 (two) times daily.    aspirin (ECOTRIN) 81 MG EC tablet Take 81 mg by mouth once daily.    cyanocobalamin (VITAMIN B-12) 1000 MCG tablet Take 100 mcg by mouth once daily.    docusate sodium (COLACE) 100 MG capsule Take 1 capsule (100 mg total) by mouth 2 (two) times daily.    fluticasone (FLOVENT HFA) 110 mcg/actuation inhaler Inhale 1 puff into the lungs 2 (two) times daily.    furosemide (LASIX) 40 MG tablet Take 1 tablet (40 mg total) by mouth 2 (two) times daily.    metOLazone (ZAROXOLYN) 2.5 MG tablet Take 1 tablet (2.5 mg total) by mouth every Mon, Wed, Fri.    metoprolol tartrate (LOPRESSOR) 25 MG tablet Take 0.5 tablets (12.5 mg total) by mouth 2 (two) times daily.    omega 3-dha-epa-fish oil (FISH OIL) 1,000 mg (120 mg-180 mg) Cap Take 1 capsule by mouth once daily.    polyethylene glycol (GLYCOLAX)  17 gram/dose powder Take 17 g by mouth once daily.    potassium chloride SA (K-DUR,KLOR-CON) 10 MEQ tablet Take 1 tablet (10 mEq total) by mouth 2 (two) times daily.    pravastatin (PRAVACHOL) 40 MG tablet Take 1 tablet (40 mg total) by mouth once daily.     No current facility-administered medications on file prior to visit.        Review of Systems   Constitution: Negative for diaphoresis, malaise/fatigue, weight gain and weight loss.   HENT: Negative for congestion and nosebleeds.    Cardiovascular: Negative for chest pain, claudication, cyanosis, dyspnea on exertion, irregular heartbeat, leg swelling, near-syncope, orthopnea, palpitations, paroxysmal nocturnal dyspnea and syncope.   Respiratory: Negative for cough, hemoptysis, shortness of breath, sleep disturbances due to breathing, snoring, sputum production and wheezing.    Hematologic/Lymphatic: Negative for bleeding problem. Does not bruise/bleed easily.   Skin: Negative for rash.   Musculoskeletal: Negative for arthritis, back pain, falls, joint pain, muscle cramps and muscle weakness.   Gastrointestinal: Negative for abdominal pain, constipation, diarrhea, heartburn, hematemesis, hematochezia, melena, nausea and vomiting.   Genitourinary: Negative for dysuria, hematuria and nocturia.   Neurological: Negative for excessive daytime sleepiness, dizziness, headaches, light-headedness, loss of balance, numbness, vertigo and weakness.       Objective:   Physical Exam   Constitutional: She is oriented to person, place, and time. She appears well-developed and well-nourished.   Neck: Neck supple. No JVD present.   Cardiovascular: Normal rate, normal heart sounds and normal pulses. An irregularly irregular rhythm present. Exam reveals no friction rub.   No murmur heard.  Pulmonary/Chest: Effort normal and breath sounds normal. No respiratory distress. She has no wheezes. She has no rales.   NC at 2 liters    Abdominal: Soft. Bowel sounds are normal. She exhibits  "no distension.   Musculoskeletal: She exhibits no edema or tenderness.   Neurological: She is alert and oriented to person, place, and time.   Skin: Skin is warm and dry. No rash noted.   Psychiatric: She has a normal mood and affect. Her behavior is normal.   Nursing note and vitals reviewed.    Vitals:    02/05/20 1129   BP: (!) 132/56   Pulse: (!) 56   SpO2: 96%   Weight: 82.1 kg (181 lb)   Height: 4' 9" (1.448 m)     Lab Results   Component Value Date    CHOL 164 11/20/2019    CHOL 156 10/25/2018    CHOL 147 01/31/2018     Lab Results   Component Value Date    HDL 78 (H) 11/20/2019    HDL 68 10/25/2018    HDL 66 01/31/2018     Lab Results   Component Value Date    LDLCALC 73.6 11/20/2019    LDLCALC 76.0 10/25/2018    LDLCALC 69.0 01/31/2018     Lab Results   Component Value Date    TRIG 62 11/20/2019    TRIG 60 10/25/2018    TRIG 60 01/31/2018     Lab Results   Component Value Date    CHOLHDL 47.6 11/20/2019    CHOLHDL 43.6 10/25/2018    CHOLHDL 44.9 01/31/2018       Chemistry        Component Value Date/Time     01/10/2020 1525    K 3.9 01/10/2020 1525    CL 92 (L) 01/10/2020 1525    CO2 34 (H) 01/10/2020 1525    BUN 51 (H) 01/10/2020 1525    CREATININE 1.3 01/10/2020 1525    GLU 96 01/10/2020 1525        Component Value Date/Time    CALCIUM 9.9 01/10/2020 1525    ALKPHOS 115 01/10/2020 1525    AST 20 01/10/2020 1525    ALT 13 01/10/2020 1525    BILITOT 0.5 01/10/2020 1525    ESTGFRAFRICA 43.2 (A) 01/10/2020 1525    EGFRNONAA 37.5 (A) 01/10/2020 1525          Lab Results   Component Value Date    TSH 2.307 11/20/2019     Lab Results   Component Value Date    INR 1.0 05/21/2019    INR 0.9 02/16/2019    INR 1.2 05/27/2017     Lab Results   Component Value Date    WBC 6.51 11/20/2019    HGB 12.3 11/20/2019    HCT 38.6 11/20/2019    MCV 95 11/20/2019     11/20/2019     BMP  Sodium   Date Value Ref Range Status   01/10/2020 139 136 - 145 mmol/L Final     Potassium   Date Value Ref Range Status "   01/10/2020 3.9 3.5 - 5.1 mmol/L Final     Chloride   Date Value Ref Range Status   01/10/2020 92 (L) 95 - 110 mmol/L Final     CO2   Date Value Ref Range Status   01/10/2020 34 (H) 23 - 29 mmol/L Final     BUN, Bld   Date Value Ref Range Status   01/10/2020 51 (H) 8 - 23 mg/dL Final     Creatinine   Date Value Ref Range Status   01/10/2020 1.3 0.5 - 1.4 mg/dL Final     Calcium   Date Value Ref Range Status   01/10/2020 9.9 8.7 - 10.5 mg/dL Final     Anion Gap   Date Value Ref Range Status   01/10/2020 13 8 - 16 mmol/L Final     eGFR if    Date Value Ref Range Status   01/10/2020 43.2 (A) >60 mL/min/1.73 m^2 Final     eGFR if non    Date Value Ref Range Status   01/10/2020 37.5 (A) >60 mL/min/1.73 m^2 Final     Comment:     Calculation used to obtain the estimated glomerular filtration  rate (eGFR) is the CKD-EPI equation.        CrCl cannot be calculated (Patient's most recent lab result is older than the maximum 7 days allowed.).    Assessment:     1. Atrial fibrillation, unspecified type    2. Preop cardiovascular exam    3. Essential hypertension        Plan:   Atrial fibrillation, unspecified type  Patient with persistent A-fib. Rate controlled. Has declined ablation and PPM implant in the past.   She will continue metoprolol and amio   Continue Eliquis for CVA prophylaxis     Preop cardiovascular exam  Patient is clear to proceed with cataract extraction surgery at low to moderate risk. She will not need to hold Eliquis prior to [facility].   Patient needs to be certain to take her  Metoprolol and Amio the morning     Essential hypertension  Continue current medical therapy     RTC in 6 months or sooner if needed

## 2020-02-07 DIAGNOSIS — I48.19 PERSISTENT ATRIAL FIBRILLATION: ICD-10-CM

## 2020-02-07 RX ORDER — AMIODARONE HYDROCHLORIDE 200 MG/1
TABLET ORAL
Qty: 30 TABLET | Refills: 6 | Status: SHIPPED | OUTPATIENT
Start: 2020-02-07 | End: 2022-04-14

## 2020-03-07 NOTE — PATIENT INSTRUCTIONS

## 2020-03-17 ENCOUNTER — PES CALL (OUTPATIENT)
Dept: ADMINISTRATIVE | Facility: CLINIC | Age: 85
End: 2020-03-17

## 2020-04-21 RX ORDER — METOLAZONE 2.5 MG/1
TABLET ORAL
Qty: 15 TABLET | Refills: 11 | Status: SHIPPED | OUTPATIENT
Start: 2020-04-21 | End: 2021-03-04 | Stop reason: SDUPTHER

## 2020-05-10 DIAGNOSIS — I48.19 ATRIAL FIBRILLATION, PERSISTENT: ICD-10-CM

## 2020-05-10 RX ORDER — APIXABAN 5 MG/1
TABLET, FILM COATED ORAL
Qty: 60 TABLET | Refills: 6 | Status: SHIPPED | OUTPATIENT
Start: 2020-05-10 | End: 2020-07-28

## 2020-05-26 ENCOUNTER — LAB VISIT (OUTPATIENT)
Dept: LAB | Facility: HOSPITAL | Age: 85
End: 2020-05-26
Attending: NURSE PRACTITIONER
Payer: MEDICARE

## 2020-05-26 ENCOUNTER — OFFICE VISIT (OUTPATIENT)
Dept: INTERNAL MEDICINE | Facility: CLINIC | Age: 85
End: 2020-05-26
Payer: MEDICARE

## 2020-05-26 VITALS
SYSTOLIC BLOOD PRESSURE: 132 MMHG | BODY MASS INDEX: 37.71 KG/M2 | DIASTOLIC BLOOD PRESSURE: 64 MMHG | HEART RATE: 78 BPM | WEIGHT: 174.81 LBS | TEMPERATURE: 99 F | OXYGEN SATURATION: 98 % | HEIGHT: 57 IN

## 2020-05-26 DIAGNOSIS — M19.90 OSTEOARTHRITIS, UNSPECIFIED OSTEOARTHRITIS TYPE, UNSPECIFIED SITE: ICD-10-CM

## 2020-05-26 DIAGNOSIS — I10 ESSENTIAL HYPERTENSION: Primary | Chronic | ICD-10-CM

## 2020-05-26 DIAGNOSIS — J45.909 ASTHMA, UNSPECIFIED ASTHMA SEVERITY, UNSPECIFIED WHETHER COMPLICATED, UNSPECIFIED WHETHER PERSISTENT: ICD-10-CM

## 2020-05-26 DIAGNOSIS — I51.89 LEFT VENTRICULAR DIASTOLIC DYSFUNCTION WITH PRESERVED SYSTOLIC FUNCTION: Chronic | ICD-10-CM

## 2020-05-26 DIAGNOSIS — R73.03 PREDIABETES: ICD-10-CM

## 2020-05-26 DIAGNOSIS — E66.01 SEVERE OBESITY WITH BODY MASS INDEX (BMI) OF 35.0 TO 39.9 WITH SERIOUS COMORBIDITY: ICD-10-CM

## 2020-05-26 DIAGNOSIS — D64.9 ANEMIA, UNSPECIFIED TYPE: ICD-10-CM

## 2020-05-26 DIAGNOSIS — I48.0 PAF (PAROXYSMAL ATRIAL FIBRILLATION): Chronic | ICD-10-CM

## 2020-05-26 DIAGNOSIS — I10 ESSENTIAL HYPERTENSION: Chronic | ICD-10-CM

## 2020-05-26 DIAGNOSIS — E78.5 HYPERLIPIDEMIA, UNSPECIFIED HYPERLIPIDEMIA TYPE: Chronic | ICD-10-CM

## 2020-05-26 DIAGNOSIS — N18.30 CKD (CHRONIC KIDNEY DISEASE) STAGE 3, GFR 30-59 ML/MIN: ICD-10-CM

## 2020-05-26 PROBLEM — Z01.810 PREOP CARDIOVASCULAR EXAM: Status: RESOLVED | Noted: 2020-02-05 | Resolved: 2020-05-26

## 2020-05-26 LAB
ALBUMIN SERPL BCP-MCNC: 3.3 G/DL (ref 3.5–5.2)
ALP SERPL-CCNC: 99 U/L (ref 55–135)
ALT SERPL W/O P-5'-P-CCNC: 21 U/L (ref 10–44)
ANION GAP SERPL CALC-SCNC: 7 MMOL/L (ref 8–16)
AST SERPL-CCNC: 21 U/L (ref 10–40)
BILIRUB SERPL-MCNC: 0.4 MG/DL (ref 0.1–1)
BUN SERPL-MCNC: 28 MG/DL (ref 8–23)
CALCIUM SERPL-MCNC: 9.5 MG/DL (ref 8.7–10.5)
CHLORIDE SERPL-SCNC: 98 MMOL/L (ref 95–110)
CO2 SERPL-SCNC: 34 MMOL/L (ref 23–29)
CREAT SERPL-MCNC: 1.1 MG/DL (ref 0.5–1.4)
EST. GFR  (AFRICAN AMERICAN): 52.9 ML/MIN/1.73 M^2
EST. GFR  (NON AFRICAN AMERICAN): 45.9 ML/MIN/1.73 M^2
GLUCOSE SERPL-MCNC: 95 MG/DL (ref 70–110)
POTASSIUM SERPL-SCNC: 4 MMOL/L (ref 3.5–5.1)
PROT SERPL-MCNC: 7.2 G/DL (ref 6–8.4)
SODIUM SERPL-SCNC: 139 MMOL/L (ref 136–145)

## 2020-05-26 PROCEDURE — 99214 PR OFFICE/OUTPT VISIT, EST, LEVL IV, 30-39 MIN: ICD-10-PCS | Mod: HCNC,S$GLB,, | Performed by: NURSE PRACTITIONER

## 2020-05-26 PROCEDURE — 80053 COMPREHEN METABOLIC PANEL: CPT | Mod: HCNC

## 2020-05-26 PROCEDURE — 3078F PR MOST RECENT DIASTOLIC BLOOD PRESSURE < 80 MM HG: ICD-10-PCS | Mod: HCNC,CPTII,S$GLB, | Performed by: NURSE PRACTITIONER

## 2020-05-26 PROCEDURE — 83036 HEMOGLOBIN GLYCOSYLATED A1C: CPT | Mod: HCNC

## 2020-05-26 PROCEDURE — 3078F DIAST BP <80 MM HG: CPT | Mod: HCNC,CPTII,S$GLB, | Performed by: NURSE PRACTITIONER

## 2020-05-26 PROCEDURE — 1126F AMNT PAIN NOTED NONE PRSNT: CPT | Mod: HCNC,S$GLB,, | Performed by: NURSE PRACTITIONER

## 2020-05-26 PROCEDURE — 99999 PR PBB SHADOW E&M-EST. PATIENT-LVL IV: ICD-10-PCS | Mod: PBBFAC,HCNC,, | Performed by: NURSE PRACTITIONER

## 2020-05-26 PROCEDURE — 99999 PR PBB SHADOW E&M-EST. PATIENT-LVL IV: CPT | Mod: PBBFAC,HCNC,, | Performed by: NURSE PRACTITIONER

## 2020-05-26 PROCEDURE — 3075F PR MOST RECENT SYSTOLIC BLOOD PRESS GE 130-139MM HG: ICD-10-PCS | Mod: HCNC,CPTII,S$GLB, | Performed by: NURSE PRACTITIONER

## 2020-05-26 PROCEDURE — 1126F PR PAIN SEVERITY QUANTIFIED, NO PAIN PRESENT: ICD-10-PCS | Mod: HCNC,S$GLB,, | Performed by: NURSE PRACTITIONER

## 2020-05-26 PROCEDURE — 1101F PR PT FALLS ASSESS DOC 0-1 FALLS W/OUT INJ PAST YR: ICD-10-PCS | Mod: HCNC,CPTII,S$GLB, | Performed by: NURSE PRACTITIONER

## 2020-05-26 PROCEDURE — 1159F PR MEDICATION LIST DOCUMENTED IN MEDICAL RECORD: ICD-10-PCS | Mod: HCNC,S$GLB,, | Performed by: NURSE PRACTITIONER

## 2020-05-26 PROCEDURE — 3075F SYST BP GE 130 - 139MM HG: CPT | Mod: HCNC,CPTII,S$GLB, | Performed by: NURSE PRACTITIONER

## 2020-05-26 PROCEDURE — 99214 OFFICE O/P EST MOD 30 MIN: CPT | Mod: HCNC,S$GLB,, | Performed by: NURSE PRACTITIONER

## 2020-05-26 PROCEDURE — 1101F PT FALLS ASSESS-DOCD LE1/YR: CPT | Mod: HCNC,CPTII,S$GLB, | Performed by: NURSE PRACTITIONER

## 2020-05-26 PROCEDURE — 1159F MED LIST DOCD IN RCRD: CPT | Mod: HCNC,S$GLB,, | Performed by: NURSE PRACTITIONER

## 2020-05-26 PROCEDURE — 36415 COLL VENOUS BLD VENIPUNCTURE: CPT | Mod: HCNC

## 2020-05-26 NOTE — PROGRESS NOTES
Elke STEINBERG Veto 85 y.o. female     Chief Complaint:  Chief Complaint   Patient presents with    Follow-up     6 month f/u       History of Present Illness:  Pt is known to provider and presents with cg for:       6mo f/u - multiple chronic conditions   No complaints   2LNC continuous   Labs reviewed from last week - stable     Exam:  Review of Systems   Constitutional: Negative for unexpected weight change.   HENT: Negative for trouble swallowing.    Eyes: Negative for discharge and visual disturbance.   Respiratory: Negative for shortness of breath.    Cardiovascular: Negative for chest pain and leg swelling.   Gastrointestinal: Negative for diarrhea, nausea and vomiting.   Genitourinary: Negative for difficulty urinating.   Musculoskeletal: Negative for gait problem.   Skin: Negative for wound.   Neurological: Negative for speech difficulty.   Psychiatric/Behavioral: Negative for confusion.     Physical Exam  Vitals signs and nursing note reviewed.   Constitutional:       General: She is awake. She is not in acute distress.     Appearance: Normal appearance. She is well-developed and overweight. She is not ill-appearing, toxic-appearing or diaphoretic.   HENT:      Head: Normocephalic and atraumatic.      Right Ear: External ear normal.      Left Ear: External ear normal.   Eyes:      General: No scleral icterus.        Right eye: No discharge.         Left eye: No discharge.      Conjunctiva/sclera: Conjunctivae normal.      Pupils: Pupils are equal, round, and reactive to light.   Neck:      Musculoskeletal: Normal range of motion.      Trachea: No tracheal deviation.   Cardiovascular:      Rate and Rhythm: Normal rate and regular rhythm.   Pulmonary:      Effort: Pulmonary effort is normal. No respiratory distress.      Breath sounds: Normal breath sounds. No stridor. No wheezing or rales.      Comments: Wearing portable O2  Chest:      Chest wall: No tenderness.   Abdominal:      General: Bowel sounds are  normal. There is no distension.      Palpations: Abdomen is soft.      Tenderness: There is no abdominal tenderness.   Musculoskeletal: Normal range of motion.      Right lower leg: Edema (trace) present.      Left lower leg: Edema (trace) present.   Skin:     General: Skin is warm and dry.      Coloration: Skin is not pale.      Findings: No erythema or rash.   Neurological:      Mental Status: She is alert and oriented to person, place, and time. Mental status is at baseline.      Gait: Gait abnormal.   Psychiatric:         Mood and Affect: Mood normal.         Speech: Speech normal.         Behavior: Behavior normal. Behavior is cooperative.         Thought Content: Thought content normal.         Judgment: Judgment normal.         Most Recent Laboratory Results Reviewed ({Yes)  Lab Results   Component Value Date    WBC 5.39 05/21/2020    HGB 12.5 05/21/2020    HCT 41.4 05/21/2020     05/21/2020    CHOL 164 11/20/2019    TRIG 62 11/20/2019    HDL 78 (H) 11/20/2019    ALT 21 05/26/2020    AST 21 05/26/2020     05/26/2020    K 4.0 05/26/2020    CL 98 05/26/2020    CREATININE 1.1 05/26/2020    BUN 28 (H) 05/26/2020    CO2 34 (H) 05/26/2020    TSH 2.631 05/21/2020    INR 1.0 05/21/2019    HGBA1C 5.5 05/26/2020       Assessment     ICD-10-CM ICD-9-CM   1. Essential hypertension  I10 401.9   2. Hyperlipidemia, unspecified hyperlipidemia type  E78.5 272.4   3. PAF (paroxysmal atrial fibrillation)  I48.0 427.31   4. Left ventricular diastolic dysfunction with preserved systolic function  I51.9 429.9   5. CKD (chronic kidney disease) stage 3, GFR 30-59 ml/min;Acute renal failure superimposed on stage 3 chronic kidney disease  N18.3 585.3   6. Asthma, unspecified asthma severity, unspecified whether complicated, unspecified whether persistent  J45.909 493.90   7. Prediabetes  R73.03 790.29   8. Anemia, unspecified type  D64.9 285.9   9. Osteoarthritis, unspecified osteoarthritis type, unspecified site  M19.90  715.90   10. Severe obesity with body mass index (BMI) of 35.0 to 39.9 with serious comorbidity  E66.01 278.01        Plan   Essential hypertension  -     Comprehensive metabolic panel; Future; Expected date: 05/26/2020    Hyperlipidemia, unspecified hyperlipidemia type  continue fish oil & pravastatin     PAF (paroxysmal atrial fibrillation)  Left ventricular diastolic dysfunction with preserved systolic function  followed by cards   eliquis   Metoprolol   Lasix & metolazone   ASA   pacerone     CKD (chronic kidney disease) stage 3, GFR 30-59 ml/min;Acute renal failure superimposed on stage 3 chronic kidney disease  significantly improved kidney function from 6 months ago     Asthma, unspecified asthma severity, unspecified whether complicated, unspecified whether persistent   controlled, continue current meds     Prediabetes  -     Hemoglobin A1C - 5.5     Anemia, unspecified type  Stable     Osteoarthritis, unspecified osteoarthritis type, unspecified site  Tylenol     Severe obesity with body mass index (BMI) of 35.0 to 39.9 with serious comorbidity  Contributing to chronic conditions         Follow up in about 6 months (around 11/26/2020).  Future Appointments     Date Provider Specialty Appt Notes    8/19/2020 KRYSTIN Stevens Cardiology 6 mo f/u    11/25/2020 Isidra Benavidez MD Internal Medicine 6 month f/u

## 2020-05-27 LAB
ESTIMATED AVG GLUCOSE: 111 MG/DL (ref 68–131)
HBA1C MFR BLD HPLC: 5.5 % (ref 4–5.6)

## 2020-06-29 ENCOUNTER — TELEPHONE (OUTPATIENT)
Dept: NEPHROLOGY | Facility: CLINIC | Age: 85
End: 2020-06-29

## 2020-06-29 ENCOUNTER — TELEPHONE (OUTPATIENT)
Dept: INTERNAL MEDICINE | Facility: CLINIC | Age: 85
End: 2020-06-29

## 2020-06-29 ENCOUNTER — LAB VISIT (OUTPATIENT)
Dept: LAB | Facility: HOSPITAL | Age: 85
End: 2020-06-29
Attending: INTERNAL MEDICINE
Payer: MEDICARE

## 2020-06-29 DIAGNOSIS — N39.0 UTI (URINARY TRACT INFECTION), UNCOMPLICATED: Primary | ICD-10-CM

## 2020-06-29 DIAGNOSIS — N39.0 UTI (URINARY TRACT INFECTION), UNCOMPLICATED: ICD-10-CM

## 2020-06-29 LAB
BILIRUB UR QL STRIP: NEGATIVE
CLARITY UR: CLEAR
COLOR UR: ABNORMAL
GLUCOSE UR QL STRIP: NEGATIVE
HGB UR QL STRIP: ABNORMAL
KETONES UR QL STRIP: NEGATIVE
LEUKOCYTE ESTERASE UR QL STRIP: NEGATIVE
NITRITE UR QL STRIP: NEGATIVE
PH UR STRIP: 7.5 [PH] (ref 5–8)
PROT UR QL STRIP: NEGATIVE
SP GR UR STRIP: 1.01 (ref 1–1.03)
URN SPEC COLLECT METH UR: ABNORMAL

## 2020-06-29 PROCEDURE — 81002 URINALYSIS NONAUTO W/O SCOPE: CPT | Mod: HCNC

## 2020-06-29 PROCEDURE — 87086 URINE CULTURE/COLONY COUNT: CPT | Mod: HCNC

## 2020-06-29 NOTE — TELEPHONE ENCOUNTER
----- Message from Tamera Solis MD sent at 6/29/2020 10:39 AM CDT -----  Regarding: FW: Patient  Ok to accommodate   ----- Message -----  From: Deepika Perrin LPN  Sent: 6/29/2020  10:37 AM CDT  To: Tamera Solis MD  Subject: FW: Patient                                      She will se julio cesar joyce tomorrow.  ----- Message -----  From: Tamera Solis MD  Sent: 6/29/2020  10:35 AM CDT  To: Deepika Perrin LPN  Subject: RE: Patient                                      Yes please  ----- Message -----  From: Deepika Perrin LPN  Sent: 6/29/2020  10:28 AM CDT  To: Tamera Solis MD  Subject: FW: Patient                                      Ok to do ua with c&s for c/o burning on urination?  ----- Message -----  From: Darius Calvillo  Sent: 6/29/2020  10:08 AM CDT  To: Irma [facility]ENC[facility] Staff  Subject: Patient                                          Type:  Sooner Apoointment Request    Caller is requesting a sooner appointment.  Caller declined first available appointment listed below.  Caller will not accept being placed on the waitlist and is requesting a message be sent to doctor.  Name of Caller:pt  When is the first available appointment?07/11/2020  Symptoms:kidney pain (burning while urinating)  Would the patient rather a call back or a response via [facility]? Call back   Best Call Back Number:[phone]  Additional Information: n/a

## 2020-06-29 NOTE — TELEPHONE ENCOUNTER
Spoke with pts grandson states pt c/o urinary burning.   Offered appt at another location and declined.   Will keep appt for tomorrow at 7am

## 2020-06-29 NOTE — TELEPHONE ENCOUNTER
----- Message from Kathie Marshall sent at 6/29/2020  9:57 AM CDT -----  Regarding: burning  Type:  Needs Medical Advice    Who Called: pr  Symptoms (please be specific): yeast infection   How long has patient had these symptoms:  n/a  Pharmacy name and phone #:  n/a  Would the patient rather a call back or a response via [facility]? Call back   Best Call Back Number: [phone]  Additional Information: Please call back

## 2020-06-30 ENCOUNTER — OFFICE VISIT (OUTPATIENT)
Dept: INTERNAL MEDICINE | Facility: CLINIC | Age: 85
End: 2020-06-30
Payer: MEDICARE

## 2020-06-30 VITALS
HEART RATE: 89 BPM | BODY MASS INDEX: 37.09 KG/M2 | OXYGEN SATURATION: 97 % | WEIGHT: 171.94 LBS | SYSTOLIC BLOOD PRESSURE: 120 MMHG | DIASTOLIC BLOOD PRESSURE: 68 MMHG | HEIGHT: 57 IN | TEMPERATURE: 97 F

## 2020-06-30 DIAGNOSIS — R30.0 DYSURIA: Primary | ICD-10-CM

## 2020-06-30 LAB
BILIRUB UR QL STRIP: NEGATIVE
CLARITY UR: CLEAR
COLOR UR: NORMAL
GLUCOSE UR QL STRIP: NEGATIVE
HGB UR QL STRIP: NEGATIVE
KETONES UR QL STRIP: NEGATIVE
LEUKOCYTE ESTERASE UR QL STRIP: NEGATIVE
NITRITE UR QL STRIP: NEGATIVE
PH UR STRIP: 7.5 [PH] (ref 5–8)
PROT UR QL STRIP: NEGATIVE
SP GR UR STRIP: 1.01 (ref 1–1.03)
URN SPEC COLLECT METH UR: NORMAL

## 2020-06-30 PROCEDURE — 1101F PT FALLS ASSESS-DOCD LE1/YR: CPT | Mod: HCNC,CPTII,S$GLB, | Performed by: FAMILY MEDICINE

## 2020-06-30 PROCEDURE — 3078F DIAST BP <80 MM HG: CPT | Mod: HCNC,CPTII,S$GLB, | Performed by: FAMILY MEDICINE

## 2020-06-30 PROCEDURE — 1126F AMNT PAIN NOTED NONE PRSNT: CPT | Mod: HCNC,S$GLB,, | Performed by: FAMILY MEDICINE

## 2020-06-30 PROCEDURE — 87086 URINE CULTURE/COLONY COUNT: CPT | Mod: HCNC

## 2020-06-30 PROCEDURE — 3074F SYST BP LT 130 MM HG: CPT | Mod: HCNC,CPTII,S$GLB, | Performed by: FAMILY MEDICINE

## 2020-06-30 PROCEDURE — 81002 URINALYSIS NONAUTO W/O SCOPE: CPT | Mod: HCNC

## 2020-06-30 PROCEDURE — 1126F PR PAIN SEVERITY QUANTIFIED, NO PAIN PRESENT: ICD-10-PCS | Mod: HCNC,S$GLB,, | Performed by: FAMILY MEDICINE

## 2020-06-30 PROCEDURE — 1159F MED LIST DOCD IN RCRD: CPT | Mod: HCNC,S$GLB,, | Performed by: FAMILY MEDICINE

## 2020-06-30 PROCEDURE — 99213 OFFICE O/P EST LOW 20 MIN: CPT | Mod: HCNC,S$GLB,, | Performed by: FAMILY MEDICINE

## 2020-06-30 PROCEDURE — 1101F PR PT FALLS ASSESS DOC 0-1 FALLS W/OUT INJ PAST YR: ICD-10-PCS | Mod: HCNC,CPTII,S$GLB, | Performed by: FAMILY MEDICINE

## 2020-06-30 PROCEDURE — 99213 PR OFFICE/OUTPT VISIT, EST, LEVL III, 20-29 MIN: ICD-10-PCS | Mod: HCNC,S$GLB,, | Performed by: FAMILY MEDICINE

## 2020-06-30 PROCEDURE — 3074F PR MOST RECENT SYSTOLIC BLOOD PRESSURE < 130 MM HG: ICD-10-PCS | Mod: HCNC,CPTII,S$GLB, | Performed by: FAMILY MEDICINE

## 2020-06-30 PROCEDURE — 1159F PR MEDICATION LIST DOCUMENTED IN MEDICAL RECORD: ICD-10-PCS | Mod: HCNC,S$GLB,, | Performed by: FAMILY MEDICINE

## 2020-06-30 PROCEDURE — 99999 PR PBB SHADOW E&M-EST. PATIENT-LVL IV: ICD-10-PCS | Mod: PBBFAC,HCNC,, | Performed by: FAMILY MEDICINE

## 2020-06-30 PROCEDURE — 3078F PR MOST RECENT DIASTOLIC BLOOD PRESSURE < 80 MM HG: ICD-10-PCS | Mod: HCNC,CPTII,S$GLB, | Performed by: FAMILY MEDICINE

## 2020-06-30 PROCEDURE — 99999 PR PBB SHADOW E&M-EST. PATIENT-LVL IV: CPT | Mod: PBBFAC,HCNC,, | Performed by: FAMILY MEDICINE

## 2020-06-30 NOTE — PROGRESS NOTES
Subjective:       Patient ID: Elke Laws is a 85 y.o. female.    Chief Complaint: Urinary Tract Infection    Patient presents to clinic today reporting 4 day history of dysuria.  She denies fever, back pain, hematuria, frequency, vaginal itching or discharge.  She is otherwise without concerns today.    Review of Systems   Constitutional: Negative for chills, fatigue, fever and unexpected weight change.   Eyes: Negative for visual disturbance.   Respiratory: Negative for shortness of breath.    Cardiovascular: Negative for chest pain.   Genitourinary: Positive for dysuria. Negative for frequency, hematuria, urgency and vaginal discharge.   Musculoskeletal: Negative for myalgias.   Neurological: Negative for headaches.       Objective:      Physical Exam  Vitals signs reviewed.   Constitutional:       General: She is not in acute distress.     Appearance: She is well-developed.   HENT:      Head: Normocephalic and atraumatic.   Eyes:      General: No scleral icterus.     Conjunctiva/sclera: Conjunctivae normal.      Pupils: Pupils are equal, round, and reactive to light.   Pulmonary:      Effort: Pulmonary effort is normal.   Neurological:      Mental Status: She is alert and oriented to person, place, and time.      Cranial Nerves: No cranial nerve deficit.      Gait: Gait normal.         Assessment:       1. Dysuria        Plan:     Problem List Items Addressed This Visit     None      Visit Diagnoses     Dysuria    -  Primary    Relevant Orders    Urinalysis (Completed)    Urine culture        UA pending, patient did not want to wait for result. Will notify her with result and treatment recommendations.

## 2020-07-01 LAB
BACTERIA UR CULT: NORMAL

## 2020-07-31 NOTE — PROGRESS NOTES
"Subjective:   Patient ID:  Elke Laws is a 84 y.o. female who presents for follow up of Carotid Artery Disease (c/o SOB)      HPI  An 83 yo female  With PAF PRESENTS FOR F/U AFTER HOSPITAL ADMIT SHE HAD AFIB SHE WAS IN ACUTE DIASTOLIC CHF SHE WAS DIURESED AND HAS DONE WELL. SHE IS HERE HOWEVER SHE STARTED GETTING CONGESTED HAS COUGH FEELS FEBRILE COUGHING FEELS WHEEZY. HAS NO LEG SWELLING NO CHEST PAIN.   Past Medical History:   Diagnosis Date    Abnormal nuclear stress test 6/30/2016    Acute congestive heart failure 5/27/2017    Acute coronary syndrome     Acute kidney injury (nontraumatic) 5/27/2017    Acute kidney injury (nontraumatic) 5/27/2017    Acute on chronic congestive heart failure 5/21/2017    Acute on chronic diastolic congestive heart failure 8/27/2015    Anemia 5/9/2016    Anemia 5/9/2016    Angina pectoris 1/25/2018    Anticoagulant long-term use     Aortic regurgitation     Echo 11/2013---5 - Mild to moderate aortic regurgitation.      Asthma     Atrial fibrillation 5/15/2014    Back pain     s/p epidural steriod injections, no recent injections.    Baker's cyst     small, right, seen on US lower extremity 6/2014    Blood transfusion     Approximately 6 years ago    Chronic constipation     Coronary artery disease     Degenerative disc disease, lumbar     Diastolic dysfunction     Seen on echo 11/2013, stress test negative 5/2014    Diverticulosis     colonoscopy 3/27/2011    E coli bacteremia 5/23/2017    E. coli UTI (urinary tract infection) 5/23/2017    Hemorrhoids     colonoscopy 3/27/2011    Hiatal hernia     CXR 12/30/2016---Retrocardiac density again noted consistent with a hiatal hernia.    Hx of adenomatous colonic polyps     Hyperlipidemia     Hypertension     Hyponatremia 5/9/2016    Hypoxemia 5/27/2017    Hypoxia 6/28/2017    Leukocytosis 5/27/2017    Mitral regurgitation     Myocardial infarction     per patient was told in the past she had a "mild " 07/31/2020 @ 13:59 -  met with the patient and provided her with the information for her insurance pre-cert.   "heart attack"    Obesity     Osteoarthritis, knee     Pneumonia     per patient "walking Pneumonia" did not require hospitalization    Seasonal allergies     Sepsis 2017    Trouble in sleeping     Urinary incontinence        Past Surgical History:   Procedure Laterality Date    APPENDECTOMY      COLONOSCOPY okay by dr. ramos N/A 2017    Per[facility]    EYE SURGERY Left     HYSTERECTOMY      benign reasons    KNEE SURGERY Bilateral     x2     Left heart cath      OLECRANON BURSECTOMY Right     2011    TONSILLECTOMY      URETHRA SURGERY         Social History     Tobacco Use    Smoking status: Former Smoker     Packs/day: 0.05     Years: 1.00     Pack years: 0.05     Types: Cigarettes     Last attempt to quit: 1952     Years since quittin.2    Smokeless tobacco: Never Used   Substance Use Topics    Alcohol use: No     Alcohol/week: 0.0 oz    Drug use: No       Family History   Problem Relation Age of Onset    Heart disease Mother     Cancer Mother         colon    Hypertension Mother     Hyperlipidemia Mother     Heart disease Father     Hypertension Father     Hyperlipidemia Father     Heart disease Sister         MI    Heart disease Brother         MI    COPD Son     Hypertension Son     Diabetes Brother     Diabetes Son     Cancer Sister         breast    Kidney disease Neg Hx     Stroke Neg Hx        Current Outpatient Medications   Medication Sig    acetaminophen (TYLENOL) 500 MG tablet Take 1 tablet (500 mg total) by mouth 2 (two) times daily. (Patient taking differently: Take 500 mg by mouth 2 (two) times daily as needed. )    albuterol 90 mcg/actuation inhaler Inhale 2 puffs into the lungs 4 (four) times daily.    ASCORBATE CALCIUM (VITAMIN C ORAL) Take 500 mg by mouth once daily.     DOCOSAHEXANOIC ACID/EPA (FISH OIL ORAL) Take 500 mg by mouth once daily.     ELIQUIS 5 mg Tab TAKE 1 TABLET BY MOUTH TWICE DAILY    " flecainide (TAMBOCOR) 50 MG Tab Take 1 tablet (50 mg total) by mouth every 12 (twelve) hours.    furosemide (LASIX) 20 MG tablet Take 2 tablets (40 mg total) by mouth once daily.    isosorbide mononitrate (IMDUR) 30 MG 24 hr tablet TAKE 1 TABLET EVERY DAY    metoprolol succinate (TOPROL-XL) 100 MG 24 hr tablet TAKE 1 TABLET EVERY DAY    pravastatin (PRAVACHOL) 40 MG tablet TAKE 1 TABLET EVERY DAY    albuterol (PROVENTIL HFA) 90 mcg/actuation inhaler Inhale 2 puffs into the lungs every 6 (six) hours as needed for Wheezing or Shortness of Breath. Rescue    compressor, for nebulizer Lara Compressor use as directed by albuterol use.    fluticasone (FLOVENT HFA) 110 mcg/actuation inhaler Inhale 1 puff into the lungs 2 (two) times daily.     No current facility-administered medications for this visit.      Current Outpatient Medications on File Prior to Visit   Medication Sig    acetaminophen (TYLENOL) 500 MG tablet Take 1 tablet (500 mg total) by mouth 2 (two) times daily. (Patient taking differently: Take 500 mg by mouth 2 (two) times daily as needed. )    albuterol 90 mcg/actuation inhaler Inhale 2 puffs into the lungs 4 (four) times daily.    ASCORBATE CALCIUM (VITAMIN C ORAL) Take 500 mg by mouth once daily.     DOCOSAHEXANOIC ACID/EPA (FISH OIL ORAL) Take 500 mg by mouth once daily.     ELIQUIS 5 mg Tab TAKE 1 TABLET BY MOUTH TWICE DAILY    flecainide (TAMBOCOR) 50 MG Tab Take 1 tablet (50 mg total) by mouth every 12 (twelve) hours.    furosemide (LASIX) 20 MG tablet Take 2 tablets (40 mg total) by mouth once daily.    isosorbide mononitrate (IMDUR) 30 MG 24 hr tablet TAKE 1 TABLET EVERY DAY    metoprolol succinate (TOPROL-XL) 100 MG 24 hr tablet TAKE 1 TABLET EVERY DAY    pravastatin (PRAVACHOL) 40 MG tablet TAKE 1 TABLET EVERY DAY    albuterol (PROVENTIL HFA) 90 mcg/actuation inhaler Inhale 2 puffs into the lungs every 6 (six) hours as needed for Wheezing or Shortness of Breath. Rescue     compressor, for nebulizer Lara Compressor use as directed by albuterol use.    fluticasone (FLOVENT HFA) 110 mcg/actuation inhaler Inhale 1 puff into the lungs 2 (two) times daily.     No current facility-administered medications on file prior to visit.      Review of patient's allergies indicates:   Allergen Reactions    Iodine and iodide containing products Rash     Review of Systems   Constitution: Positive for chills and fever. Negative for diaphoresis, weakness, malaise/fatigue and weight gain.   HENT: Negative for hoarse voice.    Eyes: Negative for double vision and visual disturbance.   Cardiovascular: Positive for dyspnea on exertion. Negative for chest pain, claudication, cyanosis, irregular heartbeat, leg swelling, near-syncope, orthopnea, palpitations, paroxysmal nocturnal dyspnea and syncope.   Respiratory: Positive for cough, shortness of breath and wheezing. Negative for hemoptysis and snoring.    Hematologic/Lymphatic: Negative for bleeding problem. Does not bruise/bleed easily.   Skin: Negative for color change and poor wound healing.   Musculoskeletal: Negative for muscle cramps, muscle weakness and myalgias.   Gastrointestinal: Negative for bloating, abdominal pain, change in bowel habit, diarrhea, heartburn, hematemesis, hematochezia, melena and nausea.   Neurological: Negative for excessive daytime sleepiness, dizziness, headaches, light-headedness, loss of balance and numbness.   Psychiatric/Behavioral: Negative for memory loss. The patient does not have insomnia.    Allergic/Immunologic: Negative for hives.       Objective:   Physical Exam   Constitutional: She is oriented to person, place, and time. She appears well-developed and well-nourished. She does not appear ill. No distress.   HENT:   Head: Normocephalic and atraumatic.   Eyes: EOM are normal. Pupils are equal, round, and reactive to light. No scleral icterus.   Neck: Normal range of motion. Neck supple. Normal carotid pulses, no  hepatojugular reflux and no JVD present. Carotid bruit is not present. No tracheal deviation present. No thyromegaly present.   Cardiovascular: Normal rate, regular rhythm, intact distal pulses and normal pulses. Exam reveals no gallop and no friction rub.   Murmur heard.   Harsh midsystolic murmur is present with a grade of 2/6 at the upper right sternal border radiating to the neck.  High-pitched blowing holosystolic murmur of grade 2/6 is also present at the apex.  High-pitched blowing decrescendo early diastolic murmur is present with a grade of 2/6 at the upper right sternal border radiating to the apex.  Pulses:       Carotid pulses are 2+ on the right side, and 2+ on the left side.       Radial pulses are 2+ on the right side, and 2+ on the left side.        Femoral pulses are 2+ on the right side, and 2+ on the left side.       Popliteal pulses are 2+ on the right side, and 2+ on the left side.        Dorsalis pedis pulses are 2+ on the right side, and 2+ on the left side.        Posterior tibial pulses are 2+ on the right side, and 2+ on the left side.   Pulmonary/Chest: Effort normal. No respiratory distress. She has wheezes. She has no rhonchi. She has no rales. She exhibits no tenderness.   RHONCHI   Abdominal: Soft. Normal appearance, normal aorta and bowel sounds are normal. She exhibits no abdominal bruit, no ascites and no pulsatile midline mass. There is no hepatomegaly. There is no tenderness.   Musculoskeletal: She exhibits no edema.        Right shoulder: She exhibits no deformity.   SCRA KNEE SURGERY WELL HEALED.   Neurological: She is alert and oriented to person, place, and time. She has normal strength. No cranial nerve deficit. Coordination normal.   Skin: Skin is warm and dry. No rash noted. She is not diaphoretic. No cyanosis or erythema. Nails show no clubbing.   Psychiatric: She has a normal mood and affect. Her speech is normal and behavior is normal.   Nursing note and vitals  "reviewed.    Vitals:    03/22/19 1435   BP: 108/62   Patient Position: Sitting   BP Method: Large (Manual)   Pulse: 60   Weight: 84.5 kg (186 lb 4.6 oz)   Height: 4' 9" (1.448 m)     Lab Results   Component Value Date    CHOL 156 10/25/2018    CHOL 147 01/31/2018    CHOL 123 04/13/2017     Lab Results   Component Value Date    HDL 68 10/25/2018    HDL 66 01/31/2018    HDL 59 04/13/2017     Lab Results   Component Value Date    LDLCALC 76.0 10/25/2018    LDLCALC 69.0 01/31/2018    LDLCALC 51.2 (L) 04/13/2017     Lab Results   Component Value Date    TRIG 60 10/25/2018    TRIG 60 01/31/2018    TRIG 64 04/13/2017     Lab Results   Component Value Date    CHOLHDL 43.6 10/25/2018    CHOLHDL 44.9 01/31/2018    CHOLHDL 48.0 04/13/2017       Chemistry        Component Value Date/Time     03/08/2019 0433    K 3.9 03/08/2019 0433    CL 96 03/08/2019 0433    CO2 35 (H) 03/08/2019 0433    BUN 20 03/08/2019 0433    CREATININE 1.0 03/08/2019 0433    GLU 93 03/08/2019 0433        Component Value Date/Time    CALCIUM 9.8 03/08/2019 0433    ALKPHOS 101 03/08/2019 0433    AST 23 03/08/2019 0433    ALT 32 03/08/2019 0433    BILITOT 0.7 03/08/2019 0433    ESTGFRAFRICA 60 03/08/2019 0433    EGFRNONAA 52 (A) 03/08/2019 0433          Lab Results   Component Value Date    TSH 2.207 01/31/2018     Lab Results   Component Value Date    INR 0.9 02/16/2019    INR 1.2 05/27/2017    INR 1.2 05/21/2017     Lab Results   Component Value Date    WBC 6.10 03/08/2019    HGB 13.3 03/08/2019    HCT 41.1 03/08/2019    MCV 92 03/08/2019     03/08/2019     BMP  Sodium   Date Value Ref Range Status   03/08/2019 141 136 - 145 mmol/L Final     Potassium   Date Value Ref Range Status   03/08/2019 3.9 3.5 - 5.1 mmol/L Final     Chloride   Date Value Ref Range Status   03/08/2019 96 95 - 110 mmol/L Final     CO2   Date Value Ref Range Status   03/08/2019 35 (H) 23 - 29 mmol/L Final     BUN, Bld   Date Value Ref Range Status   03/08/2019 20 8 - 23 " mg/dL Final     Creatinine   Date Value Ref Range Status   03/08/2019 1.0 0.5 - 1.4 mg/dL Final     Calcium   Date Value Ref Range Status   03/08/2019 9.8 8.7 - 10.5 mg/dL Final     Anion Gap   Date Value Ref Range Status   03/08/2019 10 8 - 16 mmol/L Final     eGFR if    Date Value Ref Range Status   03/08/2019 60 >60 mL/min/1.73 m^2 Final     eGFR if non    Date Value Ref Range Status   03/08/2019 52 (A) >60 mL/min/1.73 m^2 Final     Comment:     Calculation used to obtain the estimated glomerular filtration  rate (eGFR) is the CKD-EPI equation.        CrCl cannot be calculated (Patient's most recent lab result is older than the maximum 7 days allowed.).    Assessment:     1. PAF (paroxysmal atrial fibrillation)    2. Hyperlipidemia, unspecified hyperlipidemia type    3. Intermittent asthma without complication, unspecified asthma severity    4. Atherosclerosis of aorta    5. Chronic diastolic heart failure    6. Left ventricular diastolic dysfunction with preserved systolic function    7. Coronary artery disease involving native coronary artery of native heart without angina pectoris    8. Essential hypertension    9. Severe obesity with body mass index (BMI) of 35.0 to 39.9 with serious comorbidity    10. Bilateral carotid bruits    11. Anemia, unspecified type    12. Stage 3 chronic kidney disease      HAS URI WITH CONGESTION WHEEZING I THINK WILL GET HER TO AFTER HOURS TO GET HER TREATED AND MAKE SURE SHE DOES NOT HAVE THE FLU .   Plan:   TO AFTER HOURS.

## 2020-08-12 ENCOUNTER — PES CALL (OUTPATIENT)
Dept: ADMINISTRATIVE | Facility: CLINIC | Age: 85
End: 2020-08-12

## 2020-08-19 ENCOUNTER — OFFICE VISIT (OUTPATIENT)
Dept: CARDIOLOGY | Facility: CLINIC | Age: 85
End: 2020-08-19
Payer: MEDICARE

## 2020-08-19 VITALS
BODY MASS INDEX: 37.09 KG/M2 | WEIGHT: 171.94 LBS | HEIGHT: 57 IN | DIASTOLIC BLOOD PRESSURE: 68 MMHG | SYSTOLIC BLOOD PRESSURE: 124 MMHG | HEART RATE: 82 BPM | OXYGEN SATURATION: 100 %

## 2020-08-19 DIAGNOSIS — I25.10 CORONARY ARTERY DISEASE, ANGINA PRESENCE UNSPECIFIED, UNSPECIFIED VESSEL OR LESION TYPE, UNSPECIFIED WHETHER NATIVE OR TRANSPLANTED HEART: Primary | ICD-10-CM

## 2020-08-19 DIAGNOSIS — I10 ESSENTIAL HYPERTENSION: Chronic | ICD-10-CM

## 2020-08-19 DIAGNOSIS — E78.5 HYPERLIPIDEMIA, UNSPECIFIED HYPERLIPIDEMIA TYPE: Chronic | ICD-10-CM

## 2020-08-19 DIAGNOSIS — I51.89 LEFT VENTRICULAR DIASTOLIC DYSFUNCTION WITH PRESERVED SYSTOLIC FUNCTION: Chronic | ICD-10-CM

## 2020-08-19 DIAGNOSIS — I48.0 PAF (PAROXYSMAL ATRIAL FIBRILLATION): Chronic | ICD-10-CM

## 2020-08-19 PROCEDURE — 99214 PR OFFICE/OUTPT VISIT, EST, LEVL IV, 30-39 MIN: ICD-10-PCS | Mod: HCNC,S$GLB,, | Performed by: NURSE PRACTITIONER

## 2020-08-19 PROCEDURE — 99999 PR PBB SHADOW E&M-EST. PATIENT-LVL IV: ICD-10-PCS | Mod: PBBFAC,HCNC,, | Performed by: NURSE PRACTITIONER

## 2020-08-19 PROCEDURE — 3078F DIAST BP <80 MM HG: CPT | Mod: HCNC,CPTII,S$GLB, | Performed by: NURSE PRACTITIONER

## 2020-08-19 PROCEDURE — 99999 PR PBB SHADOW E&M-EST. PATIENT-LVL IV: CPT | Mod: PBBFAC,HCNC,, | Performed by: NURSE PRACTITIONER

## 2020-08-19 PROCEDURE — 1126F PR PAIN SEVERITY QUANTIFIED, NO PAIN PRESENT: ICD-10-PCS | Mod: HCNC,S$GLB,, | Performed by: NURSE PRACTITIONER

## 2020-08-19 PROCEDURE — 1159F PR MEDICATION LIST DOCUMENTED IN MEDICAL RECORD: ICD-10-PCS | Mod: HCNC,S$GLB,, | Performed by: NURSE PRACTITIONER

## 2020-08-19 PROCEDURE — 1101F PT FALLS ASSESS-DOCD LE1/YR: CPT | Mod: HCNC,CPTII,S$GLB, | Performed by: NURSE PRACTITIONER

## 2020-08-19 PROCEDURE — 3078F PR MOST RECENT DIASTOLIC BLOOD PRESSURE < 80 MM HG: ICD-10-PCS | Mod: HCNC,CPTII,S$GLB, | Performed by: NURSE PRACTITIONER

## 2020-08-19 PROCEDURE — 1101F PR PT FALLS ASSESS DOC 0-1 FALLS W/OUT INJ PAST YR: ICD-10-PCS | Mod: HCNC,CPTII,S$GLB, | Performed by: NURSE PRACTITIONER

## 2020-08-19 PROCEDURE — 1126F AMNT PAIN NOTED NONE PRSNT: CPT | Mod: HCNC,S$GLB,, | Performed by: NURSE PRACTITIONER

## 2020-08-19 PROCEDURE — 3074F PR MOST RECENT SYSTOLIC BLOOD PRESSURE < 130 MM HG: ICD-10-PCS | Mod: HCNC,CPTII,S$GLB, | Performed by: NURSE PRACTITIONER

## 2020-08-19 PROCEDURE — 1159F MED LIST DOCD IN RCRD: CPT | Mod: HCNC,S$GLB,, | Performed by: NURSE PRACTITIONER

## 2020-08-19 PROCEDURE — 99214 OFFICE O/P EST MOD 30 MIN: CPT | Mod: HCNC,S$GLB,, | Performed by: NURSE PRACTITIONER

## 2020-08-19 PROCEDURE — 3074F SYST BP LT 130 MM HG: CPT | Mod: HCNC,CPTII,S$GLB, | Performed by: NURSE PRACTITIONER

## 2020-08-19 NOTE — PROGRESS NOTES
Subjective:   Patient ID:  Elke Laws is a 85 y.o. female who presents for follow up of Atrial Fibrillation      HPI  Ms. Laws' current conditions include persistent AF, diastolic CHF, non obstructive CAD per [facility] done in , HTN and HLD. Admitted in May 2019 for A-fib with RVR. She had failed CV x2 during admission. DC'd with [facility]io. No BB restarted at that time due to bradycardia during admission. Patient declined follow up with EP ablation and PPM consideration and opted for conservative medical management.   Currently taking Lasix 40mg BID and Metolazone 2.5mg on MF.     Has no abnormal bleeding on Eliquis   Denies any chest pain, SOB, DIAZ,  orthopnea, PND, dizziness, palpitations,  near syncope, syncope or edema . Has no symptoms concerning for angina or equivalent. No CNS Complaints to suggest TIA or CVA. Does well with limiting sodium intake.      Past Medical History:   Diagnosis Date    Abnormal nuclear stress test 6/30/2016    Acute congestive heart failure 5/27/2017    Acute coronary syndrome     Acute kidney injury (nontraumatic) 5/27/2017    Acute on chronic congestive heart failure 5/21/2017    Acute on chronic diastolic congestive heart failure 8/27/2015    Acute on chronic respiratory failure 5/6/2019    Acute renal failure superimposed on stage 3 chronic kidney disease 1/26/2018    Anemia 5/9/2016    Angina pectoris 1/25/2018    Angina pectoris 1/25/2018    Anticoagulant long-term use     Anxiety 6/16/2019    Aortic regurgitation     Echo 11/2013---5 - Mild to moderate aortic regurgitation.      Asthma     Atrial fibrillation 5/15/2014    Back pain     s/p epidural steriod injections, no recent injections.    Baker's cyst     small, right, seen on US lower extremity 6/2014    Blood transfusion     Approximately 6 years ago    Chronic constipation     Coronary artery disease     Degenerative disc disease, lumbar     Diastolic dysfunction     Seen on echo 11/2013, stress  "test negative 2014    Diverticulosis     colonoscopy 3/27/2011    E coli bacteremia 2017    E. coli UTI (urinary tract infection) 2017    Hemorrhoids     colonoscopy 3/27/2011    Hiatal hernia     CXR 2016---Retrocardiac density again noted consistent with a hiatal hernia.    Hx of adenomatous colonic polyps     Hyperlipidemia     Hypertension     Hyponatremia 2016    Hypoxemia 2017    Hypoxia 2017    Leukocytosis 2017    Mitral regurgitation     Myocardial infarction     per patient was told in the past she had a "mild heart attack"    Obesity     Osteoarthritis, knee     Pneumonia     per patient "walking Pneumonia" did not require hospitalization    Seasonal allergies     Sepsis 2017    Trouble in sleeping     Urinary incontinence        Past Surgical History:   Procedure Laterality Date    APPENDECTOMY      CATARACT EXTRACTION, BILATERAL      COLONOSCOPY N/A 2017    Procedure: COLONOSCOPY okay by dr. ramos;  Surgeon: Toño Abdi MD;  Location: [facility] ENDO;  Service: Endoscopy;  Laterality: N/A;    EYE SURGERY Left     HYSTERECTOMY      benign reasons    KNEE SURGERY Bilateral     x2     Left heart cath      OLECRANON BURSECTOMY Right     2011    TONSILLECTOMY      TREATMENT OF CARDIAC ARRHYTHMIA N/A 2019    Procedure: CARDIOVERSION OR DEFIBRILLATION;  Surgeon: Kee Jones MD;  Location: [facility] CATH LAB;  Service: Cardiology;  Laterality: N/A;    TREATMENT OF CARDIAC ARRHYTHMIA N/A 2019    Procedure: CARDIOVERSION;  Surgeon: Juan Manuel Gooden MD;  Location: [facility] CATH LAB;  Service: Cardiology;  Laterality: N/A;    URETHRA SURGERY         Social History     Tobacco Use    Smoking status: Former Smoker     Packs/day: 0.05     Years: 1.00     Pack years: 0.05     Types: Cigarettes     Quit date: 1952     Years since quittin.6    Smokeless tobacco: Never Used   Substance Use Topics    Alcohol use: No     Alcohol/week: " 0.0 standard drinks    Drug use: No       Family History   Problem Relation Age of Onset    Heart disease Mother     Cancer Mother         colon    Hypertension Mother     Hyperlipidemia Mother     Heart disease Father     Hypertension Father     Hyperlipidemia Father     Heart disease Sister         MI    Heart disease Brother         MI    COPD Son     Hypertension Son     Diabetes Brother     Diabetes Son     Cancer Sister         breast    Kidney disease Neg Hx     Stroke Neg Hx        Current Outpatient Medications   Medication Sig    acetaminophen (TYLENOL) 500 MG tablet Take 1 tablet (500 mg total) by mouth 2 (two) times daily. (Patient taking differently: Take 500 mg by mouth 2 (two) times daily as needed. )    albuterol 90 mcg/actuation inhaler Inhale 2 puffs into the lungs 4 (four) times daily.    aspirin (ECOTRIN) 81 MG EC tablet Take 81 mg by mouth once daily.    cyanocobalamin (VITAMIN B-12) 1000 MCG tablet Take 100 mcg by mouth once daily.    docusate sodium (COLACE) 100 MG capsule Take 1 capsule (100 mg total) by mouth 2 (two) times daily.    ELIQUIS 5 mg Tab TAKE 1 TABLET BY MOUTH TWICE DAILY    fluticasone (FLOVENT HFA) 110 mcg/actuation inhaler Inhale 1 puff into the lungs 2 (two) times daily.    furosemide (LASIX) 40 MG tablet TAKE 1 TABLET TWICE DAILY    metOLazone (ZAROXOLYN) 2.5 MG tablet TAKE 1 TABLET BY MOUTH EVERY MONDAY, WEDNESDAY, FRIDAY    metoprolol tartrate (LOPRESSOR) 25 MG tablet TAKE 1/2 TABLET TWICE DAILY    omega 3-dha-epa-fish oil (FISH OIL) 1,000 mg (120 mg-180 mg) Cap Take 1 capsule by mouth once daily.    PACERONE 200 mg Tab TAKE 1 TABLET BY MOUTH ONCE DAILY    polyethylene glycol (GLYCOLAX) 17 gram/dose powder Take 17 g by mouth once daily.    potassium chloride SA (K-DUR,KLOR-CON) 10 MEQ tablet Take 1 tablet (10 mEq total) by mouth 2 (two) times daily.    pravastatin (PRAVACHOL) 40 MG tablet TAKE 1 TABLET EVERY DAY     No current  facility-administered medications for this visit.      Current Outpatient Medications on File Prior to Visit   Medication Sig    acetaminophen (TYLENOL) 500 MG tablet Take 1 tablet (500 mg total) by mouth 2 (two) times daily. (Patient taking differently: Take 500 mg by mouth 2 (two) times daily as needed. )    albuterol 90 mcg/actuation inhaler Inhale 2 puffs into the lungs 4 (four) times daily.    aspirin (ECOTRIN) 81 MG EC tablet Take 81 mg by mouth once daily.    cyanocobalamin (VITAMIN B-12) 1000 MCG tablet Take 100 mcg by mouth once daily.    docusate sodium (COLACE) 100 MG capsule Take 1 capsule (100 mg total) by mouth 2 (two) times daily.    ELIQUIS 5 mg Tab TAKE 1 TABLET BY MOUTH TWICE DAILY    fluticasone (FLOVENT HFA) 110 mcg/actuation inhaler Inhale 1 puff into the lungs 2 (two) times daily.    furosemide (LASIX) 40 MG tablet TAKE 1 TABLET TWICE DAILY    metOLazone (ZAROXOLYN) 2.5 MG tablet TAKE 1 TABLET BY MOUTH EVERY MONDAY, WEDNESDAY, FRIDAY    metoprolol tartrate (LOPRESSOR) 25 MG tablet TAKE 1/2 TABLET TWICE DAILY    omega 3-dha-epa-fish oil (FISH OIL) 1,000 mg (120 mg-180 mg) Cap Take 1 capsule by mouth once daily.    PACERONE 200 mg Tab TAKE 1 TABLET BY MOUTH ONCE DAILY    polyethylene glycol (GLYCOLAX) 17 gram/dose powder Take 17 g by mouth once daily.    potassium chloride SA (K-DUR,KLOR-CON) 10 MEQ tablet Take 1 tablet (10 mEq total) by mouth 2 (two) times daily.    pravastatin (PRAVACHOL) 40 MG tablet TAKE 1 TABLET EVERY DAY     No current facility-administered medications on file prior to visit.        Review of Systems   Constitution: Negative for diaphoresis, malaise/fatigue, weight gain and weight loss.   HENT: Negative for congestion and nosebleeds.    Cardiovascular: Negative for chest pain, claudication, cyanosis, dyspnea on exertion, irregular heartbeat, leg swelling, near-syncope, orthopnea, palpitations, paroxysmal nocturnal dyspnea and syncope.   Respiratory: Negative  "for cough, hemoptysis, shortness of breath, sleep disturbances due to breathing, snoring, sputum production and wheezing.    Hematologic/Lymphatic: Negative for bleeding problem. Does not bruise/bleed easily.   Skin: Negative for rash.   Musculoskeletal: Negative for arthritis, back pain, falls, joint pain, muscle cramps and muscle weakness.   Gastrointestinal: Negative for abdominal pain, constipation, diarrhea, heartburn, hematemesis, hematochezia, melena, nausea and vomiting.   Genitourinary: Negative for dysuria, hematuria and nocturia.   Neurological: Negative for excessive daytime sleepiness, dizziness, headaches, light-headedness, loss of balance, numbness, vertigo and weakness.       Objective:   Physical Exam   Constitutional: She is oriented to person, place, and time. She appears well-developed and well-nourished.   Neck: Neck supple. No JVD present.   Cardiovascular: Normal rate, normal heart sounds and normal pulses. An irregularly irregular rhythm present. Exam reveals no friction rub.   No murmur heard.  Pulmonary/Chest: Effort normal and breath sounds normal. No respiratory distress. She has no wheezes. She has no rales.   NC at 2 liters    Abdominal: Soft. Bowel sounds are normal. She exhibits no distension.   Musculoskeletal:         General: No tenderness or edema.   Neurological: She is alert and oriented to person, place, and time.   Skin: Skin is warm and dry. No rash noted.   Psychiatric: She has a normal mood and affect. Her behavior is normal.   Nursing note and vitals reviewed.    Vitals:    08/19/20 1437   BP: 124/68   Pulse: 82   SpO2: 100%   Weight: 78 kg (171 lb 15.3 oz)   Height: 4' 9" (1.448 m)     Lab Results   Component Value Date    CHOL 164 11/20/2019    CHOL 156 10/25/2018    CHOL 147 01/31/2018     Lab Results   Component Value Date    HDL 78 (H) 11/20/2019    HDL 68 10/25/2018    HDL 66 01/31/2018     Lab Results   Component Value Date    LDLCALC 73.6 11/20/2019    LDLCALC 76.0 " 10/25/2018    LDLCALC 69.0 01/31/2018     Lab Results   Component Value Date    TRIG 62 11/20/2019    TRIG 60 10/25/2018    TRIG 60 01/31/2018     Lab Results   Component Value Date    CHOLHDL 47.6 11/20/2019    CHOLHDL 43.6 10/25/2018    CHOLHDL 44.9 01/31/2018       Chemistry        Component Value Date/Time     05/26/2020 1548    K 4.0 05/26/2020 1548    CL 98 05/26/2020 1548    CO2 34 (H) 05/26/2020 1548    BUN 28 (H) 05/26/2020 1548    CREATININE 1.1 05/26/2020 1548    GLU 95 05/26/2020 1548        Component Value Date/Time    CALCIUM 9.5 05/26/2020 1548    ALKPHOS 99 05/26/2020 1548    AST 21 05/26/2020 1548    ALT 21 05/26/2020 1548    BILITOT 0.4 05/26/2020 1548    ESTGFRAFRICA 52.9 (A) 05/26/2020 1548    EGFRNONAA 45.9 (A) 05/26/2020 1548          Lab Results   Component Value Date    TSH 2.631 05/21/2020     Lab Results   Component Value Date    INR 1.0 05/21/2019    INR 0.9 02/16/2019    INR 1.2 05/27/2017     Lab Results   Component Value Date    WBC 5.39 05/21/2020    HGB 12.5 05/21/2020    HCT 41.4 05/21/2020     (H) 05/21/2020     05/21/2020     BMP  Sodium   Date Value Ref Range Status   05/26/2020 139 136 - 145 mmol/L Final     Potassium   Date Value Ref Range Status   05/26/2020 4.0 3.5 - 5.1 mmol/L Final     Chloride   Date Value Ref Range Status   05/26/2020 98 95 - 110 mmol/L Final     CO2   Date Value Ref Range Status   05/26/2020 34 (H) 23 - 29 mmol/L Final     BUN, Bld   Date Value Ref Range Status   05/26/2020 28 (H) 8 - 23 mg/dL Final     Creatinine   Date Value Ref Range Status   05/26/2020 1.1 0.5 - 1.4 mg/dL Final     Calcium   Date Value Ref Range Status   05/26/2020 9.5 8.7 - 10.5 mg/dL Final     Anion Gap   Date Value Ref Range Status   05/26/2020 7 (L) 8 - 16 mmol/L Final     eGFR if    Date Value Ref Range Status   05/26/2020 52.9 (A) >60 mL/min/1.73 m^2 Final     eGFR if non    Date Value Ref Range Status   05/26/2020 45.9 (A) >60  mL/min/1.73 m^2 Final     Comment:     Calculation used to obtain the estimated glomerular filtration  rate (eGFR) is the CKD-EPI equation.        CrCl cannot be calculated (Patient's most recent lab result is older than the maximum 7 days allowed.).    Assessment:     1. Coronary artery disease, angina presence unspecified, unspecified vessel or lesion type, unspecified whether native or transplanted heart    2. Essential hypertension    3. Hyperlipidemia, unspecified hyperlipidemia type    4. PAF (paroxysmal atrial fibrillation)    5. Left ventricular diastolic dysfunction with preserved systolic function        Plan:   Coronary artery disease, angina presence unspecified, unspecified vessel or lesion type, unspecified whether native or transplanted heart  Continue ASA, Statin and BB  Heart healthy diet     Essential hypertension  Continue current therapy     Hyperlipidemia, unspecified hyperlipidemia type  Continue Statin     PAF (paroxysmal atrial fibrillation)  Continue metoprolol and amio for rate control   Continue Eliquis for CVA prophylaxis    Left ventricular diastolic dysfunction with preserved systolic function  Continue lasix 40mg BID and metolazone MF  Heart healthy diet  Limit fluid intake 50-60 oz   Daily weights and to notify clinic if weight increases by more than 3 lbs in 1 day or 5 lbs in 1 week.   Exercise routine as tolerated    RTC in 6 months or sooner if needed . Fasting labs with PCP

## 2020-08-24 ENCOUNTER — OFFICE VISIT (OUTPATIENT)
Dept: INTERNAL MEDICINE | Facility: CLINIC | Age: 85
End: 2020-08-24
Payer: MEDICARE

## 2020-08-24 VITALS
BODY MASS INDEX: 38.29 KG/M2 | HEIGHT: 57 IN | WEIGHT: 177.5 LBS | DIASTOLIC BLOOD PRESSURE: 66 MMHG | HEART RATE: 75 BPM | OXYGEN SATURATION: 99 % | SYSTOLIC BLOOD PRESSURE: 124 MMHG

## 2020-08-24 DIAGNOSIS — R41.3 MEMORY DIFFICULTIES: ICD-10-CM

## 2020-08-24 DIAGNOSIS — E78.5 HYPERLIPIDEMIA, UNSPECIFIED HYPERLIPIDEMIA TYPE: Chronic | ICD-10-CM

## 2020-08-24 DIAGNOSIS — Z99.81 DEPENDENCE ON SUPPLEMENTAL OXYGEN: ICD-10-CM

## 2020-08-24 DIAGNOSIS — Z74.09 OTHER REDUCED MOBILITY: ICD-10-CM

## 2020-08-24 DIAGNOSIS — Z99.89 DEPENDENCE ON OTHER ENABLING MACHINES AND DEVICES: ICD-10-CM

## 2020-08-24 DIAGNOSIS — I25.10 CORONARY ARTERY DISEASE, ANGINA PRESENCE UNSPECIFIED, UNSPECIFIED VESSEL OR LESION TYPE, UNSPECIFIED WHETHER NATIVE OR TRANSPLANTED HEART: ICD-10-CM

## 2020-08-24 DIAGNOSIS — Z00.00 ENCOUNTER FOR PREVENTIVE HEALTH EXAMINATION: Primary | ICD-10-CM

## 2020-08-24 DIAGNOSIS — I48.0 PAF (PAROXYSMAL ATRIAL FIBRILLATION): Chronic | ICD-10-CM

## 2020-08-24 DIAGNOSIS — Z91.89 POTENTIAL FOR COGNITIVE IMPAIRMENT: ICD-10-CM

## 2020-08-24 DIAGNOSIS — F41.9 ANXIETY: ICD-10-CM

## 2020-08-24 DIAGNOSIS — I70.0 ATHEROSCLEROSIS OF AORTA: Chronic | ICD-10-CM

## 2020-08-24 DIAGNOSIS — R94.120 ABNORMAL HEARING SCREEN: ICD-10-CM

## 2020-08-24 DIAGNOSIS — N18.30 CKD (CHRONIC KIDNEY DISEASE) STAGE 3, GFR 30-59 ML/MIN: ICD-10-CM

## 2020-08-24 DIAGNOSIS — H91.90 HEARING DIFFICULTY, UNSPECIFIED LATERALITY: ICD-10-CM

## 2020-08-24 DIAGNOSIS — I27.9 PULMONARY HEART DISEASE: ICD-10-CM

## 2020-08-24 DIAGNOSIS — E66.01 SEVERE OBESITY (BMI 35.0-39.9) WITH COMORBIDITY: ICD-10-CM

## 2020-08-24 DIAGNOSIS — J45.909 ASTHMA, UNSPECIFIED ASTHMA SEVERITY, UNSPECIFIED WHETHER COMPLICATED, UNSPECIFIED WHETHER PERSISTENT: ICD-10-CM

## 2020-08-24 PROCEDURE — 99213 PR OFFICE/OUTPT VISIT, EST, LEVL III, 20-29 MIN: ICD-10-PCS | Mod: HCNC,S$GLB,, | Performed by: NURSE PRACTITIONER

## 2020-08-24 PROCEDURE — 3078F PR MOST RECENT DIASTOLIC BLOOD PRESSURE < 80 MM HG: ICD-10-PCS | Mod: HCNC,CPTII,S$GLB, | Performed by: NURSE PRACTITIONER

## 2020-08-24 PROCEDURE — 99499 RISK ADDL DX/OHS AUDIT: ICD-10-PCS | Mod: S$GLB,,, | Performed by: NURSE PRACTITIONER

## 2020-08-24 PROCEDURE — 99499 UNLISTED E&M SERVICE: CPT | Mod: S$GLB,,, | Performed by: NURSE PRACTITIONER

## 2020-08-24 PROCEDURE — 99999 PR PBB SHADOW E&M-EST. PATIENT-LVL IV: CPT | Mod: PBBFAC,HCNC,, | Performed by: NURSE PRACTITIONER

## 2020-08-24 PROCEDURE — 3078F DIAST BP <80 MM HG: CPT | Mod: HCNC,CPTII,S$GLB, | Performed by: NURSE PRACTITIONER

## 2020-08-24 PROCEDURE — 3074F PR MOST RECENT SYSTOLIC BLOOD PRESSURE < 130 MM HG: ICD-10-PCS | Mod: HCNC,CPTII,S$GLB, | Performed by: NURSE PRACTITIONER

## 2020-08-24 PROCEDURE — 99213 OFFICE O/P EST LOW 20 MIN: CPT | Mod: HCNC,S$GLB,, | Performed by: NURSE PRACTITIONER

## 2020-08-24 PROCEDURE — 99999 PR PBB SHADOW E&M-EST. PATIENT-LVL IV: ICD-10-PCS | Mod: PBBFAC,HCNC,, | Performed by: NURSE PRACTITIONER

## 2020-08-24 PROCEDURE — 3074F SYST BP LT 130 MM HG: CPT | Mod: HCNC,CPTII,S$GLB, | Performed by: NURSE PRACTITIONER

## 2020-08-24 NOTE — PROGRESS NOTES
"  Elke Laws presented for a  Medicare AWV and comprehensive Health Risk Assessment today. The following components were reviewed and updated:    · Medical history  · Family History  · Social history  · Allergies and Current Medications  · Health Risk Assessment  · Health Maintenance  · Care Team     ** See Completed Assessments for Annual Wellness Visit within the encounter summary.**         The following assessments were completed:  · Living Situation  · CAGE  · Depression Screening  · Timed Get Up and Go  · Whisper Test  · Cognitive Function Screening  · Nutrition Screening  · ADL Screening  · PAQ Screening        Vitals:    08/24/20 0822   BP: 124/66   BP Location: Right arm   Patient Position: Sitting   BP Method: Medium (Manual)   Pulse: 75   SpO2: 99%   Weight: 80.5 kg (177 lb 7.5 oz)   Height: 4' 9" (1.448 m)     Body mass index is 38.4 kg/m².  Physical Exam  Vitals signs and nursing note reviewed.   Constitutional:       Appearance: She is well-developed.      Comments: Patient accompanied by grandson, who was present throughout the visit.      HENT:      Head: Normocephalic.   Cardiovascular:      Rate and Rhythm: Normal rate. Rhythm irregular.      Heart sounds: Normal heart sounds. No murmur.   Pulmonary:      Effort: Pulmonary effort is normal. No respiratory distress.      Breath sounds: Normal breath sounds.      Comments: O2 via NC  Abdominal:      Palpations: Abdomen is soft. There is no mass.      Tenderness: There is no abdominal tenderness.   Musculoskeletal: Normal range of motion.   Skin:     General: Skin is warm and dry.   Neurological:      Mental Status: She is alert.      Motor: No abnormal muscle tone.      Comments: Oriented to person and place.    Psychiatric:         Speech: Speech normal.         Behavior: Behavior normal.               Diagnoses and health risks identified today and associated recommendations/orders:    1. Encounter for preventive health examination  Reports she " will discuss nephrology follow up with PCP.   Reports sees outside eye doctor and up to date on eye exam.     2. Coronary artery disease, angina presence unspecified, unspecified vessel or lesion type, unspecified whether native or transplanted heart  Stable and controlled. Continue current treatment plan as previously prescribed with your PCP and cardiologist.     3. PAF (paroxysmal atrial fibrillation)  Stable. Continue current treatment plan as previously prescribed with your cardiologist.     4. Hyperlipidemia, unspecified hyperlipidemia type  Stable and controlled. Continue current treatment plan as previously prescribed with your PCP and cardiologist.     5. Atherosclerosis of aorta  cxr 5/2019  Stable and controlled. Continue current treatment plan as previously prescribed with your PCP and cardiologist.     6. Pulmonary heart disease  Echo 3/2019  Continue current treatment plan as previously prescribed with your PCP.    7. Asthma, unspecified asthma severity, unspecified whether complicated, unspecified whether persistent  Stable. Continue current treatment plan as previously prescribed with your PCP.    8. CKD (chronic kidney disease) stage 3, GFR 30-59 ml/min  Stable. Continue current treatment plan as previously prescribed with your nephrologist.     9. Anxiety  Continue current treatment plan as previously prescribed with your PCP.     10. Severe obesity (BMI 35.0-39.9) with comorbidity  Encouraged healthy diet and exercise as tolerated to help bring BMI into normal range.   Continue current treatment plan as previously prescribed with your PCP.     11. Abnormal hearing screen  Reports difficulty hearing.   Abnormal whisper test.   Has been evaluated by audiology in past and hearing aids recommended.     12. Hearing difficulty, unspecified laterality  See #11    13. Potential for cognitive impairment  Reports memory difficulties.   Abnormal cognitive function screening.   Grandson lives with patient.    Advised to follow up with PCP for further evaluation and recommendations. They expressed understanding.      14. Memory difficulties  See #13    15. Dependence on other enabling machines and devices  Abnormal timed get up and go test. Denies any falls in the last 12 months. Uses a walker to aid with ambulation.   Fall precautions reviewed with patient. They expressed understanding.       16. Dependence on supplemental oxygen  Reports wears 2.5L O2 via NC, continuously.      17. Other reduced mobility  See #15      Provided Elke with a 5-10 year written screening schedule and personal prevention plan. Recommendations were developed using the USPSTF age appropriate recommendations. Education, counseling, and referrals were provided as needed. After Visit Summary printed and given to patient which includes a list of additional screenings\tests needed.    Follow up in about 1 year (around 8/24/2021) for awv.    Beronica Walters NP

## 2020-08-24 NOTE — Clinical Note
Your patient was seen today for AWV. Abnormalities bolded. Please review.   Thank you,   SRIDEVI Kim

## 2020-08-24 NOTE — PATIENT INSTRUCTIONS
Counseling and Referral of Other Preventative  (Italic type indicates deductible and co-insurance are waived)    Patient Name: Elke Laws  Today's Date: 8/24/2020    Health Maintenance       Date Due Completion Date    Influenza Vaccine (1) 09/01/2020 10/21/2019    Lipid Panel 11/20/2020 11/20/2019    DEXA SCAN 02/02/2021 2/2/2018 (Declined)    Override on 2/2/2018: Declined    Override on 11/12/2013: Not Clinically Appropriate (NOrmal in 2010. No repeat recommended.)    Override on 5/12/2010: Done (normal exam.  Repeat not recommended)    TETANUS VACCINE 06/10/2026 6/10/2016        No orders of the defined types were placed in this encounter.    The following information is provided to all patients.  This information is to help you find resources for any of the problems found today that may be affecting your health:                Living healthy guide: www.[facility].[place].gov      Understanding Diabetes: www.diabetes.org      Eating healthy: www.cdc.gov/healthyweight      [facility] home safety checklist: www.cdc.gov/steadi/patient.html      Agency on Aging: www.goea.[place].[facility]      Alcoholics anonymous (AA): www.aa.org      Physical Activity: www.cody.nih.gov/va5xlun      Tobacco use: www.quitwithusla.org

## 2020-10-26 ENCOUNTER — TELEPHONE (OUTPATIENT)
Dept: INTERNAL MEDICINE | Facility: CLINIC | Age: 85
End: 2020-10-26

## 2020-10-26 NOTE — TELEPHONE ENCOUNTER
----- Message from Irina Guaman sent at 10/26/2020  9:39 AM CDT -----  Contact: [phone]  Would like to consult with the nurse, patient has not being feeling good, patient would like to speak with the nurse as soon as possible, please call back thanks sj

## 2020-10-26 NOTE — TELEPHONE ENCOUNTER
Called pt states she would like to get oxygen tank looked at to make sure it is working correctly.   States never mind for the call and hung up.

## 2020-12-14 ENCOUNTER — OFFICE VISIT (OUTPATIENT)
Dept: INTERNAL MEDICINE | Facility: CLINIC | Age: 85
End: 2020-12-14
Payer: MEDICARE

## 2020-12-14 DIAGNOSIS — J45.909 ASTHMA, UNSPECIFIED ASTHMA SEVERITY, UNSPECIFIED WHETHER COMPLICATED, UNSPECIFIED WHETHER PERSISTENT: ICD-10-CM

## 2020-12-14 DIAGNOSIS — E66.01 SEVERE OBESITY WITH BODY MASS INDEX (BMI) OF 35.0 TO 39.9 WITH SERIOUS COMORBIDITY: ICD-10-CM

## 2020-12-14 DIAGNOSIS — I48.0 PAF (PAROXYSMAL ATRIAL FIBRILLATION): Chronic | ICD-10-CM

## 2020-12-14 DIAGNOSIS — I27.9 PULMONARY HEART DISEASE: ICD-10-CM

## 2020-12-14 DIAGNOSIS — R05.9 COUGH: Primary | ICD-10-CM

## 2020-12-14 DIAGNOSIS — R06.09 DYSPNEA ON EXERTION: ICD-10-CM

## 2020-12-14 DIAGNOSIS — Z91.89 POTENTIAL FOR COGNITIVE IMPAIRMENT: ICD-10-CM

## 2020-12-14 PROCEDURE — 99441 PR PHYSICIAN TELEPHONE EVALUATION 5-10 MIN: ICD-10-PCS | Mod: 95,,, | Performed by: NURSE PRACTITIONER

## 2020-12-14 PROCEDURE — 1157F PR ADVANCE CARE PLAN OR EQUIV PRESENT IN MEDICAL RECORD: ICD-10-PCS | Mod: 95,,, | Performed by: NURSE PRACTITIONER

## 2020-12-14 PROCEDURE — 1157F ADVNC CARE PLAN IN RCRD: CPT | Mod: 95,,, | Performed by: NURSE PRACTITIONER

## 2020-12-14 PROCEDURE — 99441 PR PHYSICIAN TELEPHONE EVALUATION 5-10 MIN: CPT | Mod: 95,,, | Performed by: NURSE PRACTITIONER

## 2020-12-28 DIAGNOSIS — T50.2X5A DIURETIC-INDUCED HYPOKALEMIA: ICD-10-CM

## 2020-12-28 DIAGNOSIS — E87.6 DIURETIC-INDUCED HYPOKALEMIA: ICD-10-CM

## 2020-12-28 RX ORDER — POTASSIUM CHLORIDE 750 MG/1
TABLET, EXTENDED RELEASE ORAL
Qty: 60 TABLET | Refills: 0 | Status: SHIPPED | OUTPATIENT
Start: 2020-12-28 | End: 2021-03-05 | Stop reason: SDUPTHER

## 2021-01-04 ENCOUNTER — OFFICE VISIT (OUTPATIENT)
Dept: PULMONOLOGY | Facility: CLINIC | Age: 86
End: 2021-01-04
Payer: MEDICARE

## 2021-01-04 ENCOUNTER — HOSPITAL ENCOUNTER (OUTPATIENT)
Dept: RADIOLOGY | Facility: HOSPITAL | Age: 86
Discharge: HOME | End: 2021-01-04
Attending: NURSE PRACTITIONER
Payer: MEDICARE

## 2021-01-04 ENCOUNTER — OFFICE VISIT (OUTPATIENT)
Dept: INTERNAL MEDICINE | Facility: CLINIC | Age: 86
End: 2021-01-04
Payer: MEDICARE

## 2021-01-04 VITALS
BODY MASS INDEX: 35.72 KG/M2 | DIASTOLIC BLOOD PRESSURE: 78 MMHG | HEART RATE: 71 BPM | WEIGHT: 165.56 LBS | SYSTOLIC BLOOD PRESSURE: 120 MMHG | HEIGHT: 57 IN

## 2021-01-04 VITALS
DIASTOLIC BLOOD PRESSURE: 60 MMHG | SYSTOLIC BLOOD PRESSURE: 116 MMHG | HEART RATE: 68 BPM | BODY MASS INDEX: 35.81 KG/M2 | RESPIRATION RATE: 18 BRPM | WEIGHT: 166 LBS | HEIGHT: 57 IN

## 2021-01-04 DIAGNOSIS — H91.93 BILATERAL HEARING LOSS, UNSPECIFIED HEARING LOSS TYPE: ICD-10-CM

## 2021-01-04 DIAGNOSIS — N18.30 STAGE 3 CHRONIC KIDNEY DISEASE, UNSPECIFIED WHETHER STAGE 3A OR 3B CKD: ICD-10-CM

## 2021-01-04 DIAGNOSIS — E66.01 SEVERE OBESITY WITH BODY MASS INDEX (BMI) OF 35.0 TO 39.9 WITH SERIOUS COMORBIDITY: ICD-10-CM

## 2021-01-04 DIAGNOSIS — I48.0 PAF (PAROXYSMAL ATRIAL FIBRILLATION): Chronic | ICD-10-CM

## 2021-01-04 DIAGNOSIS — I70.0 ATHEROSCLEROSIS OF AORTA: Chronic | ICD-10-CM

## 2021-01-04 DIAGNOSIS — Z91.89 POTENTIAL FOR COGNITIVE IMPAIRMENT: ICD-10-CM

## 2021-01-04 DIAGNOSIS — I10 ESSENTIAL HYPERTENSION: Chronic | ICD-10-CM

## 2021-01-04 DIAGNOSIS — I27.9 PULMONARY HEART DISEASE: ICD-10-CM

## 2021-01-04 DIAGNOSIS — Z79.899 ENCOUNTER FOR LONG-TERM (CURRENT) USE OF MEDICATIONS: ICD-10-CM

## 2021-01-04 DIAGNOSIS — F41.9 ANXIETY: ICD-10-CM

## 2021-01-04 DIAGNOSIS — R31.9 URINARY TRACT INFECTION WITH HEMATURIA, SITE UNSPECIFIED: ICD-10-CM

## 2021-01-04 DIAGNOSIS — I38 HEART VALVE REGURGITATION: ICD-10-CM

## 2021-01-04 DIAGNOSIS — J45.20 MILD INTERMITTENT ASTHMA WITHOUT COMPLICATION: Primary | ICD-10-CM

## 2021-01-04 DIAGNOSIS — I25.10 CORONARY ARTERY DISEASE, ANGINA PRESENCE UNSPECIFIED, UNSPECIFIED VESSEL OR LESION TYPE, UNSPECIFIED WHETHER NATIVE OR TRANSPLANTED HEART: ICD-10-CM

## 2021-01-04 DIAGNOSIS — R06.09 DYSPNEA ON EXERTION: ICD-10-CM

## 2021-01-04 DIAGNOSIS — J45.909 ASTHMA, UNSPECIFIED ASTHMA SEVERITY, UNSPECIFIED WHETHER COMPLICATED, UNSPECIFIED WHETHER PERSISTENT: ICD-10-CM

## 2021-01-04 DIAGNOSIS — R30.0 DYSURIA: Primary | ICD-10-CM

## 2021-01-04 DIAGNOSIS — R73.03 PREDIABETES: ICD-10-CM

## 2021-01-04 DIAGNOSIS — N39.0 URINARY TRACT INFECTION WITH HEMATURIA, SITE UNSPECIFIED: ICD-10-CM

## 2021-01-04 DIAGNOSIS — J45.20 MILD INTERMITTENT ASTHMA WITHOUT COMPLICATION: ICD-10-CM

## 2021-01-04 DIAGNOSIS — E78.5 HYPERLIPIDEMIA, UNSPECIFIED HYPERLIPIDEMIA TYPE: Chronic | ICD-10-CM

## 2021-01-04 DIAGNOSIS — Z99.89 DEPENDENCE ON OTHER ENABLING MACHINES AND DEVICES: ICD-10-CM

## 2021-01-04 DIAGNOSIS — Z99.81 DEPENDENCE ON SUPPLEMENTAL OXYGEN: ICD-10-CM

## 2021-01-04 LAB
BACTERIA #/AREA URNS HPF: ABNORMAL /HPF
BILIRUB UR QL STRIP: NEGATIVE
CLARITY UR: ABNORMAL
COLOR UR: YELLOW
GLUCOSE UR QL STRIP: NEGATIVE
HGB UR QL STRIP: ABNORMAL
KETONES UR QL STRIP: NEGATIVE
LEUKOCYTE ESTERASE UR QL STRIP: ABNORMAL
MICROSCOPIC COMMENT: ABNORMAL
NITRITE UR QL STRIP: NEGATIVE
PH UR STRIP: 7.5 [PH] (ref 5–8)
PROT UR QL STRIP: NEGATIVE
RBC #/AREA URNS HPF: 15 /HPF (ref 0–4)
SP GR UR STRIP: 1.01 (ref 1–1.03)
SQUAMOUS #/AREA URNS HPF: 1 /HPF
URN SPEC COLLECT METH UR: ABNORMAL
WBC #/AREA URNS HPF: >100 /HPF (ref 0–5)
WBC CLUMPS URNS QL MICRO: ABNORMAL

## 2021-01-04 PROCEDURE — 1101F PR PT FALLS ASSESS DOC 0-1 FALLS W/OUT INJ PAST YR: ICD-10-PCS | Mod: CPTII,S$GLB,, | Performed by: NURSE PRACTITIONER

## 2021-01-04 PROCEDURE — 1126F PR PAIN SEVERITY QUANTIFIED, NO PAIN PRESENT: ICD-10-PCS | Mod: S$GLB,,, | Performed by: FAMILY MEDICINE

## 2021-01-04 PROCEDURE — 71046 XR CHEST PA AND LATERAL: ICD-10-PCS | Mod: 26,,, | Performed by: RADIOLOGY

## 2021-01-04 PROCEDURE — 90694 VACC AIIV4 NO PRSRV 0.5ML IM: CPT | Mod: S$GLB,,, | Performed by: FAMILY MEDICINE

## 2021-01-04 PROCEDURE — 1101F PT FALLS ASSESS-DOCD LE1/YR: CPT | Mod: CPTII,S$GLB,, | Performed by: NURSE PRACTITIONER

## 2021-01-04 PROCEDURE — 1159F MED LIST DOCD IN RCRD: CPT | Mod: S$GLB,,, | Performed by: NURSE PRACTITIONER

## 2021-01-04 PROCEDURE — 3288F FALL RISK ASSESSMENT DOCD: CPT | Mod: CPTII,S$GLB,, | Performed by: NURSE PRACTITIONER

## 2021-01-04 PROCEDURE — 1101F PT FALLS ASSESS-DOCD LE1/YR: CPT | Mod: CPTII,S$GLB,, | Performed by: FAMILY MEDICINE

## 2021-01-04 PROCEDURE — 99999 PR PBB SHADOW E&M-EST. PATIENT-LVL IV: ICD-10-PCS | Mod: PBBFAC,,, | Performed by: NURSE PRACTITIONER

## 2021-01-04 PROCEDURE — 81000 URINALYSIS NONAUTO W/SCOPE: CPT

## 2021-01-04 PROCEDURE — G0008 FLU VACCINE - QUADRIVALENT - ADJUVANTED: ICD-10-PCS | Mod: S$GLB,,, | Performed by: FAMILY MEDICINE

## 2021-01-04 PROCEDURE — 3288F PR FALLS RISK ASSESSMENT DOCUMENTED: ICD-10-PCS | Mod: CPTII,S$GLB,, | Performed by: FAMILY MEDICINE

## 2021-01-04 PROCEDURE — 99499 RISK ADDL DX/OHS AUDIT: ICD-10-PCS | Mod: S$GLB,,, | Performed by: FAMILY MEDICINE

## 2021-01-04 PROCEDURE — 99214 PR OFFICE/OUTPT VISIT, EST, LEVL IV, 30-39 MIN: ICD-10-PCS | Mod: S$GLB,,, | Performed by: NURSE PRACTITIONER

## 2021-01-04 PROCEDURE — 87088 URINE BACTERIA CULTURE: CPT

## 2021-01-04 PROCEDURE — 1157F ADVNC CARE PLAN IN RCRD: CPT | Mod: S$GLB,,, | Performed by: NURSE PRACTITIONER

## 2021-01-04 PROCEDURE — 90694 FLU VACCINE - QUADRIVALENT - ADJUVANTED: ICD-10-PCS | Mod: S$GLB,,, | Performed by: FAMILY MEDICINE

## 2021-01-04 PROCEDURE — 71046 X-RAY EXAM CHEST 2 VIEWS: CPT | Mod: TC

## 2021-01-04 PROCEDURE — 99999 PR PBB SHADOW E&M-EST. PATIENT-LVL IV: CPT | Mod: PBBFAC,,, | Performed by: NURSE PRACTITIONER

## 2021-01-04 PROCEDURE — 87186 SC STD MICRODIL/AGAR DIL: CPT

## 2021-01-04 PROCEDURE — 1159F PR MEDICATION LIST DOCUMENTED IN MEDICAL RECORD: ICD-10-PCS | Mod: S$GLB,,, | Performed by: NURSE PRACTITIONER

## 2021-01-04 PROCEDURE — 71046 X-RAY EXAM CHEST 2 VIEWS: CPT | Mod: 26,,, | Performed by: RADIOLOGY

## 2021-01-04 PROCEDURE — 1101F PR PT FALLS ASSESS DOC 0-1 FALLS W/OUT INJ PAST YR: ICD-10-PCS | Mod: CPTII,S$GLB,, | Performed by: FAMILY MEDICINE

## 2021-01-04 PROCEDURE — 1157F ADVNC CARE PLAN IN RCRD: CPT | Mod: S$GLB,,, | Performed by: FAMILY MEDICINE

## 2021-01-04 PROCEDURE — 1126F AMNT PAIN NOTED NONE PRSNT: CPT | Mod: S$GLB,,, | Performed by: FAMILY MEDICINE

## 2021-01-04 PROCEDURE — 99214 PR OFFICE/OUTPT VISIT, EST, LEVL IV, 30-39 MIN: ICD-10-PCS | Mod: 25,S$GLB,, | Performed by: FAMILY MEDICINE

## 2021-01-04 PROCEDURE — 3288F PR FALLS RISK ASSESSMENT DOCUMENTED: ICD-10-PCS | Mod: CPTII,S$GLB,, | Performed by: NURSE PRACTITIONER

## 2021-01-04 PROCEDURE — 1159F PR MEDICATION LIST DOCUMENTED IN MEDICAL RECORD: ICD-10-PCS | Mod: S$GLB,,, | Performed by: FAMILY MEDICINE

## 2021-01-04 PROCEDURE — 1157F PR ADVANCE CARE PLAN OR EQUIV PRESENT IN MEDICAL RECORD: ICD-10-PCS | Mod: S$GLB,,, | Performed by: NURSE PRACTITIONER

## 2021-01-04 PROCEDURE — 99214 OFFICE O/P EST MOD 30 MIN: CPT | Mod: 25,S$GLB,, | Performed by: FAMILY MEDICINE

## 2021-01-04 PROCEDURE — 99999 PR PBB SHADOW E&M-EST. PATIENT-LVL V: CPT | Mod: PBBFAC,,, | Performed by: FAMILY MEDICINE

## 2021-01-04 PROCEDURE — 99999 PR PBB SHADOW E&M-EST. PATIENT-LVL V: ICD-10-PCS | Mod: PBBFAC,,, | Performed by: FAMILY MEDICINE

## 2021-01-04 PROCEDURE — 1157F PR ADVANCE CARE PLAN OR EQUIV PRESENT IN MEDICAL RECORD: ICD-10-PCS | Mod: S$GLB,,, | Performed by: FAMILY MEDICINE

## 2021-01-04 PROCEDURE — 99499 UNLISTED E&M SERVICE: CPT | Mod: S$GLB,,, | Performed by: FAMILY MEDICINE

## 2021-01-04 PROCEDURE — 1159F MED LIST DOCD IN RCRD: CPT | Mod: S$GLB,,, | Performed by: FAMILY MEDICINE

## 2021-01-04 PROCEDURE — 3288F FALL RISK ASSESSMENT DOCD: CPT | Mod: CPTII,S$GLB,, | Performed by: FAMILY MEDICINE

## 2021-01-04 PROCEDURE — 87184 SC STD DISK METHOD PER PLATE: CPT

## 2021-01-04 PROCEDURE — 99214 OFFICE O/P EST MOD 30 MIN: CPT | Mod: S$GLB,,, | Performed by: NURSE PRACTITIONER

## 2021-01-04 PROCEDURE — 87086 URINE CULTURE/COLONY COUNT: CPT

## 2021-01-04 PROCEDURE — 87077 CULTURE AEROBIC IDENTIFY: CPT

## 2021-01-04 PROCEDURE — G0008 ADMIN INFLUENZA VIRUS VAC: HCPCS | Mod: S$GLB,,, | Performed by: FAMILY MEDICINE

## 2021-01-04 RX ORDER — FLUTICASONE PROPIONATE 110 UG/1
1 AEROSOL, METERED RESPIRATORY (INHALATION) 2 TIMES DAILY
Qty: 36 G | Refills: 3 | Status: SHIPPED | OUTPATIENT
Start: 2021-01-04 | End: 2021-10-08

## 2021-01-04 RX ORDER — ALBUTEROL SULFATE 0.83 MG/ML
2.5 SOLUTION RESPIRATORY (INHALATION) EVERY 6 HOURS PRN
COMMUNITY
End: 2021-01-04 | Stop reason: SDUPTHER

## 2021-01-04 RX ORDER — ALBUTEROL SULFATE 0.83 MG/ML
2.5 SOLUTION RESPIRATORY (INHALATION) EVERY 6 HOURS PRN
Qty: 150 ML | Refills: 5 | Status: SHIPPED | OUTPATIENT
Start: 2021-01-04 | End: 2021-10-08

## 2021-01-04 RX ORDER — ALBUTEROL SULFATE 90 UG/1
2 AEROSOL, METERED RESPIRATORY (INHALATION) 4 TIMES DAILY
Qty: 54 G | Refills: 3 | Status: SHIPPED | OUTPATIENT
Start: 2021-01-04 | End: 2021-10-08

## 2021-01-04 RX ORDER — SULFAMETHOXAZOLE AND TRIMETHOPRIM 800; 160 MG/1; MG/1
1 TABLET ORAL 2 TIMES DAILY
Qty: 14 TABLET | Refills: 0 | Status: SHIPPED | OUTPATIENT
Start: 2021-01-04 | End: 2021-01-11

## 2021-01-08 ENCOUNTER — LAB VISIT (OUTPATIENT)
Dept: LAB | Facility: HOSPITAL | Age: 86
End: 2021-01-08
Attending: FAMILY MEDICINE
Payer: MEDICARE

## 2021-01-08 ENCOUNTER — CLINICAL SUPPORT (OUTPATIENT)
Dept: AUDIOLOGY | Facility: CLINIC | Age: 86
End: 2021-01-08
Payer: MEDICARE

## 2021-01-08 DIAGNOSIS — H93.293 IMPAIRED AUDITORY DISCRIMINATION, BILATERAL: ICD-10-CM

## 2021-01-08 DIAGNOSIS — R82.79 POSITIVE URINE CULTURE: Primary | ICD-10-CM

## 2021-01-08 DIAGNOSIS — H90.3 SENSORINEURAL HEARING LOSS, BILATERAL: Primary | ICD-10-CM

## 2021-01-08 DIAGNOSIS — H91.93 BILATERAL HEARING LOSS, UNSPECIFIED HEARING LOSS TYPE: ICD-10-CM

## 2021-01-08 DIAGNOSIS — E78.5 HYPERLIPIDEMIA, UNSPECIFIED HYPERLIPIDEMIA TYPE: Chronic | ICD-10-CM

## 2021-01-08 LAB
ALBUMIN SERPL BCP-MCNC: 3.2 G/DL (ref 3.5–5.2)
ALP SERPL-CCNC: 79 U/L (ref 55–135)
ALT SERPL W/O P-5'-P-CCNC: 13 U/L (ref 10–44)
ANION GAP SERPL CALC-SCNC: 11 MMOL/L (ref 8–16)
AST SERPL-CCNC: 19 U/L (ref 10–40)
BACTERIA UR CULT: ABNORMAL
BILIRUB SERPL-MCNC: 0.5 MG/DL (ref 0.1–1)
BUN SERPL-MCNC: 20 MG/DL (ref 8–23)
CALCIUM SERPL-MCNC: 9.5 MG/DL (ref 8.7–10.5)
CHLORIDE SERPL-SCNC: 95 MMOL/L (ref 95–110)
CHOLEST SERPL-MCNC: 139 MG/DL (ref 120–199)
CHOLEST/HDLC SERPL: 2.2 {RATIO} (ref 2–5)
CO2 SERPL-SCNC: 31 MMOL/L (ref 23–29)
CREAT SERPL-MCNC: 1.2 MG/DL (ref 0.5–1.4)
EST. GFR  (AFRICAN AMERICAN): 47.3 ML/MIN/1.73 M^2
EST. GFR  (NON AFRICAN AMERICAN): 41 ML/MIN/1.73 M^2
GLUCOSE SERPL-MCNC: 84 MG/DL (ref 70–110)
HDLC SERPL-MCNC: 63 MG/DL (ref 40–75)
HDLC SERPL: 45.3 % (ref 20–50)
LDLC SERPL CALC-MCNC: 62.6 MG/DL (ref 63–159)
NONHDLC SERPL-MCNC: 76 MG/DL
POTASSIUM SERPL-SCNC: 4.3 MMOL/L (ref 3.5–5.1)
PROT SERPL-MCNC: 6.9 G/DL (ref 6–8.4)
SODIUM SERPL-SCNC: 137 MMOL/L (ref 136–145)
TRIGL SERPL-MCNC: 67 MG/DL (ref 30–150)

## 2021-01-08 PROCEDURE — 80061 LIPID PANEL: CPT

## 2021-01-08 PROCEDURE — 99999 PR PBB SHADOW E&M-EST. PATIENT-LVL I: ICD-10-PCS | Mod: PBBFAC,,, | Performed by: AUDIOLOGIST-HEARING AID FITTER

## 2021-01-08 PROCEDURE — 36415 COLL VENOUS BLD VENIPUNCTURE: CPT

## 2021-01-08 PROCEDURE — 92567 PR TYMPA2METRY: ICD-10-PCS | Mod: S$GLB,,, | Performed by: AUDIOLOGIST-HEARING AID FITTER

## 2021-01-08 PROCEDURE — 99999 PR PBB SHADOW E&M-EST. PATIENT-LVL I: CPT | Mod: PBBFAC,,, | Performed by: AUDIOLOGIST-HEARING AID FITTER

## 2021-01-08 PROCEDURE — 92557 COMPREHENSIVE HEARING TEST: CPT | Mod: S$GLB,,, | Performed by: AUDIOLOGIST-HEARING AID FITTER

## 2021-01-08 PROCEDURE — 80053 COMPREHEN METABOLIC PANEL: CPT

## 2021-01-08 PROCEDURE — 92567 TYMPANOMETRY: CPT | Mod: S$GLB,,, | Performed by: AUDIOLOGIST-HEARING AID FITTER

## 2021-01-08 PROCEDURE — 92557 PR COMPREHENSIVE HEARING TEST: ICD-10-PCS | Mod: S$GLB,,, | Performed by: AUDIOLOGIST-HEARING AID FITTER

## 2021-01-08 RX ORDER — AMOXICILLIN AND CLAVULANATE POTASSIUM 875; 125 MG/1; MG/1
1 TABLET, FILM COATED ORAL 2 TIMES DAILY
Qty: 14 TABLET | Refills: 0 | Status: SHIPPED | OUTPATIENT
Start: 2021-01-08 | End: 2021-01-15

## 2021-02-01 ENCOUNTER — OFFICE VISIT (OUTPATIENT)
Dept: INTERNAL MEDICINE | Facility: CLINIC | Age: 86
End: 2021-02-01
Payer: MEDICARE

## 2021-02-01 VITALS
OXYGEN SATURATION: 99 % | SYSTOLIC BLOOD PRESSURE: 116 MMHG | DIASTOLIC BLOOD PRESSURE: 60 MMHG | RESPIRATION RATE: 16 BRPM | HEART RATE: 70 BPM

## 2021-02-01 DIAGNOSIS — R05.9 COUGH: ICD-10-CM

## 2021-02-01 DIAGNOSIS — J45.20 MILD INTERMITTENT ASTHMA WITHOUT COMPLICATION: Primary | ICD-10-CM

## 2021-02-01 PROCEDURE — 1157F PR ADVANCE CARE PLAN OR EQUIV PRESENT IN MEDICAL RECORD: ICD-10-PCS | Mod: 95,,, | Performed by: FAMILY MEDICINE

## 2021-02-01 PROCEDURE — 1159F PR MEDICATION LIST DOCUMENTED IN MEDICAL RECORD: ICD-10-PCS | Mod: 95,,, | Performed by: FAMILY MEDICINE

## 2021-02-01 PROCEDURE — 1157F ADVNC CARE PLAN IN RCRD: CPT | Mod: 95,,, | Performed by: FAMILY MEDICINE

## 2021-02-01 PROCEDURE — 99442 PR PHYSICIAN TELEPHONE EVALUATION 11-20 MIN: CPT | Mod: 95,,, | Performed by: FAMILY MEDICINE

## 2021-02-01 PROCEDURE — 99442 PR PHYSICIAN TELEPHONE EVALUATION 11-20 MIN: ICD-10-PCS | Mod: 95,,, | Performed by: FAMILY MEDICINE

## 2021-02-01 PROCEDURE — 1159F MED LIST DOCD IN RCRD: CPT | Mod: 95,,, | Performed by: FAMILY MEDICINE

## 2021-02-01 RX ORDER — AZITHROMYCIN 250 MG/1
TABLET, FILM COATED ORAL
Qty: 6 TABLET | Refills: 0 | Status: SHIPPED | OUTPATIENT
Start: 2021-02-01 | End: 2021-02-06

## 2021-02-08 ENCOUNTER — TELEPHONE (OUTPATIENT)
Dept: INTERNAL MEDICINE | Facility: CLINIC | Age: 86
End: 2021-02-08

## 2021-02-09 ENCOUNTER — HOSPITAL ENCOUNTER (OUTPATIENT)
Dept: RADIOLOGY | Facility: HOSPITAL | Age: 86
Discharge: HOME | End: 2021-02-09
Attending: PHYSICIAN ASSISTANT
Payer: MEDICARE

## 2021-02-09 ENCOUNTER — OFFICE VISIT (OUTPATIENT)
Dept: INTERNAL MEDICINE | Facility: CLINIC | Age: 86
End: 2021-02-09
Payer: MEDICARE

## 2021-02-09 VITALS
HEART RATE: 69 BPM | HEIGHT: 57 IN | DIASTOLIC BLOOD PRESSURE: 84 MMHG | OXYGEN SATURATION: 97 % | WEIGHT: 157.63 LBS | BODY MASS INDEX: 34.01 KG/M2 | TEMPERATURE: 98 F | SYSTOLIC BLOOD PRESSURE: 112 MMHG

## 2021-02-09 DIAGNOSIS — J45.20 MILD INTERMITTENT ASTHMA WITHOUT COMPLICATION: ICD-10-CM

## 2021-02-09 DIAGNOSIS — I10 ESSENTIAL HYPERTENSION: Chronic | ICD-10-CM

## 2021-02-09 DIAGNOSIS — R05.9 COUGH: Primary | ICD-10-CM

## 2021-02-09 DIAGNOSIS — R05.9 COUGH: ICD-10-CM

## 2021-02-09 DIAGNOSIS — N18.30 STAGE 3 CHRONIC KIDNEY DISEASE, UNSPECIFIED WHETHER STAGE 3A OR 3B CKD: ICD-10-CM

## 2021-02-09 DIAGNOSIS — R06.02 SHORTNESS OF BREATH: ICD-10-CM

## 2021-02-09 PROCEDURE — 3288F FALL RISK ASSESSMENT DOCD: CPT | Mod: CPTII,S$GLB,, | Performed by: PHYSICIAN ASSISTANT

## 2021-02-09 PROCEDURE — 1157F ADVNC CARE PLAN IN RCRD: CPT | Mod: S$GLB,,, | Performed by: PHYSICIAN ASSISTANT

## 2021-02-09 PROCEDURE — 1157F PR ADVANCE CARE PLAN OR EQUIV PRESENT IN MEDICAL RECORD: ICD-10-PCS | Mod: S$GLB,,, | Performed by: PHYSICIAN ASSISTANT

## 2021-02-09 PROCEDURE — 99214 PR OFFICE/OUTPT VISIT, EST, LEVL IV, 30-39 MIN: ICD-10-PCS | Mod: S$GLB,,, | Performed by: PHYSICIAN ASSISTANT

## 2021-02-09 PROCEDURE — 71046 X-RAY EXAM CHEST 2 VIEWS: CPT | Mod: 26,,, | Performed by: RADIOLOGY

## 2021-02-09 PROCEDURE — 1101F PR PT FALLS ASSESS DOC 0-1 FALLS W/OUT INJ PAST YR: ICD-10-PCS | Mod: CPTII,S$GLB,, | Performed by: PHYSICIAN ASSISTANT

## 2021-02-09 PROCEDURE — U0003 INFECTIOUS AGENT DETECTION BY NUCLEIC ACID (DNA OR RNA); SEVERE ACUTE RESPIRATORY SYNDROME CORONAVIRUS 2 (SARS-COV-2) (CORONAVIRUS DISEASE [COVID-19]), AMPLIFIED PROBE TECHNIQUE, MAKING USE OF HIGH THROUGHPUT TECHNOLOGIES AS DESCRIBED BY CMS-2020-01-R: HCPCS

## 2021-02-09 PROCEDURE — 1101F PT FALLS ASSESS-DOCD LE1/YR: CPT | Mod: CPTII,S$GLB,, | Performed by: PHYSICIAN ASSISTANT

## 2021-02-09 PROCEDURE — 1159F MED LIST DOCD IN RCRD: CPT | Mod: S$GLB,,, | Performed by: PHYSICIAN ASSISTANT

## 2021-02-09 PROCEDURE — 3288F PR FALLS RISK ASSESSMENT DOCUMENTED: ICD-10-PCS | Mod: CPTII,S$GLB,, | Performed by: PHYSICIAN ASSISTANT

## 2021-02-09 PROCEDURE — 1159F PR MEDICATION LIST DOCUMENTED IN MEDICAL RECORD: ICD-10-PCS | Mod: S$GLB,,, | Performed by: PHYSICIAN ASSISTANT

## 2021-02-09 PROCEDURE — 1126F PR PAIN SEVERITY QUANTIFIED, NO PAIN PRESENT: ICD-10-PCS | Mod: S$GLB,,, | Performed by: PHYSICIAN ASSISTANT

## 2021-02-09 PROCEDURE — 1126F AMNT PAIN NOTED NONE PRSNT: CPT | Mod: S$GLB,,, | Performed by: PHYSICIAN ASSISTANT

## 2021-02-09 PROCEDURE — 71046 X-RAY EXAM CHEST 2 VIEWS: CPT | Mod: TC

## 2021-02-09 PROCEDURE — 99999 PR PBB SHADOW E&M-EST. PATIENT-LVL III: CPT | Mod: PBBFAC,,, | Performed by: PHYSICIAN ASSISTANT

## 2021-02-09 PROCEDURE — U0005 INFEC AGEN DETEC AMPLI PROBE: HCPCS

## 2021-02-09 PROCEDURE — 71046 XR CHEST PA AND LATERAL: ICD-10-PCS | Mod: 26,,, | Performed by: RADIOLOGY

## 2021-02-09 PROCEDURE — 99214 OFFICE O/P EST MOD 30 MIN: CPT | Mod: S$GLB,,, | Performed by: PHYSICIAN ASSISTANT

## 2021-02-09 PROCEDURE — 99999 PR PBB SHADOW E&M-EST. PATIENT-LVL III: ICD-10-PCS | Mod: PBBFAC,,, | Performed by: PHYSICIAN ASSISTANT

## 2021-02-10 LAB — SARS-COV-2 RNA RESP QL NAA+PROBE: NOT DETECTED

## 2021-02-17 ENCOUNTER — OFFICE VISIT (OUTPATIENT)
Dept: CARDIOLOGY | Facility: CLINIC | Age: 86
End: 2021-02-17
Payer: MEDICARE

## 2021-02-17 VITALS
HEART RATE: 53 BPM | WEIGHT: 170.19 LBS | DIASTOLIC BLOOD PRESSURE: 84 MMHG | OXYGEN SATURATION: 99 % | SYSTOLIC BLOOD PRESSURE: 132 MMHG | BODY MASS INDEX: 36.72 KG/M2 | HEIGHT: 57 IN

## 2021-02-17 DIAGNOSIS — I10 ESSENTIAL HYPERTENSION: Chronic | ICD-10-CM

## 2021-02-17 DIAGNOSIS — I48.0 PAF (PAROXYSMAL ATRIAL FIBRILLATION): Primary | Chronic | ICD-10-CM

## 2021-02-17 DIAGNOSIS — I25.10 CORONARY ARTERY DISEASE, ANGINA PRESENCE UNSPECIFIED, UNSPECIFIED VESSEL OR LESION TYPE, UNSPECIFIED WHETHER NATIVE OR TRANSPLANTED HEART: ICD-10-CM

## 2021-02-17 PROCEDURE — 99999 PR PBB SHADOW E&M-EST. PATIENT-LVL IV: CPT | Mod: PBBFAC,,, | Performed by: NURSE PRACTITIONER

## 2021-02-17 PROCEDURE — 99213 OFFICE O/P EST LOW 20 MIN: CPT | Mod: S$GLB,,, | Performed by: NURSE PRACTITIONER

## 2021-02-17 PROCEDURE — 1157F ADVNC CARE PLAN IN RCRD: CPT | Mod: S$GLB,,, | Performed by: NURSE PRACTITIONER

## 2021-02-17 PROCEDURE — 1126F AMNT PAIN NOTED NONE PRSNT: CPT | Mod: S$GLB,,, | Performed by: NURSE PRACTITIONER

## 2021-02-17 PROCEDURE — 99213 PR OFFICE/OUTPT VISIT, EST, LEVL III, 20-29 MIN: ICD-10-PCS | Mod: S$GLB,,, | Performed by: NURSE PRACTITIONER

## 2021-02-17 PROCEDURE — 1159F PR MEDICATION LIST DOCUMENTED IN MEDICAL RECORD: ICD-10-PCS | Mod: S$GLB,,, | Performed by: NURSE PRACTITIONER

## 2021-02-17 PROCEDURE — 1157F PR ADVANCE CARE PLAN OR EQUIV PRESENT IN MEDICAL RECORD: ICD-10-PCS | Mod: S$GLB,,, | Performed by: NURSE PRACTITIONER

## 2021-02-17 PROCEDURE — 99999 PR PBB SHADOW E&M-EST. PATIENT-LVL IV: ICD-10-PCS | Mod: PBBFAC,,, | Performed by: NURSE PRACTITIONER

## 2021-02-17 PROCEDURE — 1159F MED LIST DOCD IN RCRD: CPT | Mod: S$GLB,,, | Performed by: NURSE PRACTITIONER

## 2021-02-17 PROCEDURE — 1126F PR PAIN SEVERITY QUANTIFIED, NO PAIN PRESENT: ICD-10-PCS | Mod: S$GLB,,, | Performed by: NURSE PRACTITIONER

## 2021-03-04 DIAGNOSIS — I48.0 PAF (PAROXYSMAL ATRIAL FIBRILLATION): Primary | ICD-10-CM

## 2021-03-04 RX ORDER — METOLAZONE 2.5 MG/1
TABLET ORAL
Qty: 15 TABLET | Refills: 11 | Status: SHIPPED | OUTPATIENT
Start: 2021-03-04 | End: 2021-08-11

## 2021-03-05 DIAGNOSIS — E87.6 DIURETIC-INDUCED HYPOKALEMIA: ICD-10-CM

## 2021-03-05 DIAGNOSIS — T50.2X5A DIURETIC-INDUCED HYPOKALEMIA: ICD-10-CM

## 2021-03-09 RX ORDER — POTASSIUM CHLORIDE 750 MG/1
TABLET, EXTENDED RELEASE ORAL
Qty: 180 TABLET | Refills: 1 | Status: SHIPPED | OUTPATIENT
Start: 2021-03-09 | End: 2021-08-11

## 2021-04-20 NOTE — TELEPHONE ENCOUNTER
April 22, 2021       Rahat Jernigan MD  [address]  [facility] [address]  Via Fax: [phone]      Patient: Raina Lagunas   YOB: 1967   Date of Visit: 4/20/2021       Dear Dr. Jernigan:    I saw your patient, Raina Lagunas, for an evaluation. Below are my notes for this visit with her.    If you have questions, please do not hesitate to call me.      Sincerely,        Devan Rehman MD        CC: No Recipients  Devan Rehman MD  4/22/2021  7:15 AM  Signed    HISTORY OF PRESENT ILLNESS:  Raina Lagunas is a 53 year old  female whom returns regarding Patellofemoral Osteoarthritis with Medial Meniscus Tear; RIGHT Knee. She is here for ORTHOVISC for the RIGHT Knee. 1 of 3. Discussed the viscosupplementation in detail... she wished to proceed.     Accompanied by no one  Date of Onset: Chronic - worsened in the last 2 weeks     Occupation/School:  at [employer] - on feet  PT: None    Review of Systems   Constitutional: Negative for chills and fever.   HENT: Negative for congestion, sore throat and tinnitus.    Eyes: Negative for redness.   Respiratory: Negative for cough, shortness of breath, wheezing and stridor.    Cardiovascular: Negative for leg swelling.   Gastrointestinal: Negative for diarrhea, nausea and vomiting.   Endocrine: Negative for cold intolerance and heat intolerance.   Musculoskeletal: Negative for arthralgias, joint swelling and myalgias.   Skin: Negative for color change and pallor.   Neurological: Negative for syncope, weakness and numbness.   Hematological: Does not bruise/bleed easily.   Psychiatric/Behavioral: Negative for agitation, confusion and self-injury.       No past medical history on file.     No past surgical history on file.     No family history on file.     Social History     Tobacco Use   Smoking Status Not on file      Social History     Substance and Sexual Activity   Alcohol Use Not on file      Social History     Substance and Sexual Activity    Please call and schedule Zio holter monitor at [facility] when she leave the hospital today,  if possible.      Drug Use Not on file         MEDICATIONS:  Current Outpatient Medications   Medication Sig Dispense Refill   • hyaluronan (OrthoVisc) 30 MG/2ML injection Inject a single syringe into the RIGHT Knee weekly x3 3 Syringe 0   • buPROPion XL (WELLBUTRIN XL) 150 MG 24 hr tablet      • QUEtiapine (SEROquel) 50 MG tablet      • esomeprazole (NexIUM) 40 MG capsule Take 1 Capsule by mouth two times per day before meals.     • aspirin 325 MG tablet      • clonazePAM (KlonoPIN) 1 MG tablet      • fluticasone (FLONASE) 50 MCG/ACT nasal spray Spray 2 sprays in each nostril daily.     • tramadol-acetaminophen (ULTRACET) 37.5-325 MG per tablet      • sodium hyaluronATE (Euflexxa) 20 MG/2ML injection prefilled syringe Inject 2 mLs into the articular space 1 day a week. Inject a single syringe into the RIGHT Knee weekly x3 3 Syringe 0   • meloxicam (Mobic) 15 MG tablet One tablet by mouth daily with food 30 tablet 0     No current facility-administered medications for this visit.          ALLERGIES:   Allergen Reactions   • Penicillins RASH     [facility]ner Allergy Text Annotation: penicillins         PHYSICAL EXAMINATION:    RIGHT Knee: 1+ Effusion. No atrophy. Positive TTP Medial Joint Line. No TTP Lateral Joint Line. No TTP Patella. No TTP Patellar Tendon. Mild Para-patellar TTP. No Pes Anserine Bursa TTP. No Popliteal Fossa TTP. AROM: 4 - 110. Negative Lachman. Negative Seated Anterior Drawer. Negative Posterior Drawer. Positive Anthony's Medial. Negative Anthony's Lateral. No Varus Pain or Laxity in Flexion or Extension. No Valgus Pain or Laxity in Flexion or Extension. Calf Soft, NT. NVI distally.    IMAGING & TESTING:   Bilateral PA Standing, Bilateral Patellar Callahan and Lateral of the RIGHT Knee (3/29/2021): Some marginal osteophytes with advanced degenerative changes seen in the Patellofemoral compartment. Mild degenerative change in the tibiofemoral joint. No fracture or dislocation. No evidence of loose body.     MRI RIGHT  Knee (3/16/2021): End stage patellofemoral OA with severe chronic lateral patellar tracking abnormality. Moderate medial and lateral tibiofemoral compartment OA. Posterior horn medial meniscus tear. Posterior horn and body lateral meniscus tear. Fragmented superior patellar osteophytes could represent loose bodies. Severe notch impingement secondary to medial and lateral wall osteophytes causing mass effect on ACL and PCL. 3 mm collection ganglion cysts associated with posterior joint capsule with intraarticular and extraarticular components.     PROCEDURE:  L Inj/Asp: R knee on 4/20/2021 3:48 PM  Indications: pain  Details: 22 G needle, anterolateral approach  Medications: 2 mL hyaluronan 30 MG/2ML  Outcome: tolerated well, no immediate complications    MN  Procedure, treatment alternatives, risks and benefits explained, specific risks discussed. Consent was given by the patient. Immediately prior to procedure a time out was called to verify the correct patient, procedure, equipment, support staff and site/side marked as required. Patient was prepped and draped in the usual sterile fashion.       ASSESSMENT & PLAN:  Raina Lagunas is a 53 year old female Patellofemoral Osteoarthritis with Medial Meniscus Tear; RIGHT Knee.     Plan as follows:  1. Reviewed Viscosupplementation with the patient... Specifically ORTHOVISC. Patient wished to continue.   2. Patient tolerated the injection well  3. Ice following the injection.   4. Follow-up in 1 week for Orthovisc #2.       Devan Rehman MD  04/20/21

## 2021-06-22 ENCOUNTER — PES CALL (OUTPATIENT)
Dept: ADMINISTRATIVE | Facility: CLINIC | Age: 86
End: 2021-06-22

## 2021-06-28 ENCOUNTER — HOSPITAL ENCOUNTER (EMERGENCY)
Facility: HOSPITAL | Age: 86
Discharge: HOME | End: 2021-06-28
Attending: EMERGENCY MEDICINE
Payer: MEDICARE

## 2021-06-28 VITALS
BODY MASS INDEX: 34.85 KG/M2 | OXYGEN SATURATION: 100 % | SYSTOLIC BLOOD PRESSURE: 156 MMHG | DIASTOLIC BLOOD PRESSURE: 65 MMHG | RESPIRATION RATE: 16 BRPM | HEART RATE: 69 BPM | TEMPERATURE: 99 F | WEIGHT: 161.06 LBS

## 2021-06-28 DIAGNOSIS — Z78.9 INABILITY TO PERFORM ACTIVITIES OF DAILY LIVING: Primary | ICD-10-CM

## 2021-06-28 LAB
ALBUMIN SERPL BCP-MCNC: 3.5 G/DL (ref 3.5–5.2)
ALP SERPL-CCNC: 131 U/L (ref 55–135)
ALT SERPL W/O P-5'-P-CCNC: 11 U/L (ref 10–44)
ANION GAP SERPL CALC-SCNC: 13 MMOL/L (ref 8–16)
AST SERPL-CCNC: 21 U/L (ref 10–40)
BASOPHILS # BLD AUTO: 0.04 K/UL (ref 0–0.2)
BASOPHILS NFR BLD: 0.5 % (ref 0–1.9)
BILIRUB SERPL-MCNC: 0.7 MG/DL (ref 0.1–1)
BILIRUB UR QL STRIP: NEGATIVE
BUN SERPL-MCNC: 24 MG/DL (ref 8–23)
CALCIUM SERPL-MCNC: 9.3 MG/DL (ref 8.7–10.5)
CHLORIDE SERPL-SCNC: 98 MMOL/L (ref 95–110)
CLARITY UR: CLEAR
CO2 SERPL-SCNC: 27 MMOL/L (ref 23–29)
COLOR UR: YELLOW
CREAT SERPL-MCNC: 0.9 MG/DL (ref 0.5–1.4)
CTP QC/QA: YES
DIFFERENTIAL METHOD: ABNORMAL
EOSINOPHIL # BLD AUTO: 0.1 K/UL (ref 0–0.5)
EOSINOPHIL NFR BLD: 1.4 % (ref 0–8)
ERYTHROCYTE [DISTWIDTH] IN BLOOD BY AUTOMATED COUNT: 13.7 % (ref 11.5–14.5)
EST. GFR  (AFRICAN AMERICAN): >60 ML/MIN/1.73 M^2
EST. GFR  (NON AFRICAN AMERICAN): 58 ML/MIN/1.73 M^2
GLUCOSE SERPL-MCNC: 118 MG/DL (ref 70–110)
GLUCOSE UR QL STRIP: NEGATIVE
HCT VFR BLD AUTO: 42.3 % (ref 37–48.5)
HGB BLD-MCNC: 13.4 G/DL (ref 12–16)
HGB UR QL STRIP: NEGATIVE
IMM GRANULOCYTES # BLD AUTO: 0.04 K/UL (ref 0–0.04)
IMM GRANULOCYTES NFR BLD AUTO: 0.5 % (ref 0–0.5)
KETONES UR QL STRIP: NEGATIVE
LEUKOCYTE ESTERASE UR QL STRIP: NEGATIVE
LYMPHOCYTES # BLD AUTO: 1.5 K/UL (ref 1–4.8)
LYMPHOCYTES NFR BLD: 19.2 % (ref 18–48)
MCH RBC QN AUTO: 29.9 PG (ref 27–31)
MCHC RBC AUTO-ENTMCNC: 31.7 G/DL (ref 32–36)
MCV RBC AUTO: 94 FL (ref 82–98)
MONOCYTES # BLD AUTO: 0.9 K/UL (ref 0.3–1)
MONOCYTES NFR BLD: 10.9 % (ref 4–15)
NEUTROPHILS # BLD AUTO: 5.3 K/UL (ref 1.8–7.7)
NEUTROPHILS NFR BLD: 67.5 % (ref 38–73)
NITRITE UR QL STRIP: NEGATIVE
NRBC BLD-RTO: 0 /100 WBC
PH UR STRIP: 6 [PH] (ref 5–8)
PLATELET # BLD AUTO: 211 K/UL (ref 150–450)
PMV BLD AUTO: 11.4 FL (ref 9.2–12.9)
POTASSIUM SERPL-SCNC: 3.8 MMOL/L (ref 3.5–5.1)
PROT SERPL-MCNC: 7.3 G/DL (ref 6–8.4)
PROT UR QL STRIP: NEGATIVE
RBC # BLD AUTO: 4.48 M/UL (ref 4–5.4)
SARS-COV-2 RDRP RESP QL NAA+PROBE: NEGATIVE
SODIUM SERPL-SCNC: 138 MMOL/L (ref 136–145)
SP GR UR STRIP: <=1.005 (ref 1–1.03)
URN SPEC COLLECT METH UR: ABNORMAL
UROBILINOGEN UR STRIP-ACNC: NEGATIVE EU/DL
WBC # BLD AUTO: 7.9 K/UL (ref 3.9–12.7)

## 2021-06-28 PROCEDURE — 36000 PLACE NEEDLE IN VEIN: CPT

## 2021-06-28 PROCEDURE — 99284 EMERGENCY DEPT VISIT MOD MDM: CPT | Mod: 25

## 2021-06-28 PROCEDURE — 81003 URINALYSIS AUTO W/O SCOPE: CPT | Performed by: PHYSICIAN ASSISTANT

## 2021-06-28 PROCEDURE — U0002 COVID-19 LAB TEST NON-CDC: HCPCS | Performed by: EMERGENCY MEDICINE

## 2021-06-28 PROCEDURE — 85025 COMPLETE CBC W/AUTO DIFF WBC: CPT | Performed by: PHYSICIAN ASSISTANT

## 2021-06-28 PROCEDURE — 80053 COMPREHEN METABOLIC PANEL: CPT | Performed by: PHYSICIAN ASSISTANT

## 2021-06-29 ENCOUNTER — PATIENT OUTREACH (OUTPATIENT)
Dept: ADMINISTRATIVE | Facility: HOSPITAL | Age: 86
End: 2021-06-29

## 2021-06-29 ENCOUNTER — TELEPHONE (OUTPATIENT)
Dept: INTERNAL MEDICINE | Facility: CLINIC | Age: 86
End: 2021-06-29

## 2021-06-29 ENCOUNTER — TELEPHONE (OUTPATIENT)
Dept: NEUROLOGY | Facility: CLINIC | Age: 86
End: 2021-06-29

## 2021-06-30 ENCOUNTER — OFFICE VISIT (OUTPATIENT)
Dept: INTERNAL MEDICINE | Facility: CLINIC | Age: 86
End: 2021-06-30
Payer: MEDICARE

## 2021-06-30 VITALS
BODY MASS INDEX: 34.15 KG/M2 | HEART RATE: 78 BPM | DIASTOLIC BLOOD PRESSURE: 70 MMHG | WEIGHT: 158.31 LBS | TEMPERATURE: 96 F | SYSTOLIC BLOOD PRESSURE: 122 MMHG | OXYGEN SATURATION: 98 % | HEIGHT: 57 IN

## 2021-06-30 DIAGNOSIS — Z74.09 OTHER REDUCED MOBILITY: ICD-10-CM

## 2021-06-30 DIAGNOSIS — F03.90 DEMENTIA WITHOUT BEHAVIORAL DISTURBANCE, UNSPECIFIED DEMENTIA TYPE: ICD-10-CM

## 2021-06-30 DIAGNOSIS — R41.3 MEMORY DIFFICULTIES: Primary | ICD-10-CM

## 2021-06-30 DIAGNOSIS — Z99.89 DEPENDENCE ON OTHER ENABLING MACHINES AND DEVICES: ICD-10-CM

## 2021-06-30 PROCEDURE — 99214 OFFICE O/P EST MOD 30 MIN: CPT | Mod: S$GLB,,, | Performed by: FAMILY MEDICINE

## 2021-06-30 PROCEDURE — 1157F PR ADVANCE CARE PLAN OR EQUIV PRESENT IN MEDICAL RECORD: ICD-10-PCS | Mod: S$GLB,,, | Performed by: FAMILY MEDICINE

## 2021-06-30 PROCEDURE — 3288F PR FALLS RISK ASSESSMENT DOCUMENTED: ICD-10-PCS | Mod: CPTII,S$GLB,, | Performed by: FAMILY MEDICINE

## 2021-06-30 PROCEDURE — 99999 PR PBB SHADOW E&M-EST. PATIENT-LVL V: ICD-10-PCS | Mod: PBBFAC,,, | Performed by: FAMILY MEDICINE

## 2021-06-30 PROCEDURE — 99214 PR OFFICE/OUTPT VISIT, EST, LEVL IV, 30-39 MIN: ICD-10-PCS | Mod: S$GLB,,, | Performed by: FAMILY MEDICINE

## 2021-06-30 PROCEDURE — 99499 RISK ADDL DX/OHS AUDIT: ICD-10-PCS | Mod: S$GLB,,, | Performed by: FAMILY MEDICINE

## 2021-06-30 PROCEDURE — 99499 UNLISTED E&M SERVICE: CPT | Mod: S$GLB,,, | Performed by: FAMILY MEDICINE

## 2021-06-30 PROCEDURE — 1101F PR PT FALLS ASSESS DOC 0-1 FALLS W/OUT INJ PAST YR: ICD-10-PCS | Mod: CPTII,S$GLB,, | Performed by: FAMILY MEDICINE

## 2021-06-30 PROCEDURE — 99999 PR PBB SHADOW E&M-EST. PATIENT-LVL V: CPT | Mod: PBBFAC,,, | Performed by: FAMILY MEDICINE

## 2021-06-30 PROCEDURE — 1157F ADVNC CARE PLAN IN RCRD: CPT | Mod: S$GLB,,, | Performed by: FAMILY MEDICINE

## 2021-06-30 PROCEDURE — 1101F PT FALLS ASSESS-DOCD LE1/YR: CPT | Mod: CPTII,S$GLB,, | Performed by: FAMILY MEDICINE

## 2021-06-30 PROCEDURE — 3288F FALL RISK ASSESSMENT DOCD: CPT | Mod: CPTII,S$GLB,, | Performed by: FAMILY MEDICINE

## 2021-06-30 RX ORDER — A/SINGAPORE/GP1908/2015 IVR-180 (AN A/MICHIGAN/45/2015 (H1N1)PDM09-LIKE VIRUS, A/HONG KONG/4801/2014, NYMC X-263B (H3N2) (AN A/HONG KONG/4801/2014-LIKE VIRUS), AND B/BRISBANE/60/2008, WILD TYPE (A B/BRISBANE/60/2008-LIKE VIRUS) 15; 15; 15 UG/.5ML; UG/.5ML; UG/.5ML
INJECTION, SUSPENSION INTRAMUSCULAR
COMMUNITY
Start: 2021-01-04 | End: 2021-10-04 | Stop reason: ALTCHOICE

## 2021-07-01 PROCEDURE — G0180 MD CERTIFICATION HHA PATIENT: HCPCS | Mod: ,,, | Performed by: FAMILY MEDICINE

## 2021-07-01 PROCEDURE — G0180 PR HOME HEALTH MD CERTIFICATION: ICD-10-PCS | Mod: ,,, | Performed by: FAMILY MEDICINE

## 2021-07-06 ENCOUNTER — OFFICE VISIT (OUTPATIENT)
Dept: INTERNAL MEDICINE | Facility: CLINIC | Age: 86
End: 2021-07-06
Payer: MEDICARE

## 2021-07-06 ENCOUNTER — LAB VISIT (OUTPATIENT)
Dept: LAB | Facility: HOSPITAL | Age: 86
End: 2021-07-06
Attending: FAMILY MEDICINE
Payer: MEDICARE

## 2021-07-06 VITALS
DIASTOLIC BLOOD PRESSURE: 62 MMHG | OXYGEN SATURATION: 99 % | HEART RATE: 83 BPM | WEIGHT: 158.94 LBS | SYSTOLIC BLOOD PRESSURE: 110 MMHG | BODY MASS INDEX: 34.29 KG/M2 | HEIGHT: 57 IN

## 2021-07-06 VITALS
HEART RATE: 96 BPM | WEIGHT: 147.69 LBS | OXYGEN SATURATION: 98 % | BODY MASS INDEX: 31.86 KG/M2 | SYSTOLIC BLOOD PRESSURE: 112 MMHG | HEIGHT: 57 IN | TEMPERATURE: 98 F | DIASTOLIC BLOOD PRESSURE: 60 MMHG

## 2021-07-06 DIAGNOSIS — Z91.89 POTENTIAL FOR COGNITIVE IMPAIRMENT: ICD-10-CM

## 2021-07-06 DIAGNOSIS — D64.9 ANEMIA, UNSPECIFIED TYPE: Primary | ICD-10-CM

## 2021-07-06 DIAGNOSIS — Z79.899 ENCOUNTER FOR LONG-TERM (CURRENT) USE OF MEDICATIONS: ICD-10-CM

## 2021-07-06 DIAGNOSIS — E78.5 HYPERLIPIDEMIA, UNSPECIFIED HYPERLIPIDEMIA TYPE: Chronic | ICD-10-CM

## 2021-07-06 DIAGNOSIS — Z00.00 ENCOUNTER FOR PREVENTIVE HEALTH EXAMINATION: Primary | ICD-10-CM

## 2021-07-06 DIAGNOSIS — R73.03 PREDIABETES: ICD-10-CM

## 2021-07-06 DIAGNOSIS — H91.90 HEARING DIFFICULTY, UNSPECIFIED LATERALITY: ICD-10-CM

## 2021-07-06 DIAGNOSIS — R32 URINARY INCONTINENCE, UNSPECIFIED TYPE: ICD-10-CM

## 2021-07-06 DIAGNOSIS — I48.0 PAF (PAROXYSMAL ATRIAL FIBRILLATION): ICD-10-CM

## 2021-07-06 DIAGNOSIS — I25.10 CORONARY ARTERY DISEASE, ANGINA PRESENCE UNSPECIFIED, UNSPECIFIED VESSEL OR LESION TYPE, UNSPECIFIED WHETHER NATIVE OR TRANSPLANTED HEART: ICD-10-CM

## 2021-07-06 DIAGNOSIS — E66.9 OBESITY (BMI 30.0-34.9): ICD-10-CM

## 2021-07-06 DIAGNOSIS — I27.9 PULMONARY HEART DISEASE: ICD-10-CM

## 2021-07-06 DIAGNOSIS — Z99.81 DEPENDENCE ON SUPPLEMENTAL OXYGEN: ICD-10-CM

## 2021-07-06 DIAGNOSIS — N18.31 STAGE 3A CHRONIC KIDNEY DISEASE: ICD-10-CM

## 2021-07-06 DIAGNOSIS — I70.0 ATHEROSCLEROSIS OF AORTA: ICD-10-CM

## 2021-07-06 DIAGNOSIS — R26.9 ABNORMALITY OF GAIT AND MOBILITY: ICD-10-CM

## 2021-07-06 DIAGNOSIS — Z99.89 DEPENDENCE ON OTHER ENABLING MACHINES AND DEVICES: ICD-10-CM

## 2021-07-06 DIAGNOSIS — E78.5 HYPERLIPIDEMIA, UNSPECIFIED HYPERLIPIDEMIA TYPE: ICD-10-CM

## 2021-07-06 DIAGNOSIS — J45.909 ASTHMA, UNSPECIFIED ASTHMA SEVERITY, UNSPECIFIED WHETHER COMPLICATED, UNSPECIFIED WHETHER PERSISTENT: ICD-10-CM

## 2021-07-06 DIAGNOSIS — R94.120 ABNORMAL HEARING SCREEN: ICD-10-CM

## 2021-07-06 DIAGNOSIS — I48.91 ATRIAL FIBRILLATION, UNSPECIFIED TYPE: ICD-10-CM

## 2021-07-06 DIAGNOSIS — F41.9 ANXIETY: ICD-10-CM

## 2021-07-06 DIAGNOSIS — Z74.09 OTHER REDUCED MOBILITY: ICD-10-CM

## 2021-07-06 DIAGNOSIS — I10 ESSENTIAL HYPERTENSION: Chronic | ICD-10-CM

## 2021-07-06 LAB
ALBUMIN SERPL BCP-MCNC: 3.4 G/DL (ref 3.5–5.2)
ALP SERPL-CCNC: 108 U/L (ref 55–135)
ALT SERPL W/O P-5'-P-CCNC: 10 U/L (ref 10–44)
ANION GAP SERPL CALC-SCNC: 11 MMOL/L (ref 8–16)
AST SERPL-CCNC: 17 U/L (ref 10–40)
BASOPHILS # BLD AUTO: 0.04 K/UL (ref 0–0.2)
BASOPHILS NFR BLD: 0.4 % (ref 0–1.9)
BILIRUB SERPL-MCNC: 0.7 MG/DL (ref 0.1–1)
BUN SERPL-MCNC: 20 MG/DL (ref 8–23)
CALCIUM SERPL-MCNC: 9.7 MG/DL (ref 8.7–10.5)
CHLORIDE SERPL-SCNC: 97 MMOL/L (ref 95–110)
CHOLEST SERPL-MCNC: 140 MG/DL (ref 120–199)
CHOLEST/HDLC SERPL: 2.2 {RATIO} (ref 2–5)
CO2 SERPL-SCNC: 34 MMOL/L (ref 23–29)
CREAT SERPL-MCNC: 1 MG/DL (ref 0.5–1.4)
DIFFERENTIAL METHOD: ABNORMAL
EOSINOPHIL # BLD AUTO: 0.1 K/UL (ref 0–0.5)
EOSINOPHIL NFR BLD: 1.5 % (ref 0–8)
ERYTHROCYTE [DISTWIDTH] IN BLOOD BY AUTOMATED COUNT: 13.8 % (ref 11.5–14.5)
EST. GFR  (AFRICAN AMERICAN): 58.9 ML/MIN/1.73 M^2
EST. GFR  (NON AFRICAN AMERICAN): 51.1 ML/MIN/1.73 M^2
GLUCOSE SERPL-MCNC: 102 MG/DL (ref 70–110)
HCT VFR BLD AUTO: 41.2 % (ref 37–48.5)
HDLC SERPL-MCNC: 63 MG/DL (ref 40–75)
HDLC SERPL: 45 % (ref 20–50)
HGB BLD-MCNC: 12.6 G/DL (ref 12–16)
IMM GRANULOCYTES # BLD AUTO: 0.03 K/UL (ref 0–0.04)
IMM GRANULOCYTES NFR BLD AUTO: 0.3 % (ref 0–0.5)
LDLC SERPL CALC-MCNC: 64.2 MG/DL (ref 63–159)
LYMPHOCYTES # BLD AUTO: 1.5 K/UL (ref 1–4.8)
LYMPHOCYTES NFR BLD: 16.6 % (ref 18–48)
MCH RBC QN AUTO: 29.7 PG (ref 27–31)
MCHC RBC AUTO-ENTMCNC: 30.6 G/DL (ref 32–36)
MCV RBC AUTO: 97 FL (ref 82–98)
MONOCYTES # BLD AUTO: 1.1 K/UL (ref 0.3–1)
MONOCYTES NFR BLD: 11.7 % (ref 4–15)
NEUTROPHILS # BLD AUTO: 6.3 K/UL (ref 1.8–7.7)
NEUTROPHILS NFR BLD: 69.5 % (ref 38–73)
NONHDLC SERPL-MCNC: 77 MG/DL
NRBC BLD-RTO: 0 /100 WBC
PLATELET # BLD AUTO: 181 K/UL (ref 150–450)
PMV BLD AUTO: 12.2 FL (ref 9.2–12.9)
POTASSIUM SERPL-SCNC: 4.9 MMOL/L (ref 3.5–5.1)
PROT SERPL-MCNC: 7.1 G/DL (ref 6–8.4)
RBC # BLD AUTO: 4.24 M/UL (ref 4–5.4)
SODIUM SERPL-SCNC: 142 MMOL/L (ref 136–145)
TRIGL SERPL-MCNC: 64 MG/DL (ref 30–150)
TSH SERPL DL<=0.005 MIU/L-ACNC: 1.41 UIU/ML (ref 0.4–4)
WBC # BLD AUTO: 9.05 K/UL (ref 3.9–12.7)

## 2021-07-06 PROCEDURE — 1157F PR ADVANCE CARE PLAN OR EQUIV PRESENT IN MEDICAL RECORD: ICD-10-PCS | Mod: S$GLB,,, | Performed by: NURSE PRACTITIONER

## 2021-07-06 PROCEDURE — 99499 RISK ADDL DX/OHS AUDIT: ICD-10-PCS | Mod: HCNC,S$GLB,, | Performed by: NURSE PRACTITIONER

## 2021-07-06 PROCEDURE — 1126F PR PAIN SEVERITY QUANTIFIED, NO PAIN PRESENT: ICD-10-PCS | Mod: S$GLB,,, | Performed by: NURSE PRACTITIONER

## 2021-07-06 PROCEDURE — 84443 ASSAY THYROID STIM HORMONE: CPT | Performed by: FAMILY MEDICINE

## 2021-07-06 PROCEDURE — 99999 PR PBB SHADOW E&M-EST. PATIENT-LVL V: CPT | Mod: PBBFAC,,, | Performed by: NURSE PRACTITIONER

## 2021-07-06 PROCEDURE — 1126F AMNT PAIN NOTED NONE PRSNT: CPT | Mod: S$GLB,,, | Performed by: NURSE PRACTITIONER

## 2021-07-06 PROCEDURE — 1157F ADVNC CARE PLAN IN RCRD: CPT | Mod: S$GLB,,, | Performed by: PHYSICIAN ASSISTANT

## 2021-07-06 PROCEDURE — 1157F ADVNC CARE PLAN IN RCRD: CPT | Mod: S$GLB,,, | Performed by: NURSE PRACTITIONER

## 2021-07-06 PROCEDURE — 99999 PR PBB SHADOW E&M-EST. PATIENT-LVL III: CPT | Mod: PBBFAC,,, | Performed by: PHYSICIAN ASSISTANT

## 2021-07-06 PROCEDURE — 99999 PR PBB SHADOW E&M-EST. PATIENT-LVL V: ICD-10-PCS | Mod: PBBFAC,,, | Performed by: NURSE PRACTITIONER

## 2021-07-06 PROCEDURE — 1101F PT FALLS ASSESS-DOCD LE1/YR: CPT | Mod: CPTII,S$GLB,, | Performed by: NURSE PRACTITIONER

## 2021-07-06 PROCEDURE — 99213 PR OFFICE/OUTPT VISIT, EST, LEVL III, 20-29 MIN: ICD-10-PCS | Mod: S$GLB,,, | Performed by: PHYSICIAN ASSISTANT

## 2021-07-06 PROCEDURE — 1101F PR PT FALLS ASSESS DOC 0-1 FALLS W/OUT INJ PAST YR: ICD-10-PCS | Mod: CPTII,S$GLB,, | Performed by: PHYSICIAN ASSISTANT

## 2021-07-06 PROCEDURE — 1101F PR PT FALLS ASSESS DOC 0-1 FALLS W/OUT INJ PAST YR: ICD-10-PCS | Mod: CPTII,S$GLB,, | Performed by: NURSE PRACTITIONER

## 2021-07-06 PROCEDURE — 1157F PR ADVANCE CARE PLAN OR EQUIV PRESENT IN MEDICAL RECORD: ICD-10-PCS | Mod: S$GLB,,, | Performed by: PHYSICIAN ASSISTANT

## 2021-07-06 PROCEDURE — 99213 OFFICE O/P EST LOW 20 MIN: CPT | Mod: S$GLB,,, | Performed by: PHYSICIAN ASSISTANT

## 2021-07-06 PROCEDURE — 1159F PR MEDICATION LIST DOCUMENTED IN MEDICAL RECORD: ICD-10-PCS | Mod: S$GLB,,, | Performed by: PHYSICIAN ASSISTANT

## 2021-07-06 PROCEDURE — 1101F PT FALLS ASSESS-DOCD LE1/YR: CPT | Mod: CPTII,S$GLB,, | Performed by: PHYSICIAN ASSISTANT

## 2021-07-06 PROCEDURE — 99999 PR PBB SHADOW E&M-EST. PATIENT-LVL III: ICD-10-PCS | Mod: PBBFAC,,, | Performed by: PHYSICIAN ASSISTANT

## 2021-07-06 PROCEDURE — 3288F PR FALLS RISK ASSESSMENT DOCUMENTED: ICD-10-PCS | Mod: CPTII,S$GLB,, | Performed by: NURSE PRACTITIONER

## 2021-07-06 PROCEDURE — G0439 PR MEDICARE ANNUAL WELLNESS SUBSEQUENT VISIT: ICD-10-PCS | Mod: S$GLB,,, | Performed by: NURSE PRACTITIONER

## 2021-07-06 PROCEDURE — 85025 COMPLETE CBC W/AUTO DIFF WBC: CPT | Performed by: FAMILY MEDICINE

## 2021-07-06 PROCEDURE — 80061 LIPID PANEL: CPT | Performed by: FAMILY MEDICINE

## 2021-07-06 PROCEDURE — 80053 COMPREHEN METABOLIC PANEL: CPT | Performed by: FAMILY MEDICINE

## 2021-07-06 PROCEDURE — 3288F FALL RISK ASSESSMENT DOCD: CPT | Mod: CPTII,S$GLB,, | Performed by: NURSE PRACTITIONER

## 2021-07-06 PROCEDURE — 3288F FALL RISK ASSESSMENT DOCD: CPT | Mod: CPTII,S$GLB,, | Performed by: PHYSICIAN ASSISTANT

## 2021-07-06 PROCEDURE — 1126F AMNT PAIN NOTED NONE PRSNT: CPT | Mod: S$GLB,,, | Performed by: PHYSICIAN ASSISTANT

## 2021-07-06 PROCEDURE — 3288F PR FALLS RISK ASSESSMENT DOCUMENTED: ICD-10-PCS | Mod: CPTII,S$GLB,, | Performed by: PHYSICIAN ASSISTANT

## 2021-07-06 PROCEDURE — 36415 COLL VENOUS BLD VENIPUNCTURE: CPT | Performed by: FAMILY MEDICINE

## 2021-07-06 PROCEDURE — 1126F PR PAIN SEVERITY QUANTIFIED, NO PAIN PRESENT: ICD-10-PCS | Mod: S$GLB,,, | Performed by: PHYSICIAN ASSISTANT

## 2021-07-06 PROCEDURE — 1159F MED LIST DOCD IN RCRD: CPT | Mod: S$GLB,,, | Performed by: PHYSICIAN ASSISTANT

## 2021-07-06 PROCEDURE — 99499 UNLISTED E&M SERVICE: CPT | Mod: HCNC,S$GLB,, | Performed by: NURSE PRACTITIONER

## 2021-07-06 PROCEDURE — G0439 PPPS, SUBSEQ VISIT: HCPCS | Mod: S$GLB,,, | Performed by: NURSE PRACTITIONER

## 2021-07-10 ENCOUNTER — EXTERNAL HOME HEALTH (OUTPATIENT)
Dept: HOME HEALTH SERVICES | Facility: HOSPITAL | Age: 86
End: 2021-07-10
Payer: MEDICARE

## 2021-07-12 ENCOUNTER — OUTPATIENT CASE MANAGEMENT (OUTPATIENT)
Dept: ADMINISTRATIVE | Facility: OTHER | Age: 86
End: 2021-07-12

## 2021-07-15 ENCOUNTER — OUTPATIENT CASE MANAGEMENT (OUTPATIENT)
Dept: ADMINISTRATIVE | Facility: OTHER | Age: 86
End: 2021-07-15

## 2021-07-16 ENCOUNTER — OUTPATIENT CASE MANAGEMENT (OUTPATIENT)
Dept: ADMINISTRATIVE | Facility: OTHER | Age: 86
End: 2021-07-16

## 2021-07-23 ENCOUNTER — DOCUMENT SCAN (OUTPATIENT)
Dept: HOME HEALTH SERVICES | Facility: HOSPITAL | Age: 86
End: 2021-07-23
Payer: MEDICARE

## 2021-08-25 ENCOUNTER — CLINICAL SUPPORT (OUTPATIENT)
Dept: AUDIOLOGY | Facility: CLINIC | Age: 86
End: 2021-08-25
Payer: MEDICARE

## 2021-08-25 DIAGNOSIS — R94.120 ABNORMAL HEARING SCREEN: ICD-10-CM

## 2021-08-25 DIAGNOSIS — H91.90 HEARING DIFFICULTY, UNSPECIFIED LATERALITY: ICD-10-CM

## 2021-08-30 PROCEDURE — G0179 PR HOME HEALTH MD RECERTIFICATION: ICD-10-PCS | Mod: ,,, | Performed by: FAMILY MEDICINE

## 2021-08-30 PROCEDURE — G0179 MD RECERTIFICATION HHA PT: HCPCS | Mod: ,,, | Performed by: FAMILY MEDICINE

## 2021-09-07 NOTE — TELEPHONE ENCOUNTER
Care Transitions Outreach Attempt    Call within 2 business days of discharge: Yes   Attempted to reach patient for transitions of care follow up. Unable to reach patient. Patient: Monica Murphy Patient : 1937 MRN: [id number]    Last Discharge [facility]       Complaint Diagnosis Description Type Department Provider    21 Dizziness Dizziness . .. ED to Hosp-Admission (Discharged) (ADMITTED) [facility] 8S CDU Stephanie Erazo MD; Silviano Lewis,... Was this an external facility discharge?  No Discharge Facility:     Noted following upcoming appointments from discharge chart review:   1215 Cecilia Begum follow up appointment(s):   Future Appointments   Date Time Provider Cezar Gamez   2021  1:00 PM MD PRISCILLA Castaneda [facility]     Non-[facility] follow up appointment(s): Pt will see dr joyce tomorrow and asked her to let them know. I forwarded the message to them.6/29/2020/1033/sf

## 2021-09-08 ENCOUNTER — EXTERNAL HOME HEALTH (OUTPATIENT)
Dept: HOME HEALTH SERVICES | Facility: HOSPITAL | Age: 86
End: 2021-09-08
Payer: MEDICARE

## 2021-10-04 ENCOUNTER — OFFICE VISIT (OUTPATIENT)
Dept: INTERNAL MEDICINE | Facility: CLINIC | Age: 86
End: 2021-10-04
Payer: MEDICARE

## 2021-10-04 ENCOUNTER — LAB VISIT (OUTPATIENT)
Dept: LAB | Facility: HOSPITAL | Age: 86
End: 2021-10-04
Attending: FAMILY MEDICINE
Payer: MEDICARE

## 2021-10-04 VITALS
SYSTOLIC BLOOD PRESSURE: 104 MMHG | OXYGEN SATURATION: 98 % | HEART RATE: 96 BPM | HEIGHT: 57 IN | BODY MASS INDEX: 34.06 KG/M2 | DIASTOLIC BLOOD PRESSURE: 62 MMHG | TEMPERATURE: 97 F | WEIGHT: 157.88 LBS

## 2021-10-04 DIAGNOSIS — I70.0 ATHEROSCLEROSIS OF AORTA: ICD-10-CM

## 2021-10-04 DIAGNOSIS — E53.8 B12 DEFICIENCY: ICD-10-CM

## 2021-10-04 DIAGNOSIS — J45.909 ASTHMA, UNSPECIFIED ASTHMA SEVERITY, UNSPECIFIED WHETHER COMPLICATED, UNSPECIFIED WHETHER PERSISTENT: ICD-10-CM

## 2021-10-04 DIAGNOSIS — E78.5 HYPERLIPIDEMIA, UNSPECIFIED HYPERLIPIDEMIA TYPE: ICD-10-CM

## 2021-10-04 DIAGNOSIS — Z76.89 ENCOUNTER TO ESTABLISH CARE WITH NEW DOCTOR: Primary | ICD-10-CM

## 2021-10-04 DIAGNOSIS — H91.90 HEARING DIFFICULTY, UNSPECIFIED LATERALITY: ICD-10-CM

## 2021-10-04 DIAGNOSIS — I10 PRIMARY HYPERTENSION: ICD-10-CM

## 2021-10-04 DIAGNOSIS — R41.89 COGNITIVE IMPAIRMENT: ICD-10-CM

## 2021-10-04 DIAGNOSIS — D64.9 ANEMIA, UNSPECIFIED TYPE: ICD-10-CM

## 2021-10-04 DIAGNOSIS — K59.00 CONSTIPATION, UNSPECIFIED CONSTIPATION TYPE: ICD-10-CM

## 2021-10-04 DIAGNOSIS — I48.11 LONGSTANDING PERSISTENT ATRIAL FIBRILLATION: ICD-10-CM

## 2021-10-04 DIAGNOSIS — N18.31 STAGE 3A CHRONIC KIDNEY DISEASE: ICD-10-CM

## 2021-10-04 LAB
ALBUMIN SERPL BCP-MCNC: 3.4 G/DL (ref 3.5–5.2)
ALP SERPL-CCNC: 104 U/L (ref 55–135)
ALT SERPL W/O P-5'-P-CCNC: 12 U/L (ref 10–44)
ANION GAP SERPL CALC-SCNC: 15 MMOL/L (ref 8–16)
AST SERPL-CCNC: 19 U/L (ref 10–40)
BASOPHILS # BLD AUTO: 0.03 K/UL (ref 0–0.2)
BASOPHILS NFR BLD: 0.4 % (ref 0–1.9)
BILIRUB SERPL-MCNC: 0.8 MG/DL (ref 0.1–1)
BUN SERPL-MCNC: 26 MG/DL (ref 8–23)
CALCIUM SERPL-MCNC: 10.2 MG/DL (ref 8.7–10.5)
CHLORIDE SERPL-SCNC: 95 MMOL/L (ref 95–110)
CHOLEST SERPL-MCNC: 159 MG/DL (ref 120–199)
CHOLEST/HDLC SERPL: 2.5 {RATIO} (ref 2–5)
CO2 SERPL-SCNC: 30 MMOL/L (ref 23–29)
CREAT SERPL-MCNC: 1 MG/DL (ref 0.5–1.4)
DIFFERENTIAL METHOD: ABNORMAL
EOSINOPHIL # BLD AUTO: 0.1 K/UL (ref 0–0.5)
EOSINOPHIL NFR BLD: 2 % (ref 0–8)
ERYTHROCYTE [DISTWIDTH] IN BLOOD BY AUTOMATED COUNT: 13.7 % (ref 11.5–14.5)
EST. GFR  (AFRICAN AMERICAN): 58.9 ML/MIN/1.73 M^2
EST. GFR  (NON AFRICAN AMERICAN): 51.1 ML/MIN/1.73 M^2
GLUCOSE SERPL-MCNC: 103 MG/DL (ref 70–110)
HCT VFR BLD AUTO: 40.3 % (ref 37–48.5)
HDLC SERPL-MCNC: 63 MG/DL (ref 40–75)
HDLC SERPL: 39.6 % (ref 20–50)
HGB BLD-MCNC: 12.4 G/DL (ref 12–16)
IMM GRANULOCYTES # BLD AUTO: 0.04 K/UL (ref 0–0.04)
IMM GRANULOCYTES NFR BLD AUTO: 0.6 % (ref 0–0.5)
LDLC SERPL CALC-MCNC: 80 MG/DL (ref 63–159)
LYMPHOCYTES # BLD AUTO: 2 K/UL (ref 1–4.8)
LYMPHOCYTES NFR BLD: 28.3 % (ref 18–48)
MCH RBC QN AUTO: 29.8 PG (ref 27–31)
MCHC RBC AUTO-ENTMCNC: 30.8 G/DL (ref 32–36)
MCV RBC AUTO: 97 FL (ref 82–98)
MONOCYTES # BLD AUTO: 1 K/UL (ref 0.3–1)
MONOCYTES NFR BLD: 13.9 % (ref 4–15)
NEUTROPHILS # BLD AUTO: 3.9 K/UL (ref 1.8–7.7)
NEUTROPHILS NFR BLD: 54.8 % (ref 38–73)
NONHDLC SERPL-MCNC: 96 MG/DL
NRBC BLD-RTO: 0 /100 WBC
PLATELET # BLD AUTO: 224 K/UL (ref 150–450)
PMV BLD AUTO: 12 FL (ref 9.2–12.9)
POTASSIUM SERPL-SCNC: 4.7 MMOL/L (ref 3.5–5.1)
PROT SERPL-MCNC: 7.3 G/DL (ref 6–8.4)
RBC # BLD AUTO: 4.16 M/UL (ref 4–5.4)
SODIUM SERPL-SCNC: 140 MMOL/L (ref 136–145)
TRIGL SERPL-MCNC: 80 MG/DL (ref 30–150)
TSH SERPL DL<=0.005 MIU/L-ACNC: 2.35 UIU/ML (ref 0.4–4)
VIT B12 SERPL-MCNC: 504 PG/ML (ref 210–950)
WBC # BLD AUTO: 7.13 K/UL (ref 3.9–12.7)

## 2021-10-04 PROCEDURE — 80053 COMPREHEN METABOLIC PANEL: CPT | Mod: HCNC | Performed by: FAMILY MEDICINE

## 2021-10-04 PROCEDURE — 90694 FLU VACCINE - QUADRIVALENT - ADJUVANTED: ICD-10-PCS | Mod: HCNC,S$GLB,, | Performed by: FAMILY MEDICINE

## 2021-10-04 PROCEDURE — 1126F AMNT PAIN NOTED NONE PRSNT: CPT | Mod: HCNC,CPTII,S$GLB, | Performed by: FAMILY MEDICINE

## 2021-10-04 PROCEDURE — 99999 PR PBB SHADOW E&M-EST. PATIENT-LVL IV: ICD-10-PCS | Mod: PBBFAC,HCNC,, | Performed by: FAMILY MEDICINE

## 2021-10-04 PROCEDURE — 36415 COLL VENOUS BLD VENIPUNCTURE: CPT | Mod: HCNC | Performed by: FAMILY MEDICINE

## 2021-10-04 PROCEDURE — 99999 PR PBB SHADOW E&M-EST. PATIENT-LVL IV: CPT | Mod: PBBFAC,HCNC,, | Performed by: FAMILY MEDICINE

## 2021-10-04 PROCEDURE — 3288F PR FALLS RISK ASSESSMENT DOCUMENTED: ICD-10-PCS | Mod: HCNC,CPTII,S$GLB, | Performed by: FAMILY MEDICINE

## 2021-10-04 PROCEDURE — 1159F PR MEDICATION LIST DOCUMENTED IN MEDICAL RECORD: ICD-10-PCS | Mod: HCNC,CPTII,S$GLB, | Performed by: FAMILY MEDICINE

## 2021-10-04 PROCEDURE — 84443 ASSAY THYROID STIM HORMONE: CPT | Mod: HCNC | Performed by: FAMILY MEDICINE

## 2021-10-04 PROCEDURE — 99214 OFFICE O/P EST MOD 30 MIN: CPT | Mod: 25,HCNC,S$GLB, | Performed by: FAMILY MEDICINE

## 2021-10-04 PROCEDURE — 99499 RISK ADDL DX/OHS AUDIT: ICD-10-PCS | Mod: HCNC,S$GLB,, | Performed by: FAMILY MEDICINE

## 2021-10-04 PROCEDURE — 1101F PR PT FALLS ASSESS DOC 0-1 FALLS W/OUT INJ PAST YR: ICD-10-PCS | Mod: HCNC,CPTII,S$GLB, | Performed by: FAMILY MEDICINE

## 2021-10-04 PROCEDURE — 90694 VACC AIIV4 NO PRSRV 0.5ML IM: CPT | Mod: HCNC,S$GLB,, | Performed by: FAMILY MEDICINE

## 2021-10-04 PROCEDURE — 1157F PR ADVANCE CARE PLAN OR EQUIV PRESENT IN MEDICAL RECORD: ICD-10-PCS | Mod: HCNC,CPTII,S$GLB, | Performed by: FAMILY MEDICINE

## 2021-10-04 PROCEDURE — G0008 ADMIN INFLUENZA VIRUS VAC: HCPCS | Mod: HCNC,S$GLB,, | Performed by: FAMILY MEDICINE

## 2021-10-04 PROCEDURE — 80061 LIPID PANEL: CPT | Mod: HCNC | Performed by: FAMILY MEDICINE

## 2021-10-04 PROCEDURE — 1157F ADVNC CARE PLAN IN RCRD: CPT | Mod: HCNC,CPTII,S$GLB, | Performed by: FAMILY MEDICINE

## 2021-10-04 PROCEDURE — 85025 COMPLETE CBC W/AUTO DIFF WBC: CPT | Mod: HCNC | Performed by: FAMILY MEDICINE

## 2021-10-04 PROCEDURE — G0008 FLU VACCINE - QUADRIVALENT - ADJUVANTED: ICD-10-PCS | Mod: HCNC,S$GLB,, | Performed by: FAMILY MEDICINE

## 2021-10-04 PROCEDURE — 1159F MED LIST DOCD IN RCRD: CPT | Mod: HCNC,CPTII,S$GLB, | Performed by: FAMILY MEDICINE

## 2021-10-04 PROCEDURE — 99499 UNLISTED E&M SERVICE: CPT | Mod: HCNC,S$GLB,, | Performed by: FAMILY MEDICINE

## 2021-10-04 PROCEDURE — 1126F PR PAIN SEVERITY QUANTIFIED, NO PAIN PRESENT: ICD-10-PCS | Mod: HCNC,CPTII,S$GLB, | Performed by: FAMILY MEDICINE

## 2021-10-04 PROCEDURE — 1101F PT FALLS ASSESS-DOCD LE1/YR: CPT | Mod: HCNC,CPTII,S$GLB, | Performed by: FAMILY MEDICINE

## 2021-10-04 PROCEDURE — 82607 VITAMIN B-12: CPT | Mod: HCNC | Performed by: FAMILY MEDICINE

## 2021-10-04 PROCEDURE — 99214 PR OFFICE/OUTPT VISIT, EST, LEVL IV, 30-39 MIN: ICD-10-PCS | Mod: 25,HCNC,S$GLB, | Performed by: FAMILY MEDICINE

## 2021-10-04 PROCEDURE — 3288F FALL RISK ASSESSMENT DOCD: CPT | Mod: HCNC,CPTII,S$GLB, | Performed by: FAMILY MEDICINE

## 2021-10-08 ENCOUNTER — OFFICE VISIT (OUTPATIENT)
Dept: CARDIOLOGY | Facility: CLINIC | Age: 86
End: 2021-10-08
Payer: MEDICARE

## 2021-10-08 ENCOUNTER — HOSPITAL ENCOUNTER (OUTPATIENT)
Dept: CARDIOLOGY | Facility: HOSPITAL | Age: 86
Discharge: HOME | End: 2021-10-08
Payer: MEDICARE

## 2021-10-08 VITALS
HEIGHT: 57 IN | DIASTOLIC BLOOD PRESSURE: 68 MMHG | SYSTOLIC BLOOD PRESSURE: 116 MMHG | HEART RATE: 68 BPM | WEIGHT: 152.13 LBS | OXYGEN SATURATION: 95 % | BODY MASS INDEX: 32.82 KG/M2

## 2021-10-08 DIAGNOSIS — K59.00 CONSTIPATION, UNSPECIFIED CONSTIPATION TYPE: ICD-10-CM

## 2021-10-08 DIAGNOSIS — I48.0 PAF (PAROXYSMAL ATRIAL FIBRILLATION): Primary | Chronic | ICD-10-CM

## 2021-10-08 DIAGNOSIS — I48.0 PAROXYSMAL ATRIAL FIBRILLATION: Primary | ICD-10-CM

## 2021-10-08 DIAGNOSIS — I25.10 CORONARY ARTERY DISEASE INVOLVING NATIVE CORONARY ARTERY OF NATIVE HEART WITHOUT ANGINA PECTORIS: Chronic | ICD-10-CM

## 2021-10-08 DIAGNOSIS — I48.0 PAROXYSMAL ATRIAL FIBRILLATION: ICD-10-CM

## 2021-10-08 DIAGNOSIS — F03.90 DEMENTIA WITHOUT BEHAVIORAL DISTURBANCE, UNSPECIFIED DEMENTIA TYPE: ICD-10-CM

## 2021-10-08 DIAGNOSIS — I51.89 LEFT VENTRICULAR DIASTOLIC DYSFUNCTION WITH PRESERVED SYSTOLIC FUNCTION: Chronic | ICD-10-CM

## 2021-10-08 PROCEDURE — 1126F AMNT PAIN NOTED NONE PRSNT: CPT | Mod: HCNC,CPTII,S$GLB, | Performed by: NURSE PRACTITIONER

## 2021-10-08 PROCEDURE — 93005 ELECTROCARDIOGRAM TRACING: CPT | Mod: HCNC

## 2021-10-08 PROCEDURE — 99214 PR OFFICE/OUTPT VISIT, EST, LEVL IV, 30-39 MIN: ICD-10-PCS | Mod: HCNC,S$GLB,, | Performed by: NURSE PRACTITIONER

## 2021-10-08 PROCEDURE — 1157F ADVNC CARE PLAN IN RCRD: CPT | Mod: HCNC,CPTII,S$GLB, | Performed by: NURSE PRACTITIONER

## 2021-10-08 PROCEDURE — 99999 PR PBB SHADOW E&M-EST. PATIENT-LVL IV: CPT | Mod: PBBFAC,HCNC,, | Performed by: NURSE PRACTITIONER

## 2021-10-08 PROCEDURE — 1126F PR PAIN SEVERITY QUANTIFIED, NO PAIN PRESENT: ICD-10-PCS | Mod: HCNC,CPTII,S$GLB, | Performed by: NURSE PRACTITIONER

## 2021-10-08 PROCEDURE — 99499 RISK ADDL DX/OHS AUDIT: ICD-10-PCS | Mod: HCNC,S$GLB,, | Performed by: NURSE PRACTITIONER

## 2021-10-08 PROCEDURE — 99999 PR PBB SHADOW E&M-EST. PATIENT-LVL IV: ICD-10-PCS | Mod: PBBFAC,HCNC,, | Performed by: NURSE PRACTITIONER

## 2021-10-08 PROCEDURE — 93010 EKG 12-LEAD: ICD-10-PCS | Mod: HCNC,,, | Performed by: INTERNAL MEDICINE

## 2021-10-08 PROCEDURE — 99214 OFFICE O/P EST MOD 30 MIN: CPT | Mod: HCNC,S$GLB,, | Performed by: NURSE PRACTITIONER

## 2021-10-08 PROCEDURE — 93010 ELECTROCARDIOGRAM REPORT: CPT | Mod: HCNC,,, | Performed by: INTERNAL MEDICINE

## 2021-10-08 PROCEDURE — 99499 UNLISTED E&M SERVICE: CPT | Mod: HCNC,S$GLB,, | Performed by: NURSE PRACTITIONER

## 2021-10-08 PROCEDURE — 1159F MED LIST DOCD IN RCRD: CPT | Mod: HCNC,CPTII,S$GLB, | Performed by: NURSE PRACTITIONER

## 2021-10-08 PROCEDURE — 1159F PR MEDICATION LIST DOCUMENTED IN MEDICAL RECORD: ICD-10-PCS | Mod: HCNC,CPTII,S$GLB, | Performed by: NURSE PRACTITIONER

## 2021-10-08 PROCEDURE — 1157F PR ADVANCE CARE PLAN OR EQUIV PRESENT IN MEDICAL RECORD: ICD-10-PCS | Mod: HCNC,CPTII,S$GLB, | Performed by: NURSE PRACTITIONER

## 2021-10-08 RX ORDER — POLYETHYLENE GLYCOL 3350 17 G/17G
17 POWDER, FOR SOLUTION ORAL DAILY
Qty: 510 G | Refills: 0
Start: 2021-10-08 | End: 2022-04-14

## 2021-10-22 ENCOUNTER — TELEPHONE (OUTPATIENT)
Dept: INTERNAL MEDICINE | Facility: CLINIC | Age: 86
End: 2021-10-22
Payer: MEDICARE

## 2021-10-29 PROCEDURE — G0179 MD RECERTIFICATION HHA PT: HCPCS | Mod: ,,, | Performed by: FAMILY MEDICINE

## 2021-10-29 PROCEDURE — G0179 PR HOME HEALTH MD RECERTIFICATION: ICD-10-PCS | Mod: ,,, | Performed by: FAMILY MEDICINE

## 2021-11-09 ENCOUNTER — EXTERNAL HOME HEALTH (OUTPATIENT)
Dept: HOME HEALTH SERVICES | Facility: HOSPITAL | Age: 86
End: 2021-11-09
Payer: MEDICARE

## 2022-02-05 ENCOUNTER — HOSPITAL ENCOUNTER (EMERGENCY)
Facility: HOSPITAL | Age: 87
Discharge: HOME | End: 2022-02-05
Attending: EMERGENCY MEDICINE
Payer: MEDICARE

## 2022-02-05 VITALS
HEART RATE: 79 BPM | SYSTOLIC BLOOD PRESSURE: 137 MMHG | RESPIRATION RATE: 20 BRPM | DIASTOLIC BLOOD PRESSURE: 72 MMHG | TEMPERATURE: 98 F | OXYGEN SATURATION: 97 % | BODY MASS INDEX: 33.48 KG/M2 | HEIGHT: 57 IN | WEIGHT: 155.19 LBS

## 2022-02-05 DIAGNOSIS — N17.9 ACUTE RENAL FAILURE, UNSPECIFIED ACUTE RENAL FAILURE TYPE: ICD-10-CM

## 2022-02-05 DIAGNOSIS — S09.90XA INJURY OF HEAD, INITIAL ENCOUNTER: Primary | ICD-10-CM
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
## 2022-02-05 LAB
ALBUMIN SERPL BCP-MCNC: 3.7 G/DL (ref 3.5–5.2)
ALP SERPL-CCNC: 78 U/L (ref 55–135)
ALT SERPL W/O P-5'-P-CCNC: 14 U/L (ref 10–44)
ANION GAP SERPL CALC-SCNC: 20 MMOL/L (ref 8–16)
AST SERPL-CCNC: 21 U/L (ref 10–40)
BASOPHILS # BLD AUTO: 0.04 K/UL (ref 0–0.2)
BASOPHILS NFR BLD: 0.3 % (ref 0–1.9)
BILIRUB SERPL-MCNC: 1.3 MG/DL (ref 0.1–1)
BILIRUB UR QL STRIP: NEGATIVE
BUN SERPL-MCNC: 36 MG/DL (ref 8–23)
CALCIUM SERPL-MCNC: 9.9 MG/DL (ref 8.7–10.5)
CHLORIDE SERPL-SCNC: 88 MMOL/L (ref 95–110)
CLARITY UR: CLEAR
CO2 SERPL-SCNC: 28 MMOL/L (ref 23–29)
COLOR UR: YELLOW
CREAT SERPL-MCNC: 1.8 MG/DL (ref 0.5–1.4)
DIFFERENTIAL METHOD: ABNORMAL
EOSINOPHIL # BLD AUTO: 0 K/UL (ref 0–0.5)
EOSINOPHIL NFR BLD: 0.2 % (ref 0–8)
ERYTHROCYTE [DISTWIDTH] IN BLOOD BY AUTOMATED COUNT: 13.2 % (ref 11.5–14.5)
EST. GFR  (AFRICAN AMERICAN): 29 ML/MIN/1.73 M^2
EST. GFR  (NON AFRICAN AMERICAN): 25 ML/MIN/1.73 M^2
GLUCOSE SERPL-MCNC: 214 MG/DL (ref 70–110)
GLUCOSE UR QL STRIP: NEGATIVE
HCT VFR BLD AUTO: 43.8 % (ref 37–48.5)
HGB BLD-MCNC: 14.6 G/DL (ref 12–16)
HGB UR QL STRIP: NEGATIVE
IMM GRANULOCYTES # BLD AUTO: 0.07 K/UL (ref 0–0.04)
IMM GRANULOCYTES NFR BLD AUTO: 0.6 % (ref 0–0.5)
KETONES UR QL STRIP: NEGATIVE
LEUKOCYTE ESTERASE UR QL STRIP: NEGATIVE
LIPASE SERPL-CCNC: 38 U/L (ref 4–60)
LYMPHOCYTES # BLD AUTO: 1.8 K/UL (ref 1–4.8)
LYMPHOCYTES NFR BLD: 14 % (ref 18–48)
MCH RBC QN AUTO: 30.7 PG (ref 27–31)
MCHC RBC AUTO-ENTMCNC: 33.3 G/DL (ref 32–36)
MCV RBC AUTO: 92 FL (ref 82–98)
MONOCYTES # BLD AUTO: 0.8 K/UL (ref 0.3–1)
MONOCYTES NFR BLD: 6.1 % (ref 4–15)
NEUTROPHILS # BLD AUTO: 9.9 K/UL (ref 1.8–7.7)
NEUTROPHILS NFR BLD: 78.8 % (ref 38–73)
NITRITE UR QL STRIP: NEGATIVE
NRBC BLD-RTO: 0 /100 WBC
PH UR STRIP: 7 [PH] (ref 5–8)
PLATELET # BLD AUTO: 203 K/UL (ref 150–450)
PMV BLD AUTO: 11.6 FL (ref 9.2–12.9)
POTASSIUM SERPL-SCNC: 2.8 MMOL/L (ref 3.5–5.1)
PROT SERPL-MCNC: 7.5 G/DL (ref 6–8.4)
PROT UR QL STRIP: NEGATIVE
RBC # BLD AUTO: 4.75 M/UL (ref 4–5.4)
SODIUM SERPL-SCNC: 136 MMOL/L (ref 136–145)
SP GR UR STRIP: 1.01 (ref 1–1.03)
TROPONIN I SERPL DL<=0.01 NG/ML-MCNC: 0.05 NG/ML (ref 0–0.03)
URN SPEC COLLECT METH UR: NORMAL
UROBILINOGEN UR STRIP-ACNC: NEGATIVE EU/DL
WBC # BLD AUTO: 12.53 K/UL (ref 3.9–12.7)

## 2022-02-05 PROCEDURE — 83690 ASSAY OF LIPASE: CPT | Mod: HCNC | Performed by: EMERGENCY MEDICINE

## 2022-02-05 PROCEDURE — 93005 ELECTROCARDIOGRAM TRACING: CPT | Mod: HCNC

## 2022-02-05 PROCEDURE — 96360 HYDRATION IV INFUSION INIT: CPT | Mod: HCNC

## 2022-02-05 PROCEDURE — 93010 ELECTROCARDIOGRAM REPORT: CPT | Mod: HCNC,,, | Performed by: INTERNAL MEDICINE

## 2022-02-05 PROCEDURE — 99285 EMERGENCY DEPT VISIT HI MDM: CPT | Mod: 25,HCNC

## 2022-02-05 PROCEDURE — 25000003 PHARM REV CODE 250: Mod: HCNC | Performed by: EMERGENCY MEDICINE

## 2022-02-05 PROCEDURE — P9612 CATHETERIZE FOR URINE SPEC: HCPCS | Mod: HCNC

## 2022-02-05 PROCEDURE — 85025 COMPLETE CBC W/AUTO DIFF WBC: CPT | Mod: HCNC | Performed by: EMERGENCY MEDICINE

## 2022-02-05 PROCEDURE — 93010 EKG 12-LEAD: ICD-10-PCS | Mod: HCNC,,, | Performed by: INTERNAL MEDICINE

## 2022-02-05 PROCEDURE — 84484 ASSAY OF TROPONIN QUANT: CPT | Mod: HCNC | Performed by: EMERGENCY MEDICINE

## 2022-02-05 PROCEDURE — 80053 COMPREHEN METABOLIC PANEL: CPT | Mod: HCNC | Performed by: EMERGENCY MEDICINE

## 2022-02-05 PROCEDURE — 81003 URINALYSIS AUTO W/O SCOPE: CPT | Mod: HCNC | Performed by: EMERGENCY MEDICINE

## 2022-02-05 RX ORDER — POTASSIUM CHLORIDE 20 MEQ/1
40 TABLET, EXTENDED RELEASE ORAL
Status: COMPLETED | OUTPATIENT
Start: 2022-02-05 | End: 2022-02-05

## 2022-02-05 RX ADMIN — POTASSIUM CHLORIDE 40 MEQ: 1500 TABLET, EXTENDED RELEASE ORAL at 11:02

## 2022-02-05 RX ADMIN — SODIUM CHLORIDE 500 ML: 0.9 INJECTION, SOLUTION INTRAVENOUS at 09:02

## 2022-02-05 NOTE — ED PROVIDER NOTES
SCRIBE #1 NOTE: I, Kylee Tolentino, am scribing for, and in the presence of, Bakari Hooper Jr., MD. I have scribed the entire note.       History     Chief Complaint   Patient presents with    Fall     Fell last night in her kitchen, hit head, denies loss of consciousness. Laid on the floor all night till her daughter came at 7 am this morning. Takes eliquis.     Review of patient's allergies indicates:   Allergen Reactions    Iodine and iodide containing products Rash         History of Present Illness     HPI    2/5/2022, 9:01 AM  History obtained from the patient      History of Present Illness: Elke Laws is a 87 y.o. female patient with a PMHx of atrial fibrilation, anemia, dementia, and HTN who presents to the Emergency Department for evaluation of a fall which onset 10 pm last night. Pt states that she fell last night in her kitchen where she hit her head.  She was on the floor all night until her daughter found her on the floor at 7 am today. Pt is currently taking Eliquis.  Symptoms are constant and moderate in severity. No mitigating or exacerbating factors reported. No associated sxs reported. Patient denies any fever, chills, CP, SOB, LOC , and all other sxs at this time. No further complaints or concerns at this time.       Arrival mode: Personal vehicle    PCP: Rick Echols MD        Past Medical History:  Past Medical History:   Diagnosis Date    Abnormal nuclear stress test 6/30/2016    Acute congestive heart failure 5/27/2017    Acute coronary syndrome     Acute kidney injury (nontraumatic) 5/27/2017    Acute on chronic congestive heart failure 5/21/2017    Acute on chronic diastolic congestive heart failure 8/27/2015    Acute on chronic respiratory failure 5/6/2019    Acute renal failure superimposed on stage 3 chronic kidney disease 1/26/2018    Anemia 5/9/2016    Angina pectoris 1/25/2018    Angina pectoris 1/25/2018    Anticoagulant long-term use     Anxiety 6/16/2019     "Aortic regurgitation     Echo 11/2013---5 - Mild to moderate aortic regurgitation.      Asthma     Atrial fibrillation 5/15/2014    Back pain     s/p epidural steriod injections, no recent injections.    Baker's cyst     small, right, seen on US lower extremity 6/2014    Blood transfusion     Approximately 6 years ago    Chronic constipation     Coronary artery disease     Degenerative disc disease, lumbar     Dementia 10/8/2021    Diastolic dysfunction     Seen on echo 11/2013, stress test negative 5/2014    Diverticulosis     colonoscopy 3/27/2011    E coli bacteremia 5/23/2017    E. coli UTI (urinary tract infection) 5/23/2017    Hemorrhoids     colonoscopy 3/27/2011    Hiatal hernia     CXR 12/30/2016---Retrocardiac density again noted consistent with a hiatal hernia.    Hx of adenomatous colonic polyps     Hyperlipidemia     Hypertension     Hyponatremia 5/9/2016    Hypoxemia 5/27/2017    Hypoxia 6/28/2017    Leukocytosis 5/27/2017    Mitral regurgitation     Myocardial infarction     per patient was told in the past she had a "mild heart attack"    Obesity     Osteoarthritis, knee     Pneumonia     per patient "walking Pneumonia" did not require hospitalization    Seasonal allergies     Sepsis 5/22/2017    Severe obesity with body mass index (BMI) of 35.0 to 39.9 with serious comorbidity     Trouble in sleeping     Urinary incontinence        Past Surgical History:  Past Surgical History:   Procedure Laterality Date    APPENDECTOMY      CATARACT EXTRACTION, BILATERAL      COLONOSCOPY N/A 8/29/2017    Procedure: COLONOSCOPY okay by dr. ramos;  Surgeon: Toño Abdi MD;  Location: [facility];  Service: Endoscopy;  Laterality: N/A;    EYE SURGERY Left     HYSTERECTOMY      benign reasons    KNEE SURGERY Bilateral     x2     Left heart cath      OLECRANON BURSECTOMY Right     6/2011    TONSILLECTOMY      TREATMENT OF CARDIAC ARRHYTHMIA N/A 4/16/2019    Procedure: " CARDIOVERSION OR DEFIBRILLATION;  Surgeon: Kee Jones MD;  Location: [facility] CATH LAB;  Service: Cardiology;  Laterality: N/A;    TREATMENT OF CARDIAC ARRHYTHMIA N/A 2019    Procedure: CARDIOVERSION;  Surgeon: Juan Manuel Gooden MD;  Location: [facility] CATH LAB;  Service: Cardiology;  Laterality: N/A;    URETHRA SURGERY           Family History:  Family History   Problem Relation Age of Onset    Heart disease Mother     Cancer Mother         colon    Hypertension Mother     Hyperlipidemia Mother     Heart disease Father     Hypertension Father     Hyperlipidemia Father     Heart disease Sister         MI    Heart disease Brother         MI    COPD Son     Hypertension Son     Diabetes Brother     Diabetes Son     Cancer Sister         breast    Kidney disease Neg Hx     Stroke Neg Hx        Social History:  Social History     Tobacco Use    Smoking status: Former Smoker     Packs/day: 0.05     Years: 1.00     Pack years: 0.05     Types: Cigarettes     Quit date: 1952     Years since quittin.1    Smokeless tobacco: Never Used   Substance and Sexual Activity    Alcohol use: No     Alcohol/week: 0.0 standard drinks    Drug use: No    Sexual activity: Not Currently     Partners: Male     Birth control/protection: See Surgical Hx        Review of Systems     Review of Systems   Constitutional: Negative for chills and fever.   HENT: Negative for sore throat.    Respiratory: Negative for shortness of breath.    Cardiovascular: Negative for chest pain.   Gastrointestinal: Negative for nausea.   Genitourinary: Negative for dysuria.   Musculoskeletal: Negative for back pain.   Skin: Negative for rash.   Neurological: Negative for weakness.   Hematological: Does not bruise/bleed easily.   Psychiatric/Behavioral:        (-) LOC   All other systems reviewed and are negative.       Physical Exam     Initial Vitals [22 0849]   BP Pulse Resp Temp SpO2   107/84 (!) 113 20 98.1 °F (36.7 °C) 98 %      MAP  "      --          Physical Exam  Nursing Notes and Vital Signs Reviewed.  Constitutional: Patient is in no acute distress. Well-developed and well-nourished.  Head: Atraumatic. Normocephalic.  Eyes:  EOM intact.  No scleral icterus.  ENT: Mucous membranes are moist.  Nares clear   Neck:  Full ROM. No JVD.  Cardiovascular: Regular rate. Regular rhythm No murmurs, rubs, or gallops. Distal pulses are 2+ and symmetric  Pulmonary/Chest: No respiratory distress. Clear to auscultation bilaterally. No wheezing or rales.  Equal chest wall rise bilaterally  Abdominal: Soft and non-distended.  There is no tenderness.  No rebound, guarding, or rigidity. Good bowel sounds.  Genitourinary: No CVA tenderness.  No suprapubic tenderness  Musculoskeletal: Moves all extremities. No obvious deformities.  5 x 5 strength in all extremities   Skin: Warm and dry.  Neurological:  Alert, awake, and appropriate.  Normal speech.  No acute focal neurological deficits are appreciated.  Two through 12 intact bilaterally.  Psychiatric: Normal affect. Good eye contact. Appropriate in content.     ED Course   Procedures  ED Vital Signs:  Vitals:    02/05/22 0849 02/05/22 0900 02/05/22 0904 02/05/22 0915   BP: 107/84 (!) 134/55  (!) 144/63   Pulse: (!) 113  95 92   Resp: 20   (!) 22   Temp: 98.1 °F (36.7 °C)      TempSrc: Oral      SpO2: 98%   97%   Weight:       Height: 4' 9" (1.448 m)       02/05/22 0930 02/05/22 0945 02/05/22 1041   BP: 136/77 138/63    Pulse: 96 89    Resp: 15 19    Temp:      TempSrc:      SpO2: 95% 97%    Weight:   70.4 kg (155 lb 3.3 oz)   Height:          Abnormal Lab Results:  Labs Reviewed   CBC W/ AUTO DIFFERENTIAL - Abnormal; Notable for the following components:       Result Value    Immature Granulocytes 0.6 (*)     Gran # (ANC) 9.9 (*)     Immature Grans (Abs) 0.07 (*)     Gran % 78.8 (*)     Lymph % 14.0 (*)     All other components within normal limits   COMPREHENSIVE METABOLIC PANEL - Abnormal; Notable for the " following components:    Potassium 2.8 (*)     Chloride 88 (*)     Glucose 214 (*)     BUN 36 (*)     Creatinine 1.8 (*)     Total Bilirubin 1.3 (*)     Anion Gap 20 (*)     eGFR if  29 (*)     eGFR if non  25 (*)     All other components within normal limits   TROPONIN I - Abnormal; Notable for the following components:    Troponin I 0.051 (*)     All other components within normal limits   LIPASE   URINALYSIS, REFLEX TO URINE CULTURE    Narrative:     Specimen Source->Urine        All Lab Results:  Results for orders placed or performed during the hospital encounter of 02/05/22   CBC auto differential   Result Value Ref Range    WBC 12.53 3.90 - 12.70 K/uL    RBC 4.75 4.00 - 5.40 M/uL    Hemoglobin 14.6 12.0 - 16.0 g/dL    Hematocrit 43.8 37.0 - 48.5 %    MCV 92 82 - 98 fL    MCH 30.7 27.0 - 31.0 pg    MCHC 33.3 32.0 - 36.0 g/dL    RDW 13.2 11.5 - 14.5 %    Platelets 203 150 - 450 K/uL    MPV 11.6 9.2 - 12.9 fL    Immature Granulocytes 0.6 (H) 0.0 - 0.5 %    Gran # (ANC) 9.9 (H) 1.8 - 7.7 K/uL    Immature Grans (Abs) 0.07 (H) 0.00 - 0.04 K/uL    Lymph # 1.8 1.0 - 4.8 K/uL    Mono # 0.8 0.3 - 1.0 K/uL    Eos # 0.0 0.0 - 0.5 K/uL    Baso # 0.04 0.00 - 0.20 K/uL    nRBC 0 0 /100 WBC    Gran % 78.8 (H) 38.0 - 73.0 %    Lymph % 14.0 (L) 18.0 - 48.0 %    Mono % 6.1 4.0 - 15.0 %    Eosinophil % 0.2 0.0 - 8.0 %    Basophil % 0.3 0.0 - 1.9 %    Differential Method Automated    Comprehensive metabolic panel   Result Value Ref Range    Sodium 136 136 - 145 mmol/L    Potassium 2.8 (L) 3.5 - 5.1 mmol/L    Chloride 88 (L) 95 - 110 mmol/L    CO2 28 23 - 29 mmol/L    Glucose 214 (H) 70 - 110 mg/dL    BUN 36 (H) 8 - 23 mg/dL    Creatinine 1.8 (H) 0.5 - 1.4 mg/dL    Calcium 9.9 8.7 - 10.5 mg/dL    Total Protein 7.5 6.0 - 8.4 g/dL    Albumin 3.7 3.5 - 5.2 g/dL    Total Bilirubin 1.3 (H) 0.1 - 1.0 mg/dL    Alkaline Phosphatase 78 55 - 135 U/L    AST 21 10 - 40 U/L    ALT 14 10 - 44 U/L    Anion Gap 20  (H) 8 - 16 mmol/L    eGFR if African American 29 (A) >60 mL/min/1.73 m^2    eGFR if non African American 25 (A) >60 mL/min/1.73 m^2   Lipase   Result Value Ref Range    Lipase 38 4 - 60 U/L   Urinalysis, Reflex to Urine Culture Urine, Clean Catch    Specimen: Urine   Result Value Ref Range    Specimen UA Urine, Catheterized     Color, UA Yellow Yellow, Straw, Ruth    Appearance, UA Clear Clear    pH, UA 7.0 5.0 - 8.0    Specific Gravity, UA 1.010 1.005 - 1.030    Protein, UA Negative Negative    Glucose, UA Negative Negative    Ketones, UA Negative Negative    Bilirubin (UA) Negative Negative    Occult Blood UA Negative Negative    Nitrite, UA Negative Negative    Urobilinogen, UA Negative <2.0 EU/dL    Leukocytes, UA Negative Negative   Troponin I   Result Value Ref Range    Troponin I 0.051 (H) 0.000 - 0.026 ng/mL         Imaging Results:  Imaging Results          X-Ray Chest AP Portable (Final result)  Result time 02/05/22 09:48:33    Final result by Tai Mcgill MD (02/05/22 09:48:33)                 Impression:      No acute findings.No change since previous exam.      Electronically signed by: Tai Mcgill  Date:    02/05/2022  Time:    09:48             Narrative:    EXAMINATION:  XR CHEST AP PORTABLE    CLINICAL HISTORY:  fall;    COMPARISON:  06/28/2021    FINDINGS:  Mild-to-moderate elevation right hemidiaphragm.  Lungs clear.  Heart size within normal limits.No significant bony findings.  No change since prior exam..                               CT Head Without Contrast (Final result)  Result time 02/05/22 09:31:29    Final result by Tai Mcgill MD (02/05/22 09:31:29)                 Impression:      1. No acute intracranial findings.  2. Age-appropriate atrophy and mild chronic ischemic change.  3. No change since 06/28/2021.  All CT scans at this facility are performed  using dose modulation techniques as appropriate to performed exam including the following:  automated  exposure control; adjustment of mA and/or kV according to the patients size (this includes techniques or standardized protocols for targeted exams where dose is matched to indication/reason for exam: i.e. extremities or head);  iterative reconstruction technique.      Electronically signed by: Tai Mcgill  Date:    02/05/2022  Time:    09:31             Narrative:    EXAMINATION:  CT HEAD WITHOUT CONTRAST    CLINICAL HISTORY:  Head trauma, moderate-severe;.    TECHNIQUE:  Low dose axial images were obtained through the head.  Coronal and sagittal reformations were also performed. Contrast was not administered.    COMPARISON:  06/28/2021.    FINDINGS:  Midline structures are intact. Age-appropriate atrophy.  Mild periventricular low-density changes compatible chronic ischemic microvascular disease.    No intracranial hemorrhage,acute infarct, or suspicious intracranial lesion is identified.    Skull base and calvarium are normal. Visualized sinuses and mastoid air cells are clear.                                 The EKG was ordered, reviewed, and independently interpreted by the ED provider.  Interpretation time: 0905  Rate: 88 BPM  Rhythm: atrial fibrillation  Interpretation: Abnormal ECG. No STEMI.  .           The Emergency Provider reviewed the vital signs and test results, which are outlined above.     ED Discussion       10:57 AM  Patient is stable nontoxic.  Patient has no alteration of consciousness that I can ascertain here.  She does have some mild renal insufficiency in a baseline elevation in her troponin.  She has a mild bump in her creatinine as well.  She appears to be dehydrated clinically I will treat with fluids with close follow-up.  I discussed all findings with the patient and her daughter.  They verbalized understanding agreement with all seems very reliable.  She is safe for discharge in my opinion.    10:59 AM: Reassessed pt at this time. Discussed with pt all pertinent ED information  and results. Discussed pt dx and plan of tx. Gave pt all f/u and return to the ED instructions. All questions and concerns were addressed at this time. Pt expresses understanding of information and instructions, and is comfortable with plan to discharge. Pt is stable for discharge.    I discussed with patient and/or family/caretaker that evaluation in the ED does not suggest any emergent or life threatening medical conditions requiring immediate intervention beyond what was provided in the ED, and I believe patient is safe for discharge.  Regardless, an unremarkable evaluation in the ED does not preclude the development or presence of a serious of life threatening condition. As such, patient was instructed to return immediately for any worsening or change in current symptoms.    Results for orders placed or performed during the hospital encounter of 02/05/22   CBC auto differential   Result Value Ref Range    WBC 12.53 3.90 - 12.70 K/uL    RBC 4.75 4.00 - 5.40 M/uL    Hemoglobin 14.6 12.0 - 16.0 g/dL    Hematocrit 43.8 37.0 - 48.5 %    MCV 92 82 - 98 fL    MCH 30.7 27.0 - 31.0 pg    MCHC 33.3 32.0 - 36.0 g/dL    RDW 13.2 11.5 - 14.5 %    Platelets 203 150 - 450 K/uL    MPV 11.6 9.2 - 12.9 fL    Immature Granulocytes 0.6 (H) 0.0 - 0.5 %    Gran # (ANC) 9.9 (H) 1.8 - 7.7 K/uL    Immature Grans (Abs) 0.07 (H) 0.00 - 0.04 K/uL    Lymph # 1.8 1.0 - 4.8 K/uL    Mono # 0.8 0.3 - 1.0 K/uL    Eos # 0.0 0.0 - 0.5 K/uL    Baso # 0.04 0.00 - 0.20 K/uL    nRBC 0 0 /100 WBC    Gran % 78.8 (H) 38.0 - 73.0 %    Lymph % 14.0 (L) 18.0 - 48.0 %    Mono % 6.1 4.0 - 15.0 %    Eosinophil % 0.2 0.0 - 8.0 %    Basophil % 0.3 0.0 - 1.9 %    Differential Method Automated    Comprehensive metabolic panel   Result Value Ref Range    Sodium 136 136 - 145 mmol/L    Potassium 2.8 (L) 3.5 - 5.1 mmol/L    Chloride 88 (L) 95 - 110 mmol/L    CO2 28 23 - 29 mmol/L    Glucose 214 (H) 70 - 110 mg/dL    BUN 36 (H) 8 - 23 mg/dL    Creatinine 1.8 (H) 0.5 -  1.4 mg/dL    Calcium 9.9 8.7 - 10.5 mg/dL    Total Protein 7.5 6.0 - 8.4 g/dL    Albumin 3.7 3.5 - 5.2 g/dL    Total Bilirubin 1.3 (H) 0.1 - 1.0 mg/dL    Alkaline Phosphatase 78 55 - 135 U/L    AST 21 10 - 40 U/L    ALT 14 10 - 44 U/L    Anion Gap 20 (H) 8 - 16 mmol/L    eGFR if African American 29 (A) >60 mL/min/1.73 m^2    eGFR if non African American 25 (A) >60 mL/min/1.73 m^2   Lipase   Result Value Ref Range    Lipase 38 4 - 60 U/L   Urinalysis, Reflex to Urine Culture Urine, Clean Catch    Specimen: Urine   Result Value Ref Range    Specimen UA Urine, Catheterized     Color, UA Yellow Yellow, Straw, Ruth    Appearance, UA Clear Clear    pH, UA 7.0 5.0 - 8.0    Specific Gravity, UA 1.010 1.005 - 1.030    Protein, UA Negative Negative    Glucose, UA Negative Negative    Ketones, UA Negative Negative    Bilirubin (UA) Negative Negative    Occult Blood UA Negative Negative    Nitrite, UA Negative Negative    Urobilinogen, UA Negative <2.0 EU/dL    Leukocytes, UA Negative Negative   Troponin I   Result Value Ref Range    Troponin I 0.051 (H) 0.000 - 0.026 ng/mL            Medical Decision Making:   Clinical Tests:   Lab Tests: Ordered and Reviewed  Radiological Study: Ordered and Reviewed  Medical Tests: Ordered and Reviewed           ED Medication(s):  Medications   potassium chloride SA CR tablet 40 mEq (has no administration in time range)   sodium chloride 0.9% bolus 500 mL (500 mLs Intravenous New Bag 2/5/22 1116)       New Prescriptions    No medications on file        Follow-up Information     Rick Echols MD.    Specialty: Family Medicine  Contact information:  [address] [facility] [address] [phone]                             Scribe Attestation:   Scribe #1: I performed the above scribed service and the documentation accurately describes the services I performed. I attest to the accuracy of the note.     Attending:   Physician Attestation Statement for Scribe #1: Bakari TEMPLE  Sandie Gamble MD, personally performed the services described in this documentation, as scribed by Kylee Tolentino, in my presence, and it is both accurate and complete.           Clinical Impression       ICD-10-CM ICD-9-CM   1. Injury of head, initial encounter  S09.90XA 959.01   2. Fall  W19.XXXA E888.9   3. Acute renal failure, unspecified acute renal failure type  N17.9 584.9       Disposition:   Disposition: Discharged  Condition: Stable       Bakari Hooper Jr., MD  02/05/22 1100

## 2022-02-05 NOTE — DISCHARGE INSTRUCTIONS
Mildly dehydrated.  Drink plenty of fluids.  Follow your.  Follow up with her doctor on Monday Tuesday or Wednesday.  Return as needed for any worsening symptoms, problems, questions concerns for

## 2022-02-09 ENCOUNTER — OFFICE VISIT (OUTPATIENT)
Dept: INTERNAL MEDICINE | Facility: CLINIC | Age: 87
End: 2022-02-09
Payer: MEDICARE

## 2022-02-09 ENCOUNTER — LAB VISIT (OUTPATIENT)
Dept: LAB | Facility: HOSPITAL | Age: 87
End: 2022-02-09
Attending: FAMILY MEDICINE
Payer: MEDICARE

## 2022-02-09 VITALS
DIASTOLIC BLOOD PRESSURE: 68 MMHG | HEART RATE: 103 BPM | BODY MASS INDEX: 29.8 KG/M2 | OXYGEN SATURATION: 92 % | TEMPERATURE: 98 F | SYSTOLIC BLOOD PRESSURE: 120 MMHG | WEIGHT: 138.13 LBS | HEIGHT: 57 IN

## 2022-02-09 DIAGNOSIS — R79.89 ELEVATED SERUM CREATININE: Primary | ICD-10-CM

## 2022-02-09 DIAGNOSIS — R79.89 ELEVATED SERUM CREATININE: ICD-10-CM

## 2022-02-09 DIAGNOSIS — Z91.81 AT RISK FOR FALLS: ICD-10-CM

## 2022-02-09 LAB
ANION GAP SERPL CALC-SCNC: 14 MMOL/L (ref 8–16)
BUN SERPL-MCNC: 42 MG/DL (ref 8–23)
CALCIUM SERPL-MCNC: 10.3 MG/DL (ref 8.7–10.5)
CHLORIDE SERPL-SCNC: 83 MMOL/L (ref 95–110)
CO2 SERPL-SCNC: 39 MMOL/L (ref 23–29)
CREAT SERPL-MCNC: 1.5 MG/DL (ref 0.5–1.4)
EST. GFR  (AFRICAN AMERICAN): 35.9 ML/MIN/1.73 M^2
EST. GFR  (NON AFRICAN AMERICAN): 31.1 ML/MIN/1.73 M^2
GLUCOSE SERPL-MCNC: 132 MG/DL (ref 70–110)
POTASSIUM SERPL-SCNC: 3.5 MMOL/L (ref 3.5–5.1)
SODIUM SERPL-SCNC: 136 MMOL/L (ref 136–145)

## 2022-02-09 PROCEDURE — 1159F MED LIST DOCD IN RCRD: CPT | Mod: HCNC,CPTII,S$GLB, | Performed by: FAMILY MEDICINE

## 2022-02-09 PROCEDURE — 3288F FALL RISK ASSESSMENT DOCD: CPT | Mod: HCNC,CPTII,S$GLB, | Performed by: FAMILY MEDICINE

## 2022-02-09 PROCEDURE — 99213 OFFICE O/P EST LOW 20 MIN: CPT | Mod: HCNC,S$GLB,, | Performed by: FAMILY MEDICINE

## 2022-02-09 PROCEDURE — 80048 BASIC METABOLIC PNL TOTAL CA: CPT | Mod: HCNC | Performed by: FAMILY MEDICINE

## 2022-02-09 PROCEDURE — 99213 PR OFFICE/OUTPT VISIT, EST, LEVL III, 20-29 MIN: ICD-10-PCS | Mod: HCNC,S$GLB,, | Performed by: FAMILY MEDICINE

## 2022-02-09 PROCEDURE — 3288F PR FALLS RISK ASSESSMENT DOCUMENTED: ICD-10-PCS | Mod: HCNC,CPTII,S$GLB, | Performed by: FAMILY MEDICINE

## 2022-02-09 PROCEDURE — 1101F PT FALLS ASSESS-DOCD LE1/YR: CPT | Mod: HCNC,CPTII,S$GLB, | Performed by: FAMILY MEDICINE

## 2022-02-09 PROCEDURE — 99999 PR PBB SHADOW E&M-EST. PATIENT-LVL III: CPT | Mod: PBBFAC,HCNC,, | Performed by: FAMILY MEDICINE

## 2022-02-09 PROCEDURE — 1159F PR MEDICATION LIST DOCUMENTED IN MEDICAL RECORD: ICD-10-PCS | Mod: HCNC,CPTII,S$GLB, | Performed by: FAMILY MEDICINE

## 2022-02-09 PROCEDURE — 1157F PR ADVANCE CARE PLAN OR EQUIV PRESENT IN MEDICAL RECORD: ICD-10-PCS | Mod: HCNC,CPTII,S$GLB, | Performed by: FAMILY MEDICINE

## 2022-02-09 PROCEDURE — 1157F ADVNC CARE PLAN IN RCRD: CPT | Mod: HCNC,CPTII,S$GLB, | Performed by: FAMILY MEDICINE

## 2022-02-09 PROCEDURE — 36415 COLL VENOUS BLD VENIPUNCTURE: CPT | Mod: HCNC | Performed by: FAMILY MEDICINE

## 2022-02-09 PROCEDURE — 1101F PR PT FALLS ASSESS DOC 0-1 FALLS W/OUT INJ PAST YR: ICD-10-PCS | Mod: HCNC,CPTII,S$GLB, | Performed by: FAMILY MEDICINE

## 2022-02-09 PROCEDURE — 99999 PR PBB SHADOW E&M-EST. PATIENT-LVL III: ICD-10-PCS | Mod: PBBFAC,HCNC,, | Performed by: FAMILY MEDICINE

## 2022-02-09 NOTE — PROGRESS NOTES
Subjective:       Patient ID: Elke Laws is a 87 y.o. female.    Chief Complaint: Hospital Follow Up    PCP: Dr. Echols    Patient presents to clinic today for ER follow up. She was seen in ER 2/5/22 after fall with head injury. Patient and caregiver reports she is doing okay and are without other concerns today.      Review of Systems   Constitutional: Negative for chills, fatigue, fever and unexpected weight change.   Eyes: Negative for visual disturbance.   Respiratory: Negative for shortness of breath.    Cardiovascular: Negative for chest pain.   Musculoskeletal: Negative for myalgias.   Neurological: Negative for headaches.         Objective:      Physical Exam  Vitals reviewed.   Constitutional:       General: She is not in acute distress.     Appearance: She is well-developed.   HENT:      Head: Normocephalic and atraumatic.   Eyes:      General: Lids are normal. No scleral icterus.     Extraocular Movements: Extraocular movements intact.      Conjunctiva/sclera: Conjunctivae normal.      Pupils: Pupils are equal, round, and reactive to light.   Pulmonary:      Effort: Pulmonary effort is normal.   Neurological:      Mental Status: She is alert and oriented to person, place, and time.   Psychiatric:         Mood and Affect: Mood and affect normal.         Assessment:       1. Elevated serum creatinine    2. At risk for falls        Plan:     Problem List Items Addressed This Visit     At risk for falls    Current Assessment & Plan     Advised to use cane/walker for fall prevention             Other Visit Diagnoses     Elevated serum creatinine    -  Primary    Relevant Orders    Basic Metabolic Panel (Completed)

## 2022-03-04 ENCOUNTER — HOSPITAL ENCOUNTER (EMERGENCY)
Facility: HOSPITAL | Age: 87
Discharge: HOME | End: 2022-03-05
Attending: EMERGENCY MEDICINE
Payer: MEDICARE

## 2022-03-04 DIAGNOSIS — K56.41 FECAL IMPACTION: Primary | ICD-10-CM

## 2022-03-04 DIAGNOSIS — E87.6 HYPOKALEMIA: ICD-10-CM

## 2022-03-04 PROCEDURE — 96365 THER/PROPH/DIAG IV INF INIT: CPT | Mod: HCNC

## 2022-03-04 PROCEDURE — 99284 EMERGENCY DEPT VISIT MOD MDM: CPT | Mod: 25,HCNC

## 2022-03-05 VITALS
WEIGHT: 138.88 LBS | OXYGEN SATURATION: 100 % | SYSTOLIC BLOOD PRESSURE: 138 MMHG | HEART RATE: 77 BPM | RESPIRATION RATE: 19 BRPM | BODY MASS INDEX: 29.96 KG/M2 | HEIGHT: 57 IN | DIASTOLIC BLOOD PRESSURE: 76 MMHG | TEMPERATURE: 99 F

## 2022-03-05 LAB
ALBUMIN SERPL BCP-MCNC: 3.4 G/DL (ref 3.5–5.2)
ALP SERPL-CCNC: 76 U/L (ref 55–135)
ALT SERPL W/O P-5'-P-CCNC: 11 U/L (ref 10–44)
ANION GAP SERPL CALC-SCNC: 15 MMOL/L (ref 8–16)
AST SERPL-CCNC: 20 U/L (ref 10–40)
BASOPHILS # BLD AUTO: 0.02 K/UL (ref 0–0.2)
BASOPHILS NFR BLD: 0.2 % (ref 0–1.9)
BILIRUB SERPL-MCNC: 1.3 MG/DL (ref 0.1–1)
BUN SERPL-MCNC: 36 MG/DL (ref 8–23)
CALCIUM SERPL-MCNC: 9.7 MG/DL (ref 8.7–10.5)
CHLORIDE SERPL-SCNC: 87 MMOL/L (ref 95–110)
CO2 SERPL-SCNC: 32 MMOL/L (ref 23–29)
CREAT SERPL-MCNC: 1.2 MG/DL (ref 0.5–1.4)
DIFFERENTIAL METHOD: ABNORMAL
EOSINOPHIL # BLD AUTO: 0 K/UL (ref 0–0.5)
EOSINOPHIL NFR BLD: 0.3 % (ref 0–8)
ERYTHROCYTE [DISTWIDTH] IN BLOOD BY AUTOMATED COUNT: 13.2 % (ref 11.5–14.5)
EST. GFR  (AFRICAN AMERICAN): 47 ML/MIN/1.73 M^2
EST. GFR  (NON AFRICAN AMERICAN): 41 ML/MIN/1.73 M^2
GLUCOSE SERPL-MCNC: 134 MG/DL (ref 70–110)
HCT VFR BLD AUTO: 39.3 % (ref 37–48.5)
HGB BLD-MCNC: 13.3 G/DL (ref 12–16)
IMM GRANULOCYTES # BLD AUTO: 0.04 K/UL (ref 0–0.04)
IMM GRANULOCYTES NFR BLD AUTO: 0.4 % (ref 0–0.5)
INR PPP: 1 (ref 0.8–1.2)
LYMPHOCYTES # BLD AUTO: 1.4 K/UL (ref 1–4.8)
LYMPHOCYTES NFR BLD: 14.2 % (ref 18–48)
MAGNESIUM SERPL-MCNC: 1.9 MG/DL (ref 1.6–2.6)
MCH RBC QN AUTO: 31 PG (ref 27–31)
MCHC RBC AUTO-ENTMCNC: 33.8 G/DL (ref 32–36)
MCV RBC AUTO: 92 FL (ref 82–98)
MONOCYTES # BLD AUTO: 1.2 K/UL (ref 0.3–1)
MONOCYTES NFR BLD: 12.1 % (ref 4–15)
NEUTROPHILS # BLD AUTO: 7.1 K/UL (ref 1.8–7.7)
NEUTROPHILS NFR BLD: 72.8 % (ref 38–73)
NRBC BLD-RTO: 0 /100 WBC
PLATELET # BLD AUTO: 202 K/UL (ref 150–450)
PMV BLD AUTO: 10.9 FL (ref 9.2–12.9)
POTASSIUM SERPL-SCNC: 2.5 MMOL/L (ref 3.5–5.1)
PROT SERPL-MCNC: 6.5 G/DL (ref 6–8.4)
PROTHROMBIN TIME: 11.1 SEC (ref 9–12.5)
RBC # BLD AUTO: 4.29 M/UL (ref 4–5.4)
SODIUM SERPL-SCNC: 134 MMOL/L (ref 136–145)
WBC # BLD AUTO: 9.79 K/UL (ref 3.9–12.7)

## 2022-03-05 PROCEDURE — 93010 ELECTROCARDIOGRAM REPORT: CPT | Mod: HCNC,,, | Performed by: INTERNAL MEDICINE

## 2022-03-05 PROCEDURE — 25000003 PHARM REV CODE 250: Mod: HCNC | Performed by: EMERGENCY MEDICINE

## 2022-03-05 PROCEDURE — 93010 EKG 12-LEAD: ICD-10-PCS | Mod: HCNC,,, | Performed by: INTERNAL MEDICINE

## 2022-03-05 PROCEDURE — 83735 ASSAY OF MAGNESIUM: CPT | Mod: HCNC | Performed by: EMERGENCY MEDICINE

## 2022-03-05 PROCEDURE — 85610 PROTHROMBIN TIME: CPT | Mod: HCNC | Performed by: EMERGENCY MEDICINE

## 2022-03-05 PROCEDURE — 63600175 PHARM REV CODE 636 W HCPCS: Mod: HCNC | Performed by: EMERGENCY MEDICINE

## 2022-03-05 PROCEDURE — 93005 ELECTROCARDIOGRAM TRACING: CPT | Mod: HCNC

## 2022-03-05 PROCEDURE — 80053 COMPREHEN METABOLIC PANEL: CPT | Mod: HCNC | Performed by: EMERGENCY MEDICINE

## 2022-03-05 PROCEDURE — 85025 COMPLETE CBC W/AUTO DIFF WBC: CPT | Mod: HCNC | Performed by: EMERGENCY MEDICINE

## 2022-03-05 RX ORDER — SODIUM CHLORIDE 9 MG/ML
1000 INJECTION, SOLUTION INTRAVENOUS
Status: COMPLETED | OUTPATIENT
Start: 2022-03-05 | End: 2022-03-05

## 2022-03-05 RX ORDER — POTASSIUM CHLORIDE 7.45 MG/ML
10 INJECTION INTRAVENOUS
Status: COMPLETED | OUTPATIENT
Start: 2022-03-05 | End: 2022-03-05

## 2022-03-05 RX ADMIN — POTASSIUM BICARBONATE 20 MEQ: 391 TABLET, EFFERVESCENT ORAL at 02:03

## 2022-03-05 RX ADMIN — POTASSIUM CHLORIDE 10 MEQ: 7.46 INJECTION, SOLUTION INTRAVENOUS at 02:03

## 2022-03-05 RX ADMIN — SODIUM CHLORIDE 1000 ML: 0.9 INJECTION, SOLUTION INTRAVENOUS at 02:03

## 2022-03-05 NOTE — ED PROVIDER NOTES
SCRIBE #1 NOTE: I, Rudolph Oreilly, am scribing for, and in the presence of, Cm Shultz MD. I have scribed the entire note.      History      Chief Complaint   Patient presents with    Hemorrhoids     Pt presented to ED with c/o increased pain and minor bleeding to her hemorrhoids        Review of patient's allergies indicates:   Allergen Reactions    Iodine and iodide containing products Rash        HPI   HPI    3/5/2022, 12:19 AM   History obtained from the patient and granddaughter      History of Present Illness: Elke Laws is a 87 y.o. female patient who presents to the Emergency Department for rectal pain, onset 3 days PTA. Symptoms are constant and moderate in severity. No mitigating or exacerbating factors reported. Associated sxs include mild rectal bleeding. Patient denies any fever, chills, n/v/d, SOB, CP, weakness, numbness, dizziness, headache, and all other sxs at this time. No prior Tx reported. No further complaints or concerns at this time.     Arrival mode: Personal vehicle    PCP: Rick Echols MD       Past Medical History:  Past Medical History:   Diagnosis Date    Abnormal nuclear stress test 6/30/2016    Acute congestive heart failure 5/27/2017    Acute coronary syndrome     Acute kidney injury (nontraumatic) 5/27/2017    Acute on chronic congestive heart failure 5/21/2017    Acute on chronic diastolic congestive heart failure 8/27/2015    Acute on chronic respiratory failure 5/6/2019    Acute renal failure superimposed on stage 3 chronic kidney disease 1/26/2018    Anemia 5/9/2016    Angina pectoris 1/25/2018    Angina pectoris 1/25/2018    Anticoagulant long-term use     Anxiety 6/16/2019    Aortic regurgitation     Echo 11/2013---5 - Mild to moderate aortic regurgitation.      Asthma     Atrial fibrillation 5/15/2014    Back pain     s/p epidural steriod injections, no recent injections.    Baker's cyst     small, right, seen on US lower extremity 6/2014    Blood  "transfusion     Approximately 6 years ago    Chronic constipation     Coronary artery disease     Degenerative disc disease, lumbar     Dementia 10/8/2021    Diastolic dysfunction     Seen on echo 11/2013, stress test negative 5/2014    Diverticulosis     colonoscopy 3/27/2011    E coli bacteremia 5/23/2017    E. coli UTI (urinary tract infection) 5/23/2017    Hemorrhoids     colonoscopy 3/27/2011    Hiatal hernia     CXR 12/30/2016---Retrocardiac density again noted consistent with a hiatal hernia.    Hx of adenomatous colonic polyps     Hyperlipidemia     Hypertension     Hyponatremia 5/9/2016    Hypoxemia 5/27/2017    Hypoxia 6/28/2017    Leukocytosis 5/27/2017    Mitral regurgitation     Myocardial infarction     per patient was told in the past she had a "mild heart attack"    Obesity     Osteoarthritis, knee     Pneumonia     per patient "walking Pneumonia" did not require hospitalization    Seasonal allergies     Sepsis 5/22/2017    Severe obesity with body mass index (BMI) of 35.0 to 39.9 with serious comorbidity     Trouble in sleeping     Urinary incontinence        Past Surgical History:  Past Surgical History:   Procedure Laterality Date    APPENDECTOMY      CATARACT EXTRACTION, BILATERAL      COLONOSCOPY N/A 8/29/2017    Procedure: COLONOSCOPY okay by dr. ramos;  Surgeon: Toño Abdi MD;  Location: [facility] ENDO;  Service: Endoscopy;  Laterality: N/A;    EYE SURGERY Left     HYSTERECTOMY      benign reasons    KNEE SURGERY Bilateral     x2     Left heart cath      OLECRANON BURSECTOMY Right     6/2011    TONSILLECTOMY      TREATMENT OF CARDIAC ARRHYTHMIA N/A 4/16/2019    Procedure: CARDIOVERSION OR DEFIBRILLATION;  Surgeon: Kee Jones MD;  Location: [facility] CATH LAB;  Service: Cardiology;  Laterality: N/A;    TREATMENT OF CARDIAC ARRHYTHMIA N/A 5/24/2019    Procedure: CARDIOVERSION;  Surgeon: Juan Manuel Gooden MD;  Location: [facility] CATH LAB;  Service: Cardiology;  Laterality: " N/A;    URETHRA SURGERY           Family History:  Family History   Problem Relation Age of Onset    Heart disease Mother     Cancer Mother         colon    Hypertension Mother     Hyperlipidemia Mother     Heart disease Father     Hypertension Father     Hyperlipidemia Father     Heart disease Sister         MI    Heart disease Brother         MI    COPD Son     Hypertension Son     Diabetes Brother     Diabetes Son     Cancer Sister         breast    Kidney disease Neg Hx     Stroke Neg Hx        Social History:  Social History     Tobacco Use    Smoking status: Former Smoker     Packs/day: 0.05     Years: 1.00     Pack years: 0.05     Types: Cigarettes     Quit date: 1952     Years since quittin.2    Smokeless tobacco: Never Used   Substance and Sexual Activity    Alcohol use: No     Alcohol/week: 0.0 standard drinks    Drug use: No    Sexual activity: Not Currently     Partners: Male     Birth control/protection: See Surgical Hx       ROS   Review of Systems   Constitutional: Negative for chills and fever.   HENT: Negative for sore throat.    Respiratory: Negative for shortness of breath.    Cardiovascular: Negative for chest pain.   Gastrointestinal: Positive for anal bleeding and rectal pain. Negative for diarrhea, nausea and vomiting.   Genitourinary: Negative for dysuria.   Musculoskeletal: Negative for back pain.   Skin: Negative for rash.   Neurological: Negative for dizziness, weakness, light-headedness, numbness and headaches.   Hematological: Does not bruise/bleed easily.   All other systems reviewed and are negative.    Physical Exam      Initial Vitals [223]   BP Pulse Resp Temp SpO2   (!) 146/71 76 14 98.3 °F (36.8 °C) 96 %      MAP       --          Physical Exam  Nursing Notes and Vital Signs Reviewed.  Constitutional: Patient is in no acute distress. Well-developed and well-nourished.  Head: Atraumatic. Normocephalic.  Eyes: PERRL. EOM intact. Conjunctivae  "are not pale. No scleral icterus.  ENT: Mucous membranes are moist. Oropharynx is clear and symmetric.    Neck: Supple. Full ROM. No lymphadenopathy.  Cardiovascular: Regular rate. Regular rhythm. No murmurs, rubs, or gallops. Distal pulses are 2+ and symmetric.  Pulmonary/Chest: No respiratory distress. Clear to auscultation bilaterally. No wheezing or rales.  Abdominal: Soft and non-distended.  There is no tenderness.  No rebound, guarding, or rigidity.   Rectal: Female chaperone present for the duration of the rectal exam. 2-3 non-thrombosed, non-bleeding external hemorrhoids. No melena or hematochezia. Large fecal impaction noted.  Musculoskeletal: Moves all extremities. No obvious deformities. No edema.  Skin: Warm and dry.  Neurological:  Alert, awake, and appropriate.  Normal speech.  No acute focal neurological deficits are appreciated.  Psychiatric: Normal affect. Good eye contact. Appropriate in content.    ED Course    Procedures  ED Vital Signs:  Vitals:    03/04/22 2113 03/05/22 0201 03/05/22 0400   BP: (!) 146/71 132/77 138/76   Pulse: 76 77 77   Resp: 14 16 19   Temp: 98.3 °F (36.8 °C)  98.7 °F (37.1 °C)   TempSrc: Oral  Oral   SpO2: 96% 100% 100%   Weight: 63 kg (138 lb 14.2 oz)     Height: 4' 9" (1.448 m)         Abnormal Lab Results:  Labs Reviewed   CBC W/ AUTO DIFFERENTIAL - Abnormal; Notable for the following components:       Result Value    Mono # 1.2 (*)     Lymph % 14.2 (*)     All other components within normal limits   COMPREHENSIVE METABOLIC PANEL - Abnormal; Notable for the following components:    Sodium 134 (*)     Potassium 2.5 (*)     Chloride 87 (*)     CO2 32 (*)     Glucose 134 (*)     BUN 36 (*)     Albumin 3.4 (*)     Total Bilirubin 1.3 (*)     eGFR if  47 (*)     eGFR if non  41 (*)     All other components within normal limits    Narrative:        Potassium critical result(s) called and verbal readback obtained   from  Evelin Alexandra RN by CD9 " 03/05/2022 02:22   PROTIME-INR   MAGNESIUM   MAGNESIUM        All Lab Results:  Results for orders placed or performed during the hospital encounter of 03/04/22   CBC auto differential   Result Value Ref Range    WBC 9.79 3.90 - 12.70 K/uL    RBC 4.29 4.00 - 5.40 M/uL    Hemoglobin 13.3 12.0 - 16.0 g/dL    Hematocrit 39.3 37.0 - 48.5 %    MCV 92 82 - 98 fL    MCH 31.0 27.0 - 31.0 pg    MCHC 33.8 32.0 - 36.0 g/dL    RDW 13.2 11.5 - 14.5 %    Platelets 202 150 - 450 K/uL    MPV 10.9 9.2 - 12.9 fL    Immature Granulocytes 0.4 0.0 - 0.5 %    Gran # (ANC) 7.1 1.8 - 7.7 K/uL    Immature Grans (Abs) 0.04 0.00 - 0.04 K/uL    Lymph # 1.4 1.0 - 4.8 K/uL    Mono # 1.2 (H) 0.3 - 1.0 K/uL    Eos # 0.0 0.0 - 0.5 K/uL    Baso # 0.02 0.00 - 0.20 K/uL    nRBC 0 0 /100 WBC    Gran % 72.8 38.0 - 73.0 %    Lymph % 14.2 (L) 18.0 - 48.0 %    Mono % 12.1 4.0 - 15.0 %    Eosinophil % 0.3 0.0 - 8.0 %    Basophil % 0.2 0.0 - 1.9 %    Differential Method Automated    Comprehensive metabolic panel   Result Value Ref Range    Sodium 134 (L) 136 - 145 mmol/L    Potassium 2.5 (LL) 3.5 - 5.1 mmol/L    Chloride 87 (L) 95 - 110 mmol/L    CO2 32 (H) 23 - 29 mmol/L    Glucose 134 (H) 70 - 110 mg/dL    BUN 36 (H) 8 - 23 mg/dL    Creatinine 1.2 0.5 - 1.4 mg/dL    Calcium 9.7 8.7 - 10.5 mg/dL    Total Protein 6.5 6.0 - 8.4 g/dL    Albumin 3.4 (L) 3.5 - 5.2 g/dL    Total Bilirubin 1.3 (H) 0.1 - 1.0 mg/dL    Alkaline Phosphatase 76 55 - 135 U/L    AST 20 10 - 40 U/L    ALT 11 10 - 44 U/L    Anion Gap 15 8 - 16 mmol/L    eGFR if African American 47 (A) >60 mL/min/1.73 m^2    eGFR if non African American 41 (A) >60 mL/min/1.73 m^2   Protime-INR   Result Value Ref Range    Prothrombin Time 11.1 9.0 - 12.5 sec    INR 1.0 0.8 - 1.2   Magnesium   Result Value Ref Range    Magnesium 1.9 1.6 - 2.6 mg/dL     Imaging Results:  Imaging Results    None        The EKG was ordered, reviewed, and independently interpreted by the ED provider.  Interpretation time:  2:45  Rate: 79 BPM  Rhythm: atrial fibrillation  Interpretation: Rightward axis. Nonspecific ST abnormality. No STEMI.           The Emergency Provider reviewed the vital signs and test results, which are outlined above.    ED Discussion     3:20 AM: Reassessed pt at this time. After disimpaction, and enema, her bowels are moving and she feels much better. Discussed with pt all pertinent ED information and results. Discussed pt dx and plan of tx. Gave pt all f/u and return to the ED instructions. All questions and concerns were addressed at this time. Pt expresses understanding of information and instructions, and is comfortable with plan to discharge. Pt is stable for discharge.    I discussed with patient and/or family/caretaker that evaluation in the ED does not suggest any emergent or life threatening medical conditions requiring immediate intervention beyond what was provided in the ED, and I believe patient is safe for discharge.  Regardless, an unremarkable evaluation in the ED does not preclude the development or presence of a serious of life threatening condition. As such, patient was instructed to return immediately for any worsening or change in current symptoms.    ED Course as of 03/05/22 0654   Sat Mar 05, 2022   0317 Patient on furosemide. EKG without sequela of hypokalemia. Will Rx extra K for the next several days. Patient understands to be rechecked in 2-3 days for adjustments as needed.  [BA]      ED Course User Index  [BA] Cm Shultz MD       ED Medication(s):  Medications   potassium chloride 10 mEq in 100 mL IVPB (0 mEq Intravenous Stopped 3/5/22 0344)   potassium bicarbonate disintegrating tablet 20 mEq (20 mEq Oral Given 3/5/22 0237)   0.9%  NaCl infusion (0 mLs Intravenous Stopped 3/5/22 0401)   potassium bicarbonate disintegrating tablet 20 mEq (20 mEq Oral Given 3/5/22 0238)        Follow-up Information     Rick Echols MD. Schedule an appointment as soon as possible for a visit in  3 days.    Specialty: Family Medicine  Why: For re-evaluation and further treatment, and recheck of potassium level.  Contact information:  [id number] [facility]  [place] [address] [phone]                        Discharge Medication List as of 3/5/2022  3:20 AM      START taking these medications    Details   potassium bicarbonate (K-LYTE) disintegrating tablet Take 1 tablet (25 mEq total) by mouth once daily. for 5 days, Starting Sat 3/5/2022, Until Thu 3/10/2022, Print              Medical Decision Making    Medical Decision Making:   Clinical Tests:   Lab Tests: Ordered and Reviewed  Medical Tests: Ordered and Reviewed           Scribe Attestation:   Scribe #1: I performed the above scribed service and the documentation accurately describes the services I performed. I attest to the accuracy of the note.    Attending:   Physician Attestation Statement for Scribe #1: I, Cm Shultz MD, personally performed the services described in this documentation, as scribed by Rudolph Oreilly, in my presence, and it is both accurate and complete.          Clinical Impression       ICD-10-CM ICD-9-CM   1. Fecal impaction  K56.41 560.32   2. Hypokalemia  E87.6 276.8       Disposition:   Disposition: Discharged  Condition: Stable         Cm Shultz MD  03/05/22 0654

## 2022-03-09 NOTE — PROGRESS NOTES
Subjective:   Patient ID:  Elke Laws is a 82 y.o. female     Chief complaint:Irregular Heart Beat      HPI  Background as recorded in my last note (10/21/15):  A 80 yo female with h/o htn hlp obesity djd who is here for eval of afib Rx -- referred by dr Angulo who saw her in May for AF -- she was DIAZ s too many palps, more tired thamn usual -- constantly so. She has a hx of AF with RVR in 12/2013. All her ECGs prior and after are in NSR until the one from May 6/2015 which showed AF with CVR.Dr Angulo saw her on 5/21 and mentions that she was in AF.   She was placed on Eliquis and given a holter (766-94)which showed mostly NSR and occ ASxc EAT  She also had an echo  >>   1 - Normal left ventricular systolic function (EF 60-65%).   2 - Left ventricular diastolic dysfunction.   3 - Normal right ventricular systolic function .  4- MCINTOSH 39   She started feeling better after the holter was placed.She says that now she has no resal issues with her ADLs. She does some housework s issues  She has a CHADSVASC=IV  She was started on flec in July and she has since had no palps and no documented PAF.   The past week, she has been having near syncopal orthostatic spells. BP s have been running low at home -- 110/40s  ECG today shows NSR,  and normal other intervals too (QRSd 86 msec)  Flec level was 0.2 on 50 bid    Update since then:  She recently saw dr Angulo in f/u she is having a lot symptoms of heart fluttering she feels weak and tired she is real short of breath she feels tired has no energy  She seems better now  I have reviewed the actual image of the ECG tracing obtained today and it shows NSR with normal intervals      He referred her for further w/up   Current Outpatient Prescriptions   Medication Sig    apixaban (ELIQUIS) 5 mg Tab Take 5 mg by mouth 2 (two) times daily.    aspirin (ECOTRIN) 81 MG EC tablet Take 1 tablet (81 mg total) by mouth once daily.    DOCOSAHEXANOIC ACID/EPA (FISH OIL ORAL) Take 500  mg by mouth once daily.     DULCOLAX, BISACODYL, ORAL Take by mouth as needed.    flecainide (TAMBOCOR) 50 MG Tab Take 1 tablet (50 mg total) by mouth every 12 (twelve) hours.    furosemide (LASIX) 20 MG tablet Take 1 tablet (20 mg total) by mouth once daily. (Patient taking differently: Take 40 mg by mouth once daily. )    isosorbide mononitrate (IMDUR) 30 MG 24 hr tablet TAKE 1 TABLET(30 MG) BY MOUTH EVERY DAY    metoprolol succinate (TOPROL-XL) 100 MG 24 hr tablet Take 1 tablet (100 mg total) by mouth once daily.    pravastatin (PRAVACHOL) 40 MG tablet Take 1 tablet (40 mg total) by mouth once daily. (Patient taking differently: Take 40 mg by mouth every evening. )    acetaminophen (TYLENOL) 500 MG tablet Take 1 tablet (500 mg total) by mouth 2 (two) times daily.    albuterol 90 mcg/actuation inhaler Inhale 2 puffs into the lungs 4 (four) times daily.    albuterol sulfate 2.5 mg/0.5 mL Nebu Take 2.5 mg by nebulization every 4 (four) hours as needed.    ASCORBATE CALCIUM (VITAMIN C ORAL) Take 500 mg by mouth once daily.     compressor, for nebulizer Lara Compressor use as directed by albuterol use.    fluticasone (FLOVENT HFA) 110 mcg/actuation inhaler Inhale 1 puff into the lungs 2 (two) times daily.    LACTULOSE ORAL Take 10 mg by mouth.     loratadine (CLARITIN) 10 mg tablet Take 1 tablet (10 mg total) by mouth once daily.    POLYETHYLENE GLYCOL 3350 (MIRALAX ORAL) Take by mouth as needed.    pramoxine 1 % Foam Place rectally 3 (three) times daily as needed.     No current facility-administered medications for this visit.      Review of Systems   Constitution: Positive for weight loss. Negative for decreased appetite, weakness and weight gain.   HENT: Negative for nosebleeds.    Eyes: Negative for blurred vision and visual disturbance.   Cardiovascular: Positive for irregular heartbeat. Negative for chest pain, claudication, cyanosis, dyspnea on exertion, leg swelling, near-syncope, orthopnea,  palpitations, paroxysmal nocturnal dyspnea and syncope.   Respiratory: Negative for cough, shortness of breath and wheezing.    Endocrine: Negative for heat intolerance.   Hematologic/Lymphatic: Bruises/bleeds easily.   Skin: Negative for rash.   Musculoskeletal: Positive for arthritis and back pain. Negative for muscle weakness and myalgias.   Gastrointestinal: Negative for abdominal pain, anorexia, melena, nausea and vomiting.   Genitourinary: Negative for menorrhagia.   Neurological: Positive for light-headedness. Negative for excessive daytime sleepiness, dizziness, headaches, loss of balance, seizures and vertigo.   Psychiatric/Behavioral: Negative for altered mental status and depression. The patient has insomnia. The patient is not nervous/anxious.        Objective:   Physical Exam   Constitutional: She is oriented to person, place, and time. She appears well-developed and well-nourished.   Overweight   HENT:   Head: Normocephalic and atraumatic.   Right Ear: External ear normal.   Left Ear: External ear normal.   Eyes: Conjunctivae are normal. Pupils are equal, round, and reactive to light. Left eye exhibits no discharge. Left conjunctiva is not injected. Left conjunctiva has no hemorrhage. No scleral icterus.   Neck: Normal range of motion. Neck supple. No thyromegaly present.   Cardiovascular: Normal rate, regular rhythm, normal heart sounds and intact distal pulses.  Exam reveals no gallop, no S3, no S4, no friction rub, no midsystolic click and no opening snap.    No murmur heard.  Pulses:       Carotid pulses are 2+ on the right side, and 2+ on the left side.       Radial pulses are 2+ on the right side, and 2+ on the left side.        Dorsalis pedis pulses are 2+ on the right side, and 2+ on the left side.        Posterior tibial pulses are 2+ on the right side, and 2+ on the left side.   Pulmonary/Chest: Effort normal and breath sounds normal.   Abdominal: Soft. She exhibits no distension and no  "ascites. There is no hepatomegaly. There is no tenderness. There is no rigidity and no guarding.   Obese abdomen   Musculoskeletal:        Right ankle: She exhibits no swelling.        Left ankle: She exhibits no swelling.        Right lower leg: She exhibits no swelling.        Left lower leg: She exhibits no swelling.   Neurological: She is alert and oriented to person, place, and time. She has normal strength. No cranial nerve deficit. Gait normal.   Skin: Skin is warm and dry. No rash noted. No cyanosis. Nails show no clubbing.   Psychiatric: She has a normal mood and affect. Her speech is normal and behavior is normal. Thought content normal. Cognition and memory are normal.   Nursing note and vitals reviewed.    /60 (BP Location: Right arm, Patient Position: Sitting)   Pulse 62   Ht 4' 8" (1.422 m)   Wt 84.8 kg (187 lb)   LMP 12/13/1973   BMI 41.92 kg/m²      Assessment:    She seems to be having recurrent PAF -- NS  1. PAF (paroxysmal atrial fibrillation)    2. Essential hypertension    3. Hyperlipidemia, unspecified hyperlipidemia type    4. Intermittent asthma without complication, unspecified asthma severity    5. Left ventricular diastolic dysfunction with preserved systolic function    6. Coronary artery disease involving native coronary artery of native heart without angina pectoris        Plan:     There is no sign of flec tox -- will check level and decide if need to increase dose  Orders Placed This Encounter   Procedures    Flecainide level     Standing Status:   Future     Number of Occurrences:   1     Standing Expiration Date:   12/5/2018     Return if symptoms worsen or fail to improve.  There are no discontinued medications.  New Prescriptions    No medications on file     Modified Medications    No medications on file              " Body Location Override (Optional - Billing Will Still Be Based On Selected Body Map Location If Applicable): right temple Biopsy Photograph Reviewed: Yes Consent Type: Consent 1 (Standard) Eye Shield Used: No Initial Size Of Lesion: 0 Number Of Stages: 1 Repair Type: Complex Repair Oculoplastic Surgeon Procedure Text (A): After obtaining clear surgical margins the patient was sent to oculoplastics for surgical repair.  The patient understands they will receive post-surgical care and follow-up from the referring physician's office. Oculoplastic Surgeon Procedure Text (B): After obtaining clear surgical margins the patient was sent to oculoplastics for surgical repair.  The patient understands they will receive post-surgical care and follow-up from the referring physician's office. Otolaryngologist Procedure Text (A): After obtaining clear surgical margins the patient was sent to otolaryngology for surgical repair.  The patient understands they will receive post-surgical care and follow-up from the referring physician's office. Otolaryngologist Procedure Text (B): After obtaining clear surgical margins the patient was sent to otolaryngology for surgical repair.  The patient understands they will receive post-surgical care and follow-up from the referring physician's office. Plastic Surgeon (A): Dr. English Plastic Surgeon (B): Dr. Eugene Plastic Surgeon (E): Dr. Huang Plastic Surgeon Procedure Text (A): After obtaining clear surgical margins the patient was sent to plastics for surgical repair.  The patient understands they will receive post-surgical care and follow-up from Dr. Greene's office. Plastic Surgeon Procedure Text (B): After obtaining clear surgical margins the patient was sent to plastics for surgical repair.  The patient understands they will receive post-surgical care and follow-up from Dr. Eugene's office. Plastic Surgeon Procedure Text (C): After obtaining clear surgical margins the patient was sent to plastics for surgical repair.  The patient understands they will receive post-surgical care and follow-up from the referring physician's office. Plastic Surgeon Procedure Text (D): After obtaining clear surgical margins the patient was sent to plastics for surgical repair.  The patient understands they will receive post-surgical care and follow-up from the referring physician's office. Mid-Level Procedure Text (A): After obtaining clear surgical margins the patient was sent to a mid-level provider for surgical repair.  The patient understands they will receive post-surgical care and follow-up from the mid-level provider. Mid-Level Procedure Text (B): After obtaining clear surgical margins the patient was sent to a mid-level provider for surgical repair.  The patient understands they will receive post-surgical care and follow-up from the mid-level provider. Provider Procedure Text (A): After obtaining clear surgical margins the defect was repaired by another provider. Asc Procedure Text (A): After obtaining clear surgical margins the patient was sent to an ASC for surgical repair.  The patient understands they will receive post-surgical care and follow-up from the ASC physician. Asc Procedure Text (B): After obtaining clear surgical margins the patient was sent to an ASC for surgical repair.  The patient understands they will receive post-surgical care and follow-up from the ASC physician. Suturegard Retention Suture: 2-0 Nylon Retention Suture Bite Size: 3 mm Length To Time In Minutes Device Was In Place: 10 Undermining Type: Entire Wound Debridement Text: The wound edges were debrided prior to proceeding with the closure to facilitate wound healing. Helical Rim Text: The closure involved the helical rim. Vermilion Border Text: The closure involved the vermilion border. Nostril Rim Text: The closure involved the nostril rim. Retention Suture Text: Retention sutures were placed to support the closure and prevent dehiscence. Location Indication Override (Is Already Calculated Based On Selected Body Location): Area H Area H Indication Text: Tumors in this location are included in Area H (eyelids, eyebrows, nose, lips, chin, ear, pre-auricular, post-auricular, temple, genitalia, hands, feet, ankles and areola).  Tissue conservation is critical in these anatomic locations. Area M Indication Text: Tumors in this location are included in Area M (cheek, forehead, scalp, neck, jawline and pretibial skin).  Mohs surgery is indicated for tumors in these anatomic locations. Area L Indication Text: Tumors in this location are included in Area L (trunk and extremities).  Mohs surgery is indicated for larger tumors, or tumors with aggressive histologic features, in these anatomic locations. Special Stains Stage 1 - Results: Base On Clearance Noted Above Stage 2: Additional Anesthesia Type: 1% lidocaine with epinephrine Include Tumor Staging In Mohs Note?: Please Select the Appropriate Response Staging Info: By selecting yes to the question above you will include information on AJCC 8 tumor staging in your Mohs note. Information on tumor staging will be automatically added for SCCs on the head and neck. AJCC 8 includes tumor size, tumor depth, perineural involvement and bone invasion. Tumor Depth: Less than 6mm from granular layer and no invasion beyond the subcutaneous fat Medical Necessity Statement: Based on my medical judgement, Mohs surgery is the most appropriate treatment for this cancer compared to other treatments. Alternatives Discussed Intro (Do Not Add Period): I discussed alternative treatments to Mohs surgery and specifically discussed the risks and benefits of Consent 1/Introductory Paragraph: The rationale for Mohs was explained to the patient and consent was obtained. The risks, benefits and alternatives to therapy were discussed in detail. Specifically, the risks of infection, scarring, bleeding, prolonged wound healing, incomplete removal, allergy to anesthesia, nerve injury and recurrence were addressed. Prior to the procedure, the treatment site was clearly identified and confirmed by the patient. All components of Universal Protocol/PAUSE Rule completed. Consent 2/Introductory Paragraph: Mohs surgery was explained to the patient and consent was obtained. The risks, benefits and alternatives to therapy were discussed in detail. Specifically, the risks of infection, scarring, bleeding, prolonged wound healing, incomplete removal, allergy to anesthesia, nerve injury and recurrence were addressed. Prior to the procedure, the treatment site was clearly identified and confirmed by the patient. All components of Universal Protocol/PAUSE Rule completed. Consent 3/Introductory Paragraph: I gave the patient a chance to ask questions they had about the procedure.  Following this I explained the Mohs procedure and consent was obtained. The risks, benefits and alternatives to therapy were discussed in detail. Specifically, the risks of infection, scarring, bleeding, prolonged wound healing, incomplete removal, allergy to anesthesia, nerve injury and recurrence were addressed. Prior to the procedure, the treatment site was clearly identified and confirmed by the patient. All components of Universal Protocol/PAUSE Rule completed. Consent (Temporal Branch)/Introductory Paragraph: The rationale for Mohs was explained to the patient and consent was obtained. The risks, benefits and alternatives to therapy were discussed in detail. Specifically, the risks of damage to the temporal branch of the facial nerve, infection, scarring, bleeding, prolonged wound healing, incomplete removal, allergy to anesthesia, and recurrence were addressed. Prior to the procedure, the treatment site was clearly identified and confirmed by the patient. All components of Universal Protocol/PAUSE Rule completed. Consent (Marginal Mandibular)/Introductory Paragraph: The rationale for Mohs was explained to the patient and consent was obtained. The risks, benefits and alternatives to therapy were discussed in detail. Specifically, the risks of damage to the marginal mandibular branch of the facial nerve, infection, scarring, bleeding, prolonged wound healing, incomplete removal, allergy to anesthesia, and recurrence were addressed. Prior to the procedure, the treatment site was clearly identified and confirmed by the patient. All components of Universal Protocol/PAUSE Rule completed. Consent (Spinal Accessory)/Introductory Paragraph: The rationale for Mohs was explained to the patient and consent was obtained. The risks, benefits and alternatives to therapy were discussed in detail. Specifically, the risks of damage to the spinal accessory nerve, infection, scarring, bleeding, prolonged wound healing, incomplete removal, allergy to anesthesia, and recurrence were addressed. Prior to the procedure, the treatment site was clearly identified and confirmed by the patient. All components of Universal Protocol/PAUSE Rule completed. Consent (Near Eyelid Margin)/Introductory Paragraph: The rationale for Mohs was explained to the patient and consent was obtained. The risks, benefits and alternatives to therapy were discussed in detail. Specifically, the risks of ectropion or eyelid deformity, infection, scarring, bleeding, prolonged wound healing, incomplete removal, allergy to anesthesia, nerve injury and recurrence were addressed. Prior to the procedure, the treatment site was clearly identified and confirmed by the patient. All components of Universal Protocol/PAUSE Rule completed. Consent (Ear)/Introductory Paragraph: The rationale for Mohs was explained to the patient and consent was obtained. The risks, benefits and alternatives to therapy were discussed in detail. Specifically, the risks of ear deformity, infection, scarring, bleeding, prolonged wound healing, incomplete removal, allergy to anesthesia, nerve injury and recurrence were addressed. Prior to the procedure, the treatment site was clearly identified and confirmed by the patient. All components of Universal Protocol/PAUSE Rule completed. Consent (Nose)/Introductory Paragraph: The rationale for Mohs was explained to the patient and consent was obtained. The risks, benefits and alternatives to therapy were discussed in detail. Specifically, the risks of nasal deformity, changes in the flow of air through the nose, infection, scarring, bleeding, prolonged wound healing, incomplete removal, allergy to anesthesia, nerve injury and recurrence were addressed. Prior to the procedure, the treatment site was clearly identified and confirmed by the patient. All components of Universal Protocol/PAUSE Rule completed. Consent (Lip)/Introductory Paragraph: The rationale for Mohs was explained to the patient and consent was obtained. The risks, benefits and alternatives to therapy were discussed in detail. Specifically, the risks of lip deformity, changes in the oral aperture, infection, scarring, bleeding, prolonged wound healing, incomplete removal, allergy to anesthesia, nerve injury and recurrence were addressed. Prior to the procedure, the treatment site was clearly identified and confirmed by the patient. All components of Universal Protocol/PAUSE Rule completed. Consent (Scalp)/Introductory Paragraph: The rationale for Mohs was explained to the patient and consent was obtained. The risks, benefits and alternatives to therapy were discussed in detail. Specifically, the risks of changes in hair growth pattern secondary to repair, infection, scarring, bleeding, prolonged wound healing, incomplete removal, allergy to anesthesia, nerve injury and recurrence were addressed. Prior to the procedure, the treatment site was clearly identified and confirmed by the patient. All components of Universal Protocol/PAUSE Rule completed. Detail Level: Detailed Postop Diagnosis: same Surgeon: Dr. Ahn Anesthesia Type: 0.5% lidocaine with 1:200,000 epinephrine Anesthesia Volume In Cc: 6 Additional Anesthesia Volume In Cc: 5 Hemostasis: Electrocautery Estimated Blood Loss (Cc): minimal Repair Anesthesia Method: local infiltration Anesthesia Volume In Cc: 9 Brow Lift Text: A midfrontal incision was made medially to the defect to allow access to the tissues just superior to the left eyebrow. Following careful dissection inferiorly in a supraperiosteal plane to the level of the left eyebrow, several 3-0 monocryl sutures were used to resuspend the eyebrow orbicularis oculi muscular unit to the superior frontal bone periosteum. This resulted in an appropriate reapproximation of static eyebrow symmetry and correction of the left brow ptosis. Deep Sutures: 3-0 Vicryl Epidermal Sutures: 5-0 Nylon Additional Epidermal Sutures: 4-0 Nylon Epidermal Closure: running horizontal mattress Suturegard Intro: Intraoperative tissue expansion was performed, utilizing the SUTUREGARD device, in order to reduce wound tension. Suturegard Body: The suture ends were repeatedly re-tightened and re-clamped to achieve the desired tissue expansion. Hemigard Intro: Due to skin fragility and wound tension, it was decided to use HEMIGARD adhesive retention suture devices to permit a linear closure. The skin was cleaned and dried for a 6cm distance away from the wound. Excessive hair, if present, was removed to allow for adhesion. Hemigard Postcare Instructions: The HEMIGARD strips are to remain completely dry for at least 5-7 days. Donor Site Anesthesia Type: same as repair anesthesia Epidermal Closure Graft Donor Site (Optional): simple interrupted Graft Donor Site Bandage (Optional-Leave Blank If You Don't Want In Note): Steri-strips and a pressure bandage were applied to the donor site. Closure 2 Information: This tab is for additional flaps and grafts, including complex repair and grafts and complex repair and flaps. You can also specify a different location for the additional defect, if the location is the same you do not need to select a new one. We will insert the automated text for the repair you select below just as we do for solitary flaps and grafts. Please note that at this time if you select a location with a different insurance zone you will need to override the ICD10 and CPT if appropriate. Closure 3 Information: This tab is for additional flaps and grafts above and beyond our usual structured repairs.  Please note if you enter information here it will not currently bill and you will need to add the billing information manually. Closure 4 Information: This tab is for additional flaps and grafts above and beyond our usual structured repairs.  Please note if you enter information here it will not currently bill and you will need to add the billing information manually. Wound Care: Aquaphor Dressing: pressure dressing Wound Care (No Sutures): Petrolatum Dressing (No Sutures): dry sterile dressing Suture Removal: 14 days Unna Boot Text: An Unna boot was placed to help immobilize the limb and facilitate more rapid healing. Home Suture Removal Text: Patient was provided instructions on removing sutures and will remove their sutures at home.  If they have any questions or difficulties they will call the office. Post-Care Instructions: Patient instructed to leave dressing alone and dry for 48 hours. Written and verbal instructions provided for the patient along with Dr. Ahn's cell phone number. Pain Refusal Text: I offered to prescribe pain medication but the patient refused to take this medication. Mauc Instructions: By selecting yes to the question below the MAUC number will be added into the note.  This will be calculated automatically based on the diagnosis chosen, the size entered, the body zone selected (H,M,L) and the specific indications you chose. You will also have the option to override the Mohs AUC if you disagree with the automatically calculated number and this option is found in the Case Summary tab. Where Do You Want The Question To Include Opioid Counseling Located?: Case Summary Tab Eye Protection Verbiage: Before proceeding with the stage, a plastic scleral shield was inserted. The globe was anesthetized with a few drops of 1% lidocaine with 1:100,000 epinephrine. Then, an appropriate sized scleral shield was chosen and coated with lacrilube ointment. The shield was gently inserted and left in place for the duration of each stage. After the stage was completed, the shield was gently removed. Mohs Method Verbiage: A beveled incision following the standard Mohs approach was done and the specimen was harvested as a microscopic controlled layer. Surgeon/Pathologist Verbiage (Will Incorporate Name Of Surgeon From Intro If Not Blank): operated in two distinct and integrated capacities as the surgeon and pathologist. Mohs Histo Method Verbiage: Each section was then chromacoded and processed in the Mohs lab using the Mohs protocol and submitted for frozen section. Subsequent Stages Histo Method Verbiage: Using a similar technique to that described above, a thin layer of tissue was removed from all areas where tumor was visible on the previous stage.  The tissue was again oriented, mapped, dyed, and processed as above. Mohs Rapid Report Verbiage: The area of clinically evident tumor was marked with skin marking ink and appropriately hatched.  The initial incision was made following the Mohs approach through the skin.  The specimen was taken to the lab, divided into the necessary number of pieces, chromacoded and processed according to the Mohs protocol.  This was repeated in successive stages until a tumor free defect was achieved. Complex Repair Preamble Text (Leave Blank If You Do Not Want): Extensive wide undermining was performed. Intermediate Repair Preamble Text (Leave Blank If You Do Not Want): Undermining was performed with blunt dissection. Non-Graft Cartilage Fenestration Text: The cartilage was fenestrated with a 2mm punch biopsy to help facilitate healing. Graft Cartilage Fenestration Text: The cartilage was fenestrated with a 2mm punch biopsy to help facilitate graft survival and healing. Secondary Intention Text (Leave Blank If You Do Not Want): The defect will heal with secondary intention. No Repair - Repaired With Adjacent Surgical Defect Text (Leave Blank If You Do Not Want): After obtaining clear surgical margins the defect was repaired concurrently with another surgical defect which was in close approximation. Referred To Plastics For Closure Text (Leave Blank If You Do Not Want): After obtaining clear surgical margins the patient was sent to Dr. Greene for surgical repair. Adjacent Tissue Transfer Text: The defect edges were debeveled with a #15 scalpel blade.  Given the location of the defect and the proximity to free margins an adjacent tissue transfer was deemed most appropriate.  Using a sterile surgical marker, an appropriate flap was drawn incorporating the defect and placing the expected incisions within the relaxed skin tension lines where possible.    The area thus outlined was incised deep to adipose tissue with a #15 scalpel blade.  The skin margins were undermined to an appropriate distance in all directions utilizing iris scissors. Advancement Flap (Single) Text: The defect edges were debeveled with a #15 scalpel blade.  Given the location of the defect and the proximity to free margins a single advancement flap was deemed most appropriate.  Using a sterile surgical marker, an appropriate advancement flap was drawn incorporating the defect and placing the expected incisions within the relaxed skin tension lines where possible.    The area thus outlined was incised deep to adipose tissue with a #15 scalpel blade.  The skin margins were undermined to an appropriate distance in all directions utilizing iris scissors. Advancement Flap (Double) Text: The defect edges were debeveled with a #15 scalpel blade.  Given the location of the defect and the proximity to free margins a double advancement flap was deemed most appropriate.  Using a sterile surgical marker, the appropriate advancement flaps were drawn incorporating the defect and placing the expected incisions within the relaxed skin tension lines where possible.    The area thus outlined was incised deep to adipose tissue with a #15 scalpel blade.  The skin margins were undermined to an appropriate distance in all directions utilizing iris scissors. Burow's Advancement Flap Text: The defect edges were debeveled with a #15 scalpel blade.  Given the location of the defect and the proximity to free margins a Burow's advancement flap was deemed most appropriate.  Using a sterile surgical marker, the appropriate advancement flap was drawn incorporating the defect and placing the expected incisions within the relaxed skin tension lines where possible.    The area thus outlined was incised deep to adipose tissue with a #15 scalpel blade.  The skin margins were undermined to an appropriate distance in all directions utilizing iris scissors. Chonodrocutaneous Helical Advancement Flap Text: The defect edges were debeveled with a #15 scalpel blade.  Given the location of the defect and the proximity to free margins a chondrocutaneous helical advancement flap was deemed most appropriate.  Using a sterile surgical marker, the appropriate advancement flap was drawn incorporating the defect and placing the expected incisions within the relaxed skin tension lines where possible.    The area thus outlined was incised deep to adipose tissue with a #15 scalpel blade.  The skin margins were undermined to an appropriate distance in all directions utilizing iris scissors. Crescentic Advancement Flap Text: The defect edges were debeveled with a #15 scalpel blade.  Given the location of the defect and the proximity to free margins a crescentic advancement flap was deemed most appropriate.  Using a sterile surgical marker, the appropriate advancement flap was drawn incorporating the defect and placing the expected incisions within the relaxed skin tension lines where possible.    The area thus outlined was incised deep to adipose tissue with a #15 scalpel blade.  The skin margins were undermined to an appropriate distance in all directions utilizing iris scissors. A-T Advancement Flap Text: The defect edges were debeveled with a #15 scalpel blade.  Given the location of the defect, shape of the defect and the proximity to free margins an A-T advancement flap was deemed most appropriate.  Using a sterile surgical marker, an appropriate advancement flap was drawn incorporating the defect and placing the expected incisions within the relaxed skin tension lines where possible.    The area thus outlined was incised deep to adipose tissue with a #15 scalpel blade.  The skin margins were undermined to an appropriate distance in all directions utilizing iris scissors. O-T Advancement Flap Text: The defect edges were debeveled with a #15 scalpel blade.  Given the location of the defect, shape of the defect and the proximity to free margins an O-T advancement flap was deemed most appropriate.  Using a sterile surgical marker, an appropriate advancement flap was drawn incorporating the defect and placing the expected incisions within the relaxed skin tension lines where possible.    The area thus outlined was incised deep to adipose tissue with a #15 scalpel blade.  The skin margins were undermined to an appropriate distance in all directions utilizing iris scissors. O-L Flap Text: The defect edges were debeveled with a #15 scalpel blade.  Given the location of the defect, shape of the defect and the proximity to free margins an O-L flap was deemed most appropriate.  Using a sterile surgical marker, an appropriate advancement flap was drawn incorporating the defect and placing the expected incisions within the relaxed skin tension lines where possible.    The area thus outlined was incised deep to adipose tissue with a #15 scalpel blade.  The skin margins were undermined to an appropriate distance in all directions utilizing iris scissors. O-Z Flap Text: The defect edges were debeveled with a #15 scalpel blade.  Given the location of the defect, shape of the defect and the proximity to free margins an O-Z flap was deemed most appropriate.  Using a sterile surgical marker, an appropriate transposition flap was drawn incorporating the defect and placing the expected incisions within the relaxed skin tension lines where possible. The area thus outlined was incised deep to adipose tissue with a #15 scalpel blade.  The skin margins were undermined to an appropriate distance in all directions utilizing iris scissors. Double O-Z Flap Text: The defect edges were debeveled with a #15 scalpel blade.  Given the location of the defect, shape of the defect and the proximity to free margins a Double O-Z flap was deemed most appropriate.  Using a sterile surgical marker, an appropriate transposition flap was drawn incorporating the defect and placing the expected incisions within the relaxed skin tension lines where possible. The area thus outlined was incised deep to adipose tissue with a #15 scalpel blade.  The skin margins were undermined to an appropriate distance in all directions utilizing iris scissors. V-Y Flap Text: The defect edges were debeveled with a #15 scalpel blade.  Given the location of the defect, shape of the defect and the proximity to free margins a V-Y flap was deemed most appropriate.  Using a sterile surgical marker, an appropriate advancement flap was drawn incorporating the defect and placing the expected incisions within the relaxed skin tension lines where possible.    The area thus outlined was incised deep to adipose tissue with a #15 scalpel blade.  The skin margins were undermined to an appropriate distance in all directions utilizing iris scissors. Advancement-Rotation Flap Text: The defect edges were debeveled with a #15 scalpel blade.  Given the location of the defect, shape of the defect and the proximity to free margins an advancement-rotation flap was deemed most appropriate.  Using a sterile surgical marker, an appropriate flap was drawn incorporating the defect and placing the expected incisions within the relaxed skin tension lines where possible. The area thus outlined was incised deep to adipose tissue with a #15 scalpel blade.  The skin margins were undermined to an appropriate distance in all directions utilizing iris scissors. Mercedes Flap Text: The defect edges were debeveled with a #15 scalpel blade.  Given the location of the defect, shape of the defect and the proximity to free margins a Mercedes flap was deemed most appropriate.  Using a sterile surgical marker, an appropriate advancement flap was drawn incorporating the defect and placing the expected incisions within the relaxed skin tension lines where possible. The area thus outlined was incised deep to adipose tissue with a #15 scalpel blade.  The skin margins were undermined to an appropriate distance in all directions utilizing iris scissors. Modified Advancement Flap Text: The defect edges were debeveled with a #15 scalpel blade.  Given the location of the defect, shape of the defect and the proximity to free margins a modified advancement flap was deemed most appropriate.  Using a sterile surgical marker, an appropriate advancement flap was drawn incorporating the defect and placing the expected incisions within the relaxed skin tension lines where possible.    The area thus outlined was incised deep to adipose tissue with a #15 scalpel blade.  The skin margins were undermined to an appropriate distance in all directions utilizing iris scissors. Mucosal Advancement Flap Text: Given the location of the defect, shape of the defect and the proximity to free margins a mucosal advancement flap was deemed most appropriate. Incisions were made with a 15 blade scalpel in the appropriate fashion along the cutaneous vermillion border and the mucosal lip. The remaining actinically damaged mucosal tissue was excised.  The mucosal advancement flap was then elevated to the gingival sulcus with care taken to preserve the neurovascular structures and advanced into the primary defect. Care was taken to ensure that precise realignment of the vermillion border was achieved. Peng Advancement Flap Text: The defect edges were debeveled with a #15 scalpel blade.  Given the location of the defect, shape of the defect and the proximity to free margins a Peng advancement flap was deemed most appropriate.  Using a sterile surgical marker, an appropriate advancement flap was drawn incorporating the defect and placing the expected incisions within the relaxed skin tension lines where possible. The area thus outlined was incised deep to adipose tissue with a #15 scalpel blade.  The skin margins were undermined to an appropriate distance in all directions utilizing iris scissors. Hatchet Flap Text: The defect edges were debeveled with a #15 scalpel blade.  Given the location of the defect, shape of the defect and the proximity to free margins a hatchet flap was deemed most appropriate.  Using a sterile surgical marker, an appropriate hatchet flap was drawn incorporating the defect and placing the expected incisions within the relaxed skin tension lines where possible.    The area thus outlined was incised deep to adipose tissue with a #15 scalpel blade.  The skin margins were undermined to an appropriate distance in all directions utilizing iris scissors. Rotation Flap Text: The defect edges were debeveled with a #15 scalpel blade.  Given the location of the defect, shape of the defect and the proximity to free margins a rotation flap was deemed most appropriate.  Using a sterile surgical marker, an appropriate rotation flap was drawn incorporating the defect and placing the expected incisions within the relaxed skin tension lines where possible.    The area thus outlined was incised deep to adipose tissue with a #15 scalpel blade.  The skin margins were undermined to an appropriate distance in all directions utilizing iris scissors. Spiral Flap Text: The defect edges were debeveled with a #15 scalpel blade.  Given the location of the defect, shape of the defect and the proximity to free margins a spiral flap was deemed most appropriate.  Using a sterile surgical marker, an appropriate rotation flap was drawn incorporating the defect and placing the expected incisions within the relaxed skin tension lines where possible. The area thus outlined was incised deep to adipose tissue with a #15 scalpel blade.  The skin margins were undermined to an appropriate distance in all directions utilizing iris scissors. Staged Advancement Flap Text: The defect edges were debeveled with a #15 scalpel blade.  Given the location of the defect, shape of the defect and the proximity to free margins a staged advancement flap was deemed most appropriate.  Using a sterile surgical marker, an appropriate advancement flap was drawn incorporating the defect and placing the expected incisions within the relaxed skin tension lines where possible. The area thus outlined was incised deep to adipose tissue with a #15 scalpel blade.  The skin margins were undermined to an appropriate distance in all directions utilizing iris scissors. Star Wedge Flap Text: The defect edges were debeveled with a #15 scalpel blade.  Given the location of the defect, shape of the defect and the proximity to free margins a star wedge flap was deemed most appropriate.  Using a sterile surgical marker, an appropriate rotation flap was drawn incorporating the defect and placing the expected incisions within the relaxed skin tension lines where possible. The area thus outlined was incised deep to adipose tissue with a #15 scalpel blade.  The skin margins were undermined to an appropriate distance in all directions utilizing iris scissors. Transposition Flap Text: The defect edges were debeveled with a #15 scalpel blade.  Given the location of the defect and the proximity to free margins a transposition flap was deemed most appropriate.  Using a sterile surgical marker, an appropriate transposition flap was drawn incorporating the defect.    The area thus outlined was incised deep to adipose tissue with a #15 scalpel blade.  The skin margins were undermined to an appropriate distance in all directions utilizing iris scissors. Muscle Hinge Flap Text: The defect edges were debeveled with a #15 scalpel blade.  Given the size, depth and location of the defect and the proximity to free margins a muscle hinge flap was deemed most appropriate.  Using a sterile surgical marker, an appropriate hinge flap was drawn incorporating the defect. The area thus outlined was incised with a #15 scalpel blade.  The skin margins were undermined to an appropriate distance in all directions utilizing iris scissors. Mustarde Flap Text: The defect edges were debeveled with a #15 scalpel blade.  Given the size, depth and location of the defect and the proximity to free margins a Mustarde flap was deemed most appropriate.  Using a sterile surgical marker, an appropriate flap was drawn incorporating the defect. The area thus outlined was incised with a #15 scalpel blade.  The skin margins were undermined to an appropriate distance in all directions utilizing iris scissors. Nasal Turnover Hinge Flap Text: The defect edges were debeveled with a #15 scalpel blade.  Given the size, depth, location of the defect and the defect being full thickness a nasal turnover hinge flap was deemed most appropriate.  Using a sterile surgical marker, an appropriate hinge flap was drawn incorporating the defect. The area thus outlined was incised with a #15 scalpel blade. The flap was designed to recreate the nasal mucosal lining and the alar rim. The skin margins were undermined to an appropriate distance in all directions utilizing iris scissors. Nasalis-Muscle-Based Myocutaneous Island Pedicle Flap Text: Using a #15 blade, an incision was made around the donor flap to the level of the nasalis muscle. Wide lateral undermining was then performed in both the subcutaneous plane above the nasalis muscle, and in a submuscular plane just above periosteum. This allowed the formation of a free nasalis muscle axial pedicle (based on the angular artery) which was still attached to the actual cutaneous flap, increasing its mobility and vascular viability. Hemostasis was obtained with pinpoint electrocoagulation. The flap was mobilized into position and the pivotal anchor points positioned and stabilized with buried interrupted sutures. Subcutaneous and dermal tissues were closed in a multilayered fashion with sutures. Tissue redundancies were excised, and the epidermal edges were apposed without significant tension and sutured with sutures. Orbicularis Oris Muscle Flap Text: The defect edges were debeveled with a #15 scalpel blade.  Given that the defect affected the competency of the oral sphincter an obicularis oris muscle flap was deemed most appropriate to restore this competency and normal muscle function.  Using a sterile surgical marker, an appropriate flap was drawn incorporating the defect. The area thus outlined was incised with a #15 scalpel blade. Melolabial Transposition Flap Text: The defect edges were debeveled with a #15 scalpel blade.  Given the location of the defect and the proximity to free margins a melolabial flap was deemed most appropriate.  Using a sterile surgical marker, an appropriate melolabial transposition flap was drawn incorporating the defect.    The area thus outlined was incised deep to adipose tissue with a #15 scalpel blade.  The skin margins were undermined to an appropriate distance in all directions utilizing iris scissors. Rhombic Flap Text: The defect edges were debeveled with a #15 scalpel blade.  Given the location of the defect and the proximity to free margins a rhombic flap was deemed most appropriate.  Using a sterile surgical marker, an appropriate rhombic flap was drawn incorporating the defect.    The area thus outlined was incised deep to adipose tissue with a #15 scalpel blade.  The skin margins were undermined to an appropriate distance in all directions utilizing iris scissors. Rhomboid Transposition Flap Text: The defect edges were debeveled with a #15 scalpel blade.  Given the location of the defect and the proximity to free margins a rhomboid transposition flap was deemed most appropriate.  Using a sterile surgical marker, an appropriate rhomboid flap was drawn incorporating the defect.    The area thus outlined was incised deep to adipose tissue with a #15 scalpel blade.  The skin margins were undermined to an appropriate distance in all directions utilizing iris scissors. Bi-Rhombic Flap Text: The defect edges were debeveled with a #15 scalpel blade.  Given the location of the defect and the proximity to free margins a bi-rhombic flap was deemed most appropriate.  Using a sterile surgical marker, an appropriate rhombic flap was drawn incorporating the defect. The area thus outlined was incised deep to adipose tissue with a #15 scalpel blade.  The skin margins were undermined to an appropriate distance in all directions utilizing iris scissors. Helical Rim Advancement Flap Text: The defect edges were debeveled with a #15 blade scalpel.  Given the location of the defect and the proximity to free margins (helical rim) a double helical rim advancement flap was deemed most appropriate.  Using a sterile surgical marker, the appropriate advancement flaps were drawn incorporating the defect and placing the expected incisions between the helical rim and antihelix where possible.  The area thus outlined was incised through and through with a #15 scalpel blade.  With a skin hook and iris scissors, the flaps were gently and sharply undermined and freed up. Bilateral Helical Rim Advancement Flap Text: The defect edges were debeveled with a #15 blade scalpel.  Given the location of the defect and the proximity to free margins (helical rim) a bilateral helical rim advancement flap was deemed most appropriate.  Using a sterile surgical marker, the appropriate advancement flaps were drawn incorporating the defect and placing the expected incisions between the helical rim and antihelix where possible.  The area thus outlined was incised through and through with a #15 scalpel blade.  With a skin hook and iris scissors, the flaps were gently and sharply undermined and freed up. Ear Star Wedge Flap Text: The defect edges were debeveled with a #15 blade scalpel.  Given the location of the defect and the proximity to free margins (helical rim) an ear star wedge flap was deemed most appropriate.  Using a sterile surgical marker, the appropriate flap was drawn incorporating the defect and placing the expected incisions between the helical rim and antihelix where possible.  The area thus outlined was incised through and through with a #15 scalpel blade. Banner Transposition Flap Text: The defect edges were debeveled with a #15 scalpel blade.  Given the location of the defect and the proximity to free margins a Banner transposition flap was deemed most appropriate.  Using a sterile surgical marker, an appropriate flap drawn around the defect. The area thus outlined was incised deep to adipose tissue with a #15 scalpel blade.  The skin margins were undermined to an appropriate distance in all directions utilizing iris scissors. Bilobed Flap Text: The defect edges were debeveled with a #15 scalpel blade.  Given the location of the defect and the proximity to free margins a bilobe flap was deemed most appropriate.  Using a sterile surgical marker, an appropriate bilobe flap drawn around the defect.    The area thus outlined was incised deep to adipose tissue with a #15 scalpel blade.  The skin margins were undermined to an appropriate distance in all directions utilizing iris scissors. Bilobed Transposition Flap Text: The defect edges were debeveled with a #15 scalpel blade.  Given the location of the defect and the proximity to free margins a bilobed transposition flap was deemed most appropriate.  Using a sterile surgical marker, an appropriate bilobe flap drawn around the defect.    The area thus outlined was incised deep to adipose tissue with a #15 scalpel blade.  The skin margins were undermined to an appropriate distance in all directions utilizing iris scissors. Trilobed Flap Text: The defect edges were debeveled with a #15 scalpel blade.  Given the location of the defect and the proximity to free margins a trilobed flap was deemed most appropriate.  Using a sterile surgical marker, an appropriate trilobed flap drawn around the defect.    The area thus outlined was incised deep to adipose tissue with a #15 scalpel blade.  The skin margins were undermined to an appropriate distance in all directions utilizing iris scissors. Dorsal Nasal Flap Text: The defect edges were debeveled with a #15 scalpel blade.  Given the location of the defect and the proximity to free margins a dorsal nasal flap was deemed most appropriate.  Using a sterile surgical marker, an appropriate dorsal nasal flap was drawn around the defect.    The area thus outlined was incised deep to adipose tissue with a #15 scalpel blade.  The skin margins were undermined to an appropriate distance in all directions utilizing iris scissors. Island Pedicle Flap Text: The defect edges were debeveled with a #15 scalpel blade.  Given the location of the defect, shape of the defect and the proximity to free margins an island pedicle advancement flap was deemed most appropriate.  Using a sterile surgical marker, an appropriate advancement flap was drawn incorporating the defect, outlining the appropriate donor tissue and placing the expected incisions within the relaxed skin tension lines where possible.    The area thus outlined was incised deep to adipose tissue with a #15 scalpel blade.  The skin margins were undermined to an appropriate distance in all directions around the primary defect and laterally outward around the island pedicle utilizing iris scissors.  There was minimal undermining beneath the pedicle flap. Island Pedicle Flap With Canthal Suspension Text: The defect edges were debeveled with a #15 scalpel blade.  Given the location of the defect, shape of the defect and the proximity to free margins an island pedicle advancement flap was deemed most appropriate.  Using a sterile surgical marker, an appropriate advancement flap was drawn incorporating the defect, outlining the appropriate donor tissue and placing the expected incisions within the relaxed skin tension lines where possible. The area thus outlined was incised deep to adipose tissue with a #15 scalpel blade.  The skin margins were undermined to an appropriate distance in all directions around the primary defect and laterally outward around the island pedicle utilizing iris scissors.  There was minimal undermining beneath the pedicle flap. A suspension suture was placed in the canthal tendon to prevent tension and prevent ectropion. Alar Island Pedicle Flap Text: The defect edges were debeveled with a #15 scalpel blade.  Given the location of the defect, shape of the defect and the proximity to the alar rim an island pedicle advancement flap was deemed most appropriate.  Using a sterile surgical marker, an appropriate advancement flap was drawn incorporating the defect, outlining the appropriate donor tissue and placing the expected incisions within the nasal ala running parallel to the alar rim. The area thus outlined was incised with a #15 scalpel blade.  The skin margins were undermined minimally to an appropriate distance in all directions around the primary defect and laterally outward around the island pedicle utilizing iris scissors.  There was minimal undermining beneath the pedicle flap. Double Island Pedicle Flap Text: The defect edges were debeveled with a #15 scalpel blade.  Given the location of the defect, shape of the defect and the proximity to free margins a double island pedicle advancement flap was deemed most appropriate.  Using a sterile surgical marker, an appropriate advancement flap was drawn incorporating the defect, outlining the appropriate donor tissue and placing the expected incisions within the relaxed skin tension lines where possible.    The area thus outlined was incised deep to adipose tissue with a #15 scalpel blade.  The skin margins were undermined to an appropriate distance in all directions around the primary defect and laterally outward around the island pedicle utilizing iris scissors.  There was minimal undermining beneath the pedicle flap. Island Pedicle Flap-Requiring Vessel Identification Text: The defect edges were debeveled with a #15 scalpel blade.  Given the location of the defect, shape of the defect and the proximity to free margins an island pedicle advancement flap was deemed most appropriate.  Using a sterile surgical marker, an appropriate advancement flap was drawn, based on the axial vessel mentioned above, incorporating the defect, outlining the appropriate donor tissue and placing the expected incisions within the relaxed skin tension lines where possible.    The area thus outlined was incised deep to adipose tissue with a #15 scalpel blade.  The skin margins were undermined to an appropriate distance in all directions around the primary defect and laterally outward around the island pedicle utilizing iris scissors.  There was minimal undermining beneath the pedicle flap. Keystone Flap Text: The defect edges were debeveled with a #15 scalpel blade.  Given the location of the defect, shape of the defect a keystone flap was deemed most appropriate.  Using a sterile surgical marker, an appropriate keystone flap was drawn incorporating the defect, outlining the appropriate donor tissue and placing the expected incisions within the relaxed skin tension lines where possible. The area thus outlined was incised deep to adipose tissue with a #15 scalpel blade.  The skin margins were undermined to an appropriate distance in all directions around the primary defect and laterally outward around the flap utilizing iris scissors. O-T Plasty Text: The defect edges were debeveled with a #15 scalpel blade.  Given the location of the defect, shape of the defect and the proximity to free margins an O-T plasty was deemed most appropriate.  Using a sterile surgical marker, an appropriate O-T plasty was drawn incorporating the defect and placing the expected incisions within the relaxed skin tension lines where possible.    The area thus outlined was incised deep to adipose tissue with a #15 scalpel blade.  The skin margins were undermined to an appropriate distance in all directions utilizing iris scissors. O-Z Plasty Text: The defect edges were debeveled with a #15 scalpel blade.  Given the location of the defect, shape of the defect and the proximity to free margins an O-Z plasty (double transposition flap) was deemed most appropriate.  Using a sterile surgical marker, the appropriate transposition flaps were drawn incorporating the defect and placing the expected incisions within the relaxed skin tension lines where possible.    The area thus outlined was incised deep to adipose tissue with a #15 scalpel blade.  The skin margins were undermined to an appropriate distance in all directions utilizing iris scissors.  Hemostasis was achieved with electrocautery.  The flaps were then transposed into place, one clockwise and the other counterclockwise, and anchored with interrupted buried subcutaneous sutures. Double O-Z Plasty Text: The defect edges were debeveled with a #15 scalpel blade.  Given the location of the defect, shape of the defect and the proximity to free margins a Double O-Z plasty (double transposition flap) was deemed most appropriate.  Using a sterile surgical marker, the appropriate transposition flaps were drawn incorporating the defect and placing the expected incisions within the relaxed skin tension lines where possible. The area thus outlined was incised deep to adipose tissue with a #15 scalpel blade.  The skin margins were undermined to an appropriate distance in all directions utilizing iris scissors.  Hemostasis was achieved with electrocautery.  The flaps were then transposed into place, one clockwise and the other counterclockwise, and anchored with interrupted buried subcutaneous sutures. V-Y Plasty Text: The defect edges were debeveled with a #15 scalpel blade.  Given the location of the defect, shape of the defect and the proximity to free margins an V-Y advancement flap was deemed most appropriate.  Using a sterile surgical marker, an appropriate advancement flap was drawn incorporating the defect and placing the expected incisions within the relaxed skin tension lines where possible.    The area thus outlined was incised deep to adipose tissue with a #15 scalpel blade.  The skin margins were undermined to an appropriate distance in all directions utilizing iris scissors. H Plasty Text: Given the location of the defect, shape of the defect and the proximity to free margins a H-plasty was deemed most appropriate for repair.  Using a sterile surgical marker, the appropriate advancement arms of the H-plasty were drawn incorporating the defect and placing the expected incisions within the relaxed skin tension lines where possible. The area thus outlined was incised deep to adipose tissue with a #15 scalpel blade. The skin margins were undermined to an appropriate distance in all directions utilizing iris scissors.  The opposing advancement arms were then advanced into place in opposite direction and anchored with interrupted buried subcutaneous sutures. W Plasty Text: The lesion was extirpated to the level of the fat with a #15 scalpel blade.  Given the location of the defect, shape of the defect and the proximity to free margins a W-plasty was deemed most appropriate for repair.  Using a sterile surgical marker, the appropriate transposition arms of the W-plasty were drawn incorporating the defect and placing the expected incisions within the relaxed skin tension lines where possible.    The area thus outlined was incised deep to adipose tissue with a #15 scalpel blade.  The skin margins were undermined to an appropriate distance in all directions utilizing iris scissors.  The opposing transposition arms were then transposed into place in opposite direction and anchored with interrupted buried subcutaneous sutures. Z Plasty Text: The lesion was extirpated to the level of the fat with a #15 scalpel blade.  Given the location of the defect, shape of the defect and the proximity to free margins a Z-plasty was deemed most appropriate for repair.  Using a sterile surgical marker, the appropriate transposition arms of the Z-plasty were drawn incorporating the defect and placing the expected incisions within the relaxed skin tension lines where possible.    The area thus outlined was incised deep to adipose tissue with a #15 scalpel blade.  The skin margins were undermined to an appropriate distance in all directions utilizing iris scissors.  The opposing transposition arms were then transposed into place in opposite direction and anchored with interrupted buried subcutaneous sutures. Zygomaticofacial Flap Text: Given the location of the defect, shape of the defect and the proximity to free margins a zygomaticofacial flap was deemed most appropriate for repair.  Using a sterile surgical marker, the appropriate flap was drawn incorporating the defect and placing the expected incisions within the relaxed skin tension lines where possible. The area thus outlined was incised deep to adipose tissue with a #15 scalpel blade with preservation of a vascular pedicle.  The skin margins were undermined to an appropriate distance in all directions utilizing iris scissors.  The flap was then placed into the defect and anchored with interrupted buried subcutaneous sutures. Cheek Interpolation Flap Text: A decision was made to reconstruct the defect utilizing an interpolation axial flap and a staged reconstruction.  A telfa template was made of the defect.  This telfa template was then used to outline the Cheek Interpolation flap.  The donor area for the pedicle flap was then injected with anesthesia.  The flap was excised through the skin and subcutaneous tissue down to the layer of the underlying musculature.  The interpolation flap was carefully excised within this deep plane to maintain its blood supply.  The edges of the donor site were undermined.   The donor site was closed in a primary fashion.  The pedicle was then rotated into position and sutured.  Once the tube was sutured into place, adequate blood supply was confirmed with blanching and refill.  The pedicle was then wrapped with xeroform gauze and dressed appropriately with a telfa and gauze bandage to ensure continued blood supply and protect the attached pedicle. Cheek-To-Nose Interpolation Flap Text: A decision was made to reconstruct the defect utilizing an interpolation axial flap and a staged reconstruction.  A telfa template was made of the defect.  This telfa template was then used to outline the Cheek-To-Nose Interpolation flap.  The donor area for the pedicle flap was then injected with anesthesia.  The flap was excised through the skin and subcutaneous tissue down to the layer of the underlying musculature.  The interpolation flap was carefully excised within this deep plane to maintain its blood supply.  The edges of the donor site were undermined.   The donor site was closed in a primary fashion.  The pedicle was then rotated into position and sutured.  Once the tube was sutured into place, adequate blood supply was confirmed with blanching and refill.  The pedicle was then wrapped with xeroform gauze and dressed appropriately with a telfa and gauze bandage to ensure continued blood supply and protect the attached pedicle. Interpolation Flap Text: A decision was made to reconstruct the defect utilizing an interpolation axial flap and a staged reconstruction.  A telfa template was made of the defect.  This telfa template was then used to outline the interpolation flap.  The donor area for the pedicle flap was then injected with anesthesia.  The flap was excised through the skin and subcutaneous tissue down to the layer of the underlying musculature.  The interpolation flap was carefully excised within this deep plane to maintain its blood supply.  The edges of the donor site were undermined.   The donor site was closed in a primary fashion.  The pedicle was then rotated into position and sutured.  Once the tube was sutured into place, adequate blood supply was confirmed with blanching and refill.  The pedicle was then wrapped with xeroform gauze and dressed appropriately with a telfa and gauze bandage to ensure continued blood supply and protect the attached pedicle. Melolabial Interpolation Flap Text: A decision was made to reconstruct the defect utilizing an interpolation axial flap and a staged reconstruction.  A telfa template was made of the defect.  This telfa template was then used to outline the melolabial interpolation flap.  The donor area for the pedicle flap was then injected with anesthesia.  The flap was excised through the skin and subcutaneous tissue down to the layer of the underlying musculature.  The pedicle flap was carefully excised within this deep plane to maintain its blood supply.  The edges of the donor site were undermined.   The donor site was closed in a primary fashion.  The pedicle was then rotated into position and sutured.  Once the tube was sutured into place, adequate blood supply was confirmed with blanching and refill.  The pedicle was then wrapped with xeroform gauze and dressed appropriately with a telfa and gauze bandage to ensure continued blood supply and protect the attached pedicle. Mastoid Interpolation Flap Text: A decision was made to reconstruct the defect utilizing an interpolation axial flap and a staged reconstruction.  A telfa template was made of the defect.  This telfa template was then used to outline the mastoid interpolation flap.  The donor area for the pedicle flap was then injected with anesthesia.  The flap was excised through the skin and subcutaneous tissue down to the layer of the underlying musculature.  The pedicle flap was carefully excised within this deep plane to maintain its blood supply.  The edges of the donor site were undermined.   The donor site was closed in a primary fashion.  The pedicle was then rotated into position and sutured.  Once the tube was sutured into place, adequate blood supply was confirmed with blanching and refill.  The pedicle was then wrapped with xeroform gauze and dressed appropriately with a telfa and gauze bandage to ensure continued blood supply and protect the attached pedicle. Posterior Auricular Interpolation Flap Text: A decision was made to reconstruct the defect utilizing an interpolation axial flap and a staged reconstruction.  A telfa template was made of the defect.  This telfa template was then used to outline the posterior auricular interpolation flap.  The donor area for the pedicle flap was then injected with anesthesia.  The flap was excised through the skin and subcutaneous tissue down to the layer of the underlying musculature.  The pedicle flap was carefully excised within this deep plane to maintain its blood supply.  The edges of the donor site were undermined.   The donor site was closed in a primary fashion.  The pedicle was then rotated into position and sutured.  Once the tube was sutured into place, adequate blood supply was confirmed with blanching and refill.  The pedicle was then wrapped with xeroform gauze and dressed appropriately with a telfa and gauze bandage to ensure continued blood supply and protect the attached pedicle. Paramedian Forehead Flap Text: A decision was made to reconstruct the defect utilizing an interpolation axial flap and a staged reconstruction.  A telfa template was made of the defect.  This telfa template was then used to outline the paramedian forehead pedicle flap.  The donor area for the pedicle flap was then injected with anesthesia.  The flap was excised through the skin and subcutaneous tissue down to the layer of the underlying musculature.  The pedicle flap was carefully excised within this deep plane to maintain its blood supply.  The edges of the donor site were undermined.   The donor site was closed in a primary fashion.  The pedicle was then rotated into position and sutured.  Once the tube was sutured into place, adequate blood supply was confirmed with blanching and refill.  The pedicle was then wrapped with xeroform gauze and dressed appropriately with a telfa and gauze bandage to ensure continued blood supply and protect the attached pedicle. Cheiloplasty (Less Than 50%) Text: A decision was made to reconstruct the defect with a  cheiloplasty.  The defect was undermined extensively.  Additional obicularis oris muscle was excised with a 15 blade scalpel.  The defect was converted into a full thickness wedge, of less than 50% of the vertical height of the lip, to facilite a better cosmetic result.  Small vessels were then tied off with 5-0 monocyrl. The obicularis oris, superficial fascia, adipose and dermis were then reapproximated.  After the deeper layers were approximated the epidermis was reapproximated with particular care given to realign the vermillion border. Cheiloplasty (Complex) Text: A decision was made to reconstruct the defect with a  cheiloplasty.  The defect was undermined extensively.  Additional obicularis oris muscle was excised with a 15 blade scalpel.  The defect was converted into a full thickness wedge to facilite a better cosmetic result.  Small vessels were then tied off with 5-0 monocyrl. The obicularis oris, superficial fascia, adipose and dermis were then reapproximated.  After the deeper layers were approximated the epidermis was reapproximated with particular care given to realign the vermillion border. Ear Wedge Repair Text: A wedge excision was completed by carrying down an excision through the full thickness of the ear and cartilage with an inward facing Burow's triangle. The wound was then closed in a layered fashion. Full Thickness Lip Wedge Repair (Flap) Text: Given the location of the defect and the proximity to free margins a full thickness wedge repair was deemed most appropriate.  Using a sterile surgical marker, the appropriate repair was drawn incorporating the defect and placing the expected incisions perpendicular to the vermillion border.  The vermillion border was also meticulously outlined to ensure appropriate reapproximation during the repair.  The area thus outlined was incised through and through with a #15 scalpel blade.  The muscularis and dermis were reaproximated with deep sutures following hemostasis. Care was taken to realign the vermillion border before proceeding with the superficial closure.  Once the vermillion was realigned the superfical and mucosal closure was finished. Split-Thickness Skin Graft Text: The defect edges were debeveled with a #15 scalpel blade.  Given the location of the defect, shape of the defect and the proximity to free margins a split thickness skin graft was deemed most appropriate.  Using a sterile surgical marker, the primary defect shape was transferred to the donor site. The split thickness graft was then harvested.  The skin graft was then placed in the primary defect and oriented appropriately. Burow's Graft Text: The defect edges were debeveled with a #15 scalpel blade.  Given the location of the defect, shape of the defect, the proximity to free margins and the presence of a standing cone deformity a Burow's skin graft was deemed most appropriate. The standing cone was removed and this tissue was then trimmed to the shape of the primary defect. The adipose tissue was also removed until only dermis and epidermis were left.  The skin margins of the secondary defect were undermined to an appropriate distance in all directions utilizing iris scissors.  The secondary defect was closed with interrupted buried subcutaneous sutures.  The skin edges were then re-apposed with running  sutures.  The skin graft was then placed in the primary defect and oriented appropriately. Cartilage Graft Text: The defect edges were debeveled with a #15 scalpel blade.  Given the location of the defect, shape of the defect, the fact the defect involved a full thickness cartilage defect a cartilage graft was deemed most appropriate.  An appropriate donor site was identified, cleansed, and anesthetized. The cartilage graft was then harvested and transferred to the recipient site, oriented appropriately and then sutured into place.  The secondary defect was then repaired using a primary closure. Composite Graft Text: The defect edges were debeveled with a #15 scalpel blade.  Given the location of the defect, shape of the defect, the proximity to free margins and the fact the defect was full thickness a composite graft was deemed most appropriate.  The defect was outline and then transferred to the donor site.  A full thickness graft was then excised from the donor site. The graft was then placed in the primary defect, oriented appropriately and then sutured into place.  The secondary defect was then repaired using a primary closure. Epidermal Autograft Text: The defect edges were debeveled with a #15 scalpel blade.  Given the location of the defect, shape of the defect and the proximity to free margins an epidermal autograft was deemed most appropriate.  Using a sterile surgical marker, the primary defect shape was transferred to the donor site. The epidermal graft was then harvested.  The skin graft was then placed in the primary defect and oriented appropriately. Dermal Autograft Text: The defect edges were debeveled with a #15 scalpel blade.  Given the location of the defect, shape of the defect and the proximity to free margins a dermal autograft was deemed most appropriate.  Using a sterile surgical marker, the primary defect shape was transferred to the donor site. The area thus outlined was incised deep to adipose tissue with a #15 scalpel blade.  The harvested graft was then trimmed of adipose and epidermal tissue until only dermis was left.  The skin graft was then placed in the primary defect and oriented appropriately. Skin Substitute Text: The defect edges were debeveled with a #15 scalpel blade.  Given the location of the defect, shape of the defect and the proximity to free margins a skin substitute graft was deemed most appropriate.  The graft material was trimmed to fit the size of the defect. The graft was then placed in the primary defect and oriented appropriately. Tissue Cultured Epidermal Autograft Text: The defect edges were debeveled with a #15 scalpel blade.  Given the location of the defect, shape of the defect and the proximity to free margins a tissue cultured epidermal autograft was deemed most appropriate.  The graft was then trimmed to fit the size of the defect.  The graft was then placed in the primary defect and oriented appropriately. Xenograft Text: The defect edges were debeveled with a #15 scalpel blade.  Given the location of the defect, shape of the defect and the proximity to free margins a xenograft was deemed most appropriate.  The graft was then trimmed to fit the size of the defect.  The graft was then placed in the primary defect and oriented appropriately. Purse String (Simple) Text: Given the location of the defect and the characteristics of the surrounding skin a pursestring closure was deemed most appropriate.  Undermining was performed circumfirentially around the surgical defect.  A purstring suture was then placed and tightened. Purse String (Intermediate) Text: Given the location of the defect and the characteristics of the surrounding skin a pursestring intermediate closure was deemed most appropriate.  Undermining was performed circumfirentially around the surgical defect.  A purstring suture was then placed and tightened. Partial Purse String (Simple) Text: Given the location of the defect and the characteristics of the surrounding skin a simple purse string closure was deemed most appropriate.  Undermining was performed circumfirentially around the surgical defect.  A purse string suture was then placed and tightened. Wound tension only allowed a partial closure of the circular defect. Partial Purse String (Intermediate) Text: Given the location of the defect and the characteristics of the surrounding skin an intermediate purse string closure was deemed most appropriate.  Undermining was performed circumfirentially around the surgical defect.  A purse string suture was then placed and tightened. Wound tension only allowed a partial closure of the circular defect. Localized Dermabrasion Text: The patient was draped in routine manner.  Localized dermabrasion using 3 x 17 mm wire brush was performed in routine manner to papillary dermis. This spot dermabrasion is being performed to complete skin cancer reconstruction. It also will eliminate the other sun damaged precancerous cells that are known to be part of the regional effect of a lifetime's worth of sun exposure. This localized dermabrasion is therapeutic and should not be considered cosmetic in any regard. Tarsorrhaphy Text: A tarsorrhaphy was performed using Frost sutures. Complex Repair And Flap Additional Text (Will Appearing After The Standard Complex Repair Text): The complex repair was not sufficient to completely close the primary defect. The remaining additional defect was repaired with the flap mentioned below. Complex Repair And Graft Additional Text (Will Appearing After The Standard Complex Repair Text): The complex repair was not sufficient to completely close the primary defect. The remaining additional defect was repaired with the graft mentioned below. Manual Repair Warning Statement: We plan on removing the manually selected variable below in favor of our much easier automatic structured text blocks found in the previous tab. We decided to do this to help make the flow better and give you the full power of structured data. Manual selection is never going to be ideal in our platform and I would encourage you to avoid using manual selection from this point on, especially since I will be sunsetting this feature. It is important that you do one of two things with the customized text below. First, you can save all of the text in a word file so you can have it for future reference. Second, transfer the text to the appropriate area in the Library tab. Lastly, if there is a flap or graft type which we do not have you need to let us know right away so I can add it in before the variable is hidden. No need to panic, we plan to give you roughly 6 months to make the change. Same Histology In Subsequent Stages Text: The pattern and morphology of the tumor is as described in the first stage. No Residual Tumor Seen Histology Text: There were no malignant cells seen in the sections examined. Inflammation Suggestive Of Cancer Camouflage Histology Text: There was a dense lymphocytic infiltrate which prevented adequate histologic evaluation of adjacent structures. Bcc Histology Text: There were numerous aggregates of basaloid cells. Bcc Infiltrative Histology Text: There were numerous aggregates of basaloid cells demonstrating an infiltrative pattern. Mart-1 - Positive Histology Text: MART-1 staining demonstrates areas of higher density and clustering of melanocytes with Pagetoid spread upwards within the epidermis. The surgical margins are positive for tumor cells. Mart-1 - Negative Histology Text: MART-1 staining demonstrates a normal density and pattern of melanocytes along the dermal-epidermal junction. The surgical margins are negative for tumor cells. Information: Selecting Yes will display possible errors in your note based on the variables you have selected. This validation is only offered as a suggestion for you. PLEASE NOTE THAT THE VALIDATION TEXT WILL BE REMOVED WHEN YOU FINALIZE YOUR NOTE. IF YOU WANT TO FAX A PRELIMINARY NOTE YOU WILL NEED TO TOGGLE THIS TO 'NO' IF YOU DO NOT WANT IT IN YOUR FAXED NOTE.

## 2022-04-14 ENCOUNTER — OFFICE VISIT (OUTPATIENT)
Dept: INTERNAL MEDICINE | Facility: CLINIC | Age: 87
End: 2022-04-14
Payer: MEDICARE

## 2022-04-14 ENCOUNTER — LAB VISIT (OUTPATIENT)
Dept: LAB | Facility: HOSPITAL | Age: 87
End: 2022-04-14
Attending: FAMILY MEDICINE
Payer: MEDICARE

## 2022-04-14 ENCOUNTER — OFFICE VISIT (OUTPATIENT)
Dept: CARDIOLOGY | Facility: CLINIC | Age: 87
End: 2022-04-14
Payer: MEDICARE

## 2022-04-14 VITALS
BODY MASS INDEX: 30.42 KG/M2 | HEIGHT: 57 IN | SYSTOLIC BLOOD PRESSURE: 120 MMHG | DIASTOLIC BLOOD PRESSURE: 70 MMHG | WEIGHT: 141 LBS | OXYGEN SATURATION: 97 % | HEART RATE: 90 BPM

## 2022-04-14 VITALS
OXYGEN SATURATION: 97 % | HEIGHT: 57 IN | SYSTOLIC BLOOD PRESSURE: 110 MMHG | WEIGHT: 142 LBS | HEART RATE: 87 BPM | DIASTOLIC BLOOD PRESSURE: 80 MMHG | BODY MASS INDEX: 30.63 KG/M2 | TEMPERATURE: 97 F

## 2022-04-14 DIAGNOSIS — I38 HEART VALVE REGURGITATION: ICD-10-CM

## 2022-04-14 DIAGNOSIS — I48.91 ATRIAL FIBRILLATION, UNSPECIFIED TYPE: Primary | ICD-10-CM

## 2022-04-14 DIAGNOSIS — I25.10 CORONARY ARTERY DISEASE, UNSPECIFIED VESSEL OR LESION TYPE, UNSPECIFIED WHETHER ANGINA PRESENT, UNSPECIFIED WHETHER NATIVE OR TRANSPLANTED HEART: ICD-10-CM

## 2022-04-14 DIAGNOSIS — E78.5 HYPERLIPIDEMIA, UNSPECIFIED HYPERLIPIDEMIA TYPE: Chronic | ICD-10-CM

## 2022-04-14 DIAGNOSIS — I48.19 ATRIAL FIBRILLATION, PERSISTENT: ICD-10-CM

## 2022-04-14 DIAGNOSIS — E66.9 OBESITY (BMI 30.0-34.9): ICD-10-CM

## 2022-04-14 DIAGNOSIS — I70.0 ATHEROSCLEROSIS OF AORTA: ICD-10-CM

## 2022-04-14 DIAGNOSIS — Z86.73 HISTORY OF TIA (TRANSIENT ISCHEMIC ATTACK): ICD-10-CM

## 2022-04-14 DIAGNOSIS — R09.89 BILATERAL CAROTID BRUITS: ICD-10-CM

## 2022-04-14 DIAGNOSIS — I48.0 PAF (PAROXYSMAL ATRIAL FIBRILLATION): Chronic | ICD-10-CM

## 2022-04-14 DIAGNOSIS — F41.9 ANXIETY: ICD-10-CM

## 2022-04-14 DIAGNOSIS — Z86.79 HISTORY OF ANGINA PECTORIS: ICD-10-CM

## 2022-04-14 DIAGNOSIS — I70.0 ATHEROSCLEROSIS OF AORTA: Chronic | ICD-10-CM

## 2022-04-14 DIAGNOSIS — I48.11 LONGSTANDING PERSISTENT ATRIAL FIBRILLATION: ICD-10-CM

## 2022-04-14 DIAGNOSIS — I10 PRIMARY HYPERTENSION: Primary | ICD-10-CM

## 2022-04-14 DIAGNOSIS — R79.89 ELEVATED TROPONIN: ICD-10-CM

## 2022-04-14 DIAGNOSIS — N18.4 CKD (CHRONIC KIDNEY DISEASE) STAGE 4, GFR 15-29 ML/MIN: ICD-10-CM

## 2022-04-14 DIAGNOSIS — T50.2X5A DIURETIC-INDUCED HYPOKALEMIA: ICD-10-CM

## 2022-04-14 DIAGNOSIS — E87.6 DIURETIC-INDUCED HYPOKALEMIA: ICD-10-CM

## 2022-04-14 DIAGNOSIS — I27.9 PULMONARY HEART DISEASE: ICD-10-CM

## 2022-04-14 DIAGNOSIS — K59.09 CONSTIPATION, CHRONIC: ICD-10-CM

## 2022-04-14 DIAGNOSIS — R06.09 DYSPNEA ON EXERTION: ICD-10-CM

## 2022-04-14 DIAGNOSIS — R41.89 COGNITIVE IMPAIRMENT: ICD-10-CM

## 2022-04-14 DIAGNOSIS — I10 PRIMARY HYPERTENSION: ICD-10-CM

## 2022-04-14 DIAGNOSIS — I51.89 LEFT VENTRICULAR DIASTOLIC DYSFUNCTION WITH PRESERVED SYSTOLIC FUNCTION: Chronic | ICD-10-CM

## 2022-04-14 DIAGNOSIS — N18.31 STAGE 3A CHRONIC KIDNEY DISEASE: ICD-10-CM

## 2022-04-14 DIAGNOSIS — I25.10 CORONARY ARTERY DISEASE INVOLVING NATIVE CORONARY ARTERY OF NATIVE HEART WITHOUT ANGINA PECTORIS: Chronic | ICD-10-CM

## 2022-04-14 LAB
ALBUMIN SERPL BCP-MCNC: 3.1 G/DL (ref 3.5–5.2)
ALP SERPL-CCNC: 84 U/L (ref 55–135)
ALT SERPL W/O P-5'-P-CCNC: 16 U/L (ref 10–44)
ANION GAP SERPL CALC-SCNC: 12 MMOL/L (ref 8–16)
AST SERPL-CCNC: 23 U/L (ref 10–40)
BILIRUB SERPL-MCNC: 0.6 MG/DL (ref 0.1–1)
BUN SERPL-MCNC: 33 MG/DL (ref 8–23)
CALCIUM SERPL-MCNC: 10.2 MG/DL (ref 8.7–10.5)
CHLORIDE SERPL-SCNC: 91 MMOL/L (ref 95–110)
CO2 SERPL-SCNC: 35 MMOL/L (ref 23–29)
CREAT SERPL-MCNC: 0.8 MG/DL (ref 0.5–1.4)
EST. GFR  (AFRICAN AMERICAN): >60 ML/MIN/1.73 M^2
EST. GFR  (NON AFRICAN AMERICAN): >60 ML/MIN/1.73 M^2
GLUCOSE SERPL-MCNC: 98 MG/DL (ref 70–110)
POTASSIUM SERPL-SCNC: 3.6 MMOL/L (ref 3.5–5.1)
PROT SERPL-MCNC: 7.5 G/DL (ref 6–8.4)
SODIUM SERPL-SCNC: 138 MMOL/L (ref 136–145)

## 2022-04-14 PROCEDURE — 36415 COLL VENOUS BLD VENIPUNCTURE: CPT | Performed by: FAMILY MEDICINE

## 2022-04-14 PROCEDURE — 1159F MED LIST DOCD IN RCRD: CPT | Mod: CPTII,S$GLB,, | Performed by: INTERNAL MEDICINE

## 2022-04-14 PROCEDURE — 3288F PR FALLS RISK ASSESSMENT DOCUMENTED: ICD-10-PCS | Mod: CPTII,S$GLB,, | Performed by: FAMILY MEDICINE

## 2022-04-14 PROCEDURE — 99999 PR PBB SHADOW E&M-EST. PATIENT-LVL III: CPT | Mod: PBBFAC,,, | Performed by: FAMILY MEDICINE

## 2022-04-14 PROCEDURE — 1101F PT FALLS ASSESS-DOCD LE1/YR: CPT | Mod: CPTII,S$GLB,, | Performed by: INTERNAL MEDICINE

## 2022-04-14 PROCEDURE — 1126F PR PAIN SEVERITY QUANTIFIED, NO PAIN PRESENT: ICD-10-PCS | Mod: CPTII,S$GLB,, | Performed by: INTERNAL MEDICINE

## 2022-04-14 PROCEDURE — 99214 PR OFFICE/OUTPT VISIT, EST, LEVL IV, 30-39 MIN: ICD-10-PCS | Mod: S$GLB,,, | Performed by: FAMILY MEDICINE

## 2022-04-14 PROCEDURE — 1157F ADVNC CARE PLAN IN RCRD: CPT | Mod: CPTII,S$GLB,, | Performed by: FAMILY MEDICINE

## 2022-04-14 PROCEDURE — 99999 PR PBB SHADOW E&M-EST. PATIENT-LVL III: ICD-10-PCS | Mod: PBBFAC,,, | Performed by: FAMILY MEDICINE

## 2022-04-14 PROCEDURE — 1126F AMNT PAIN NOTED NONE PRSNT: CPT | Mod: CPTII,S$GLB,, | Performed by: INTERNAL MEDICINE

## 2022-04-14 PROCEDURE — 1101F PR PT FALLS ASSESS DOC 0-1 FALLS W/OUT INJ PAST YR: ICD-10-PCS | Mod: CPTII,S$GLB,, | Performed by: INTERNAL MEDICINE

## 2022-04-14 PROCEDURE — 3288F PR FALLS RISK ASSESSMENT DOCUMENTED: ICD-10-PCS | Mod: CPTII,S$GLB,, | Performed by: INTERNAL MEDICINE

## 2022-04-14 PROCEDURE — 3288F FALL RISK ASSESSMENT DOCD: CPT | Mod: CPTII,S$GLB,, | Performed by: INTERNAL MEDICINE

## 2022-04-14 PROCEDURE — 3288F FALL RISK ASSESSMENT DOCD: CPT | Mod: CPTII,S$GLB,, | Performed by: FAMILY MEDICINE

## 2022-04-14 PROCEDURE — 1159F PR MEDICATION LIST DOCUMENTED IN MEDICAL RECORD: ICD-10-PCS | Mod: CPTII,S$GLB,, | Performed by: FAMILY MEDICINE

## 2022-04-14 PROCEDURE — 99214 PR OFFICE/OUTPT VISIT, EST, LEVL IV, 30-39 MIN: ICD-10-PCS | Mod: S$GLB,,, | Performed by: INTERNAL MEDICINE

## 2022-04-14 PROCEDURE — 99499 RISK ADDL DX/OHS AUDIT: ICD-10-PCS | Mod: HCNC,S$GLB,, | Performed by: FAMILY MEDICINE

## 2022-04-14 PROCEDURE — 99999 PR PBB SHADOW E&M-EST. PATIENT-LVL III: ICD-10-PCS | Mod: PBBFAC,,, | Performed by: INTERNAL MEDICINE

## 2022-04-14 PROCEDURE — 99999 PR PBB SHADOW E&M-EST. PATIENT-LVL III: CPT | Mod: PBBFAC,,, | Performed by: INTERNAL MEDICINE

## 2022-04-14 PROCEDURE — 99499 UNLISTED E&M SERVICE: CPT | Mod: HCNC,S$GLB,, | Performed by: FAMILY MEDICINE

## 2022-04-14 PROCEDURE — 99499 RISK ADDL DX/OHS AUDIT: ICD-10-PCS | Mod: S$GLB,,, | Performed by: INTERNAL MEDICINE

## 2022-04-14 PROCEDURE — 1157F PR ADVANCE CARE PLAN OR EQUIV PRESENT IN MEDICAL RECORD: ICD-10-PCS | Mod: CPTII,S$GLB,, | Performed by: FAMILY MEDICINE

## 2022-04-14 PROCEDURE — 99214 OFFICE O/P EST MOD 30 MIN: CPT | Mod: S$GLB,,, | Performed by: INTERNAL MEDICINE

## 2022-04-14 PROCEDURE — 1101F PR PT FALLS ASSESS DOC 0-1 FALLS W/OUT INJ PAST YR: ICD-10-PCS | Mod: CPTII,S$GLB,, | Performed by: FAMILY MEDICINE

## 2022-04-14 PROCEDURE — 80053 COMPREHEN METABOLIC PANEL: CPT | Performed by: FAMILY MEDICINE

## 2022-04-14 PROCEDURE — 1159F PR MEDICATION LIST DOCUMENTED IN MEDICAL RECORD: ICD-10-PCS | Mod: CPTII,S$GLB,, | Performed by: INTERNAL MEDICINE

## 2022-04-14 PROCEDURE — 1157F PR ADVANCE CARE PLAN OR EQUIV PRESENT IN MEDICAL RECORD: ICD-10-PCS | Mod: CPTII,S$GLB,, | Performed by: INTERNAL MEDICINE

## 2022-04-14 PROCEDURE — 1101F PT FALLS ASSESS-DOCD LE1/YR: CPT | Mod: CPTII,S$GLB,, | Performed by: FAMILY MEDICINE

## 2022-04-14 PROCEDURE — 99499 UNLISTED E&M SERVICE: CPT | Mod: S$GLB,,, | Performed by: INTERNAL MEDICINE

## 2022-04-14 PROCEDURE — 1157F ADVNC CARE PLAN IN RCRD: CPT | Mod: CPTII,S$GLB,, | Performed by: INTERNAL MEDICINE

## 2022-04-14 PROCEDURE — 99214 OFFICE O/P EST MOD 30 MIN: CPT | Mod: S$GLB,,, | Performed by: FAMILY MEDICINE

## 2022-04-14 PROCEDURE — 1159F MED LIST DOCD IN RCRD: CPT | Mod: CPTII,S$GLB,, | Performed by: FAMILY MEDICINE

## 2022-04-14 RX ORDER — PRAVASTATIN SODIUM 40 MG/1
40 TABLET ORAL NIGHTLY
Qty: 90 TABLET | Refills: 3 | Status: SHIPPED | OUTPATIENT
Start: 2022-04-14 | End: 2023-04-27

## 2022-04-14 RX ORDER — FUROSEMIDE 40 MG/1
40 TABLET ORAL 2 TIMES DAILY
Qty: 180 TABLET | Refills: 3 | Status: SHIPPED | OUTPATIENT
Start: 2022-04-14 | End: 2022-10-27 | Stop reason: SDUPTHER

## 2022-04-14 RX ORDER — METOPROLOL TARTRATE 50 MG/1
25 TABLET ORAL 2 TIMES DAILY
Qty: 90 TABLET | Refills: 3 | Status: SHIPPED | OUTPATIENT
Start: 2022-04-14 | End: 2023-04-27

## 2022-04-14 RX ORDER — POTASSIUM CHLORIDE 750 MG/1
TABLET, EXTENDED RELEASE ORAL
Qty: 180 TABLET | Refills: 1 | Status: SHIPPED | OUTPATIENT
Start: 2022-04-14 | End: 2023-01-20

## 2022-04-14 RX ORDER — METOLAZONE 2.5 MG/1
TABLET ORAL
Qty: 45 TABLET | Refills: 3 | Status: SHIPPED | OUTPATIENT
Start: 2022-04-14 | End: 2022-10-27 | Stop reason: ALTCHOICE

## 2022-04-14 NOTE — PROGRESS NOTES
Subjective:   Patient ID:  Elke Laws is a 87 y.o. female who presents for follow up of PAF (paroxysmal atrial fibrillation)      HPI   SHERIN STRATTON NP 10/8/2021  Ms. Laws' current conditions include persistent AF, diastolic CHF, non obstructive CAD per [facility] done in , HTN and HLD, MR  Family reports that patient's oxygen has been discontinued.      Admitted in May 2019 for A-fib with RVR. She had failed CV x 2 during admission. DC'd with [facility]. No BB restarted at that time due to bradycardia during admission. Patient declined follow up with EP ablation and PPM consideration and opted for conservative medical management.      Has no abnormal bleeding on Eliquis      Recently diagnosed with Dementia. Her sister in law, Alcira, is now helping care for her. Her son Calixto Laws is now POA     Has no complaints today.   Denies any chest pain, SOB, DIAZ,  orthopnea, PND, dizziness, palpitations,  near syncope, syncope or edema . Has no symptoms concerning for angina or equivalent. No CNS Complaints to suggest TIA or CVA. Does well with limiting sodium intake.     Currently taking Lasix 40mg BID and Metolazone 2.5mg on MF.      4/14/2022   HEREE FOR F/U HAS LOST WEIGHT SHE IS STILL COOKING TAKING CARE OF HOUSE WORK HAS NO CHEST PAIN NO SHORTNESS OF BREATH TURCIOS S NO LEG SWELLING  HAS VARICOSE VEINS THAT CAN LEAD TO SWELLING IF SHE IS DEPENDENT FOR A LONGTIME. SHE IS COMPLIANT WITH MEDICAL THERAPY .  Past Medical History:   Diagnosis Date    Abnormal nuclear stress test 6/30/2016    Acute congestive heart failure 5/27/2017    Acute coronary syndrome     Acute kidney injury (nontraumatic) 5/27/2017    Acute on chronic congestive heart failure 5/21/2017    Acute on chronic diastolic congestive heart failure 8/27/2015    Acute on chronic respiratory failure 5/6/2019    Acute renal failure superimposed on stage 3 chronic kidney disease 1/26/2018    Anemia 5/9/2016    Angina pectoris 1/25/2018    Angina pectoris  "1/25/2018    Anticoagulant long-term use     Anxiety 6/16/2019    Aortic regurgitation     Echo 11/2013---5 - Mild to moderate aortic regurgitation.      Asthma     Atrial fibrillation 5/15/2014    Back pain     s/p epidural steriod injections, no recent injections.    Baker's cyst     small, right, seen on US lower extremity 6/2014    Blood transfusion     Approximately 6 years ago    Chronic constipation     Coronary artery disease     Degenerative disc disease, lumbar     Dementia 10/8/2021    Diastolic dysfunction     Seen on echo 11/2013, stress test negative 5/2014    Diverticulosis     colonoscopy 3/27/2011    E coli bacteremia 5/23/2017    E. coli UTI (urinary tract infection) 5/23/2017    Hemorrhoids     colonoscopy 3/27/2011    Hiatal hernia     CXR 12/30/2016---Retrocardiac density again noted consistent with a hiatal hernia.    Hx of adenomatous colonic polyps     Hyperlipidemia     Hypertension     Hyponatremia 5/9/2016    Hypoxemia 5/27/2017    Hypoxia 6/28/2017    Leukocytosis 5/27/2017    Mitral regurgitation     Myocardial infarction     per patient was told in the past she had a "mild heart attack"    Obesity     Osteoarthritis, knee     Pneumonia     per patient "walking Pneumonia" did not require hospitalization    Seasonal allergies     Sepsis 5/22/2017    Severe obesity with body mass index (BMI) of 35.0 to 39.9 with serious comorbidity     Trouble in sleeping     Urinary incontinence        Past Surgical History:   Procedure Laterality Date    APPENDECTOMY      CATARACT EXTRACTION, BILATERAL      COLONOSCOPY N/A 8/29/2017    Procedure: COLONOSCOPY okay by dr. ramos;  Surgeon: Toño Abdi MD;  Location[facility];  Service: Endoscopy;  Laterality: N/A;    EYE SURGERY Left     HYSTERECTOMY      benign reasons    KNEE SURGERY Bilateral     x2     Left heart cath      OLECRANON BURSECTOMY Right     6/2011    TONSILLECTOMY      TREATMENT OF CARDIAC " ARRHYTHMIA N/A 2019    Procedure: CARDIOVERSION OR DEFIBRILLATION;  Surgeon: Kee Jones MD;  Location: [facility] CATH LAB;  Service: Cardiology;  Laterality: N/A;    TREATMENT OF CARDIAC ARRHYTHMIA N/A 2019    Procedure: CARDIOVERSION;  Surgeon: Juan Manuel Gooden MD;  Location: [facility] CATH LAB;  Service: Cardiology;  Laterality: N/A;    URETHRA SURGERY         Social History     Tobacco Use    Smoking status: Former Smoker     Packs/day: 0.05     Years: 1.00     Pack years: 0.05     Types: Cigarettes     Quit date: 1952     Years since quittin.3    Smokeless tobacco: Never Used   Substance Use Topics    Alcohol use: No     Alcohol/week: 0.0 standard drinks    Drug use: No       Family History   Problem Relation Age of Onset    Heart disease Mother     Cancer Mother         colon    Hypertension Mother     Hyperlipidemia Mother     Heart disease Father     Hypertension Father     Hyperlipidemia Father     Heart disease Sister         MI    Heart disease Brother         MI    COPD Son     Hypertension Son     Diabetes Brother     Diabetes Son     Cancer Sister         breast    Kidney disease Neg Hx     Stroke Neg Hx        Current Outpatient Medications   Medication Sig    aspirin (ECOTRIN) 81 MG EC tablet Take 81 mg by mouth once daily.    cyanocobalamin (VITAMIN B-12) 1000 MCG tablet Take 100 mcg by mouth once daily.    ELIQUIS 5 mg Tab TAKE 1 TABLET BY MOUTH TWICE DAILY    furosemide (LASIX) 40 MG tablet TAKE 1 TABLET TWICE DAILY    metOLazone (ZAROXOLYN) 2.5 MG tablet TAKE 1 TABLET BY MOUTH EVERY MONDAY, WEDNESDAY AND FRIDAY    omega 3-dha-epa-fish oil 1,000 mg (120 mg-180 mg) Cap Take 1 capsule by mouth once daily.    potassium chloride SA (K-DUR,KLOR-CON) 10 MEQ tablet TAKE 1 TABLET(10 MEQ) BY MOUTH TWICE DAILY    pravastatin (PRAVACHOL) 40 MG tablet Take 1 tablet (40 mg total) by mouth every evening.     No current facility-administered medications for this visit.      Current Outpatient Medications on File Prior to Visit   Medication Sig    aspirin (ECOTRIN) 81 MG EC tablet Take 81 mg by mouth once daily.    cyanocobalamin (VITAMIN B-12) 1000 MCG tablet Take 100 mcg by mouth once daily.    ELIQUIS 5 mg Tab TAKE 1 TABLET BY MOUTH TWICE DAILY    furosemide (LASIX) 40 MG tablet TAKE 1 TABLET TWICE DAILY    metOLazone (ZAROXOLYN) 2.5 MG tablet TAKE 1 TABLET BY MOUTH EVERY MONDAY, WEDNESDAY AND FRIDAY    omega 3-dha-epa-fish oil 1,000 mg (120 mg-180 mg) Cap Take 1 capsule by mouth once daily.    potassium chloride SA (K-DUR,KLOR-CON) 10 MEQ tablet TAKE 1 TABLET(10 MEQ) BY MOUTH TWICE DAILY    pravastatin (PRAVACHOL) 40 MG tablet Take 1 tablet (40 mg total) by mouth every evening.    [DISCONTINUED] docusate sodium (COLACE) 100 MG capsule Take 1 capsule (100 mg total) by mouth 2 (two) times daily.    [DISCONTINUED] metoprolol tartrate (LOPRESSOR) 25 MG tablet TAKE 1/2 TABLET TWICE DAILY (Patient not taking: Reported on 4/14/2022)    [DISCONTINUED] PACERONE 200 mg Tab TAKE 1 TABLET BY MOUTH ONCE DAILY (Patient not taking: Reported on 4/14/2022)    [DISCONTINUED] polyethylene glycol (GLYCOLAX) 17 gram/dose powder Take 17 g by mouth once daily.     No current facility-administered medications on file prior to visit.     Review of patient's allergies indicates:   Allergen Reactions    Iodine and iodide containing products Rash     Review of Systems   Constitutional: Positive for decreased appetite and weight loss. Negative for diaphoresis, malaise/fatigue and weight gain.   HENT: Negative for hoarse voice.    Eyes: Negative for double vision and visual disturbance.   Cardiovascular: Negative for chest pain, claudication, cyanosis, dyspnea on exertion, irregular heartbeat, leg swelling, near-syncope, orthopnea, palpitations, paroxysmal nocturnal dyspnea and syncope.   Respiratory: Negative for cough, hemoptysis, shortness of breath and snoring.    Hematologic/Lymphatic:  Negative for bleeding problem. Does not bruise/bleed easily.   Skin: Negative for color change and poor wound healing.   Musculoskeletal: Negative for muscle cramps, muscle weakness and myalgias.   Gastrointestinal: Negative for bloating, abdominal pain, change in bowel habit, diarrhea, heartburn, hematemesis, hematochezia, melena and nausea.   Neurological: Negative for excessive daytime sleepiness, dizziness, headaches, light-headedness, loss of balance, numbness and weakness.   Psychiatric/Behavioral: Negative for memory loss. The patient does not have insomnia.    Allergic/Immunologic: Negative for hives.       Objective:   Physical Exam  Constitutional:       General: She is not in acute distress.     Appearance: Normal appearance. She is well-developed. She is not ill-appearing.   HENT:      Head: Normocephalic and atraumatic.   Eyes:      General: No scleral icterus.     Pupils: Pupils are equal, round, and reactive to light.   Neck:      Thyroid: No thyromegaly.      Vascular: Normal carotid pulses. No carotid bruit, hepatojugular reflux or JVD.      Trachea: No tracheal deviation.   Cardiovascular:      Rate and Rhythm: Normal rate. Rhythm irregular.      Pulses: Normal pulses.      Heart sounds: Normal heart sounds. No murmur heard.    No friction rub. No gallop.   Pulmonary:      Effort: Pulmonary effort is normal. No respiratory distress.      Breath sounds: Normal breath sounds. No wheezing, rhonchi or rales.   Chest:      Chest wall: No tenderness.   Abdominal:      General: Bowel sounds are normal. There is no abdominal bruit.      Palpations: Abdomen is soft. There is no hepatomegaly or pulsatile mass.      Tenderness: There is no abdominal tenderness.   Musculoskeletal:      Right shoulder: No deformity.      Cervical back: Normal range of motion and neck supple.      Right lower leg: No edema.      Left lower leg: No edema.      Comments: VARICOSE VEINS IN BOTH LEGS,.    Skin:     General: Skin is  "warm and dry.      Findings: No erythema or rash.      Nails: There is no clubbing.   Neurological:      Mental Status: She is alert and oriented to person, place, and time.      Cranial Nerves: No cranial nerve deficit.      Coordination: Coordination normal.   Psychiatric:         Speech: Speech normal.         Behavior: Behavior normal.         Judgment: Judgment normal.       Vitals:    04/14/22 1529   BP: 120/70   BP Location: Left arm   Patient Position: Sitting   BP Method: Medium (Manual)   Pulse: 90   SpO2: 97%   Weight: 64 kg (141 lb)   Height: 4' 9" (1.448 m)     Lab Results   Component Value Date    CHOL 159 10/04/2021    CHOL 140 07/06/2021    CHOL 139 01/08/2021     Lab Results   Component Value Date    HDL 63 10/04/2021    HDL 63 07/06/2021    HDL 63 01/08/2021     Lab Results   Component Value Date    LDLCALC 80.0 10/04/2021    LDLCALC 64.2 07/06/2021    LDLCALC 62.6 (L) 01/08/2021     Lab Results   Component Value Date    TRIG 80 10/04/2021    TRIG 64 07/06/2021    TRIG 67 01/08/2021     Lab Results   Component Value Date    CHOLHDL 39.6 10/04/2021    CHOLHDL 45.0 07/06/2021    CHOLHDL 45.3 01/08/2021       Chemistry        Component Value Date/Time     (L) 03/05/2022 0146    K 2.5 (LL) 03/05/2022 0146    CL 87 (L) 03/05/2022 0146    CO2 32 (H) 03/05/2022 0146    BUN 36 (H) 03/05/2022 0146    CREATININE 1.2 03/05/2022 0146     (H) 03/05/2022 0146        Component Value Date/Time    CALCIUM 9.7 03/05/2022 0146    ALKPHOS 76 03/05/2022 0146    AST 20 03/05/2022 0146    ALT 11 03/05/2022 0146    BILITOT 1.3 (H) 03/05/2022 0146    ESTGFRAFRICA 47 (A) 03/05/2022 0146    EGFRNONAA 41 (A) 03/05/2022 0146        Lab Results   Component Value Date    HGBA1C 5.5 05/26/2020       Lab Results   Component Value Date    TSH 2.353 10/04/2021     Lab Results   Component Value Date    INR 1.0 03/05/2022    INR 1.0 05/21/2019    INR 0.9 02/16/2019     Lab Results   Component Value Date    WBC 9.79 " 03/05/2022    HGB 13.3 03/05/2022    HCT 39.3 03/05/2022    MCV 92 03/05/2022     03/05/2022     BMP  Sodium   Date Value Ref Range Status   03/05/2022 134 (L) 136 - 145 mmol/L Final     Potassium   Date Value Ref Range Status   03/05/2022 2.5 (LL) 3.5 - 5.1 mmol/L Final     Comment:     Potassium critical result(s) called and verbal readback obtained   from  Evelin Alexandra RN by CD9 03/05/2022 02:22       Chloride   Date Value Ref Range Status   03/05/2022 87 (L) 95 - 110 mmol/L Final     CO2   Date Value Ref Range Status   03/05/2022 32 (H) 23 - 29 mmol/L Final     BUN   Date Value Ref Range Status   03/05/2022 36 (H) 8 - 23 mg/dL Final     Creatinine   Date Value Ref Range Status   03/05/2022 1.2 0.5 - 1.4 mg/dL Final     Calcium   Date Value Ref Range Status   03/05/2022 9.7 8.7 - 10.5 mg/dL Final     Anion Gap   Date Value Ref Range Status   03/05/2022 15 8 - 16 mmol/L Final     eGFR if    Date Value Ref Range Status   03/05/2022 47 (A) >60 mL/min/1.73 m^2 Final     eGFR if non    Date Value Ref Range Status   03/05/2022 41 (A) >60 mL/min/1.73 m^2 Final     Comment:     Calculation used to obtain the estimated glomerular filtration  rate (eGFR) is the CKD-EPI equation.        CrCl cannot be calculated (Patient's most recent lab result is older than the maximum 7 days allowed.).    Assessment:     1. Atrial fibrillation, unspecified type    2. Cognitive impairment    3. Anxiety    4. Atherosclerosis of aorta    5. Bilateral carotid bruits    6. Coronary artery disease, unspecified vessel or lesion type, unspecified whether angina present, unspecified whether native or transplanted heart    7. Dyspnea on exertion    8. Elevated troponin    9. Heart valve regurgitation    10. History of angina pectoris    11. Hyperlipidemia, unspecified hyperlipidemia type    12. Left ventricular diastolic dysfunction with preserved systolic function    13. Mild nonobstructive CAD per [facility] in  2016    14. PAF (paroxysmal atrial fibrillation)    15. Primary hypertension    16. Pulmonary heart disease    17. Stage 3a chronic kidney disease    18. CKD (chronic kidney disease) stage 4, GFR 15-29 ml/min    19. Obesity (BMI 30.0-34.9)      WELL COMPENSATED ON APPROPRIATE THERAPY COMPLIANT  NO CHF CONTINUE SAME    HTN CONTROLLED CONTINUE THE SAME   AFIB ON NOAC NO CVA CONTINUE SAME.   HLP ON [employer] A1C PREVIOUSLY CONTROLLED    COUNSELED ABOUT CONTINUED ACTIVE LIFESTYLE.   Plan:   AS PER ABOVE   Continue current therapy  Cardiac low salt diet.  Risk factor modification and excercise program.  F/u in 6 months with lipid cmp A1C

## 2022-04-14 NOTE — PROGRESS NOTES
Subjective:       Patient ID: Elke Laws is a 87 y.o. female.    Chief Complaint: Follow-up    She was emergency room recently with fecal impaction.  She also had decreased renal function and hypokalemia.  She is currently on senna every 3rd day which is working well for bowels.  MiraLax was not used because it was inconvenient for family.  She has been off oxygen since last visit.  She has had no shortness of breath.  O2 sat today is 97%.  She also has hypertension  atrial fibrillation she denies headache chest pain palpitations shortness of breath or edema    Review of Systems   Constitutional: Negative for appetite change, chills, fever and unexpected weight change.   HENT: Negative for congestion.    Respiratory: Negative for cough, chest tightness, shortness of breath and wheezing.    Cardiovascular: Negative for chest pain, palpitations and leg swelling.   Gastrointestinal: Positive for constipation. Negative for abdominal distention and abdominal pain.   Neurological: Negative for dizziness, weakness, light-headedness and headaches.       Objective:      Physical Exam  Constitutional:       General: She is not in acute distress.     Appearance: She is not diaphoretic.   Cardiovascular:      Rate and Rhythm: Rhythm irregular.      Heart sounds: No murmur heard.    No gallop.   Pulmonary:      Effort: Pulmonary effort is normal. No respiratory distress.      Breath sounds: No wheezing, rhonchi or rales.   Abdominal:      General: There is no distension.      Palpations: Abdomen is soft. There is no mass.      Tenderness: There is no abdominal tenderness.   Skin:     General: Skin is warm and dry.      Coloration: Skin is not pale.      Findings: No erythema.   Neurological:      Mental Status: She is alert.   Psychiatric:         Mood and Affect: Mood normal.         Behavior: Behavior normal.         Thought Content: Thought content normal.         Judgment: Judgment normal.         Admission on 03/04/2022,  Discharged on 03/05/2022   Component Date Value Ref Range Status    WBC 03/05/2022 9.79  3.90 - 12.70 K/uL Final    RBC 03/05/2022 4.29  4.00 - 5.40 M/uL Final    Hemoglobin 03/05/2022 13.3  12.0 - 16.0 g/dL Final    Hematocrit 03/05/2022 39.3  37.0 - 48.5 % Final    MCV 03/05/2022 92  82 - 98 fL Final    MCH 03/05/2022 31.0  27.0 - 31.0 pg Final    MCHC 03/05/2022 33.8  32.0 - 36.0 g/dL Final    RDW 03/05/2022 13.2  11.5 - 14.5 % Final    Platelets 03/05/2022 202  150 - 450 K/uL Final    MPV 03/05/2022 10.9  9.2 - 12.9 fL Final    Immature Granulocytes 03/05/2022 0.4  0.0 - 0.5 % Final    Gran # (ANC) 03/05/2022 7.1  1.8 - 7.7 K/uL Final    Immature Grans (Abs) 03/05/2022 0.04  0.00 - 0.04 K/uL Final    Lymph # 03/05/2022 1.4  1.0 - 4.8 K/uL Final    Mono # 03/05/2022 1.2 (A) 0.3 - 1.0 K/uL Final    Eos # 03/05/2022 0.0  0.0 - 0.5 K/uL Final    Baso # 03/05/2022 0.02  0.00 - 0.20 K/uL Final    nRBC 03/05/2022 0  0 /100 WBC Final    Gran % 03/05/2022 72.8  38.0 - 73.0 % Final    Lymph % 03/05/2022 14.2 (A) 18.0 - 48.0 % Final    Mono % 03/05/2022 12.1  4.0 - 15.0 % Final    Eosinophil % 03/05/2022 0.3  0.0 - 8.0 % Final    Basophil % 03/05/2022 0.2  0.0 - 1.9 % Final    Differential Method 03/05/2022 Automated   Final    Sodium 03/05/2022 134 (A) 136 - 145 mmol/L Final    Potassium 03/05/2022 2.5 (A) 3.5 - 5.1 mmol/L Final    Chloride 03/05/2022 87 (A) 95 - 110 mmol/L Final    CO2 03/05/2022 32 (A) 23 - 29 mmol/L Final    Glucose 03/05/2022 134 (A) 70 - 110 mg/dL Final    BUN 03/05/2022 36 (A) 8 - 23 mg/dL Final    Creatinine 03/05/2022 1.2  0.5 - 1.4 mg/dL Final    Calcium 03/05/2022 9.7  8.7 - 10.5 mg/dL Final    Total Protein 03/05/2022 6.5  6.0 - 8.4 g/dL Final    Albumin 03/05/2022 3.4 (A) 3.5 - 5.2 g/dL Final    Total Bilirubin 03/05/2022 1.3 (A) 0.1 - 1.0 mg/dL Final    Alkaline Phosphatase 03/05/2022 76  55 - 135 U/L Final    AST 03/05/2022 20  10 - 40 U/L Final    ALT  03/05/2022 11  10 - 44 U/L Final    Anion Gap 03/05/2022 15  8 - 16 mmol/L Final    eGFR if  03/05/2022 47 (A) >60 mL/min/1.73 m^2 Final    eGFR if non African American 03/05/2022 41 (A) >60 mL/min/1.73 m^2 Final    Prothrombin Time 03/05/2022 11.1  9.0 - 12.5 sec Final    INR 03/05/2022 1.0  0.8 - 1.2 Final    Magnesium 03/05/2022 1.9  1.6 - 2.6 mg/dL Final     Assessment:       1. Primary hypertension    2. CKD (chronic kidney disease) stage 4, GFR 15-29 ml/min    3. Atherosclerosis of aorta    4. Longstanding persistent atrial fibrillation    5. Constipation, chronic        Plan:     Pressure controlled.  Pulse ox good on room air.  Continue senna every 3rd day for constipation.  Medications reviewed.  CMP was ordered.  Follow-up in 3 months.    Primary hypertension    CKD (chronic kidney disease) stage 4, GFR 15-29 ml/min  -     Comprehensive Metabolic Panel; Future; Expected date: 04/14/2022    Atherosclerosis of aorta    Longstanding persistent atrial fibrillation    Constipation, chronic

## 2022-05-18 DIAGNOSIS — Z78.0 ASYMPTOMATIC MENOPAUSAL STATE: ICD-10-CM

## 2022-06-14 ENCOUNTER — PATIENT OUTREACH (OUTPATIENT)
Dept: ADMINISTRATIVE | Facility: HOSPITAL | Age: 87
End: 2022-06-14
Payer: MEDICARE

## 2022-08-23 ENCOUNTER — TELEPHONE (OUTPATIENT)
Dept: ADMINISTRATIVE | Facility: HOSPITAL | Age: 87
End: 2022-08-23
Payer: MEDICARE

## 2022-09-09 ENCOUNTER — PES CALL (OUTPATIENT)
Dept: ADMINISTRATIVE | Facility: CLINIC | Age: 87
End: 2022-09-09
Payer: MEDICARE

## 2022-10-13 ENCOUNTER — LAB VISIT (OUTPATIENT)
Dept: LAB | Facility: HOSPITAL | Age: 87
End: 2022-10-13
Attending: INTERNAL MEDICINE
Payer: MEDICARE

## 2022-10-13 DIAGNOSIS — E78.5 HYPERLIPIDEMIA, UNSPECIFIED HYPERLIPIDEMIA TYPE: Chronic | ICD-10-CM

## 2022-10-13 DIAGNOSIS — I48.19 ATRIAL FIBRILLATION, PERSISTENT: ICD-10-CM

## 2022-10-13 DIAGNOSIS — I25.10 CORONARY ARTERY DISEASE, UNSPECIFIED VESSEL OR LESION TYPE, UNSPECIFIED WHETHER ANGINA PRESENT, UNSPECIFIED WHETHER NATIVE OR TRANSPLANTED HEART: ICD-10-CM

## 2022-10-13 LAB
ALBUMIN SERPL BCP-MCNC: 3.3 G/DL (ref 3.5–5.2)
ALP SERPL-CCNC: 87 U/L (ref 55–135)
ALT SERPL W/O P-5'-P-CCNC: 15 U/L (ref 10–44)
ANION GAP SERPL CALC-SCNC: 10 MMOL/L (ref 8–16)
AST SERPL-CCNC: 19 U/L (ref 10–40)
BILIRUB SERPL-MCNC: 0.4 MG/DL (ref 0.1–1)
BUN SERPL-MCNC: 76 MG/DL (ref 8–23)
CALCIUM SERPL-MCNC: 9.4 MG/DL (ref 8.7–10.5)
CHLORIDE SERPL-SCNC: 97 MMOL/L (ref 95–110)
CHOLEST SERPL-MCNC: 151 MG/DL (ref 120–199)
CHOLEST/HDLC SERPL: 3 {RATIO} (ref 2–5)
CO2 SERPL-SCNC: 34 MMOL/L (ref 23–29)
CREAT SERPL-MCNC: 1.2 MG/DL (ref 0.5–1.4)
EST. GFR  (NO RACE VARIABLE): 43.8 ML/MIN/1.73 M^2
ESTIMATED AVG GLUCOSE: 108 MG/DL (ref 68–131)
GLUCOSE SERPL-MCNC: 117 MG/DL (ref 70–110)
HBA1C MFR BLD: 5.4 % (ref 4–5.6)
HDLC SERPL-MCNC: 51 MG/DL (ref 40–75)
HDLC SERPL: 33.8 % (ref 20–50)
LDLC SERPL CALC-MCNC: 85 MG/DL (ref 63–159)
NONHDLC SERPL-MCNC: 100 MG/DL
POTASSIUM SERPL-SCNC: 3.5 MMOL/L (ref 3.5–5.1)
PROT SERPL-MCNC: 6.7 G/DL (ref 6–8.4)
SODIUM SERPL-SCNC: 141 MMOL/L (ref 136–145)
TRIGL SERPL-MCNC: 75 MG/DL (ref 30–150)

## 2022-10-13 PROCEDURE — 80053 COMPREHEN METABOLIC PANEL: CPT | Performed by: INTERNAL MEDICINE

## 2022-10-13 PROCEDURE — 36415 COLL VENOUS BLD VENIPUNCTURE: CPT | Performed by: INTERNAL MEDICINE

## 2022-10-13 PROCEDURE — 83036 HEMOGLOBIN GLYCOSYLATED A1C: CPT | Performed by: INTERNAL MEDICINE

## 2022-10-13 PROCEDURE — 80061 LIPID PANEL: CPT | Performed by: INTERNAL MEDICINE

## 2022-10-27 ENCOUNTER — OFFICE VISIT (OUTPATIENT)
Dept: CARDIOLOGY | Facility: CLINIC | Age: 87
End: 2022-10-27
Payer: MEDICARE

## 2022-10-27 VITALS
DIASTOLIC BLOOD PRESSURE: 50 MMHG | OXYGEN SATURATION: 97 % | HEIGHT: 57 IN | HEART RATE: 71 BPM | WEIGHT: 133.81 LBS | BODY MASS INDEX: 28.87 KG/M2 | SYSTOLIC BLOOD PRESSURE: 102 MMHG

## 2022-10-27 DIAGNOSIS — R41.89 COGNITIVE IMPAIRMENT: ICD-10-CM

## 2022-10-27 DIAGNOSIS — E78.5 HYPERLIPIDEMIA, UNSPECIFIED HYPERLIPIDEMIA TYPE: Chronic | ICD-10-CM

## 2022-10-27 DIAGNOSIS — E66.9 OBESITY (BMI 30.0-34.9): ICD-10-CM

## 2022-10-27 DIAGNOSIS — I48.11 LONGSTANDING PERSISTENT ATRIAL FIBRILLATION: ICD-10-CM

## 2022-10-27 DIAGNOSIS — I70.0 ATHEROSCLEROSIS OF AORTA: Chronic | ICD-10-CM

## 2022-10-27 DIAGNOSIS — I27.9 PULMONARY HEART DISEASE: ICD-10-CM

## 2022-10-27 DIAGNOSIS — N18.31 STAGE 3A CHRONIC KIDNEY DISEASE: ICD-10-CM

## 2022-10-27 DIAGNOSIS — I38 HEART VALVE REGURGITATION: ICD-10-CM

## 2022-10-27 DIAGNOSIS — I10 PRIMARY HYPERTENSION: ICD-10-CM

## 2022-10-27 DIAGNOSIS — I48.91 ATRIAL FIBRILLATION, UNSPECIFIED TYPE: ICD-10-CM

## 2022-10-27 DIAGNOSIS — R73.03 PREDIABETES: ICD-10-CM

## 2022-10-27 DIAGNOSIS — N18.4 CKD (CHRONIC KIDNEY DISEASE) STAGE 4, GFR 15-29 ML/MIN: ICD-10-CM

## 2022-10-27 DIAGNOSIS — R09.89 BILATERAL CAROTID BRUITS: ICD-10-CM

## 2022-10-27 DIAGNOSIS — I25.10 CORONARY ARTERY DISEASE, UNSPECIFIED VESSEL OR LESION TYPE, UNSPECIFIED WHETHER ANGINA PRESENT, UNSPECIFIED WHETHER NATIVE OR TRANSPLANTED HEART: ICD-10-CM

## 2022-10-27 DIAGNOSIS — I51.89 LEFT VENTRICULAR DIASTOLIC DYSFUNCTION WITH PRESERVED SYSTOLIC FUNCTION: Chronic | ICD-10-CM

## 2022-10-27 DIAGNOSIS — I48.0 PAF (PAROXYSMAL ATRIAL FIBRILLATION): Primary | Chronic | ICD-10-CM

## 2022-10-27 DIAGNOSIS — R06.09 DYSPNEA ON EXERTION: ICD-10-CM

## 2022-10-27 DIAGNOSIS — I25.10 CORONARY ARTERY DISEASE INVOLVING NATIVE CORONARY ARTERY OF NATIVE HEART WITHOUT ANGINA PECTORIS: Chronic | ICD-10-CM

## 2022-10-27 PROCEDURE — 99499 UNLISTED E&M SERVICE: CPT | Mod: S$GLB,,, | Performed by: INTERNAL MEDICINE

## 2022-10-27 PROCEDURE — 99499 RISK ADDL DX/OHS AUDIT: ICD-10-PCS | Mod: S$GLB,,, | Performed by: INTERNAL MEDICINE

## 2022-10-27 PROCEDURE — 99999 PR PBB SHADOW E&M-EST. PATIENT-LVL III: ICD-10-PCS | Mod: PBBFAC,,, | Performed by: INTERNAL MEDICINE

## 2022-10-27 PROCEDURE — 3288F PR FALLS RISK ASSESSMENT DOCUMENTED: ICD-10-PCS | Mod: CPTII,S$GLB,, | Performed by: INTERNAL MEDICINE

## 2022-10-27 PROCEDURE — 99214 OFFICE O/P EST MOD 30 MIN: CPT | Mod: S$GLB,,, | Performed by: INTERNAL MEDICINE

## 2022-10-27 PROCEDURE — 99999 PR PBB SHADOW E&M-EST. PATIENT-LVL III: CPT | Mod: PBBFAC,,, | Performed by: INTERNAL MEDICINE

## 2022-10-27 PROCEDURE — 1101F PR PT FALLS ASSESS DOC 0-1 FALLS W/OUT INJ PAST YR: ICD-10-PCS | Mod: CPTII,S$GLB,, | Performed by: INTERNAL MEDICINE

## 2022-10-27 PROCEDURE — 1157F ADVNC CARE PLAN IN RCRD: CPT | Mod: CPTII,S$GLB,, | Performed by: INTERNAL MEDICINE

## 2022-10-27 PROCEDURE — 1159F MED LIST DOCD IN RCRD: CPT | Mod: CPTII,S$GLB,, | Performed by: INTERNAL MEDICINE

## 2022-10-27 PROCEDURE — 1101F PT FALLS ASSESS-DOCD LE1/YR: CPT | Mod: CPTII,S$GLB,, | Performed by: INTERNAL MEDICINE

## 2022-10-27 PROCEDURE — 1126F PR PAIN SEVERITY QUANTIFIED, NO PAIN PRESENT: ICD-10-PCS | Mod: CPTII,S$GLB,, | Performed by: INTERNAL MEDICINE

## 2022-10-27 PROCEDURE — 1126F AMNT PAIN NOTED NONE PRSNT: CPT | Mod: CPTII,S$GLB,, | Performed by: INTERNAL MEDICINE

## 2022-10-27 PROCEDURE — 3288F FALL RISK ASSESSMENT DOCD: CPT | Mod: CPTII,S$GLB,, | Performed by: INTERNAL MEDICINE

## 2022-10-27 PROCEDURE — 1157F PR ADVANCE CARE PLAN OR EQUIV PRESENT IN MEDICAL RECORD: ICD-10-PCS | Mod: CPTII,S$GLB,, | Performed by: INTERNAL MEDICINE

## 2022-10-27 PROCEDURE — 99214 PR OFFICE/OUTPT VISIT, EST, LEVL IV, 30-39 MIN: ICD-10-PCS | Mod: S$GLB,,, | Performed by: INTERNAL MEDICINE

## 2022-10-27 PROCEDURE — 1159F PR MEDICATION LIST DOCUMENTED IN MEDICAL RECORD: ICD-10-PCS | Mod: CPTII,S$GLB,, | Performed by: INTERNAL MEDICINE

## 2022-10-27 RX ORDER — FUROSEMIDE 40 MG/1
40 TABLET ORAL DAILY
Qty: 90 TABLET | Refills: 3 | Status: SHIPPED | OUTPATIENT
Start: 2022-10-27

## 2022-10-27 NOTE — PROGRESS NOTES
Subjective:   Patient ID:  Elke Laws is a 88 y.o. female who presents for follow up of Follow-up      HPI  SHERIN STRATTON NP 10/8/2021  Ms. Laws' current conditions include persistent AF, diastolic CHF, non obstructive CAD per [facility] done in , HTN and HLD, MR  Family reports that patient's oxygen has been discontinued.      Admitted in May 2019 for A-fib with RVR. She had failed CV x 2 during admission. DC'd with [facility]. No BB restarted at that time due to bradycardia during admission. Patient declined follow up with EP ablation and PPM consideration and opted for conservative medical management.      Has no abnormal bleeding on Eliquis      Recently diagnosed with Dementia. Her sister in law, Alcira, is now helping care for her. Her son Calixto Laws is now POA     Has no complaints today.   Denies any chest pain, SOB, DIAZ,  orthopnea, PND, dizziness, palpitations,  near syncope, syncope or edema . Has no symptoms concerning for angina or equivalent. No CNS Complaints to suggest TIA or CVA. Does well with limiting sodium intake.     Currently taking Lasix 40mg BID and Metolazone 2.5mg on MF.      4/14/2022   HEREE FOR F/U HAS LOST WEIGHT SHE IS STILL COOKING TAKING CARE OF HOUSE WORK HAS NO CHEST PAIN NO SHORTNESS OF BREATH TURCIOS S NO LEG SWELLING  HAS VARICOSE VEINS THAT CAN LEAD TO SWELLING IF SHE IS DEPENDENT FOR A LONGTIME. SHE IS COMPLIANT WITH MEDICAL THERAPY .    10/27/2022  Lives with her daughter now has lost weight diet appropraite. Has no chest pain or shortness of breath not taking metolazone . She is still on lasix bid her bp is on the low side. Has no falls.   Past Medical History:   Diagnosis Date    Abnormal nuclear stress test 6/30/2016    Acute congestive heart failure 5/27/2017    Acute coronary syndrome     Acute kidney injury (nontraumatic) 5/27/2017    Acute on chronic congestive heart failure 5/21/2017    Acute on chronic diastolic congestive heart failure 8/27/2015    Acute on chronic  "respiratory failure 5/6/2019    Acute renal failure superimposed on stage 3 chronic kidney disease 1/26/2018    Anemia 5/9/2016    Angina pectoris 1/25/2018    Angina pectoris 1/25/2018    Anticoagulant long-term use     Anxiety 6/16/2019    Aortic regurgitation     Echo 11/2013---5 - Mild to moderate aortic regurgitation.      Asthma     Atrial fibrillation 5/15/2014    Back pain     s/p epidural steriod injections, no recent injections.    Bridges's cyst     small, right, seen on US lower extremity 6/2014    Blood transfusion     Approximately 6 years ago    Chronic constipation     Coronary artery disease     Degenerative disc disease, lumbar     Dementia 10/8/2021    Diastolic dysfunction     Seen on echo 11/2013, stress test negative 5/2014    Diverticulosis     colonoscopy 3/27/2011    E coli bacteremia 5/23/2017    E. coli UTI (urinary tract infection) 5/23/2017    Hemorrhoids     colonoscopy 3/27/2011    Hiatal hernia     CXR 12/30/2016---Retrocardiac density again noted consistent with a hiatal hernia.    Hx of adenomatous colonic polyps     Hyperlipidemia     Hypertension     Hyponatremia 5/9/2016    Hypoxemia 5/27/2017    Hypoxia 6/28/2017    Leukocytosis 5/27/2017    Mitral regurgitation     Myocardial infarction     per patient was told in the past she had a "mild heart attack"    Obesity     Osteoarthritis, knee     Pneumonia     per patient "walking Pneumonia" did not require hospitalization    Seasonal allergies     Sepsis 5/22/2017    Severe obesity with body mass index (BMI) of 35.0 to 39.9 with serious comorbidity     Trouble in sleeping     Urinary incontinence        Past Surgical History:   Procedure Laterality Date    APPENDECTOMY      CATARACT EXTRACTION, BILATERAL      COLONOSCOPY N/A 8/29/2017    Procedure: COLONOSCOPY okay by dr. ramos;  Surgeon: Toño Abdi MD;  Location: [facility];  Service: Endoscopy;  Laterality: N/A;    EYE SURGERY Left     HYSTERECTOMY      benign reasons    KNEE " SURGERY Bilateral     x2     Left heart cath      OLECRANON BURSECTOMY Right     2011    TONSILLECTOMY      TREATMENT OF CARDIAC ARRHYTHMIA N/A 2019    Procedure: CARDIOVERSION OR DEFIBRILLATION;  Surgeon: Kee Jones MD;  Location: [facility] CATH LAB;  Service: Cardiology;  Laterality: N/A;    TREATMENT OF CARDIAC ARRHYTHMIA N/A 2019    Procedure: CARDIOVERSION;  Surgeon: Juan Manuel Gooden MD;  Location: [facility] CATH LAB;  Service: Cardiology;  Laterality: N/A;    URETHRA SURGERY         Social History     Tobacco Use    Smoking status: Former     Packs/day: 0.05     Years: 1.00     Pack years: 0.05     Types: Cigarettes     Quit date: 1952     Years since quittin.8    Smokeless tobacco: Never   Substance Use Topics    Alcohol use: No     Alcohol/week: 0.0 standard drinks    Drug use: No       Family History   Problem Relation Age of Onset    Heart disease Mother     Cancer Mother         colon    Hypertension Mother     Hyperlipidemia Mother     Heart disease Father     Hypertension Father     Hyperlipidemia Father     Heart disease Sister         MI    Heart disease Brother         MI    COPD Son     Hypertension Son     Diabetes Brother     Diabetes Son     Cancer Sister         breast    Kidney disease Neg Hx     Stroke Neg Hx        Current Outpatient Medications   Medication Sig    apixaban (ELIQUIS) 5 mg Tab Take 1 tablet (5 mg total) by mouth 2 (two) times daily.    aspirin (ECOTRIN) 81 MG EC tablet Take 81 mg by mouth once daily.    cyanocobalamin (VITAMIN B-12) 1000 MCG tablet Take 100 mcg by mouth once daily.    furosemide (LASIX) 40 MG tablet Take 1 tablet (40 mg total) by mouth 2 (two) times daily.    metOLazone (ZAROXOLYN) 2.5 MG tablet TAKE 1 TABLET BY MOUTH EVERY MONDAY, WEDNESDAY AND FRIDAY    metoprolol tartrate (LOPRESSOR) 50 MG tablet Take 0.5 tablets (25 mg total) by mouth 2 (two) times daily.    omega 3-dha-epa-fish oil 1,000 mg (120 mg-180 mg) Cap Take 1 capsule by mouth once daily.     potassium chloride SA (K-DUR,KLOR-CON) 10 MEQ tablet 1 TABLET PO BID    pravastatin (PRAVACHOL) 40 MG tablet Take 1 tablet (40 mg total) by mouth every evening.     No current facility-administered medications for this visit.     Current Outpatient Medications on File Prior to Visit   Medication Sig    apixaban (ELIQUIS) 5 mg Tab Take 1 tablet (5 mg total) by mouth 2 (two) times daily.    aspirin (ECOTRIN) 81 MG EC tablet Take 81 mg by mouth once daily.    cyanocobalamin (VITAMIN B-12) 1000 MCG tablet Take 100 mcg by mouth once daily.    furosemide (LASIX) 40 MG tablet Take 1 tablet (40 mg total) by mouth 2 (two) times daily.    metOLazone (ZAROXOLYN) 2.5 MG tablet TAKE 1 TABLET BY MOUTH EVERY MONDAY, WEDNESDAY AND FRIDAY    metoprolol tartrate (LOPRESSOR) 50 MG tablet Take 0.5 tablets (25 mg total) by mouth 2 (two) times daily.    omega 3-dha-epa-fish oil 1,000 mg (120 mg-180 mg) Cap Take 1 capsule by mouth once daily.    potassium chloride SA (K-DUR,KLOR-CON) 10 MEQ tablet 1 TABLET PO BID    pravastatin (PRAVACHOL) 40 MG tablet Take 1 tablet (40 mg total) by mouth every evening.     No current facility-administered medications on file prior to visit.     Review of patient's allergies indicates:   Allergen Reactions    Iodine and iodide containing products Rash      Review of Systems   Constitutional: Negative for diaphoresis, malaise/fatigue and weight gain.   HENT:  Negative for hoarse voice.    Eyes:  Negative for double vision and visual disturbance.   Cardiovascular:  Negative for chest pain, claudication, cyanosis, dyspnea on exertion, irregular heartbeat, leg swelling, near-syncope, orthopnea, palpitations, paroxysmal nocturnal dyspnea and syncope.   Respiratory:  Negative for cough, hemoptysis, shortness of breath and snoring.    Hematologic/Lymphatic: Negative for bleeding problem. Does not bruise/bleed easily.   Skin:  Negative for color change and poor wound healing.   Musculoskeletal:  Negative for  muscle cramps, muscle weakness and myalgias.   Gastrointestinal:  Negative for bloating, abdominal pain, change in bowel habit, diarrhea, heartburn, hematemesis, hematochezia, melena and nausea.   Neurological:  Negative for excessive daytime sleepiness, dizziness, headaches, light-headedness, loss of balance, numbness and weakness.   Psychiatric/Behavioral:  Negative for memory loss. The patient does not have insomnia.    Allergic/Immunologic: Negative for hives.     Objective:   Physical Exam  Vitals and nursing note reviewed.   Constitutional:       General: She is not in acute distress.     Appearance: Normal appearance. She is well-developed. She is not ill-appearing.   HENT:      Head: Normocephalic and atraumatic.   Eyes:      General: No scleral icterus.     Pupils: Pupils are equal, round, and reactive to light.   Neck:      Thyroid: No thyromegaly.      Vascular: Normal carotid pulses. No carotid bruit, hepatojugular reflux or JVD.      Trachea: No tracheal deviation.   Cardiovascular:      Rate and Rhythm: Normal rate. Rhythm irregularly irregular.      Pulses: Normal pulses.      Heart sounds: Normal heart sounds. No murmur heard.    No friction rub. No gallop.   Pulmonary:      Effort: Pulmonary effort is normal. No respiratory distress.      Breath sounds: Normal breath sounds. No wheezing, rhonchi or rales.   Chest:      Chest wall: No tenderness.   Abdominal:      General: Bowel sounds are normal. There is no abdominal bruit.      Palpations: Abdomen is soft. There is no hepatomegaly or pulsatile mass.      Tenderness: There is no abdominal tenderness.   Musculoskeletal:      Right shoulder: No deformity.      Cervical back: Normal range of motion and neck supple.      Right lower leg: No edema.      Left lower leg: No edema.   Skin:     General: Skin is warm and dry.      Findings: No erythema or rash.      Nails: There is no clubbing.      Comments: Spider veins noted and varicosities.  "  Neurological:      Mental Status: She is alert and oriented to person, place, and time.      Cranial Nerves: No cranial nerve deficit.      Coordination: Coordination normal.   Psychiatric:         Speech: Speech normal.         Behavior: Behavior normal.         Thought Content: Thought content normal.     Vitals:    10/27/22 1115   BP: (!) 102/50   BP Location: Right arm   Patient Position: Sitting   BP Method: Medium (Automatic)   Pulse: 71   SpO2: 97%   Weight: 60.7 kg (133 lb 13.1 oz)   Height: 4' 9" (1.448 m)     Lab Results   Component Value Date    CHOL 151 10/13/2022    CHOL 159 10/04/2021    CHOL 140 07/06/2021     Lab Results   Component Value Date    HDL 51 10/13/2022    HDL 63 10/04/2021    HDL 63 07/06/2021     Lab Results   Component Value Date    LDLCALC 85.0 10/13/2022    LDLCALC 80.0 10/04/2021    LDLCALC 64.2 07/06/2021     Lab Results   Component Value Date    TRIG 75 10/13/2022    TRIG 80 10/04/2021    TRIG 64 07/06/2021     Lab Results   Component Value Date    CHOLHDL 33.8 10/13/2022    CHOLHDL 39.6 10/04/2021    CHOLHDL 45.0 07/06/2021       Chemistry        Component Value Date/Time     10/13/2022 0834    K 3.5 10/13/2022 0834    CL 97 10/13/2022 0834    CO2 34 (H) 10/13/2022 0834    BUN 76 (H) 10/13/2022 0834    CREATININE 1.2 10/13/2022 0834     (H) 10/13/2022 0834        Component Value Date/Time    CALCIUM 9.4 10/13/2022 0834    ALKPHOS 87 10/13/2022 0834    AST 19 10/13/2022 0834    ALT 15 10/13/2022 0834    BILITOT 0.4 10/13/2022 0834    ESTGFRAFRICA >60.0 04/14/2022 1444    EGFRNONAA >60.0 04/14/2022 1444        Lab Results   Component Value Date    HGBA1C 5.4 10/13/2022         Lab Results   Component Value Date    TSH 2.353 10/04/2021     Lab Results   Component Value Date    INR 1.0 03/05/2022    INR 1.0 05/21/2019    INR 0.9 02/16/2019     Lab Results   Component Value Date    WBC 9.79 03/05/2022    HGB 13.3 03/05/2022    HCT 39.3 03/05/2022    MCV 92 03/05/2022    "  03/05/2022     BMP  Sodium   Date Value Ref Range Status   10/13/2022 141 136 - 145 mmol/L Final     Potassium   Date Value Ref Range Status   10/13/2022 3.5 3.5 - 5.1 mmol/L Final     Chloride   Date Value Ref Range Status   10/13/2022 97 95 - 110 mmol/L Final     CO2   Date Value Ref Range Status   10/13/2022 34 (H) 23 - 29 mmol/L Final     BUN   Date Value Ref Range Status   10/13/2022 76 (H) 8 - 23 mg/dL Final     Creatinine   Date Value Ref Range Status   10/13/2022 1.2 0.5 - 1.4 mg/dL Final     Calcium   Date Value Ref Range Status   10/13/2022 9.4 8.7 - 10.5 mg/dL Final     Anion Gap   Date Value Ref Range Status   10/13/2022 10 8 - 16 mmol/L Final     eGFR if    Date Value Ref Range Status   04/14/2022 >60.0 >60 mL/min/1.73 m^2 Final     eGFR if non    Date Value Ref Range Status   04/14/2022 >60.0 >60 mL/min/1.73 m^2 Final     Comment:     Calculation used to obtain the estimated glomerular filtration  rate (eGFR) is the CKD-EPI equation.        CrCl cannot be calculated (Patient's most recent lab result is older than the maximum 7 days allowed.).    Assessment:     1. PAF (paroxysmal atrial fibrillation)    2. Cognitive impairment    3. Atrial fibrillation, unspecified type    4. Atherosclerosis of aorta    5. Bilateral carotid bruits    6. Coronary artery disease, unspecified vessel or lesion type, unspecified whether angina present, unspecified whether native or transplanted heart    7. Dyspnea on exertion    8. Heart valve regurgitation    9. Hyperlipidemia, unspecified hyperlipidemia type    10. Left ventricular diastolic dysfunction with preserved systolic function    11. Longstanding persistent atrial fibrillation    12. Mild nonobstructive CAD per [facility] in 2016    13. Primary hypertension    14. Pulmonary heart disease    15. Stage 3a chronic kidney disease    16. CKD (chronic kidney disease) stage 4, GFR 15-29 ml/min    17. Obesity (BMI 30.0-34.9)    18.  Prediabetes      Cad stable asymptomatic continue same.   Afib now chronic on noac no bleeding tolerating well.    Htn controlled continue same  Hlp on target continue same  no meds side effects  Diabetes on target a1c normal continue same    Diastolic dysfunction on diuretics she appears contracted will drop lasix dose to once daily.     Plan:   Continue current therapy lasix to once daily   Cardiac low salt diet.  Risk factor modification and excercise program.  F/u in 6 months with lipid cmp  a1c

## 2023-01-01 NOTE — TELEPHONE ENCOUNTER
Attempted without success to reach patient.  No answer.  Will try back later.     Dameon Jones is a 2 days male  presents for circumcision.  Consents have been signed and reviewed.  Questions have been answered.  Risks/benefits/alternatives have been discussed.    Time out performed.    Anesthesia: 0.8cc of 1% lidocaine    Procedure: Circumcision with 1.1 gumco    Surgeon: Dr. Peyton Larson  Assistant: nurse and Tech  Complications: None  EBL: Minimal    Procedure:    Patient was taken to the circumcision room.  Dorsal bilateral penile block with 1% lidocaine was performed.  Area was prepped and draped in normal fashion.  Foreskin was removed in routine fashion using the gumco technique.      Gumco was removed.  Oozing controlled with gentle pressure and silver nitrate.  Excellent hemostasis was then noted.  Vitamin A&D gauze was then applied to the penis.

## 2023-01-03 ENCOUNTER — TELEPHONE (OUTPATIENT)
Dept: ADMINISTRATIVE | Facility: HOSPITAL | Age: 88
End: 2023-01-03
Payer: MEDICARE

## 2023-01-19 ENCOUNTER — APPOINTMENT (OUTPATIENT)
Dept: LAB | Facility: HOSPITAL | Age: 88
End: 2023-01-19
Attending: FAMILY MEDICINE
Payer: MEDICARE

## 2023-01-19 ENCOUNTER — OFFICE VISIT (OUTPATIENT)
Dept: PODIATRY | Facility: CLINIC | Age: 88
End: 2023-01-19
Payer: MEDICARE

## 2023-01-19 ENCOUNTER — OFFICE VISIT (OUTPATIENT)
Dept: INTERNAL MEDICINE | Facility: CLINIC | Age: 88
End: 2023-01-19
Payer: MEDICARE

## 2023-01-19 VITALS
SYSTOLIC BLOOD PRESSURE: 108 MMHG | DIASTOLIC BLOOD PRESSURE: 68 MMHG | BODY MASS INDEX: 29.77 KG/M2 | TEMPERATURE: 97 F | HEIGHT: 57 IN | WEIGHT: 138 LBS | RESPIRATION RATE: 18 BRPM | OXYGEN SATURATION: 95 % | HEART RATE: 62 BPM

## 2023-01-19 DIAGNOSIS — I70.0 ATHEROSCLEROSIS OF AORTA: Chronic | ICD-10-CM

## 2023-01-19 DIAGNOSIS — Q84.5 ENLARGED AND HYPERTROPHIC NAILS: ICD-10-CM

## 2023-01-19 DIAGNOSIS — I48.11 LONGSTANDING PERSISTENT ATRIAL FIBRILLATION: ICD-10-CM

## 2023-01-19 DIAGNOSIS — N18.30 STAGE 3 CHRONIC KIDNEY DISEASE, UNSPECIFIED WHETHER STAGE 3A OR 3B CKD: ICD-10-CM

## 2023-01-19 DIAGNOSIS — I10 ESSENTIAL HYPERTENSION: ICD-10-CM

## 2023-01-19 DIAGNOSIS — R73.03 PREDIABETES: ICD-10-CM

## 2023-01-19 DIAGNOSIS — I48.91 ATRIAL FIBRILLATION, UNSPECIFIED TYPE: ICD-10-CM

## 2023-01-19 DIAGNOSIS — E66.3 OVERWEIGHT (BMI 25.0-29.9): ICD-10-CM

## 2023-01-19 DIAGNOSIS — Z00.00 ROUTINE GENERAL MEDICAL EXAMINATION AT A HEALTH CARE FACILITY: Primary | ICD-10-CM

## 2023-01-19 DIAGNOSIS — I27.9 PULMONARY HEART DISEASE: ICD-10-CM

## 2023-01-19 DIAGNOSIS — L89.301 PRESSURE INJURY OF BUTTOCK, STAGE 1, UNSPECIFIED LATERALITY: ICD-10-CM

## 2023-01-19 DIAGNOSIS — F03.90 DEMENTIA, UNSPECIFIED DEMENTIA SEVERITY, UNSPECIFIED DEMENTIA TYPE, UNSPECIFIED WHETHER BEHAVIORAL, PSYCHOTIC, OR MOOD DISTURBANCE OR ANXIETY: ICD-10-CM

## 2023-01-19 DIAGNOSIS — D64.9 ANEMIA, UNSPECIFIED TYPE: ICD-10-CM

## 2023-01-19 DIAGNOSIS — R13.10 DYSPHAGIA, UNSPECIFIED TYPE: ICD-10-CM

## 2023-01-19 DIAGNOSIS — R32 URINARY INCONTINENCE, UNSPECIFIED TYPE: ICD-10-CM

## 2023-01-19 DIAGNOSIS — Z79.01 CURRENT USE OF LONG TERM ANTICOAGULATION: Primary | ICD-10-CM

## 2023-01-19 PROCEDURE — 99999 PR PBB SHADOW E&M-EST. PATIENT-LVL III: ICD-10-PCS | Mod: PBBFAC,HCNC,, | Performed by: PODIATRIST

## 2023-01-19 PROCEDURE — 1101F PT FALLS ASSESS-DOCD LE1/YR: CPT | Mod: HCNC,CPTII,S$GLB, | Performed by: FAMILY MEDICINE

## 2023-01-19 PROCEDURE — 1157F PR ADVANCE CARE PLAN OR EQUIV PRESENT IN MEDICAL RECORD: ICD-10-PCS | Mod: HCNC,CPTII,S$GLB, | Performed by: FAMILY MEDICINE

## 2023-01-19 PROCEDURE — 99202 OFFICE O/P NEW SF 15 MIN: CPT | Mod: 25,HCNC,S$GLB, | Performed by: PODIATRIST

## 2023-01-19 PROCEDURE — 3288F FALL RISK ASSESSMENT DOCD: CPT | Mod: HCNC,CPTII,S$GLB, | Performed by: FAMILY MEDICINE

## 2023-01-19 PROCEDURE — 99397 PER PM REEVAL EST PAT 65+ YR: CPT | Mod: HCNC,S$GLB,, | Performed by: FAMILY MEDICINE

## 2023-01-19 PROCEDURE — 1159F MED LIST DOCD IN RCRD: CPT | Mod: HCNC,CPTII,S$GLB, | Performed by: PODIATRIST

## 2023-01-19 PROCEDURE — 11721 DEBRIDE NAIL 6 OR MORE: CPT | Mod: Q9,HCNC,S$GLB, | Performed by: PODIATRIST

## 2023-01-19 PROCEDURE — G0008 FLU VACCINE - QUADRIVALENT - ADJUVANTED: ICD-10-PCS | Mod: HCNC,S$GLB,, | Performed by: FAMILY MEDICINE

## 2023-01-19 PROCEDURE — 99397 PR PREVENTIVE VISIT,EST,65 & OVER: ICD-10-PCS | Mod: HCNC,S$GLB,, | Performed by: FAMILY MEDICINE

## 2023-01-19 PROCEDURE — 1160F RVW MEDS BY RX/DR IN RCRD: CPT | Mod: HCNC,CPTII,S$GLB, | Performed by: PODIATRIST

## 2023-01-19 PROCEDURE — 1160F PR REVIEW ALL MEDS BY PRESCRIBER/CLIN PHARMACIST DOCUMENTED: ICD-10-PCS | Mod: HCNC,CPTII,S$GLB, | Performed by: PODIATRIST

## 2023-01-19 PROCEDURE — 1157F ADVNC CARE PLAN IN RCRD: CPT | Mod: HCNC,CPTII,S$GLB, | Performed by: FAMILY MEDICINE

## 2023-01-19 PROCEDURE — 11721 PR DEBRIDEMENT OF NAILS, 6 OR MORE: ICD-10-PCS | Mod: Q9,HCNC,S$GLB, | Performed by: PODIATRIST

## 2023-01-19 PROCEDURE — 1159F PR MEDICATION LIST DOCUMENTED IN MEDICAL RECORD: ICD-10-PCS | Mod: HCNC,CPTII,S$GLB, | Performed by: FAMILY MEDICINE

## 2023-01-19 PROCEDURE — 3288F FALL RISK ASSESSMENT DOCD: CPT | Mod: HCNC,CPTII,S$GLB, | Performed by: PODIATRIST

## 2023-01-19 PROCEDURE — 3288F PR FALLS RISK ASSESSMENT DOCUMENTED: ICD-10-PCS | Mod: HCNC,CPTII,S$GLB, | Performed by: FAMILY MEDICINE

## 2023-01-19 PROCEDURE — 1101F PR PT FALLS ASSESS DOC 0-1 FALLS W/OUT INJ PAST YR: ICD-10-PCS | Mod: HCNC,CPTII,S$GLB, | Performed by: FAMILY MEDICINE

## 2023-01-19 PROCEDURE — 99202 PR OFFICE/OUTPT VISIT, NEW, LEVL II, 15-29 MIN: ICD-10-PCS | Mod: 25,HCNC,S$GLB, | Performed by: PODIATRIST

## 2023-01-19 PROCEDURE — 1101F PR PT FALLS ASSESS DOC 0-1 FALLS W/OUT INJ PAST YR: ICD-10-PCS | Mod: HCNC,CPTII,S$GLB, | Performed by: PODIATRIST

## 2023-01-19 PROCEDURE — 1159F MED LIST DOCD IN RCRD: CPT | Mod: HCNC,CPTII,S$GLB, | Performed by: FAMILY MEDICINE

## 2023-01-19 PROCEDURE — 1157F PR ADVANCE CARE PLAN OR EQUIV PRESENT IN MEDICAL RECORD: ICD-10-PCS | Mod: HCNC,CPTII,S$GLB, | Performed by: PODIATRIST

## 2023-01-19 PROCEDURE — 1125F PR PAIN SEVERITY QUANTIFIED, PAIN PRESENT: ICD-10-PCS | Mod: HCNC,CPTII,S$GLB, | Performed by: FAMILY MEDICINE

## 2023-01-19 PROCEDURE — 99999 PR PBB SHADOW E&M-EST. PATIENT-LVL V: CPT | Mod: PBBFAC,HCNC,, | Performed by: FAMILY MEDICINE

## 2023-01-19 PROCEDURE — 1101F PT FALLS ASSESS-DOCD LE1/YR: CPT | Mod: HCNC,CPTII,S$GLB, | Performed by: PODIATRIST

## 2023-01-19 PROCEDURE — 3288F PR FALLS RISK ASSESSMENT DOCUMENTED: ICD-10-PCS | Mod: HCNC,CPTII,S$GLB, | Performed by: PODIATRIST

## 2023-01-19 PROCEDURE — 99999 PR PBB SHADOW E&M-EST. PATIENT-LVL V: ICD-10-PCS | Mod: PBBFAC,HCNC,, | Performed by: FAMILY MEDICINE

## 2023-01-19 PROCEDURE — 1157F ADVNC CARE PLAN IN RCRD: CPT | Mod: HCNC,CPTII,S$GLB, | Performed by: PODIATRIST

## 2023-01-19 PROCEDURE — 99999 PR PBB SHADOW E&M-EST. PATIENT-LVL III: CPT | Mod: PBBFAC,HCNC,, | Performed by: PODIATRIST

## 2023-01-19 PROCEDURE — G0008 ADMIN INFLUENZA VIRUS VAC: HCPCS | Mod: HCNC,S$GLB,, | Performed by: FAMILY MEDICINE

## 2023-01-19 PROCEDURE — 90694 FLU VACCINE - QUADRIVALENT - ADJUVANTED: ICD-10-PCS | Mod: HCNC,S$GLB,, | Performed by: FAMILY MEDICINE

## 2023-01-19 PROCEDURE — 90694 VACC AIIV4 NO PRSRV 0.5ML IM: CPT | Mod: HCNC,S$GLB,, | Performed by: FAMILY MEDICINE

## 2023-01-19 PROCEDURE — 1159F PR MEDICATION LIST DOCUMENTED IN MEDICAL RECORD: ICD-10-PCS | Mod: HCNC,CPTII,S$GLB, | Performed by: PODIATRIST

## 2023-01-19 PROCEDURE — 1125F AMNT PAIN NOTED PAIN PRSNT: CPT | Mod: HCNC,CPTII,S$GLB, | Performed by: FAMILY MEDICINE

## 2023-01-19 NOTE — ASSESSMENT & PLAN NOTE
Body mass index is 29.86 kg/m².    Wt Readings from Last 10 Encounters:   01/19/23 62.6 kg (138 lb 0.1 oz)   10/27/22 60.7 kg (133 lb 13.1 oz)   04/14/22 64 kg (141 lb)   04/14/22 64.4 kg (141 lb 15.6 oz)   03/04/22 63 kg (138 lb 14.2 oz)   02/09/22 62.6 kg (138 lb 1.9 oz)   02/05/22 70.4 kg (155 lb 3.3 oz)   10/08/21 69 kg (152 lb 1.9 oz)   10/04/21 71.6 kg (157 lb 13.6 oz)   07/06/21 67 kg (147 lb 11.3 oz)

## 2023-01-19 NOTE — PROGRESS NOTES
Subjective:       Patient ID: Elke Laws is a 88 y.o. female.    Chief Complaint: Annual Exam    Patient presents to clinic today for annual physical exam. Her daughter is present with her today. They desire to re-establish care with me. Daughter requests new rollator, patient's is broken and brakes no longer work.    Review of Systems   Constitutional:  Negative for chills, fatigue, fever and unexpected weight change.   HENT:  Positive for dental problem, hearing loss, rhinorrhea and trouble swallowing (sometimes). Negative for congestion and ear pain.    Eyes:  Negative for pain and visual disturbance.   Respiratory:  Positive for cough (sometimes after eating; 3+ months (moved in with daughter 3 months ago, previously cared for by her sister)) and shortness of breath.    Cardiovascular:  Negative for chest pain, palpitations and leg swelling.   Gastrointestinal:  Positive for constipation (treating with OTC meds with good results) and nausea (sometimes). Negative for abdominal distention, abdominal pain, blood in stool, diarrhea and vomiting.   Genitourinary:  Negative for difficulty urinating and vaginal discharge.   Musculoskeletal:  Positive for back pain. Negative for arthralgias and myalgias.   Skin:  Negative for rash.   Neurological:  Positive for weakness and numbness. Negative for dizziness and headaches.   Hematological:  Negative for adenopathy. Bruises/bleeds easily.   Psychiatric/Behavioral:  Negative for dysphoric mood and sleep disturbance. The patient is nervous/anxious.      Above positive ROS are chronic/stable per patient report unless otherwise specified.    Objective:      Physical Exam  Vitals reviewed.   Constitutional:       General: She is not in acute distress.     Appearance: Normal appearance. She is well-developed.   HENT:      Head: Normocephalic and atraumatic.      Right Ear: Tympanic membrane, ear canal and external ear normal.      Left Ear: Tympanic membrane, ear canal and  external ear normal.      Nose: Nose normal. No mucosal edema or rhinorrhea.      Mouth/Throat:      Pharynx: Uvula midline.   Eyes:      General: Lids are normal. No scleral icterus.     Extraocular Movements: Extraocular movements intact.      Conjunctiva/sclera: Conjunctivae normal.      Pupils: Pupils are equal, round, and reactive to light.   Neck:      Thyroid: No thyromegaly.   Cardiovascular:      Rate and Rhythm: Normal rate. Rhythm irregularly irregular.      Heart sounds: No murmur heard.    No friction rub. No gallop.   Pulmonary:      Effort: Pulmonary effort is normal.      Breath sounds: Normal breath sounds. No wheezing, rhonchi or rales.   Abdominal:      General: Bowel sounds are normal. There is no distension.      Palpations: Abdomen is soft. There is no mass.      Tenderness: There is no abdominal tenderness.   Musculoskeletal:         General: Normal range of motion.      Cervical back: Normal range of motion and neck supple.   Lymphadenopathy:      Cervical: No cervical adenopathy.   Skin:     General: Skin is warm and dry.      Findings: Rash present. No lesion.      Nails: There is no clubbing.          Neurological:      Mental Status: She is alert and oriented to person, place, and time.      Gait: Gait abnormal.      Comments: Ambulates with rollator   Psychiatric:         Mood and Affect: Mood and affect normal.       Assessment:       1. Routine general medical examination at a health care facility    2. Prediabetes    3. Essential hypertension    4. Pulmonary heart disease    5. Urinary incontinence, unspecified type    6. Enlarged and hypertrophic nails    7. Dysphagia, unspecified type    8. Pressure injury of buttock, stage 1, unspecified laterality    9. Overweight (BMI 25.0-29.9)    10. Dementia, unspecified dementia severity, unspecified dementia type, unspecified whether behavioral, psychotic, or mood disturbance or anxiety    11. Anemia, unspecified type    12. Stage 3 chronic  kidney disease, unspecified whether stage 3a or 3b CKD    13. Atherosclerosis of aorta    14. Longstanding persistent atrial fibrillation          Plan:   1. Routine general medical examination at a health care facility    2. Prediabetes  Assessment & Plan:  Normal a1c    Lab Results   Component Value Date    HGBA1C 5.4 10/13/2022    HGBA1C 5.5 05/26/2020    LDLCALC 85.0 10/13/2022    CREATRANDUR 108.0 03/01/2018           3. Essential hypertension  -     CBC Auto Differential; Future; Expected date: 01/19/2023  -     Comprehensive Metabolic Panel; Future; Expected date: 01/19/2023  -     TSH; Future; Expected date: 01/19/2023    4. Pulmonary heart disease  Overview:  Followed by Cardiology, continue current treatment plan Echo 3/2019    Orders:  -     WALKER FOR HOME USE    5. Urinary incontinence, unspecified type  -     Urinalysis; Future; Expected date: 01/19/2023  -     Urine culture; Future; Expected date: 01/19/2023    6. Enlarged and hypertrophic nails  -     Ambulatory referral/consult to Podiatry; Future; Expected date: 01/26/2023    7. Dysphagia, unspecified type  -     FL Esophagram Complete; Future; Expected date: 01/19/2023    8. Pressure injury of buttock, stage 1, unspecified laterality  -     WHEELCHAIR GEL CUSHION FOR HOME USE    9. Overweight (BMI 25.0-29.9)  Assessment & Plan:  Body mass index is 29.86 kg/m².    Wt Readings from Last 10 Encounters:   01/19/23 62.6 kg (138 lb 0.1 oz)   10/27/22 60.7 kg (133 lb 13.1 oz)   04/14/22 64 kg (141 lb)   04/14/22 64.4 kg (141 lb 15.6 oz)   03/04/22 63 kg (138 lb 14.2 oz)   02/09/22 62.6 kg (138 lb 1.9 oz)   02/05/22 70.4 kg (155 lb 3.3 oz)   10/08/21 69 kg (152 lb 1.9 oz)   10/04/21 71.6 kg (157 lb 13.6 oz)   07/06/21 67 kg (147 lb 11.3 oz)           10. Dementia, unspecified dementia severity, unspecified dementia type, unspecified whether behavioral, psychotic, or mood disturbance or anxiety  Overview:  Diagnosed by Dr. Amato, [facility], July  2021      11. Anemia, unspecified type  Assessment & Plan:  Status pending lab       12. Stage 3 chronic kidney disease, unspecified whether stage 3a or 3b CKD  Assessment & Plan:  Status pending lab       13. Atherosclerosis of aorta  Overview:  CXR 12/30/2016--- Aorta is tortuous with underlying calcification.     Xray chest 5/2015---Stable aortic tortuosity and atherosclerosis.   Patient also has noncompressible vascular abnormalities on CANDICE exam of the left lower extremity consistent with atherosclerosis.  CANDICE done on 7/1/2015.      14. Longstanding persistent atrial fibrillation  Overview:  Followed by Cardiology, continue current treatment plan       Other orders  -     Influenza - Quadrivalent (Adjuvanted)      Health Maintenance reviewed/updated.

## 2023-01-19 NOTE — PATIENT INSTRUCTIONS
"The bivalent covid booster is now available. You can schedule an appointment for the vaccine through [facility] or ask  to assist you with scheduling an appointment for the vaccine.    The bivalent vaccines, known as the "Omicron" vaccines, target both the original strain of COVID-19 as well as the Omicron variant, which is now the predominant strain in the [place].    The Omicron booster is authorized for individuals 12 years and older and is given two months after last dose of primary or booster shot.   "

## 2023-01-19 NOTE — ASSESSMENT & PLAN NOTE
Normal a1c    Lab Results   Component Value Date    HGBA1C 5.4 10/13/2022    HGBA1C 5.5 05/26/2020    LDLCALC 85.0 10/13/2022    CREATRANDUR 108.0 03/01/2018

## 2023-01-19 NOTE — PROGRESS NOTES
Subjective:       Patient ID: Elke Laws is a 88 y.o. female.    Chief Complaint: Nail Problem (Patient complains of thickened nail to right second toe. Patient complains of 3/10 pain at present to area. She is non diabetic and continues on Eliquis. She was last see  by PCP By Isidra Benavidez on 01.19.23)      HPI: Patient presents to the office today at the referral of Dr. Benavidez with pain present to the digits on the right foot.  States having increased pain associated with dystrophic thickened toenails growing upwards.  She is currently on longstanding anticoagulation therapy with Eliquis.  Does have a history of atrial fibrillation.  Continues to follow with Dr. Benavidez with last appointment on 01/19/2023.   This patient's PMD is Isidra Benavidez MD.  Presents to clinic today with her daughter present    Review of patient's allergies indicates:   Allergen Reactions    Iodine and iodide containing products Rash       Past Medical History:   Diagnosis Date    Abnormal nuclear stress test 6/30/2016    Acute congestive heart failure 5/27/2017    Acute coronary syndrome     Acute kidney injury (nontraumatic) 5/27/2017    Acute on chronic congestive heart failure 5/21/2017    Acute on chronic diastolic congestive heart failure 8/27/2015    Acute on chronic respiratory failure 5/6/2019    Acute renal failure superimposed on stage 3 chronic kidney disease 1/26/2018    Anemia 5/9/2016    Angina pectoris 1/25/2018    Angina pectoris 1/25/2018    Anticoagulant long-term use     Anxiety 6/16/2019    Aortic regurgitation     Echo 11/2013---5 - Mild to moderate aortic regurgitation.      Asthma     Atrial fibrillation 5/15/2014    Back pain     s/p epidural steriod injections, no recent injections.    Bridges's cyst     small, right, seen on US lower extremity 6/2014    Blood transfusion     Approximately 6 years ago    Chronic constipation     Coronary artery disease     Degenerative disc disease, lumbar     Dementia  "10/8/2021    Diastolic dysfunction     Seen on echo 2013, stress test negative 2014    Diverticulosis     colonoscopy 3/27/2011    E coli bacteremia 2017    E. coli UTI (urinary tract infection) 2017    Hemorrhoids     colonoscopy 3/27/2011    Hiatal hernia     CXR 2016---Retrocardiac density again noted consistent with a hiatal hernia.    Hx of adenomatous colonic polyps     Hyperlipidemia     Hypertension     Hyponatremia 2016    Hypoxemia 2017    Hypoxia 2017    Leukocytosis 2017    Mitral regurgitation     Myocardial infarction     per patient was told in the past she had a "mild heart attack"    Obesity     Osteoarthritis, knee     Pneumonia     per patient "walking Pneumonia" did not require hospitalization    Seasonal allergies     Sepsis 2017    Severe obesity with body mass index (BMI) of 35.0 to 39.9 with serious comorbidity     Trouble in sleeping     Urinary incontinence        Family History   Problem Relation Age of Onset    Heart disease Mother     Cancer Mother         colon    Hypertension Mother     Hyperlipidemia Mother     Heart disease Father     Hypertension Father     Hyperlipidemia Father     Heart disease Sister         MI    Heart disease Brother         MI    COPD Son     Hypertension Son     Diabetes Brother     Diabetes Son     Cancer Sister         breast    Kidney disease Neg Hx     Stroke Neg Hx        Social History     Socioeconomic History    Marital status:     Number of children: 4    Highest education level: 8th grade   Occupational History    Occupation: retired   Tobacco Use    Smoking status: Former     Packs/day: 0.05     Years: 1.00     Pack years: 0.05     Types: Cigarettes     Quit date: 1952     Years since quittin.0    Smokeless tobacco: Never   Substance and Sexual Activity    Alcohol use: No     Alcohol/week: 0.0 standard drinks    Drug use: No    Sexual activity: Not Currently     Partners: Male     Birth " control/protection: See Surgical Hx       Past Surgical History:   Procedure Laterality Date    APPENDECTOMY      CATARACT EXTRACTION, BILATERAL      COLONOSCOPY N/A 8/29/2017    Procedure: COLONOSCOPY okay by dr. ramos;  Surgeon: Toño Abdi MD;  Location: [facility] ENDO;  Service: Endoscopy;  Laterality: N/A;    EYE SURGERY Left     HYSTERECTOMY      benign reasons    KNEE SURGERY Bilateral     x2     Left heart cath      OLECRANON BURSECTOMY Right     6/2011    TONSILLECTOMY      TREATMENT OF CARDIAC ARRHYTHMIA N/A 4/16/2019    Procedure: CARDIOVERSION OR DEFIBRILLATION;  Surgeon: Kee Jones MD;  Location: [facility] CATH LAB;  Service: Cardiology;  Laterality: N/A;    TREATMENT OF CARDIAC ARRHYTHMIA N/A 5/24/2019    Procedure: CARDIOVERSION;  Surgeon: Juan Manuel Gooden MD;  Location: [facility] CATH LAB;  Service: Cardiology;  Laterality: N/A;    URETHRA SURGERY         Review of Systems       Objective:   LMP 12/13/1973     Physical Exam  LOWER EXTREMITY PHYSICAL EXAMINATION    VASCULAR:  The right dorsalis pedis pulse 2/4 and the right posterior tibial pulse 1/4.  The left dorsalis pedis pulse 2/4 and posterior tibial pulse on the left is 1/4.  Capillary refill is intact.  Pedal hair growth decreased.     NEUROLOGY:  Sharp/dull, light touch, proprioception all intact and equal bilaterally.  Vibratory sensation is intact.  Protective sensation intact    DERMATOLOGY:  Skin is supple, moist, intact.  There is no signs of callusing, ulcerations, other lesions identified to the dorsal or plantar aspect of the right or left foot.  The R1, 2, 5 and left L1,2, 5 are thickened, discolored dystrophic.  There is subungual debris.  Nail plates have area of dark discoloration.  The remaining nails 3-4 on the right foot and the left foot are elongated but of normal color, thickness, and texture.   There is no signs of ingrowing into the medial or lateral borders.  There is no evidence of wounds or skin breakdown.  No edema or erythema.  No  obvious lacerations or fissuring.  Interdigital spaces are clean, dry, intact.  No rashes or scars appreciated.    ORTHOPEDIC: Manual Muscle Testing is 5/5 in all planes on the left and right, without pains, with and without resistance. Gait pattern is non-antalgic.    Assessment:     1. Current use of long term anticoagulation    2. Atrial fibrillation, unspecified type    3. Enlarged and hypertrophic nails        Plan:     Current use of long term anticoagulation    Atrial fibrillation, unspecified type    Enlarged and hypertrophic nails  -     Ambulatory referral/consult to Podiatry      Foot counseling and education is provided at this visit. Patient is advised to wear socks and shoes at all times.  Do not walk barefoot, or with just socks, even when indoors.  Be sure to check and inspect the inside of the shoe before putting them on her feet.  Protect your feet at all times.  Walking shoes and/or athletic shoes are the best types of shoe gear. Do not wear vinyl or plastic type shoe gear, as they do not stretch and/or breathe.  Protect your feet from hot and/or cold. Elevate the extremities when sitting.  Do not wear excessively tight socks and/or shoe gear. Wiggle your toes for a few minutes throughout the day. Move your ankles up and down, in and out, to help blood flow in your lower extremity.     Dystrophic nail plates, as outlined above (R#1,2,5  ; L#1,2,5 ), are sharply debrided with double action nail nipper, and/or with the assistance of a mechanical rotary divina, with removal of all offending nail and nail border(s), for reduction of pains. Nails are reduced in terms of length, width and girth with removal of subungual debris to facilitate pain free weight bearing and ambulation. The elongated nails as outlined in the objective portion of this note, were trimmed to appropriate length, with a double action nail nipper, for alleviation/reduction of pains as well. Follow up in approx. 3-4  months.        Future Appointments   Date Time Provider Department Center   4/24/2023  8:10 AM LABORAT[facility], [facility]   5/1/2023  2:00 PM Tish Angulo MD [facility]   7/19/2023 10:20 AM Hannah Beasley PA-C [facility]

## 2023-01-20 DIAGNOSIS — E87.6 DIURETIC-INDUCED HYPOKALEMIA: ICD-10-CM

## 2023-01-20 DIAGNOSIS — T50.2X5A DIURETIC-INDUCED HYPOKALEMIA: ICD-10-CM

## 2023-01-20 DIAGNOSIS — E87.5 HYPERKALEMIA: Primary | ICD-10-CM

## 2023-01-20 RX ORDER — POTASSIUM CHLORIDE 750 MG/1
10 TABLET, EXTENDED RELEASE ORAL DAILY
Start: 2023-01-20 | End: 2023-02-10

## 2023-01-23 ENCOUNTER — TELEPHONE (OUTPATIENT)
Dept: INTERNAL MEDICINE | Facility: CLINIC | Age: 88
End: 2023-01-23
Payer: MEDICARE

## 2023-03-01 NOTE — ED NOTES
Pt AAOx3, resting in bed, side rails up x 2, call bell within reach. NAD at this time. Will continue to monitor.     Dressing: pressure dressing with telfa

## 2023-03-16 ENCOUNTER — TELEPHONE (OUTPATIENT)
Dept: CARDIOLOGY | Facility: CLINIC | Age: 88
End: 2023-03-16
Payer: MEDICARE

## 2023-03-16 NOTE — TELEPHONE ENCOUNTER
Spoe to relative and r/s to 5/9 with Dr. Angulo----- Message from Logan New sent at 3/16/2023  1:51 PM CDT -----  Contact: Keya/ Daughter  Keya is needing a call back in regards to rescheduling the patient's appointment. Please give her a call back at [phone].

## 2023-04-24 ENCOUNTER — LAB VISIT (OUTPATIENT)
Dept: LAB | Facility: HOSPITAL | Age: 88
End: 2023-04-24
Attending: INTERNAL MEDICINE
Payer: MEDICARE

## 2023-04-24 DIAGNOSIS — I25.10 CORONARY ARTERY DISEASE, UNSPECIFIED VESSEL OR LESION TYPE, UNSPECIFIED WHETHER ANGINA PRESENT, UNSPECIFIED WHETHER NATIVE OR TRANSPLANTED HEART: ICD-10-CM

## 2023-04-24 DIAGNOSIS — R73.03 PREDIABETES: ICD-10-CM

## 2023-04-24 LAB
ALBUMIN SERPL BCP-MCNC: 3.4 G/DL (ref 3.5–5.2)
ALP SERPL-CCNC: 104 U/L (ref 55–135)
ALT SERPL W/O P-5'-P-CCNC: 15 U/L (ref 10–44)
ANION GAP SERPL CALC-SCNC: 11 MMOL/L (ref 8–16)
AST SERPL-CCNC: 20 U/L (ref 10–40)
BILIRUB SERPL-MCNC: 0.9 MG/DL (ref 0.1–1)
BUN SERPL-MCNC: 38 MG/DL (ref 8–23)
CALCIUM SERPL-MCNC: 9.5 MG/DL (ref 8.7–10.5)
CHLORIDE SERPL-SCNC: 95 MMOL/L (ref 95–110)
CHOLEST SERPL-MCNC: 138 MG/DL (ref 120–199)
CHOLEST/HDLC SERPL: 2.3 {RATIO} (ref 2–5)
CO2 SERPL-SCNC: 34 MMOL/L (ref 23–29)
CREAT SERPL-MCNC: 1 MG/DL (ref 0.5–1.4)
EST. GFR  (NO RACE VARIABLE): 54.2 ML/MIN/1.73 M^2
ESTIMATED AVG GLUCOSE: 103 MG/DL (ref 68–131)
GLUCOSE SERPL-MCNC: 94 MG/DL (ref 70–110)
HBA1C MFR BLD: 5.2 % (ref 4–5.6)
HDLC SERPL-MCNC: 61 MG/DL (ref 40–75)
HDLC SERPL: 44.2 % (ref 20–50)
LDLC SERPL CALC-MCNC: 68.8 MG/DL (ref 63–159)
NONHDLC SERPL-MCNC: 77 MG/DL
POTASSIUM SERPL-SCNC: 3.3 MMOL/L (ref 3.5–5.1)
PROT SERPL-MCNC: 6.5 G/DL (ref 6–8.4)
SODIUM SERPL-SCNC: 140 MMOL/L (ref 136–145)
TRIGL SERPL-MCNC: 41 MG/DL (ref 30–150)

## 2023-04-24 PROCEDURE — 36415 COLL VENOUS BLD VENIPUNCTURE: CPT | Mod: HCNC | Performed by: INTERNAL MEDICINE

## 2023-04-24 PROCEDURE — 80053 COMPREHEN METABOLIC PANEL: CPT | Mod: HCNC | Performed by: INTERNAL MEDICINE

## 2023-04-24 PROCEDURE — 80061 LIPID PANEL: CPT | Mod: HCNC | Performed by: INTERNAL MEDICINE

## 2023-04-24 PROCEDURE — 83036 HEMOGLOBIN GLYCOSYLATED A1C: CPT | Mod: HCNC | Performed by: INTERNAL MEDICINE

## 2023-04-25 ENCOUNTER — TELEPHONE (OUTPATIENT)
Dept: CARDIOLOGY | Facility: CLINIC | Age: 88
End: 2023-04-25
Payer: MEDICARE

## 2023-04-25 NOTE — TELEPHONE ENCOUNTER
Patient's daughter was notified of results. All questions were answered. Pt's daughter verbalized understanding. Pt's daughter will call back with any other questions or concerns.    Med list updated  ----- Message from Tish Angulo MD sent at 4/25/2023  8:24 AM CDT -----  Potassium is a little  low ask her to cut lasix dose in half .

## 2023-04-27 DIAGNOSIS — E78.5 HYPERLIPIDEMIA, UNSPECIFIED HYPERLIPIDEMIA TYPE: Chronic | ICD-10-CM

## 2023-04-27 DIAGNOSIS — Z86.73 HISTORY OF TIA (TRANSIENT ISCHEMIC ATTACK): ICD-10-CM

## 2023-04-27 DIAGNOSIS — I48.91 ATRIAL FIBRILLATION, UNSPECIFIED TYPE: ICD-10-CM

## 2023-04-27 RX ORDER — METOLAZONE 2.5 MG/1
TABLET ORAL
Qty: 39 TABLET | Refills: 3 | Status: SHIPPED | OUTPATIENT
Start: 2023-04-27 | End: 2023-08-25 | Stop reason: SDUPTHER

## 2023-04-27 RX ORDER — METOPROLOL TARTRATE 50 MG/1
TABLET ORAL
Qty: 90 TABLET | Refills: 3 | Status: SHIPPED | OUTPATIENT
Start: 2023-04-27 | End: 2023-08-25 | Stop reason: SDUPTHER

## 2023-04-27 RX ORDER — PRAVASTATIN SODIUM 40 MG/1
TABLET ORAL
Qty: 90 TABLET | Refills: 3 | Status: SHIPPED | OUTPATIENT
Start: 2023-04-27 | End: 2023-08-25 | Stop reason: SDUPTHER

## 2023-05-02 DIAGNOSIS — I48.19 ATRIAL FIBRILLATION, PERSISTENT: ICD-10-CM

## 2023-05-02 DIAGNOSIS — I48.91 ATRIAL FIBRILLATION, UNSPECIFIED TYPE: ICD-10-CM

## 2023-05-02 RX ORDER — APIXABAN 5 MG/1
TABLET, FILM COATED ORAL
Qty: 60 TABLET | Refills: 6 | Status: SHIPPED | OUTPATIENT
Start: 2023-05-02 | End: 2023-09-08 | Stop reason: SDUPTHER

## 2023-05-09 ENCOUNTER — OFFICE VISIT (OUTPATIENT)
Dept: CARDIOLOGY | Facility: CLINIC | Age: 88
End: 2023-05-09
Payer: MEDICARE

## 2023-05-09 ENCOUNTER — HOSPITAL ENCOUNTER (OUTPATIENT)
Dept: CARDIOLOGY | Facility: HOSPITAL | Age: 88
Discharge: HOME | End: 2023-05-09
Attending: INTERNAL MEDICINE
Payer: MEDICARE

## 2023-05-09 VITALS
WEIGHT: 135.56 LBS | SYSTOLIC BLOOD PRESSURE: 118 MMHG | OXYGEN SATURATION: 95 % | DIASTOLIC BLOOD PRESSURE: 70 MMHG | BODY MASS INDEX: 29.25 KG/M2 | HEART RATE: 88 BPM | HEIGHT: 57 IN

## 2023-05-09 DIAGNOSIS — R06.09 DYSPNEA ON EXERTION: ICD-10-CM

## 2023-05-09 DIAGNOSIS — I27.9 PULMONARY HEART DISEASE: ICD-10-CM

## 2023-05-09 DIAGNOSIS — I38 HEART VALVE REGURGITATION: ICD-10-CM

## 2023-05-09 DIAGNOSIS — I25.10 CORONARY ARTERY DISEASE INVOLVING NATIVE CORONARY ARTERY OF NATIVE HEART WITHOUT ANGINA PECTORIS: Chronic | ICD-10-CM

## 2023-05-09 DIAGNOSIS — I10 ESSENTIAL HYPERTENSION: ICD-10-CM

## 2023-05-09 DIAGNOSIS — R09.89 BILATERAL CAROTID BRUITS: ICD-10-CM

## 2023-05-09 DIAGNOSIS — E78.5 HYPERLIPIDEMIA, UNSPECIFIED HYPERLIPIDEMIA TYPE: Chronic | ICD-10-CM

## 2023-05-09 DIAGNOSIS — N18.30 STAGE 3 CHRONIC KIDNEY DISEASE, UNSPECIFIED WHETHER STAGE 3A OR 3B CKD: ICD-10-CM

## 2023-05-09 DIAGNOSIS — I70.0 ATHEROSCLEROSIS OF AORTA: Chronic | ICD-10-CM

## 2023-05-09 DIAGNOSIS — I48.0 PAF (PAROXYSMAL ATRIAL FIBRILLATION): Primary | ICD-10-CM

## 2023-05-09 DIAGNOSIS — I48.91 ATRIAL FIBRILLATION, UNSPECIFIED TYPE: Primary | ICD-10-CM

## 2023-05-09 DIAGNOSIS — R09.89 DECREASED PULSES IN FEET: ICD-10-CM

## 2023-05-09 DIAGNOSIS — I48.0 PAF (PAROXYSMAL ATRIAL FIBRILLATION): Chronic | ICD-10-CM

## 2023-05-09 DIAGNOSIS — I48.0 PAF (PAROXYSMAL ATRIAL FIBRILLATION): ICD-10-CM

## 2023-05-09 DIAGNOSIS — I51.89 LEFT VENTRICULAR DIASTOLIC DYSFUNCTION WITH PRESERVED SYSTOLIC FUNCTION: Chronic | ICD-10-CM

## 2023-05-09 PROCEDURE — 1159F MED LIST DOCD IN RCRD: CPT | Mod: CPTII,S$GLB,, | Performed by: INTERNAL MEDICINE

## 2023-05-09 PROCEDURE — 3288F FALL RISK ASSESSMENT DOCD: CPT | Mod: CPTII,S$GLB,, | Performed by: INTERNAL MEDICINE

## 2023-05-09 PROCEDURE — 1160F RVW MEDS BY RX/DR IN RCRD: CPT | Mod: CPTII,S$GLB,, | Performed by: INTERNAL MEDICINE

## 2023-05-09 PROCEDURE — 99214 OFFICE O/P EST MOD 30 MIN: CPT | Mod: S$GLB,,, | Performed by: INTERNAL MEDICINE

## 2023-05-09 PROCEDURE — 93010 ELECTROCARDIOGRAM REPORT: CPT | Mod: ,,, | Performed by: INTERNAL MEDICINE

## 2023-05-09 PROCEDURE — 1101F PR PT FALLS ASSESS DOC 0-1 FALLS W/OUT INJ PAST YR: ICD-10-PCS | Mod: CPTII,S$GLB,, | Performed by: INTERNAL MEDICINE

## 2023-05-09 PROCEDURE — 99214 PR OFFICE/OUTPT VISIT, EST, LEVL IV, 30-39 MIN: ICD-10-PCS | Mod: S$GLB,,, | Performed by: INTERNAL MEDICINE

## 2023-05-09 PROCEDURE — 1157F PR ADVANCE CARE PLAN OR EQUIV PRESENT IN MEDICAL RECORD: ICD-10-PCS | Mod: CPTII,S$GLB,, | Performed by: INTERNAL MEDICINE

## 2023-05-09 PROCEDURE — 1101F PT FALLS ASSESS-DOCD LE1/YR: CPT | Mod: CPTII,S$GLB,, | Performed by: INTERNAL MEDICINE

## 2023-05-09 PROCEDURE — 1157F ADVNC CARE PLAN IN RCRD: CPT | Mod: CPTII,S$GLB,, | Performed by: INTERNAL MEDICINE

## 2023-05-09 PROCEDURE — 1126F PR PAIN SEVERITY QUANTIFIED, NO PAIN PRESENT: ICD-10-PCS | Mod: CPTII,S$GLB,, | Performed by: INTERNAL MEDICINE

## 2023-05-09 PROCEDURE — 1159F PR MEDICATION LIST DOCUMENTED IN MEDICAL RECORD: ICD-10-PCS | Mod: CPTII,S$GLB,, | Performed by: INTERNAL MEDICINE

## 2023-05-09 PROCEDURE — 99999 PR PBB SHADOW E&M-EST. PATIENT-LVL III: ICD-10-PCS | Mod: PBBFAC,,, | Performed by: INTERNAL MEDICINE

## 2023-05-09 PROCEDURE — 3288F PR FALLS RISK ASSESSMENT DOCUMENTED: ICD-10-PCS | Mod: CPTII,S$GLB,, | Performed by: INTERNAL MEDICINE

## 2023-05-09 PROCEDURE — 1160F PR REVIEW ALL MEDS BY PRESCRIBER/CLIN PHARMACIST DOCUMENTED: ICD-10-PCS | Mod: CPTII,S$GLB,, | Performed by: INTERNAL MEDICINE

## 2023-05-09 PROCEDURE — 93010 EKG 12-LEAD: ICD-10-PCS | Mod: ,,, | Performed by: INTERNAL MEDICINE

## 2023-05-09 PROCEDURE — 1126F AMNT PAIN NOTED NONE PRSNT: CPT | Mod: CPTII,S$GLB,, | Performed by: INTERNAL MEDICINE

## 2023-05-09 PROCEDURE — 93005 ELECTROCARDIOGRAM TRACING: CPT

## 2023-05-09 PROCEDURE — 99999 PR PBB SHADOW E&M-EST. PATIENT-LVL III: CPT | Mod: PBBFAC,,, | Performed by: INTERNAL MEDICINE

## 2023-05-09 RX ORDER — DOCUSATE SODIUM 100 MG/1
CAPSULE, LIQUID FILLED ORAL 2 TIMES DAILY
COMMUNITY
End: 2023-11-21

## 2023-05-09 RX ORDER — SENNOSIDES 8.6 MG/1
1 TABLET ORAL DAILY PRN
COMMUNITY

## 2023-05-09 RX ORDER — ASCORBIC ACID 500 MG
500 TABLET ORAL DAILY
COMMUNITY

## 2023-05-09 NOTE — PROGRESS NOTES
Subjective:   Patient ID:  Elke Laws is a 88 y.o. female who presents for follow up of No chief complaint on file.      HPI  SHERIN STRATTON NP 10/8/2021  Ms. Laws' current conditions include persistent AF, diastolic CHF, non obstructive CAD per [facility] done in , HTN and HLD, MR  Family reports that patient's oxygen has been discontinued.      Admitted in May 2019 for A-fib with RVR. She had failed CV x 2 during admission. DC'd with [facility]. No BB restarted at that time due to bradycardia during admission. Patient declined follow up with EP ablation and PPM consideration and opted for conservative medical management.      Has no abnormal bleeding on Eliquis      Recently diagnosed with Dementia. Her sister in law, Alcira, is now helping care for her. Her son Calixto Laws is now POA     Has no complaints today.   Denies any chest pain, SOB, DIAZ,  orthopnea, PND, dizziness, palpitations,  near syncope, syncope or edema . Has no symptoms concerning for angina or equivalent. No CNS Complaints to suggest TIA or CVA. Does well with limiting sodium intake.     Currently taking Lasix 40mg BID and Metolazone 2.5mg on MF.      4/14/2022   HEREE FOR F/U HAS LOST WEIGHT SHE IS STILL COOKING TAKING CARE OF HOUSE WORK HAS NO CHEST PAIN NO SHORTNESS OF BREATH TURCIOS S NO LEG SWELLING  HAS VARICOSE VEINS THAT CAN LEAD TO SWELLING IF SHE IS DEPENDENT FOR A LONGTIME. SHE IS COMPLIANT WITH MEDICAL THERAPY .     10/27/2022  Lives with her daughter now has lost weight diet appropraite. Has no chest pain or shortness of breath not taking metolazone . She is still on lasix bid her bp is on the low side. Has no falls.     5/8/2023   She walks with cane walker . Her blood pressure is controlled. She is tolerated low dose diuretics no cardiac symptoms no bleeding. No chf symptoms appetite ok. Has not much bruising. He [facility] is taking good care of her. She goes to  on aging node hydrations no falls.   Past Medical History:  "  Diagnosis Date    Abnormal nuclear stress test 6/30/2016    Acute congestive heart failure 5/27/2017    Acute coronary syndrome     Acute kidney injury (nontraumatic) 5/27/2017    Acute on chronic congestive heart failure 5/21/2017    Acute on chronic diastolic congestive heart failure 8/27/2015    Acute on chronic respiratory failure 5/6/2019    Acute renal failure superimposed on stage 3 chronic kidney disease 1/26/2018    Anemia 5/9/2016    Angina pectoris 1/25/2018    Angina pectoris 1/25/2018    Anticoagulant long-term use     Anxiety 6/16/2019    Aortic regurgitation     Echo 11/2013---5 - Mild to moderate aortic regurgitation.      Asthma     Atrial fibrillation 5/15/2014    Back pain     s/p epidural steriod injections, no recent injections.    Bridges's cyst     small, right, seen on US lower extremity 6/2014    Blood transfusion     Approximately 6 years ago    Chronic constipation     Coronary artery disease     Degenerative disc disease, lumbar     Dementia 10/8/2021    Diastolic dysfunction     Seen on echo 11/2013, stress test negative 5/2014    Diverticulosis     colonoscopy 3/27/2011    E coli bacteremia 5/23/2017    E. coli UTI (urinary tract infection) 5/23/2017    Hemorrhoids     colonoscopy 3/27/2011    Hiatal hernia     CXR 12/30/2016---Retrocardiac density again noted consistent with a hiatal hernia.    Hx of adenomatous colonic polyps     Hyperlipidemia     Hypertension     Hyponatremia 5/9/2016    Hypoxemia 5/27/2017    Hypoxia 6/28/2017    Leukocytosis 5/27/2017    Mitral regurgitation     Myocardial infarction     per patient was told in the past she had a "mild heart attack"    Obesity     Osteoarthritis, knee     Pneumonia     per patient "walking Pneumonia" did not require hospitalization    Seasonal allergies     Sepsis 5/22/2017    Severe obesity with body mass index (BMI) of 35.0 to 39.9 with serious comorbidity     Trouble in sleeping     Urinary incontinence        Past Surgical " History:   Procedure Laterality Date    APPENDECTOMY      CATARACT EXTRACTION, BILATERAL      COLONOSCOPY N/A 2017    Procedure: COLONOSCOPY okay by dr. ramos;  Surgeon: Toño Abdi MD;  Location: [facility] ENDO;  Service: Endoscopy;  Laterality: N/A;    EYE SURGERY Left     HYSTERECTOMY      benign reasons    KNEE SURGERY Bilateral     x2     Left heart cath      OLECRANON BURSECTOMY Right     2011    TONSILLECTOMY      TREATMENT OF CARDIAC ARRHYTHMIA N/A 2019    Procedure: CARDIOVERSION OR DEFIBRILLATION;  Surgeon: Kee Jones MD;  Location: [facility] CATH LAB;  Service: Cardiology;  Laterality: N/A;    TREATMENT OF CARDIAC ARRHYTHMIA N/A 2019    Procedure: CARDIOVERSION;  Surgeon: Juan Manuel Gooden MD;  Location: [facility] CATH LAB;  Service: Cardiology;  Laterality: N/A;    URETHRA SURGERY         Social History     Tobacco Use    Smoking status: Former     Packs/day: 0.05     Years: 1.00     Pack years: 0.05     Types: Cigarettes     Quit date: 1952     Years since quittin.4    Smokeless tobacco: Never   Substance Use Topics    Alcohol use: No     Alcohol/week: 0.0 standard drinks    Drug use: No       Family History   Problem Relation Age of Onset    Heart disease Mother     Cancer Mother         colon    Hypertension Mother     Hyperlipidemia Mother     Heart disease Father     Hypertension Father     Hyperlipidemia Father     Heart disease Sister         MI    Heart disease Brother         MI    COPD Son     Hypertension Son     Diabetes Brother     Diabetes Son     Cancer Sister         breast    Kidney disease Neg Hx     Stroke Neg Hx        Current Outpatient Medications   Medication Sig    ascorbic acid, vitamin C, (VITAMIN C) 500 MG tablet Take 500 mg by mouth once daily.    aspirin (ECOTRIN) 81 MG EC tablet Take 81 mg by mouth once daily.    calcium polycarbophil (FIBER-TABS ORAL) Take by mouth once daily.    cyanocobalamin (VITAMIN B-12) 1000 MCG tablet Take 100 mcg by mouth once daily.     docusate sodium (COLACE) 100 MG capsule Take by mouth 2 (two) times daily. Take 2 tabs daily    ELIQUIS 5 mg Tab TAKE 1 TABLET(5 MG) BY MOUTH TWICE DAILY    ergocalciferol, vitamin D2, (VITAMIN D ORAL) Take by mouth once daily.    furosemide (LASIX) 40 MG tablet Take 1 tablet (40 mg total) by mouth once daily. (Patient taking differently: Take 20 mg by mouth once daily. Cute dose in half starting 4/25/23 per Dr. Angulo)    metOLazone (ZAROXOLYN) 2.5 MG tablet TAKE 1 TABLET BY MOUTH EVERY MONDAY, WEDNESDAY AND FRIDAY    metoprolol tartrate (LOPRESSOR) 50 MG tablet TAKE 1/2 TABLET TWICE DAILY    potassium chloride SA (K-DUR,KLOR-CON M) 10 MEQ tablet TAKE 1 TABLET TWICE DAILY (Patient taking differently: once.)    pravastatin (PRAVACHOL) 40 MG tablet TAKE 1 TABLET EVERY EVENING    senna (SENOKOT) 8.6 mg tablet Take 1 tablet by mouth daily as needed for Constipation.    omega 3-dha-epa-fish oil 1,000 mg (120 mg-180 mg) Cap Take 1 capsule by mouth once daily.     No current facility-administered medications for this visit.     Current Outpatient Medications on File Prior to Visit   Medication Sig    ascorbic acid, vitamin C, (VITAMIN C) 500 MG tablet Take 500 mg by mouth once daily.    aspirin (ECOTRIN) 81 MG EC tablet Take 81 mg by mouth once daily.    calcium polycarbophil (FIBER-TABS ORAL) Take by mouth once daily.    cyanocobalamin (VITAMIN B-12) 1000 MCG tablet Take 100 mcg by mouth once daily.    docusate sodium (COLACE) 100 MG capsule Take by mouth 2 (two) times daily. Take 2 tabs daily    ELIQUIS 5 mg Tab TAKE 1 TABLET(5 MG) BY MOUTH TWICE DAILY    ergocalciferol, vitamin D2, (VITAMIN D ORAL) Take by mouth once daily.    furosemide (LASIX) 40 MG tablet Take 1 tablet (40 mg total) by mouth once daily. (Patient taking differently: Take 20 mg by mouth once daily. Cute dose in half starting 4/25/23 per Dr. Angulo)    metOLazone (ZAROXOLYN) 2.5 MG tablet TAKE 1 TABLET BY MOUTH EVERY MONDAY, WEDNESDAY AND FRIDAY     metoprolol tartrate (LOPRESSOR) 50 MG tablet TAKE 1/2 TABLET TWICE DAILY    potassium chloride SA (K-DUR,KLOR-CON M) 10 MEQ tablet TAKE 1 TABLET TWICE DAILY (Patient taking differently: once.)    pravastatin (PRAVACHOL) 40 MG tablet TAKE 1 TABLET EVERY EVENING    senna (SENOKOT) 8.6 mg tablet Take 1 tablet by mouth daily as needed for Constipation.    omega 3-dha-epa-fish oil 1,000 mg (120 mg-180 mg) Cap Take 1 capsule by mouth once daily.     No current facility-administered medications on file prior to visit.     Review of patient's allergies indicates:   Allergen Reactions    Iodine and iodide containing products Rash      Review of Systems   Constitutional: Negative for diaphoresis, malaise/fatigue and weight gain.   HENT:  Negative for hoarse voice.    Eyes:  Negative for double vision and visual disturbance.   Cardiovascular:  Negative for chest pain, claudication, cyanosis, dyspnea on exertion, irregular heartbeat, leg swelling, near-syncope, orthopnea, palpitations, paroxysmal nocturnal dyspnea and syncope.   Respiratory:  Negative for cough, hemoptysis, shortness of breath and snoring.    Hematologic/Lymphatic: Negative for bleeding problem. Does not bruise/bleed easily.   Skin:  Negative for color change and poor wound healing.   Musculoskeletal:  Negative for muscle cramps, muscle weakness and myalgias.   Gastrointestinal:  Negative for bloating, abdominal pain, change in bowel habit, diarrhea, heartburn, hematemesis, hematochezia, melena and nausea.   Neurological:  Negative for excessive daytime sleepiness, dizziness, headaches, light-headedness, loss of balance, numbness and weakness.   Psychiatric/Behavioral:  Negative for memory loss. The patient does not have insomnia.    Allergic/Immunologic: Negative for hives.     Objective:   Physical Exam  Constitutional:       General: She is not in acute distress.     Appearance: Normal appearance. She is well-developed. She is not ill-appearing.   HENT:       "Head: Normocephalic and atraumatic.   Eyes:      General: No scleral icterus.     Pupils: Pupils are equal, round, and reactive to light.   Neck:      Thyroid: No thyromegaly.      Vascular: Normal carotid pulses. No carotid bruit, hepatojugular reflux or JVD.      Trachea: No tracheal deviation.   Cardiovascular:      Rate and Rhythm: Normal rate. Rhythm irregular.      Pulses: Normal pulses.      Heart sounds: Normal heart sounds. No murmur heard.    No friction rub. No gallop.   Pulmonary:      Effort: Pulmonary effort is normal. No respiratory distress.      Breath sounds: Normal breath sounds. No wheezing, rhonchi or rales.   Chest:      Chest wall: No tenderness.   Abdominal:      General: Bowel sounds are normal. There is no abdominal bruit.      Palpations: Abdomen is soft. There is no hepatomegaly or pulsatile mass.      Tenderness: There is no abdominal tenderness.   Musculoskeletal:      Right shoulder: No deformity.      Cervical back: Normal range of motion and neck supple.      Right lower leg: No edema.      Left lower leg: No edema.      Comments: Scar knee surgery well healed. Varicosities and spider veins noted.    Skin:     General: Skin is warm and dry.      Findings: No erythema or rash.      Nails: There is no clubbing.   Neurological:      General: No focal deficit present.      Mental Status: She is alert and oriented to person, place, and time.      Cranial Nerves: No cranial nerve deficit.      Coordination: Coordination normal.   Psychiatric:         Mood and Affect: Mood normal.         Speech: Speech normal.         Behavior: Behavior normal.     Vitals:    05/09/23 1457 05/09/23 1458   BP: 122/64 118/70   BP Location: Right arm Left arm   Patient Position: Sitting Sitting   BP Method: Medium (Manual) Medium (Manual)   Pulse: 88    SpO2: 95%    Weight: 61.5 kg (135 lb 9.3 oz)    Height: 4' 9" (1.448 m)      Lab Results   Component Value Date    CHOL 138 04/24/2023    CHOL 151 10/13/2022 "    CHOL 159 10/04/2021      Body mass index is 29.34 kg/m².   Lab Results   Component Value Date    HGBA1C 5.2 04/24/2023      BMP  Lab Results   Component Value Date     04/24/2023    K 3.3 (L) 04/24/2023    CL 95 04/24/2023    CO2 34 (H) 04/24/2023    BUN 38 (H) 04/24/2023    CREATININE 1.0 04/24/2023    CALCIUM 9.5 04/24/2023    ANIONGAP 11 04/24/2023    EGFRNORACEVR 54.2 (A) 04/24/2023      Lab Results   Component Value Date    HDL 61 04/24/2023    HDL 51 10/13/2022    HDL 63 10/04/2021     Lab Results   Component Value Date    LDLCALC 68.8 04/24/2023    LDLCALC 85.0 10/13/2022    LDLCALC 80.0 10/04/2021     Lab Results   Component Value Date    TRIG 41 04/24/2023    TRIG 75 10/13/2022    TRIG 80 10/04/2021     Lab Results   Component Value Date    CHOLHDL 44.2 04/24/2023    CHOLHDL 33.8 10/13/2022    CHOLHDL 39.6 10/04/2021       Chemistry        Component Value Date/Time     04/24/2023 0818    K 3.3 (L) 04/24/2023 0818    CL 95 04/24/2023 0818    CO2 34 (H) 04/24/2023 0818    BUN 38 (H) 04/24/2023 0818    CREATININE 1.0 04/24/2023 0818    GLU 94 04/24/2023 0818        Component Value Date/Time    CALCIUM 9.5 04/24/2023 0818    ALKPHOS 104 04/24/2023 0818    AST 20 04/24/2023 0818    ALT 15 04/24/2023 0818    BILITOT 0.9 04/24/2023 0818    ESTGFRAFRICA >60.0 04/14/2022 1444    EGFRNONAA >60.0 04/14/2022 1444          Lab Results   Component Value Date    TSH 2.106 01/19/2023     Lab Results   Component Value Date    INR 1.0 03/05/2022    INR 1.0 05/21/2019    INR 0.9 02/16/2019     Lab Results   Component Value Date    WBC 6.98 01/19/2023    HGB 12.4 01/19/2023    HCT 40.2 01/19/2023     (H) 01/19/2023     01/19/2023     BMP  Sodium   Date Value Ref Range Status   04/24/2023 140 136 - 145 mmol/L Final     Potassium   Date Value Ref Range Status   04/24/2023 3.3 (L) 3.5 - 5.1 mmol/L Final     Chloride   Date Value Ref Range Status   04/24/2023 95 95 - 110 mmol/L Final     CO2   Date  Value Ref Range Status   04/24/2023 34 (H) 23 - 29 mmol/L Final     BUN   Date Value Ref Range Status   04/24/2023 38 (H) 8 - 23 mg/dL Final     Creatinine   Date Value Ref Range Status   04/24/2023 1.0 0.5 - 1.4 mg/dL Final     Calcium   Date Value Ref Range Status   04/24/2023 9.5 8.7 - 10.5 mg/dL Final     Anion Gap   Date Value Ref Range Status   04/24/2023 11 8 - 16 mmol/L Final     eGFR if    Date Value Ref Range Status   04/14/2022 >60.0 >60 mL/min/1.73 m^2 Final     eGFR if non    Date Value Ref Range Status   04/14/2022 >60.0 >60 mL/min/1.73 m^2 Final     Comment:     Calculation used to obtain the estimated glomerular filtration  rate (eGFR) is the CKD-EPI equation.        CrCl cannot be calculated (Patient's most recent lab result is older than the maximum 7 days allowed.).    Assessment:     1. Atrial fibrillation, unspecified type    2. Atherosclerosis of aorta    3. Bilateral carotid bruits    4. Decreased pulses in feet    5. Dyspnea on exertion    6. Essential hypertension    7. Heart valve regurgitation    8. Hyperlipidemia, unspecified hyperlipidemia type    9. Left ventricular diastolic dysfunction with preserved systolic function    10. Mild nonobstructive CAD per [facility] in 2016    11. PAF (paroxysmal atrial fibrillation)    12. Pulmonary heart disease    13. Stage 3 chronic kidney disease, unspecified whether stage 3a or 3b CKD    Hypokalemia wioll go back to supplement bid every other day.    Afib asymptomatic on eliquis no bleeding continue same   Hlp on target continue same   Htn controlled continue same   Chf well compensated on adequate therapy will continue same. Metolazone twice weekly.    Plan:   Continue current therapy  Cardiac low salt diet.  Risk factor modification and excercise program.  F/u in 6 months with lipid cmp

## 2023-05-12 ENCOUNTER — PES CALL (OUTPATIENT)
Dept: ADMINISTRATIVE | Facility: CLINIC | Age: 88
End: 2023-05-12
Payer: MEDICARE

## 2023-05-16 ENCOUNTER — OFFICE VISIT (OUTPATIENT)
Dept: OPHTHALMOLOGY | Facility: CLINIC | Age: 88
End: 2023-05-16
Payer: MEDICARE

## 2023-05-16 DIAGNOSIS — Z96.1 PSEUDOPHAKIA OF BOTH EYES: ICD-10-CM

## 2023-05-16 DIAGNOSIS — H52.7 REFRACTIVE ERRORS: ICD-10-CM

## 2023-05-16 DIAGNOSIS — H35.3131 NONEXUDATIVE AGE-RELATED MACULAR DEGENERATION, BILATERAL, EARLY DRY STAGE: Primary | ICD-10-CM

## 2023-05-16 PROCEDURE — 1157F PR ADVANCE CARE PLAN OR EQUIV PRESENT IN MEDICAL RECORD: ICD-10-PCS | Mod: CPTII,,, | Performed by: OPTOMETRIST

## 2023-05-16 PROCEDURE — 1159F MED LIST DOCD IN RCRD: CPT | Mod: CPTII,,, | Performed by: OPTOMETRIST

## 2023-05-16 PROCEDURE — 92004 COMPRE OPH EXAM NEW PT 1/>: CPT | Mod: ,,, | Performed by: OPTOMETRIST

## 2023-05-16 PROCEDURE — 92015 PR REFRACTION: ICD-10-PCS | Mod: ,,, | Performed by: OPTOMETRIST

## 2023-05-16 PROCEDURE — 1157F ADVNC CARE PLAN IN RCRD: CPT | Mod: CPTII,,, | Performed by: OPTOMETRIST

## 2023-05-16 PROCEDURE — 92134 POSTERIOR SEGMENT OCT RETINA (OCULAR COHERENCE TOMOGRAPHY)-BOTH EYES: ICD-10-PCS | Mod: ,,, | Performed by: OPTOMETRIST

## 2023-05-16 PROCEDURE — 99999 PR PBB SHADOW E&M-EST. PATIENT-LVL III: ICD-10-PCS | Mod: PBBFAC,,, | Performed by: OPTOMETRIST

## 2023-05-16 PROCEDURE — 1159F PR MEDICATION LIST DOCUMENTED IN MEDICAL RECORD: ICD-10-PCS | Mod: CPTII,,, | Performed by: OPTOMETRIST

## 2023-05-16 PROCEDURE — 92015 DETERMINE REFRACTIVE STATE: CPT | Mod: ,,, | Performed by: OPTOMETRIST

## 2023-05-16 PROCEDURE — 92004 PR EYE EXAM, NEW PATIENT,COMPREHESV: ICD-10-PCS | Mod: ,,, | Performed by: OPTOMETRIST

## 2023-05-16 PROCEDURE — 99999 PR PBB SHADOW E&M-EST. PATIENT-LVL III: CPT | Mod: PBBFAC,,, | Performed by: OPTOMETRIST

## 2023-05-16 PROCEDURE — 92134 CPTRZ OPH DX IMG PST SGM RTA: CPT | Mod: ,,, | Performed by: OPTOMETRIST

## 2023-05-16 NOTE — PROGRESS NOTES
HPI     Eye Problem     Additional comments: OD seeing black spots and itchy, burning, watery   eyes.           Comments    Patient states this past Saturday night, she started seeing black spots   with the right eye only. And for the past week, both eyes been burning,   itchy, and watery.   Pain Scale:  8  Onset: 05/13/2023  OD, OS, OU:   OU  Discharge:   Yes  A.M. Matting:  Sometimes   Itch:   Yes  Redness:   No  Photophobia:   No  Foreign body sensation:   Burning   Deep pain:   No  Previous occurrence:   No  Drops:   Ketotifen Fumarate Ophthalmic Solution            Last edited by Lula Lebron on 5/16/2023  2:19 PM.            Assessment /Plan     For exam results, see Encounter Report.    Nonexudative age-related macular degeneration, bilateral, early dry stage  -     Posterior Segment OCT Retina-Both eyes    Pseudophakia of both eyes    Refractive errors      AMD OD > OS       Stable IOL OU.    Dispense Final Rx for glasses.  RTC 1 year  Discussed above and answered questions.

## 2023-08-25 DIAGNOSIS — I48.91 ATRIAL FIBRILLATION, UNSPECIFIED TYPE: ICD-10-CM

## 2023-08-25 DIAGNOSIS — Z86.73 HISTORY OF TIA (TRANSIENT ISCHEMIC ATTACK): ICD-10-CM

## 2023-08-25 DIAGNOSIS — E78.5 HYPERLIPIDEMIA, UNSPECIFIED HYPERLIPIDEMIA TYPE: Chronic | ICD-10-CM

## 2023-08-25 RX ORDER — METOLAZONE 2.5 MG/1
2.5 TABLET ORAL
Qty: 39 TABLET | Refills: 3 | Status: SHIPPED | OUTPATIENT
Start: 2023-08-25 | End: 2024-02-05

## 2023-08-25 RX ORDER — METOPROLOL TARTRATE 50 MG/1
25 TABLET ORAL 2 TIMES DAILY
Qty: 90 TABLET | Refills: 3 | Status: SHIPPED | OUTPATIENT
Start: 2023-08-25

## 2023-08-25 RX ORDER — PRAVASTATIN SODIUM 40 MG/1
40 TABLET ORAL NIGHTLY
Qty: 90 TABLET | Refills: 3 | Status: SHIPPED | OUTPATIENT
Start: 2023-08-25

## 2023-08-25 NOTE — TELEPHONE ENCOUNTER
----- Message from Tammy Martines sent at 8/25/2023 12:16 PM CDT -----  Regarding: Patient Pharmacy Change  Hello, this patients daughter came in requesting that all of Ms. Bedoya's prescriptions be sent to the [facility] in [place] from now on and that if there are any being sent to the [facility] pharmacy currently can they please be switched over as well. Her daughter asked if she can be contacted to confirm this was taken care of. She can be reached at the mobile number in the patients chart, [phone]. Her name is Keya. Thankyou

## 2023-08-25 NOTE — TELEPHONE ENCOUNTER
----- Message from Tammy Martines sent at 8/25/2023 12:13 PM CDT -----  Regarding: Patient Pharmacy Change  Hello, this patients daughter came in requesting that all of Ms. Bedoya's prescriptions be sent to the [facility] in [place] from now on and that if there are any being sent to the [facility] pharmacy currently can they please be switched over as well. Her daughter asked if she can be contacted to confirm this was taken care of. She can be reached at the mobile number in the patients chart, [phone]. Her name is Keya. Thankyou

## 2023-08-25 NOTE — TELEPHONE ENCOUNTER
I spoke to Ms. Laura and got the pharmacy changed for her. It looks like most of her medication is sent in by Dr. Angulo so she said she will get it from him. I also got her scheduled for a 6 mo f/u because she was not scheduled back in January.

## 2023-09-08 ENCOUNTER — LAB VISIT (OUTPATIENT)
Dept: LAB | Facility: HOSPITAL | Age: 88
End: 2023-09-08
Attending: PHYSICIAN ASSISTANT
Payer: MEDICARE

## 2023-09-08 ENCOUNTER — OFFICE VISIT (OUTPATIENT)
Dept: INTERNAL MEDICINE | Facility: CLINIC | Age: 88
End: 2023-09-08
Payer: MEDICARE

## 2023-09-08 VITALS
DIASTOLIC BLOOD PRESSURE: 62 MMHG | TEMPERATURE: 96 F | WEIGHT: 133.5 LBS | BODY MASS INDEX: 28.89 KG/M2 | OXYGEN SATURATION: 97 % | HEART RATE: 93 BPM | SYSTOLIC BLOOD PRESSURE: 116 MMHG

## 2023-09-08 DIAGNOSIS — I48.91 ATRIAL FIBRILLATION, UNSPECIFIED TYPE: ICD-10-CM

## 2023-09-08 DIAGNOSIS — F03.90 DEMENTIA, UNSPECIFIED DEMENTIA SEVERITY, UNSPECIFIED DEMENTIA TYPE, UNSPECIFIED WHETHER BEHAVIORAL, PSYCHOTIC, OR MOOD DISTURBANCE OR ANXIETY: ICD-10-CM

## 2023-09-08 DIAGNOSIS — I10 ESSENTIAL HYPERTENSION: ICD-10-CM

## 2023-09-08 DIAGNOSIS — E55.9 VITAMIN D DEFICIENCY: ICD-10-CM

## 2023-09-08 DIAGNOSIS — E87.6 DIURETIC-INDUCED HYPOKALEMIA: ICD-10-CM

## 2023-09-08 DIAGNOSIS — D64.9 ANEMIA, UNSPECIFIED TYPE: ICD-10-CM

## 2023-09-08 DIAGNOSIS — I48.11 LONGSTANDING PERSISTENT ATRIAL FIBRILLATION: ICD-10-CM

## 2023-09-08 DIAGNOSIS — I27.9 PULMONARY HEART DISEASE: Primary | ICD-10-CM

## 2023-09-08 DIAGNOSIS — T50.2X5A DIURETIC-INDUCED HYPOKALEMIA: ICD-10-CM

## 2023-09-08 DIAGNOSIS — E78.5 HYPERLIPIDEMIA, UNSPECIFIED HYPERLIPIDEMIA TYPE: Chronic | ICD-10-CM

## 2023-09-08 DIAGNOSIS — I48.19 ATRIAL FIBRILLATION, PERSISTENT: ICD-10-CM

## 2023-09-08 DIAGNOSIS — I70.0 CALCIFICATION OF AORTA: ICD-10-CM

## 2023-09-08 DIAGNOSIS — R73.03 PREDIABETES: ICD-10-CM

## 2023-09-08 DIAGNOSIS — E53.8 B12 DEFICIENCY: ICD-10-CM

## 2023-09-08 DIAGNOSIS — N18.31 STAGE 3A CHRONIC KIDNEY DISEASE: ICD-10-CM

## 2023-09-08 PROBLEM — Z91.89 POTENTIAL FOR COGNITIVE IMPAIRMENT: Status: RESOLVED | Noted: 2019-11-23 | Resolved: 2023-09-08

## 2023-09-08 LAB
25(OH)D3+25(OH)D2 SERPL-MCNC: 39 NG/ML (ref 30–96)
BASOPHILS # BLD AUTO: 0.03 K/UL (ref 0–0.2)
BASOPHILS NFR BLD: 0.4 % (ref 0–1.9)
DIFFERENTIAL METHOD: ABNORMAL
EOSINOPHIL # BLD AUTO: 0.2 K/UL (ref 0–0.5)
EOSINOPHIL NFR BLD: 2.6 % (ref 0–8)
ERYTHROCYTE [DISTWIDTH] IN BLOOD BY AUTOMATED COUNT: 13.9 % (ref 11.5–14.5)
HCT VFR BLD AUTO: 43 % (ref 37–48.5)
HGB BLD-MCNC: 13.5 G/DL (ref 12–16)
IMM GRANULOCYTES # BLD AUTO: 0.03 K/UL (ref 0–0.04)
IMM GRANULOCYTES NFR BLD AUTO: 0.4 % (ref 0–0.5)
LYMPHOCYTES # BLD AUTO: 1.5 K/UL (ref 1–4.8)
LYMPHOCYTES NFR BLD: 22 % (ref 18–48)
MCH RBC QN AUTO: 30.8 PG (ref 27–31)
MCHC RBC AUTO-ENTMCNC: 31.4 G/DL (ref 32–36)
MCV RBC AUTO: 98 FL (ref 82–98)
MONOCYTES # BLD AUTO: 0.8 K/UL (ref 0.3–1)
MONOCYTES NFR BLD: 11.9 % (ref 4–15)
NEUTROPHILS # BLD AUTO: 4.3 K/UL (ref 1.8–7.7)
NEUTROPHILS NFR BLD: 62.7 % (ref 38–73)
NRBC BLD-RTO: 0 /100 WBC
PLATELET # BLD AUTO: 170 K/UL (ref 150–450)
PMV BLD AUTO: 12.5 FL (ref 9.2–12.9)
POTASSIUM SERPL-SCNC: 5.3 MMOL/L (ref 3.5–5.1)
RBC # BLD AUTO: 4.39 M/UL (ref 4–5.4)
WBC # BLD AUTO: 6.87 K/UL (ref 3.9–12.7)

## 2023-09-08 PROCEDURE — 1101F PT FALLS ASSESS-DOCD LE1/YR: CPT | Mod: CPTII,S$GLB,, | Performed by: PHYSICIAN ASSISTANT

## 2023-09-08 PROCEDURE — 84132 ASSAY OF SERUM POTASSIUM: CPT | Performed by: PHYSICIAN ASSISTANT

## 2023-09-08 PROCEDURE — 99214 PR OFFICE/OUTPT VISIT, EST, LEVL IV, 30-39 MIN: ICD-10-PCS | Mod: S$GLB,,, | Performed by: PHYSICIAN ASSISTANT

## 2023-09-08 PROCEDURE — 82306 VITAMIN D 25 HYDROXY: CPT | Performed by: PHYSICIAN ASSISTANT

## 2023-09-08 PROCEDURE — 1160F PR REVIEW ALL MEDS BY PRESCRIBER/CLIN PHARMACIST DOCUMENTED: ICD-10-PCS | Mod: CPTII,S$GLB,, | Performed by: PHYSICIAN ASSISTANT

## 2023-09-08 PROCEDURE — 1159F MED LIST DOCD IN RCRD: CPT | Mod: CPTII,S$GLB,, | Performed by: PHYSICIAN ASSISTANT

## 2023-09-08 PROCEDURE — 85025 COMPLETE CBC W/AUTO DIFF WBC: CPT | Performed by: PHYSICIAN ASSISTANT

## 2023-09-08 PROCEDURE — 1157F ADVNC CARE PLAN IN RCRD: CPT | Mod: CPTII,S$GLB,, | Performed by: PHYSICIAN ASSISTANT

## 2023-09-08 PROCEDURE — 1101F PR PT FALLS ASSESS DOC 0-1 FALLS W/OUT INJ PAST YR: ICD-10-PCS | Mod: CPTII,S$GLB,, | Performed by: PHYSICIAN ASSISTANT

## 2023-09-08 PROCEDURE — 1125F AMNT PAIN NOTED PAIN PRSNT: CPT | Mod: CPTII,S$GLB,, | Performed by: PHYSICIAN ASSISTANT

## 2023-09-08 PROCEDURE — 1160F RVW MEDS BY RX/DR IN RCRD: CPT | Mod: CPTII,S$GLB,, | Performed by: PHYSICIAN ASSISTANT

## 2023-09-08 PROCEDURE — 99214 OFFICE O/P EST MOD 30 MIN: CPT | Mod: S$GLB,,, | Performed by: PHYSICIAN ASSISTANT

## 2023-09-08 PROCEDURE — 3288F FALL RISK ASSESSMENT DOCD: CPT | Mod: CPTII,S$GLB,, | Performed by: PHYSICIAN ASSISTANT

## 2023-09-08 PROCEDURE — 1157F PR ADVANCE CARE PLAN OR EQUIV PRESENT IN MEDICAL RECORD: ICD-10-PCS | Mod: CPTII,S$GLB,, | Performed by: PHYSICIAN ASSISTANT

## 2023-09-08 PROCEDURE — 1159F PR MEDICATION LIST DOCUMENTED IN MEDICAL RECORD: ICD-10-PCS | Mod: CPTII,S$GLB,, | Performed by: PHYSICIAN ASSISTANT

## 2023-09-08 PROCEDURE — 3288F PR FALLS RISK ASSESSMENT DOCUMENTED: ICD-10-PCS | Mod: CPTII,S$GLB,, | Performed by: PHYSICIAN ASSISTANT

## 2023-09-08 PROCEDURE — 1125F PR PAIN SEVERITY QUANTIFIED, PAIN PRESENT: ICD-10-PCS | Mod: CPTII,S$GLB,, | Performed by: PHYSICIAN ASSISTANT

## 2023-09-08 PROCEDURE — 36415 COLL VENOUS BLD VENIPUNCTURE: CPT | Performed by: PHYSICIAN ASSISTANT

## 2023-09-08 PROCEDURE — 99999 PR PBB SHADOW E&M-EST. PATIENT-LVL IV: CPT | Mod: PBBFAC,,, | Performed by: PHYSICIAN ASSISTANT

## 2023-09-08 PROCEDURE — 99999 PR PBB SHADOW E&M-EST. PATIENT-LVL IV: ICD-10-PCS | Mod: PBBFAC,,, | Performed by: PHYSICIAN ASSISTANT

## 2023-09-08 RX ORDER — VITAMIN E 268 MG
400 CAPSULE ORAL DAILY
COMMUNITY

## 2023-09-08 RX ORDER — ACETAMINOPHEN 500 MG
500 TABLET ORAL EVERY 6 HOURS PRN
COMMUNITY

## 2023-09-08 NOTE — PATIENT INSTRUCTIONS
"[facility]    [address] [place] [address] [phone]   Jas Amato MD    [address] [place] [address] [phone] (Work)    [phone] (Fax)         The bivalent covid booster is now available. You can visit [facility] in our primary care building by the front entrance to get this vaccine.     Pharmacy hours:  Monday-Friday 8am-5:30pm    The bivalent vaccines, known as the "Omicron" vaccines, target both the original strain of COVID-19 as well as the Omicron variant, which is now the predominant strain in the [place].    The Omicron booster is authorized for individuals 12 years and older and is given two months after last dose of primary or booster shot.     Get your flu vaccine this Fall.     Please bring your medication or a written list to your next office visit.    If you check your blood pressure at home, please bring written your blood pressure log and your blood pressure machine to your next office visit.       "

## 2023-09-08 NOTE — PROGRESS NOTES
Subjective:       Patient ID: Elke Laws is a 88 y.o. female.    Chief Complaint: Follow-up      Patient presents to clinic today for followup of chronic conditions.        Review of Systems   Constitutional:  Negative for chills, fatigue, fever and unexpected weight change.   Eyes:  Negative for visual disturbance.   Respiratory:  Negative for shortness of breath.    Cardiovascular:  Negative for chest pain.   Musculoskeletal:  Negative for myalgias.   Neurological:  Negative for headaches.       Objective:      Physical Exam  Vitals and nursing note reviewed.   Constitutional:       General: She is not in acute distress.     Appearance: She is well-developed.      Comments: Seated in wheelchair   HENT:      Head: Normocephalic and atraumatic.   Eyes:      General: Lids are normal. No scleral icterus.     Extraocular Movements: Extraocular movements intact.      Conjunctiva/sclera: Conjunctivae normal.   Cardiovascular:      Rate and Rhythm: Normal rate. Rhythm irregular.   Pulmonary:      Effort: Pulmonary effort is normal.      Breath sounds: Normal breath sounds. No decreased breath sounds, wheezing, rhonchi or rales.   Neurological:      Mental Status: She is alert.      Cranial Nerves: No cranial nerve deficit.   Psychiatric:         Mood and Affect: Mood and affect normal.         Assessment:       1. Pulmonary heart disease    2. Longstanding persistent atrial fibrillation    3. Calcification of aorta    4. Stage 3a chronic kidney disease    5. Essential hypertension    6. Hyperlipidemia, unspecified hyperlipidemia type    7. B12 deficiency    8. Anemia, unspecified type    9. Dementia, unspecified dementia severity, unspecified dementia type, unspecified whether behavioral, psychotic, or mood disturbance or anxiety    10. Diuretic-induced hypokalemia    11. Prediabetes    12. Vitamin D deficiency        Plan:   1. Pulmonary heart disease  Overview:  Followed by Cardiology, continue current treatment plan  Echo 3/2019      2. Longstanding persistent atrial fibrillation  Overview:  Followed by Cardiology, continue current treatment plan       3. Calcification of aorta  Overview:  CXR 12/30/2016, on ASA and statin      4. Stage 3a chronic kidney disease  Assessment & Plan:  Status pending lab, no NSAIDs      5. Essential hypertension  Assessment & Plan:  /62, controlled, continue lasix, metoprolol, metolazone    Orders:  -     TSH; Future; Expected date: 03/08/2024    6. Hyperlipidemia, unspecified hyperlipidemia type  Assessment & Plan:  Status pending lab, continue pravastatin    Orders:  -     Comprehensive Metabolic Panel; Future; Expected date: 03/08/2024  -     Lipid Panel; Future; Expected date: 03/08/2024    7. B12 deficiency  Assessment & Plan:  Status pending lab, continue supplement    Orders:  -     Vitamin B12; Future; Expected date: 03/08/2024    8. Anemia, unspecified type  Assessment & Plan:  Status pending lab     Orders:  -     CBC Auto Differential; Future; Expected date: 03/08/2024  -     CBC Auto Differential; Future; Expected date: 09/08/2023    9. Dementia, unspecified dementia severity, unspecified dementia type, unspecified whether behavioral, psychotic, or mood disturbance or anxiety  Overview:  Diagnosed by Dr. Amato, [facility], July 2021, continue with current treatment plan     Assessment & Plan:  Recommend caregiver travis appt to f/u with dementia concerns      10. Diuretic-induced hypokalemia  -     Potassium; Future; Expected date: 09/08/2023    11. Prediabetes  Assessment & Plan:  Status pending lab     Orders:  -     Hemoglobin A1C; Future; Expected date: 03/08/2024    12. Vitamin D deficiency  -     Vitamin D; Future; Expected date: 03/08/2024  -     Vitamin D; Future; Expected date: 09/08/2023        Fasting labs ASAP   Discussed Covid booster, scheduling information given.  Annual with Dr. Benavidez scheduled with fasting labs PTA   Health Maintenance reviewed/updated.

## 2023-09-08 NOTE — TELEPHONE ENCOUNTER
Patient is here in the lobby today. She is requesting a   refill on her Eliquis and a dental clearance.    Patient was last seen on 05/09 and she will have extractions.

## 2023-11-21 ENCOUNTER — HOSPITAL ENCOUNTER (OUTPATIENT)
Facility: HOSPITAL | Age: 88
Discharge: HOME | End: 2023-11-22
Attending: EMERGENCY MEDICINE | Admitting: FAMILY MEDICINE
Payer: MEDICARE

## 2023-11-21 DIAGNOSIS — D64.9 ANEMIA, UNSPECIFIED TYPE: ICD-10-CM

## 2023-11-21 DIAGNOSIS — K92.2 ACUTE LOWER GI BLEEDING: Primary | ICD-10-CM

## 2023-11-21 DIAGNOSIS — I48.91 A-FIB: ICD-10-CM

## 2023-11-21 LAB
ABO + RH BLD: NORMAL
ALBUMIN SERPL BCP-MCNC: 3.1 G/DL (ref 3.5–5.2)
ALP SERPL-CCNC: 72 U/L (ref 55–135)
ALT SERPL W/O P-5'-P-CCNC: 14 U/L (ref 10–44)
ANION GAP SERPL CALC-SCNC: 14 MMOL/L (ref 8–16)
APTT PPP: 26 SEC (ref 21–32)
AST SERPL-CCNC: 19 U/L (ref 10–40)
BASOPHILS # BLD AUTO: 0.02 K/UL (ref 0–0.2)
BASOPHILS # BLD AUTO: 0.03 K/UL (ref 0–0.2)
BASOPHILS NFR BLD: 0.3 % (ref 0–1.9)
BASOPHILS NFR BLD: 0.3 % (ref 0–1.9)
BILIRUB SERPL-MCNC: 0.7 MG/DL (ref 0.1–1)
BILIRUB UR QL STRIP: NEGATIVE
BLD GP AB SCN CELLS X3 SERPL QL: NORMAL
BUN SERPL-MCNC: 38 MG/DL (ref 8–23)
CALCIUM SERPL-MCNC: 8.6 MG/DL (ref 8.7–10.5)
CHLORIDE SERPL-SCNC: 94 MMOL/L (ref 95–110)
CLARITY UR: CLEAR
CO2 SERPL-SCNC: 27 MMOL/L (ref 23–29)
COLOR UR: COLORLESS
CREAT SERPL-MCNC: 1.2 MG/DL (ref 0.5–1.4)
DIFFERENTIAL METHOD: ABNORMAL
DIFFERENTIAL METHOD: ABNORMAL
EOSINOPHIL # BLD AUTO: 0.1 K/UL (ref 0–0.5)
EOSINOPHIL # BLD AUTO: 0.1 K/UL (ref 0–0.5)
EOSINOPHIL NFR BLD: 1.1 % (ref 0–8)
EOSINOPHIL NFR BLD: 1.4 % (ref 0–8)
ERYTHROCYTE [DISTWIDTH] IN BLOOD BY AUTOMATED COUNT: 12.6 % (ref 11.5–14.5)
ERYTHROCYTE [DISTWIDTH] IN BLOOD BY AUTOMATED COUNT: 12.6 % (ref 11.5–14.5)
EST. GFR  (NO RACE VARIABLE): 43 ML/MIN/1.73 M^2
GLUCOSE SERPL-MCNC: 110 MG/DL (ref 70–110)
GLUCOSE UR QL STRIP: NEGATIVE
HCT VFR BLD AUTO: 30.7 % (ref 37–48.5)
HCT VFR BLD AUTO: 32.8 % (ref 37–48.5)
HCT VFR BLD AUTO: 35.4 % (ref 37–48.5)
HGB BLD-MCNC: 10.2 G/DL (ref 12–16)
HGB BLD-MCNC: 11.1 G/DL (ref 12–16)
HGB BLD-MCNC: 12 G/DL (ref 12–16)
HGB UR QL STRIP: ABNORMAL
IMM GRANULOCYTES # BLD AUTO: 0.03 K/UL (ref 0–0.04)
IMM GRANULOCYTES # BLD AUTO: 0.04 K/UL (ref 0–0.04)
IMM GRANULOCYTES NFR BLD AUTO: 0.4 % (ref 0–0.5)
IMM GRANULOCYTES NFR BLD AUTO: 0.5 % (ref 0–0.5)
INR PPP: 1.1 (ref 0.8–1.2)
KETONES UR QL STRIP: NEGATIVE
LEUKOCYTE ESTERASE UR QL STRIP: NEGATIVE
LYMPHOCYTES # BLD AUTO: 1.6 K/UL (ref 1–4.8)
LYMPHOCYTES # BLD AUTO: 1.7 K/UL (ref 1–4.8)
LYMPHOCYTES NFR BLD: 18.4 % (ref 18–48)
LYMPHOCYTES NFR BLD: 24.9 % (ref 18–48)
MCH RBC QN AUTO: 30.7 PG (ref 27–31)
MCH RBC QN AUTO: 31.3 PG (ref 27–31)
MCHC RBC AUTO-ENTMCNC: 33.2 G/DL (ref 32–36)
MCHC RBC AUTO-ENTMCNC: 33.9 G/DL (ref 32–36)
MCV RBC AUTO: 92 FL (ref 82–98)
MCV RBC AUTO: 93 FL (ref 82–98)
MONOCYTES # BLD AUTO: 0.6 K/UL (ref 0.3–1)
MONOCYTES # BLD AUTO: 0.8 K/UL (ref 0.3–1)
MONOCYTES NFR BLD: 9.2 % (ref 4–15)
MONOCYTES NFR BLD: 9.3 % (ref 4–15)
NEUTROPHILS # BLD AUTO: 4.4 K/UL (ref 1.8–7.7)
NEUTROPHILS # BLD AUTO: 6.2 K/UL (ref 1.8–7.7)
NEUTROPHILS NFR BLD: 63.7 % (ref 38–73)
NEUTROPHILS NFR BLD: 70.5 % (ref 38–73)
NITRITE UR QL STRIP: NEGATIVE
NRBC BLD-RTO: 0 /100 WBC
NRBC BLD-RTO: 0 /100 WBC
PH UR STRIP: 7 [PH] (ref 5–8)
PLATELET # BLD AUTO: 148 K/UL (ref 150–450)
PLATELET # BLD AUTO: 180 K/UL (ref 150–450)
PMV BLD AUTO: 10.9 FL (ref 9.2–12.9)
PMV BLD AUTO: 10.9 FL (ref 9.2–12.9)
POTASSIUM SERPL-SCNC: 3.7 MMOL/L (ref 3.5–5.1)
PROT SERPL-MCNC: 6.2 G/DL (ref 6–8.4)
PROT UR QL STRIP: NEGATIVE
PROTHROMBIN TIME: 11.2 SEC (ref 9–12.5)
RBC # BLD AUTO: 3.32 M/UL (ref 4–5.4)
RBC # BLD AUTO: 3.84 M/UL (ref 4–5.4)
SODIUM SERPL-SCNC: 135 MMOL/L (ref 136–145)
SP GR UR STRIP: 1.01 (ref 1–1.03)
SPECIMEN OUTDATE: NORMAL
URN SPEC COLLECT METH UR: ABNORMAL
UROBILINOGEN UR STRIP-ACNC: NEGATIVE EU/DL
WBC # BLD AUTO: 6.91 K/UL (ref 3.9–12.7)
WBC # BLD AUTO: 8.85 K/UL (ref 3.9–12.7)

## 2023-11-21 PROCEDURE — 85018 HEMOGLOBIN: CPT | Performed by: FAMILY MEDICINE

## 2023-11-21 PROCEDURE — 25000003 PHARM REV CODE 250: Performed by: NURSE PRACTITIONER

## 2023-11-21 PROCEDURE — G0378 HOSPITAL OBSERVATION PER HR: HCPCS

## 2023-11-21 PROCEDURE — 99285 EMERGENCY DEPT VISIT HI MDM: CPT | Mod: 25

## 2023-11-21 PROCEDURE — 36415 COLL VENOUS BLD VENIPUNCTURE: CPT | Performed by: FAMILY MEDICINE

## 2023-11-21 PROCEDURE — 96365 THER/PROPH/DIAG IV INF INIT: CPT

## 2023-11-21 PROCEDURE — 96361 HYDRATE IV INFUSION ADD-ON: CPT

## 2023-11-21 PROCEDURE — 93005 ELECTROCARDIOGRAM TRACING: CPT

## 2023-11-21 PROCEDURE — 63600175 PHARM REV CODE 636 W HCPCS: Performed by: NURSE PRACTITIONER

## 2023-11-21 PROCEDURE — 81003 URINALYSIS AUTO W/O SCOPE: CPT | Performed by: NURSE PRACTITIONER

## 2023-11-21 PROCEDURE — 93010 EKG 12-LEAD: ICD-10-PCS | Mod: ,,, | Performed by: INTERNAL MEDICINE

## 2023-11-21 PROCEDURE — 85025 COMPLETE CBC W/AUTO DIFF WBC: CPT | Performed by: EMERGENCY MEDICINE

## 2023-11-21 PROCEDURE — 25000003 PHARM REV CODE 250: Performed by: EMERGENCY MEDICINE

## 2023-11-21 PROCEDURE — 99223 PR INITIAL HOSPITAL CARE,LEVL III: ICD-10-PCS | Mod: ,,, | Performed by: INTERNAL MEDICINE

## 2023-11-21 PROCEDURE — 85014 HEMATOCRIT: CPT | Performed by: FAMILY MEDICINE

## 2023-11-21 PROCEDURE — 80053 COMPREHEN METABOLIC PANEL: CPT | Performed by: NURSE PRACTITIONER

## 2023-11-21 PROCEDURE — 99223 1ST HOSP IP/OBS HIGH 75: CPT | Mod: ,,, | Performed by: INTERNAL MEDICINE

## 2023-11-21 PROCEDURE — 86900 BLOOD TYPING SEROLOGIC ABO: CPT | Performed by: EMERGENCY MEDICINE

## 2023-11-21 PROCEDURE — C9113 INJ PANTOPRAZOLE SODIUM, VIA: HCPCS | Performed by: NURSE PRACTITIONER

## 2023-11-21 PROCEDURE — 85730 THROMBOPLASTIN TIME PARTIAL: CPT | Performed by: EMERGENCY MEDICINE

## 2023-11-21 PROCEDURE — 11000001 HC ACUTE MED/SURG PRIVATE ROOM

## 2023-11-21 PROCEDURE — 85610 PROTHROMBIN TIME: CPT | Performed by: EMERGENCY MEDICINE

## 2023-11-21 PROCEDURE — 85025 COMPLETE CBC W/AUTO DIFF WBC: CPT | Mod: 91 | Performed by: NURSE PRACTITIONER

## 2023-11-21 PROCEDURE — 93010 ELECTROCARDIOGRAM REPORT: CPT | Mod: ,,, | Performed by: INTERNAL MEDICINE

## 2023-11-21 RX ORDER — ONDANSETRON 2 MG/ML
4 INJECTION INTRAMUSCULAR; INTRAVENOUS EVERY 8 HOURS PRN
Status: DISCONTINUED | OUTPATIENT
Start: 2023-11-21 | End: 2023-11-22 | Stop reason: HOSPADM

## 2023-11-21 RX ORDER — FUROSEMIDE 20 MG/1
20 TABLET ORAL DAILY
Status: DISCONTINUED | OUTPATIENT
Start: 2023-11-22 | End: 2023-11-22 | Stop reason: HOSPADM

## 2023-11-21 RX ORDER — TALC
6 POWDER (GRAM) TOPICAL NIGHTLY PRN
Status: DISCONTINUED | OUTPATIENT
Start: 2023-11-21 | End: 2023-11-22 | Stop reason: HOSPADM

## 2023-11-21 RX ORDER — PRAVASTATIN SODIUM 20 MG/1
40 TABLET ORAL NIGHTLY
Status: DISCONTINUED | OUTPATIENT
Start: 2023-11-21 | End: 2023-11-22 | Stop reason: HOSPADM

## 2023-11-21 RX ORDER — HYDROCODONE BITARTRATE AND ACETAMINOPHEN 5; 325 MG/1; MG/1
1 TABLET ORAL EVERY 4 HOURS PRN
Status: DISCONTINUED | OUTPATIENT
Start: 2023-11-21 | End: 2023-11-22 | Stop reason: HOSPADM

## 2023-11-21 RX ORDER — SODIUM CHLORIDE 0.9 % (FLUSH) 0.9 %
10 SYRINGE (ML) INJECTION
Status: DISCONTINUED | OUTPATIENT
Start: 2023-11-21 | End: 2023-11-22 | Stop reason: HOSPADM

## 2023-11-21 RX ORDER — ACETAMINOPHEN 325 MG/1
650 TABLET ORAL EVERY 4 HOURS PRN
Status: DISCONTINUED | OUTPATIENT
Start: 2023-11-21 | End: 2023-11-22 | Stop reason: HOSPADM

## 2023-11-21 RX ORDER — METOPROLOL TARTRATE 25 MG/1
25 TABLET, FILM COATED ORAL 2 TIMES DAILY
Status: DISCONTINUED | OUTPATIENT
Start: 2023-11-22 | End: 2023-11-22 | Stop reason: HOSPADM

## 2023-11-21 RX ORDER — PANTOPRAZOLE SODIUM 40 MG/10ML
40 INJECTION, POWDER, LYOPHILIZED, FOR SOLUTION INTRAVENOUS
Status: COMPLETED | OUTPATIENT
Start: 2023-11-21 | End: 2023-11-21

## 2023-11-21 RX ORDER — PANTOPRAZOLE SODIUM 40 MG/10ML
40 INJECTION, POWDER, LYOPHILIZED, FOR SOLUTION INTRAVENOUS 2 TIMES DAILY
Status: DISCONTINUED | OUTPATIENT
Start: 2023-11-22 | End: 2023-11-22 | Stop reason: HOSPADM

## 2023-11-21 RX ADMIN — Medication 6 MG: at 09:11

## 2023-11-21 RX ADMIN — PRAVASTATIN SODIUM 40 MG: 20 TABLET ORAL at 09:11

## 2023-11-21 RX ADMIN — PANTOPRAZOLE SODIUM 40 MG: 40 INJECTION, POWDER, FOR SOLUTION INTRAVENOUS at 05:11

## 2023-11-21 RX ADMIN — SODIUM CHLORIDE 500 ML: 9 INJECTION, SOLUTION INTRAVENOUS at 11:11

## 2023-11-21 NOTE — ASSESSMENT & PLAN NOTE
Creatine stable for now. BMP reviewed- noted Estimated Creatinine Clearance: 22.8 mL/min (based on SCr of 1.2 mg/dL). according to latest data. Based on current GFR, CKD stage is stage 3 - GFR 30-59.  Monitor UOP and serial BMP and adjust therapy as needed. Renally dose meds. Avoid nephrotoxic medications and procedures.    - creatinine at base line

## 2023-11-21 NOTE — ED NOTES
Bed: 20  Expected date:   Expected time:   Means of arrival: Personal Transportation  Comments:  caterina

## 2023-11-21 NOTE — ASSESSMENT & PLAN NOTE
Patient with Permanent atrial fibrillation which is controlled currently with Beta Blocker. Patient is currently in atrial fibrillation.[id number] Score: 4.   - follows with Dr. Angulo  - states she is no longer on eliquis as she has no more refills, unclear if this is correct  - may need to reach out to cards regarding restarting in future once gi bleed resolved

## 2023-11-21 NOTE — SUBJECTIVE & OBJECTIVE
"Past Medical History:   Diagnosis Date    Abnormal nuclear stress test 6/30/2016    Acute congestive heart failure 5/27/2017    Acute coronary syndrome     Acute kidney injury (nontraumatic) 5/27/2017    Acute on chronic congestive heart failure 5/21/2017    Acute on chronic diastolic congestive heart failure 8/27/2015    Acute on chronic respiratory failure 5/6/2019    Acute renal failure superimposed on stage 3 chronic kidney disease 1/26/2018    Anemia 5/9/2016    Angina pectoris 1/25/2018    Angina pectoris 1/25/2018    Anticoagulant long-term use     Anxiety 6/16/2019    Aortic regurgitation     Echo 11/2013---5 - Mild to moderate aortic regurgitation.      Asthma     Atrial fibrillation 5/15/2014    Back pain     s/p epidural steriod injections, no recent injections.    Bridges's cyst     small, right, seen on US lower extremity 6/2014    Blood transfusion     Approximately 6 years ago    Chronic constipation     Coronary artery disease     Degenerative disc disease, lumbar     Dementia 10/8/2021    Diastolic dysfunction     Seen on echo 11/2013, stress test negative 5/2014    Diverticulosis     colonoscopy 3/27/2011    E coli bacteremia 5/23/2017    E. coli UTI (urinary tract infection) 5/23/2017    Hemorrhoids     colonoscopy 3/27/2011    Hiatal hernia     CXR 12/30/2016---Retrocardiac density again noted consistent with a hiatal hernia.    Hx of adenomatous colonic polyps     Hyperlipidemia     Hypertension     Hyponatremia 5/9/2016    Hypoxemia 5/27/2017    Hypoxia 6/28/2017    Leukocytosis 5/27/2017    Mitral regurgitation     Myocardial infarction     per patient was told in the past she had a "mild heart attack"    Obesity     Osteoarthritis, knee     Pneumonia     per patient "walking Pneumonia" did not require hospitalization    Seasonal allergies     Sepsis 5/22/2017    Severe obesity with body mass index (BMI) of 35.0 to 39.9 with serious comorbidity     Trouble in sleeping     Urinary incontinence  "       Past Surgical History:   Procedure Laterality Date    APPENDECTOMY      CATARACT EXTRACTION, BILATERAL      COLONOSCOPY N/A 8/29/2017    Procedure: COLONOSCOPY okay by dr. ramos;  Surgeon: Toño Abdi MD;  Location: [facility] ENDO;  Service: Endoscopy;  Laterality: N/A;    EYE SURGERY Left     HYSTERECTOMY      benign reasons    KNEE SURGERY Bilateral     x2     Left heart cath      OLECRANON BURSECTOMY Right     6/2011    TONSILLECTOMY      TREATMENT OF CARDIAC ARRHYTHMIA N/A 4/16/2019    Procedure: CARDIOVERSION OR DEFIBRILLATION;  Surgeon: Kee Jones MD;  Location: [facility] CATH LAB;  Service: Cardiology;  Laterality: N/A;    TREATMENT OF CARDIAC ARRHYTHMIA N/A 5/24/2019    Procedure: CARDIOVERSION;  Surgeon: Juan Manuel Gooden MD;  Location: [facility] CATH LAB;  Service: Cardiology;  Laterality: N/A;    URETHRA SURGERY         Review of patient's allergies indicates:   Allergen Reactions    Iodine and iodide containing products Rash       No current facility-administered medications on file prior to encounter.     Current Outpatient Medications on File Prior to Encounter   Medication Sig    acetaminophen (TYLENOL) 500 MG tablet Take 500 mg by mouth every 6 (six) hours as needed for Pain.    apixaban (ELIQUIS) 5 mg Tab Take 1 tablet (5 mg total) by mouth 2 (two) times daily.    ascorbic acid, vitamin C, (VITAMIN C) 500 MG tablet Take 500 mg by mouth once daily.    aspirin (ECOTRIN) 81 MG EC tablet Take 81 mg by mouth once daily.    calcium polycarbophil (FIBER-TABS ORAL) Take by mouth once daily.    cyanocobalamin (VITAMIN B-12) 1000 MCG tablet Take 100 mcg by mouth once daily.    docusate sodium (COLACE) 100 MG capsule Take by mouth 2 (two) times daily. Take 2 tabs daily    ergocalciferol, vitamin D2, (VITAMIN D ORAL) Take by mouth once daily.    furosemide (LASIX) 40 MG tablet Take 1 tablet (40 mg total) by mouth once daily. (Patient taking differently: Take 20 mg by mouth once daily. Cute dose in half starting 4/25/23  per Dr. Angulo)    metOLazone (ZAROXOLYN) 2.5 MG tablet Take 1 tablet (2.5 mg total) by mouth every Mon, Wed, Fri.    metoprolol tartrate (LOPRESSOR) 50 MG tablet Take 0.5 tablets (25 mg total) by mouth 2 (two) times daily.    omega 3-dha-epa-fish oil 1,000 mg (120 mg-180 mg) Cap Take 1 capsule by mouth once daily.    potassium chloride SA (K-DUR,KLOR-CON M) 10 MEQ tablet TAKE 1 TABLET TWICE DAILY (Patient taking differently: once.)    pravastatin (PRAVACHOL) 40 MG tablet Take 1 tablet (40 mg total) by mouth every evening.    senna (SENOKOT) 8.6 mg tablet Take 1 tablet by mouth daily as needed for Constipation.    vitamin E 400 UNIT capsule Take 400 Units by mouth once daily.     Family History       Problem Relation (Age of Onset)    COPD Son    Cancer Mother, Sister    Diabetes Brother, Son    Heart disease Mother, Father, Sister, Brother    Hyperlipidemia Mother, Father    Hypertension Mother, Father, Son          Tobacco Use    Smoking status: Former     Current packs/day: 0.00     Average packs/day: 0.1 packs/day for 1 year (0.1 ttl pk-yrs)     Types: Cigarettes     Start date: 1951     Quit date: 1952     Years since quittin.9    Smokeless tobacco: Never   Substance and Sexual Activity    Alcohol use: No     Alcohol/week: 0.0 standard drinks of alcohol    Drug use: No    Sexual activity: Not Currently     Partners: Male     Birth control/protection: See Surgical Hx     Review of Systems   Gastrointestinal:  Positive for blood in stool.     Objective:     Vital Signs (Most Recent):  Temp: 97.8 °F (36.6 °C) (23 1037)  Pulse: 75 (23 1602)  Resp: 18 (23 1602)  BP: (!) 120/58 (23 1602)  SpO2: 97 % (23 1602) Vital Signs (24h Range):  Temp:  [97.8 °F (36.6 °C)] 97.8 °F (36.6 °C)  Pulse:  [66-82] 75  Resp:  [18] 18  SpO2:  [97 %-98 %] 97 %  BP: (104-120)/(58-68) 120/58     Weight: (S)  (Needs bed weight)  There is no height or weight on file to calculate BMI.     Physical  Exam  Vitals and nursing note reviewed.   Constitutional:       Appearance: Normal appearance.   Cardiovascular:      Rate and Rhythm: Normal rate. Rhythm irregular.      Pulses: Normal pulses.      Heart sounds: Normal heart sounds.   Pulmonary:      Effort: Pulmonary effort is normal.      Breath sounds: Normal breath sounds. No wheezing.   Abdominal:      General: Bowel sounds are normal. There is no distension.      Palpations: Abdomen is soft.      Tenderness: There is no abdominal tenderness.   Musculoskeletal:         General: No swelling. Normal range of motion.   Skin:     General: Skin is warm and dry.      Findings: No bruising.   Neurological:      General: No focal deficit present.      Mental Status: She is alert.      Comments: Oriented to person and place (states year as 1988), answers questions appropriately, follows commands           Significant Labs: All pertinent labs within the past 24 hours have been reviewed.    Significant Imaging: I have reviewed all pertinent imaging results/findings within the past 24 hours.

## 2023-11-21 NOTE — ASSESSMENT & PLAN NOTE
- nuc med bleeding scan negative for acute bleed  - has hx of diverticulosis  - trend H&G  - PPI BID  - GI consulted, will follow up any further recs

## 2023-11-21 NOTE — H&P
[facility] - Emergency Dept.  Hospital Medicine  History & Physical    Patient Name: Elke Laws  MRN: [id number]  Patient Class: IP- Inpatient  Admission Date: 11/21/2023  Attending Physician: Ash Wilkerson MD   Primary Care Provider: Isidra Benavidez MD         Patient information was obtained from patient, relative(s), past medical records, and ER records.     Subjective:     Principal Problem:Acute lower GI bleeding    Chief Complaint:   Chief Complaint   Patient presents with    Female  Problem     Pt. Daughter reports a diaper full of bright red blood with clots after using the restroom. She is unclear if it is coming from the vagina or rectum.         HPI: Ms. Laws is an 89 year old female with a history of CAD, HTN, permanent afib, diverticulosis and dementia who presents to ED for evaluation of bright red blood in her diaper after going to the restroom, notice by her daughter.  Of note patients daughter states that she is prescribed Eliquis and ASA 81 mg.  Patient has been out of Eliquis for over 2 months and unable to get a refill so she has not been taking it. Patients last dose of ASA was this am (11/21).  Patient denies any CP, palpitations, dyspnea, fever/chills, hematemesis.  In ED hgb 12, creatinine 1.2.  UA with no signs of infection.  Per ED patient had another large bloody BM in ED.  GI consulted, recommended tagged RBC scan - no active bleeding noted.  Repeat Hgb 10 in ED (was given 500ml of NS upon arrival).  No further episodes of bleeding.  Patient will be admitted to hospital medicine for GI bleed.    Code Status DNR, discussed with patient and daughter at bedside      Past Medical History:   Diagnosis Date    Abnormal nuclear stress test 6/30/2016    Acute congestive heart failure 5/27/2017    Acute coronary syndrome     Acute kidney injury (nontraumatic) 5/27/2017    Acute on chronic congestive heart failure 5/21/2017    Acute on chronic diastolic congestive heart failure 8/27/2015     "Acute on chronic respiratory failure 5/6/2019    Acute renal failure superimposed on stage 3 chronic kidney disease 1/26/2018    Anemia 5/9/2016    Angina pectoris 1/25/2018    Angina pectoris 1/25/2018    Anticoagulant long-term use     Anxiety 6/16/2019    Aortic regurgitation     Echo 11/2013---5 - Mild to moderate aortic regurgitation.      Asthma     Atrial fibrillation 5/15/2014    Back pain     s/p epidural steriod injections, no recent injections.    Bridges's cyst     small, right, seen on US lower extremity 6/2014    Blood transfusion     Approximately 6 years ago    Chronic constipation     Coronary artery disease     Degenerative disc disease, lumbar     Dementia 10/8/2021    Diastolic dysfunction     Seen on echo 11/2013, stress test negative 5/2014    Diverticulosis     colonoscopy 3/27/2011    E coli bacteremia 5/23/2017    E. coli UTI (urinary tract infection) 5/23/2017    Hemorrhoids     colonoscopy 3/27/2011    Hiatal hernia     CXR 12/30/2016---Retrocardiac density again noted consistent with a hiatal hernia.    Hx of adenomatous colonic polyps     Hyperlipidemia     Hypertension     Hyponatremia 5/9/2016    Hypoxemia 5/27/2017    Hypoxia 6/28/2017    Leukocytosis 5/27/2017    Mitral regurgitation     Myocardial infarction     per patient was told in the past she had a "mild heart attack"    Obesity     Osteoarthritis, knee     Pneumonia     per patient "walking Pneumonia" did not require hospitalization    Seasonal allergies     Sepsis 5/22/2017    Severe obesity with body mass index (BMI) of 35.0 to 39.9 with serious comorbidity     Trouble in sleeping     Urinary incontinence        Past Surgical History:   Procedure Laterality Date    APPENDECTOMY      CATARACT EXTRACTION, BILATERAL      COLONOSCOPY N/A 8/29/2017    Procedure: COLONOSCOPY okay by dr. ramos;  Surgeon: Toño Abdi MD;  Location: [facility];  Service: Endoscopy;  Laterality: N/A;    EYE SURGERY Left     HYSTERECTOMY      benign " reasons    KNEE SURGERY Bilateral     x2     Left heart cath      OLECRANON BURSECTOMY Right     6/2011    TONSILLECTOMY      TREATMENT OF CARDIAC ARRHYTHMIA N/A 4/16/2019    Procedure: CARDIOVERSION OR DEFIBRILLATION;  Surgeon: Kee Jones MD;  Location: [facility] CATH LAB;  Service: Cardiology;  Laterality: N/A;    TREATMENT OF CARDIAC ARRHYTHMIA N/A 5/24/2019    Procedure: CARDIOVERSION;  Surgeon: Juan Manuel Gooden MD;  Location: [facility] CATH LAB;  Service: Cardiology;  Laterality: N/A;    URETHRA SURGERY         Review of patient's allergies indicates:   Allergen Reactions    Iodine and iodide containing products Rash       No current facility-administered medications on file prior to encounter.     Current Outpatient Medications on File Prior to Encounter   Medication Sig    acetaminophen (TYLENOL) 500 MG tablet Take 500 mg by mouth every 6 (six) hours as needed for Pain.    apixaban (ELIQUIS) 5 mg Tab Take 1 tablet (5 mg total) by mouth 2 (two) times daily.    ascorbic acid, vitamin C, (VITAMIN C) 500 MG tablet Take 500 mg by mouth once daily.    aspirin (ECOTRIN) 81 MG EC tablet Take 81 mg by mouth once daily.    calcium polycarbophil (FIBER-TABS ORAL) Take by mouth once daily.    cyanocobalamin (VITAMIN B-12) 1000 MCG tablet Take 100 mcg by mouth once daily.    docusate sodium (COLACE) 100 MG capsule Take by mouth 2 (two) times daily. Take 2 tabs daily    ergocalciferol, vitamin D2, (VITAMIN D ORAL) Take by mouth once daily.    furosemide (LASIX) 40 MG tablet Take 1 tablet (40 mg total) by mouth once daily. (Patient taking differently: Take 20 mg by mouth once daily. Cute dose in half starting 4/25/23 per Dr. Angulo)    metOLazone (ZAROXOLYN) 2.5 MG tablet Take 1 tablet (2.5 mg total) by mouth every Mon, Wed, Fri.    metoprolol tartrate (LOPRESSOR) 50 MG tablet Take 0.5 tablets (25 mg total) by mouth 2 (two) times daily.    omega 3-dha-epa-fish oil 1,000 mg (120 mg-180 mg) Cap Take 1 capsule by mouth once daily.     potassium chloride SA (K-DUR,KLOR-CON M) 10 MEQ tablet TAKE 1 TABLET TWICE DAILY (Patient taking differently: once.)    pravastatin (PRAVACHOL) 40 MG tablet Take 1 tablet (40 mg total) by mouth every evening.    senna (SENOKOT) 8.6 mg tablet Take 1 tablet by mouth daily as needed for Constipation.    vitamin E 400 UNIT capsule Take 400 Units by mouth once daily.     Family History       Problem Relation (Age of Onset)    COPD Son    Cancer Mother, Sister    Diabetes Brother, Son    Heart disease Mother, Father, Sister, Brother    Hyperlipidemia Mother, Father    Hypertension Mother, Father, Son          Tobacco Use    Smoking status: Former     Current packs/day: 0.00     Average packs/day: 0.1 packs/day for 1 year (0.1 ttl pk-yrs)     Types: Cigarettes     Start date: 1951     Quit date: 1952     Years since quittin.9    Smokeless tobacco: Never   Substance and Sexual Activity    Alcohol use: No     Alcohol/week: 0.0 standard drinks of alcohol    Drug use: No    Sexual activity: Not Currently     Partners: Male     Birth control/protection: See Surgical Hx     Review of Systems   Gastrointestinal:  Positive for blood in stool.     Objective:     Vital Signs (Most Recent):  Temp: 97.8 °F (36.6 °C) (23 1037)  Pulse: 75 (23 1602)  Resp: 18 (23 1602)  BP: (!) 120/58 (23 1602)  SpO2: 97 % (23 1602) Vital Signs (24h Range):  Temp:  [97.8 °F (36.6 °C)] 97.8 °F (36.6 °C)  Pulse:  [66-82] 75  Resp:  [18] 18  SpO2:  [97 %-98 %] 97 %  BP: (104-120)/(58-68) 120/58     Weight: (S)  (Needs bed weight)  There is no height or weight on file to calculate BMI.     Physical Exam  Vitals and nursing note reviewed.   Constitutional:       Appearance: Normal appearance.   Cardiovascular:      Rate and Rhythm: Normal rate. Rhythm irregular.      Pulses: Normal pulses.      Heart sounds: Normal heart sounds.   Pulmonary:      Effort: Pulmonary effort is normal.      Breath sounds: Normal breath  sounds. No wheezing.   Abdominal:      General: Bowel sounds are normal. There is no distension.      Palpations: Abdomen is soft.      Tenderness: There is no abdominal tenderness.   Musculoskeletal:         General: No swelling. Normal range of motion.   Skin:     General: Skin is warm and dry.      Findings: No bruising.   Neurological:      General: No focal deficit present.      Mental Status: She is alert.      Comments: Oriented to person and place (states year as 1988), answers questions appropriately, follows commands           Significant Labs: All pertinent labs within the past 24 hours have been reviewed.    Significant Imaging: I have reviewed all pertinent imaging results/findings within the past 24 hours.  Assessment/Plan:     * Acute lower GI bleeding  - nuc med bleeding scan negative for acute bleed  - has hx of diverticulosis  - trend H&G  - PPI BID  - GI consulted, will follow up any further recs      Longstanding persistent atrial fibrillation  Patient with Permanent atrial fibrillation which is controlled currently with Beta Blocker. Patient is currently in atrial fibrillation.[id number] Score: 4.   - follows with Dr. Angulo  - states she is no longer on eliquis as she has no more refills, unclear if this is correct  - may need to reach out to cards regarding restarting in future once gi bleed resolved    Dementia  - baseline oriented to person and place, remains at baseline  - supportive care  - delirium precautions     Coronary artery disease  - home regimen includes asa/statin   - holding asa at this time d/t gi bleeding     Stage 3a chronic kidney disease  Creatine stable for now. BMP reviewed- noted Estimated Creatinine Clearance: 22.8 mL/min (based on SCr of 1.2 mg/dL). according to latest data. Based on current GFR, CKD stage is stage 3 - GFR 30-59.  Monitor UOP and serial BMP and adjust therapy as needed. Renally dose meds. Avoid nephrotoxic medications and procedures.    - creatinine at  base line    Hyperlipidemia  - continue statin       Essential hypertension  - restart home BB and lasix   - trend BP and adjust meds as necessary       VTE Risk Mitigation (From admission, onward)           Ordered     Reason for No Pharmacological VTE Prophylaxis  Once        Question:  Reasons:  Answer:  Risk of Bleeding    11/21/23 1640     IP VTE HIGH RISK PATIENT  Once         11/21/23 1640     Place sequential compression device  Until discontinued         11/21/23 1640                        Gloria Oneill NP  Department of Hospital Medicine  [facility] - Emergency Dept.

## 2023-11-21 NOTE — HPI
Ms. Laws is an 89 year old female with a history of CAD, HTN, permanent afib, diverticulosis and dementia who presents to ED for evaluation of bright red blood in her diaper after going to the restroom, notice by her daughter.  Of note patients daughter states that she is prescribed Eliquis and ASA 81 mg.  Patient has been out of Eliquis for over 2 months and unable to get a refill so she has not been taking it. Patients last dose of ASA was this am (11/21).  Patient denies any CP, palpitations, dyspnea, fever/chills, hematemesis.  In ED hgb 12, creatinine 1.2.  UA with no signs of infection.  Per ED patient had another large bloody BM in ED.  GI consulted, recommended tagged RBC scan - no active bleeding noted.  Repeat Hgb 10 in ED (was given 500ml of NS upon arrival).  No further episodes of bleeding.  Patient will be admitted to hospital medicine for GI bleed.    Code Status DNR, discussed with patient and daughter at bedside

## 2023-11-21 NOTE — PHARMACY MED REC
"Admission Medication History     The home medication history was taken by Chantelle Fam.    You may go to "Admission" then "Reconcile Home Medications" tabs to review and/or act upon these items.     The home medication list has been updated by the Pharmacy department.   Please read ALL comments highlighted in yellow.   Please address this information as you see fit.    Feel free to contact us if you have any questions or require assistance.      The medications listed below were removed from the home medication list. Please reorder if appropriate:  Patient reports no longer taking the following medication(s):  Docusate 100mg        Medications listed below were obtained from: Patient/family and [employer]- [employer], patient's daughter  (Not in a hospital admission)      LAST MED REC COMPLETED:     Potential issues to be addressed PRIOR TO DISCHARGE  Patient requested refills for the following medications: (Eliquis 5mg) Patient hasn't had this medication in 6 months due to not being able to get it refilled    Chantelleabdirizak Fam  [id number]        Current Outpatient Medications on File Prior to Encounter   Medication Sig Dispense Refill Last Dose    acetaminophen (TYLENOL) 500 MG tablet Take 500 mg by mouth every 6 (six) hours as needed for Pain.   11/20/2023    ascorbic acid, vitamin C, (VITAMIN C) 500 MG tablet Take 500 mg by mouth once daily.   11/21/2023    aspirin (ECOTRIN) 81 MG EC tablet Take 81 mg by mouth once daily.   11/21/2023    calcium polycarbophil (FIBER-TABS ORAL) Take by mouth once daily.   11/21/2023    cyanocobalamin (VITAMIN B-12) 1000 MCG tablet Take 100 mcg by mouth once daily.   11/21/2023    ergocalciferol, vitamin D2, (VITAMIN D ORAL) Take by mouth once daily.   11/21/2023    furosemide (LASIX) 40 MG tablet Take 1 tablet (40 mg total) by mouth once daily. 90 tablet 3 11/21/2023    metOLazone (ZAROXOLYN) 2.5 MG tablet Take 1 tablet (2.5 mg total) by mouth every Mon, Wed, Fri. 39 " tablet 3 11/17/2023    metoprolol tartrate (LOPRESSOR) 50 MG tablet Take 0.5 tablets (25 mg total) by mouth 2 (two) times daily. 90 tablet 3 11/21/2023    omega 3-dha-epa-fish oil 1,000 mg (120 mg-180 mg) Cap Take 1 capsule by mouth once daily.   11/21/2023    potassium chloride SA (K-DUR,KLOR-CON M) 10 MEQ tablet TAKE 1 TABLET TWICE DAILY (Patient taking differently: Take 10 mEq by mouth once daily.) 180 tablet 3 11/21/2023    pravastatin (PRAVACHOL) 40 MG tablet Take 1 tablet (40 mg total) by mouth every evening. 90 tablet 3 11/20/2023    senna (SENOKOT) 8.6 mg tablet Take 1 tablet by mouth daily as needed for Constipation.   11/21/2023    vitamin E 400 UNIT capsule Take 400 Units by mouth once daily.   11/21/2023    apixaban (ELIQUIS) 5 mg Tab Take 1 tablet (5 mg total) by mouth 2 (two) times daily. 60 tablet 6 Unknown                           .

## 2023-11-21 NOTE — ED PROVIDER NOTES
SCRIBE #1 NOTE: I, Evelin Tavares, am scribing for, and in the presence of, Bakari Hooper Jr., MD. I have scribed the entire note.       History     Chief Complaint   Patient presents with    Female  Problem     Pt. Daughter reports a diaper full of bright red blood with clots after using the restroom. She is unclear if it is coming from the vagina or rectum.      Review of patient's allergies indicates:   Allergen Reactions    Iodine and iodide containing products Rash         History of Present Illness     HPI    11/21/2023, 10:53 AM  History obtained from the patient and pt's daughter      History of Present Illness: Elke Laws is a 89 y.o. female patient with a PMHx of HTN, HLD, Diverticulosis, MI, CHF, CAD, A-fib, hemorrhoids  who presents to the Emergency Department for evaluation of genitourinary bleeding which onset today. Pt's daughter states that pt was experiencing bright red blood in her diaper after going to the restroom. Pt's daughter also states that pt is currently prescribed Eliquis, but has not taken the medication in over a month. Symptoms are constant and moderate in severity. No mitigating or exacerbating factors reported. No associated sxs mentioned. Patient denies any abdominal pain, n/v/d, abdominal distention, fever, chills , and all other sxs at this time. No prior Tx mentioned. No further complaints or concerns at this time.       Arrival mode: Personal vehicle    PCP: Isidra Benavidez MD        Past Medical History:  Past Medical History:   Diagnosis Date    Abnormal nuclear stress test 6/30/2016    Acute congestive heart failure 5/27/2017    Acute coronary syndrome     Acute kidney injury (nontraumatic) 5/27/2017    Acute on chronic congestive heart failure 5/21/2017    Acute on chronic diastolic congestive heart failure 8/27/2015    Acute on chronic respiratory failure 5/6/2019    Acute renal failure superimposed on stage 3 chronic kidney disease 1/26/2018    Anemia  "5/9/2016    Angina pectoris 1/25/2018    Angina pectoris 1/25/2018    Anticoagulant long-term use     Anxiety 6/16/2019    Aortic regurgitation     Echo 11/2013---5 - Mild to moderate aortic regurgitation.      Asthma     Atrial fibrillation 5/15/2014    Back pain     s/p epidural steriod injections, no recent injections.    Bridges's cyst     small, right, seen on US lower extremity 6/2014    Blood transfusion     Approximately 6 years ago    Chronic constipation     Coronary artery disease     Degenerative disc disease, lumbar     Dementia 10/8/2021    Diastolic dysfunction     Seen on echo 11/2013, stress test negative 5/2014    Diverticulosis     colonoscopy 3/27/2011    E coli bacteremia 5/23/2017    E. coli UTI (urinary tract infection) 5/23/2017    Hemorrhoids     colonoscopy 3/27/2011    Hiatal hernia     CXR 12/30/2016---Retrocardiac density again noted consistent with a hiatal hernia.    Hx of adenomatous colonic polyps     Hyperlipidemia     Hypertension     Hyponatremia 5/9/2016    Hypoxemia 5/27/2017    Hypoxia 6/28/2017    Leukocytosis 5/27/2017    Mitral regurgitation     Myocardial infarction     per patient was told in the past she had a "mild heart attack"    Obesity     Osteoarthritis, knee     Pneumonia     per patient "walking Pneumonia" did not require hospitalization    Seasonal allergies     Sepsis 5/22/2017    Severe obesity with body mass index (BMI) of 35.0 to 39.9 with serious comorbidity     Trouble in sleeping     Urinary incontinence        Past Surgical History:  Past Surgical History:   Procedure Laterality Date    APPENDECTOMY      CATARACT EXTRACTION, BILATERAL      COLONOSCOPY N/A 8/29/2017    Procedure: COLONOSCOPY okay by dr. ramos;  Surgeon: Toño Abdi MD;  Location: [facility];  Service: Endoscopy;  Laterality: N/A;    EYE SURGERY Left     HYSTERECTOMY      benign reasons    KNEE SURGERY Bilateral     x2     Left heart cath      OLECRANON BURSECTOMY Right     6/2011    " TONSILLECTOMY      TREATMENT OF CARDIAC ARRHYTHMIA N/A 2019    Procedure: CARDIOVERSION OR DEFIBRILLATION;  Surgeon: Kee Jones MD;  Location: [facility] CATH LAB;  Service: Cardiology;  Laterality: N/A;    TREATMENT OF CARDIAC ARRHYTHMIA N/A 2019    Procedure: CARDIOVERSION;  Surgeon: Juan Manuel Gooden MD;  Location: [facility] CATH LAB;  Service: Cardiology;  Laterality: N/A;    URETHRA SURGERY           Family History:  Family History   Problem Relation Age of Onset    Heart disease Mother     Cancer Mother         colon    Hypertension Mother     Hyperlipidemia Mother     Heart disease Father     Hypertension Father     Hyperlipidemia Father     Heart disease Sister         MI    Heart disease Brother         MI    COPD Son     Hypertension Son     Diabetes Brother     Diabetes Son     Cancer Sister         breast    Kidney disease Neg Hx     Stroke Neg Hx        Social History:  Social History     Tobacco Use    Smoking status: Former     Current packs/day: 0.00     Average packs/day: 0.1 packs/day for 1 year (0.1 ttl pk-yrs)     Types: Cigarettes     Start date: 1951     Quit date: 1952     Years since quittin.9    Smokeless tobacco: Never   Substance and Sexual Activity    Alcohol use: No     Alcohol/week: 0.0 standard drinks of alcohol    Drug use: No    Sexual activity: Not Currently     Partners: Male     Birth control/protection: See Surgical Hx        Review of Systems     Review of Systems   Constitutional:  Negative for chills and fever.   HENT:  Negative for sore throat.    Respiratory:  Negative for shortness of breath.    Cardiovascular:  Negative for chest pain.   Gastrointestinal:  Positive for blood in stool. Negative for abdominal distention, abdominal pain, diarrhea, nausea and vomiting.   Genitourinary:  Negative for dysuria.   Musculoskeletal:  Negative for back pain.   Skin:  Negative for rash.   Neurological:  Negative for weakness.   Hematological:  Does not bruise/bleed easily.    All other systems reviewed and are negative.     Physical Exam     Initial Vitals [11/21/23 1037]   BP Pulse Resp Temp SpO2   107/68 66 18 97.8 °F (36.6 °C) 98 %      MAP       --          Physical Exam  Nursing Notes and Vital Signs Reviewed.  Constitutional: Patient is in no acute distress. Well-developed and well-nourished.  Head: Atraumatic. Normocephalic.  Eyes:  EOM intact.  No scleral icterus.  ENT: Mucous membranes are moist.  Nares clear   Neck:  Full ROM. No JVD.  Cardiovascular: Regular rate. Regular rhythm No murmurs, rubs, or gallops. Distal pulses are 2+ and symmetric  Pulmonary/Chest: No respiratory distress. Clear to auscultation bilaterally. No wheezing or rales.  Equal chest wall rise bilaterally  Abdominal: Soft and non-distended.  There is no tenderness.  No rebound, guarding, or rigidity. Good bowel sounds.  There is bright red to purplish blood in the diaper.  Patient had a bloody bowel movement here  Genitourinary: No CVA tenderness.  No suprapubic tenderness  Musculoskeletal: Moves all extremities. No obvious deformities.  5 x 5 strength in all extremities   Skin: Warm and dry.  Neurological:  Alert, awake, and appropriate.  Normal speech.  No acute focal neurological deficits are appreciated.  Two through 12 intact bilaterally.  Psychiatric: Normal affect. Good eye contact. Appropriate in content.      ED Course   Critical Care    Date/Time: 11/21/2023 11:00 AM    Performed by: Bakari Hooper Jr., MD  Authorized by: Bakari Hooper Jr., MD  Direct patient critical care time: 15 minutes  Additional history critical care time: 10 minutes  Ordering / reviewing critical care time: 10 minutes  Documentation critical care time: 5 minutes  Consulting other physicians critical care time: 5 minutes  Total critical care time (exclusive of procedural time) : 45 minutes  Critical care time was exclusive of separately billable procedures and treating other patients and teaching time.  Critical  care was necessary to treat or prevent imminent or life-threatening deterioration of the following conditions: GI bleed.  Critical care was time spent personally by me on the following activities: blood draw for specimens, development of treatment plan with patient or surrogate, interpretation of cardiac output measurements, evaluation of patient's response to treatment, examination of patient, obtaining history from patient or surrogate, ordering and performing treatments and interventions, ordering and review of laboratory studies, ordering and review of radiographic studies, pulse oximetry, re-evaluation of patient's condition, review of old charts and discussions with consultants.        ED Vital Signs:  Vitals:    11/21/23 1037 11/21/23 1407 11/21/23 1500   BP: 107/68 104/62 120/60   Pulse: 66 82 73   Resp: 18 18    Temp: 97.8 °F (36.6 °C)     TempSrc: Oral     SpO2: 98% 97% 97%       Abnormal Lab Results:  Labs Reviewed   CBC W/ AUTO DIFFERENTIAL - Abnormal; Notable for the following components:       Result Value    RBC 3.84 (*)     Hematocrit 35.4 (*)     MCH 31.3 (*)     All other components within normal limits   COMPREHENSIVE METABOLIC PANEL - Abnormal; Notable for the following components:    Sodium 135 (*)     Chloride 94 (*)     BUN 38 (*)     Calcium 8.6 (*)     Albumin 3.1 (*)     eGFR 43 (*)     All other components within normal limits   URINALYSIS, REFLEX TO URINE CULTURE - Abnormal; Notable for the following components:    Color, UA Colorless (*)     Occult Blood UA Trace (*)     All other components within normal limits    Narrative:     Specimen Source->Urine   CBC W/ AUTO DIFFERENTIAL - Abnormal; Notable for the following components:    RBC 3.32 (*)     Hemoglobin 10.2 (*)     Hematocrit 30.7 (*)     Platelets 148 (*)     All other components within normal limits   APTT   PROTIME-INR   TYPE & SCREEN        All Lab Results:  Results for orders placed or performed during the hospital encounter  of 11/21/23   CBC auto differential   Result Value Ref Range    WBC 8.85 3.90 - 12.70 K/uL    RBC 3.84 (L) 4.00 - 5.40 M/uL    Hemoglobin 12.0 12.0 - 16.0 g/dL    Hematocrit 35.4 (L) 37.0 - 48.5 %    MCV 92 82 - 98 fL    MCH 31.3 (H) 27.0 - 31.0 pg    MCHC 33.9 32.0 - 36.0 g/dL    RDW 12.6 11.5 - 14.5 %    Platelets 180 150 - 450 K/uL    MPV 10.9 9.2 - 12.9 fL    Immature Granulocytes 0.5 0.0 - 0.5 %    Gran # (ANC) 6.2 1.8 - 7.7 K/uL    Immature Grans (Abs) 0.04 0.00 - 0.04 K/uL    Lymph # 1.6 1.0 - 4.8 K/uL    Mono # 0.8 0.3 - 1.0 K/uL    Eos # 0.1 0.0 - 0.5 K/uL    Baso # 0.03 0.00 - 0.20 K/uL    nRBC 0 0 /100 WBC    Gran % 70.5 38.0 - 73.0 %    Lymph % 18.4 18.0 - 48.0 %    Mono % 9.2 4.0 - 15.0 %    Eosinophil % 1.1 0.0 - 8.0 %    Basophil % 0.3 0.0 - 1.9 %    Differential Method Automated    Comprehensive metabolic panel   Result Value Ref Range    Sodium 135 (L) 136 - 145 mmol/L    Potassium 3.7 3.5 - 5.1 mmol/L    Chloride 94 (L) 95 - 110 mmol/L    CO2 27 23 - 29 mmol/L    Glucose 110 70 - 110 mg/dL    BUN 38 (H) 8 - 23 mg/dL    Creatinine 1.2 0.5 - 1.4 mg/dL    Calcium 8.6 (L) 8.7 - 10.5 mg/dL    Total Protein 6.2 6.0 - 8.4 g/dL    Albumin 3.1 (L) 3.5 - 5.2 g/dL    Total Bilirubin 0.7 0.1 - 1.0 mg/dL    Alkaline Phosphatase 72 55 - 135 U/L    AST 19 10 - 40 U/L    ALT 14 10 - 44 U/L    eGFR 43 (A) >60 mL/min/1.73 m^2    Anion Gap 14 8 - 16 mmol/L   Urinalysis, Reflex to Urine Culture Urine, Catheterized    Specimen: Urine   Result Value Ref Range    Specimen UA Urine, Catheterized     Color, UA Colorless (A) Yellow, Straw, Ruth    Appearance, UA Clear Clear    pH, UA 7.0 5.0 - 8.0    Specific Gravity, UA 1.010 1.005 - 1.030    Protein, UA Negative Negative    Glucose, UA Negative Negative    Ketones, UA Negative Negative    Bilirubin (UA) Negative Negative    Occult Blood UA Trace (A) Negative    Nitrite, UA Negative Negative    Urobilinogen, UA Negative <2.0 EU/dL    Leukocytes, UA Negative Negative    APTT   Result Value Ref Range    aPTT 26.0 21.0 - 32.0 sec   Protime-INR   Result Value Ref Range    Prothrombin Time 11.2 9.0 - 12.5 sec    INR 1.1 0.8 - 1.2   CBC auto differential   Result Value Ref Range    WBC 6.91 3.90 - 12.70 K/uL    RBC 3.32 (L) 4.00 - 5.40 M/uL    Hemoglobin 10.2 (L) 12.0 - 16.0 g/dL    Hematocrit 30.7 (L) 37.0 - 48.5 %    MCV 93 82 - 98 fL    MCH 30.7 27.0 - 31.0 pg    MCHC 33.2 32.0 - 36.0 g/dL    RDW 12.6 11.5 - 14.5 %    Platelets 148 (L) 150 - 450 K/uL    MPV 10.9 9.2 - 12.9 fL    Immature Granulocytes 0.4 0.0 - 0.5 %    Gran # (ANC) 4.4 1.8 - 7.7 K/uL    Immature Grans (Abs) 0.03 0.00 - 0.04 K/uL    Lymph # 1.7 1.0 - 4.8 K/uL    Mono # 0.6 0.3 - 1.0 K/uL    Eos # 0.1 0.0 - 0.5 K/uL    Baso # 0.02 0.00 - 0.20 K/uL    nRBC 0 0 /100 WBC    Gran % 63.7 38.0 - 73.0 %    Lymph % 24.9 18.0 - 48.0 %    Mono % 9.3 4.0 - 15.0 %    Eosinophil % 1.4 0.0 - 8.0 %    Basophil % 0.3 0.0 - 1.9 %    Differential Method Automated    Type & Screen   Result Value Ref Range    Group & Rh B POS     Indirect Dung NEG     Specimen Outdate 11/24/2023 23:59         Imaging Results:  Imaging Results              NM GI Bleed Scan (Tagged RBC) (Final result)  Result time 11/21/23 14:21:48      Final result by Víctor Barrow MD (11/21/23 14:21:48)                   Impression:      No evidence of active GI bleeding over 60 minutes. The sensitivity of this study is approximately 0.5-1 ml/minute.      Electronically signed by: Víctor Barrow  Date:    11/21/2023  Time:    14:21               Narrative:    EXAMINATION:  NM GI BLEED STUDY    CLINICAL HISTORY:  GI bleed;    TECHNIQUE:  After injection of autologous red blood cells labeled in vitro with 24.8mCi of Tc-99m pertechnetate, sequential dynamic images of the abdomen were obtained for 60minutes.    COMPARISON:  None.    FINDINGS:  There is normal distribution of the activity within the labeled blood pool with visualization of the heart, liver, spleen,  kidneys, bladder, and genitals.    Normal vessels are identified, but no abnormal extraluminal focus of activity is identified.                                                  The Emergency Provider reviewed the vital signs and test results, which are outlined above.     ED Discussion     11:50 AM: Discussed pt's case with Dr. Cardoza (Gastroenterology) who recommends tagged RBC scan.     11:54 AM: Discussed case with Dr. Wilkerson (Hospital Medicine). Dr. Wilkerson agrees with current care and management of pt and accepts admission.   Admitting Service: Hospital Medicine  Admitting Physician: Dr. Wilkerson  Admit to: Med Tele     11:55 AM: Re-evaluated pt. I have discussed test results, shared treatment plan, and the need for admission with patient and family at bedside. Pt and family express understanding at this time and agree with all information. All questions answered. Pt and family have no further questions or concerns at this time. Pt is ready for admit.     1:10 PM: Discussed pt's case with Dr. Cardoza (Gastroenterology) who recommends repeating an H and H.    2:29 PM: Discussed pt's case with Dr. Cardoza (Gastroenterology) who recommends placing GI consult.       Medical Decision Making  Differential diagnosis:  Upper GI bleed, lower GI bleed, anemia, diverticular bleed    Patient with history of diverticulosis.  She had been on Eliquis until 2 months ago.  She is still on aspirin.  She notes bright red blood per rectum that is painless.  Patient has blood in her stool has had a drop in her hemoglobin by 2 points over a course of 4 hours.  Tagged red blood cell scan showed no active bleeding.  This was done due to her history of iodine allergies.  Patient being admitted to Hospital Medicine for further evaluation treatment    Problems Addressed:  Acute lower GI bleeding: acute illness or injury that poses a threat to life or bodily functions    Amount and/or Complexity of Data Reviewed  Independent Historian:  friend     Details: Significant history provided by her family  External Data Reviewed: labs and notes.  Labs: ordered. Decision-making details documented in ED Course.  Radiology: ordered.  Discussion of management or test interpretation with external provider(s): Discussed the case with both GI and Hospital Medicine.  Patient being admitted to Hospital Medicine for further treatment    Risk  OTC drugs.  Prescription drug management.  Decision regarding hospitalization.    Critical Care  Total time providing critical care: 45 minutes                ED Medication(s):  Medications   sodium chloride 0.9% bolus 500 mL 500 mL (0 mLs Intravenous Stopped 11/21/23 1303)       New Prescriptions    No medications on file               Scribe Attestation:   Scribe #1: I performed the above scribed service and the documentation accurately describes the services I performed. I attest to the accuracy of the note.     Attending:   Physician Attestation Statement for Scribe #1: I, Bakari Hooper Jr., MD, personally performed the services described in this documentation, as scribed by Evelin Herrera, in my presence, and it is both accurate and complete.           Clinical Impression       ICD-10-CM ICD-9-CM   1. Acute lower GI bleeding  K92.2 578.9   2. Anemia, unspecified type  D64.9 285.9       Disposition:   Disposition: Admitted  Condition: Fair        Bakari Hooper Jr., MD  11/21/23 1542

## 2023-11-21 NOTE — ASSESSMENT & PLAN NOTE
- baseline oriented to person and place, remains at baseline  - supportive care  - delirium precautions

## 2023-11-21 NOTE — FIRST PROVIDER EVALUATION
Medical screening examination initiated.  I have conducted a focused provider triage encounter, findings are as follows:    Brief history of present illness:  89-year-old female with complaint either rectal or vaginal bleeding this morning.  Reports that she stood up and noticed blood was in her undergarments.  Denies pain.  Denies weakness.    There were no vitals filed for this visit.    Pertinent physical exam:  AAOX3

## 2023-11-21 NOTE — HPI
89 y.o female with CAD, CKD stage 3, pan-diverticulosis with history of diverticulitis and diverticular bleed 13 years ago was brought to the ED by her daughter Glenn today for abrupt onset of painless hematochezia. Daughter states patient took her ASA 81mg at 8:30am and by 10am she experienced her first episode of dark red/maroon colored stool. She had three episodes at home and one in the ED. Patient denies associated pain, nausea or vomiting. In the ED her vitals signs showed a lower than normal BP at 107/68 with a HR of 66, O2 Sat 98% RA. Labs showed a H&H 12/35.4. She underwent a tagged RBC scan because of her allergy to iodine contrast and was negative. Repeat H&H post imaging was 10.2/30.7.     Patient is seen this afternoon in the ED. She remains alert and hemodynamically stable. Daughter at bedside. Confirmed patient is no longer taking Eliquis though it was a active medication on her medication list. No NSAID use. Last colonoscopy in 08/2017 and notable for pan tics, with suspected diverticulitis in the sigmoid colon. She was treated with Cipro and Flagyl for 10 days. Patient denies recent history of constipation. She wears adult diapers.

## 2023-11-22 VITALS
TEMPERATURE: 98 F | RESPIRATION RATE: 16 BRPM | DIASTOLIC BLOOD PRESSURE: 85 MMHG | HEART RATE: 85 BPM | SYSTOLIC BLOOD PRESSURE: 124 MMHG | HEIGHT: 57 IN | OXYGEN SATURATION: 97 % | BODY MASS INDEX: 29.11 KG/M2 | WEIGHT: 134.94 LBS

## 2023-11-22 PROBLEM — K92.2 ACUTE LOWER GI BLEEDING: Status: RESOLVED | Noted: 2023-11-21 | Resolved: 2023-11-22

## 2023-11-22 LAB
ANION GAP SERPL CALC-SCNC: 16 MMOL/L (ref 8–16)
BUN SERPL-MCNC: 38 MG/DL (ref 8–23)
CALCIUM SERPL-MCNC: 8.6 MG/DL (ref 8.7–10.5)
CHLORIDE SERPL-SCNC: 96 MMOL/L (ref 95–110)
CO2 SERPL-SCNC: 26 MMOL/L (ref 23–29)
CREAT SERPL-MCNC: 0.9 MG/DL (ref 0.5–1.4)
EST. GFR  (NO RACE VARIABLE): >60 ML/MIN/1.73 M^2
GLUCOSE SERPL-MCNC: 82 MG/DL (ref 70–110)
HCT VFR BLD AUTO: 32.2 % (ref 37–48.5)
HCT VFR BLD AUTO: 32.4 % (ref 37–48.5)
HCT VFR BLD AUTO: 32.8 % (ref 37–48.5)
HGB BLD-MCNC: 10.7 G/DL (ref 12–16)
HGB BLD-MCNC: 10.8 G/DL (ref 12–16)
HGB BLD-MCNC: 10.8 G/DL (ref 12–16)
POTASSIUM SERPL-SCNC: 3 MMOL/L (ref 3.5–5.1)
SODIUM SERPL-SCNC: 138 MMOL/L (ref 136–145)

## 2023-11-22 PROCEDURE — 36415 COLL VENOUS BLD VENIPUNCTURE: CPT | Performed by: NURSE PRACTITIONER

## 2023-11-22 PROCEDURE — C9113 INJ PANTOPRAZOLE SODIUM, VIA: HCPCS | Performed by: NURSE PRACTITIONER

## 2023-11-22 PROCEDURE — 80048 BASIC METABOLIC PNL TOTAL CA: CPT | Performed by: NURSE PRACTITIONER

## 2023-11-22 PROCEDURE — 85014 HEMATOCRIT: CPT | Mod: 91 | Performed by: FAMILY MEDICINE

## 2023-11-22 PROCEDURE — 85018 HEMOGLOBIN: CPT | Mod: 91 | Performed by: FAMILY MEDICINE

## 2023-11-22 PROCEDURE — G0378 HOSPITAL OBSERVATION PER HR: HCPCS

## 2023-11-22 PROCEDURE — 85014 HEMATOCRIT: CPT | Performed by: FAMILY MEDICINE

## 2023-11-22 PROCEDURE — 36415 COLL VENOUS BLD VENIPUNCTURE: CPT | Performed by: FAMILY MEDICINE

## 2023-11-22 PROCEDURE — 25000003 PHARM REV CODE 250: Performed by: NURSE PRACTITIONER

## 2023-11-22 PROCEDURE — 25000003 PHARM REV CODE 250: Performed by: HOSPITALIST

## 2023-11-22 PROCEDURE — 96376 TX/PRO/DX INJ SAME DRUG ADON: CPT

## 2023-11-22 PROCEDURE — 85018 HEMOGLOBIN: CPT | Performed by: FAMILY MEDICINE

## 2023-11-22 PROCEDURE — 63600175 PHARM REV CODE 636 W HCPCS: Performed by: NURSE PRACTITIONER

## 2023-11-22 RX ORDER — POTASSIUM CHLORIDE 20 MEQ/1
40 TABLET, EXTENDED RELEASE ORAL ONCE
Status: COMPLETED | OUTPATIENT
Start: 2023-11-22 | End: 2023-11-22

## 2023-11-22 RX ADMIN — METOPROLOL TARTRATE 25 MG: 25 TABLET, FILM COATED ORAL at 09:11

## 2023-11-22 RX ADMIN — FUROSEMIDE 20 MG: 20 TABLET ORAL at 09:11

## 2023-11-22 RX ADMIN — POTASSIUM CHLORIDE 40 MEQ: 1500 TABLET, EXTENDED RELEASE ORAL at 09:11

## 2023-11-22 RX ADMIN — PANTOPRAZOLE SODIUM 40 MG: 40 INJECTION, POWDER, FOR SOLUTION INTRAVENOUS at 04:11

## 2023-11-22 NOTE — DISCHARGE SUMMARY
[facility] Medicine  Discharge Summary      Patient Name: Elke Laws  MRN: [id number]  ALBERT: [id number]  Patient Class: OP- Observation  Admission Date: 11/21/2023  Hospital Length of Stay: 1 days  Discharge Date and Time: No discharge date for patient encounter.  Attending Physician: Rai Covington MD   Discharging Provider: Rai Covington MD  Primary Care Provider: Isidra Benavidez MD    Primary Care Team: [facility] MEDICINE A    HPI:   Ms. Laws is an 89 year old female with a history of CAD, HTN, permanent afib, diverticulosis and dementia who presents to ED for evaluation of bright red blood in her diaper after going to the restroom, notice by her daughter.  Of note patients daughter states that she is prescribed Eliquis and ASA 81 mg.  Patient has been out of Eliquis for over 2 months and unable to get a refill so she has not been taking it. Patients last dose of ASA was this am (11/21).  Patient denies any CP, palpitations, dyspnea, fever/chills, hematemesis.  In ED hgb 12, creatinine 1.2.  UA with no signs of infection.  Per ED patient had another large bloody BM in ED.  GI consulted, recommended tagged RBC scan - no active bleeding noted.  Repeat Hgb 10 in ED (was given 500ml of NS upon arrival).  No further episodes of bleeding.  Patient will be admitted to hospital medicine for GI bleed.    Code Status DNR, discussed with patient and daughter at bedside      * No surgery found *      Hospital Course:   11/22/23  NAEON, H/H remain stable  Daughter at bedside, reports last BM around 4 AM  Stable for hospital discharge  F/U with PCP in 1-2 weeks for further management  ---  Of note, patient hasn't taken Eliquis for many months per daughter  Advised to hold ASA till f/u with PCP in 1-2 weeks     Goals of Care Treatment Preferences:  Code Status: DNR    Living Will: Yes              Consults:   Consults (From admission, onward)          Status Ordering Provider     Inpatient consult to  Gastroenterology  Once        Provider:  (Not yet assigned)    Completed JUDE LEGGETT            No new Assessment & Plan notes have been filed under this hospital service since the last note was generated.  Service: Hospital Medicine    Final Active Diagnoses:    Diagnosis Date Noted POA    Longstanding persistent atrial fibrillation [I48.11] 04/14/2022 Yes    Dementia [F03.90] 10/08/2021 Yes    Coronary artery disease [I25.10] 11/23/2019 Yes    Stage 3a chronic kidney disease [N18.31] 10/21/2019 Yes    Essential hypertension [I10]  Yes    Hyperlipidemia [E78.5]  Yes     Chronic      Problems Resolved During this Admission:    Diagnosis Date Noted Date Resolved POA    PRINCIPAL PROBLEM:  Acute lower GI bleeding [K92.2] 11/21/2023 11/22/2023 Yes       Discharged Condition: stable    Disposition: Home or Self Care    Follow Up:   Follow-up Information       Isidra Benavidez MD. Schedule an appointment as soon as possible for a visit in 1 week(s).    Specialty: Family Medicine  Why: Hospital discharge follow up  Contact information:  [address] [place] [address] [address]ra [place] [address] [phone]                           Patient Instructions:   No discharge procedures on file.    Significant Diagnostic Studies: Labs: All labs within the past 24 hours have been reviewed    Pending Diagnostic Studies:       None           Medications:  Reconciled Home Medications:      Medication List        CHANGE how you take these medications      potassium chloride SA 10 MEQ tablet  Commonly known as: K-DURROSEMARIEOR-CON M  TAKE 1 TABLET TWICE DAILY  What changed: when to take this            CONTINUE taking these medications      acetaminophen 500 MG tablet  Commonly known as: TYLENOL  Take 500 mg by mouth every 6 (six) hours as needed for Pain.     ascorbic acid (vitamin C) 500 MG tablet  Commonly known as: VITAMIN C  Take 500 mg by mouth once daily.     cyanocobalamin 1000 MCG tablet  Commonly known as: VITAMIN B-12  Take 100  mcg by mouth once daily.     FIBER-TABS ORAL  Take by mouth once daily.     furosemide 40 MG tablet  Commonly known as: LASIX  Take 1 tablet (40 mg total) by mouth once daily.     metOLazone 2.5 MG tablet  Commonly known as: ZAROXOLYN  Take 1 tablet (2.5 mg total) by mouth every Mon, Wed, Fri.     metoprolol tartrate 50 MG tablet  Commonly known as: LOPRESSOR  Take 0.5 tablets (25 mg total) by mouth 2 (two) times daily.     omega 3-dha-epa-fish oil 1,000 mg (120 mg-180 mg) Cap  Take 1 capsule by mouth once daily.     pravastatin 40 MG tablet  Commonly known as: PRAVACHOL  Take 1 tablet (40 mg total) by mouth every evening.     senna 8.6 mg tablet  Commonly known as: SENOKOT  Take 1 tablet by mouth daily as needed for Constipation.     VITAMIN D ORAL  Take by mouth once daily.     vitamin E 400 UNIT capsule  Take 400 Units by mouth once daily.            STOP taking these medications      apixaban 5 mg Tab  Commonly known as: ELIQUIS     aspirin 81 MG EC tablet  Commonly known as: ECOTRIN              Indwelling Lines/Drains at time of discharge:   Lines/Drains/Airways       None                   Time spent on the discharge of patient: 35 minutes         Rai Covington MD  Department of Hospital Medicine  [facility]

## 2023-11-22 NOTE — PLAN OF CARE
[facility]  Discharge Final Note    Primary Care Provider: Isidra Benavidez MD    Expected Discharge Date: 11/22/2023    Final Discharge Note (most recent)       Final Note - 11/22/23 1052          Final Note    Assessment Type Final Discharge Note     Anticipated Discharge Disposition Home or Self Care     Hospital Resources/Appts/Education Provided Appointments scheduled and added to AVS        Post-Acute Status    Discharge Delays None known at this time                     Contact Info       Isidra Benavidez MD   Specialty: Family Medicine   Relationship: PCP - General    [address] [place] [address] [address] [address]   Phone: [phone]       Next Steps: Schedule an appointment as soon as possible for a visit in 1 week(s)    Instructions: Hospital discharge follow up          No d/c needs.

## 2023-11-22 NOTE — HOSPITAL COURSE
11/22/23  RAISA, H/H remain stable  Daughter at bedside, reports last BM around 4 AM  Stable for hospital discharge  F/U with PCP in 1-2 weeks for further management  ---  Of note, patient hasn't taken Eliquis for many months per daughter  Advised to hold ASA till f/u with PCP in 1-2 weeks

## 2023-11-22 NOTE — PLAN OF CARE
Discussed poc with pt and family, verbalized understanding     Purposeful rounding every 2hours    VS wnl  Cardiac monitoring in use  Fall precautions in place, remains injury free  Pt denies c/o n/v and pain    Accurate I&Os  Bed locked at lowest position  Call light within reach    Chart check complete  Will cont with POC    Problem: Adult Inpatient Plan of Care  Goal: Plan of Care Review  Outcome: Ongoing, Progressing  Goal: Patient-Specific Goal (Individualized)  Outcome: Ongoing, Progressing  Goal: Absence of Hospital-Acquired Illness or Injury  Outcome: Ongoing, Progressing  Goal: Optimal Comfort and Wellbeing  Outcome: Ongoing, Progressing  Goal: Readiness for Transition of Care  Outcome: Ongoing, Progressing     Problem: Fall Injury Risk  Goal: Absence of Fall and Fall-Related Injury  Outcome: Ongoing, Progressing     Problem: Skin Injury Risk Increased  Goal: Skin Health and Integrity  Outcome: Ongoing, Progressing

## 2023-11-22 NOTE — ASSESSMENT & PLAN NOTE
Known pan tics  Hx diverticular bleed 13 years ago  On ASA - last taken 11/21/23 at 8:30am  Last colonoscopy 08/2017: pan tics and suspected diverticulitis s/p abxs

## 2023-11-22 NOTE — SUBJECTIVE & OBJECTIVE
"Past Medical History:   Diagnosis Date    Abnormal nuclear stress test 6/30/2016    Acute congestive heart failure 5/27/2017    Acute coronary syndrome     Acute kidney injury (nontraumatic) 5/27/2017    Acute on chronic congestive heart failure 5/21/2017    Acute on chronic diastolic congestive heart failure 8/27/2015    Acute on chronic respiratory failure 5/6/2019    Acute renal failure superimposed on stage 3 chronic kidney disease 1/26/2018    Anemia 5/9/2016    Angina pectoris 1/25/2018    Angina pectoris 1/25/2018    Anticoagulant long-term use     Anxiety 6/16/2019    Aortic regurgitation     Echo 11/2013---5 - Mild to moderate aortic regurgitation.      Asthma     Atrial fibrillation 5/15/2014    Back pain     s/p epidural steriod injections, no recent injections.    Bridges's cyst     small, right, seen on US lower extremity 6/2014    Blood transfusion     Approximately 6 years ago    Chronic constipation     Coronary artery disease     Degenerative disc disease, lumbar     Dementia 10/8/2021    Diastolic dysfunction     Seen on echo 11/2013, stress test negative 5/2014    Diverticulosis     colonoscopy 3/27/2011    E coli bacteremia 5/23/2017    E. coli UTI (urinary tract infection) 5/23/2017    Hemorrhoids     colonoscopy 3/27/2011    Hiatal hernia     CXR 12/30/2016---Retrocardiac density again noted consistent with a hiatal hernia.    Hx of adenomatous colonic polyps     Hyperlipidemia     Hypertension     Hyponatremia 5/9/2016    Hypoxemia 5/27/2017    Hypoxia 6/28/2017    Leukocytosis 5/27/2017    Mitral regurgitation     Myocardial infarction     per patient was told in the past she had a "mild heart attack"    Obesity     Osteoarthritis, knee     Pneumonia     per patient "walking Pneumonia" did not require hospitalization    Seasonal allergies     Sepsis 5/22/2017    Severe obesity with body mass index (BMI) of 35.0 to 39.9 with serious comorbidity     Trouble in sleeping     Urinary incontinence  "       Past Surgical History:   Procedure Laterality Date    APPENDECTOMY      CATARACT EXTRACTION, BILATERAL      COLONOSCOPY N/A 2017    Procedure: COLONOSCOPY okay by dr. ramos;  Surgeon: Toño Abdi MD;  Location: [facility] ENDO;  Service: Endoscopy;  Laterality: N/A;    EYE SURGERY Left     HYSTERECTOMY      benign reasons    KNEE SURGERY Bilateral     x2     Left heart cath      OLECRANON BURSECTOMY Right     2011    TONSILLECTOMY      TREATMENT OF CARDIAC ARRHYTHMIA N/A 2019    Procedure: CARDIOVERSION OR DEFIBRILLATION;  Surgeon: Kee Jones MD;  Location: [facility] CATH LAB;  Service: Cardiology;  Laterality: N/A;    TREATMENT OF CARDIAC ARRHYTHMIA N/A 2019    Procedure: CARDIOVERSION;  Surgeon: Juan Manuel Gooden MD;  Location: [facility] CATH LAB;  Service: Cardiology;  Laterality: N/A;    URETHRA SURGERY         Review of patient's allergies indicates:   Allergen Reactions    Iodine and iodide containing products Rash     Family History       Problem Relation (Age of Onset)    COPD Son    Cancer Mother, Sister    Diabetes Brother, Son    Heart disease Mother, Father, Sister, Brother    Hyperlipidemia Mother, Father    Hypertension Mother, Father, Son          Tobacco Use    Smoking status: Former     Current packs/day: 0.00     Average packs/day: 0.1 packs/day for 1 year (0.1 ttl pk-yrs)     Types: Cigarettes     Start date: 1951     Quit date: 1952     Years since quittin.9    Smokeless tobacco: Never   Substance and Sexual Activity    Alcohol use: No     Alcohol/week: 0.0 standard drinks of alcohol    Drug use: No    Sexual activity: Not Currently     Partners: Male     Birth control/protection: See Surgical Hx     Review of Systems   Gastrointestinal:  Positive for blood in stool.   All other systems reviewed and are negative.    Objective:     Vital Signs (Most Recent):  Temp: 97.8 °F (36.6 °C) (23 1037)  Pulse: 74 (23 1802)  Resp: 18 (23 1602)  BP: 130/61 (23  1802)  SpO2: 97 % (11/21/23 1802) Vital Signs (24h Range):  Temp:  [97.8 °F (36.6 °C)] 97.8 °F (36.6 °C)  Pulse:  [66-82] 74  Resp:  [18] 18  SpO2:  [97 %-98 %] 97 %  BP: (104-130)/(58-68) 130/61     Weight: (S)  (Needs bed weight) (11/21/23 1037)  There is no height or weight on file to calculate BMI.      Intake/Output Summary (Last 24 hours) at 11/21/2023 1918  Last data filed at 11/21/2023 1303  Gross per 24 hour   Intake 500 ml   Output --   Net 500 ml       Lines/Drains/Airways       Peripheral Intravenous Line  Duration                  Peripheral IV - Single Lumen 11/21/23 1103 22 G Anterior;Left Forearm <1 day         Peripheral IV - Single Lumen 11/21/23 1738 20 G Posterior;Right Hand <1 day                     Physical Exam  Vitals and nursing note reviewed.   Constitutional:       Appearance: Normal appearance.   Cardiovascular:      Rate and Rhythm: Normal rate and regular rhythm.   Pulmonary:      Effort: Pulmonary effort is normal.      Breath sounds: Normal breath sounds.   Abdominal:      Palpations: Abdomen is soft.      Tenderness: There is no abdominal tenderness. There is no guarding or rebound.   Skin:     General: Skin is warm and dry.   Neurological:      General: No focal deficit present.      Mental Status: She is alert and oriented to person, place, and time.   Psychiatric:         Mood and Affect: Mood normal.         Behavior: Behavior normal.         Thought Content: Thought content normal.         Judgment: Judgment normal.          Significant Labs:  CBC:   Recent Labs   Lab 11/21/23  1103 11/21/23  1432   WBC 8.85 6.91   HGB 12.0 10.2*   HCT 35.4* 30.7*    148*     CMP:   Recent Labs   Lab 11/21/23  1103      CALCIUM 8.6*   ALBUMIN 3.1*   PROT 6.2   *   K 3.7   CO2 27   CL 94*   BUN 38*   CREATININE 1.2   ALKPHOS 72   ALT 14   AST 19   BILITOT 0.7     Coagulation:   Recent Labs   Lab 11/21/23  1117   INR 1.1   APTT 26.0       Significant Imaging:  Imaging results  within the past 24 hours have been reviewed.

## 2023-11-27 ENCOUNTER — OFFICE VISIT (OUTPATIENT)
Dept: INTERNAL MEDICINE | Facility: CLINIC | Age: 88
End: 2023-11-27
Payer: MEDICARE

## 2023-11-27 ENCOUNTER — LAB VISIT (OUTPATIENT)
Dept: LAB | Facility: HOSPITAL | Age: 88
End: 2023-11-27
Attending: FAMILY MEDICINE
Payer: MEDICARE

## 2023-11-27 VITALS
OXYGEN SATURATION: 97 % | HEART RATE: 77 BPM | DIASTOLIC BLOOD PRESSURE: 64 MMHG | BODY MASS INDEX: 26.63 KG/M2 | RESPIRATION RATE: 18 BRPM | WEIGHT: 123.44 LBS | SYSTOLIC BLOOD PRESSURE: 102 MMHG | TEMPERATURE: 97 F | HEIGHT: 57 IN

## 2023-11-27 DIAGNOSIS — Z23 NEED FOR VACCINATION: ICD-10-CM

## 2023-11-27 DIAGNOSIS — K92.2 ACUTE LOWER GI BLEEDING: Primary | ICD-10-CM

## 2023-11-27 DIAGNOSIS — K92.2 ACUTE LOWER GI BLEEDING: ICD-10-CM

## 2023-11-27 PROCEDURE — 99999 PR PBB SHADOW E&M-EST. PATIENT-LVL V: CPT | Mod: PBBFAC,,, | Performed by: FAMILY MEDICINE

## 2023-11-27 PROCEDURE — 90694 VACC AIIV4 NO PRSRV 0.5ML IM: CPT | Mod: S$GLB,,, | Performed by: FAMILY MEDICINE

## 2023-11-27 PROCEDURE — 1126F PR PAIN SEVERITY QUANTIFIED, NO PAIN PRESENT: ICD-10-PCS | Mod: CPTII,S$GLB,, | Performed by: FAMILY MEDICINE

## 2023-11-27 PROCEDURE — 90694 FLU VACCINE - QUADRIVALENT - ADJUVANTED: ICD-10-PCS | Mod: S$GLB,,, | Performed by: FAMILY MEDICINE

## 2023-11-27 PROCEDURE — 1157F ADVNC CARE PLAN IN RCRD: CPT | Mod: CPTII,S$GLB,, | Performed by: FAMILY MEDICINE

## 2023-11-27 PROCEDURE — 1157F PR ADVANCE CARE PLAN OR EQUIV PRESENT IN MEDICAL RECORD: ICD-10-PCS | Mod: CPTII,S$GLB,, | Performed by: FAMILY MEDICINE

## 2023-11-27 PROCEDURE — 1101F PT FALLS ASSESS-DOCD LE1/YR: CPT | Mod: CPTII,S$GLB,, | Performed by: FAMILY MEDICINE

## 2023-11-27 PROCEDURE — 1159F MED LIST DOCD IN RCRD: CPT | Mod: CPTII,S$GLB,, | Performed by: FAMILY MEDICINE

## 2023-11-27 PROCEDURE — 1126F AMNT PAIN NOTED NONE PRSNT: CPT | Mod: CPTII,S$GLB,, | Performed by: FAMILY MEDICINE

## 2023-11-27 PROCEDURE — 1159F PR MEDICATION LIST DOCUMENTED IN MEDICAL RECORD: ICD-10-PCS | Mod: CPTII,S$GLB,, | Performed by: FAMILY MEDICINE

## 2023-11-27 PROCEDURE — G0008 ADMIN INFLUENZA VIRUS VAC: HCPCS | Mod: S$GLB,,, | Performed by: FAMILY MEDICINE

## 2023-11-27 PROCEDURE — G0008 FLU VACCINE - QUADRIVALENT - ADJUVANTED: ICD-10-PCS | Mod: S$GLB,,, | Performed by: FAMILY MEDICINE

## 2023-11-27 PROCEDURE — 3288F FALL RISK ASSESSMENT DOCD: CPT | Mod: CPTII,S$GLB,, | Performed by: FAMILY MEDICINE

## 2023-11-27 PROCEDURE — 99213 PR OFFICE/OUTPT VISIT, EST, LEVL III, 20-29 MIN: ICD-10-PCS | Mod: S$GLB,,, | Performed by: FAMILY MEDICINE

## 2023-11-27 PROCEDURE — 3288F PR FALLS RISK ASSESSMENT DOCUMENTED: ICD-10-PCS | Mod: CPTII,S$GLB,, | Performed by: FAMILY MEDICINE

## 2023-11-27 PROCEDURE — 99213 OFFICE O/P EST LOW 20 MIN: CPT | Mod: S$GLB,,, | Performed by: FAMILY MEDICINE

## 2023-11-27 PROCEDURE — 99999 PR PBB SHADOW E&M-EST. PATIENT-LVL V: ICD-10-PCS | Mod: PBBFAC,,, | Performed by: FAMILY MEDICINE

## 2023-11-27 PROCEDURE — 1101F PR PT FALLS ASSESS DOC 0-1 FALLS W/OUT INJ PAST YR: ICD-10-PCS | Mod: CPTII,S$GLB,, | Performed by: FAMILY MEDICINE

## 2023-11-27 PROCEDURE — 85025 COMPLETE CBC W/AUTO DIFF WBC: CPT | Performed by: FAMILY MEDICINE

## 2023-11-27 PROCEDURE — 36415 COLL VENOUS BLD VENIPUNCTURE: CPT | Performed by: FAMILY MEDICINE

## 2023-11-27 RX ORDER — MEMANTINE HYDROCHLORIDE 10 MG/1
5 TABLET ORAL 2 TIMES DAILY
COMMUNITY
Start: 2023-11-15

## 2023-11-27 NOTE — PATIENT INSTRUCTIONS
Check with your pharmacist regarding RSV vaccine.      You are eligible for a covid booster, covid vaccines are available at most pharmacies.

## 2023-11-27 NOTE — PROGRESS NOTES
Subjective:       Patient ID: Elke Laws is a 89 y.o. female.    Chief Complaint: Hospital Follow Up    Transitional Care Note    Family and/or Caretaker present at visit?  Yes.  Diagnostic tests reviewed/disposition: No diagnosic tests pending after this hospitalization.  Disease/illness education: see A/P  Home health/community services discussion/referrals: Patient has home health established at [facility].   Establishment or re-establishment of referral orders for community resources: No other necessary community resources.   Discussion with other health care providers: No discussion with other health care providers necessary.     Patient presents to clinic today for hospital TCC visit. Patient was recently hospitalized for rectal bleeding. Caregiver reports she had some shortness of breath yesterday, but resolved today. Denies bloody stools or melena. She is scheduled with Dr. Martin at [facility] 12/15/23. She is seeing Dr. Andujar, Neurology at [facility].       Review of Systems   Constitutional:  Negative for chills, fatigue, fever and unexpected weight change.   Eyes:  Negative for visual disturbance.   Respiratory:  Negative for shortness of breath.    Cardiovascular:  Negative for chest pain.   Musculoskeletal:  Negative for myalgias.   Neurological:  Negative for headaches.         Objective:      Physical Exam  Vitals reviewed.   Constitutional:       General: She is not in acute distress.     Appearance: She is well-developed.   HENT:      Head: Normocephalic and atraumatic.   Eyes:      General: Lids are normal. No scleral icterus.     Extraocular Movements: Extraocular movements intact.      Conjunctiva/sclera: Conjunctivae normal.      Pupils: Pupils are equal, round, and reactive to light.   Cardiovascular:      Rate and Rhythm: Normal rate and regular rhythm.      Heart sounds: No murmur heard.     No friction rub. No gallop.   Pulmonary:      Effort: Pulmonary effort is  normal.      Breath sounds: Normal breath sounds. No decreased breath sounds, wheezing, rhonchi or rales.   Neurological:      Mental Status: She is alert and oriented to person, place, and time.      Cranial Nerves: No cranial nerve deficit.      Gait: Gait normal.   Psychiatric:         Mood and Affect: Mood and affect normal.         Assessment:       1. Acute lower GI bleeding    2. Need for vaccination        Plan:   1. Acute lower GI bleeding  -     CBC Auto Differential; Future; Expected date: 11/27/2023    2. Need for vaccination  -     Influenza - Quadrivalent (Adjuvanted)      Keep appointment with Cardiology to discuss whether or not she needs to resume aspirin.  Caregiver expressed understanding and agreement with plan.

## 2023-11-28 LAB
BASOPHILS # BLD AUTO: 0.03 K/UL (ref 0–0.2)
BASOPHILS NFR BLD: 0.3 % (ref 0–1.9)
DIFFERENTIAL METHOD: ABNORMAL
EOSINOPHIL # BLD AUTO: 0 K/UL (ref 0–0.5)
EOSINOPHIL NFR BLD: 0.1 % (ref 0–8)
ERYTHROCYTE [DISTWIDTH] IN BLOOD BY AUTOMATED COUNT: 13.6 % (ref 11.5–14.5)
HCT VFR BLD AUTO: 34.3 % (ref 37–48.5)
HGB BLD-MCNC: 11.1 G/DL (ref 12–16)
IMM GRANULOCYTES # BLD AUTO: 0.05 K/UL (ref 0–0.04)
IMM GRANULOCYTES NFR BLD AUTO: 0.4 % (ref 0–0.5)
LYMPHOCYTES # BLD AUTO: 1.4 K/UL (ref 1–4.8)
LYMPHOCYTES NFR BLD: 12 % (ref 18–48)
MCH RBC QN AUTO: 31.3 PG (ref 27–31)
MCHC RBC AUTO-ENTMCNC: 32.4 G/DL (ref 32–36)
MCV RBC AUTO: 97 FL (ref 82–98)
MONOCYTES # BLD AUTO: 1 K/UL (ref 0.3–1)
MONOCYTES NFR BLD: 8.9 % (ref 4–15)
NEUTROPHILS # BLD AUTO: 8.8 K/UL (ref 1.8–7.7)
NEUTROPHILS NFR BLD: 78.3 % (ref 38–73)
NRBC BLD-RTO: 0 /100 WBC
PLATELET # BLD AUTO: 240 K/UL (ref 150–450)
PMV BLD AUTO: 12.4 FL (ref 9.2–12.9)
RBC # BLD AUTO: 3.55 M/UL (ref 4–5.4)
WBC # BLD AUTO: 11.21 K/UL (ref 3.9–12.7)

## 2023-12-18 ENCOUNTER — OFFICE VISIT (OUTPATIENT)
Dept: INTERNAL MEDICINE | Facility: CLINIC | Age: 88
End: 2023-12-18
Payer: MEDICARE

## 2023-12-18 VITALS
HEART RATE: 66 BPM | HEIGHT: 56 IN | WEIGHT: 132.69 LBS | DIASTOLIC BLOOD PRESSURE: 70 MMHG | BODY MASS INDEX: 29.85 KG/M2 | OXYGEN SATURATION: 96 % | SYSTOLIC BLOOD PRESSURE: 132 MMHG

## 2023-12-18 DIAGNOSIS — Z00.00 ENCOUNTER FOR PREVENTIVE HEALTH EXAMINATION: Primary | ICD-10-CM

## 2023-12-18 DIAGNOSIS — F41.9 ANXIETY: ICD-10-CM

## 2023-12-18 DIAGNOSIS — J45.20 MILD INTERMITTENT ASTHMA WITHOUT COMPLICATION: ICD-10-CM

## 2023-12-18 DIAGNOSIS — R15.9 INCONTINENCE OF FECES, UNSPECIFIED FECAL INCONTINENCE TYPE: ICD-10-CM

## 2023-12-18 DIAGNOSIS — I48.0 PAF (PAROXYSMAL ATRIAL FIBRILLATION): ICD-10-CM

## 2023-12-18 DIAGNOSIS — I70.0 ATHEROSCLEROSIS OF AORTA: ICD-10-CM

## 2023-12-18 DIAGNOSIS — I10 ESSENTIAL HYPERTENSION: ICD-10-CM

## 2023-12-18 DIAGNOSIS — I25.10 CORONARY ARTERY DISEASE, UNSPECIFIED VESSEL OR LESION TYPE, UNSPECIFIED WHETHER ANGINA PRESENT, UNSPECIFIED WHETHER NATIVE OR TRANSPLANTED HEART: ICD-10-CM

## 2023-12-18 DIAGNOSIS — I27.9 PULMONARY HEART DISEASE: ICD-10-CM

## 2023-12-18 DIAGNOSIS — E78.5 HYPERLIPIDEMIA, UNSPECIFIED HYPERLIPIDEMIA TYPE: Chronic | ICD-10-CM

## 2023-12-18 DIAGNOSIS — R26.9 ABNORMALITY OF GAIT AND MOBILITY: ICD-10-CM

## 2023-12-18 DIAGNOSIS — Z99.89 DEPENDENCE ON OTHER ENABLING MACHINES AND DEVICES: ICD-10-CM

## 2023-12-18 DIAGNOSIS — F03.90 DEMENTIA, UNSPECIFIED DEMENTIA SEVERITY, UNSPECIFIED DEMENTIA TYPE, UNSPECIFIED WHETHER BEHAVIORAL, PSYCHOTIC, OR MOOD DISTURBANCE OR ANXIETY: ICD-10-CM

## 2023-12-18 DIAGNOSIS — R32 URINARY INCONTINENCE, UNSPECIFIED TYPE: ICD-10-CM

## 2023-12-18 PROCEDURE — G0439 PR MEDICARE ANNUAL WELLNESS SUBSEQUENT VISIT: ICD-10-PCS | Mod: S$GLB,,, | Performed by: NURSE PRACTITIONER

## 2023-12-18 PROCEDURE — 1159F PR MEDICATION LIST DOCUMENTED IN MEDICAL RECORD: ICD-10-PCS | Mod: CPTII,S$GLB,, | Performed by: NURSE PRACTITIONER

## 2023-12-18 PROCEDURE — 99999 PR PBB SHADOW E&M-EST. PATIENT-LVL V: CPT | Mod: PBBFAC,,, | Performed by: NURSE PRACTITIONER

## 2023-12-18 PROCEDURE — 1170F PR FUNCTIONAL STATUS ASSESSED: ICD-10-PCS | Mod: CPTII,S$GLB,, | Performed by: NURSE PRACTITIONER

## 2023-12-18 PROCEDURE — 99999 PR PBB SHADOW E&M-EST. PATIENT-LVL V: ICD-10-PCS | Mod: PBBFAC,,, | Performed by: NURSE PRACTITIONER

## 2023-12-18 PROCEDURE — 1126F AMNT PAIN NOTED NONE PRSNT: CPT | Mod: CPTII,S$GLB,, | Performed by: NURSE PRACTITIONER

## 2023-12-18 PROCEDURE — 1101F PR PT FALLS ASSESS DOC 0-1 FALLS W/OUT INJ PAST YR: ICD-10-PCS | Mod: CPTII,S$GLB,, | Performed by: NURSE PRACTITIONER

## 2023-12-18 PROCEDURE — 1126F PR PAIN SEVERITY QUANTIFIED, NO PAIN PRESENT: ICD-10-PCS | Mod: CPTII,S$GLB,, | Performed by: NURSE PRACTITIONER

## 2023-12-18 PROCEDURE — 3288F PR FALLS RISK ASSESSMENT DOCUMENTED: ICD-10-PCS | Mod: CPTII,S$GLB,, | Performed by: NURSE PRACTITIONER

## 2023-12-18 PROCEDURE — 1160F PR REVIEW ALL MEDS BY PRESCRIBER/CLIN PHARMACIST DOCUMENTED: ICD-10-PCS | Mod: CPTII,S$GLB,, | Performed by: NURSE PRACTITIONER

## 2023-12-18 PROCEDURE — 1170F FXNL STATUS ASSESSED: CPT | Mod: CPTII,S$GLB,, | Performed by: NURSE PRACTITIONER

## 2023-12-18 PROCEDURE — G0439 PPPS, SUBSEQ VISIT: HCPCS | Mod: S$GLB,,, | Performed by: NURSE PRACTITIONER

## 2023-12-18 PROCEDURE — 1157F ADVNC CARE PLAN IN RCRD: CPT | Mod: CPTII,S$GLB,, | Performed by: NURSE PRACTITIONER

## 2023-12-18 PROCEDURE — 1159F MED LIST DOCD IN RCRD: CPT | Mod: CPTII,S$GLB,, | Performed by: NURSE PRACTITIONER

## 2023-12-18 PROCEDURE — 3288F FALL RISK ASSESSMENT DOCD: CPT | Mod: CPTII,S$GLB,, | Performed by: NURSE PRACTITIONER

## 2023-12-18 PROCEDURE — 1157F PR ADVANCE CARE PLAN OR EQUIV PRESENT IN MEDICAL RECORD: ICD-10-PCS | Mod: CPTII,S$GLB,, | Performed by: NURSE PRACTITIONER

## 2023-12-18 PROCEDURE — 1160F RVW MEDS BY RX/DR IN RCRD: CPT | Mod: CPTII,S$GLB,, | Performed by: NURSE PRACTITIONER

## 2023-12-18 PROCEDURE — 1101F PT FALLS ASSESS-DOCD LE1/YR: CPT | Mod: CPTII,S$GLB,, | Performed by: NURSE PRACTITIONER

## 2023-12-18 NOTE — PROGRESS NOTES
"  Elke Laws presented for a  Medicare AWV and comprehensive Health Risk Assessment today. The following components were reviewed and updated:    Medical history  Family History  Social history  Allergies and Current Medications  Health Risk Assessment  Health Maintenance  Care Team         ** See Completed Assessments for Annual Wellness Visit within the encounter summary.**         The following assessments were completed:  Living Situation  CAGE  Depression Screening  Timed Get Up and Go-na  Whisper Test-na  Cognitive Function Screening-na  Nutrition Screening  ADL Screening  PAQ Screening        Vitals:    12/18/23 1343   BP: 132/70   Pulse: 66   SpO2: 96%   Weight: 60.2 kg (132 lb 11.5 oz)   Height: 4' 8.3" (1.43 m)     Body mass index is 29.44 kg/m².  Physical Exam  Vitals and nursing note reviewed.   Constitutional:       Appearance: She is well-developed.      Comments: Patient accompanied by daughter, who was present throughout the visit and aided in information gathering.    In wheelchair      HENT:      Head: Normocephalic.   Cardiovascular:      Rate and Rhythm: Normal rate. Rhythm irregular.      Heart sounds: Murmur heard.      Comments: Daughter reports cardiologist aware  Pulmonary:      Effort: Pulmonary effort is normal. No respiratory distress.      Breath sounds: Normal breath sounds.   Abdominal:      Palpations: Abdomen is soft. There is no mass.      Tenderness: There is no abdominal tenderness.   Musculoskeletal:         General: Normal range of motion.   Skin:     General: Skin is warm and dry.   Neurological:      Mental Status: She is alert.      Motor: No abnormal muscle tone.      Comments: Oriented to person and place. Unable to give correct month and year.    Psychiatric:         Speech: Speech normal.         Behavior: Behavior normal.               Diagnoses and health risks identified today and associated recommendations/orders:    1. Encounter for preventive health examination   "   Review for Opioid Screening: Pt does not have Rx for Opioids      Review for Substance Use Disorders: Patient does not use substance  per chart     Reports cardiac conditions are stable.    Reports she is at her respiratory baseline    Discussed receiving rsv and covid vaccine at pharmacy.     Encouraged healthy diet and exercise as tolerated     2. Dementia, unspecified dementia severity, unspecified dementia type, unspecified whether behavioral, psychotic, or mood disturbance or anxiety  Worsening per daughter  Advised to follow up with neurologist for further evaluation and recommendations. Patient expressed understanding.    - Ambulatory referral/consult to Outpatient Case Management    3. Atherosclerosis of aorta  Cxr 5/19  Stable. Continue current treatment plan as previously prescribed with your  cardiologist.     4. Pulmonary heart disease  Echo 3/19  Stable. Continue current treatment plan as previously prescribed with your  cardiologist and pulmonologist.     5. PAF (paroxysmal atrial fibrillation)  Stable. Continue current treatment plan as previously prescribed with your  cardiologist.     6. Essential hypertension  Continue current treatment plan as previously prescribed with your  pcp and cardiologist.     7. Hyperlipidemia, unspecified hyperlipidemia type  Continue current treatment plan as previously prescribed with your  pcp and cardiologist.     8. Coronary artery disease, unspecified vessel or lesion type, unspecified whether angina present, unspecified whether native or transplanted heart  Continue current treatment plan as previously prescribed with your  cardiologist.     9. Anxiety  Continue current treatment plan as previously prescribed with your  pcp     10. Mild intermittent asthma without complication  Continue current treatment plan as previously prescribed with your  pulmonologist.     11. Urinary incontinence, unspecified type  Chronic  Advised to follow up with PCP/neurologist for  further evaluation and recommendations they  expressed understanding.      12. Incontinence of feces, unspecified fecal incontinence type  Chronic  Advised to follow up with PCP/neurologist for further evaluation and recommendations they  expressed understanding.      13. Dependence on other enabling machines and devices  Unable to complete timed get up and go test (did not bring in assistive device). Denies any falls in the last 12 months. Uses a cane/walker to aid with ambulation.   Fall precautions reviewed with patient. Advised to follow up with PCP for further recommendations.  They expressed understanding.       14. Abnormality of gait and mobility  Advised to follow up with PCP for further recommendations.  They expressed understanding.       Provided Elke with a 5-10 year written screening schedule and personal prevention plan. Recommendations were developed using the USPSTF age appropriate recommendations. Education, counseling, and referrals were provided as needed. After Visit Summary printed and given to patient which includes a list of additional screenings\tests needed.    Follow up in about 1 year (around 12/18/2024) for awv.    Beronica Walters NP  I offered to discuss advanced care planning, including how to pick a person who would make decisions for you if you were unable to make them for yourself, called a health care power of , and what kind of decisions you might make such as use of life sustaining treatments such as ventilators and tube feeding when faced with a life limiting illness recorded on a living will that they will need to know. (How you want to be cared for as you near the end of your natural life)     X  Patient has advanced directives on file, which we reviewed, and they do not wish to make changes.

## 2023-12-18 NOTE — PATIENT INSTRUCTIONS
Counseling and Referral of Other Preventative  (Italic type indicates deductible and co-insurance are waived)    Patient Name: Elke Laws  Today's Date: 12/18/2023    Health Maintenance       Date Due Completion Date    RSV Vaccine (Age 60+ and Pregnant patients) (1 - 1-dose 60+ series) Never done ---    COVID-19 Vaccine (3 - 2023-24 season) 09/01/2023 7/6/2021    Hemoglobin A1c (Prediabetes) 04/24/2024 4/24/2023    Lipid Panel 04/24/2024 4/24/2023    TETANUS VACCINE 06/10/2026 6/10/2016        No orders of the defined types were placed in this encounter.    The following information is provided to all patients.  This information is to help you find resources for any of the problems found today that may be affecting your health:                Living healthy guide: www.[facility].[place].gov      Understanding Diabetes: www.diabetes.org      Eating healthy: www.cdc.gov/healthyweight      CDC home safety checklist: www.cdc.gov/steadi/patient.html      Agency on Aging: www.goea.[place].[facility]      Alcoholics anonymous (AA): www.aa.org      Physical Activity: www.cody.nih.gov/fv6qwve      Tobacco use: www.quitwithusla.org

## 2024-01-29 ENCOUNTER — TELEPHONE (OUTPATIENT)
Dept: INTERNAL MEDICINE | Facility: CLINIC | Age: 89
End: 2024-01-29
Payer: MEDICARE

## 2024-01-29 NOTE — TELEPHONE ENCOUNTER
----- Message from Josi Peñaloza sent at 1/29/2024  9:53 AM CST -----  Regarding: patient call back  Type: Patient Call Back    Who called: Xiao with [facility]     What is the request in detail: Asked for an order to evaluate     Can the clinic reply by [facility]? No     Would the patient rather a call back or a response via [facility]? Call/fax     Best call back number: fax [phone] or email [email or web address]   Phone [phone]

## 2024-01-31 ENCOUNTER — TELEPHONE (OUTPATIENT)
Dept: INTERNAL MEDICINE | Facility: CLINIC | Age: 89
End: 2024-01-31
Payer: MEDICARE

## 2024-01-31 NOTE — TELEPHONE ENCOUNTER
----- Message from Radha Alvarado sent at 1/31/2024  2:07 PM CST -----  Contact: Betzaida  Pt daughter is calling in regards to getting evaluation for HOSPICE before March 2024 to get more help for pt. Betzaida can be reached at [phone].      Thanks  TNCASSIE

## 2024-02-05 ENCOUNTER — OFFICE VISIT (OUTPATIENT)
Dept: INTERNAL MEDICINE | Facility: CLINIC | Age: 89
End: 2024-02-05
Payer: MEDICARE

## 2024-02-05 VITALS
DIASTOLIC BLOOD PRESSURE: 74 MMHG | HEIGHT: 56 IN | TEMPERATURE: 97 F | WEIGHT: 135.13 LBS | RESPIRATION RATE: 18 BRPM | HEART RATE: 69 BPM | SYSTOLIC BLOOD PRESSURE: 132 MMHG | BODY MASS INDEX: 30.4 KG/M2 | OXYGEN SATURATION: 96 %

## 2024-02-05 DIAGNOSIS — F03.918 DEMENTIA WITH OTHER BEHAVIORAL DISTURBANCE, UNSPECIFIED DEMENTIA SEVERITY, UNSPECIFIED DEMENTIA TYPE: Primary | ICD-10-CM

## 2024-02-05 DIAGNOSIS — I27.9 PULMONARY HEART DISEASE: ICD-10-CM

## 2024-02-05 DIAGNOSIS — I48.0 PAF (PAROXYSMAL ATRIAL FIBRILLATION): ICD-10-CM

## 2024-02-05 DIAGNOSIS — N18.31 STAGE 3A CHRONIC KIDNEY DISEASE: ICD-10-CM

## 2024-02-05 DIAGNOSIS — F03.918 DEMENTIA WITH OTHER BEHAVIORAL DISTURBANCE, UNSPECIFIED DEMENTIA SEVERITY, UNSPECIFIED DEMENTIA TYPE: ICD-10-CM

## 2024-02-05 DIAGNOSIS — I50.32 CHRONIC DIASTOLIC HEART FAILURE: ICD-10-CM

## 2024-02-05 DIAGNOSIS — I70.0 ATHEROSCLEROSIS OF AORTA: ICD-10-CM

## 2024-02-05 PROBLEM — I50.33 ACUTE ON CHRONIC DIASTOLIC (CONGESTIVE) HEART FAILURE: Status: RESOLVED | Noted: 2019-03-07 | Resolved: 2024-02-05

## 2024-02-05 PROCEDURE — 99999 PR PBB SHADOW E&M-EST. PATIENT-LVL V: CPT | Mod: PBBFAC,,, | Performed by: FAMILY MEDICINE

## 2024-02-05 PROCEDURE — 99214 OFFICE O/P EST MOD 30 MIN: CPT | Mod: S$GLB,,, | Performed by: FAMILY MEDICINE

## 2024-02-05 RX ORDER — ASPIRIN 81 MG/1
1 TABLET ORAL EVERY MORNING
COMMUNITY

## 2024-02-05 NOTE — PROGRESS NOTES
Subjective:       Patient ID: Elke Laws is a 89 y.o. female.    Chief Complaint: Referral    Patient presents to clinic today with her daughter.  Daughter requests referral to [facility].  She reports the patient's dementia is worsening and they desire hospice for ongoing care.  Daughter reports they have been in contact with hospice, but simply need a referral from me.  Daughter reports patient is more forgetful and has poor appetite.  They are otherwise without concerns today.      Review of Systems   Constitutional:  Negative for chills, fatigue, fever and unexpected weight change.   Eyes:  Negative for visual disturbance.   Respiratory:  Negative for shortness of breath.    Cardiovascular:  Negative for chest pain.   Musculoskeletal:  Negative for myalgias.   Neurological:  Negative for headaches.         Objective:      Physical Exam  Vitals reviewed.   Constitutional:       General: She is not in acute distress.     Appearance: She is well-developed.   HENT:      Head: Normocephalic and atraumatic.   Eyes:      General: Lids are normal. No scleral icterus.     Extraocular Movements: Extraocular movements intact.      Conjunctiva/sclera: Conjunctivae normal.      Pupils: Pupils are equal, round, and reactive to light.   Cardiovascular:      Rate and Rhythm: Normal rate and regular rhythm.      Heart sounds: No murmur heard.     No friction rub. No gallop.   Pulmonary:      Effort: Pulmonary effort is normal.      Breath sounds: Normal breath sounds. No decreased breath sounds, wheezing, rhonchi or rales.   Neurological:      Mental Status: She is alert.   Psychiatric:         Mood and Affect: Mood and affect normal.         Assessment:       1. Dementia with other behavioral disturbance, unspecified dementia severity, unspecified dementia type    2. Dementia with other behavioral disturbance, unspecified dementia severity, unspecified dementia type    3. Chronic diastolic heart failure    4. PAF  (paroxysmal atrial fibrillation)    5. Pulmonary heart disease    6. Stage 3a chronic kidney disease    7. Atherosclerosis of aorta        Plan:   1. Dementia with other behavioral disturbance, unspecified dementia severity, unspecified dementia type  Overview:  Diagnosed by Dr. Amato, [facility], July 2021, continue with current treatment plan     Orders:  -     Ambulatory referral/consult to Hospice; Future; Expected date: 02/12/2024    2. Dementia with other behavioral disturbance, unspecified dementia severity, unspecified dementia type  Overview:  Diagnosed by Dr. Amato [facility], July 2021, continue with current treatment plan     Orders:  -     Ambulatory referral/consult to Hospice; Future; Expected date: 02/12/2024    3. Chronic diastolic heart failure    4. PAF (paroxysmal atrial fibrillation)    5. Pulmonary heart disease  Overview:  Followed by Cardiology, continue current treatment plan Echo 3/2019      6. Stage 3a chronic kidney disease    7. Atherosclerosis of aorta  Overview:  CXR 12/30/2016, on ASA and statin

## 2024-02-23 ENCOUNTER — TELEPHONE (OUTPATIENT)
Dept: CARDIOLOGY | Facility: CLINIC | Age: 89
End: 2024-02-23
Payer: MEDICARE

## 2024-02-23 NOTE — TELEPHONE ENCOUNTER
Spoke to patients daughter and informed her that once patient is on hospice that they have the say so and they give the orders. She was ok with that.----- Message from Marielle Arevalo sent at 2/23/2024 10:05 AM CST -----  Pt relative Keya states pt is now in hospice but need to know how to treat pulling the pt teeth? Contact number is [phone].

## 2024-03-11 ENCOUNTER — TELEPHONE (OUTPATIENT)
Dept: INTERNAL MEDICINE | Facility: CLINIC | Age: 89
End: 2024-03-11
Payer: MEDICARE

## 2024-03-11 NOTE — TELEPHONE ENCOUNTER
----- Message from Diane uGtierrez sent at 3/11/2024  1:09 PM CDT -----  Contact: Daughter  Patient daughter Megan is calling to let  office know that patient is now on hospice. Please [phone]

## 2024-04-03 ENCOUNTER — PATIENT MESSAGE (OUTPATIENT)
Dept: PULMONOLOGY | Facility: CLINIC | Age: 89
End: 2024-04-03
Payer: MEDICARE

## 2024-05-09 ENCOUNTER — E-CONSULT (OUTPATIENT)
Dept: CARDIOLOGY | Facility: CLINIC | Age: 89
End: 2024-05-09
Payer: MEDICARE

## 2024-05-09 DIAGNOSIS — I48.0 PAF (PAROXYSMAL ATRIAL FIBRILLATION): Primary | ICD-10-CM

## 2024-05-09 DIAGNOSIS — Z01.810 PREOP CARDIOVASCULAR EXAM: Primary | ICD-10-CM

## 2024-05-09 DIAGNOSIS — Z79.899 ENCOUNTER FOR LONG-TERM (CURRENT) USE OF HIGH-RISK MEDICATION: ICD-10-CM

## 2024-05-09 DIAGNOSIS — I48.91 ATRIAL FIBRILLATION, UNSPECIFIED TYPE: ICD-10-CM

## 2024-05-09 PROCEDURE — 99451 NTRPROF PH1/NTRNET/EHR 5/>: CPT | Mod: GW,S$GLB,, | Performed by: INTERNAL MEDICINE

## 2024-05-09 NOTE — CONSULTS
[facility]  Response for E-Consult     Patient Name: Elke Laws  MRN: [id number]  Primary Care Provider: Isidra Benavidez MD   Requesting Provider: Isidra Benavidez MD  E-Consult to Cardiology  Consult performed by: Kee Jones MD  Consult ordered by: Isidra Benavidez MD           89 yo female, E consult for preop clearance of dental procedure  The chart reviewed.  PMH persistent AF, diastolic CHF, non obstructive CAD per [facility] done in , HTN and HLD, MR   GFR 39 Cr 1.3  12/23 echo showed EF nml, mod AI    Plan  Elevated periop risk of CV events for non-high risk procedure.  Ok to proceed the scheduled procedure without further cardiac study.  OK to hold ASA 7 days before the procedure and resume postop once hemodynamically stable      Total time of Consultation: 10 minute    I did not speak to the requesting provider verbally about this.     *This eConsult is based on the clinical data available to me and is furnished without benefit of a physical examination. The eConsult will need to be interpreted in light of any clinical issues or changes in patient status not available to me at the time of filing this eConsults. Significant changes in patient condition or level of acuity should result in immediate formal consultation and reevaluation. Please alert me if you have further questions.    Thank you for this eConsult referral.     Kee Jones MD  The [address]

## 2024-11-07 ENCOUNTER — HOSPITAL ENCOUNTER (OUTPATIENT)
Dept: CARDIOLOGY | Facility: HOSPITAL | Age: 89
Discharge: HOME | End: 2024-11-07
Attending: INTERNAL MEDICINE
Payer: MEDICARE

## 2024-11-07 ENCOUNTER — OFFICE VISIT (OUTPATIENT)
Dept: CARDIOLOGY | Facility: CLINIC | Age: 89
End: 2024-11-07
Payer: MEDICARE

## 2024-11-07 VITALS — HEART RATE: 58 BPM | DIASTOLIC BLOOD PRESSURE: 60 MMHG | SYSTOLIC BLOOD PRESSURE: 110 MMHG | OXYGEN SATURATION: 97 %

## 2024-11-07 DIAGNOSIS — I38 HEART VALVE REGURGITATION: ICD-10-CM

## 2024-11-07 DIAGNOSIS — R06.09 DYSPNEA ON EXERTION: ICD-10-CM

## 2024-11-07 DIAGNOSIS — I48.0 PAF (PAROXYSMAL ATRIAL FIBRILLATION): Primary | ICD-10-CM

## 2024-11-07 DIAGNOSIS — I70.0 ATHEROSCLEROSIS OF AORTA: ICD-10-CM

## 2024-11-07 DIAGNOSIS — I10 ESSENTIAL HYPERTENSION: ICD-10-CM

## 2024-11-07 DIAGNOSIS — F03.918 DEMENTIA WITH OTHER BEHAVIORAL DISTURBANCE, UNSPECIFIED DEMENTIA SEVERITY, UNSPECIFIED DEMENTIA TYPE: ICD-10-CM

## 2024-11-07 DIAGNOSIS — E66.3 OVERWEIGHT (BMI 25.0-29.9): ICD-10-CM

## 2024-11-07 DIAGNOSIS — E78.5 HYPERLIPIDEMIA, UNSPECIFIED HYPERLIPIDEMIA TYPE: Chronic | ICD-10-CM

## 2024-11-07 DIAGNOSIS — I51.89 LEFT VENTRICULAR DIASTOLIC DYSFUNCTION WITH PRESERVED SYSTOLIC FUNCTION: Chronic | ICD-10-CM

## 2024-11-07 DIAGNOSIS — E66.01 MORBID (SEVERE) OBESITY DUE TO EXCESS CALORIES: ICD-10-CM

## 2024-11-07 DIAGNOSIS — I48.20 CHRONIC ATRIAL FIBRILLATION: ICD-10-CM

## 2024-11-07 DIAGNOSIS — I25.10 CORONARY ARTERY DISEASE INVOLVING NATIVE CORONARY ARTERY OF NATIVE HEART WITHOUT ANGINA PECTORIS: Primary | Chronic | ICD-10-CM

## 2024-11-07 DIAGNOSIS — I48.0 PAF (PAROXYSMAL ATRIAL FIBRILLATION): ICD-10-CM

## 2024-11-07 DIAGNOSIS — J45.20 MILD INTERMITTENT ASTHMA WITHOUT COMPLICATION: ICD-10-CM

## 2024-11-07 PROCEDURE — 93005 ELECTROCARDIOGRAM TRACING: CPT

## 2024-11-07 PROCEDURE — 93010 ELECTROCARDIOGRAM REPORT: CPT | Mod: ,,, | Performed by: INTERNAL MEDICINE

## 2024-11-07 PROCEDURE — 1159F MED LIST DOCD IN RCRD: CPT | Mod: CPTII,S$GLB,, | Performed by: INTERNAL MEDICINE

## 2024-11-07 PROCEDURE — 99214 OFFICE O/P EST MOD 30 MIN: CPT | Mod: S$GLB,,, | Performed by: INTERNAL MEDICINE

## 2024-11-07 PROCEDURE — 1100F PTFALLS ASSESS-DOCD GE2>/YR: CPT | Mod: CPTII,S$GLB,, | Performed by: INTERNAL MEDICINE

## 2024-11-07 PROCEDURE — 99999 PR PBB SHADOW E&M-EST. PATIENT-LVL III: CPT | Mod: PBBFAC,,, | Performed by: INTERNAL MEDICINE

## 2024-11-07 PROCEDURE — 3288F FALL RISK ASSESSMENT DOCD: CPT | Mod: CPTII,S$GLB,, | Performed by: INTERNAL MEDICINE

## 2024-11-07 PROCEDURE — 1160F RVW MEDS BY RX/DR IN RCRD: CPT | Mod: CPTII,S$GLB,, | Performed by: INTERNAL MEDICINE

## 2024-11-07 PROCEDURE — 1157F ADVNC CARE PLAN IN RCRD: CPT | Mod: CPTII,S$GLB,, | Performed by: INTERNAL MEDICINE

## 2024-11-07 NOTE — PROGRESS NOTES
Subjective:   Patient ID:  Elke Laws is a 89 y.o. female who presents for follow up of No chief complaint on file.      HPI  SHERIN STRATTON NP 10/8/2021  Ms. Laws' current conditions include persistent AF, diastolic CHF, non obstructive CAD per [facility] done in , HTN and HLD, MR  Family reports that patient's oxygen has been discontinued.      Admitted in May 2019 for A-fib with RVR. She had failed CV x 2 during admission. DC'd with [facility]. No BB restarted at that time due to bradycardia during admission. Patient declined follow up with EP ablation and PPM consideration and opted for conservative medical management.      Has no abnormal bleeding on Eliquis      Recently diagnosed with Dementia. Her sister in law, Alcira, is now helping care for her. Her son Calixto Laws is now POA     Has no complaints today.   Denies any chest pain, SOB, DIAZ,  orthopnea, PND, dizziness, palpitations,  near syncope, syncope or edema . Has no symptoms concerning for angina or equivalent. No CNS Complaints to suggest TIA or CVA. Does well with limiting sodium intake.     Currently taking Lasix 40mg BID and Metolazone 2.5mg on MF.      4/14/2022   HEREE FOR F/U HAS LOST WEIGHT SHE IS STILL COOKING TAKING CARE OF HOUSE WORK HAS NO CHEST PAIN NO SHORTNESS OF BREATH TURCIOS S NO LEG SWELLING  HAS VARICOSE VEINS THAT CAN LEAD TO SWELLING IF SHE IS DEPENDENT FOR A LONGTIME. SHE IS COMPLIANT WITH MEDICAL THERAPY .     10/27/2022  Lives with her daughter now has lost weight diet appropraite. Has no chest pain or shortness of breath not taking metolazone . She is still on lasix bid her bp is on the low side. Has no falls.      5/8/2023   She walks with cane walker . Her blood pressure is controlled. She is tolerated low dose diuretics no cardiac symptoms no bleeding. No chf symptoms appetite ok. Has not much bruising. He [facility] is taking good care of her. She goes to  on aging node hydrations no falls.     11/7/2024   Had gi bleed  "around thanksgiving 11/2023 not taken any eliquis  she is on asa now she is in nursing home after a hospice transition she is asymptomatic cardiovascular wise. She does not each much. Has no other cardiovascular complaints.no falls  Past Medical History:   Diagnosis Date    Abnormal nuclear stress test 6/30/2016    Acute congestive heart failure 5/27/2017    Acute coronary syndrome     Acute kidney injury (nontraumatic) 5/27/2017    Acute on chronic congestive heart failure 5/21/2017    Acute on chronic diastolic congestive heart failure 8/27/2015    Acute on chronic respiratory failure 5/6/2019    Acute renal failure superimposed on stage 3 chronic kidney disease 1/26/2018    Anemia 5/9/2016    Angina pectoris 1/25/2018    Angina pectoris 1/25/2018    Anticoagulant long-term use     Anxiety 6/16/2019    Aortic regurgitation     Echo 11/2013---5 - Mild to moderate aortic regurgitation.      Asthma     Atrial fibrillation 5/15/2014    Back pain     s/p epidural steriod injections, no recent injections.    Bridges's cyst     small, right, seen on US lower extremity 6/2014    Blood transfusion     Approximately 6 years ago    Chronic constipation     Coronary artery disease     Degenerative disc disease, lumbar     Dementia 10/8/2021    Diastolic dysfunction     Seen on echo 11/2013, stress test negative 5/2014    Diverticulosis     colonoscopy 3/27/2011    E coli bacteremia 5/23/2017    E. coli UTI (urinary tract infection) 5/23/2017    Hemorrhoids     colonoscopy 3/27/2011    Hiatal hernia     CXR 12/30/2016---Retrocardiac density again noted consistent with a hiatal hernia.    Hx of adenomatous colonic polyps     Hyperlipidemia     Hypertension     Hyponatremia 5/9/2016    Hypoxemia 5/27/2017    Hypoxia 6/28/2017    Leukocytosis 5/27/2017    Mitral regurgitation     Myocardial infarction     per patient was told in the past she had a "mild heart attack"    Obesity     Osteoarthritis, knee     Pneumonia     per patient " ""walking Pneumonia" did not require hospitalization    Seasonal allergies     Sepsis 2017    Severe obesity with body mass index (BMI) of 35.0 to 39.9 with serious comorbidity     Trouble in sleeping     Urinary incontinence        Past Surgical History:   Procedure Laterality Date    APPENDECTOMY      CATARACT EXTRACTION, BILATERAL      COLONOSCOPY N/A 2017    Procedure: COLONOSCOPY okay by dr. ramos;  Surgeon: Toño Abdi MD;  Location: [facility] ENDO;  Service: Endoscopy;  Laterality: N/A;    EYE SURGERY Left     HYSTERECTOMY      benign reasons    KNEE SURGERY Bilateral     x2     Left heart cath      OLECRANON BURSECTOMY Right     2011    TONSILLECTOMY      TREATMENT OF CARDIAC ARRHYTHMIA N/A 2019    Procedure: CARDIOVERSION OR DEFIBRILLATION;  Surgeon: Kee Jones MD;  Location: [facility] CATH LAB;  Service: Cardiology;  Laterality: N/A;    TREATMENT OF CARDIAC ARRHYTHMIA N/A 2019    Procedure: CARDIOVERSION;  Surgeon: Juan Manuel Gooden MD;  Location: [facility] CATH LAB;  Service: Cardiology;  Laterality: N/A;    URETHRA SURGERY         Social History     Tobacco Use    Smoking status: Former     Current packs/day: 0.00     Average packs/day: 0.1 packs/day for 1 year (0.1 ttl pk-yrs)     Types: Cigarettes     Start date: 1951     Quit date: 1952     Years since quittin.9    Smokeless tobacco: Never   Substance Use Topics    Alcohol use: No     Alcohol/week: 0.0 standard drinks of alcohol    Drug use: No       Family History   Problem Relation Name Age of Onset    Heart disease Mother      Cancer Mother          colon    Hypertension Mother      Hyperlipidemia Mother      Heart disease Father      Hypertension Father      Hyperlipidemia Father      Heart disease Sister          MI    Heart disease Brother          MI    COPD Son      Hypertension Son      Diabetes Brother      Diabetes Son      Cancer Sister          breast    Kidney disease Neg Hx      Stroke Neg Hx         Current Outpatient " Medications   Medication Sig    acetaminophen (TYLENOL) 500 MG tablet Take 500 mg by mouth every 6 (six) hours as needed for Pain.    ascorbic acid, vitamin C, (VITAMIN C) 500 MG tablet Take 500 mg by mouth once daily.    aspirin (ECOTRIN) 81 MG EC tablet Take 1 tablet by mouth every morning.    calcium polycarbophil (FIBER-TABS ORAL) Take by mouth once daily.    cyanocobalamin (VITAMIN B-12) 1000 MCG tablet Take 100 mcg by mouth once daily.    ergocalciferol, vitamin D2, (VITAMIN D ORAL) Take by mouth once daily.    furosemide (LASIX) 40 MG tablet Take 1 tablet (40 mg total) by mouth once daily.    memantine (NAMENDA) 10 MG Tab Take 5 mg by mouth 2 (two) times daily.    metoprolol tartrate (LOPRESSOR) 50 MG tablet Take 0.5 tablets (25 mg total) by mouth 2 (two) times daily.    omega 3-dha-epa-fish oil 1,000 mg (120 mg-180 mg) Cap Take 1 capsule by mouth once daily.    pravastatin (PRAVACHOL) 40 MG tablet Take 1 tablet (40 mg total) by mouth every evening.    senna (SENOKOT) 8.6 mg tablet Take 1 tablet by mouth daily as needed for Constipation.    vitamin E 400 UNIT capsule Take 400 Units by mouth once daily.     No current facility-administered medications for this visit.     Current Outpatient Medications on File Prior to Visit   Medication Sig    acetaminophen (TYLENOL) 500 MG tablet Take 500 mg by mouth every 6 (six) hours as needed for Pain.    ascorbic acid, vitamin C, (VITAMIN C) 500 MG tablet Take 500 mg by mouth once daily.    aspirin (ECOTRIN) 81 MG EC tablet Take 1 tablet by mouth every morning.    calcium polycarbophil (FIBER-TABS ORAL) Take by mouth once daily.    cyanocobalamin (VITAMIN B-12) 1000 MCG tablet Take 100 mcg by mouth once daily.    ergocalciferol, vitamin D2, (VITAMIN D ORAL) Take by mouth once daily.    furosemide (LASIX) 40 MG tablet Take 1 tablet (40 mg total) by mouth once daily.    memantine (NAMENDA) 10 MG Tab Take 5 mg by mouth 2 (two) times daily.    metoprolol tartrate (LOPRESSOR)  50 MG tablet Take 0.5 tablets (25 mg total) by mouth 2 (two) times daily.    omega 3-dha-epa-fish oil 1,000 mg (120 mg-180 mg) Cap Take 1 capsule by mouth once daily.    pravastatin (PRAVACHOL) 40 MG tablet Take 1 tablet (40 mg total) by mouth every evening.    senna (SENOKOT) 8.6 mg tablet Take 1 tablet by mouth daily as needed for Constipation.    vitamin E 400 UNIT capsule Take 400 Units by mouth once daily.     No current facility-administered medications on file prior to visit.     Review of patient's allergies indicates:   Allergen Reactions    Iodine Shortness Of Breath     Tongue swelling    Shrimp Other (See Comments)     Tongue swells up    Iodine and iodide containing products Rash      Review of Systems   Constitutional: Negative for diaphoresis, malaise/fatigue and weight gain.   HENT:  Negative for hoarse voice.    Eyes:  Negative for double vision and visual disturbance.   Cardiovascular:  Negative for chest pain, claudication, cyanosis, dyspnea on exertion, irregular heartbeat, leg swelling, near-syncope, orthopnea, palpitations, paroxysmal nocturnal dyspnea and syncope.   Respiratory:  Negative for cough, hemoptysis, shortness of breath and snoring.    Hematologic/Lymphatic: Negative for bleeding problem. Does not bruise/bleed easily.   Skin:  Negative for color change and poor wound healing.   Musculoskeletal:  Negative for muscle cramps, muscle weakness and myalgias.   Gastrointestinal:  Negative for bloating, abdominal pain, change in bowel habit, diarrhea, heartburn, hematemesis, hematochezia, melena and nausea.   Neurological:  Negative for excessive daytime sleepiness, dizziness, headaches, light-headedness, loss of balance, numbness and weakness.   Psychiatric/Behavioral:  Positive for memory loss. The patient does not have insomnia.    Allergic/Immunologic: Negative for hives.       Objective:   Physical Exam  Vitals and nursing note reviewed.   Constitutional:       General: She is not in  acute distress.     Appearance: Normal appearance. She is well-developed. She is not ill-appearing.   HENT:      Head: Normocephalic and atraumatic.   Eyes:      General: No scleral icterus.     Pupils: Pupils are equal, round, and reactive to light.   Neck:      Thyroid: No thyromegaly.      Vascular: Normal carotid pulses. No carotid bruit, hepatojugular reflux or JVD.      Trachea: No tracheal deviation.   Cardiovascular:      Rate and Rhythm: Normal rate. Rhythm irregular.      Pulses: Normal pulses.      Heart sounds: Murmur heard.      High-pitched blowing holosystolic murmur is present with a grade of 2/6 at the apex.      Diastolic murmur is present with a grade of 2/4.      No friction rub. No gallop.   Pulmonary:      Effort: Pulmonary effort is normal. No respiratory distress.      Breath sounds: Normal breath sounds. No wheezing, rhonchi or rales.   Chest:      Chest wall: No tenderness.   Abdominal:      General: Bowel sounds are normal. There is no abdominal bruit.      Palpations: Abdomen is soft. There is no hepatomegaly or pulsatile mass.      Tenderness: There is no abdominal tenderness.   Musculoskeletal:      Right shoulder: No deformity.      Cervical back: Normal range of motion and neck supple.      Right lower leg: No edema.      Left lower leg: No edema.   Skin:     General: Skin is warm and dry.      Findings: No erythema or rash.      Nails: There is no clubbing.   Neurological:      Mental Status: She is alert and oriented to person, place, and time.      Cranial Nerves: No cranial nerve deficit.      Coordination: Coordination normal.   Psychiatric:         Speech: Speech normal.         Behavior: Behavior normal.       Vitals:    11/07/24 1605 11/07/24 1610   BP: 118/60 110/60   BP Location: Left arm Right arm   Patient Position: Sitting Sitting   Pulse: (!) 58    SpO2: 97%      Lab Results   Component Value Date    CHOL 138 04/24/2023    CHOL 151 10/13/2022    CHOL 159 10/04/2021       There is no height or weight on file to calculate BMI.   Lab Results   Component Value Date    HGBA1C 5.2 04/24/2023      BMP  Lab Results   Component Value Date     11/22/2023    K 3.0 (L) 11/22/2023    CL 96 11/22/2023    CO2 26 11/22/2023    BUN 38 (H) 11/22/2023    CREATININE 0.9 11/22/2023    CALCIUM 8.6 (L) 11/22/2023    ANIONGAP 16 11/22/2023    EGFRNORACEVR >60 11/22/2023      Lab Results   Component Value Date    HDL 61 04/24/2023    HDL 51 10/13/2022    HDL 63 10/04/2021     Lab Results   Component Value Date    LDLCALC 68.8 04/24/2023    LDLCALC 85.0 10/13/2022    LDLCALC 80.0 10/04/2021     Lab Results   Component Value Date    TRIG 41 04/24/2023    TRIG 75 10/13/2022    TRIG 80 10/04/2021     Lab Results   Component Value Date    CHOLHDL 44.2 04/24/2023    CHOLHDL 33.8 10/13/2022    CHOLHDL 39.6 10/04/2021       Chemistry        Component Value Date/Time     11/22/2023 0411    K 3.0 (L) 11/22/2023 0411    CL 96 11/22/2023 0411    CO2 26 11/22/2023 0411    BUN 38 (H) 11/22/2023 0411    CREATININE 0.9 11/22/2023 0411    GLU 82 11/22/2023 0411        Component Value Date/Time    CALCIUM 8.6 (L) 11/22/2023 0411    ALKPHOS 72 11/21/2023 1103    AST 19 11/21/2023 1103    ALT 14 11/21/2023 1103    BILITOT 0.7 11/21/2023 1103    ESTGFRAFRICA >60.0 04/14/2022 1444    EGFRNONAA >60.0 04/14/2022 1444          Lab Results   Component Value Date    TSH 2.106 01/19/2023     Lab Results   Component Value Date    INR 1.1 11/21/2023    INR 1.0 03/05/2022    INR 1.0 05/21/2019     Lab Results   Component Value Date    WBC 6.4 12/15/2023    HGB 11.1 12/15/2023    HCT 34.4 12/15/2023    MCV 97 11/27/2023     12/15/2023     BMP  Sodium   Date Value Ref Range Status   11/22/2023 138 136 - 145 mmol/L Final     Potassium   Date Value Ref Range Status   11/22/2023 3.0 (L) 3.5 - 5.1 mmol/L Final     Chloride   Date Value Ref Range Status   11/22/2023 96 95 - 110 mmol/L Final     CO2   Date Value Ref Range  Status   11/22/2023 26 23 - 29 mmol/L Final     BUN   Date Value Ref Range Status   11/22/2023 38 (H) 8 - 23 mg/dL Final     Creatinine   Date Value Ref Range Status   11/22/2023 0.9 0.5 - 1.4 mg/dL Final     Calcium   Date Value Ref Range Status   11/22/2023 8.6 (L) 8.7 - 10.5 mg/dL Final     Anion Gap   Date Value Ref Range Status   11/22/2023 16 8 - 16 mmol/L Final     eGFR if    Date Value Ref Range Status   04/14/2022 >60.0 >60 mL/min/1.73 m^2 Final     eGFR if non    Date Value Ref Range Status   04/14/2022 >60.0 >60 mL/min/1.73 m^2 Final     Comment:     Calculation used to obtain the estimated glomerular filtration  rate (eGFR) is the CKD-EPI equation.        CrCl cannot be calculated (Patient's most recent lab result is older than the maximum 7 days allowed.).    Assessment:     1. Mild nonobstructive CAD per [facility] in 2016    2. Morbid (severe) obesity due to excess calories    3. Hyperlipidemia, unspecified hyperlipidemia type    4. Left ventricular diastolic dysfunction with preserved systolic function    5. Essential hypertension    6. Overweight (BMI 25.0-29.9)    7. Mild intermittent asthma without complication    8. Atherosclerosis of aorta    9. Heart valve regurgitation    10. Dyspnea on exertion    11. Dementia with other behavioral disturbance, unspecified dementia severity, unspecified dementia type    12. Chronic atrial fibrillation    Chronic afib rate controlled not noac candidate due to gi bleed continue asa   Htn controlled  continue same    Dementia stable in nursing home continue supportive care.  Hlp on statins ldl on target will check labs in nursing home  Non obs cad on asa asymptomatic continue same.  Atherosclerosis aorta on asa statins continue same.      Plan:     Continue current therapy  Cardiac low salt diet.  Risk factor modification and excercise program.  F/u iyearly   Labs at nursing home       Yes

## 2024-11-08 LAB
OHS QRS DURATION: 66 MS
OHS QTC CALCULATION: 415 MS

## 2025-01-15 ENCOUNTER — TELEPHONE (OUTPATIENT)
Dept: CARDIOLOGY | Facility: CLINIC | Age: OVER 89
End: 2025-01-15
Payer: MEDICARE

## 2025-01-15 NOTE — TELEPHONE ENCOUNTER
Planned Procedure: dental extractions    Date of Procedure: tbd    Referring Provider: [facility]    Recommendations:     Antibiotic Prophylaxis: SBE Prophylactic Antibiotic Therapy as per recommendations of AHA Guidelines     Anticoagulants: NA  How long to stop before treatment?   When to restart after treatment?     Is Epinephrine OK? no    Risk Assessment is Low/ Medium/ High: ?    Office Contact:   Phone: [phone]  Fax: [phone]

## 2025-01-15 NOTE — TELEPHONE ENCOUNTER
----- Message from ADE Lnidsay sent at 1/15/2025 11:43 AM CST -----    ----- Message -----  From: Yoanna Shaikh LPN  Sent: 1/15/2025  11:11 AM CST  To: Tish Angulo MD; [facility] Staff    Please advise      [facility] are requesting cardiac clearance for extractions for PT. Last seen 11/7/24    Phone [phone]  Fax [phone]

## 2025-03-12 ENCOUNTER — PATIENT OUTREACH (OUTPATIENT)
Dept: ADMINISTRATIVE | Facility: CLINIC | Age: OVER 89
End: 2025-03-12
Payer: MEDICARE

## 2025-03-12 NOTE — PROGRESS NOTES
TCC call not required; pt not applicable as patient was discharge to [facility].